# Patient Record
Sex: FEMALE | Race: ASIAN | NOT HISPANIC OR LATINO
[De-identification: names, ages, dates, MRNs, and addresses within clinical notes are randomized per-mention and may not be internally consistent; named-entity substitution may affect disease eponyms.]

---

## 2020-08-07 PROBLEM — Z00.00 ENCOUNTER FOR PREVENTIVE HEALTH EXAMINATION: Status: ACTIVE | Noted: 2020-08-07

## 2020-08-10 ENCOUNTER — APPOINTMENT (OUTPATIENT)
Dept: CARDIOLOGY | Facility: CLINIC | Age: 65
End: 2020-08-10

## 2021-06-18 ENCOUNTER — EMERGENCY (EMERGENCY)
Facility: HOSPITAL | Age: 66
LOS: 1 days | Discharge: HOME | End: 2021-06-18
Attending: EMERGENCY MEDICINE
Payer: MEDICARE

## 2021-06-18 VITALS
WEIGHT: 210.1 LBS | OXYGEN SATURATION: 97 % | HEART RATE: 58 BPM | SYSTOLIC BLOOD PRESSURE: 209 MMHG | TEMPERATURE: 99 F | HEIGHT: 60 IN | DIASTOLIC BLOOD PRESSURE: 79 MMHG | RESPIRATION RATE: 18 BRPM

## 2021-06-18 LAB
ALBUMIN SERPL ELPH-MCNC: 3.7 G/DL — SIGNIFICANT CHANGE UP (ref 3.3–5)
ALP SERPL-CCNC: 90 U/L — SIGNIFICANT CHANGE UP (ref 40–120)
ALT FLD-CCNC: 32 U/L — SIGNIFICANT CHANGE UP (ref 10–45)
ANION GAP SERPL CALC-SCNC: 13 MMOL/L — SIGNIFICANT CHANGE UP (ref 5–17)
APTT BLD: 32 SEC — SIGNIFICANT CHANGE UP (ref 27.5–35.5)
AST SERPL-CCNC: 37 U/L — SIGNIFICANT CHANGE UP (ref 10–40)
BASOPHILS # BLD AUTO: 0.02 K/UL — SIGNIFICANT CHANGE UP (ref 0–0.2)
BASOPHILS NFR BLD AUTO: 0.4 % — SIGNIFICANT CHANGE UP (ref 0–2)
BILIRUB SERPL-MCNC: 0.4 MG/DL — SIGNIFICANT CHANGE UP (ref 0.2–1.2)
BLD GP AB SCN SERPL QL: NEGATIVE — SIGNIFICANT CHANGE UP
BUN SERPL-MCNC: 18 MG/DL — SIGNIFICANT CHANGE UP (ref 7–23)
CALCIUM SERPL-MCNC: 9.8 MG/DL — SIGNIFICANT CHANGE UP (ref 8.4–10.5)
CHLORIDE SERPL-SCNC: 102 MMOL/L — SIGNIFICANT CHANGE UP (ref 96–108)
CO2 SERPL-SCNC: 22 MMOL/L — SIGNIFICANT CHANGE UP (ref 22–31)
CREAT SERPL-MCNC: 0.72 MG/DL — SIGNIFICANT CHANGE UP (ref 0.5–1.3)
EOSINOPHIL # BLD AUTO: 0.15 K/UL — SIGNIFICANT CHANGE UP (ref 0–0.5)
EOSINOPHIL NFR BLD AUTO: 2.8 % — SIGNIFICANT CHANGE UP (ref 0–6)
GLUCOSE SERPL-MCNC: 108 MG/DL — HIGH (ref 70–99)
HCT VFR BLD CALC: 45.3 % — HIGH (ref 34.5–45)
HGB BLD-MCNC: 14.1 G/DL — SIGNIFICANT CHANGE UP (ref 11.5–15.5)
IMM GRANULOCYTES NFR BLD AUTO: 0.2 % — SIGNIFICANT CHANGE UP (ref 0–1.5)
INR BLD: 1.04 RATIO — SIGNIFICANT CHANGE UP (ref 0.88–1.16)
LYMPHOCYTES # BLD AUTO: 1.82 K/UL — SIGNIFICANT CHANGE UP (ref 1–3.3)
LYMPHOCYTES # BLD AUTO: 34.5 % — SIGNIFICANT CHANGE UP (ref 13–44)
MCHC RBC-ENTMCNC: 25.5 PG — LOW (ref 27–34)
MCHC RBC-ENTMCNC: 31.1 GM/DL — LOW (ref 32–36)
MCV RBC AUTO: 81.8 FL — SIGNIFICANT CHANGE UP (ref 80–100)
MONOCYTES # BLD AUTO: 0.39 K/UL — SIGNIFICANT CHANGE UP (ref 0–0.9)
MONOCYTES NFR BLD AUTO: 7.4 % — SIGNIFICANT CHANGE UP (ref 2–14)
NEUTROPHILS # BLD AUTO: 2.89 K/UL — SIGNIFICANT CHANGE UP (ref 1.8–7.4)
NEUTROPHILS NFR BLD AUTO: 54.7 % — SIGNIFICANT CHANGE UP (ref 43–77)
NRBC # BLD: 0 /100 WBCS — SIGNIFICANT CHANGE UP (ref 0–0)
PLATELET # BLD AUTO: 177 K/UL — SIGNIFICANT CHANGE UP (ref 150–400)
POTASSIUM SERPL-MCNC: 5 MMOL/L — SIGNIFICANT CHANGE UP (ref 3.5–5.3)
POTASSIUM SERPL-SCNC: 5 MMOL/L — SIGNIFICANT CHANGE UP (ref 3.5–5.3)
PROT SERPL-MCNC: 8 G/DL — SIGNIFICANT CHANGE UP (ref 6–8.3)
PROTHROM AB SERPL-ACNC: 12.4 SEC — SIGNIFICANT CHANGE UP (ref 10.6–13.6)
RBC # BLD: 5.54 M/UL — HIGH (ref 3.8–5.2)
RBC # FLD: 15.7 % — HIGH (ref 10.3–14.5)
RH IG SCN BLD-IMP: POSITIVE — SIGNIFICANT CHANGE UP
SODIUM SERPL-SCNC: 137 MMOL/L — SIGNIFICANT CHANGE UP (ref 135–145)
WBC # BLD: 5.28 K/UL — SIGNIFICANT CHANGE UP (ref 3.8–10.5)
WBC # FLD AUTO: 5.28 K/UL — SIGNIFICANT CHANGE UP (ref 3.8–10.5)

## 2021-06-18 PROCEDURE — 93971 EXTREMITY STUDY: CPT

## 2021-06-18 PROCEDURE — 99283 EMERGENCY DEPT VISIT LOW MDM: CPT

## 2021-06-18 PROCEDURE — 86900 BLOOD TYPING SEROLOGIC ABO: CPT

## 2021-06-18 PROCEDURE — 73590 X-RAY EXAM OF LOWER LEG: CPT | Mod: 26,LT

## 2021-06-18 PROCEDURE — 93005 ELECTROCARDIOGRAM TRACING: CPT

## 2021-06-18 PROCEDURE — 85025 COMPLETE CBC W/AUTO DIFF WBC: CPT

## 2021-06-18 PROCEDURE — 85730 THROMBOPLASTIN TIME PARTIAL: CPT

## 2021-06-18 PROCEDURE — 99284 EMERGENCY DEPT VISIT MOD MDM: CPT | Mod: 25

## 2021-06-18 PROCEDURE — 86850 RBC ANTIBODY SCREEN: CPT

## 2021-06-18 PROCEDURE — U0003: CPT

## 2021-06-18 PROCEDURE — 99284 EMERGENCY DEPT VISIT MOD MDM: CPT

## 2021-06-18 PROCEDURE — 73610 X-RAY EXAM OF ANKLE: CPT | Mod: 26,LT

## 2021-06-18 PROCEDURE — 73610 X-RAY EXAM OF ANKLE: CPT

## 2021-06-18 PROCEDURE — 73590 X-RAY EXAM OF LOWER LEG: CPT

## 2021-06-18 PROCEDURE — 93010 ELECTROCARDIOGRAM REPORT: CPT

## 2021-06-18 PROCEDURE — 80053 COMPREHEN METABOLIC PANEL: CPT

## 2021-06-18 PROCEDURE — 73562 X-RAY EXAM OF KNEE 3: CPT | Mod: 26,LT

## 2021-06-18 PROCEDURE — 85610 PROTHROMBIN TIME: CPT

## 2021-06-18 PROCEDURE — 93971 EXTREMITY STUDY: CPT | Mod: 26,LT

## 2021-06-18 PROCEDURE — 86901 BLOOD TYPING SEROLOGIC RH(D): CPT

## 2021-06-18 PROCEDURE — 73562 X-RAY EXAM OF KNEE 3: CPT

## 2021-06-18 PROCEDURE — U0005: CPT

## 2021-06-18 RX ORDER — ACETAMINOPHEN 500 MG
975 TABLET ORAL ONCE
Refills: 0 | Status: COMPLETED | OUTPATIENT
Start: 2021-06-18 | End: 2021-06-18

## 2021-06-18 RX ADMIN — Medication 975 MILLIGRAM(S): at 22:04

## 2021-06-18 NOTE — ED ADULT NURSE NOTE - OBJECTIVE STATEMENT
66 year old female with a past medical history of hypertension and high cholesterol presents to the ED c/o left leg/ ankle swelling and pain. Pt is A&Ox3, speaking coherently. Pt reports fracturing her left ankle "a few weeks ago" s/p fall. Pt was in [place] at this time, and was seen and evaluated for the fracture there. [place] back to the [place] from [place] on 6/16. Today, pt presents with increased pain and swelling to the left leg/ ankle. On exam, left foot is swollen and painful to the touch. Pt denies fever, chills, shortness of breath, nausea, vomiting, diarrhea. Pt is not on any anticoagulation.

## 2021-06-18 NOTE — ED ADULT NURSE NOTE - TEMPLATE LIST FOR HEAD TO TOE ASSESSMENT
[address] [address] [address]  [address] [place] [address]  Phone: [phone]  Fax: [phone]    Perry Quach NP        March 7, 2018     Patient: Lyly Chakraborty   YOB: 2003   Date of Visit: 3/7/2018       To Whom It May Concern: It is my medical opinion that Lyly Chakraborty should remain out of school 3/7 and 3/8/2018 due to illness. If you have any questions or concerns, please don't hesitate to call.     Sincerely,        Perry Quach NP General

## 2021-06-18 NOTE — CONSULT NOTE ADULT - SUBJECTIVE AND OBJECTIVE BOX
Patient is a 66yFemale community ambulator without assistive devices who presents to [facility] ED w/ a c/o of a left tibial shaft frcture nonunnion. Ptaient is Albanian speaking, daughter at bedside. Patient fell 3 months ago while in [place]. Was treated nonoperatively with a cast for the left tibial shaft fracture. The cast was removed after 6 weeks, and the patient has not ambulated since the fall. Patient has a deformity to the left LE but denies pain. Denies radiation of pain elsewhere. States inability to ambulate since the injury. Denies any numbness or tingling. Denies having any other pain elsewhere. Denies any previous orthopaedic history. No other orthopaedic concerns at this time.    PAST MEDICAL & SURGICAL HISTORY:      No Known Allergies      PHYSICAL EXAM:  T(C): 36.6 (06-18-21 @ 22:19), Max: 37.1 (06-18-21 @ 18:39)  HR: 58 (06-18-21 @ 22:19) (58 - 58)  BP: 160/79 (06-18-21 @ 22:19) (160/79 - 209/79)  RR: 20 (06-18-21 @ 22:19) (18 - 20)  SpO2: 96% (06-18-21 @ 22:19) (96% - 97%)    Gen: NAD, Resting comfortably    LLE:  Skin intact, no erythema or ecchymosis. Notable deformity to the distal tibia which is stable and bony  No bony tenderness to palpation  Stable to SLR, and varus/valgus stress to the previous fracture site  +EHL/FHL/TA/GSC  +SILT L3-S1  + DP  Compartments soft and compressible  No calf tenderness    Secondary Survey:   No TTP over bony prominences, SILT, palpable pulses, full/painless A/PROM, compartments soft. No TTP over spinous processes or paraspinal muscles at C/T/L spine. No palpable step off. No other injuries or complaints.      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 Duenas outpatient on Monday. Call office to schedule appointment.  -All Patient's and Family Member's questions answered. Patient/family understand and agree w/ plan. Discussed with Daughter at bedside.  -Imaging and clinical presentation discussed w/ Dr. Duenas who is aware and agrees w/ above plan.

## 2021-06-18 NOTE — ED PROVIDER NOTE - PATIENT PORTAL LINK FT
You can access the FollowMyHealth Patient Portal offered by [facility] by registering at the following website: [email or web address]. By joining [employer]’s FollowMyHealth portal, you will also be able to view your health information using other applications (apps) compatible with our system.

## 2021-06-18 NOTE — ED PROVIDER NOTE - CARE PROVIDER_API CALL
Jimenez Duenas (MD)  Orthopaedic Surgery  [address], [address]  [place] [address]  Phone: [phone]  Fax: [phone]  Scheduled Appointment: 06/21/2021

## 2021-06-18 NOTE — ED PROVIDER NOTE - ATTENDING CONTRIBUTION TO CARE
Attending MD Sanchez:   I personally have seen and examined this patient.  Physician assistant note reviewed and agree on plan of care and except where noted.  See HPI, PE, and MDM for details.

## 2021-06-18 NOTE — ED PROVIDER NOTE - PROGRESS NOTE DETAILS
Attending MD Sanchez: orthopedics has seen patient. Non union will certainly need surgery, however ortho team feels surgery is best planned as outpatient. Patient should remain NWB on LLE until then. Plan for follow up Monday with Dr. Duenas. Will follow up US ro DVT

## 2021-06-18 NOTE — ED PROVIDER NOTE - CARE PLAN
Principal Discharge DX:	Closed displaced pilon fracture of left tibia with nonunion, subsequent encounter  Secondary Diagnosis:	Closed fracture of distal end of left fibula with nonunion, unspecified fracture morphology, subsequent encounter

## 2021-06-18 NOTE — ED PROVIDER NOTE - PHYSICAL EXAMINATION
A&Ox3, NAD. NCAT. PERRL, EOMI. Neck supple, no LAD. Lungs CTAB. +S1S2, RRR, No m/r/g. Abd soft, NT/ND, +BS, no rebound or guarding. Extremities: cap refill <2, pulses in distal L extremities 4+,  Skin without rash. CN II-XII intact. Strength 5/5 UE/LE. Sensations intact throughout. + LLE valgus deformity of the ankle, + 2+ edema, + pulses appreciated by doppler.

## 2021-06-18 NOTE — ED PROVIDER NOTE - NSFOLLOWUPINSTRUCTIONS_ED_ALL_ED_FT
1. You were seen in the ED for ankle pain. Your x-rays show non-union (failure of the bone to heal correctly) of a previously tibia and fibula fracture.      2. This fracture will need surgery to fix it. You were seen by our orthopedics doctors who would like you to follow up with Dr Jerry Duenas this Monday. Please call the office first thing in the morning to schedule the appointment. (number provided).      3. You may use Tylenol 650mg every 8 hours or Motrin 600mg every 8 hours as needed for pain. These are over the counter medications.      4. Please continue to not walk on the leg until you see Dr. Duenas.     5. Return for worsening pain or new numbness or tingling of the leg.

## 2021-06-18 NOTE — ED PROVIDER NOTE - OBJECTIVE STATEMENT
67 yo female with pmh htn here for evaluation of left ankle fracture sustained 3 months ago. Pt from [place], sustained mechanical fall 3 months ago in the bathroom resulting in ankle fracture which required surgical intervention which pt did not wish to undergo at the time. Pt was placed in cast x 6 weeks instead. Pt subsequently since fracture has been bedridden as anle too painful to ambulate on, 2 days ago came to [place] to live with her daughter and is here today to establish care. Pt endorses left ankle/foot swelling which has recently increased, denies chest pain o shortness of breath, no difficulty breathing, abdominal pain, no other complaints. PT has not taken htn medications today as she ran out of medication, pcp is sending a new script today.

## 2021-06-18 NOTE — ED PROVIDER NOTE - SECONDARY DIAGNOSIS.
Closed fracture of distal end of left fibula with nonunion, unspecified fracture morphology, subsequent encounter

## 2021-06-18 NOTE — ED PROVIDER NOTE - CLINICAL SUMMARY MEDICAL DECISION MAKING FREE TEXT BOX
Attending MD Sanchez:   66F with history of left ankle fracture 3 months ago, offered surgery in [place] but declined, was immobilized with cast, presenting with persistent left ankle pain and inability to ambulate due to pain for weeks. [place] arrived in [place]. Exam shows valgus deformity of left ankle with palpable prominent callus. No focal bony ttp on exam. Ddx includes chronic non-union of left ankle fx, DVT. Plan for XRs, ortho consult, US      *The above represents an initial assessment/impression. Please refer to progress notes for potential changes in patient clinical course*

## 2021-06-19 VITALS
HEART RATE: 61 BPM | RESPIRATION RATE: 16 BRPM | DIASTOLIC BLOOD PRESSURE: 66 MMHG | OXYGEN SATURATION: 97 % | SYSTOLIC BLOOD PRESSURE: 149 MMHG | TEMPERATURE: 97 F

## 2021-06-19 LAB — SARS-COV-2 RNA SPEC QL NAA+PROBE: SIGNIFICANT CHANGE UP

## 2021-06-23 ENCOUNTER — APPOINTMENT (OUTPATIENT)
Dept: ORTHOPEDIC SURGERY | Facility: CLINIC | Age: 66
End: 2021-06-23
Payer: MEDICARE

## 2021-06-23 VITALS
DIASTOLIC BLOOD PRESSURE: 92 MMHG | HEIGHT: 60 IN | WEIGHT: 215 LBS | SYSTOLIC BLOOD PRESSURE: 192 MMHG | BODY MASS INDEX: 42.21 KG/M2 | HEART RATE: 67 BPM

## 2021-06-23 DIAGNOSIS — Z78.9 OTHER SPECIFIED HEALTH STATUS: ICD-10-CM

## 2021-06-23 PROCEDURE — 99204 OFFICE O/P NEW MOD 45 MIN: CPT

## 2021-06-28 ENCOUNTER — OUTPATIENT (OUTPATIENT)
Dept: OUTPATIENT SERVICES | Facility: HOSPITAL | Age: 66
LOS: 1 days | End: 2021-06-28
Payer: MEDICARE

## 2021-06-28 VITALS
RESPIRATION RATE: 16 BRPM | OXYGEN SATURATION: 98 % | SYSTOLIC BLOOD PRESSURE: 176 MMHG | DIASTOLIC BLOOD PRESSURE: 82 MMHG | HEIGHT: 60 IN | TEMPERATURE: 98 F | HEART RATE: 77 BPM | WEIGHT: 214.95 LBS

## 2021-06-28 DIAGNOSIS — Z90.49 ACQUIRED ABSENCE OF OTHER SPECIFIED PARTS OF DIGESTIVE TRACT: Chronic | ICD-10-CM

## 2021-06-28 DIAGNOSIS — I10 ESSENTIAL (PRIMARY) HYPERTENSION: ICD-10-CM

## 2021-06-28 DIAGNOSIS — S82.202K UNSPECIFIED FRACTURE OF SHAFT OF LEFT TIBIA, SUBSEQUENT ENCOUNTER FOR CLOSED FRACTURE WITH NONUNION: ICD-10-CM

## 2021-06-28 DIAGNOSIS — S82.209A UNSPECIFIED FRACTURE OF SHAFT OF UNSPECIFIED TIBIA, INITIAL ENCOUNTER FOR CLOSED FRACTURE: ICD-10-CM

## 2021-06-28 LAB
ANION GAP SERPL CALC-SCNC: 13 MMOL/L — SIGNIFICANT CHANGE UP (ref 7–14)
BLD GP AB SCN SERPL QL: NEGATIVE — SIGNIFICANT CHANGE UP
BUN SERPL-MCNC: 12 MG/DL — SIGNIFICANT CHANGE UP (ref 7–23)
CALCIUM SERPL-MCNC: 10.1 MG/DL — SIGNIFICANT CHANGE UP (ref 8.4–10.5)
CHLORIDE SERPL-SCNC: 100 MMOL/L — SIGNIFICANT CHANGE UP (ref 98–107)
CO2 SERPL-SCNC: 24 MMOL/L — SIGNIFICANT CHANGE UP (ref 22–31)
CREAT SERPL-MCNC: 0.63 MG/DL — SIGNIFICANT CHANGE UP (ref 0.5–1.3)
GLUCOSE SERPL-MCNC: 95 MG/DL — SIGNIFICANT CHANGE UP (ref 70–99)
HCT VFR BLD CALC: 51 % — HIGH (ref 34.5–45)
HGB BLD-MCNC: 15.9 G/DL — HIGH (ref 11.5–15.5)
MCHC RBC-ENTMCNC: 26.2 PG — LOW (ref 27–34)
MCHC RBC-ENTMCNC: 31.2 GM/DL — LOW (ref 32–36)
MCV RBC AUTO: 83.9 FL — SIGNIFICANT CHANGE UP (ref 80–100)
NRBC # BLD: 0 /100 WBCS — SIGNIFICANT CHANGE UP
NRBC # FLD: 0 K/UL — SIGNIFICANT CHANGE UP
PLATELET # BLD AUTO: 160 K/UL — SIGNIFICANT CHANGE UP (ref 150–400)
POTASSIUM SERPL-MCNC: 4.4 MMOL/L — SIGNIFICANT CHANGE UP (ref 3.5–5.3)
POTASSIUM SERPL-SCNC: 4.4 MMOL/L — SIGNIFICANT CHANGE UP (ref 3.5–5.3)
RBC # BLD: 6.08 M/UL — HIGH (ref 3.8–5.2)
RBC # FLD: 16.6 % — HIGH (ref 10.3–14.5)
RH IG SCN BLD-IMP: POSITIVE — SIGNIFICANT CHANGE UP
SODIUM SERPL-SCNC: 137 MMOL/L — SIGNIFICANT CHANGE UP (ref 135–145)
WBC # BLD: 6.69 K/UL — SIGNIFICANT CHANGE UP (ref 3.8–10.5)
WBC # FLD AUTO: 6.69 K/UL — SIGNIFICANT CHANGE UP (ref 3.8–10.5)

## 2021-06-28 PROCEDURE — 93010 ELECTROCARDIOGRAM REPORT: CPT

## 2021-06-28 RX ORDER — SODIUM CHLORIDE 9 MG/ML
1000 INJECTION, SOLUTION INTRAVENOUS
Refills: 0 | Status: DISCONTINUED | OUTPATIENT
Start: 2021-07-02 | End: 2021-07-06

## 2021-06-28 NOTE — H&P PST ADULT - NSICDXPASTMEDICALHX_GEN_ALL_CORE_FT
PAST MEDICAL HISTORY:  Hyperlipidemia     Hypertension     Obesity      PAST MEDICAL HISTORY:  H/O fracture of tibia and fibula (L)    Hyperlipidemia     Hypertension     Obesity

## 2021-06-28 NOTE — H&P PST ADULT - MUSCULOSKELETAL COMMENTS
hx of Left tibia and fibula fracture 3 months ago with non union.  She injured her left leg when she fell in her [place] country of [place].  Now scheduled for Reconstruction Left tibia nonunion. LLE with ace wrap.

## 2021-06-28 NOTE — H&P PST ADULT - NSICDXPROBLEM_GEN_ALL_CORE_FT
PROBLEM DIAGNOSES  Problem: Tibia fracture  Assessment and Plan: Reconstruction Left tibia nonunion.  Preop instructions and antibacterial soap given and explained (verbal and written), with teach back.   ULISES prtecautions, OR booking informed    Problem: Hypertension  Assessment and Plan: Pt advised to see PMD for pre-op eval, requested on chart.  Pt to take her Losartan morning of surgery wi\h sip of water

## 2021-06-28 NOTE — H&P PST ADULT - HISTORY OF PRESENT ILLNESS
67 y/o female with hx of Left tibia and fibula fracture 3 months ago with non union.  She injured her left leg when she fell in her [place] country of [place].  Now scheduled for Reconstruction Left tibia nonunion.

## 2021-06-28 NOTE — H&P PST ADULT - NSANTHOSAYNRD_GEN_A_CORE
No. ULISES screening performed.  STOP BANG Legend: 0-2 = LOW Risk; 3-4 = INTERMEDIATE Risk; 5-8 = HIGH Risk

## 2021-06-28 NOTE — H&P PST ADULT - SOURCE OF INFORMATION, PROFILE
patient Pt speaks Khmer.  anahy presented to PST with pt and assisted with interpretation. at pt's request/patient/family

## 2021-06-29 ENCOUNTER — APPOINTMENT (OUTPATIENT)
Dept: DISASTER EMERGENCY | Facility: CLINIC | Age: 66
End: 2021-06-29

## 2021-06-30 LAB — SARS-COV-2 N GENE NPH QL NAA+PROBE: NOT DETECTED

## 2021-07-01 ENCOUNTER — TRANSCRIPTION ENCOUNTER (OUTPATIENT)
Age: 66
End: 2021-07-01

## 2021-07-02 ENCOUNTER — INPATIENT (INPATIENT)
Facility: HOSPITAL | Age: 66
LOS: 3 days | Discharge: HOME | End: 2021-07-06
Attending: ORTHOPAEDIC SURGERY | Admitting: ORTHOPAEDIC SURGERY
Payer: MEDICARE

## 2021-07-02 ENCOUNTER — TRANSCRIPTION ENCOUNTER (OUTPATIENT)
Age: 66
End: 2021-07-02

## 2021-07-02 ENCOUNTER — APPOINTMENT (OUTPATIENT)
Dept: ORTHOPEDIC SURGERY | Facility: HOSPITAL | Age: 66
End: 2021-07-02

## 2021-07-02 VITALS
RESPIRATION RATE: 16 BRPM | SYSTOLIC BLOOD PRESSURE: 176 MMHG | TEMPERATURE: 98 F | DIASTOLIC BLOOD PRESSURE: 71 MMHG | HEART RATE: 85 BPM | HEIGHT: 60 IN | OXYGEN SATURATION: 96 % | WEIGHT: 214.95 LBS

## 2021-07-02 DIAGNOSIS — S82.202K UNSPECIFIED FRACTURE OF SHAFT OF LEFT TIBIA, SUBSEQUENT ENCOUNTER FOR CLOSED FRACTURE WITH NONUNION: ICD-10-CM

## 2021-07-02 DIAGNOSIS — Z90.49 ACQUIRED ABSENCE OF OTHER SPECIFIED PARTS OF DIGESTIVE TRACT: Chronic | ICD-10-CM

## 2021-07-02 LAB
ANION GAP SERPL CALC-SCNC: 11 MMOL/L — SIGNIFICANT CHANGE UP (ref 7–14)
BUN SERPL-MCNC: 16 MG/DL — SIGNIFICANT CHANGE UP (ref 7–23)
CALCIUM SERPL-MCNC: 9.1 MG/DL — SIGNIFICANT CHANGE UP (ref 8.4–10.5)
CHLORIDE SERPL-SCNC: 102 MMOL/L — SIGNIFICANT CHANGE UP (ref 98–107)
CO2 SERPL-SCNC: 23 MMOL/L — SIGNIFICANT CHANGE UP (ref 22–31)
CREAT SERPL-MCNC: 0.73 MG/DL — SIGNIFICANT CHANGE UP (ref 0.5–1.3)
GLUCOSE SERPL-MCNC: 132 MG/DL — HIGH (ref 70–99)
HCT VFR BLD CALC: 45.7 % — HIGH (ref 34.5–45)
HGB BLD-MCNC: 14.4 G/DL — SIGNIFICANT CHANGE UP (ref 11.5–15.5)
MCHC RBC-ENTMCNC: 26 PG — LOW (ref 27–34)
MCHC RBC-ENTMCNC: 31.5 GM/DL — LOW (ref 32–36)
MCV RBC AUTO: 82.5 FL — SIGNIFICANT CHANGE UP (ref 80–100)
NRBC # BLD: 0 /100 WBCS — SIGNIFICANT CHANGE UP
NRBC # FLD: 0 K/UL — SIGNIFICANT CHANGE UP
PLATELET # BLD AUTO: 156 K/UL — SIGNIFICANT CHANGE UP (ref 150–400)
POTASSIUM SERPL-MCNC: 4.4 MMOL/L — SIGNIFICANT CHANGE UP (ref 3.5–5.3)
POTASSIUM SERPL-SCNC: 4.4 MMOL/L — SIGNIFICANT CHANGE UP (ref 3.5–5.3)
RBC # BLD: 5.54 M/UL — HIGH (ref 3.8–5.2)
RBC # FLD: 15.3 % — HIGH (ref 10.3–14.5)
SODIUM SERPL-SCNC: 136 MMOL/L — SIGNIFICANT CHANGE UP (ref 135–145)
WBC # BLD: 12.32 K/UL — HIGH (ref 3.8–10.5)
WBC # FLD AUTO: 12.32 K/UL — HIGH (ref 3.8–10.5)

## 2021-07-02 PROCEDURE — 27759 TREATMENT OF TIBIA FRACTURE: CPT | Mod: LT

## 2021-07-02 PROCEDURE — 27726 REPAIR FIBULA NONUNION: CPT | Mod: LT

## 2021-07-02 PROCEDURE — 27724 REPAIR/GRAFT OF TIBIA: CPT | Mod: LT

## 2021-07-02 RX ORDER — PANTOPRAZOLE SODIUM 20 MG/1
40 TABLET, DELAYED RELEASE ORAL
Refills: 0 | Status: DISCONTINUED | OUTPATIENT
Start: 2021-07-02 | End: 2021-07-06

## 2021-07-02 RX ORDER — AMLODIPINE BESYLATE 2.5 MG/1
10 TABLET ORAL DAILY
Refills: 0 | Status: DISCONTINUED | OUTPATIENT
Start: 2021-07-02 | End: 2021-07-06

## 2021-07-02 RX ORDER — HYDROMORPHONE HYDROCHLORIDE 2 MG/ML
0.5 INJECTION INTRAMUSCULAR; INTRAVENOUS; SUBCUTANEOUS
Refills: 0 | Status: DISCONTINUED | OUTPATIENT
Start: 2021-07-02 | End: 2021-07-02

## 2021-07-02 RX ORDER — CEFAZOLIN SODIUM 1 G
2000 VIAL (EA) INJECTION EVERY 8 HOURS
Refills: 0 | Status: COMPLETED | OUTPATIENT
Start: 2021-07-02 | End: 2021-07-03

## 2021-07-02 RX ORDER — SENNA PLUS 8.6 MG/1
2 TABLET ORAL AT BEDTIME
Refills: 0 | Status: DISCONTINUED | OUTPATIENT
Start: 2021-07-02 | End: 2021-07-06

## 2021-07-02 RX ORDER — ATORVASTATIN CALCIUM 80 MG/1
10 TABLET, FILM COATED ORAL DAILY
Refills: 0 | Status: DISCONTINUED | OUTPATIENT
Start: 2021-07-02 | End: 2021-07-06

## 2021-07-02 RX ORDER — ATORVASTATIN CALCIUM 80 MG/1
10 TABLET, FILM COATED ORAL DAILY
Refills: 0 | Status: DISCONTINUED | OUTPATIENT
Start: 2021-07-02 | End: 2021-07-02

## 2021-07-02 RX ORDER — ASPIRIN/CALCIUM CARB/MAGNESIUM 324 MG
325 TABLET ORAL
Refills: 0 | Status: DISCONTINUED | OUTPATIENT
Start: 2021-07-02 | End: 2021-07-06

## 2021-07-02 RX ORDER — OXYCODONE HYDROCHLORIDE 5 MG/1
5 TABLET ORAL EVERY 4 HOURS
Refills: 0 | Status: DISCONTINUED | OUTPATIENT
Start: 2021-07-02 | End: 2021-07-06

## 2021-07-02 RX ORDER — MAGNESIUM HYDROXIDE 400 MG/1
30 TABLET, CHEWABLE ORAL DAILY
Refills: 0 | Status: DISCONTINUED | OUTPATIENT
Start: 2021-07-02 | End: 2021-07-06

## 2021-07-02 RX ORDER — OXYCODONE HYDROCHLORIDE 5 MG/1
2.5 TABLET ORAL EVERY 4 HOURS
Refills: 0 | Status: DISCONTINUED | OUTPATIENT
Start: 2021-07-02 | End: 2021-07-06

## 2021-07-02 RX ORDER — ACETAMINOPHEN 500 MG
975 TABLET ORAL EVERY 8 HOURS
Refills: 0 | Status: DISCONTINUED | OUTPATIENT
Start: 2021-07-02 | End: 2021-07-06

## 2021-07-02 RX ORDER — SODIUM CHLORIDE 9 MG/ML
1000 INJECTION INTRAMUSCULAR; INTRAVENOUS; SUBCUTANEOUS
Refills: 0 | Status: DISCONTINUED | OUTPATIENT
Start: 2021-07-02 | End: 2021-07-06

## 2021-07-02 RX ORDER — ONDANSETRON 8 MG/1
4 TABLET, FILM COATED ORAL ONCE
Refills: 0 | Status: DISCONTINUED | OUTPATIENT
Start: 2021-07-02 | End: 2021-07-02

## 2021-07-02 RX ORDER — LOSARTAN POTASSIUM 100 MG/1
25 TABLET, FILM COATED ORAL DAILY
Refills: 0 | Status: DISCONTINUED | OUTPATIENT
Start: 2021-07-02 | End: 2021-07-06

## 2021-07-02 RX ADMIN — HYDROMORPHONE HYDROCHLORIDE 0.5 MILLIGRAM(S): 2 INJECTION INTRAMUSCULAR; INTRAVENOUS; SUBCUTANEOUS at 16:18

## 2021-07-02 RX ADMIN — HYDROMORPHONE HYDROCHLORIDE 0.5 MILLIGRAM(S): 2 INJECTION INTRAMUSCULAR; INTRAVENOUS; SUBCUTANEOUS at 17:45

## 2021-07-02 RX ADMIN — HYDROMORPHONE HYDROCHLORIDE 0.5 MILLIGRAM(S): 2 INJECTION INTRAMUSCULAR; INTRAVENOUS; SUBCUTANEOUS at 18:10

## 2021-07-02 RX ADMIN — Medication 100 MILLIGRAM(S): at 21:34

## 2021-07-02 RX ADMIN — Medication 975 MILLIGRAM(S): at 23:12

## 2021-07-02 RX ADMIN — HYDROMORPHONE HYDROCHLORIDE 0.5 MILLIGRAM(S): 2 INJECTION INTRAMUSCULAR; INTRAVENOUS; SUBCUTANEOUS at 17:53

## 2021-07-02 RX ADMIN — SODIUM CHLORIDE 125 MILLILITER(S): 9 INJECTION INTRAMUSCULAR; INTRAVENOUS; SUBCUTANEOUS at 19:02

## 2021-07-02 RX ADMIN — HYDROMORPHONE HYDROCHLORIDE 0.5 MILLIGRAM(S): 2 INJECTION INTRAMUSCULAR; INTRAVENOUS; SUBCUTANEOUS at 15:44

## 2021-07-02 RX ADMIN — SENNA PLUS 2 TABLET(S): 8.6 TABLET ORAL at 21:34

## 2021-07-02 RX ADMIN — OXYCODONE HYDROCHLORIDE 5 MILLIGRAM(S): 5 TABLET ORAL at 23:13

## 2021-07-02 RX ADMIN — HYDROMORPHONE HYDROCHLORIDE 0.5 MILLIGRAM(S): 2 INJECTION INTRAMUSCULAR; INTRAVENOUS; SUBCUTANEOUS at 16:15

## 2021-07-02 RX ADMIN — HYDROMORPHONE HYDROCHLORIDE 0.5 MILLIGRAM(S): 2 INJECTION INTRAMUSCULAR; INTRAVENOUS; SUBCUTANEOUS at 18:00

## 2021-07-02 NOTE — DISCHARGE NOTE PROVIDER - NSDCMRMEDTOKEN_GEN_ALL_CORE_FT
amLODIPine 10 mg oral tablet: 1 tab(s) orally once a day  atorvastatin 10 mg oral tablet: 1 tab(s) orally once a day  losartan 25 mg oral tablet: 1 tab(s) orally once a day   acetaminophen 325 mg oral tablet: 3 tab(s) orally every 8 hours  amLODIPine 10 mg oral tablet: 1 tab(s) orally once a day  aspirin 325 mg oral delayed release tablet: 1 tab(s) orally 2 times a day MDD:2  atorvastatin 10 mg oral tablet: 1 tab(s) orally once a day  losartan 25 mg oral tablet: 1 tab(s) orally once a day  oxyCODONE 5 mg oral tablet: 1-2 tab(s) orally every 6 hours, As Needed moderate to severe pain MDD:8  pantoprazole 40 mg oral delayed release tablet: 1 tab(s) orally once a day (before a meal)  senna oral tablet: 2 tab(s) orally once a day (at bedtime)

## 2021-07-02 NOTE — DISCHARGE NOTE PROVIDER - INSTRUCTIONS
continue diet as was prior to surgery, and as was discussed with PMD  resume same diet as prior to surgery

## 2021-07-02 NOTE — DISCHARGE NOTE PROVIDER - NSDCCPCAREPLAN_GEN_ALL_CORE_FT
PRINCIPAL DISCHARGE DIAGNOSIS  Diagnosis: Tibia fracture  Assessment and Plan of Treatment:        PRINCIPAL DISCHARGE DIAGNOSIS  Diagnosis: Closed fracture of shaft of left tibia with nonunion  Assessment and Plan of Treatment: 66year old female is admitted for elective Left tibia intramedullary nail for nonunion fracture 7/2/2021 without any intraoperative complications.  Patient is doing well and stable for discharge.  Patient is tolerating physical therapy: weight bearing to Left leg, out of bed for gait training, and exercises as shown by Physical Therapy.  Keep wound dressing on as is until day of office visit.  Staples/sutures if applicable, to be removed in the office 14 days from date of surgery.  Patient is on Aspirin (MUST TAKE WITH FOOD) for anticoagulation.  Orthopaedic Surgeon Dr. Duenas would like patient to call private MD/Internist for appointment postop to maintain continuity of care.  Follow up with Dr. Duenas's office in 1-2 weeks.   Istop reviewed       PRINCIPAL DISCHARGE DIAGNOSIS  Diagnosis: Closed fracture of shaft of left tibia with nonunion  Assessment and Plan of Treatment: 66 year old female history of left tibial shaft nonunion admitted for surgery. Patient is s/p elective Left tibia intramedullary nail for nonunion fracture with Dr. Duenas on 7/2/2021 without any intraoperative complications.  Patient is doing well and stable for discharge.  Patient is tolerating physical therapy: weight bearing as tolerated to Left leg, out of bed for gait training, and exercises as shown by Physical Therapy.  Keep wound dressing on as is until day of office visit.  Staples/sutures if applicable, to be removed in the office 14 days from date of surgery.  Patient is on Aspirin (MUST TAKE WITH FOOD) for anticoagulation.  Orthopaedic Surgeon Dr. Duenas would like patient to call private MD/Internist for appointment postop to maintain continuity of care.  Follow up with Dr. Duenas's office in 1-2 weeks.   Istop reviewed

## 2021-07-02 NOTE — ASU PREOP CHECKLIST - SELECT TESTS ORDERED
BMP/CBC/Type and Screen/EKG BMP/CBC/Type and Cross/Type and Screen/EKG BMP/CBC/Type and Cross/Type and Screen/EKG/COVID-19

## 2021-07-02 NOTE — DISCHARGE NOTE PROVIDER - HOSPITAL COURSE
65 y/o emapuma presents to [facility] for orthopedic surgery. Patient s/p Left tibia IMN with Dr. Duenas on 7/2/21. Patient tolerated the procedure well without any intraoperative complications. Patient tolerated physical therapy well, pain is controlled. Pt is weight bearing as tolerated left lower extremity with cane/walker as needed. Seen by medical attending for continuity of care and management and cleared for safe discharge. Keep dressing/incision clean, dry and intact. Any suture/staples to be removed on post-op day #14 at office visit. Pt is on  Aspirin 325mg  BID  for DVT prophylaxis, please take for 6 weeks unless otherwise instructed by your surgeon. Please follow up with Dr. Duenas in 1-2 weeks, call office to make appointment, [phone]. Please follow up with your PMD for continuity of care and management as medications may have changed. 66year old female is admitted for elective Left tibia intramedullary nail for nonunion fracture 7/2/2021 without any intraoperative complications.  Patient is doing well and stable for discharge.  Patient is tolerating physical therapy: weight bearing to Left leg, out of bed for gait training, and exercises as shown by Physical Therapy.  Keep wound dressing on as is until day of office visit.  Staples/sutures if applicable, to be removed in the office 14 days from date of surgery.  Patient is on Aspirin (MUST TAKE WITH FOOD) for anticoagulation.  Orthopaedic Surgeon Dr. Duenas would like patient to call private MD/Internist for appointment postop to maintain continuity of care.  Follow up with Dr. Duenas's office in 1-2 weeks.     Istop reviewed 66 year old female history of left tibial shaft nonunion admitted for surgery. Patient is s/p elective Left tibia intramedullary nail for nonunion fracture with Dr. Duenas on 7/2/2021 without any intraoperative complications.  Patient is doing well and stable for discharge.  Patient is tolerating physical therapy: weight bearing as tolerated to Left leg, out of bed for gait training, and exercises as shown by Physical Therapy.  Keep wound dressing on as is until day of office visit.  Staples/sutures if applicable, to be removed in the office 14 days from date of surgery.  Patient is on Aspirin (MUST TAKE WITH FOOD) for anticoagulation.  Orthopaedic Surgeon Dr. Duenas would like patient to call private MD/Internist for appointment postop to maintain continuity of care.  Follow up with Dr. Duenas's office in 1-2 weeks.     Istop reviewed

## 2021-07-02 NOTE — ASU PREOP CHECKLIST - TYPE OF SOLUTION
Detail Level: Simple Additional Notes: Patient is going to be having surgery next week will do bloodwork and any other additional testing Regarding a possible Lupus diagnosis. Patient reports she has been diagnosed with lupus in the past but never treated.\\nPatient prefers desonide cream lactated ringers

## 2021-07-02 NOTE — DISCHARGE NOTE PROVIDER - CARE PROVIDER_API CALL
Jimenez Duenas (MD)  Orthopaedic Surgery  [address]  [place] [address]  Phone: [phone]  Fax: [phone]  Follow Up Time: 2 weeks

## 2021-07-02 NOTE — DISCHARGE NOTE PROVIDER - NSDCACTIVITY_GEN_ALL_CORE
Do not drive or operate machinery/Showering allowed/Do not make important decisions/Stairs allowed/No heavy lifting/straining/Walking - Outdoors allowed

## 2021-07-02 NOTE — DISCHARGE NOTE PROVIDER - NSDCFUADDINST_GEN_ALL_CORE_FT
weight bear as tolerated left lower extremity dressing is water resistant but still keep direct stream of water away from the dressing  no swimming or bathing where wound is exposed to water for long periods of time

## 2021-07-03 DIAGNOSIS — I10 ESSENTIAL (PRIMARY) HYPERTENSION: ICD-10-CM

## 2021-07-03 DIAGNOSIS — Z29.9 ENCOUNTER FOR PROPHYLACTIC MEASURES, UNSPECIFIED: ICD-10-CM

## 2021-07-03 DIAGNOSIS — E78.5 HYPERLIPIDEMIA, UNSPECIFIED: ICD-10-CM

## 2021-07-03 LAB
ANION GAP SERPL CALC-SCNC: 12 MMOL/L — SIGNIFICANT CHANGE UP (ref 7–14)
BUN SERPL-MCNC: 18 MG/DL — SIGNIFICANT CHANGE UP (ref 7–23)
CALCIUM SERPL-MCNC: 9.3 MG/DL — SIGNIFICANT CHANGE UP (ref 8.4–10.5)
CHLORIDE SERPL-SCNC: 101 MMOL/L — SIGNIFICANT CHANGE UP (ref 98–107)
CO2 SERPL-SCNC: 24 MMOL/L — SIGNIFICANT CHANGE UP (ref 22–31)
COVID-19 SPIKE DOMAIN AB INTERP: NEGATIVE — SIGNIFICANT CHANGE UP
COVID-19 SPIKE DOMAIN ANTIBODY RESULT: 0.4 U/ML — SIGNIFICANT CHANGE UP
CREAT SERPL-MCNC: 0.68 MG/DL — SIGNIFICANT CHANGE UP (ref 0.5–1.3)
GLUCOSE SERPL-MCNC: 138 MG/DL — HIGH (ref 70–99)
HCT VFR BLD CALC: 42.7 % — SIGNIFICANT CHANGE UP (ref 34.5–45)
HGB BLD-MCNC: 13.2 G/DL — SIGNIFICANT CHANGE UP (ref 11.5–15.5)
MAGNESIUM SERPL-MCNC: 2 MG/DL — SIGNIFICANT CHANGE UP (ref 1.6–2.6)
MCHC RBC-ENTMCNC: 26 PG — LOW (ref 27–34)
MCHC RBC-ENTMCNC: 30.9 GM/DL — LOW (ref 32–36)
MCV RBC AUTO: 84.2 FL — SIGNIFICANT CHANGE UP (ref 80–100)
NRBC # BLD: 0 /100 WBCS — SIGNIFICANT CHANGE UP
NRBC # FLD: 0 K/UL — SIGNIFICANT CHANGE UP
PHOSPHATE SERPL-MCNC: 4.3 MG/DL — SIGNIFICANT CHANGE UP (ref 2.5–4.5)
PLATELET # BLD AUTO: 164 K/UL — SIGNIFICANT CHANGE UP (ref 150–400)
POTASSIUM SERPL-MCNC: 5 MMOL/L — SIGNIFICANT CHANGE UP (ref 3.5–5.3)
POTASSIUM SERPL-SCNC: 5 MMOL/L — SIGNIFICANT CHANGE UP (ref 3.5–5.3)
RBC # BLD: 5.07 M/UL — SIGNIFICANT CHANGE UP (ref 3.8–5.2)
RBC # FLD: 15.4 % — HIGH (ref 10.3–14.5)
SARS-COV-2 IGG+IGM SERPL QL IA: 0.4 U/ML — SIGNIFICANT CHANGE UP
SARS-COV-2 IGG+IGM SERPL QL IA: NEGATIVE — SIGNIFICANT CHANGE UP
SODIUM SERPL-SCNC: 137 MMOL/L — SIGNIFICANT CHANGE UP (ref 135–145)
WBC # BLD: 10.46 K/UL — SIGNIFICANT CHANGE UP (ref 3.8–10.5)
WBC # FLD AUTO: 10.46 K/UL — SIGNIFICANT CHANGE UP (ref 3.8–10.5)

## 2021-07-03 PROCEDURE — 99223 1ST HOSP IP/OBS HIGH 75: CPT

## 2021-07-03 RX ORDER — HYDROMORPHONE HYDROCHLORIDE 2 MG/ML
0.25 INJECTION INTRAMUSCULAR; INTRAVENOUS; SUBCUTANEOUS ONCE
Refills: 0 | Status: DISCONTINUED | OUTPATIENT
Start: 2021-07-03 | End: 2021-07-03

## 2021-07-03 RX ADMIN — Medication 325 MILLIGRAM(S): at 05:54

## 2021-07-03 RX ADMIN — Medication 100 MILLIGRAM(S): at 05:53

## 2021-07-03 RX ADMIN — Medication 975 MILLIGRAM(S): at 22:25

## 2021-07-03 RX ADMIN — Medication 325 MILLIGRAM(S): at 17:37

## 2021-07-03 RX ADMIN — SENNA PLUS 2 TABLET(S): 8.6 TABLET ORAL at 22:25

## 2021-07-03 RX ADMIN — LOSARTAN POTASSIUM 25 MILLIGRAM(S): 100 TABLET, FILM COATED ORAL at 05:54

## 2021-07-03 RX ADMIN — HYDROMORPHONE HYDROCHLORIDE 0.25 MILLIGRAM(S): 2 INJECTION INTRAMUSCULAR; INTRAVENOUS; SUBCUTANEOUS at 14:08

## 2021-07-03 RX ADMIN — OXYCODONE HYDROCHLORIDE 5 MILLIGRAM(S): 5 TABLET ORAL at 04:26

## 2021-07-03 RX ADMIN — OXYCODONE HYDROCHLORIDE 5 MILLIGRAM(S): 5 TABLET ORAL at 03:56

## 2021-07-03 RX ADMIN — OXYCODONE HYDROCHLORIDE 5 MILLIGRAM(S): 5 TABLET ORAL at 12:03

## 2021-07-03 RX ADMIN — ATORVASTATIN CALCIUM 10 MILLIGRAM(S): 80 TABLET, FILM COATED ORAL at 22:25

## 2021-07-03 RX ADMIN — PANTOPRAZOLE SODIUM 40 MILLIGRAM(S): 20 TABLET, DELAYED RELEASE ORAL at 05:54

## 2021-07-03 RX ADMIN — OXYCODONE HYDROCHLORIDE 5 MILLIGRAM(S): 5 TABLET ORAL at 11:03

## 2021-07-03 RX ADMIN — AMLODIPINE BESYLATE 10 MILLIGRAM(S): 2.5 TABLET ORAL at 05:54

## 2021-07-03 RX ADMIN — Medication 975 MILLIGRAM(S): at 05:54

## 2021-07-03 RX ADMIN — OXYCODONE HYDROCHLORIDE 5 MILLIGRAM(S): 5 TABLET ORAL at 00:00

## 2021-07-03 RX ADMIN — Medication 975 MILLIGRAM(S): at 14:09

## 2021-07-03 NOTE — CONSULT NOTE ADULT - PROBLEM SELECTOR RECOMMENDATION 9
Due to fall in March 2021. s/p L IMN on July 2, 2021.   -outpatient osteoporosis screening   -pain control, bowel regimen  -PT  -abx, ASA per ortho

## 2021-07-03 NOTE — PHYSICAL THERAPY INITIAL EVALUATION ADULT - ACTIVE RANGE OF MOTION EXAMINATION, REHAB EVAL
left LE unable to assess due to pain; able to move toes on left foot/bilateral upper extremity Active ROM was WFL (within functional limits)/Right LE Active ROM was WFL (within functional limits)

## 2021-07-03 NOTE — CONSULT NOTE ADULT - SUBJECTIVE AND OBJECTIVE BOX
Patient speaks a dialect which is close to Serbian. Daughter is at bedside and provides translation. Patient and daughter decline phone .    HPI:   66 W h/o essential HTN presents for L IMN. Patient sustained a fall in [place] in March 2021 which resulted in L tibia and fibula fracture. She is POD1. Just worked with PT and reports some L leg pain. Otherwise, patient is tolerating an oral diet. Reports no n/v/d.    PAST MEDICAL & SURGICAL HISTORY:  Obesity    Hypertension    Hyperlipidemia    H/O fracture of tibia  and fibula (L)    History of cholecystectomy  2011        Review of Systems:   CONSTITUTIONAL: No fever, weight loss, or fatigue  EYES: No eye pain, visual disturbances, or discharge  ENMT:  No difficulty hearing, tinnitus, vertigo; No sinus or throat pain  NECK: No pain or stiffness  RESPIRATORY: No cough, wheezing, chills or hemoptysis; No shortness of breath  CARDIOVASCULAR: No chest pain, palpitations, dizziness, or leg swelling  GASTROINTESTINAL: No abdominal or epigastric pain. No nausea, vomiting, or hematemesis; No diarrhea or constipation. No melena or hematochezia.  GENITOURINARY: No dysuria, frequency, hematuria, or incontinence  NEUROLOGICAL: No headaches, memory loss, loss of strength, numbness, or tremors  SKIN: No itching, burning, rashes, or lesions   LYMPH NODES: No enlarged glands  ENDOCRINE: No heat or cold intolerance; No hair loss  MUSCULOSKELETAL: No joint pain or swelling; No muscle, back pain; +extremity pain    HEME/LYMPH: No easy bruising, or bleeding gums  ALLERGY AND IMMUNOLOGIC: No hives or eczema    Allergies    No Known Allergies    Intolerances        Social History:   From [place]. Living with her daughters. She is fully dependent on most ADLs since the leg fracture; she was fully independent prior to the fall. Patient can feed herself, make her needs known.    FAMILY HISTORY:  No pertinent medical history in first degree relatives.    MEDICATIONS  (STANDING):  acetaminophen   Tablet .. 975 milliGRAM(s) Oral every 8 hours  amLODIPine   Tablet 10 milliGRAM(s) Oral daily  aspirin enteric coated 325 milliGRAM(s) Oral two times a day  atorvastatin 10 milliGRAM(s) Oral daily  lactated ringers. 1000 milliLiter(s) (30 mL/Hr) IV Continuous <Continuous>  losartan 25 milliGRAM(s) Oral daily  pantoprazole    Tablet 40 milliGRAM(s) Oral before breakfast  senna 2 Tablet(s) Oral at bedtime  sodium chloride 0.9%. 1000 milliLiter(s) (125 mL/Hr) IV Continuous <Continuous>    MEDICATIONS  (PRN):  magnesium hydroxide Suspension 30 milliLiter(s) Oral daily PRN Constipation  oxyCODONE    IR 2.5 milliGRAM(s) Oral every 4 hours PRN Moderate Pain (4 - 6)  oxyCODONE    IR 5 milliGRAM(s) Oral every 4 hours PRN Severe Pain (7 - 10)      Vital Signs Last 24 Hrs  T(C): 37.2 (03 Jul 2021 14:57), Max: 37.2 (03 Jul 2021 14:57)  T(F): 98.9 (03 Jul 2021 14:57), Max: 98.9 (03 Jul 2021 14:57)  HR: 61 (03 Jul 2021 14:57) (61 - 83)  BP: 127/54 (03 Jul 2021 14:57) (114/53 - 162/86)  BP(mean): 90 (02 Jul 2021 22:30) (88 - 136)  RR: 17 (03 Jul 2021 14:57) (10 - 18)  SpO2: 96% (03 Jul 2021 14:57) (93% - 100%)  CAPILLARY BLOOD GLUCOSE            PHYSICAL EXAM:  GENERAL: NAD, well-developed, lying in bed  HEAD:  Atraumatic, Normocephalic  EYES: EOMI, PERRLA, conjunctiva and sclera clear  NECK: Supple, No JVD  CHEST/LUNG: Clear to auscultation bilaterally; No wheeze  HEART: Regular rate and rhythm; No murmurs, rubs, or gallops  ABDOMEN: Soft, Nontender, Nondistended; Bowel sounds present  EXTREMITIES:  2+ Peripheral Pulses, No clubbing, cyanosis, or edema  PSYCH: AAOx3  NEUROLOGY: non-focal  SKIN: No rashes or lesions    LABS:                        13.2   10.46 )-----------( 164      ( 03 Jul 2021 07:14 )             42.7     07-03    137  |  101  |  18  ----------------------------<  138<H>  5.0   |  24  |  0.68    Ca    9.3      03 Jul 2021 07:14  Phos  4.3     07-03  Mg     2.00     07-03                EKG(personally reviewed):    RADIOLOGY & ADDITIONAL TESTS:    Imaging Personally Reviewed:    Consultant(s) Notes Reviewed:      Care Discussed with Consultants/Other Providers:

## 2021-07-03 NOTE — PHYSICAL THERAPY INITIAL EVALUATION ADULT - PLANNED THERAPY INTERVENTIONS, PT EVAL
Patient left positioned for safety in bed in NAD, call bell in reach, all lines intact. Brent RN aware. Daughter at bedside./balance training/bed mobility training/gait training/ROM/strengthening/transfer training

## 2021-07-03 NOTE — PHYSICAL THERAPY INITIAL EVALUATION ADULT - GENERAL OBSERVATIONS, REHAB EVAL
Pt found in bed with head of bed elevated; +02; +prima fit; +IV; patient agreeable to PT; Pierre speaking, daughter at bedside and able to translate;

## 2021-07-03 NOTE — PHYSICAL THERAPY INITIAL EVALUATION ADULT - DISCHARGE DISPOSITION, PT EVAL
Pt would benefit from restorative rehab to improve functional mobility and strength and to return to baseline functional status. However, daughter at bedside requesting home with PT.

## 2021-07-03 NOTE — PHYSICAL THERAPY INITIAL EVALUATION ADULT - ADDITIONAL COMMENTS
patient has a ramp at home for wheelchair and no stairs to negotiate; daughter reports patient was able to stand with assist at rolling walker and ambulate a few steps to wheelchair

## 2021-07-03 NOTE — PHYSICAL THERAPY INITIAL EVALUATION ADULT - PERTINENT HX OF CURRENT PROBLEM, REHAB EVAL
66 year old female with hx of Left tibia and fibula fracture 3 months ago with non union.  She injured her left leg when she fell in her home country of [place].

## 2021-07-04 PROCEDURE — 99232 SBSQ HOSP IP/OBS MODERATE 35: CPT

## 2021-07-04 RX ADMIN — Medication 975 MILLIGRAM(S): at 05:42

## 2021-07-04 RX ADMIN — OXYCODONE HYDROCHLORIDE 5 MILLIGRAM(S): 5 TABLET ORAL at 05:41

## 2021-07-04 RX ADMIN — LOSARTAN POTASSIUM 25 MILLIGRAM(S): 100 TABLET, FILM COATED ORAL at 05:42

## 2021-07-04 RX ADMIN — PANTOPRAZOLE SODIUM 40 MILLIGRAM(S): 20 TABLET, DELAYED RELEASE ORAL at 05:42

## 2021-07-04 RX ADMIN — OXYCODONE HYDROCHLORIDE 5 MILLIGRAM(S): 5 TABLET ORAL at 23:11

## 2021-07-04 RX ADMIN — OXYCODONE HYDROCHLORIDE 5 MILLIGRAM(S): 5 TABLET ORAL at 22:44

## 2021-07-04 RX ADMIN — ATORVASTATIN CALCIUM 10 MILLIGRAM(S): 80 TABLET, FILM COATED ORAL at 22:44

## 2021-07-04 RX ADMIN — Medication 975 MILLIGRAM(S): at 22:44

## 2021-07-04 RX ADMIN — AMLODIPINE BESYLATE 10 MILLIGRAM(S): 2.5 TABLET ORAL at 05:42

## 2021-07-04 RX ADMIN — OXYCODONE HYDROCHLORIDE 5 MILLIGRAM(S): 5 TABLET ORAL at 16:43

## 2021-07-04 RX ADMIN — OXYCODONE HYDROCHLORIDE 5 MILLIGRAM(S): 5 TABLET ORAL at 14:31

## 2021-07-04 RX ADMIN — Medication 975 MILLIGRAM(S): at 13:15

## 2021-07-04 RX ADMIN — SENNA PLUS 2 TABLET(S): 8.6 TABLET ORAL at 22:44

## 2021-07-04 RX ADMIN — Medication 325 MILLIGRAM(S): at 05:42

## 2021-07-04 RX ADMIN — Medication 325 MILLIGRAM(S): at 17:33

## 2021-07-04 RX ADMIN — OXYCODONE HYDROCHLORIDE 5 MILLIGRAM(S): 5 TABLET ORAL at 06:30

## 2021-07-05 DIAGNOSIS — E16.2 HYPOGLYCEMIA, UNSPECIFIED: ICD-10-CM

## 2021-07-05 LAB
ANION GAP SERPL CALC-SCNC: 10 MMOL/L — SIGNIFICANT CHANGE UP (ref 7–14)
BUN SERPL-MCNC: 18 MG/DL — SIGNIFICANT CHANGE UP (ref 7–23)
CALCIUM SERPL-MCNC: 8.6 MG/DL — SIGNIFICANT CHANGE UP (ref 8.4–10.5)
CHLORIDE SERPL-SCNC: 102 MMOL/L — SIGNIFICANT CHANGE UP (ref 98–107)
CO2 SERPL-SCNC: 27 MMOL/L — SIGNIFICANT CHANGE UP (ref 22–31)
CREAT SERPL-MCNC: 0.67 MG/DL — SIGNIFICANT CHANGE UP (ref 0.5–1.3)
GLUCOSE BLDC GLUCOMTR-MCNC: 101 MG/DL — HIGH (ref 70–99)
GLUCOSE SERPL-MCNC: 53 MG/DL — CRITICAL LOW (ref 70–99)
HCT VFR BLD CALC: 38.1 % — SIGNIFICANT CHANGE UP (ref 34.5–45)
HGB BLD-MCNC: 11.8 G/DL — SIGNIFICANT CHANGE UP (ref 11.5–15.5)
MCHC RBC-ENTMCNC: 26.4 PG — LOW (ref 27–34)
MCHC RBC-ENTMCNC: 31 GM/DL — LOW (ref 32–36)
MCV RBC AUTO: 85.2 FL — SIGNIFICANT CHANGE UP (ref 80–100)
NRBC # BLD: 0 /100 WBCS — SIGNIFICANT CHANGE UP
NRBC # FLD: 0 K/UL — SIGNIFICANT CHANGE UP
PLATELET # BLD AUTO: 173 K/UL — SIGNIFICANT CHANGE UP (ref 150–400)
POTASSIUM SERPL-MCNC: 4.8 MMOL/L — SIGNIFICANT CHANGE UP (ref 3.5–5.3)
POTASSIUM SERPL-SCNC: 4.8 MMOL/L — SIGNIFICANT CHANGE UP (ref 3.5–5.3)
RBC # BLD: 4.47 M/UL — SIGNIFICANT CHANGE UP (ref 3.8–5.2)
RBC # FLD: 16 % — HIGH (ref 10.3–14.5)
SODIUM SERPL-SCNC: 139 MMOL/L — SIGNIFICANT CHANGE UP (ref 135–145)
WBC # BLD: 7.45 K/UL — SIGNIFICANT CHANGE UP (ref 3.8–10.5)
WBC # FLD AUTO: 7.45 K/UL — SIGNIFICANT CHANGE UP (ref 3.8–10.5)

## 2021-07-05 PROCEDURE — 99232 SBSQ HOSP IP/OBS MODERATE 35: CPT

## 2021-07-05 RX ADMIN — ATORVASTATIN CALCIUM 10 MILLIGRAM(S): 80 TABLET, FILM COATED ORAL at 22:18

## 2021-07-05 RX ADMIN — Medication 325 MILLIGRAM(S): at 07:27

## 2021-07-05 RX ADMIN — MAGNESIUM HYDROXIDE 30 MILLILITER(S): 400 TABLET, CHEWABLE ORAL at 19:17

## 2021-07-05 RX ADMIN — Medication 975 MILLIGRAM(S): at 22:18

## 2021-07-05 RX ADMIN — PANTOPRAZOLE SODIUM 40 MILLIGRAM(S): 20 TABLET, DELAYED RELEASE ORAL at 07:27

## 2021-07-05 RX ADMIN — Medication 975 MILLIGRAM(S): at 14:42

## 2021-07-05 RX ADMIN — LOSARTAN POTASSIUM 25 MILLIGRAM(S): 100 TABLET, FILM COATED ORAL at 07:26

## 2021-07-05 RX ADMIN — AMLODIPINE BESYLATE 10 MILLIGRAM(S): 2.5 TABLET ORAL at 07:27

## 2021-07-05 RX ADMIN — Medication 325 MILLIGRAM(S): at 17:10

## 2021-07-05 RX ADMIN — Medication 975 MILLIGRAM(S): at 23:06

## 2021-07-05 RX ADMIN — Medication 975 MILLIGRAM(S): at 07:26

## 2021-07-05 RX ADMIN — SENNA PLUS 2 TABLET(S): 8.6 TABLET ORAL at 22:19

## 2021-07-06 ENCOUNTER — TRANSCRIPTION ENCOUNTER (OUTPATIENT)
Age: 66
End: 2021-07-06

## 2021-07-06 VITALS
OXYGEN SATURATION: 97 % | HEART RATE: 78 BPM | TEMPERATURE: 98 F | DIASTOLIC BLOOD PRESSURE: 60 MMHG | SYSTOLIC BLOOD PRESSURE: 150 MMHG | RESPIRATION RATE: 18 BRPM

## 2021-07-06 LAB — GLUCOSE BLDC GLUCOMTR-MCNC: 102 MG/DL — HIGH (ref 70–99)

## 2021-07-06 PROCEDURE — 99232 SBSQ HOSP IP/OBS MODERATE 35: CPT

## 2021-07-06 PROCEDURE — 99238 HOSP IP/OBS DSCHRG MGMT 30/<: CPT

## 2021-07-06 RX ORDER — ACETAMINOPHEN 500 MG
3 TABLET ORAL
Qty: 0 | Refills: 0 | DISCHARGE
Start: 2021-07-06

## 2021-07-06 RX ORDER — OXYCODONE HYDROCHLORIDE 5 MG/1
2 TABLET ORAL
Qty: 56 | Refills: 0
Start: 2021-07-06 | End: 2021-07-12

## 2021-07-06 RX ORDER — ASPIRIN/CALCIUM CARB/MAGNESIUM 324 MG
1 TABLET ORAL
Qty: 90 | Refills: 0
Start: 2021-07-06 | End: 2021-08-19

## 2021-07-06 RX ORDER — OXYCODONE HYDROCHLORIDE 5 MG/1
5 TABLET ORAL EVERY 4 HOURS
Refills: 0 | Status: DISCONTINUED | OUTPATIENT
Start: 2021-07-06 | End: 2021-07-06

## 2021-07-06 RX ORDER — OXYCODONE HYDROCHLORIDE 5 MG/1
2.5 TABLET ORAL EVERY 4 HOURS
Refills: 0 | Status: DISCONTINUED | OUTPATIENT
Start: 2021-07-06 | End: 2021-07-06

## 2021-07-06 RX ORDER — SENNA PLUS 8.6 MG/1
2 TABLET ORAL
Qty: 30 | Refills: 0
Start: 2021-07-06 | End: 2021-07-20

## 2021-07-06 RX ORDER — PANTOPRAZOLE SODIUM 20 MG/1
1 TABLET, DELAYED RELEASE ORAL
Qty: 45 | Refills: 0
Start: 2021-07-06 | End: 2021-08-19

## 2021-07-06 RX ADMIN — Medication 975 MILLIGRAM(S): at 13:19

## 2021-07-06 RX ADMIN — Medication 975 MILLIGRAM(S): at 05:52

## 2021-07-06 RX ADMIN — LOSARTAN POTASSIUM 25 MILLIGRAM(S): 100 TABLET, FILM COATED ORAL at 05:52

## 2021-07-06 RX ADMIN — AMLODIPINE BESYLATE 10 MILLIGRAM(S): 2.5 TABLET ORAL at 05:52

## 2021-07-06 RX ADMIN — Medication 325 MILLIGRAM(S): at 05:52

## 2021-07-06 RX ADMIN — Medication 975 MILLIGRAM(S): at 15:40

## 2021-07-06 RX ADMIN — PANTOPRAZOLE SODIUM 40 MILLIGRAM(S): 20 TABLET, DELAYED RELEASE ORAL at 06:01

## 2021-07-06 NOTE — DISCHARGE NOTE NURSING/CASE MANAGEMENT/SOCIAL WORK - CAREGIVER NAME
Rounding (Video Assessment):  Yes    Intervention Initiated From:  COR / EICU    Ivan Communicated with Bedside Nurse regarding:  Medication    Nurse Notified:  Yes  Comments:  Discussed IV drip rates w/ bedside RN.   Fhat Babs

## 2021-07-06 NOTE — DISCHARGE NOTE NURSING/CASE MANAGEMENT/SOCIAL WORK - NSDCPNINST_GEN_ALL_CORE
Maintain incisions and dressings clean dry and intact. Call MD with any signs of infection ie fever, redness, drainage, or with signs of bleeding, unrelieved/ increased pain, or persistent nausea. Continue to drink plenty of fluids. Avoid strenuous activity and constipation which may be a side effect from taking certain medications and narcotics. Continue safety and fall prevention measures as instructed to avoid injury.

## 2021-07-06 NOTE — PROGRESS NOTE ADULT - PROBLEM SELECTOR PLAN 2
FS glucose normal today. Not on insulin. No indication for sliding scale.
Hypoglycemic on BMP but asymptomatic. FS glucose normal. Not on insulin. No indication for sliding scale.
c/w home dose amlodipine, losartan.

## 2021-07-06 NOTE — PROGRESS NOTE ADULT - PROBLEM SELECTOR PLAN 1
Due to fall in March 2021. s/p L IMN on July 2, 2021.   -outpatient osteoporosis screening   -pain control, bowel regimen  -PT
Due to fall in March 2021. s/p L IMN on July 2, 2021.   -outpatient osteoporosis screening   -pain control, bowel regimen  -PT
-Due to fall in March 2021. s/p L IMN on July 2, 2021.   -outpatient osteoporosis screening   -pain control, bowel regimen  -PT

## 2021-07-06 NOTE — PROGRESS NOTE ADULT - ASSESSMENT
66 W h/o essential HTN presents for s/p L IMN July 2, 2021.
66yFemale s/p left tibia IMN, POD2    - Pain control  - mechanical/DVT ppx  - OOB/PT  - WBAT LLE  - IS  - dispo planning
66 W h/o essential HTN presents for s/p L IMN July 2, 2021.
66F h/o essential HTN presents for s/p L IMN July 2, 2021.

## 2021-07-06 NOTE — PROGRESS NOTE ADULT - PROVIDER SPECIALTY LIST ADULT
Orthopedics
Hospitalist

## 2021-07-06 NOTE — PROGRESS NOTE ADULT - SUBJECTIVE AND OBJECTIVE BOX
CHIEF COMPLAINT: f/u left tibial fracture    SUBJECTIVE / OVERNIGHT EVENTS: Patient seen and examined. No acute events overnight. Pain well controlled and patient without any complaints.    MEDICATIONS  (STANDING):  acetaminophen   Tablet .. 975 milliGRAM(s) Oral every 8 hours  amLODIPine   Tablet 10 milliGRAM(s) Oral daily  aspirin enteric coated 325 milliGRAM(s) Oral two times a day  atorvastatin 10 milliGRAM(s) Oral daily  losartan 25 milliGRAM(s) Oral daily  pantoprazole    Tablet 40 milliGRAM(s) Oral before breakfast  senna 2 Tablet(s) Oral at bedtime    MEDICATIONS  (PRN):  magnesium hydroxide Suspension 30 milliLiter(s) Oral daily PRN Constipation  oxyCODONE    IR 2.5 milliGRAM(s) Oral every 4 hours PRN Mild Pain (1 - 3)  oxyCODONE    IR 5 milliGRAM(s) Oral every 4 hours PRN Moderate or Severe pain      VITALS:  T(F): 97.8 (07-06-21 @ 13:28), Max: 98.1 (07-05-21 @ 20:53)  HR: 78 (07-06-21 @ 13:28) (69 - 78)  BP: 150/60 (07-06-21 @ 13:28) (148/64 - 162/67)  RR: 18 (07-06-21 @ 13:28) (16 - 18)  SpO2: 97% (07-06-21 @ 13:28)    PHYSICAL EXAM:  GENERAL: NAD, well-developed  HEAD:  Atraumatic, Normocephalic, MMM  CHEST/LUNG: No use of accessory muscles, CTAB, breathing non-labored  CV: RR, no mrcg  ABD: Soft, ND/NT, +BS  PSYCH: AAOx3  NEUROLOGY: CN II-XII grossly intact, moving all extremities  SKIN: No rashes or lesions  EXT: no C/C/E    LABS:              11.8                 139  | 27   | 18           7.45  >-----------< 173     ------------------------< 53                    38.1                 4.8  | 102  | 0.67                                         Ca 8.6   Mg x     Ph x        CAPILLARY BLOOD GLUCOSE  POCT Blood Glucose.: 102 mg/dL (06 Jul 2021 11:22)  POCT Blood Glucose.: 101 mg/dL (05 Jul 2021 14:29)    RADIOLOGY & ADDITIONAL TESTS:  Imaging Personally Reviewed: [x] Yes    [ ] Consultant(s) Notes Reviewed:  [x] Care Discussed with Consultants/Other Providers: Orthopedic PA - discussed disposition
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  
ORTHOPAEDICS DAILY PROGRESS NOTE:       SUBJECTIVE/ROS: POD 2. Seen and examined. Pain well controlled. Worked with PT yesterday. Denies CP/SOB/N/V.         MEDICATIONS  (STANDING):  acetaminophen   Tablet .. 975 milliGRAM(s) Oral every 8 hours  amLODIPine   Tablet 10 milliGRAM(s) Oral daily  aspirin enteric coated 325 milliGRAM(s) Oral two times a day  atorvastatin 10 milliGRAM(s) Oral daily  lactated ringers. 1000 milliLiter(s) (30 mL/Hr) IV Continuous <Continuous>  losartan 25 milliGRAM(s) Oral daily  pantoprazole    Tablet 40 milliGRAM(s) Oral before breakfast  senna 2 Tablet(s) Oral at bedtime  sodium chloride 0.9%. 1000 milliLiter(s) (125 mL/Hr) IV Continuous <Continuous>    MEDICATIONS  (PRN):  magnesium hydroxide Suspension 30 milliLiter(s) Oral daily PRN Constipation  oxyCODONE    IR 2.5 milliGRAM(s) Oral every 4 hours PRN Moderate Pain (4 - 6)  oxyCODONE    IR 5 milliGRAM(s) Oral every 4 hours PRN Severe Pain (7 - 10)      OBJECTIVE:    Vital Signs Last 24 Hrs  T(C): 36.4 (03 Jul 2021 22:30), Max: 37.2 (03 Jul 2021 14:57)  T(F): 97.6 (03 Jul 2021 22:30), Max: 98.9 (03 Jul 2021 14:57)  HR: 63 (03 Jul 2021 22:30) (61 - 66)  BP: 118/46 (03 Jul 2021 22:30) (114/53 - 130/71)  BP(mean): --  RR: 17 (03 Jul 2021 22:30) (16 - 18)  SpO2: 100% (03 Jul 2021 22:30) (96% - 100%)    I&O's Detail    02 Jul 2021 07:01  -  03 Jul 2021 07:00  --------------------------------------------------------  IN:    IV PiggyBack: 50 mL    Lactated Ringers: 120 mL    sodium chloride 0.9%: 375 mL  Total IN: 545 mL    OUT:    Voided (mL): 550 mL  Total OUT: 550 mL    Total NET: -5 mL      03 Jul 2021 07:01  -  04 Jul 2021 04:14  --------------------------------------------------------  IN:  Total IN: 0 mL    OUT:    Voided (mL): 475 mL  Total OUT: 475 mL    Total NET: -475 mL          Daily     Daily     LABS:                        13.2   10.46 )-----------( 164      ( 03 Jul 2021 07:14 )             42.7     07-03    137  |  101  |  18  ----------------------------<  138<H>  5.0   |  24  |  0.68    Ca    9.3      03 Jul 2021 07:14  Phos  4.3     07-03  Mg     2.00     07-03                    PHYSICAL EXAM:  NAD   nonlabored resp  LLE dressing c/d/i  +gastroc/ta/ehl/fhl  silt s/s/sp/dp/t  wwp  
Orthopaedic Surgery Progress Note    Subjective:   Patient seen and examined  No acute events overnight  Saturated dressing changed    Objective:  T(C): 36.6 (07-06-21 @ 05:58), Max: 36.8 (07-05-21 @ 10:04)  HR: 72 (07-06-21 @ 05:58) (69 - 76)  BP: 162/67 (07-06-21 @ 05:58) (133/58 - 162/67)  RR: 17 (07-06-21 @ 05:58) (16 - 18)  SpO2: 97% (07-06-21 @ 05:58) (95% - 97%)  Wt(kg): --    07-04 @ 07:01  -  07-05 @ 07:00  --------------------------------------------------------  IN: 500 mL / OUT: 500 mL / NET: 0 mL    07-05 @ 07:01  -  07-06 @ 06:23  --------------------------------------------------------  IN: 1250 mL / OUT: 2800 mL / NET: -1550 mL        PE    NAD  LLE:   LLE dressing c/d/i  +gastroc/ta/ehl/fhl  silt s/s/sp/dp/t  wwp                        11.8   7.45  )-----------( 173      ( 05 Jul 2021 13:35 )             38.1     07-05    139  |  102  |  18  ----------------------------<  53<LL>  4.8   |  27  |  0.67    Ca    8.6      05 Jul 2021 13:35      66y Female s/p L tibia shaft nonunion POD4  - Pain control  - WBAT LLE  - PT/OT/OOB  - DVT ppx:  BID  - Dispo planning
Pt seen/examined. Daughter at bedside. Doing well. Pain controlled. No acute overnight complaints or events.    T(C): 36.6 (07-03-21 @ 05:52), Max: 37 (07-02-21 @ 19:00)  HR: 66 (07-03-21 @ 05:52) (66 - 85)  BP: 130/71 (07-03-21 @ 05:52) (122/98 - 176/71)  RR: 16 (07-03-21 @ 05:52) (10 - 22)  SpO2: 97% (07-03-21 @ 05:52) (93% - 100%)  Wt(kg): --    Gen: awake, alert, NAD  Resp: no increased work of breathing  LLE:  Dressing c/d/i   +EHL/FHL/TA/GS  SILT S/S/SP/DP  +DP/PT Pulses  Compartments soft  No calf TTP                         13.2   10.46 )-----------( 164      ( 03 Jul 2021 07:14 )             42.7                         14.4   12.32 )-----------( 156      ( 02 Jul 2021 16:05 )             45.7       07-03    137  |  101  |  18  ----------------------------<  138<H>  5.0   |  24  |  0.68          66yFemale s/p left tibia IMN, POD1    - Pain control  - mechanical/DVT ppx  - OOB/PT  - WBAT LLE  - IS  - dispo planning
Orthopedics Post-Op Check  Patient was seen and examined at bedside. Denies CP/SOB/Dizziness/N/V/D/HA. Pain is well controlled at the moment. Daughter at bedside, able to translate. Patient speaks specific dialect, closest translation is Vietnamese.    Vital Signs Last 24 Hrs  T(C): 37 (02 Jul 2021 19:00), Max: 37 (02 Jul 2021 19:00)  T(F): 98.6 (02 Jul 2021 19:00), Max: 98.6 (02 Jul 2021 19:00)  HR: 72 (02 Jul 2021 20:00) (72 - 85)  BP: 157/131 (02 Jul 2021 20:00) (122/98 - 176/71)  BP(mean): 136 (02 Jul 2021 20:00) (90 - 136)  RR: 16 (02 Jul 2021 20:00) (12 - 22)  SpO2: 99% (02 Jul 2021 20:00) (94% - 100%)    PHYSICAL EXAM:  General: NAD  L LE: Dressing C/D/I with ACE wrap in place.   L LE: Patient able to wiggle toes 1-5, +EHL/FHL/TA/GC. Sensation is grossly intact distal. Extremities warm. Compartments are soft. DP 1+    Labs:                          14.4   12.32 )-----------( 156      ( 02 Jul 2021 16:05 )             45.7       07-02    136  |  102  |  16  ----------------------------<  132<H>  4.4   |  23  |  0.73    Ca    9.1      02 Jul 2021 16:05          A/P: 66y y/o Female s/p L tibia IMN, POD #0  -Pain control  -Antibiotic - Ancef postop  -Incentive Spirometry  -DVT prophylaxis: Venodynes/Aspirin 325mg BID  -F/U AM Labs  -PT/OT/WBAT LLE  -Notify Orthopedics with any questions    
Patient seen and examined this AM. Pain well controlled. Denies CP/SOB/N/V.     ICU Vital Signs Last 24 Hrs  T(C): 36.9 (05 Jul 2021 06:17), Max: 36.9 (05 Jul 2021 06:17)  T(F): 98.4 (05 Jul 2021 06:17), Max: 98.4 (05 Jul 2021 06:17)  HR: 77 (05 Jul 2021 06:17) (64 - 77)  BP: 149/62 (05 Jul 2021 06:17) (121/43 - 149/62)  BP(mean): --  ABP: --  ABP(mean): --  RR: 18 (05 Jul 2021 06:17) (18 - 18)  SpO2: 96% (05 Jul 2021 06:17) (96% - 97%)      PHYSICAL EXAM:  NAD   nonlabored resp  LLE dressing c/d/i  +gastroc/ta/ehl/fhl  silt s/s/sp/dp/t  wwp      66yFemale s/p left tibia IMN, progressing well.    - Pain control  - mechanical/DVT ppx  - OOB/PT  - WBAT LLE  - IS  - dispo planning  
Patient is a 66y old  Female who presents with a chief complaint of L IMN (03 Jul 2021 16:38)      SUBJECTIVE / OVERNIGHT EVENTS:  Daughter at bedside providing translation. Declines phone  as patient speaks specific dialect not available on [employer].    No events overnight. This AM, patient without n/v/d/cp/sob.  Worked w/PT today.    MEDICATIONS  (STANDING):  acetaminophen   Tablet .. 975 milliGRAM(s) Oral every 8 hours  amLODIPine   Tablet 10 milliGRAM(s) Oral daily  aspirin enteric coated 325 milliGRAM(s) Oral two times a day  atorvastatin 10 milliGRAM(s) Oral daily  lactated ringers. 1000 milliLiter(s) (30 mL/Hr) IV Continuous <Continuous>  losartan 25 milliGRAM(s) Oral daily  pantoprazole    Tablet 40 milliGRAM(s) Oral before breakfast  senna 2 Tablet(s) Oral at bedtime  sodium chloride 0.9%. 1000 milliLiter(s) (125 mL/Hr) IV Continuous <Continuous>    MEDICATIONS  (PRN):  magnesium hydroxide Suspension 30 milliLiter(s) Oral daily PRN Constipation  oxyCODONE    IR 2.5 milliGRAM(s) Oral every 4 hours PRN Moderate Pain (4 - 6)  oxyCODONE    IR 5 milliGRAM(s) Oral every 4 hours PRN Severe Pain (7 - 10)      PHYSICAL EXAM:  T(C): 36.4 (07-04-21 @ 09:56), Max: 37.2 (07-03-21 @ 14:57)  HR: 64 (07-04-21 @ 09:56) (61 - 64)  BP: 121/43 (07-04-21 @ 09:56) (115/59 - 127/54)  RR: 18 (07-04-21 @ 09:56) (17 - 18)  SpO2: 96% (07-04-21 @ 09:56) (96% - 100%)  I&O's Summary    03 Jul 2021 07:01  -  04 Jul 2021 07:00  --------------------------------------------------------  IN: 0 mL / OUT: 625 mL / NET: -625 mL      GENERAL: NAD, well-developed  HEAD:  Atraumatic, Normocephalic, MMM  CHEST/LUNG: No use of accessory muscles, CTAB, breathing non-labored  COR: RR, no mrcg  ABD: Soft, ND/NT, +BS  PSYCH: AAOx3  NEUROLOGY: CN II-XII grossly intact, moving all extremities  SKIN: No rashes or lesions  EXT: wwp, no cce    LABS:  CAPILLARY BLOOD GLUCOSE                              13.2   10.46 )-----------( 164      ( 03 Jul 2021 07:14 )             42.7     07-03    137  |  101  |  18  ----------------------------<  138<H>  5.0   |  24  |  0.68    Ca    9.3      03 Jul 2021 07:14  Phos  4.3     07-03  Mg     2.00     07-03                  RADIOLOGY & ADDITIONAL TESTS:    Telemetry Personally Reviewed -     Imaging Personally Reviewed -     Imaging Reviewed -     Consultant(s) Notes Reviewed -       Care Discussed with Consultants/Other Providers - 
Patient is a 66y old  Female who presents with a chief complaint of L IMN (03 Jul 2021 16:38)      SUBJECTIVE / OVERNIGHT EVENTS:    No events overnight. This AM, patient without n/v/d/cp/sob.  Eager to go home.    MEDICATIONS  (STANDING):  acetaminophen   Tablet .. 975 milliGRAM(s) Oral every 8 hours  amLODIPine   Tablet 10 milliGRAM(s) Oral daily  aspirin enteric coated 325 milliGRAM(s) Oral two times a day  atorvastatin 10 milliGRAM(s) Oral daily  lactated ringers. 1000 milliLiter(s) (30 mL/Hr) IV Continuous <Continuous>  losartan 25 milliGRAM(s) Oral daily  pantoprazole    Tablet 40 milliGRAM(s) Oral before breakfast  senna 2 Tablet(s) Oral at bedtime  sodium chloride 0.9%. 1000 milliLiter(s) (125 mL/Hr) IV Continuous <Continuous>    MEDICATIONS  (PRN):  magnesium hydroxide Suspension 30 milliLiter(s) Oral daily PRN Constipation  oxyCODONE    IR 2.5 milliGRAM(s) Oral every 4 hours PRN Moderate Pain (4 - 6)  oxyCODONE    IR 5 milliGRAM(s) Oral every 4 hours PRN Severe Pain (7 - 10)      PHYSICAL EXAM:  T(C): 36.4 (07-05-21 @ 13:45), Max: 36.9 (07-05-21 @ 06:17)  HR: 71 (07-05-21 @ 13:45) (71 - 77)  BP: 135/57 (07-05-21 @ 13:45) (132/64 - 149/62)  RR: 17 (07-05-21 @ 13:45) (17 - 18)  SpO2: 95% (07-05-21 @ 13:45) (95% - 97%)  I&O's Summary    04 Jul 2021 07:01  -  05 Jul 2021 07:00  --------------------------------------------------------  IN: 500 mL / OUT: 500 mL / NET: 0 mL    05 Jul 2021 07:01  -  05 Jul 2021 15:18  --------------------------------------------------------  IN: 1000 mL / OUT: 1000 mL / NET: 0 mL      GENERAL: NAD, well-developed  HEAD:  Atraumatic, Normocephalic, MMM  CHEST/LUNG: No use of accessory muscles, CTAB, breathing non-labored  COR: RR, no mrcg  ABD: Soft, ND/NT, +BS  PSYCH: AAOx3  NEUROLOGY: CN II-XII grossly intact, moving all extremities  SKIN: No rashes or lesions  EXT: wwp, no cce    LABS:  CAPILLARY BLOOD GLUCOSE      POCT Blood Glucose.: 101 mg/dL (05 Jul 2021 14:29)                          11.8   7.45  )-----------( 173      ( 05 Jul 2021 13:35 )             38.1     07-05    139  |  102  |  18  ----------------------------<  53<LL>  4.8   |  27  |  0.67    Ca    8.6      05 Jul 2021 13:35                  RADIOLOGY & ADDITIONAL TESTS:    Telemetry Personally Reviewed -     Imaging Personally Reviewed -     Imaging Reviewed -     Consultant(s) Notes Reviewed -       Care Discussed with Consultants/Other Providers -

## 2021-07-06 NOTE — DISCHARGE NOTE NURSING/CASE MANAGEMENT/SOCIAL WORK - PATIENT PORTAL LINK FT
You can access the FollowMyHealth Patient Portal offered by [facility] by registering at the following website: [email or web address]. By joining [employer]’s FollowMyHealth portal, you will also be able to view your health information using other applications (apps) compatible with our system.

## 2021-07-06 NOTE — PROGRESS NOTE ADULT - ATTENDING COMMENTS
PT, WBAT. f/u labs. DVT ppx. dc planning
PT. DVT PPX. DC planning
doing well s/p reconstruction non union

## 2021-07-19 ENCOUNTER — APPOINTMENT (OUTPATIENT)
Dept: ORTHOPEDIC SURGERY | Facility: CLINIC | Age: 66
End: 2021-07-19
Payer: MEDICARE

## 2021-07-19 PROBLEM — E66.9 OBESITY, UNSPECIFIED: Chronic | Status: ACTIVE | Noted: 2021-06-28

## 2021-07-19 PROBLEM — E78.5 HYPERLIPIDEMIA, UNSPECIFIED: Chronic | Status: ACTIVE | Noted: 2021-06-28

## 2021-07-19 PROBLEM — I10 ESSENTIAL (PRIMARY) HYPERTENSION: Chronic | Status: ACTIVE | Noted: 2021-06-28

## 2021-07-19 PROBLEM — Z87.81 PERSONAL HISTORY OF (HEALED) TRAUMATIC FRACTURE: Chronic | Status: ACTIVE | Noted: 2021-06-28

## 2021-07-19 PROCEDURE — 99024 POSTOP FOLLOW-UP VISIT: CPT

## 2021-07-26 ENCOUNTER — APPOINTMENT (OUTPATIENT)
Dept: ORTHOPEDIC SURGERY | Facility: CLINIC | Age: 66
End: 2021-07-26
Payer: MEDICARE

## 2021-07-26 PROCEDURE — 99024 POSTOP FOLLOW-UP VISIT: CPT

## 2021-07-26 PROCEDURE — 73610 X-RAY EXAM OF ANKLE: CPT | Mod: LT

## 2021-08-17 NOTE — ASU PATIENT PROFILE, ADULT - NS TRANSFER HEARING AID
0700 got report from night shift nurse RN Terence Arias regarding patient condition, plan of care, goals and information on the patient and how the patient did through out the night    0830 got the from prone to supine. After patient was supine. O2 stat drop in the 60s. Dr. Marilyn Shepard is at bedside and the respiratory  therapist in the room as well. RT and Dr. Marilyn Shepard tried different setting of ventilator and decided on Pressure control, Peep at 14, rate of 28 and Fio2 at 100%. Dr. Marilyn Shepard said not to change the peep lower than 14. He put the order in as well. Kd Dotson María 83 disciplinary  Round, Marilyn Vragas and Pharmacist discuss about her high triglyceride level and how we can stop the propofol. The goal today is to try to get rid of propofol drip and stop the Nimbex drip as well. Start Ketamine drip and increase versed to 10 so that we can come down on propofol drip and stop nimbex. 1155 change versed drip to 10 and start ketamine drip up to max 0.2 according to Dr. Marilyn Shepard order and can try to lower it later     1214 I lower down the propofol drip to 10     1328 she start becoming awake, open her eyes and move her hand up. I have to put the propofol up to 50 to keep her sedated and calm for me to go and talk to Dr. Marilyn Shepard. Called Dr. Marilyn Shepard and he does not want the propofol to stay at 50 because it will cause pancreatitis. S0 he order to put he versed drip to 15, and every 4 hours as need 4 mg of ativan when suction patient. And he order rocuronium 50 mg IV push one time to prone patient.        1800 Patient start to cough and wake up so Dr. Marilyn Shepard order another dose of rocuronium and increase the Fio2 to 100%    1910 Gave report to the nurse night shift nurse Terence Arias and update her what happen today, none

## 2021-09-08 ENCOUNTER — APPOINTMENT (OUTPATIENT)
Dept: ORTHOPEDIC SURGERY | Facility: CLINIC | Age: 66
End: 2021-09-08
Payer: MEDICARE

## 2021-09-08 VITALS
BODY MASS INDEX: 42.21 KG/M2 | HEIGHT: 60 IN | DIASTOLIC BLOOD PRESSURE: 69 MMHG | HEART RATE: 76 BPM | SYSTOLIC BLOOD PRESSURE: 145 MMHG | WEIGHT: 215 LBS

## 2021-09-08 PROCEDURE — 99024 POSTOP FOLLOW-UP VISIT: CPT

## 2021-09-08 PROCEDURE — 73590 X-RAY EXAM OF LOWER LEG: CPT | Mod: LT

## 2021-09-11 NOTE — HISTORY OF PRESENT ILLNESS
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 Presents with daughter who is acting as . She is doing well post operatively. She is attempting to walk short distances.  She has some pain with ambulation, but she is walking in regular shoes [de-identified] : Physical exam L LE \par Incisions CDI healed well. The proximal incision is well healed. The distal incision over the nonunion site is healed with the exception of a small dried eschar in the center of the incision.  There is no erythema or drainage.  There is swelling distally around ankle and foot which continues to resolve\par knee ROM 0-120 degrees \par ankle ROM 5 deg dorsiflexion to 10 degrees plantarflexion able to move toes \par NVi distally  [de-identified] : S/P reconstruction L distal 1/3 tibia shaft nonunion with auto/allograft 7/2/21\par - doing well  [de-identified] : Ap and lateral tibia taken today and reviewed by me  show healing distal tibia fracture with IMN in good positiion no signs of mechanical failure  [de-identified] : S/P reconstruction L distal 1/3 tibia shaft nonunion with auto/allograft 7/2/21\par - continue WBAT and ROMAT  \par - F/U in 8 weeks with XR at that time

## 2021-10-19 ENCOUNTER — APPOINTMENT (OUTPATIENT)
Dept: ORTHOPEDIC SURGERY | Facility: CLINIC | Age: 66
End: 2021-10-19
Payer: MEDICARE

## 2021-10-19 PROCEDURE — 99213 OFFICE O/P EST LOW 20 MIN: CPT

## 2021-10-19 PROCEDURE — 73590 X-RAY EXAM OF LOWER LEG: CPT | Mod: LT

## 2021-10-21 NOTE — HISTORY OF PRESENT ILLNESS
[de-identified] : S/P reconstruction L distal 1/3 tibia shaft nonunion with auto/allograft 7/2/21 [de-identified] : 67 yo FS/P reconstruction L distal 1/3 tibia shaft nonunion with auto/allograft 7/2/21. Presents with daughter who is acting as . She is doing well post operatively. She is able to walk short distances.  She has some pain minor with ambulation, but she is walking in regular shoes.  There is an eschar that fell off the anterior incision.  There was drainage for approximately 24 hours, but stopped on its own. [de-identified] : Physical exam L LE \par Incisions CDI healed well. The proximal incision is well healed. The distal incision over the nonunion site is healed.  There is no erythema or drainage.  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

## 2022-01-25 ENCOUNTER — APPOINTMENT (OUTPATIENT)
Dept: ORTHOPEDIC SURGERY | Facility: CLINIC | Age: 67
End: 2022-01-25
Payer: MEDICARE

## 2022-01-25 DIAGNOSIS — S82.202K UNSPECIFIED FRACTURE OF SHAFT OF LEFT TIBIA, SUBSEQUENT ENCOUNTER FOR CLOSED FRACTURE WITH NONUNION: ICD-10-CM

## 2022-01-25 DIAGNOSIS — S82.402K UNSPECIFIED FRACTURE OF SHAFT OF LEFT TIBIA, SUBSEQUENT ENCOUNTER FOR CLOSED FRACTURE WITH NONUNION: ICD-10-CM

## 2022-01-25 PROCEDURE — 73590 X-RAY EXAM OF LOWER LEG: CPT | Mod: LT

## 2022-01-25 PROCEDURE — 99213 OFFICE O/P EST LOW 20 MIN: CPT

## 2022-01-28 PROBLEM — S82.202K: Status: ACTIVE | Noted: 2021-06-23

## 2022-04-14 ENCOUNTER — APPOINTMENT (OUTPATIENT)
Dept: ORTHOPEDIC SURGERY | Facility: CLINIC | Age: 67
End: 2022-04-14
Payer: MEDICARE

## 2022-04-14 VITALS — BODY MASS INDEX: 43.19 KG/M2 | WEIGHT: 220 LBS | HEIGHT: 60 IN

## 2022-04-14 DIAGNOSIS — M25.562 PAIN IN RIGHT KNEE: ICD-10-CM

## 2022-04-14 DIAGNOSIS — M25.519 PAIN IN UNSPECIFIED SHOULDER: ICD-10-CM

## 2022-04-14 DIAGNOSIS — M25.561 PAIN IN RIGHT KNEE: ICD-10-CM

## 2022-04-14 PROCEDURE — 99215 OFFICE O/P EST HI 40 MIN: CPT | Mod: 25

## 2022-04-14 PROCEDURE — 20610 DRAIN/INJ JOINT/BURSA W/O US: CPT | Mod: 50

## 2022-04-14 PROCEDURE — 73562 X-RAY EXAM OF KNEE 3: CPT | Mod: 50

## 2022-04-15 PROBLEM — M25.561 ACUTE PAIN OF BOTH KNEES: Status: ACTIVE | Noted: 2022-04-14

## 2022-06-14 ENCOUNTER — TRANSCRIPTION ENCOUNTER (OUTPATIENT)
Age: 67
End: 2022-06-14

## 2022-06-14 ENCOUNTER — INPATIENT (INPATIENT)
Facility: HOSPITAL | Age: 67
LOS: 22 days | Discharge: HOME HEALTH | End: 2022-07-07
Attending: STUDENT IN AN ORGANIZED HEALTH CARE EDUCATION/TRAINING PROGRAM | Admitting: STUDENT IN AN ORGANIZED HEALTH CARE EDUCATION/TRAINING PROGRAM
Payer: MEDICARE

## 2022-06-14 VITALS
OXYGEN SATURATION: 100 % | RESPIRATION RATE: 18 BRPM | SYSTOLIC BLOOD PRESSURE: 148 MMHG | HEART RATE: 73 BPM | DIASTOLIC BLOOD PRESSURE: 74 MMHG | TEMPERATURE: 98 F | HEIGHT: 60 IN

## 2022-06-14 DIAGNOSIS — Z90.49 ACQUIRED ABSENCE OF OTHER SPECIFIED PARTS OF DIGESTIVE TRACT: Chronic | ICD-10-CM

## 2022-06-14 PROCEDURE — 43753 TX GASTRO INTUB W/ASP: CPT

## 2022-06-14 PROCEDURE — 99285 EMERGENCY DEPT VISIT HI MDM: CPT | Mod: 25

## 2022-06-14 PROCEDURE — 93010 ELECTROCARDIOGRAM REPORT: CPT | Mod: 59

## 2022-06-14 NOTE — ED ADULT TRIAGE NOTE - CHIEF COMPLAINT QUOTE
Pt c/o generalized abd pain and "gas sensation" x 1 day. Reports multiple episodes of vomiting and last BM yesterday. Denies fever, sick contacts. PMHX HTN.

## 2022-06-15 ENCOUNTER — TRANSCRIPTION ENCOUNTER (OUTPATIENT)
Age: 67
End: 2022-06-15

## 2022-06-15 DIAGNOSIS — K56.609 UNSPECIFIED INTESTINAL OBSTRUCTION, UNSPECIFIED AS TO PARTIAL VERSUS COMPLETE OBSTRUCTION: ICD-10-CM

## 2022-06-15 LAB
ALBUMIN SERPL ELPH-MCNC: 3.7 G/DL — SIGNIFICANT CHANGE UP (ref 3.3–5)
ALP SERPL-CCNC: 94 U/L — SIGNIFICANT CHANGE UP (ref 40–120)
ALT FLD-CCNC: 17 U/L — SIGNIFICANT CHANGE UP (ref 4–33)
ANION GAP SERPL CALC-SCNC: 13 MMOL/L — SIGNIFICANT CHANGE UP (ref 7–14)
APTT BLD: 32.3 SEC — SIGNIFICANT CHANGE UP (ref 27–36.3)
AST SERPL-CCNC: 36 U/L — HIGH (ref 4–32)
BASOPHILS # BLD AUTO: 0 K/UL — SIGNIFICANT CHANGE UP (ref 0–0.2)
BASOPHILS NFR BLD AUTO: 0 % — SIGNIFICANT CHANGE UP (ref 0–2)
BILIRUB SERPL-MCNC: 0.7 MG/DL — SIGNIFICANT CHANGE UP (ref 0.2–1.2)
BLD GP AB SCN SERPL QL: NEGATIVE — SIGNIFICANT CHANGE UP
BUN SERPL-MCNC: 12 MG/DL — SIGNIFICANT CHANGE UP (ref 7–23)
CALCIUM SERPL-MCNC: 9.5 MG/DL — SIGNIFICANT CHANGE UP (ref 8.4–10.5)
CHLORIDE SERPL-SCNC: 98 MMOL/L — SIGNIFICANT CHANGE UP (ref 98–107)
CO2 SERPL-SCNC: 21 MMOL/L — LOW (ref 22–31)
CREAT SERPL-MCNC: 0.72 MG/DL — SIGNIFICANT CHANGE UP (ref 0.5–1.3)
EGFR: 92 ML/MIN/1.73M2 — SIGNIFICANT CHANGE UP
EOSINOPHIL # BLD AUTO: 0.03 K/UL — SIGNIFICANT CHANGE UP (ref 0–0.5)
EOSINOPHIL NFR BLD AUTO: 0.3 % — SIGNIFICANT CHANGE UP (ref 0–6)
FLUAV AG NPH QL: SIGNIFICANT CHANGE UP
FLUBV AG NPH QL: SIGNIFICANT CHANGE UP
GLUCOSE SERPL-MCNC: 114 MG/DL — HIGH (ref 70–99)
HCT VFR BLD CALC: 45.7 % — HIGH (ref 34.5–45)
HGB BLD-MCNC: 14.5 G/DL — SIGNIFICANT CHANGE UP (ref 11.5–15.5)
IANC: 6.16 K/UL — SIGNIFICANT CHANGE UP (ref 1.8–7.4)
IMM GRANULOCYTES NFR BLD AUTO: 0.2 % — SIGNIFICANT CHANGE UP (ref 0–1.5)
INR BLD: 1.09 RATIO — SIGNIFICANT CHANGE UP (ref 0.88–1.16)
LIDOCAIN IGE QN: 24 U/L — SIGNIFICANT CHANGE UP (ref 7–60)
LYMPHOCYTES # BLD AUTO: 2.18 K/UL — SIGNIFICANT CHANGE UP (ref 1–3.3)
LYMPHOCYTES # BLD AUTO: 24.7 % — SIGNIFICANT CHANGE UP (ref 13–44)
MCHC RBC-ENTMCNC: 25.7 PG — LOW (ref 27–34)
MCHC RBC-ENTMCNC: 31.7 GM/DL — LOW (ref 32–36)
MCV RBC AUTO: 81 FL — SIGNIFICANT CHANGE UP (ref 80–100)
MONOCYTES # BLD AUTO: 0.45 K/UL — SIGNIFICANT CHANGE UP (ref 0–0.9)
MONOCYTES NFR BLD AUTO: 5.1 % — SIGNIFICANT CHANGE UP (ref 2–14)
NEUTROPHILS # BLD AUTO: 6.16 K/UL — SIGNIFICANT CHANGE UP (ref 1.8–7.4)
NEUTROPHILS NFR BLD AUTO: 69.7 % — SIGNIFICANT CHANGE UP (ref 43–77)
NRBC # BLD: 0 /100 WBCS — SIGNIFICANT CHANGE UP
NRBC # FLD: 0 K/UL — SIGNIFICANT CHANGE UP
PLATELET # BLD AUTO: 227 K/UL — SIGNIFICANT CHANGE UP (ref 150–400)
POTASSIUM SERPL-MCNC: 5.2 MMOL/L — SIGNIFICANT CHANGE UP (ref 3.5–5.3)
POTASSIUM SERPL-SCNC: 5.2 MMOL/L — SIGNIFICANT CHANGE UP (ref 3.5–5.3)
PROT SERPL-MCNC: 8.2 G/DL — SIGNIFICANT CHANGE UP (ref 6–8.3)
PROTHROM AB SERPL-ACNC: 12.7 SEC — SIGNIFICANT CHANGE UP (ref 10.5–13.4)
RBC # BLD: 5.64 M/UL — HIGH (ref 3.8–5.2)
RBC # FLD: 14.1 % — SIGNIFICANT CHANGE UP (ref 10.3–14.5)
RH IG SCN BLD-IMP: POSITIVE — SIGNIFICANT CHANGE UP
RH IG SCN BLD-IMP: POSITIVE — SIGNIFICANT CHANGE UP
RSV RNA NPH QL NAA+NON-PROBE: SIGNIFICANT CHANGE UP
SARS-COV-2 RNA SPEC QL NAA+PROBE: SIGNIFICANT CHANGE UP
SODIUM SERPL-SCNC: 132 MMOL/L — LOW (ref 135–145)
WBC # BLD: 8.84 K/UL — SIGNIFICANT CHANGE UP (ref 3.8–10.5)
WBC # FLD AUTO: 8.84 K/UL — SIGNIFICANT CHANGE UP (ref 3.8–10.5)

## 2022-06-15 PROCEDURE — G1004: CPT

## 2022-06-15 PROCEDURE — 49561: CPT

## 2022-06-15 PROCEDURE — 99222 1ST HOSP IP/OBS MODERATE 55: CPT | Mod: 57

## 2022-06-15 PROCEDURE — 74176 CT ABD & PELVIS W/O CONTRAST: CPT | Mod: 26,MG

## 2022-06-15 PROCEDURE — 71045 X-RAY EXAM CHEST 1 VIEW: CPT | Mod: 26

## 2022-06-15 DEVICE — DEVICE SORBAFIX ABSORBABLE FIXATION 30 DISP: Type: IMPLANTABLE DEVICE | Status: FUNCTIONAL

## 2022-06-15 DEVICE — LIGATING CLIPS WECK HORIZON MEDIUM (BLUE) 24: Type: IMPLANTABLE DEVICE | Status: FUNCTIONAL

## 2022-06-15 RX ORDER — SODIUM CHLORIDE 9 MG/ML
1000 INJECTION, SOLUTION INTRAVENOUS ONCE
Refills: 0 | Status: COMPLETED | OUTPATIENT
Start: 2022-06-15 | End: 2022-06-15

## 2022-06-15 RX ORDER — INFLUENZA VIRUS VACCINE 15; 15; 15; 15 UG/.5ML; UG/.5ML; UG/.5ML; UG/.5ML
0.7 SUSPENSION INTRAMUSCULAR ONCE
Refills: 0 | Status: DISCONTINUED | OUTPATIENT
Start: 2022-06-15 | End: 2022-06-27

## 2022-06-15 RX ORDER — HYDROMORPHONE HYDROCHLORIDE 2 MG/ML
0.25 INJECTION INTRAMUSCULAR; INTRAVENOUS; SUBCUTANEOUS EVERY 4 HOURS
Refills: 0 | Status: DISCONTINUED | OUTPATIENT
Start: 2022-06-15 | End: 2022-06-16

## 2022-06-15 RX ORDER — MORPHINE SULFATE 50 MG/1
4 CAPSULE, EXTENDED RELEASE ORAL ONCE
Refills: 0 | Status: DISCONTINUED | OUTPATIENT
Start: 2022-06-15 | End: 2022-06-15

## 2022-06-15 RX ORDER — HYDROMORPHONE HYDROCHLORIDE 2 MG/ML
0.5 INJECTION INTRAMUSCULAR; INTRAVENOUS; SUBCUTANEOUS
Refills: 0 | Status: DISCONTINUED | OUTPATIENT
Start: 2022-06-15 | End: 2022-06-15

## 2022-06-15 RX ORDER — ENOXAPARIN SODIUM 100 MG/ML
40 INJECTION SUBCUTANEOUS EVERY 24 HOURS
Refills: 0 | Status: DISCONTINUED | OUTPATIENT
Start: 2022-06-15 | End: 2022-07-07

## 2022-06-15 RX ORDER — ACETAMINOPHEN 500 MG
1000 TABLET ORAL EVERY 6 HOURS
Refills: 0 | Status: DISCONTINUED | OUTPATIENT
Start: 2022-06-15 | End: 2022-06-16

## 2022-06-15 RX ORDER — HYDROMORPHONE HYDROCHLORIDE 2 MG/ML
0.25 INJECTION INTRAMUSCULAR; INTRAVENOUS; SUBCUTANEOUS ONCE
Refills: 0 | Status: DISCONTINUED | OUTPATIENT
Start: 2022-06-15 | End: 2022-06-15

## 2022-06-15 RX ORDER — SODIUM CHLORIDE 9 MG/ML
1000 INJECTION, SOLUTION INTRAVENOUS
Refills: 0 | Status: DISCONTINUED | OUTPATIENT
Start: 2022-06-15 | End: 2022-06-16

## 2022-06-15 RX ORDER — HYDROMORPHONE HYDROCHLORIDE 2 MG/ML
0.5 INJECTION INTRAMUSCULAR; INTRAVENOUS; SUBCUTANEOUS EVERY 6 HOURS
Refills: 0 | Status: DISCONTINUED | OUTPATIENT
Start: 2022-06-15 | End: 2022-06-16

## 2022-06-15 RX ORDER — ONDANSETRON 8 MG/1
4 TABLET, FILM COATED ORAL ONCE
Refills: 0 | Status: COMPLETED | OUTPATIENT
Start: 2022-06-15 | End: 2022-06-15

## 2022-06-15 RX ORDER — LOSARTAN POTASSIUM 100 MG/1
1 TABLET, FILM COATED ORAL
Qty: 0 | Refills: 0 | DISCHARGE

## 2022-06-15 RX ORDER — HYDROMORPHONE HYDROCHLORIDE 2 MG/ML
0.25 INJECTION INTRAMUSCULAR; INTRAVENOUS; SUBCUTANEOUS EVERY 6 HOURS
Refills: 0 | Status: DISCONTINUED | OUTPATIENT
Start: 2022-06-15 | End: 2022-06-15

## 2022-06-15 RX ORDER — ONDANSETRON 8 MG/1
4 TABLET, FILM COATED ORAL ONCE
Refills: 0 | Status: DISCONTINUED | OUTPATIENT
Start: 2022-06-15 | End: 2022-06-15

## 2022-06-15 RX ADMIN — HYDROMORPHONE HYDROCHLORIDE 0.5 MILLIGRAM(S): 2 INJECTION INTRAMUSCULAR; INTRAVENOUS; SUBCUTANEOUS at 19:20

## 2022-06-15 RX ADMIN — SODIUM CHLORIDE 125 MILLILITER(S): 9 INJECTION, SOLUTION INTRAVENOUS at 23:07

## 2022-06-15 RX ADMIN — HYDROMORPHONE HYDROCHLORIDE 0.5 MILLIGRAM(S): 2 INJECTION INTRAMUSCULAR; INTRAVENOUS; SUBCUTANEOUS at 19:05

## 2022-06-15 RX ADMIN — Medication 400 MILLIGRAM(S): at 17:05

## 2022-06-15 RX ADMIN — HYDROMORPHONE HYDROCHLORIDE 0.25 MILLIGRAM(S): 2 INJECTION INTRAMUSCULAR; INTRAVENOUS; SUBCUTANEOUS at 22:24

## 2022-06-15 RX ADMIN — HYDROMORPHONE HYDROCHLORIDE 0.5 MILLIGRAM(S): 2 INJECTION INTRAMUSCULAR; INTRAVENOUS; SUBCUTANEOUS at 13:16

## 2022-06-15 RX ADMIN — MORPHINE SULFATE 4 MILLIGRAM(S): 50 CAPSULE, EXTENDED RELEASE ORAL at 03:12

## 2022-06-15 RX ADMIN — ONDANSETRON 4 MILLIGRAM(S): 8 TABLET, FILM COATED ORAL at 00:44

## 2022-06-15 RX ADMIN — ENOXAPARIN SODIUM 40 MILLIGRAM(S): 100 INJECTION SUBCUTANEOUS at 17:04

## 2022-06-15 RX ADMIN — MORPHINE SULFATE 4 MILLIGRAM(S): 50 CAPSULE, EXTENDED RELEASE ORAL at 00:45

## 2022-06-15 RX ADMIN — Medication 1000 MILLIGRAM(S): at 23:37

## 2022-06-15 RX ADMIN — SODIUM CHLORIDE 125 MILLILITER(S): 9 INJECTION, SOLUTION INTRAVENOUS at 13:11

## 2022-06-15 RX ADMIN — HYDROMORPHONE HYDROCHLORIDE 0.5 MILLIGRAM(S): 2 INJECTION INTRAMUSCULAR; INTRAVENOUS; SUBCUTANEOUS at 13:30

## 2022-06-15 RX ADMIN — MORPHINE SULFATE 4 MILLIGRAM(S): 50 CAPSULE, EXTENDED RELEASE ORAL at 01:15

## 2022-06-15 RX ADMIN — MORPHINE SULFATE 4 MILLIGRAM(S): 50 CAPSULE, EXTENDED RELEASE ORAL at 06:12

## 2022-06-15 RX ADMIN — MORPHINE SULFATE 4 MILLIGRAM(S): 50 CAPSULE, EXTENDED RELEASE ORAL at 05:42

## 2022-06-15 RX ADMIN — Medication 400 MILLIGRAM(S): at 23:07

## 2022-06-15 RX ADMIN — HYDROMORPHONE HYDROCHLORIDE 0.25 MILLIGRAM(S): 2 INJECTION INTRAMUSCULAR; INTRAVENOUS; SUBCUTANEOUS at 22:54

## 2022-06-15 RX ADMIN — Medication 1000 MILLIGRAM(S): at 17:37

## 2022-06-15 RX ADMIN — SODIUM CHLORIDE 1000 MILLILITER(S): 9 INJECTION, SOLUTION INTRAVENOUS at 00:45

## 2022-06-15 RX ADMIN — MORPHINE SULFATE 4 MILLIGRAM(S): 50 CAPSULE, EXTENDED RELEASE ORAL at 03:42

## 2022-06-15 NOTE — H&P ADULT - ASSESSMENT
68yo F (speaks Azeri dialect) with a history of HTN, HLD, L tibia surgery, lap kevan (2011, [facility]) presenting with 1.5 days of nausea, vomiting and abdominal pain found to have an incarcerated right ventral hernia. Currently hemodynamically stable.    PLAN:  - Admit to B team surgery under Dr. Fidel Ochoa, floor bed  - For OR emergently for laparoscopic hernia repair with mesh, possible open, possible bowel resection  - Daughter consented for procedure (decision maker)  - NPO/IVF  - NGT to LCWS  - Exam prior to pain meds  - Holding home anti-hypertensives    Seen and discussed with Dr. Don Pedroza, PGY2  B Team Surgery   [id number]

## 2022-06-15 NOTE — ED ADULT NURSE REASSESSMENT NOTE - NS ED NURSE REASSESS COMMENT FT1
Break coverage RN: Pt A&Ox4 resting on stretcher. Respirations even and unlabored, . pt c/o 6/10 abdominal pain. MD made aware Pt well appearing. NAD noted. Pending further orders. Bed in lowest position, safety maintained.

## 2022-06-15 NOTE — ASU PREOP CHECKLIST - 1.
pt speaks Yoruba   Hadi   ID # [id number] used to obtain permission for daughter Chu Brice to interpret for patient

## 2022-06-15 NOTE — BRIEF OPERATIVE NOTE - OPERATION/FINDINGS
Diagnostic laparoscopy with Veress entry and camera port at Deluna's point and two 5mm working ports in left hemiabdomen. Bowel reduced from hernia. Converted to open for repair of enterotomy - less than 50% wall circumference, closed in two layers. Serosal tear repaired with Lembert stitch. Hernia defect repaired primarily, fascia closed with PDS. Diagnostic laparoscopy with Veress entry and camera port at Deluna's point and two 5mm working ports in left hemiabdomen. Bowel reduced from hernia. Converted to open for repair of enterotomy - less than 50% wall circumference, closed transversely in two layers. Serosal tear repaired with Lembert stitch. Hernia defect repaired primarily, fascia closed with PDS.

## 2022-06-15 NOTE — CHART NOTE - NSCHARTNOTEFT_GEN_A_CORE
Post Operative Check    Patient is post op from a ventral hernia repair, lap converted to open with primary repair of enterotomy, pain controlled, recovering appropriately. Yet to pass gas or stool.     Vitals    T(C): 37.6 (06-15-22 @ 17:23), Max: 37.6 (06-15-22 @ 17:23)  HR: 92 (06-15-22 @ 17:23) (70 - 92)  BP: 118/69 (06-15-22 @ 17:23) (114/80 - 150/83)  RR: 18 (06-15-22 @ 17:23) (14 - 20)  SpO2: 92% (06-15-22 @ 17:23) (90% - 100%)      06-15 @ 07:01  -  06-15 @ 17:44  --------------------------------------------------------  IN:    Lactated Ringers: 375 mL  Total IN: 375 mL    OUT:    Oral Fluid: 0 mL  Total OUT: 0 mL    Total NET: 375 mL          Labs                        14.5   8.84  )-----------( 227      ( 15 Rambo 2022 01:45 )             45.7       CBC Full  -  ( 15 Rambo 2022 01:45 )  WBC Count : 8.84 K/uL  Hemoglobin : 14.5 g/dL  Hematocrit : 45.7 %  Platelet Count - Automated : 227 K/uL  Mean Cell Volume : 81.0 fL  Mean Cell Hemoglobin : 25.7 pg  Mean Cell Hemoglobin Concentration : 31.7 gm/dL  Auto Neutrophil # : 6.16 K/uL  Auto Lymphocyte # : 2.18 K/uL  Auto Monocyte # : 0.45 K/uL  Auto Eosinophil # : 0.03 K/uL  Auto Basophil # : 0.00 K/uL  Auto Neutrophil % : 69.7 %  Auto Lymphocyte % : 24.7 %  Auto Monocyte % : 5.1 %  Auto Eosinophil % : 0.3 %  Auto Basophil % : 0.0 %      Physical Exam  General: NAD, resting comfortably  Abdomen: Appropriately tender at incisions, midline wound c/d/i, 3 lap port sides in left abdomen closed with steris c/d/i       Patient is a 67y old Female s/p lap converted to open ventral hernia repair with primary repair of enterotomy, recovering on floor.     Plan:  Pain control  NPO/IVF  OOBAT  F/u AM labs     B team surgery   [id number]

## 2022-06-15 NOTE — ED PROVIDER NOTE - NSICDXPASTMEDICALHX_GEN_ALL_CORE_FT
PAST MEDICAL HISTORY:  H/O fracture of tibia and fibula (L)    Hyperlipidemia     Hypertension     Obesity

## 2022-06-15 NOTE — ED PROVIDER NOTE - NS ED ROS FT
In additional the that documented in the HPI, the additional ROS was obtained:    CONSTITUTIONAL: No fever, no chills  EYES: no change in vision, no blurred vision  HEENT: no trouble swallowing, no trouble speaking, no sore throat  CV: no chest pain, no palpitations  RESP: no cough, no shortness of breath  GI: +abdominal pain, +nausea, +vomiting, no diarrhea, no constipation  : No dysuria, no frequency  NEURO: no headache, no new numbness, no weakness, no dizziness  SKIN: no new rashes  HEME: No easy bruising or bleeding  MSK: no recent trauma  Hosea Quintanilla M.D. -Resident

## 2022-06-15 NOTE — H&P ADULT - HISTORY OF PRESENT ILLNESS
Patient is a 68yo F (speaks Nepali dialect) with a history of HTN, HLD, L tibia surgery, lap kevan (2011, [facility]) presenting with 1.5 days of nausea, vomiting and abdominal pain. Per daughter at bedside, patient began having symptoms Monday night after dinner. Last passed flatus and had a BM at that time. Denies fevers or chills. Pain improved in ED after getting morphine and NGT placement.    In the ED, stable and afebrile. Labs unremarkable. CT showed incarcerated SBO at right ventral hernia.

## 2022-06-15 NOTE — ED PROVIDER NOTE - CARE PLAN
1 Principal Discharge DX:	Small bowel obstruction  Secondary Diagnosis:	Abdominal pain  Secondary Diagnosis:	Abdominal distension

## 2022-06-15 NOTE — ED PROCEDURE NOTE - ATTENDING CONTRIBUTION TO CARE
I was physically present for the E/M service provided. I was physically present for the key portions of the service provided. KAM.

## 2022-06-15 NOTE — ED ADULT NURSE NOTE - OBJECTIVE STATEMENT
Pt received to room 14 A&Ox4 and ambulatory at baseline, steady gait observed. Pt arrives with c/o diffuse abd pain x1 day with multiple episodes of NBNB emesis. Pt denies constipation or diarrhea, states she is passing flatus with normal BM yesterday. Pt denies CP, SOB, fever, chills. PMHx HTN. 20G IV placed to RAC and labs drawn as ordered. Medicated per eMAR. MD hdz in progress.

## 2022-06-15 NOTE — ED PROVIDER NOTE - ATTENDING CONTRIBUTION TO CARE
Afebrile. Awake and Alert. Lungs CTA. Heart RRR. Abdomen soft NTND. CN II-XII grossly intact. Moves all extremities without lateralization.    r/o SBO Afebrile. Awake and Alert. Lungs CTA. Heart RRR. Abdomen soft, general TTP, ND. CN II-XII grossly intact. Moves all extremities without lateralization.    r/o SBO

## 2022-06-15 NOTE — PATIENT PROFILE ADULT - FALL HARM RISK - HARM RISK INTERVENTIONS

## 2022-06-15 NOTE — H&P ADULT - NSHPSOCIALHISTORY_GEN_ALL_CORE
Lives with daughter.  Uses wheelchair to get around and requires assistance with ADLs due to tibia surgery  Denies alcohol, drug, tobacco use.

## 2022-06-15 NOTE — PATIENT PROFILE ADULT - MONEY FOR FOOD
Aditi Muñiz PA-C     no

## 2022-06-15 NOTE — H&P ADULT - ATTENDING COMMENTS
Patient with incarcerated incisional ventral hernia. Patient with CT scan showing transition point at hernia site. On exam unable to be reduced  plan  npo, ivf ngt  or for lap ventral hernia repair possible open    I have personally interviewed and examined this patient, reviewed pertinent labs and imaging, and discussed the case with colleagues, residents, and physician assistants on B Team rounds.    The active care issues are:  1. ventral hernia with sbo    The Acute Care Surgery (B Team) Attending Group Practice:  Dr. Symone Galeano, Dr. Bernard Hogue, Dr. Diony Kaba, Dr. Fidel Ochoa,     urgent issues - spectra [id number]  nonurgent issues - [phone]  patient appointments or afterhours - [phone]

## 2022-06-15 NOTE — ED PROVIDER NOTE - PHYSICAL EXAMINATION
Vital signs reviewed.  CONSTITUTIONAL: Appears to be in pain.  awake  HEAD: Normocephalic; atraumatic  EYES: EOMI, no conjunctival injection, no scleral icterus  MOUTH/THROAT:  MMM  NECK: Trachea midline  CV: Normal S1, S2; no audible murmurs; extremities WWP  RESP: normal work of breathing; CTAB, no stridor  ABD: soft, +distended; +diffusely tender  : Deferred  MSK/EXT: no edema, no limited ROM  SKIN: No rashes on exposed skin surfaces  NEURO: Moves all extremities spontaneously with no focal deficits, speech is appropriate

## 2022-06-15 NOTE — H&P ADULT - NSHPLABSRESULTS_GEN_ALL_CORE
LABS:                          14.5   8.84  )-----------( 227      ( 15 Rambo 2022 01:45 )             45.7       06-15    132<L>  |  98  |  12  ----------------------------<  114<H>  5.2   |  21<L>  |  0.72    Ca    9.5      15 Rambo 2022 01:45    TPro  8.2  /  Alb  3.7  /  TBili  0.7  /  DBili  x   /  AST  36<H>  /  ALT  17  /  AlkPhos  94  06-15         PT/INR - ( 15 Rambo 2022 05:30 )   PT: 12.7 sec;   INR: 1.09 ratio       PTT - ( 15 Rambo 2022 05:30 )  PTT:32.3 sec    _______________________________________  RADIOLOGY & ADDITIONAL STUDIES:    < from: CT Abdomen and Pelvis w/ Oral Cont (06.15.22 @ 04:29) >    IMPRESSION:  Small bowel obstruction secondary to a right ventral hernia containing a   dilated, fecalized loop of small bowel.    < end of copied text >

## 2022-06-15 NOTE — ED PROVIDER NOTE - OBJECTIVE STATEMENT
67F pmh htn, hld, newly diagnosed DM who presents with 1 day abdominal pain, distension, nausea, and vomiting (5 episodes today).  Last BM yesterday afternoon (one hard piece of stool), has not passed flatus since prior to that.  Not eaten or drank x24 hours.  History of laparoscopic cholecystectomy.  No prior colonoscopy, declined colonoscopy when referred by her PCP 2 weeks ago.    specifically denies headache, sore throat, chest pain, shortness of breath, cough, diarrhea, back or neck pain, or lower extremity edema.

## 2022-06-15 NOTE — H&P ADULT - NSHPPHYSICALEXAM_GEN_ALL_CORE
VITALS:  Vital Signs Last 24 Hrs  T(C): 36.1 (15 Rambo 2022 07:24), Max: 36.8 (15 Rambo 2022 00:51)  T(F): 97 (15 Rambo 2022 07:24), Max: 98.3 (15 Rambo 2022 00:51)  HR: 83 (15 Rambo 2022 07:24) (70 - 83)  BP: 138/78 (15 Rambo 2022 07:24) (127/78 - 150/83)  BP(mean): --  RR: 18 (15 Rambo 2022 07:24) (18 - 18)  SpO2: 100% (15 Rambo 2022 07:24) (100% - 100%)    PHYSICAL EXAM:  Gen: No acute distress.  Abd: Soft, morbidly obese, right anterior ventral hernia mildly tender to palpation, no skin changes, nondistended, no rebound, no guarding. NGT with bilious output.  Ext: Warm and well-perfused

## 2022-06-16 LAB
ANION GAP SERPL CALC-SCNC: 11 MMOL/L — SIGNIFICANT CHANGE UP (ref 7–14)
BUN SERPL-MCNC: 19 MG/DL — SIGNIFICANT CHANGE UP (ref 7–23)
CALCIUM SERPL-MCNC: 8.2 MG/DL — LOW (ref 8.4–10.5)
CHLORIDE SERPL-SCNC: 99 MMOL/L — SIGNIFICANT CHANGE UP (ref 98–107)
CO2 SERPL-SCNC: 25 MMOL/L — SIGNIFICANT CHANGE UP (ref 22–31)
CREAT SERPL-MCNC: 1.2 MG/DL — SIGNIFICANT CHANGE UP (ref 0.5–1.3)
EGFR: 50 ML/MIN/1.73M2 — LOW
GLUCOSE SERPL-MCNC: 80 MG/DL — SIGNIFICANT CHANGE UP (ref 70–99)
HCT VFR BLD CALC: 41.1 % — SIGNIFICANT CHANGE UP (ref 34.5–45)
HCV AB S/CO SERPL IA: 0.09 S/CO — SIGNIFICANT CHANGE UP (ref 0–0.99)
HCV AB SERPL-IMP: SIGNIFICANT CHANGE UP
HGB BLD-MCNC: 12.9 G/DL — SIGNIFICANT CHANGE UP (ref 11.5–15.5)
MAGNESIUM SERPL-MCNC: 1.5 MG/DL — LOW (ref 1.6–2.6)
MCHC RBC-ENTMCNC: 26.1 PG — LOW (ref 27–34)
MCHC RBC-ENTMCNC: 31.4 GM/DL — LOW (ref 32–36)
MCV RBC AUTO: 83 FL — SIGNIFICANT CHANGE UP (ref 80–100)
NRBC # BLD: 0 /100 WBCS — SIGNIFICANT CHANGE UP
NRBC # FLD: 0 K/UL — SIGNIFICANT CHANGE UP
PHOSPHATE SERPL-MCNC: 5.4 MG/DL — HIGH (ref 2.5–4.5)
PLATELET # BLD AUTO: 183 K/UL — SIGNIFICANT CHANGE UP (ref 150–400)
POTASSIUM SERPL-MCNC: 4.3 MMOL/L — SIGNIFICANT CHANGE UP (ref 3.5–5.3)
POTASSIUM SERPL-SCNC: 4.3 MMOL/L — SIGNIFICANT CHANGE UP (ref 3.5–5.3)
RBC # BLD: 4.95 M/UL — SIGNIFICANT CHANGE UP (ref 3.8–5.2)
RBC # FLD: 14.5 % — SIGNIFICANT CHANGE UP (ref 10.3–14.5)
SODIUM SERPL-SCNC: 135 MMOL/L — SIGNIFICANT CHANGE UP (ref 135–145)
WBC # BLD: 8.2 K/UL — SIGNIFICANT CHANGE UP (ref 3.8–10.5)
WBC # FLD AUTO: 8.2 K/UL — SIGNIFICANT CHANGE UP (ref 3.8–10.5)

## 2022-06-16 RX ORDER — OXYCODONE HYDROCHLORIDE 5 MG/1
5 TABLET ORAL EVERY 6 HOURS
Refills: 0 | Status: DISCONTINUED | OUTPATIENT
Start: 2022-06-16 | End: 2022-06-16

## 2022-06-16 RX ORDER — MAGNESIUM SULFATE 500 MG/ML
2 VIAL (ML) INJECTION ONCE
Refills: 0 | Status: COMPLETED | OUTPATIENT
Start: 2022-06-16 | End: 2022-06-16

## 2022-06-16 RX ORDER — HYDROMORPHONE HYDROCHLORIDE 2 MG/ML
0.5 INJECTION INTRAMUSCULAR; INTRAVENOUS; SUBCUTANEOUS ONCE
Refills: 0 | Status: DISCONTINUED | OUTPATIENT
Start: 2022-06-16 | End: 2022-06-16

## 2022-06-16 RX ORDER — OXYCODONE HYDROCHLORIDE 5 MG/1
10 TABLET ORAL EVERY 6 HOURS
Refills: 0 | Status: DISCONTINUED | OUTPATIENT
Start: 2022-06-16 | End: 2022-06-19

## 2022-06-16 RX ORDER — OXYCODONE HYDROCHLORIDE 5 MG/1
2.5 TABLET ORAL EVERY 4 HOURS
Refills: 0 | Status: DISCONTINUED | OUTPATIENT
Start: 2022-06-16 | End: 2022-06-16

## 2022-06-16 RX ORDER — OXYCODONE HYDROCHLORIDE 5 MG/1
5 TABLET ORAL EVERY 4 HOURS
Refills: 0 | Status: DISCONTINUED | OUTPATIENT
Start: 2022-06-16 | End: 2022-06-19

## 2022-06-16 RX ORDER — ACETAMINOPHEN 500 MG
650 TABLET ORAL EVERY 6 HOURS
Refills: 0 | Status: DISCONTINUED | OUTPATIENT
Start: 2022-06-16 | End: 2022-06-18

## 2022-06-16 RX ADMIN — OXYCODONE HYDROCHLORIDE 10 MILLIGRAM(S): 5 TABLET ORAL at 21:02

## 2022-06-16 RX ADMIN — HYDROMORPHONE HYDROCHLORIDE 0.5 MILLIGRAM(S): 2 INJECTION INTRAMUSCULAR; INTRAVENOUS; SUBCUTANEOUS at 11:54

## 2022-06-16 RX ADMIN — HYDROMORPHONE HYDROCHLORIDE 0.5 MILLIGRAM(S): 2 INJECTION INTRAMUSCULAR; INTRAVENOUS; SUBCUTANEOUS at 15:52

## 2022-06-16 RX ADMIN — HYDROMORPHONE HYDROCHLORIDE 0.5 MILLIGRAM(S): 2 INJECTION INTRAMUSCULAR; INTRAVENOUS; SUBCUTANEOUS at 11:39

## 2022-06-16 RX ADMIN — OXYCODONE HYDROCHLORIDE 10 MILLIGRAM(S): 5 TABLET ORAL at 21:32

## 2022-06-16 RX ADMIN — HYDROMORPHONE HYDROCHLORIDE 0.5 MILLIGRAM(S): 2 INJECTION INTRAMUSCULAR; INTRAVENOUS; SUBCUTANEOUS at 15:37

## 2022-06-16 RX ADMIN — HYDROMORPHONE HYDROCHLORIDE 0.5 MILLIGRAM(S): 2 INJECTION INTRAMUSCULAR; INTRAVENOUS; SUBCUTANEOUS at 04:58

## 2022-06-16 RX ADMIN — OXYCODONE HYDROCHLORIDE 5 MILLIGRAM(S): 5 TABLET ORAL at 10:21

## 2022-06-16 RX ADMIN — ENOXAPARIN SODIUM 40 MILLIGRAM(S): 100 INJECTION SUBCUTANEOUS at 18:06

## 2022-06-16 RX ADMIN — Medication 650 MILLIGRAM(S): at 22:47

## 2022-06-16 RX ADMIN — Medication 1000 MILLIGRAM(S): at 05:33

## 2022-06-16 RX ADMIN — Medication 25 GRAM(S): at 10:21

## 2022-06-16 RX ADMIN — Medication 650 MILLIGRAM(S): at 22:17

## 2022-06-16 RX ADMIN — Medication 400 MILLIGRAM(S): at 05:03

## 2022-06-16 RX ADMIN — HYDROMORPHONE HYDROCHLORIDE 0.5 MILLIGRAM(S): 2 INJECTION INTRAMUSCULAR; INTRAVENOUS; SUBCUTANEOUS at 23:48

## 2022-06-16 RX ADMIN — HYDROMORPHONE HYDROCHLORIDE 0.5 MILLIGRAM(S): 2 INJECTION INTRAMUSCULAR; INTRAVENOUS; SUBCUTANEOUS at 23:18

## 2022-06-16 RX ADMIN — HYDROMORPHONE HYDROCHLORIDE 0.5 MILLIGRAM(S): 2 INJECTION INTRAMUSCULAR; INTRAVENOUS; SUBCUTANEOUS at 05:28

## 2022-06-16 NOTE — PROGRESS NOTE ADULT - ASSESSMENT
67y old Female s/p lap converted to open ventral hernia repair with primary repair of enterotomy, recovering on floor.     PLAN  -NPO/IVF  -OOBAT  -F/u AM labs   -Pain control PRN    B team surgery   [id number] 67y old Female s/p lap converted to open ventral hernia repair with primary repair of enterotomy, recovering on floor.     PLAN  -CLD, adv in afternoon if tolerates  -OOBAT  -F/u AM labs   -Pain control PRN  -dispo planning    B team surgery   [id number]

## 2022-06-16 NOTE — PHYSICAL THERAPY INITIAL EVALUATION ADULT - LEVEL OF INDEPENDENCE, REHAB EVAL
Bed mobility, transfers, and ambulation held at this time due to patient being ruled out for right Lower Extremity DVT. SAYRA Glover made aware, PT being held pending results./unable to perform

## 2022-06-16 NOTE — PHYSICAL THERAPY INITIAL EVALUATION ADULT - GENERAL OBSERVATIONS, REHAB EVAL
Pt found in bed; patient speaks Chinese, daughter at bedside and patient prefers daughter to translate; +02; morbidly obese; right Lower Extremity with +edema and erythema noted, patient reporting pain in calf, with SAYRA Glover and Surgical team aware. Pt found in bed; patient speaks Greek, daughter at bedside and patient prefers daughter to translate; +02; morbidly obese; right Lower Extremity with +mild edema and erythema noted proximal calf and distally to foot, patient reporting pain in distal posterior calf, with SAYRA Glover and Surgical team aware.

## 2022-06-16 NOTE — PROGRESS NOTE ADULT - ATTENDING COMMENTS
wound dressed, abd obese, soft, appropriate periincisional tenderness  no ankle edema, Beth's sign negative  labs reviewed    patient and her daughter's questions answered  plan today is to ADAT, ambulate, d/c purewick  d/c home possibly later today/tomorrow depending on progress

## 2022-06-16 NOTE — PROGRESS NOTE ADULT - SUBJECTIVE AND OBJECTIVE BOX
SURGERY PROGRESS NOTE    Hospital Day #1  Procedure/Dx:   ·	Diagnostic laparoscopy  ·	Ventral hernia repair        SUBJECTIVE  Pt seen and examined at bedside. No complaints.  No acute events overnight.       OBJECTIVE:    PHYSICAL EXAM   General Appearance: Appears well, NAD  Resp: Patent airway, non-labored breathing  CV: RRR  Abdomen: Soft, Nontender, Nondistended, dressing clean/dry/intact      Vital Signs Last 24 Hrs  T(C): 37.6 (15 Rambo 2022 17:23), Max: 37.6 (15 Rambo 2022 17:23)  T(F): 99.7 (15 Rambo 2022 17:23), Max: 99.7 (15 Rambo 2022 17:23)  HR: 81 (15 Rambo 2022 19:30) (70 - 92)  BP: 103/60 (15 Rambo 2022 22:20) (89/56 - 150/83)  BP(mean): 88 (15 Rambo 2022 15:00) (79 - 95)  RR: 18 (15 Rambo 2022 19:30) (14 - 20)  SpO2: 82% (15 Rambo 2022 19:30) (82% - 100%)      I's & O's    06-15-22 @ 07:01  -  06-16-22 @ 00:04  --------------------------------------------------------  IN:    IV PiggyBack: 100 mL    Lactated Ringers: 875 mL  Total IN: 975 mL    OUT:    Oral Fluid: 0 mL    Voided (mL): 100 mL  Total OUT: 100 mL    Total NET: 875 mL            LAB/STUDIES                        14.5   8.84  )-----------( 227      ( 15 Rambo 2022 01:45 )             45.7     06-15    132<L>  |  98  |  12  ----------------------------<  114<H>  5.2   |  21<L>  |  0.72    Ca    9.5      15 Rambo 2022 01:45    TPro  8.2  /  Alb  3.7  /  TBili  0.7  /  DBili  x   /  AST  36<H>  /  ALT  17  /  AlkPhos  94  06-15    PT/INR - ( 15 Rambo 2022 05:30 )   PT: 12.7 sec;   INR: 1.09 ratio    PTT - ( 15 Rambo 2022 05:30 )  PTT:32.3 sec    LIVER FUNCTIONS - ( 15 Rambo 2022 01:45 )  Alb: 3.7 g/dL / Pro: 8.2 g/dL / ALK PHOS: 94 U/L / ALT: 17 U/L / AST: 36 U/L / GGT: x                  SURGERY PROGRESS NOTE    Hospital Day #1  Procedure/Dx:   ·	Diagnostic laparoscopy  ·	Ventral hernia repair        SUBJECTIVE  Pt seen and examined at bedside. Pt's daughter at bedside, states pt c/o abd pain, notes improvement with pain medication. Denies N/V, +flatus, -BM          OBJECTIVE:    PHYSICAL EXAM   General Appearance: Appears well, NAD  Resp: Patent airway, non-labored breathing  CV: RRR  Abdomen: Soft, Nontender, Nondistended, dressing clean/dry/intact      Vital Signs Last 24 Hrs  T(C): 37.6 (15 Rambo 2022 17:23), Max: 37.6 (15 Rambo 2022 17:23)  T(F): 99.7 (15 Rambo 2022 17:23), Max: 99.7 (15 Rambo 2022 17:23)  HR: 81 (15 Rambo 2022 19:30) (70 - 92)  BP: 103/60 (15 Rambo 2022 22:20) (89/56 - 150/83)  BP(mean): 88 (15 Rambo 2022 15:00) (79 - 95)  RR: 18 (15 Rambo 2022 19:30) (14 - 20)  SpO2: 82% (15 Rambo 2022 19:30) (82% - 100%)      I's & O's    06-15-22 @ 07:01  -  06-16-22 @ 00:04  --------------------------------------------------------  IN:    IV PiggyBack: 100 mL    Lactated Ringers: 875 mL  Total IN: 975 mL    OUT:    Oral Fluid: 0 mL    Voided (mL): 100 mL  Total OUT: 100 mL    Total NET: 875 mL            LAB/STUDIES                        14.5   8.84  )-----------( 227      ( 15 Rambo 2022 01:45 )             45.7     06-15    132<L>  |  98  |  12  ----------------------------<  114<H>  5.2   |  21<L>  |  0.72    Ca    9.5      15 Rambo 2022 01:45    TPro  8.2  /  Alb  3.7  /  TBili  0.7  /  DBili  x   /  AST  36<H>  /  ALT  17  /  AlkPhos  94  06-15    PT/INR - ( 15 Rambo 2022 05:30 )   PT: 12.7 sec;   INR: 1.09 ratio    PTT - ( 15 Rambo 2022 05:30 )  PTT:32.3 sec    LIVER FUNCTIONS - ( 15 Rambo 2022 01:45 )  Alb: 3.7 g/dL / Pro: 8.2 g/dL / ALK PHOS: 94 U/L / ALT: 17 U/L / AST: 36 U/L / GGT: x

## 2022-06-17 ENCOUNTER — TRANSCRIPTION ENCOUNTER (OUTPATIENT)
Age: 67
End: 2022-06-17

## 2022-06-17 LAB
ANION GAP SERPL CALC-SCNC: 9 MMOL/L — SIGNIFICANT CHANGE UP (ref 7–14)
BUN SERPL-MCNC: 20 MG/DL — SIGNIFICANT CHANGE UP (ref 7–23)
CALCIUM SERPL-MCNC: 8.2 MG/DL — LOW (ref 8.4–10.5)
CHLORIDE SERPL-SCNC: 97 MMOL/L — LOW (ref 98–107)
CO2 SERPL-SCNC: 26 MMOL/L — SIGNIFICANT CHANGE UP (ref 22–31)
CREAT SERPL-MCNC: 0.81 MG/DL — SIGNIFICANT CHANGE UP (ref 0.5–1.3)
EGFR: 80 ML/MIN/1.73M2 — SIGNIFICANT CHANGE UP
GLUCOSE SERPL-MCNC: 90 MG/DL — SIGNIFICANT CHANGE UP (ref 70–99)
POTASSIUM SERPL-MCNC: 4.2 MMOL/L — SIGNIFICANT CHANGE UP (ref 3.5–5.3)
POTASSIUM SERPL-SCNC: 4.2 MMOL/L — SIGNIFICANT CHANGE UP (ref 3.5–5.3)
SODIUM SERPL-SCNC: 132 MMOL/L — LOW (ref 135–145)

## 2022-06-17 RX ORDER — PANTOPRAZOLE SODIUM 20 MG/1
40 TABLET, DELAYED RELEASE ORAL ONCE
Refills: 0 | Status: COMPLETED | OUTPATIENT
Start: 2022-06-17 | End: 2022-06-17

## 2022-06-17 RX ORDER — ONDANSETRON 8 MG/1
4 TABLET, FILM COATED ORAL ONCE
Refills: 0 | Status: COMPLETED | OUTPATIENT
Start: 2022-06-17 | End: 2022-06-17

## 2022-06-17 RX ORDER — LOSARTAN POTASSIUM 100 MG/1
1 TABLET, FILM COATED ORAL
Qty: 0 | Refills: 0 | DISCHARGE

## 2022-06-17 RX ORDER — OXYCODONE HYDROCHLORIDE 5 MG/1
1 TABLET ORAL
Qty: 8 | Refills: 0
Start: 2022-06-17 | End: 2022-06-18

## 2022-06-17 RX ORDER — AMLODIPINE BESYLATE 2.5 MG/1
1 TABLET ORAL
Qty: 0 | Refills: 0 | DISCHARGE

## 2022-06-17 RX ORDER — ACETAMINOPHEN 500 MG
2 TABLET ORAL
Qty: 0 | Refills: 0 | DISCHARGE
Start: 2022-06-17

## 2022-06-17 RX ORDER — PANTOPRAZOLE SODIUM 20 MG/1
40 TABLET, DELAYED RELEASE ORAL
Refills: 0 | Status: DISCONTINUED | OUTPATIENT
Start: 2022-06-18 | End: 2022-06-19

## 2022-06-17 RX ADMIN — Medication 650 MILLIGRAM(S): at 17:14

## 2022-06-17 RX ADMIN — Medication 650 MILLIGRAM(S): at 17:44

## 2022-06-17 RX ADMIN — ONDANSETRON 4 MILLIGRAM(S): 8 TABLET, FILM COATED ORAL at 23:32

## 2022-06-17 RX ADMIN — ONDANSETRON 4 MILLIGRAM(S): 8 TABLET, FILM COATED ORAL at 17:08

## 2022-06-17 RX ADMIN — Medication 650 MILLIGRAM(S): at 23:38

## 2022-06-17 RX ADMIN — Medication 650 MILLIGRAM(S): at 11:01

## 2022-06-17 RX ADMIN — Medication 650 MILLIGRAM(S): at 11:31

## 2022-06-17 RX ADMIN — Medication 650 MILLIGRAM(S): at 06:19

## 2022-06-17 RX ADMIN — ENOXAPARIN SODIUM 40 MILLIGRAM(S): 100 INJECTION SUBCUTANEOUS at 17:13

## 2022-06-17 RX ADMIN — Medication 650 MILLIGRAM(S): at 05:49

## 2022-06-17 RX ADMIN — PANTOPRAZOLE SODIUM 40 MILLIGRAM(S): 20 TABLET, DELAYED RELEASE ORAL at 23:12

## 2022-06-17 RX ADMIN — Medication 650 MILLIGRAM(S): at 23:08

## 2022-06-17 NOTE — DISCHARGE NOTE PROVIDER - CARE PROVIDERS DIRECT ADDRESSES
[email or web address]

## 2022-06-17 NOTE — DISCHARGE NOTE PROVIDER - NSDCCPCAREPLAN_GEN_ALL_CORE_FT
PRINCIPAL DISCHARGE DIAGNOSIS  Diagnosis: Incarcerated ventral hernia  Assessment and Plan of Treatment: WOUND CARE:  Please keep incisions clean and dry. Please do not Scrub or rub incisions. Do not use lotion or powder on incisions.   BATHING: You may shower and/or sponge bathe. You may use warm soapy water in the shower and rinse, pat dry.  ACTIVITY: No heavy lifting or straining. Otherwise, you may return to your usual level of physical activity. If you are taking narcotic pain medication DO NOT drive a car, operate machinery or make important decisions.  DIET: Return to your usual diet.  NOTIFY YOUR SURGEON IF YOU HAVE: any bleeding that does not stop, any pus draining from your wound(s), any fever (over 100.4 F) persistent nausea/vomiting, or if your pain is not controlled on your discharge pain medications, unable to urinate.  FOLLOW UP:  1. Please follow up with your primary care physician in one week regarding your hospitalization, bring copies of your discharge paperwork.  2. Please follow up with your surgeon, Dr. Fidel Ochoa. Please call [phone] to make an appointment.      SECONDARY DISCHARGE DIAGNOSES  Diagnosis: Abdominal pain  Assessment and Plan of Treatment:     Diagnosis: Abdominal distension  Assessment and Plan of Treatment:      PRINCIPAL DISCHARGE DIAGNOSIS  Diagnosis: Incarcerated ventral hernia  Assessment and Plan of Treatment: WOUND CARE:  wet to dry dressing with normal saline and sterile 4x4 gauze to midline incision daily. Please keep incisions clean and dry. Please do not Scrub or rub incisions. Do not use lotion or powder on incisions.   BATHING: You may shower and/or sponge bathe. You may use warm soapy water in the shower and rinse, pat dry.  ACTIVITY: No heavy lifting or straining. Otherwise, you may return to your usual level of physical activity. If you are taking narcotic pain medication DO NOT drive a car, operate machinery or make important decisions.  DIET: Return to your usual diet.  NOTIFY YOUR SURGEON IF YOU HAVE: any bleeding that does not stop, any pus draining from your wound(s), any fever (over 100.4 F) persistent nausea/vomiting, or if your pain is not controlled on your discharge pain medications, unable to urinate.  FOLLOW UP:  1. Please follow up with your primary care physician in one week regarding your hospitalization, bring copies of your discharge paperwork.  2. Please follow up with your surgeon, Dr. Fidel Ochoa. Please call [phone] to make an appointment.      SECONDARY DISCHARGE DIAGNOSES  Diagnosis: Abdominal pain  Assessment and Plan of Treatment:     Diagnosis: Abdominal distension  Assessment and Plan of Treatment:

## 2022-06-17 NOTE — DISCHARGE NOTE PROVIDER - CARE PROVIDER_API CALL
Fidel Ochoa)  Surgery  Critical Care  [address] [place] [address]  Phone: [phone]  Fax: [phone]  Follow Up Time: 2 weeks

## 2022-06-17 NOTE — DISCHARGE NOTE PROVIDER - HOSPITAL COURSE
This patient is a 68yo F (speaks Romanian dialect) with a history of HTN, HLD, L tibia surgery, lap kevan (2011, [facility]) who presented to [facility] 6/15/22 with 1.5 days of nausea, vomiting and abdominal pain. Pain improved in ED after getting morphine and NGT placement. In the ED, stable and afebrile. Labs unremarkable. CT showed incarcerated SBO at right ventral hernia. Patient admitted to Acute Care Surgery for incarcerated right ventral hernia. Patient taken to OR by Dr. Ochoa and underwent diagnostic laparoscopy converted to open for repair of enterotomy, reduction of hernia, and hernia defect repair. Patient tolerated operation well and there were no post-operative complications identified. Patient remained hemodynamically stable in the PACU and transferred to the surgical floor. Diet was restarted and advanced as tolerated. Pain control was transitioned from IV to PO pain meds. At this time, patient is currently ambulating, voiding, tolerating a regular diet. Pain well controlled on PO pain meds. Patient has been deemed stable for discharge home with follow up as an outpatient.    This patient is a 66yo F (speaks Persian dialect) with a history of HTN, HLD, L tibia surgery, lap kevan (2011, [facility]) who presented to [facility] 6/15/22 with 1.5 days of nausea, vomiting and abdominal pain. Pain improved in ED after getting morphine and NGT placement. In the ED, stable and afebrile. Labs unremarkable. CT showed incarcerated SBO at right ventral hernia. Patient admitted to Acute Care Surgery for incarcerated right ventral hernia. Patient taken to OR by Dr. Ochoa and underwent diagnostic laparoscopy converted to open for repair of enterotomy, reduction of hernia, and hernia defect repair. Patient tolerated operation well and there were no post-operative complications identified. Patient remained hemodynamically stable in the PACU and transferred to the surgical floor. Diet was restarted and advanced as tolerated. Pain control was transitioned from IV to PO pain meds. At this time, patient is currently ambulating, voiding, tolerating a regular diet. Pain well controlled on PO pain meds. Patient evaluated by physical therapy throughout post-operative course and recommended patient be discharged to rehab facility for continued PT services. Patient and family refused rehab placement. Patient has been deemed stable for discharge home with follow up as an outpatient.

## 2022-06-17 NOTE — DISCHARGE NOTE PROVIDER - NSDCFUSCHEDAPPT_GEN_ALL_CORE_FT
Humera Montero  [facility]  OrthoSurg [address] [facility]  Scheduled Appointment: 07/21/2022

## 2022-06-17 NOTE — PROGRESS NOTE ADULT - ATTENDING COMMENTS
d/c home when tolerating regular diet and no longer requiring supplemental oxygen via nasal cannula - likely 2/2 postop atelectasis  -optimize pain control  -ambulation d/c home when tolerating regular diet and no longer requiring supplemental oxygen via nasal cannula - likely 2/2 postop atelectasis  -optimize pain control  -ambulation  -check labs today to monitor creatinine trend

## 2022-06-17 NOTE — PROGRESS NOTE ADULT - ASSESSMENT
67y old Female s/p lap converted to open ventral hernia repair with primary repair of enterotomy, recovering on floor.     PLAN  -regular diet  -OOBAT  -F/u AM labs   -Pain control PRN  -dispo planning    B team surgery   [id number] 67F POD #2 from lap converted to open ventral hernia repair with primary repair of enterotomy, recovering on floor.     PLAN  - encourage regular diet  - OOBAT  - trend creatinine today  - Pain control PRN  - dispo planning: potentially home if: pain is well controlled, patient is tolerating diet, and Cr is not uptrending    B team surgery   [id number] 67F POD #2 from lap converted to open ventral hernia repair with primary repair of enterotomy, recovering on floor.     PLAN  - encourage regular diet  - OOBAT  - trend creatinine today  - Pain control PRN  - dispo: potentially home if - nasal cannula is weaned off, pain is well controlled, patient is tolerating diet, and Cr is not uptrending    B team surgery   [id number]

## 2022-06-17 NOTE — DISCHARGE NOTE PROVIDER - NSDCMRMEDTOKEN_GEN_ALL_CORE_FT
acetaminophen 325 mg oral tablet: 2 tab(s) orally every 6 hours  amLODIPine 10 mg oral tablet: 1 tab(s) orally once a day  atorvastatin 10 mg oral tablet: 1 tab(s) orally once a day  losartan 50 mg oral tablet: 1 tab(s) orally once a day   acetaminophen 325 mg oral tablet: 2 tab(s) orally every 6 hours  atorvastatin 10 mg oral tablet: 1 tab(s) orally once a day  oxyCODONE 5 mg oral tablet: 1 tab(s) orally every 6 hours, As Needed -for moderate pain - for severe pain MDD:20mg (4 tabs)    iSTOP Reference #: [id number]    acetaminophen 325 mg oral tablet: 2 tab(s) orally every 6 hours  amLODIPine 10 mg oral tablet: 1 tab(s) orally once a day  atorvastatin 10 mg oral tablet: 1 tab(s) orally once a day  losartan 50 mg oral tablet: 1 tab(s) orally once a day  oxyCODONE 5 mg oral tablet: 1 tab(s) orally every 6 hours, As Needed -for moderate pain - for severe pain MDD:20mg (4 tabs)    iSTOP Reference #: [id number]

## 2022-06-17 NOTE — DISCHARGE NOTE PROVIDER - NSDCCPTREATMENT_GEN_ALL_CORE_FT
PRINCIPAL PROCEDURE  Procedure: Ventral hernia repair  Findings and Treatment:       SECONDARY PROCEDURE  Procedure: Diagnostic laparoscopy  Findings and Treatment:     Procedure: Ventral hernia repair  Findings and Treatment:

## 2022-06-17 NOTE — PROGRESS NOTE ADULT - SUBJECTIVE AND OBJECTIVE BOX
SURGERY PROGRESS NOTE    Hospital Day #2  Procedure/Dx:   ·	Diagnostic laparoscopy  ·	Ventral hernia repair        SUBJECTIVE  Pt seen and examined at bedside. No acute events overnight.      OBJECTIVE:    PHYSICAL EXAM   General Appearance: Appears well, NAD  Resp: Patent airway, non-labored breathing  CV: RRR  Abdomen: Soft, Nontender, Nondistended, dressing clean/dry/intact      Vital Signs Last 24 Hrs  T(C): 37.6 (15 Rambo 2022 17:23), Max: 37.6 (15 Rambo 2022 17:23)  T(F): 99.7 (15 Rambo 2022 17:23), Max: 99.7 (15 Rambo 2022 17:23)  HR: 81 (15 Rambo 2022 19:30) (70 - 92)  BP: 103/60 (15 Rambo 2022 22:20) (89/56 - 150/83)  BP(mean): 88 (15 Rambo 2022 15:00) (79 - 95)  RR: 18 (15 Rambo 2022 19:30) (14 - 20)  SpO2: 82% (15 Rambo 2022 19:30) (82% - 100%)      I's & O's    06-15-22 @ 07:01  -  06-16-22 @ 00:04  --------------------------------------------------------  IN:    IV PiggyBack: 100 mL    Lactated Ringers: 875 mL  Total IN: 975 mL    OUT:    Oral Fluid: 0 mL    Voided (mL): 100 mL  Total OUT: 100 mL    Total NET: 875 mL            LAB/STUDIES                        14.5   8.84  )-----------( 227      ( 15 Rambo 2022 01:45 )             45.7     06-15    132<L>  |  98  |  12  ----------------------------<  114<H>  5.2   |  21<L>  |  0.72    Ca    9.5      15 Rambo 2022 01:45    TPro  8.2  /  Alb  3.7  /  TBili  0.7  /  DBili  x   /  AST  36<H>  /  ALT  17  /  AlkPhos  94  06-15    PT/INR - ( 15 Rambo 2022 05:30 )   PT: 12.7 sec;   INR: 1.09 ratio    PTT - ( 15 Rambo 2022 05:30 )  PTT:32.3 sec    LIVER FUNCTIONS - ( 15 Rambo 2022 01:45 )  Alb: 3.7 g/dL / Pro: 8.2 g/dL / ALK PHOS: 94 U/L / ALT: 17 U/L / AST: 36 U/L / GGT: x                  SURGERY PROGRESS NOTE    Hospital Day #2  Procedure/Dx:   ·	Diagnostic laparoscopy  ·	Ventral hernia repair        SUBJECTIVE  Pt seen and examined at bedside. No acute events overnight.  Family at bedside providing translation for subjective: patient is drinking liquids and tolerating, not nauseous/vomiting, and has not been passing gas or having bowel movements. Per family, pt is also saying her pain is not controlled on her PO pain regimen. The family would also not like her to go to rehab and they will be taking her home when she is ready for discharge.       OBJECTIVE:    PHYSICAL EXAM   General Appearance: Appears well, NAD  Resp: Patent airway, non-labored breathing  Abdomen: Soft, mildly tender, Nondistended, dressing clean/dry/intact      Vital Signs Last 24 Hrs  T(C): 37.2 (17 Jun 2022 06:03), Max: 37.5 (17 Jun 2022 02:05)  T(F): 98.9 (17 Jun 2022 06:03), Max: 99.5 (17 Jun 2022 02:05)  HR: 92 (17 Jun 2022 06:03) (82 - 102)  BP: 116/60 (17 Jun 2022 06:03) (105/56 - 122/60)  BP(mean): --  RR: 18 (17 Jun 2022 06:03) (17 - 19)  SpO2: 96% (17 Jun 2022 06:03) (92% - 98%)      16 Jun 2022 07:01  -  17 Jun 2022 07:00  --------------------------------------------------------  IN:    Oral Fluid: 600 mL  Total IN: 600 mL    OUT:    Voided (mL): 800 mL  Total OUT: 800 mL    Total NET: -200 mL                LAB/STUDIES    LABS:  cret                        12.9   8.20  )-----------( 183      ( 16 Jun 2022 07:31 )             41.1     06-16    135  |  99  |  19  ----------------------------<  80  4.3   |  25  |  1.20    Ca    8.2<L>      16 Jun 2022 07:31  Phos  5.4     06-16  Mg     1.50     06-16

## 2022-06-18 LAB
ANION GAP SERPL CALC-SCNC: 11 MMOL/L — SIGNIFICANT CHANGE UP (ref 7–14)
BUN SERPL-MCNC: 20 MG/DL — SIGNIFICANT CHANGE UP (ref 7–23)
CALCIUM SERPL-MCNC: 8.7 MG/DL — SIGNIFICANT CHANGE UP (ref 8.4–10.5)
CHLORIDE SERPL-SCNC: 98 MMOL/L — SIGNIFICANT CHANGE UP (ref 98–107)
CO2 SERPL-SCNC: 24 MMOL/L — SIGNIFICANT CHANGE UP (ref 22–31)
CREAT SERPL-MCNC: 0.66 MG/DL — SIGNIFICANT CHANGE UP (ref 0.5–1.3)
EGFR: 96 ML/MIN/1.73M2 — SIGNIFICANT CHANGE UP
GLUCOSE SERPL-MCNC: 71 MG/DL — SIGNIFICANT CHANGE UP (ref 70–99)
HCT VFR BLD CALC: 38.9 % — SIGNIFICANT CHANGE UP (ref 34.5–45)
HGB BLD-MCNC: 12.3 G/DL — SIGNIFICANT CHANGE UP (ref 11.5–15.5)
MAGNESIUM SERPL-MCNC: 2.4 MG/DL — SIGNIFICANT CHANGE UP (ref 1.6–2.6)
MCHC RBC-ENTMCNC: 25.9 PG — LOW (ref 27–34)
MCHC RBC-ENTMCNC: 31.6 GM/DL — LOW (ref 32–36)
MCV RBC AUTO: 81.9 FL — SIGNIFICANT CHANGE UP (ref 80–100)
NRBC # BLD: 0 /100 WBCS — SIGNIFICANT CHANGE UP
NRBC # FLD: 0 K/UL — SIGNIFICANT CHANGE UP
PHOSPHATE SERPL-MCNC: 2.7 MG/DL — SIGNIFICANT CHANGE UP (ref 2.5–4.5)
PLATELET # BLD AUTO: 161 K/UL — SIGNIFICANT CHANGE UP (ref 150–400)
POTASSIUM SERPL-MCNC: 4.5 MMOL/L — SIGNIFICANT CHANGE UP (ref 3.5–5.3)
POTASSIUM SERPL-SCNC: 4.5 MMOL/L — SIGNIFICANT CHANGE UP (ref 3.5–5.3)
RBC # BLD: 4.75 M/UL — SIGNIFICANT CHANGE UP (ref 3.8–5.2)
RBC # FLD: 14.7 % — HIGH (ref 10.3–14.5)
SODIUM SERPL-SCNC: 133 MMOL/L — LOW (ref 135–145)
WBC # BLD: 9.95 K/UL — SIGNIFICANT CHANGE UP (ref 3.8–10.5)
WBC # FLD AUTO: 9.95 K/UL — SIGNIFICANT CHANGE UP (ref 3.8–10.5)

## 2022-06-18 PROCEDURE — 74018 RADEX ABDOMEN 1 VIEW: CPT | Mod: 26

## 2022-06-18 RX ORDER — POLYETHYLENE GLYCOL 3350 17 G/17G
17 POWDER, FOR SOLUTION ORAL EVERY 12 HOURS
Refills: 0 | Status: DISCONTINUED | OUTPATIENT
Start: 2022-06-18 | End: 2022-06-19

## 2022-06-18 RX ORDER — SENNA PLUS 8.6 MG/1
2 TABLET ORAL AT BEDTIME
Refills: 0 | Status: DISCONTINUED | OUTPATIENT
Start: 2022-06-18 | End: 2022-06-19

## 2022-06-18 RX ORDER — ONDANSETRON 8 MG/1
4 TABLET, FILM COATED ORAL EVERY 6 HOURS
Refills: 0 | Status: DISCONTINUED | OUTPATIENT
Start: 2022-06-18 | End: 2022-06-19

## 2022-06-18 RX ORDER — SODIUM CHLORIDE 9 MG/ML
1000 INJECTION, SOLUTION INTRAVENOUS
Refills: 0 | Status: DISCONTINUED | OUTPATIENT
Start: 2022-06-18 | End: 2022-06-19

## 2022-06-18 RX ORDER — HYDROMORPHONE HYDROCHLORIDE 2 MG/ML
0.5 INJECTION INTRAMUSCULAR; INTRAVENOUS; SUBCUTANEOUS ONCE
Refills: 0 | Status: DISCONTINUED | OUTPATIENT
Start: 2022-06-18 | End: 2022-06-18

## 2022-06-18 RX ORDER — LANOLIN ALCOHOL/MO/W.PET/CERES
3 CREAM (GRAM) TOPICAL ONCE
Refills: 0 | Status: COMPLETED | OUTPATIENT
Start: 2022-06-18 | End: 2022-06-18

## 2022-06-18 RX ORDER — ONDANSETRON 8 MG/1
4 TABLET, FILM COATED ORAL ONCE
Refills: 0 | Status: COMPLETED | OUTPATIENT
Start: 2022-06-18 | End: 2022-06-18

## 2022-06-18 RX ORDER — ACETAMINOPHEN 500 MG
1000 TABLET ORAL EVERY 6 HOURS
Refills: 0 | Status: DISCONTINUED | OUTPATIENT
Start: 2022-06-18 | End: 2022-06-19

## 2022-06-18 RX ADMIN — Medication 1000 MILLIGRAM(S): at 17:42

## 2022-06-18 RX ADMIN — PANTOPRAZOLE SODIUM 40 MILLIGRAM(S): 20 TABLET, DELAYED RELEASE ORAL at 06:21

## 2022-06-18 RX ADMIN — Medication 1000 MILLIGRAM(S): at 18:15

## 2022-06-18 RX ADMIN — Medication 1000 MILLIGRAM(S): at 23:14

## 2022-06-18 RX ADMIN — Medication 1000 MILLIGRAM(S): at 23:44

## 2022-06-18 RX ADMIN — Medication 3 MILLIGRAM(S): at 01:22

## 2022-06-18 RX ADMIN — HYDROMORPHONE HYDROCHLORIDE 0.5 MILLIGRAM(S): 2 INJECTION INTRAMUSCULAR; INTRAVENOUS; SUBCUTANEOUS at 01:52

## 2022-06-18 RX ADMIN — POLYETHYLENE GLYCOL 3350 17 GRAM(S): 17 POWDER, FOR SOLUTION ORAL at 13:19

## 2022-06-18 RX ADMIN — OXYCODONE HYDROCHLORIDE 10 MILLIGRAM(S): 5 TABLET ORAL at 07:00

## 2022-06-18 RX ADMIN — OXYCODONE HYDROCHLORIDE 10 MILLIGRAM(S): 5 TABLET ORAL at 06:30

## 2022-06-18 RX ADMIN — ENOXAPARIN SODIUM 40 MILLIGRAM(S): 100 INJECTION SUBCUTANEOUS at 17:42

## 2022-06-18 RX ADMIN — Medication 1000 MILLIGRAM(S): at 13:50

## 2022-06-18 RX ADMIN — HYDROMORPHONE HYDROCHLORIDE 0.5 MILLIGRAM(S): 2 INJECTION INTRAMUSCULAR; INTRAVENOUS; SUBCUTANEOUS at 01:22

## 2022-06-18 RX ADMIN — Medication 1000 MILLIGRAM(S): at 13:19

## 2022-06-18 RX ADMIN — ONDANSETRON 4 MILLIGRAM(S): 8 TABLET, FILM COATED ORAL at 09:50

## 2022-06-18 RX ADMIN — ONDANSETRON 4 MILLIGRAM(S): 8 TABLET, FILM COATED ORAL at 18:58

## 2022-06-18 RX ADMIN — Medication 1000 MILLIGRAM(S): at 06:21

## 2022-06-18 RX ADMIN — SODIUM CHLORIDE 75 MILLILITER(S): 9 INJECTION, SOLUTION INTRAVENOUS at 13:21

## 2022-06-18 RX ADMIN — Medication 1000 MILLIGRAM(S): at 06:51

## 2022-06-18 NOTE — PROGRESS NOTE ADULT - ATTENDING COMMENTS
I have personally interviewed and examined this patient, reviewed pertinent labs and imaging, and discussed the case with colleagues, residents, and physician assistants on B Team rounds.  More than 50% of this 35 minute encounter including face to face with the patient was spent counseling and/or coordination of care on ileus.  Time included review of vitals, labs, imaging, discussion with consultants and coordination with the operating room/emergency department.    Pt seen and examined on 6/18 - distended and 1-2 episodes of vomiting overnight. Pt reported feeling a little better in AM, though still not passing gas or BM. Abd softly distended, nontender. Normal WBC. Discussed with pt's daughter, likely ileus.     - NPO/Clears as tolerated   - IV hydration   - May require NGT if not improved     The active care issues are:  1. ileus    The Acute Care Surgery (B Team) Attending Group Practice:  Dr. Elise Mccord    urgent issues - spectra [id number]  nonurgent issues - [phone]  patient appointments or afterhours - [phone]

## 2022-06-18 NOTE — PROGRESS NOTE ADULT - SUBJECTIVE AND OBJECTIVE BOX
SURGERY PROGRESS NOTE    Hospital Day #3  Procedure/Dx:   ·	Diagnostic laparoscopy  ·	Ventral hernia repair        SUBJECTIVE  Pt seen and examined at bedside. Patient complaining of some nausea overnight.   No acute events.      OBJECTIVE:    PHYSICAL EXAM   General Appearance: Appears well, NAD  Resp: Patent airway, non-labored breathing  Abdomen: Soft, mildly tender, Nondistended, dressing clean/dry/intact      Vital Signs Last 24 Hrs  T(C): 37.2 (17 Jun 2022 06:03), Max: 37.5 (17 Jun 2022 02:05)  T(F): 98.9 (17 Jun 2022 06:03), Max: 99.5 (17 Jun 2022 02:05)  HR: 92 (17 Jun 2022 06:03) (82 - 102)  BP: 116/60 (17 Jun 2022 06:03) (105/56 - 122/60)  BP(mean): --  RR: 18 (17 Jun 2022 06:03) (17 - 19)  SpO2: 96% (17 Jun 2022 06:03) (92% - 98%)      I&O's Detail    16 Jun 2022 07:01  -  17 Jun 2022 07:00  --------------------------------------------------------  IN:    Oral Fluid: 600 mL  Total IN: 600 mL    OUT:    Voided (mL): 800 mL  Total OUT: 800 mL    Total NET: -200 mL      17 Jun 2022 07:01  -  18 Jun 2022 00:18  --------------------------------------------------------  IN:    Oral Fluid: 720 mL  Total IN: 720 mL    OUT:    Voided (mL): 950 mL  Total OUT: 950 mL    Total NET: -230 mL            LAB/STUDIES    LABS:                        12.9   8.20  )-----------( 183      ( 16 Jun 2022 07:31 )             41.1     06-16    135  |  99  |  19  ----------------------------<  80  4.3   |  25  |  1.20    Ca    8.2<L>      16 Jun 2022 07:31  Phos  5.4     06-16  Mg     1.50     06-16       SURGERY PROGRESS NOTE  Procedure/Dx:   ·	Diagnostic laparoscopy  ·	Ventral hernia repair        SUBJECTIVE  Pt seen and examined at bedside. Patient complaining of some nausea overnight, on and off nasal cannula. No fevers or chills.       OBJECTIVE:    PHYSICAL EXAM   General Appearance: Appears well, NAD  Resp: Patent airway, non-labored breathing  Abdomen: Soft, mildly tender, Nondistended, dressing clean/dry/intact      Vital Signs Last 24 Hrs  T(C): 37.2 (17 Jun 2022 06:03), Max: 37.5 (17 Jun 2022 02:05)  T(F): 98.9 (17 Jun 2022 06:03), Max: 99.5 (17 Jun 2022 02:05)  HR: 92 (17 Jun 2022 06:03) (82 - 102)  BP: 116/60 (17 Jun 2022 06:03) (105/56 - 122/60)  BP(mean): --  RR: 18 (17 Jun 2022 06:03) (17 - 19)  SpO2: 96% (17 Jun 2022 06:03) (92% - 98%)      I&O's Detail    16 Jun 2022 07:01  -  17 Jun 2022 07:00  --------------------------------------------------------  IN:    Oral Fluid: 600 mL  Total IN: 600 mL    OUT:    Voided (mL): 800 mL  Total OUT: 800 mL    Total NET: -200 mL      17 Jun 2022 07:01  -  18 Jun 2022 00:18  --------------------------------------------------------  IN:    Oral Fluid: 720 mL  Total IN: 720 mL    OUT:    Voided (mL): 950 mL  Total OUT: 950 mL    Total NET: -230 mL            LAB/STUDIES    LABS:                        12.3   9.95  )-----------( 161      ( 18 Jun 2022 08:02 )             38.9   06-18    133<L>  |  98  |  20  ----------------------------<  71  4.5   |  24  |  0.66    Ca    8.7      18 Jun 2022 08:02  Phos  2.7     06-18  Mg     2.40     06-18

## 2022-06-18 NOTE — PROGRESS NOTE ADULT - ASSESSMENT
67F s/p lap converted to open ventral hernia repair with primary repair of enterotomy    PLAN  - encourage regular diet  - OOBAT  - trend creatinine   - Pain control PRN  - dispo: potentially home if - nasal cannula is weaned off, pain is well controlled, patient is tolerating diet, and Cr is not uptrending    B team surgery   [id number] 67F s/p lap converted to open ventral hernia repair with primary repair of enterotomy    PLAN  - CLD  - AXR to evaluate for ileus   - OOBAT  - trend creatinine   - Pain control PRN  - dispo: potentially home if - nasal cannula is weaned off, pain is well controlled, patient is tolerating diet, and Cr is not uptrending    B team surgery   [id number] 67F s/p lap converted to open ventral hernia repair with primary repair of enterotomy    PLAN  - CLD  - AXR to evaluate for ileus   - OOBAT  - trend creatinine   - Pain control PRN    B team surgery   [id number]

## 2022-06-19 LAB
ANION GAP SERPL CALC-SCNC: 13 MMOL/L — SIGNIFICANT CHANGE UP (ref 7–14)
BUN SERPL-MCNC: 18 MG/DL — SIGNIFICANT CHANGE UP (ref 7–23)
CALCIUM SERPL-MCNC: 8.7 MG/DL — SIGNIFICANT CHANGE UP (ref 8.4–10.5)
CHLORIDE SERPL-SCNC: 98 MMOL/L — SIGNIFICANT CHANGE UP (ref 98–107)
CO2 SERPL-SCNC: 26 MMOL/L — SIGNIFICANT CHANGE UP (ref 22–31)
CREAT SERPL-MCNC: 0.55 MG/DL — SIGNIFICANT CHANGE UP (ref 0.5–1.3)
EGFR: 100 ML/MIN/1.73M2 — SIGNIFICANT CHANGE UP
GLUCOSE SERPL-MCNC: 70 MG/DL — SIGNIFICANT CHANGE UP (ref 70–99)
HCT VFR BLD CALC: 39.3 % — SIGNIFICANT CHANGE UP (ref 34.5–45)
HGB BLD-MCNC: 12.3 G/DL — SIGNIFICANT CHANGE UP (ref 11.5–15.5)
MAGNESIUM SERPL-MCNC: 1.9 MG/DL — SIGNIFICANT CHANGE UP (ref 1.6–2.6)
MCHC RBC-ENTMCNC: 26.3 PG — LOW (ref 27–34)
MCHC RBC-ENTMCNC: 31.3 GM/DL — LOW (ref 32–36)
MCV RBC AUTO: 84 FL — SIGNIFICANT CHANGE UP (ref 80–100)
NRBC # BLD: 0 /100 WBCS — SIGNIFICANT CHANGE UP
NRBC # FLD: 0 K/UL — SIGNIFICANT CHANGE UP
PHOSPHATE SERPL-MCNC: 2.8 MG/DL — SIGNIFICANT CHANGE UP (ref 2.5–4.5)
PLATELET # BLD AUTO: 197 K/UL — SIGNIFICANT CHANGE UP (ref 150–400)
POTASSIUM SERPL-MCNC: 4.1 MMOL/L — SIGNIFICANT CHANGE UP (ref 3.5–5.3)
POTASSIUM SERPL-SCNC: 4.1 MMOL/L — SIGNIFICANT CHANGE UP (ref 3.5–5.3)
RBC # BLD: 4.68 M/UL — SIGNIFICANT CHANGE UP (ref 3.8–5.2)
RBC # FLD: 14.8 % — HIGH (ref 10.3–14.5)
SODIUM SERPL-SCNC: 137 MMOL/L — SIGNIFICANT CHANGE UP (ref 135–145)
WBC # BLD: 6.96 K/UL — SIGNIFICANT CHANGE UP (ref 3.8–10.5)
WBC # FLD AUTO: 6.96 K/UL — SIGNIFICANT CHANGE UP (ref 3.8–10.5)

## 2022-06-19 PROCEDURE — 71045 X-RAY EXAM CHEST 1 VIEW: CPT | Mod: 26

## 2022-06-19 RX ORDER — ONDANSETRON 8 MG/1
4 TABLET, FILM COATED ORAL ONCE
Refills: 0 | Status: COMPLETED | OUTPATIENT
Start: 2022-06-19 | End: 2022-06-19

## 2022-06-19 RX ORDER — TETRACAINE/BENZOCAINE/BUTAMBEN 2%-14%-2%
1 OINTMENT (GRAM) TOPICAL EVERY 6 HOURS
Refills: 0 | Status: DISCONTINUED | OUTPATIENT
Start: 2022-06-19 | End: 2022-06-20

## 2022-06-19 RX ORDER — HYDROMORPHONE HYDROCHLORIDE 2 MG/ML
0.5 INJECTION INTRAMUSCULAR; INTRAVENOUS; SUBCUTANEOUS EVERY 4 HOURS
Refills: 0 | Status: DISCONTINUED | OUTPATIENT
Start: 2022-06-19 | End: 2022-06-21

## 2022-06-19 RX ORDER — HYDROMORPHONE HYDROCHLORIDE 2 MG/ML
1 INJECTION INTRAMUSCULAR; INTRAVENOUS; SUBCUTANEOUS EVERY 4 HOURS
Refills: 0 | Status: DISCONTINUED | OUTPATIENT
Start: 2022-06-19 | End: 2022-06-21

## 2022-06-19 RX ORDER — POTASSIUM PHOSPHATE, MONOBASIC POTASSIUM PHOSPHATE, DIBASIC 236; 224 MG/ML; MG/ML
15 INJECTION, SOLUTION INTRAVENOUS ONCE
Refills: 0 | Status: COMPLETED | OUTPATIENT
Start: 2022-06-19 | End: 2022-06-19

## 2022-06-19 RX ORDER — DEXTROSE MONOHYDRATE, SODIUM CHLORIDE, AND POTASSIUM CHLORIDE 50; .745; 4.5 G/1000ML; G/1000ML; G/1000ML
1000 INJECTION, SOLUTION INTRAVENOUS
Refills: 0 | Status: DISCONTINUED | OUTPATIENT
Start: 2022-06-19 | End: 2022-06-21

## 2022-06-19 RX ORDER — MAGNESIUM SULFATE 500 MG/ML
2 VIAL (ML) INJECTION ONCE
Refills: 0 | Status: COMPLETED | OUTPATIENT
Start: 2022-06-19 | End: 2022-06-19

## 2022-06-19 RX ORDER — PANTOPRAZOLE SODIUM 20 MG/1
40 TABLET, DELAYED RELEASE ORAL EVERY 24 HOURS
Refills: 0 | Status: DISCONTINUED | OUTPATIENT
Start: 2022-06-19 | End: 2022-06-25

## 2022-06-19 RX ORDER — ACETAMINOPHEN 500 MG
1000 TABLET ORAL EVERY 6 HOURS
Refills: 0 | Status: COMPLETED | OUTPATIENT
Start: 2022-06-19 | End: 2022-06-20

## 2022-06-19 RX ADMIN — DEXTROSE MONOHYDRATE, SODIUM CHLORIDE, AND POTASSIUM CHLORIDE 125 MILLILITER(S): 50; .745; 4.5 INJECTION, SOLUTION INTRAVENOUS at 21:23

## 2022-06-19 RX ADMIN — Medication 1000 MILLIGRAM(S): at 18:40

## 2022-06-19 RX ADMIN — Medication 400 MILLIGRAM(S): at 18:10

## 2022-06-19 RX ADMIN — POTASSIUM PHOSPHATE, MONOBASIC POTASSIUM PHOSPHATE, DIBASIC 62.5 MILLIMOLE(S): 236; 224 INJECTION, SOLUTION INTRAVENOUS at 13:50

## 2022-06-19 RX ADMIN — ONDANSETRON 4 MILLIGRAM(S): 8 TABLET, FILM COATED ORAL at 07:55

## 2022-06-19 RX ADMIN — PANTOPRAZOLE SODIUM 40 MILLIGRAM(S): 20 TABLET, DELAYED RELEASE ORAL at 13:52

## 2022-06-19 RX ADMIN — Medication 25 GRAM(S): at 19:31

## 2022-06-19 RX ADMIN — DEXTROSE MONOHYDRATE, SODIUM CHLORIDE, AND POTASSIUM CHLORIDE 125 MILLILITER(S): 50; .745; 4.5 INJECTION, SOLUTION INTRAVENOUS at 18:07

## 2022-06-19 RX ADMIN — ONDANSETRON 4 MILLIGRAM(S): 8 TABLET, FILM COATED ORAL at 21:23

## 2022-06-19 RX ADMIN — ENOXAPARIN SODIUM 40 MILLIGRAM(S): 100 INJECTION SUBCUTANEOUS at 18:07

## 2022-06-19 RX ADMIN — ONDANSETRON 4 MILLIGRAM(S): 8 TABLET, FILM COATED ORAL at 01:26

## 2022-06-19 NOTE — PROGRESS NOTE ADULT - ASSESSMENT
67F s/p lap converted to open ventral hernia repair with primary repair of enterotomy on 6/15 c/b post-operative ileus     PLAN  - NPO/IVF/NGT  - ARBF  - DVT ppx/IS/OOB  - Pain control PRN  - Home meds held (Amlodipine, Losartan, Atorvastatin)    B team surgery   [id number]

## 2022-06-19 NOTE — PROGRESS NOTE ADULT - SUBJECTIVE AND OBJECTIVE BOX
HPI:  POD4    24h Events:   - Overnight, emesis x1    Subjective:   Patient examined at bedside this AM  Dry heaving at bedside  No flatus, no BMs    Objective:  Vital Signs  T(C): 36.5 (06-19 @ 14:00), Max: 36.7 (06-18 @ 21:44)  HR: 86 (06-19 @ 14:00) (75 - 86)  BP: 146/71 (06-19 @ 14:00) (119/60 - 146/71)  RR: 18 (06-19 @ 14:00) (17 - 18)  SpO2: 93% (06-19 @ 14:00) (93% - 98%)  06-18-22 @ 07:01  -  06-19-22 @ 07:00  --------------------------------------------------------  IN:  Total IN: 0 mL    OUT:    Voided (mL): 1450 mL  Total OUT: 1450 mL    Total NET: -1450 mL      06-19-22 @ 07:01  -  06-19-22 @ 15:36  --------------------------------------------------------  IN:  Total IN: 0 mL    OUT:    Voided (mL): 700 mL  Total OUT: 700 mL    Total NET: -700 mL    Physical Exam:  General: alert and oriented, NAD  Resp: airway patent, respirations unlabored  Abdomen: soft, nontender, nondistended, dressing clean/dry/intact    Labs:                        12.3   6.96  )-----------( 197      ( 19 Jun 2022 08:28 )             39.3   06-19    137  |  98  |  18  ----------------------------<  70  4.1   |  26  |  0.55    Ca    8.7      19 Jun 2022 08:28  Phos  2.8     06-19  Mg     1.90     06-19      CAPILLARY BLOOD GLUCOSE          Microbiology:      Medications:   MEDICATIONS  (STANDING):  acetaminophen   IVPB .. 1000 milliGRAM(s) IV Intermittent every 6 hours  dextrose 5% + sodium chloride 0.45% with potassium chloride 20 mEq/L 1000 milliLiter(s) (125 mL/Hr) IV Continuous <Continuous>  enoxaparin Injectable 40 milliGRAM(s) SubCutaneous every 24 hours  influenza  Vaccine (HIGH DOSE) 0.7 milliLiter(s) IntraMuscular once  magnesium sulfate  IVPB 2 Gram(s) IV Intermittent once  pantoprazole  Injectable 40 milliGRAM(s) IV Push every 24 hours    MEDICATIONS  (PRN):  HYDROmorphone  Injectable 0.5 milliGRAM(s) IV Push every 4 hours PRN Moderate Pain (4 - 6)  HYDROmorphone  Injectable 1 milliGRAM(s) IV Push every 4 hours PRN Severe Pain (7 - 10)  tetracaine/benzocaine/butamben Spray 1 Spray(s) Topical every 6 hours PRN Throat pain from NGT

## 2022-06-20 ENCOUNTER — TRANSCRIPTION ENCOUNTER (OUTPATIENT)
Age: 67
End: 2022-06-20

## 2022-06-20 LAB
ANION GAP SERPL CALC-SCNC: 10 MMOL/L — SIGNIFICANT CHANGE UP (ref 7–14)
BUN SERPL-MCNC: 13 MG/DL — SIGNIFICANT CHANGE UP (ref 7–23)
CALCIUM SERPL-MCNC: 8.6 MG/DL — SIGNIFICANT CHANGE UP (ref 8.4–10.5)
CHLORIDE SERPL-SCNC: 97 MMOL/L — LOW (ref 98–107)
CO2 SERPL-SCNC: 27 MMOL/L — SIGNIFICANT CHANGE UP (ref 22–31)
CREAT SERPL-MCNC: 0.49 MG/DL — LOW (ref 0.5–1.3)
EGFR: 103 ML/MIN/1.73M2 — SIGNIFICANT CHANGE UP
GLUCOSE SERPL-MCNC: 137 MG/DL — HIGH (ref 70–99)
HCT VFR BLD CALC: 40.9 % — SIGNIFICANT CHANGE UP (ref 34.5–45)
HGB BLD-MCNC: 13.3 G/DL — SIGNIFICANT CHANGE UP (ref 11.5–15.5)
MAGNESIUM SERPL-MCNC: 1.9 MG/DL — SIGNIFICANT CHANGE UP (ref 1.6–2.6)
MCHC RBC-ENTMCNC: 26.1 PG — LOW (ref 27–34)
MCHC RBC-ENTMCNC: 32.5 GM/DL — SIGNIFICANT CHANGE UP (ref 32–36)
MCV RBC AUTO: 80.2 FL — SIGNIFICANT CHANGE UP (ref 80–100)
NRBC # BLD: 0 /100 WBCS — SIGNIFICANT CHANGE UP
NRBC # FLD: 0 K/UL — SIGNIFICANT CHANGE UP
PHOSPHATE SERPL-MCNC: 2.5 MG/DL — SIGNIFICANT CHANGE UP (ref 2.5–4.5)
PLATELET # BLD AUTO: 233 K/UL — SIGNIFICANT CHANGE UP (ref 150–400)
POTASSIUM SERPL-MCNC: 4.1 MMOL/L — SIGNIFICANT CHANGE UP (ref 3.5–5.3)
POTASSIUM SERPL-SCNC: 4.1 MMOL/L — SIGNIFICANT CHANGE UP (ref 3.5–5.3)
RBC # BLD: 5.1 M/UL — SIGNIFICANT CHANGE UP (ref 3.8–5.2)
RBC # FLD: 14.9 % — HIGH (ref 10.3–14.5)
SODIUM SERPL-SCNC: 134 MMOL/L — LOW (ref 135–145)
WBC # BLD: 5.96 K/UL — SIGNIFICANT CHANGE UP (ref 3.8–10.5)
WBC # FLD AUTO: 5.96 K/UL — SIGNIFICANT CHANGE UP (ref 3.8–10.5)

## 2022-06-20 PROCEDURE — 71045 X-RAY EXAM CHEST 1 VIEW: CPT | Mod: 26

## 2022-06-20 RX ORDER — METOCLOPRAMIDE HCL 10 MG
10 TABLET ORAL EVERY 8 HOURS
Refills: 0 | Status: DISCONTINUED | OUTPATIENT
Start: 2022-06-20 | End: 2022-06-21

## 2022-06-20 RX ORDER — TETRACAINE/BENZOCAINE/BUTAMBEN 2%-14%-2%
1 OINTMENT (GRAM) TOPICAL EVERY 6 HOURS
Refills: 0 | Status: DISCONTINUED | OUTPATIENT
Start: 2022-06-20 | End: 2022-06-27

## 2022-06-20 RX ORDER — ONDANSETRON 8 MG/1
4 TABLET, FILM COATED ORAL ONCE
Refills: 0 | Status: COMPLETED | OUTPATIENT
Start: 2022-06-20 | End: 2022-06-20

## 2022-06-20 RX ADMIN — Medication 1000 MILLIGRAM(S): at 00:30

## 2022-06-20 RX ADMIN — Medication 10 MILLIGRAM(S): at 21:06

## 2022-06-20 RX ADMIN — Medication 400 MILLIGRAM(S): at 06:37

## 2022-06-20 RX ADMIN — PANTOPRAZOLE SODIUM 40 MILLIGRAM(S): 20 TABLET, DELAYED RELEASE ORAL at 13:31

## 2022-06-20 RX ADMIN — Medication 10 MILLIGRAM(S): at 13:31

## 2022-06-20 RX ADMIN — Medication 400 MILLIGRAM(S): at 11:34

## 2022-06-20 RX ADMIN — ENOXAPARIN SODIUM 40 MILLIGRAM(S): 100 INJECTION SUBCUTANEOUS at 17:28

## 2022-06-20 RX ADMIN — ONDANSETRON 4 MILLIGRAM(S): 8 TABLET, FILM COATED ORAL at 06:54

## 2022-06-20 RX ADMIN — Medication 1000 MILLIGRAM(S): at 11:58

## 2022-06-20 RX ADMIN — Medication 400 MILLIGRAM(S): at 00:00

## 2022-06-20 RX ADMIN — Medication 1 SPRAY(S): at 12:25

## 2022-06-20 RX ADMIN — HYDROMORPHONE HYDROCHLORIDE 0.5 MILLIGRAM(S): 2 INJECTION INTRAMUSCULAR; INTRAVENOUS; SUBCUTANEOUS at 23:33

## 2022-06-20 RX ADMIN — HYDROMORPHONE HYDROCHLORIDE 0.5 MILLIGRAM(S): 2 INJECTION INTRAMUSCULAR; INTRAVENOUS; SUBCUTANEOUS at 23:03

## 2022-06-20 RX ADMIN — DEXTROSE MONOHYDRATE, SODIUM CHLORIDE, AND POTASSIUM CHLORIDE 125 MILLILITER(S): 50; .745; 4.5 INJECTION, SOLUTION INTRAVENOUS at 02:18

## 2022-06-20 RX ADMIN — DEXTROSE MONOHYDRATE, SODIUM CHLORIDE, AND POTASSIUM CHLORIDE 125 MILLILITER(S): 50; .745; 4.5 INJECTION, SOLUTION INTRAVENOUS at 09:48

## 2022-06-20 RX ADMIN — Medication 1000 MILLIGRAM(S): at 07:07

## 2022-06-20 NOTE — PROGRESS NOTE ADULT - ASSESSMENT
67F s/p lap converted to open ventral hernia repair with primary repair of enterotomy on 6/15 c/b post-operative ileus     PLAN  - NPO/IVF/NGT  - ARBF  - DVT ppx/IS/OOB  - Pain control PRN  - Home meds held (Amlodipine, Losartan, Atorvastatin)    B team surgery   [id number] 67F s/p lap converted to open ventral hernia repair with primary repair of enterotomy on 6/15 c/b post-operative ileus     PLAN  - NPO/IVF/NGT  - Standing reglan started  - If continues to be symptomatic tomorrow may repeat CT A/P IV and PO contrast   - ARBF  - DVT ppx/IS/OOB  - Pain control PRN  - Home meds held (Amlodipine, Losartan, Atorvastatin)    B team surgery   [id number]

## 2022-06-20 NOTE — PROGRESS NOTE ADULT - ATTENDING COMMENTS
I have personally interviewed and examined this patient, reviewed pertinent labs and imaging, and discussed the case with colleagues, residents, and physician assistants on B Team rounds.  More than 50% of this 35 minute encounter including face to face with the patient was spent counseling and/or coordination of care on ileus.  Time included review of vitals, labs, imaging, discussion with consultants and coordination with the operating room/emergency department.    66yo F s/p lap to open ventral hernia repair with enterotomy repair, post op course complicated by ileus. NGT placed yesterday with bilious output. Pt's daughter facilitated translation/discussion - pt's abdomen feeling better, with less cramping pain or nausea. Occasional nausea when NGT not functioning well. Complains of sore throat/gagging from tube. Started passing flatus today. NGT output still bilious.     - Recommend keeping NGT until tomorrow morning to monitor output, as she is still vomiting around the tube at times.   - If minimal output tomorrow and continues to pass flatus, will remove NGT then.  - If not improved by tomorrow, will obtain CT abd pelvis to eval for abscess, etc, though less likely given normal WBC and afebrile.   - IV hydration     The active care issues are:  1. ileus    The Acute Care Surgery (B Team) Attending Group Practice:  Dr. Elise Mccord    urgent issues - spectra [id number]  nonurgent issues - [phone]  patient appointments or afterhours - [phone]

## 2022-06-20 NOTE — PROGRESS NOTE ADULT - SUBJECTIVE AND OBJECTIVE BOX
Surgery Progress Note     Subjective/24hour Events:   Patient seen and examined.       Vital Signs:  Vital Signs Last 24 Hrs  T(C): 36.6 (19 Jun 2022 20:50), Max: 36.7 (19 Jun 2022 02:31)  T(F): 97.8 (19 Jun 2022 20:50), Max: 98.1 (19 Jun 2022 02:31)  HR: 88 (19 Jun 2022 20:50) (79 - 88)  BP: 147/77 (19 Jun 2022 20:50) (137/77 - 147/77)  BP(mean): --  RR: 18 (19 Jun 2022 20:50) (17 - 18)  SpO2: 91% (19 Jun 2022 20:50) (91% - 98%)    CAPILLARY BLOOD GLUCOSE          I&O's Detail    18 Jun 2022 07:01  -  19 Jun 2022 07:00  --------------------------------------------------------  IN:    Lactated Ringers: 375 mL  Total IN: 375 mL    OUT:    Voided (mL): 1450 mL  Total OUT: 1450 mL    Total NET: -1075 mL      19 Jun 2022 07:01  -  20 Jun 2022 01:52  --------------------------------------------------------  IN:    dextrose 5% + sodium chloride 0.45% w/ Additives: 1000 mL    Lactated Ringers: 800 mL    Oral Fluid: 720 mL  Total IN: 2520 mL    OUT:    Nasogastric/Oral tube (mL): 450 mL    Voided (mL): 1100 mL  Total OUT: 1550 mL    Total NET: 970 mL            Physical Exam:  General: alert and oriented, NAD  Resp: airway patent, respirations unlabored  Abdomen: soft, nontender, nondistended, dressing clean/dry/intact      Labs:    06-19    137  |  98  |  18  ----------------------------<  70  4.1   |  26  |  0.55    Ca    8.7      19 Jun 2022 08:28  Phos  2.8     06-19  Mg     1.90     06-19                              12.3   6.96  )-----------( 197      ( 19 Jun 2022 08:28 )             39.3          Surgery Progress Note     Subjective/24hour Events:   Patient seen and examined. Overnight, vomiting around NGT, CXR confirmed position, still nauseated and gagging this morning, given zofran and reglan, denies fevers or chills.     OBJECTIVE:   Vital Signs Last 24 Hrs  T(C): 36.7 (20 Jun 2022 06:00), Max: 36.8 (20 Jun 2022 02:20)  T(F): 98 (20 Jun 2022 06:00), Max: 98.2 (20 Jun 2022 02:20)  HR: 80 (20 Jun 2022 06:00) (80 - 88)  BP: 139/85 (20 Jun 2022 06:00) (139/85 - 148/82)  BP(mean): --  RR: 17 (20 Jun 2022 06:00) (16 - 18)  SpO2: 95% (20 Jun 2022 06:00) (91% - 98%)    I&O's Detail    19 Jun 2022 07:01  -  20 Jun 2022 07:00  --------------------------------------------------------  IN:    dextrose 5% + sodium chloride 0.45% w/ Additives: 2000 mL    IV PiggyBack: 200 mL    Lactated Ringers: 800 mL    Oral Fluid: 720 mL  Total IN: 3720 mL    OUT:    Nasogastric/Oral tube (mL): 550 mL    Voided (mL): 1300 mL  Total OUT: 1850 mL    Total NET: 1870 mL      Physical Exam:  General: alert and oriented, NAD  Resp: airway patent, respirations unlabored  Abdomen: soft, nontender, nondistended, dressing clean/dry/intact      Labs:                          13.3   5.96  )-----------( 233      ( 20 Jun 2022 07:35 )             40.9   06-20    134<L>  |  97<L>  |  13  ----------------------------<  137<H>  4.1   |  27  |  0.49<L>    Ca    8.6      20 Jun 2022 07:35  Phos  2.5     06-20  Mg     1.90     06-20

## 2022-06-21 ENCOUNTER — RESULT REVIEW (OUTPATIENT)
Age: 67
End: 2022-06-21

## 2022-06-21 LAB
ALBUMIN SERPL ELPH-MCNC: 2.1 G/DL — LOW (ref 3.3–5)
ALBUMIN SERPL ELPH-MCNC: 2.4 G/DL — LOW (ref 3.3–5)
ALP SERPL-CCNC: 54 U/L — SIGNIFICANT CHANGE UP (ref 40–120)
ALP SERPL-CCNC: 75 U/L — SIGNIFICANT CHANGE UP (ref 40–120)
ALT FLD-CCNC: 15 U/L — SIGNIFICANT CHANGE UP (ref 4–33)
ALT FLD-CCNC: 8 U/L — SIGNIFICANT CHANGE UP (ref 4–33)
ANION GAP SERPL CALC-SCNC: 12 MMOL/L — SIGNIFICANT CHANGE UP (ref 7–14)
ANION GAP SERPL CALC-SCNC: 13 MMOL/L — SIGNIFICANT CHANGE UP (ref 7–14)
APTT BLD: 22.8 SEC — LOW (ref 27–36.3)
APTT BLD: 30.8 SEC — SIGNIFICANT CHANGE UP (ref 27–36.3)
AST SERPL-CCNC: 15 U/L — SIGNIFICANT CHANGE UP (ref 4–32)
AST SERPL-CCNC: 31 U/L — SIGNIFICANT CHANGE UP (ref 4–32)
BASE EXCESS BLDV CALC-SCNC: 0 MMOL/L — SIGNIFICANT CHANGE UP (ref -2–3)
BASOPHILS # BLD AUTO: 0 K/UL — SIGNIFICANT CHANGE UP (ref 0–0.2)
BASOPHILS NFR BLD AUTO: 0 % — SIGNIFICANT CHANGE UP (ref 0–2)
BILIRUB SERPL-MCNC: 0.7 MG/DL — SIGNIFICANT CHANGE UP (ref 0.2–1.2)
BILIRUB SERPL-MCNC: 1.1 MG/DL — SIGNIFICANT CHANGE UP (ref 0.2–1.2)
BLD GP AB SCN SERPL QL: NEGATIVE — SIGNIFICANT CHANGE UP
BLOOD GAS ARTERIAL - LYTES,HGB,ICA,LACT RESULT: SIGNIFICANT CHANGE UP
BLOOD GAS ARTERIAL - LYTES,HGB,ICA,LACT RESULT: SIGNIFICANT CHANGE UP
BLOOD GAS ARTERIAL COMPREHENSIVE RESULT: SIGNIFICANT CHANGE UP
BLOOD GAS ARTERIAL COMPREHENSIVE RESULT: SIGNIFICANT CHANGE UP
BUN SERPL-MCNC: 16 MG/DL — SIGNIFICANT CHANGE UP (ref 7–23)
BUN SERPL-MCNC: 17 MG/DL — SIGNIFICANT CHANGE UP (ref 7–23)
CA-I SERPL-SCNC: 1.13 MMOL/L — LOW (ref 1.15–1.33)
CALCIUM SERPL-MCNC: 7.8 MG/DL — LOW (ref 8.4–10.5)
CALCIUM SERPL-MCNC: 8.3 MG/DL — LOW (ref 8.4–10.5)
CHLORIDE BLDV-SCNC: 101 MMOL/L — SIGNIFICANT CHANGE UP (ref 96–108)
CHLORIDE SERPL-SCNC: 105 MMOL/L — SIGNIFICANT CHANGE UP (ref 98–107)
CHLORIDE SERPL-SCNC: 97 MMOL/L — LOW (ref 98–107)
CK MB BLD-MCNC: <8.3 % — HIGH (ref 0–2.5)
CK MB CFR SERPL CALC: <1 NG/ML — SIGNIFICANT CHANGE UP
CK SERPL-CCNC: 12 U/L — LOW (ref 25–170)
CO2 BLDV-SCNC: 22.3 MMOL/L — SIGNIFICANT CHANGE UP (ref 22–26)
CO2 SERPL-SCNC: 18 MMOL/L — LOW (ref 22–31)
CO2 SERPL-SCNC: 21 MMOL/L — LOW (ref 22–31)
CREAT ?TM UR-MCNC: 103 MG/DL — SIGNIFICANT CHANGE UP
CREAT SERPL-MCNC: 0.62 MG/DL — SIGNIFICANT CHANGE UP (ref 0.5–1.3)
CREAT SERPL-MCNC: 0.68 MG/DL — SIGNIFICANT CHANGE UP (ref 0.5–1.3)
EGFR: 95 ML/MIN/1.73M2 — SIGNIFICANT CHANGE UP
EGFR: 98 ML/MIN/1.73M2 — SIGNIFICANT CHANGE UP
EOSINOPHIL # BLD AUTO: 0 K/UL — SIGNIFICANT CHANGE UP (ref 0–0.5)
EOSINOPHIL NFR BLD AUTO: 0 % — SIGNIFICANT CHANGE UP (ref 0–6)
GAS PNL BLDA: SIGNIFICANT CHANGE UP
GAS PNL BLDA: SIGNIFICANT CHANGE UP
GAS PNL BLDV: 126 MMOL/L — LOW (ref 136–145)
GAS PNL BLDV: SIGNIFICANT CHANGE UP
GLUCOSE BLDC GLUCOMTR-MCNC: 178 MG/DL — HIGH (ref 70–99)
GLUCOSE BLDV-MCNC: 198 MG/DL — HIGH (ref 70–99)
GLUCOSE SERPL-MCNC: 108 MG/DL — HIGH (ref 70–99)
GLUCOSE SERPL-MCNC: 139 MG/DL — HIGH (ref 70–99)
HCO3 BLDV-SCNC: 22 MMOL/L — SIGNIFICANT CHANGE UP (ref 22–29)
HCT VFR BLD CALC: 37.6 % — SIGNIFICANT CHANGE UP (ref 34.5–45)
HCT VFR BLD CALC: 44.8 % — SIGNIFICANT CHANGE UP (ref 34.5–45)
HCT VFR BLD CALC: 47.7 % — HIGH (ref 34.5–45)
HCT VFR BLDA CALC: 46 % — SIGNIFICANT CHANGE UP (ref 34.5–46.5)
HGB BLD CALC-MCNC: 15.4 G/DL — SIGNIFICANT CHANGE UP (ref 11.5–15.5)
HGB BLD-MCNC: 12.5 G/DL — SIGNIFICANT CHANGE UP (ref 11.5–15.5)
HGB BLD-MCNC: 14.4 G/DL — SIGNIFICANT CHANGE UP (ref 11.5–15.5)
HGB BLD-MCNC: 15 G/DL — SIGNIFICANT CHANGE UP (ref 11.5–15.5)
IANC: 9.19 K/UL — HIGH (ref 1.8–7.4)
INR BLD: 1.2 RATIO — HIGH (ref 0.88–1.16)
INR BLD: 1.23 RATIO — HIGH (ref 0.88–1.16)
LACTATE BLDV-MCNC: 1.6 MMOL/L — SIGNIFICANT CHANGE UP (ref 0.5–2)
LYMPHOCYTES # BLD AUTO: 0.4 K/UL — LOW (ref 1–3.3)
LYMPHOCYTES # BLD AUTO: 3.5 % — LOW (ref 13–44)
MAGNESIUM SERPL-MCNC: 1.5 MG/DL — LOW (ref 1.6–2.6)
MAGNESIUM SERPL-MCNC: 1.9 MG/DL — SIGNIFICANT CHANGE UP (ref 1.6–2.6)
MCHC RBC-ENTMCNC: 25.7 PG — LOW (ref 27–34)
MCHC RBC-ENTMCNC: 26 PG — LOW (ref 27–34)
MCHC RBC-ENTMCNC: 26.5 PG — LOW (ref 27–34)
MCHC RBC-ENTMCNC: 31.4 GM/DL — LOW (ref 32–36)
MCHC RBC-ENTMCNC: 32.1 GM/DL — SIGNIFICANT CHANGE UP (ref 32–36)
MCHC RBC-ENTMCNC: 33.2 GM/DL — SIGNIFICANT CHANGE UP (ref 32–36)
MCV RBC AUTO: 79.7 FL — LOW (ref 80–100)
MCV RBC AUTO: 80.9 FL — SIGNIFICANT CHANGE UP (ref 80–100)
MCV RBC AUTO: 81.7 FL — SIGNIFICANT CHANGE UP (ref 80–100)
MONOCYTES # BLD AUTO: 0.5 K/UL — SIGNIFICANT CHANGE UP (ref 0–0.9)
MONOCYTES NFR BLD AUTO: 4.4 % — SIGNIFICANT CHANGE UP (ref 2–14)
NEUTROPHILS # BLD AUTO: 9.51 K/UL — HIGH (ref 1.8–7.4)
NEUTROPHILS NFR BLD AUTO: 22.8 % — LOW (ref 43–77)
NRBC # BLD: 0 /100 WBCS — SIGNIFICANT CHANGE UP
NRBC # BLD: 0 /100 WBCS — SIGNIFICANT CHANGE UP
NRBC # FLD: 0 K/UL — SIGNIFICANT CHANGE UP
NRBC # FLD: 0 K/UL — SIGNIFICANT CHANGE UP
NT-PROBNP SERPL-SCNC: 758 PG/ML — HIGH
PCO2 BLDV: 27 MMHG — LOW (ref 39–42)
PH BLDV: 7.51 — HIGH (ref 7.32–7.43)
PHOSPHATE SERPL-MCNC: 2.3 MG/DL — LOW (ref 2.5–4.5)
PHOSPHATE SERPL-MCNC: 3.6 MG/DL — SIGNIFICANT CHANGE UP (ref 2.5–4.5)
PLATELET # BLD AUTO: 217 K/UL — SIGNIFICANT CHANGE UP (ref 150–400)
PLATELET # BLD AUTO: 271 K/UL — SIGNIFICANT CHANGE UP (ref 150–400)
PLATELET # BLD AUTO: 328 K/UL — SIGNIFICANT CHANGE UP (ref 150–400)
PO2 BLDV: 196 MMHG — SIGNIFICANT CHANGE UP
POTASSIUM BLDV-SCNC: 4.7 MMOL/L — SIGNIFICANT CHANGE UP (ref 3.5–5.1)
POTASSIUM SERPL-MCNC: 4.3 MMOL/L — SIGNIFICANT CHANGE UP (ref 3.5–5.3)
POTASSIUM SERPL-MCNC: 5.3 MMOL/L — SIGNIFICANT CHANGE UP (ref 3.5–5.3)
POTASSIUM SERPL-SCNC: 4.3 MMOL/L — SIGNIFICANT CHANGE UP (ref 3.5–5.3)
POTASSIUM SERPL-SCNC: 5.3 MMOL/L — SIGNIFICANT CHANGE UP (ref 3.5–5.3)
PROT SERPL-MCNC: 5.2 G/DL — LOW (ref 6–8.3)
PROT SERPL-MCNC: 6.1 G/DL — SIGNIFICANT CHANGE UP (ref 6–8.3)
PROTHROM AB SERPL-ACNC: 14 SEC — HIGH (ref 10.5–13.4)
PROTHROM AB SERPL-ACNC: 14.3 SEC — HIGH (ref 10.5–13.4)
RBC # BLD: 4.72 M/UL — SIGNIFICANT CHANGE UP (ref 3.8–5.2)
RBC # BLD: 5.54 M/UL — HIGH (ref 3.8–5.2)
RBC # BLD: 5.84 M/UL — HIGH (ref 3.8–5.2)
RBC # FLD: 15.4 % — HIGH (ref 10.3–14.5)
RBC # FLD: 15.5 % — HIGH (ref 10.3–14.5)
RBC # FLD: 15.5 % — HIGH (ref 10.3–14.5)
RH IG SCN BLD-IMP: POSITIVE — SIGNIFICANT CHANGE UP
SAO2 % BLDV: 100 % — SIGNIFICANT CHANGE UP
SARS-COV-2 RNA SPEC QL NAA+PROBE: SIGNIFICANT CHANGE UP
SODIUM SERPL-SCNC: 127 MMOL/L — LOW (ref 135–145)
SODIUM SERPL-SCNC: 139 MMOL/L — SIGNIFICANT CHANGE UP (ref 135–145)
SODIUM UR-SCNC: <20 MMOL/L — SIGNIFICANT CHANGE UP
TROPONIN T, HIGH SENSITIVITY RESULT: 6 NG/L — SIGNIFICANT CHANGE UP
UUN UR-MCNC: 428 MG/DL — SIGNIFICANT CHANGE UP
WBC # BLD: 11.3 K/UL — HIGH (ref 3.8–10.5)
WBC # BLD: 9.19 K/UL — SIGNIFICANT CHANGE UP (ref 3.8–10.5)
WBC # BLD: 9.29 K/UL — SIGNIFICANT CHANGE UP (ref 3.8–10.5)
WBC # FLD AUTO: 11.3 K/UL — HIGH (ref 3.8–10.5)
WBC # FLD AUTO: 9.19 K/UL — SIGNIFICANT CHANGE UP (ref 3.8–10.5)
WBC # FLD AUTO: 9.29 K/UL — SIGNIFICANT CHANGE UP (ref 3.8–10.5)

## 2022-06-21 PROCEDURE — 71275 CT ANGIOGRAPHY CHEST: CPT | Mod: 26

## 2022-06-21 PROCEDURE — 71045 X-RAY EXAM CHEST 1 VIEW: CPT | Mod: 26,76

## 2022-06-21 PROCEDURE — 88307 TISSUE EXAM BY PATHOLOGIST: CPT | Mod: 26

## 2022-06-21 PROCEDURE — 36620 INSERTION CATHETER ARTERY: CPT | Mod: GC

## 2022-06-21 PROCEDURE — 36620 INSERTION CATHETER ARTERY: CPT

## 2022-06-21 PROCEDURE — 74177 CT ABD & PELVIS W/CONTRAST: CPT | Mod: 26

## 2022-06-21 PROCEDURE — 97606 NEG PRS WND THER DME>50 SQCM: CPT

## 2022-06-21 PROCEDURE — 76937 US GUIDE VASCULAR ACCESS: CPT | Mod: 26

## 2022-06-21 PROCEDURE — 36556 INSERT NON-TUNNEL CV CATH: CPT

## 2022-06-21 PROCEDURE — 99291 CRITICAL CARE FIRST HOUR: CPT | Mod: 57,GC

## 2022-06-21 PROCEDURE — 44125 REMOVAL OF SMALL INTESTINE: CPT

## 2022-06-21 PROCEDURE — 93010 ELECTROCARDIOGRAM REPORT: CPT

## 2022-06-21 DEVICE — STAPLER COVIDIEN ENDO GIA 80-3.8MM BLUE: Type: IMPLANTABLE DEVICE | Status: FUNCTIONAL

## 2022-06-21 DEVICE — STAPLER COVIDIEN ENDO GIA 80-3.8MM BLUE RELOAD: Type: IMPLANTABLE DEVICE | Status: FUNCTIONAL

## 2022-06-21 RX ORDER — MAGNESIUM SULFATE 500 MG/ML
2 VIAL (ML) INJECTION ONCE
Refills: 0 | Status: COMPLETED | OUTPATIENT
Start: 2022-06-21 | End: 2022-06-21

## 2022-06-21 RX ORDER — PROPOFOL 10 MG/ML
10 INJECTION, EMULSION INTRAVENOUS
Qty: 1000 | Refills: 0 | Status: DISCONTINUED | OUTPATIENT
Start: 2022-06-21 | End: 2022-06-22

## 2022-06-21 RX ORDER — LIDOCAINE 4 G/100G
1 CREAM TOPICAL DAILY
Refills: 0 | Status: DISCONTINUED | OUTPATIENT
Start: 2022-06-21 | End: 2022-07-07

## 2022-06-21 RX ORDER — PIPERACILLIN AND TAZOBACTAM 4; .5 G/20ML; G/20ML
3.38 INJECTION, POWDER, LYOPHILIZED, FOR SOLUTION INTRAVENOUS ONCE
Refills: 0 | Status: COMPLETED | OUTPATIENT
Start: 2022-06-21 | End: 2022-06-21

## 2022-06-21 RX ORDER — CHLORHEXIDINE GLUCONATE 213 G/1000ML
15 SOLUTION TOPICAL EVERY 12 HOURS
Refills: 0 | Status: DISCONTINUED | OUTPATIENT
Start: 2022-06-21 | End: 2022-06-23

## 2022-06-21 RX ORDER — ACETAMINOPHEN 500 MG
1000 TABLET ORAL ONCE
Refills: 0 | Status: COMPLETED | OUTPATIENT
Start: 2022-06-21 | End: 2022-06-21

## 2022-06-21 RX ORDER — MAGNESIUM SULFATE 500 MG/ML
1 VIAL (ML) INJECTION ONCE
Refills: 0 | Status: COMPLETED | OUTPATIENT
Start: 2022-06-21 | End: 2022-06-21

## 2022-06-21 RX ORDER — SODIUM CHLORIDE 9 MG/ML
500 INJECTION, SOLUTION INTRAVENOUS ONCE
Refills: 0 | Status: COMPLETED | OUTPATIENT
Start: 2022-06-21 | End: 2022-06-21

## 2022-06-21 RX ORDER — VANCOMYCIN HCL 1 G
1500 VIAL (EA) INTRAVENOUS EVERY 12 HOURS
Refills: 0 | Status: DISCONTINUED | OUTPATIENT
Start: 2022-06-21 | End: 2022-06-23

## 2022-06-21 RX ORDER — CHLORHEXIDINE GLUCONATE 213 G/1000ML
1 SOLUTION TOPICAL
Refills: 0 | Status: DISCONTINUED | OUTPATIENT
Start: 2022-06-21 | End: 2022-07-07

## 2022-06-21 RX ORDER — SODIUM CHLORIDE 9 MG/ML
1000 INJECTION, SOLUTION INTRAVENOUS ONCE
Refills: 0 | Status: COMPLETED | OUTPATIENT
Start: 2022-06-21 | End: 2022-06-21

## 2022-06-21 RX ORDER — VANCOMYCIN HCL 1 G
1000 VIAL (EA) INTRAVENOUS ONCE
Refills: 0 | Status: COMPLETED | OUTPATIENT
Start: 2022-06-21 | End: 2022-06-21

## 2022-06-21 RX ORDER — SODIUM CHLORIDE 9 MG/ML
1000 INJECTION, SOLUTION INTRAVENOUS
Refills: 0 | Status: DISCONTINUED | OUTPATIENT
Start: 2022-06-21 | End: 2022-06-22

## 2022-06-21 RX ORDER — NOREPINEPHRINE BITARTRATE/D5W 8 MG/250ML
0.05 PLASTIC BAG, INJECTION (ML) INTRAVENOUS
Qty: 8 | Refills: 0 | Status: DISCONTINUED | OUTPATIENT
Start: 2022-06-21 | End: 2022-06-22

## 2022-06-21 RX ORDER — FENTANYL CITRATE 50 UG/ML
50 INJECTION INTRAVENOUS ONCE
Refills: 0 | Status: DISCONTINUED | OUTPATIENT
Start: 2022-06-21 | End: 2022-06-21

## 2022-06-21 RX ORDER — SODIUM CHLORIDE 9 MG/ML
10 INJECTION INTRAMUSCULAR; INTRAVENOUS; SUBCUTANEOUS
Refills: 0 | Status: DISCONTINUED | OUTPATIENT
Start: 2022-06-21 | End: 2022-07-07

## 2022-06-21 RX ORDER — CHLORHEXIDINE GLUCONATE 213 G/1000ML
1 SOLUTION TOPICAL DAILY
Refills: 0 | Status: DISCONTINUED | OUTPATIENT
Start: 2022-06-21 | End: 2022-06-21

## 2022-06-21 RX ORDER — PIPERACILLIN AND TAZOBACTAM 4; .5 G/20ML; G/20ML
3.38 INJECTION, POWDER, LYOPHILIZED, FOR SOLUTION INTRAVENOUS EVERY 8 HOURS
Refills: 0 | Status: DISCONTINUED | OUTPATIENT
Start: 2022-06-21 | End: 2022-06-26

## 2022-06-21 RX ADMIN — CHLORHEXIDINE GLUCONATE 15 MILLILITER(S): 213 SOLUTION TOPICAL at 18:15

## 2022-06-21 RX ADMIN — SODIUM CHLORIDE 125 MILLILITER(S): 9 INJECTION, SOLUTION INTRAVENOUS at 06:20

## 2022-06-21 RX ADMIN — Medication 100 GRAM(S): at 22:32

## 2022-06-21 RX ADMIN — SODIUM CHLORIDE 100 MILLILITER(S): 9 INJECTION, SOLUTION INTRAVENOUS at 03:20

## 2022-06-21 RX ADMIN — SODIUM CHLORIDE 1000 MILLILITER(S): 9 INJECTION, SOLUTION INTRAVENOUS at 08:36

## 2022-06-21 RX ADMIN — SODIUM CHLORIDE 500 MILLILITER(S): 9 INJECTION, SOLUTION INTRAVENOUS at 10:40

## 2022-06-21 RX ADMIN — PROPOFOL 6.26 MICROGRAM(S)/KG/MIN: 10 INJECTION, EMULSION INTRAVENOUS at 19:43

## 2022-06-21 RX ADMIN — Medication 25 GRAM(S): at 06:20

## 2022-06-21 RX ADMIN — Medication 400 MILLIGRAM(S): at 02:15

## 2022-06-21 RX ADMIN — FENTANYL CITRATE 50 MICROGRAM(S): 50 INJECTION INTRAVENOUS at 18:15

## 2022-06-21 RX ADMIN — PIPERACILLIN AND TAZOBACTAM 25 GRAM(S): 4; .5 INJECTION, POWDER, LYOPHILIZED, FOR SOLUTION INTRAVENOUS at 22:31

## 2022-06-21 RX ADMIN — Medication 250 MILLIGRAM(S): at 03:20

## 2022-06-21 RX ADMIN — SODIUM CHLORIDE 125 MILLILITER(S): 9 INJECTION, SOLUTION INTRAVENOUS at 18:15

## 2022-06-21 RX ADMIN — Medication 10 MILLIGRAM(S): at 05:12

## 2022-06-21 RX ADMIN — CHLORHEXIDINE GLUCONATE 1 APPLICATION(S): 213 SOLUTION TOPICAL at 12:43

## 2022-06-21 RX ADMIN — Medication 300 MILLIGRAM(S): at 18:35

## 2022-06-21 RX ADMIN — SODIUM CHLORIDE 1000 MILLILITER(S): 9 INJECTION, SOLUTION INTRAVENOUS at 03:20

## 2022-06-21 RX ADMIN — SODIUM CHLORIDE 125 MILLILITER(S): 9 INJECTION, SOLUTION INTRAVENOUS at 17:44

## 2022-06-21 RX ADMIN — FENTANYL CITRATE 50 MICROGRAM(S): 50 INJECTION INTRAVENOUS at 17:43

## 2022-06-21 RX ADMIN — PANTOPRAZOLE SODIUM 40 MILLIGRAM(S): 20 TABLET, DELAYED RELEASE ORAL at 13:23

## 2022-06-21 RX ADMIN — PIPERACILLIN AND TAZOBACTAM 25 GRAM(S): 4; .5 INJECTION, POWDER, LYOPHILIZED, FOR SOLUTION INTRAVENOUS at 09:47

## 2022-06-21 RX ADMIN — PIPERACILLIN AND TAZOBACTAM 200 GRAM(S): 4; .5 INJECTION, POWDER, LYOPHILIZED, FOR SOLUTION INTRAVENOUS at 02:30

## 2022-06-21 RX ADMIN — PROPOFOL 6.26 MICROGRAM(S)/KG/MIN: 10 INJECTION, EMULSION INTRAVENOUS at 18:15

## 2022-06-21 NOTE — DIETITIAN INITIAL EVALUATION ADULT - NSFNSGIIOFT_GEN_A_CORE
06-20-22 @ 07:01  -  06-21-22 @ 07:00  --------------------------------------------------------  OUT:    Nasogastric/Oral tube (mL): 2500 mL  Total OUT: 2500 mL    Total NET: -2500 mL

## 2022-06-21 NOTE — DIETITIAN INITIAL EVALUATION ADULT - PERTINENT MEDS FT
MEDICATIONS  (STANDING):  chlorhexidine 2% Cloths 1 Application(s) Topical daily  enoxaparin Injectable 40 milliGRAM(s) SubCutaneous every 24 hours  influenza  Vaccine (HIGH DOSE) 0.7 milliLiter(s) IntraMuscular once  lactated ringers Bolus 500 milliLiter(s) IV Bolus once  lactated ringers. 1000 milliLiter(s) (125 mL/Hr) IV Continuous <Continuous>  norepinephrine Infusion 0.05 MICROgram(s)/kG/Min (9.78 mL/Hr) IV Continuous <Continuous>  pantoprazole  Injectable 40 milliGRAM(s) IV Push every 24 hours  piperacillin/tazobactam IVPB.. 3.375 Gram(s) IV Intermittent every 8 hours    MEDICATIONS  (PRN):  lidocaine   4% Patch 1 Patch Transdermal daily PRN pain  tetracaine/benzocaine/butamben Spray 1 Spray(s) Topical every 6 hours PRN Throat pain from NGT

## 2022-06-21 NOTE — RAPID RESPONSE TEAM SUMMARY - NSOTHERINTERVENTIONSRRT_GEN_ALL_CORE
CXR obtained, ngt in place on CXR  patietn brought to CT scanner first then transferred to SICU stat preop labs + abg and blood cultures x 2 sent, additional PIV placed  ice packs placed in armpits/groin area  CXR obtained, ngt in place on CXR  patietn brought to CT scanner first then transferred to SICU

## 2022-06-21 NOTE — CHART NOTE - NSCHARTNOTEFT_GEN_A_CORE
: Jessica Moe    INDICATION:    PROCEDURE:  [X] LIMITED ECHO  [X] LIMITED CHEST  [ ] LIMITED RETROPERITONEAL  [ ] LIMITED ABDOMINAL  [ ] LIMITED DVT  [ ] NEEDLE GUIDANCE VASCULAR  [ ] NEEDLE GUIDANCE THORACENTESIS  [ ] NEEDLE GUIDANCE PARACENTESIS  [ ] NEEDLE GUIDANCE PERICARDIOCENTESIS  [ ] OTHER    FINDINGS:    Lungs: A line predominance. Lung sliding b/l.   Heart: Technically difficult study due to body habitus. Hyperdynamic contractions on levophed    INTERPRETATION:  Normal aeration pattern in apices bilaterally  Cardiac exam limited by body habitus. Hyperdynamic contractions on levophed.    Images uploaded on [employer] Path

## 2022-06-21 NOTE — DIETITIAN INITIAL EVALUATION ADULT - REASON FOR ADMISSION
Intestinal obstruction  Patient is a 68yo F (speaks Maltese dialect) with a history of HTN, HLD, L tibia surgery, lap kevan (2011, [place]) presenting with 1.5 days of nausea, vomiting and abdominal pain.  Patient s/p ventral hernia repair, laparopscopic, c/b enterotomy intraoperatively, converted to open with primary repair on 6/15. Patient on floors with NGT, NPO. On 6/21, 2am rapid response called for fever to 104 rectal, tachycardia to 120s and tachypnea.

## 2022-06-21 NOTE — CHART NOTE - NSCHARTNOTEFT_GEN_A_CORE
Post Operative Check Note  Patient: VIPIN TERRAZAS 67y (1955) Female   MRN: [id number]  Location: [place] [address]  Visit: 06-15-22 Inpatient  Date: 06-22-22 @ 00:50    Procedure: S/P Exploratory laparotomy, small bowel resection, end ileostomy creation    Subjective: Patient seen and examined at bedside in SICU.      Objective:  Vitals: T(F): 100.1 (06-22-22 @ 00:00), Max: 102.8 (06-21-22 @ 01:38)  HR: 84 (06-22-22 @ 00:00)  BP: 126/71 (06-21-22 @ 21:00) (65/50 - 149/65)  RR: 20 (06-22-22 @ 00:00)  SpO2: 98% (06-22-22 @ 00:00)  Vent Settings: Mode: AC/ CMV (Assist Control/ Continuous Mandatory Ventilation), RR (machine): 16, TV (machine): 400, FiO2: 30, PEEP: 5, MAP: 11, PIP: 23    In:   06-20-22 @ 07:01  -  06-21-22 @ 07:00  --------------------------------------------------------  IN: 3800 mL    06-21-22 @ 07:01  -  06-22-22 @ 00:50  --------------------------------------------------------  IN: 3490 mL      IV Fluids: lactated ringers. 1000 milliLiter(s) (125 mL/Hr) IV Continuous <Continuous>      Out:   06-20-22 @ 07:01  -  06-21-22 @ 07:00  --------------------------------------------------------  OUT: 3650 mL    06-21-22 @ 07:01  -  06-22-22 @ 00:50  --------------------------------------------------------  OUT: 3810 mL      EBL:   06-21-22 @ 07:01  -  06-22-22 @ 00:50  --------------------------------------------------------  OUT: 0 mL      Voided Urine:   06-20-22 @ 07:01  -  06-21-22 @ 07:00  --------------------------------------------------------  OUT: 3650 mL    06-21-22 @ 07:01  -  06-22-22 @ 00:50  --------------------------------------------------------  OUT: 3810 mL      Rajput Catheter: yes   Drains:   RICH:        NG Tube:   06-20-22 @ 07:01  -  06-21-22 @ 07:00  --------------------------------------------------------  OUT: 2500 mL    06-21-22 @ 07:01  -  06-22-22 @ 00:50  --------------------------------------------------------  OUT: 2800 mL          Physical Examination:  Gen: Sedated  HEENT: NGT in place  CV: Regular rate  Resp: Intubated  Abd: Distended, midline wound vac in place holding appropriate suction, stoma is pink and viable, ostomy bag with serosanguinous drainage  : Rajput catheter in place    Imaging:  No post-op imaging studies    Assessment:  67yFemale patient S/P exploratory laparotomy, small bowel resection, end ileostomy creation for leaks from prior enterotomy and serosal tears.    Plan:  - IV Abx: Zosyn, Vancomycin  - Pain control  - NPO  - IVF: LR @ 125 cc/hr  - Maintain NGT, monitor output  - Maintain rajput, monitor output  - Maintain wound vac  - DVT ppx: LVX      B Team Surgery  [id number]

## 2022-06-21 NOTE — DIETITIAN INITIAL EVALUATION ADULT - NSFNSPHYEXAMSKINFT_GEN_A_CORE
no pressure injuries  83 y/o m s/p fall today with left side rib pain, hit head on xarelto; no neuro deficits, labs wnl, CT head neg, cxr showing no rib fracture, pain improved with pain meds, pt ambulatory with walker, stable for d/c, daughter with pt

## 2022-06-21 NOTE — PROGRESS NOTE ADULT - ASSESSMENT
67F s/p lap converted to open ventral hernia repair with primary repair of enterotomy on 6/15 c/b post-operative SBO. Sent to SICU for high fevers and hypotension.     PLAN  - To OR today for reexploration   - Appreciate excellent care per SICU  - NGT    B team surgery   [id number]

## 2022-06-21 NOTE — BRIEF OPERATIVE NOTE - OPERATION/FINDINGS
Midline laparotomy.  Two leaks from repairs of prior enterotomy and serosal tear identified.  Small bowel resection and end ileostomy.  Fascia closed and wound VAC applied.

## 2022-06-21 NOTE — DIETITIAN INITIAL EVALUATION ADULT - PERTINENT LABORATORY DATA
06-21    127<L>  |  97<L>  |  16  ----------------------------<  139<H>  5.3   |  18<L>  |  0.62    Ca    7.8<L>      21 Jun 2022 04:30  Phos  2.3     06-21  Mg     1.50     06-21    TPro  6.1  /  Alb  2.1<L>  /  TBili  0.7  /  DBili  x   /  AST  31  /  ALT  15  /  AlkPhos  75  06-21  POCT Blood Glucose.: 178 mg/dL (06-21-22 @ 01:44)

## 2022-06-21 NOTE — CONSULT NOTE ADULT - SUBJECTIVE AND OBJECTIVE BOX
SICU Consultation Note  =====================================================  HPI:  Patient is a 66yo F (speaks French dialect) with a history of HTN, HLD, L tibia surgery, lap kevan (2011, [place]) presenting with 1.5 days of nausea, vomiting and abdominal pain. Per daughter at bedside, patient began having symptoms Monday night after dinner. Last passed flatus and had a BM at that time. Denies fevers or chills. Pain improved in ED after getting morphine and NGT placement.    In the ED, stable and afebrile. Labs unremarkable. CT showed incarcerated SBO at right ventral hernia. (15 Rambo 2022 08:22)    Patient s/p ventral hernia repair, laparopscopic, c/b enterotomy intraoperatively, converted to open with primary repair on 6/15. Patient on floors with NGT, NPO. On 6/21, 2am rapid response called for fever to 104 rectal, tachycardia to 120s and tachypnea. Unable to answer questions per daughter, in native [facility]. Stat labs drawn. SICU consulted for sepsis management and altered mental status. CT scan obtained on way.       PAST MEDICAL & SURGICAL HISTORY:  Obesity      Hypertension      Hyperlipidemia      H/O fracture of tibia  and fibula (L)      History of cholecystectomy  2011        Home Meds: Home Medications:  acetaminophen 325 mg oral tablet: 2 tab(s) orally every 6 hours (17 Jun 2022 06:23)  atorvastatin 10 mg oral tablet: 1 tab(s) orally once a day (02 Jul 2021 10:58)    Allergies: Allergies    No Known Allergies    Intolerances      Soc:   Advanced Directives: Presumed Full Code     ROS:    REVIEW OF SYSTEMS    [ ] A ten-point review of systems was otherwise negative except as noted.  [ ] Due to altered mental status/intubation, subjective information were not able to be obtained from the patient. History was obtained, to the extent possible, from review of the chart and collateral sources of information.      CURRENT MEDICATIONS:   --------------------------------------------------------------------------------------  Neurologic Medications  acetaminophen   IVPB .. 1000 milliGRAM(s) IV Intermittent once  HYDROmorphone  Injectable 0.5 milliGRAM(s) IV Push every 4 hours PRN Moderate Pain (4 - 6)  HYDROmorphone  Injectable 1 milliGRAM(s) IV Push every 4 hours PRN Severe Pain (7 - 10)  metoclopramide Injectable 10 milliGRAM(s) IV Push every 8 hours    Respiratory Medications    Cardiovascular Medications    Gastrointestinal Medications  dextrose 5% + sodium chloride 0.45% with potassium chloride 20 mEq/L 1000 milliLiter(s) IV Continuous <Continuous>  lactated ringers Bolus 1000 milliLiter(s) IV Bolus once  pantoprazole  Injectable 40 milliGRAM(s) IV Push every 24 hours    Genitourinary Medications    Hematologic/Oncologic Medications  enoxaparin Injectable 40 milliGRAM(s) SubCutaneous every 24 hours  influenza  Vaccine (HIGH DOSE) 0.7 milliLiter(s) IntraMuscular once    Antimicrobial/Immunologic Medications  piperacillin/tazobactam IVPB. 3.375 Gram(s) IV Intermittent once  piperacillin/tazobactam IVPB.. 3.375 Gram(s) IV Intermittent every 8 hours  vancomycin  IVPB 1000 milliGRAM(s) IV Intermittent once    Endocrine/Metabolic Medications    Topical/Other Medications  tetracaine/benzocaine/butamben Spray 1 Spray(s) Topical every 6 hours PRN Throat pain from NGT    --------------------------------------------------------------------------------------    VITAL SIGNS, INS/OUTS (last 24 hours):  --------------------------------------------------------------------------------------  ICU Vital Signs Last 24 Hrs  T(C): 39.3 (21 Jun 2022 01:38), Max: 39.3 (21 Jun 2022 01:38)  T(F): 102.8 (21 Jun 2022 01:38), Max: 102.8 (21 Jun 2022 01:38)  HR: 121 (21 Jun 2022 01:38) (80 - 121)  BP: 131/84 (21 Jun 2022 01:38) (131/84 - 144/82)  BP(mean): --  ABP: --  ABP(mean): --  RR: 32 (21 Jun 2022 01:38) (17 - 32)  SpO2: 94% (21 Jun 2022 01:38) (94% - 100%)    I&O's Summary    19 Jun 2022 07:01  -  20 Jun 2022 07:00  --------------------------------------------------------  IN: 3720 mL / OUT: 1850 mL / NET: 1870 mL    20 Jun 2022 07:01  -  21 Jun 2022 02:45  --------------------------------------------------------  IN: 1850 mL / OUT: 1950 mL / NET: -100 mL      --------------------------------------------------------------------------------------    EXAM:  General/Neuro  RASS: 0  GCS: 15  Exam: Normal, NAD, unable to answer questions appropriately in [facility]    Respiratory  Exam: Lungs clear to auscultation, Normal expansion/effort.    Cardiovascular  Exam: S1, S2.  Sinus tachycardia    GI  Exam: Abdomen distended, not peritoneal to palpation, incision sites c/d/i, NGT to suction with bilious output.   Current Diet:  NPO      Tubes/Lines/Drains  ***  [x] Peripheral IV  [] Central Venous Line     	[] R	[] L	[] IJ	[] Fem	[] SC        Type:	    Date Placed:   [] Arterial Line		[] R	[] L	[] Fem	[] Rad	[] Ax	Date Placed:   [] PICC:         	[] Midline		[] Mediport           [] Urinary Catheter		Date Placed:     Extremities  Exam: Extremities warm, pink, well-perfused.        Derm:  Exam: Good skin turgor, no skin breakdown.      :   Exam: Pierce catheter in place.     LABS  --------------------------------------------------------------------------------------  Labs:  CAPILLARY BLOOD GLUCOSE      POCT Blood Glucose.: 178 mg/dL (21 Jun 2022 01:44)                          15.0   9.19  )-----------( 328      ( 21 Jun 2022 01:50 )             47.7         06-20    134<L>  |  97<L>  |  13  ----------------------------<  137<H>  4.1   |  27  |  0.49<L>      Calcium, Total Serum: 8.6 mg/dL (06-20-22 @ 07:35)      LFTs:     Blood Gas Venous - Lactate: 1.6 mmol/L (06-21-22 @ 01:50)      Coags:     14.3   ----< 1.23    ( 21 Jun 2022 01:50 )     30.8        CARDIAC MARKERS ( 21 Jun 2022 01:50 )  x     / x     / 12 U/L / x     / <1.0 ng/mL                --------------------------------------------------------------------------------------    OTHER LABS    IMAGING RESULTS      ASSESSMENT:  67y Female ***    PLAN:   Neurologic:   Respiratory:   Cardiovascular:   Gastrointestinal/Nutrition:   Renal/Genitourinary:   Hematologic:   Infectious Disease:   Lines/Tubes:  Endocrine:   Disposition:     --------------------------------------------------------------------------------------    Critical Care Diagnoses:   SICU Consultation Note  =====================================================  HPI:  Patient is a 66yo F (speaks Latvian dialect) with a history of HTN, HLD, L tibia surgery, lap kevan (2011, [place]) presenting with 1.5 days of nausea, vomiting and abdominal pain. Per daughter at bedside, patient began having symptoms Monday night after dinner. Last passed flatus and had a BM at that time. Denies fevers or chills. Pain improved in ED after getting morphine and NGT placement.    In the ED, stable and afebrile. Labs unremarkable. CT showed incarcerated SBO at right ventral hernia. (15 Rambo 2022 08:22)    Patient s/p ventral hernia repair, laparopscopic, c/b enterotomy intraoperatively, converted to open with primary repair on 6/15. Patient on floors with NGT, NPO. On 6/21, 2am rapid response called for fever to 104 rectal, tachycardia to 120s and tachypnea. Unable to answer questions per daughter, in native [facility]. Stat labs drawn. SICU consulted for sepsis management and altered mental status. CT scan obtained on way.       PAST MEDICAL & SURGICAL HISTORY:  Obesity      Hypertension      Hyperlipidemia      H/O fracture of tibia  and fibula (L)      History of cholecystectomy  2011        Home Meds: Home Medications:  acetaminophen 325 mg oral tablet: 2 tab(s) orally every 6 hours (17 Jun 2022 06:23)  atorvastatin 10 mg oral tablet: 1 tab(s) orally once a day (02 Jul 2021 10:58)    Allergies: Allergies    No Known Allergies    Intolerances      Soc:   Advanced Directives: Presumed Full Code     ROS:    REVIEW OF SYSTEMS    [ ] A ten-point review of systems was otherwise negative except as noted.  [ ] Due to altered mental status/intubation, subjective information were not able to be obtained from the patient. History was obtained, to the extent possible, from review of the chart and collateral sources of information.      CURRENT MEDICATIONS:   --------------------------------------------------------------------------------------  Neurologic Medications  acetaminophen   IVPB .. 1000 milliGRAM(s) IV Intermittent once  HYDROmorphone  Injectable 0.5 milliGRAM(s) IV Push every 4 hours PRN Moderate Pain (4 - 6)  HYDROmorphone  Injectable 1 milliGRAM(s) IV Push every 4 hours PRN Severe Pain (7 - 10)  metoclopramide Injectable 10 milliGRAM(s) IV Push every 8 hours    Respiratory Medications    Cardiovascular Medications    Gastrointestinal Medications  dextrose 5% + sodium chloride 0.45% with potassium chloride 20 mEq/L 1000 milliLiter(s) IV Continuous <Continuous>  lactated ringers Bolus 1000 milliLiter(s) IV Bolus once  pantoprazole  Injectable 40 milliGRAM(s) IV Push every 24 hours    Genitourinary Medications    Hematologic/Oncologic Medications  enoxaparin Injectable 40 milliGRAM(s) SubCutaneous every 24 hours  influenza  Vaccine (HIGH DOSE) 0.7 milliLiter(s) IntraMuscular once    Antimicrobial/Immunologic Medications  piperacillin/tazobactam IVPB. 3.375 Gram(s) IV Intermittent once  piperacillin/tazobactam IVPB.. 3.375 Gram(s) IV Intermittent every 8 hours  vancomycin  IVPB 1000 milliGRAM(s) IV Intermittent once    Endocrine/Metabolic Medications    Topical/Other Medications  tetracaine/benzocaine/butamben Spray 1 Spray(s) Topical every 6 hours PRN Throat pain from NGT    --------------------------------------------------------------------------------------    VITAL SIGNS, INS/OUTS (last 24 hours):  --------------------------------------------------------------------------------------  ICU Vital Signs Last 24 Hrs  T(C): 39.3 (21 Jun 2022 01:38), Max: 39.3 (21 Jun 2022 01:38)  T(F): 102.8 (21 Jun 2022 01:38), Max: 102.8 (21 Jun 2022 01:38)  HR: 121 (21 Jun 2022 01:38) (80 - 121)  BP: 131/84 (21 Jun 2022 01:38) (131/84 - 144/82)  BP(mean): --  ABP: --  ABP(mean): --  RR: 32 (21 Jun 2022 01:38) (17 - 32)  SpO2: 94% (21 Jun 2022 01:38) (94% - 100%)    I&O's Summary    19 Jun 2022 07:01  -  20 Jun 2022 07:00  --------------------------------------------------------  IN: 3720 mL / OUT: 1850 mL / NET: 1870 mL    20 Jun 2022 07:01  -  21 Jun 2022 02:45  --------------------------------------------------------  IN: 1850 mL / OUT: 1950 mL / NET: -100 mL      --------------------------------------------------------------------------------------    EXAM:  General/Neuro  RASS: 0  GCS: 15  Exam: Normal, NAD, unable to answer questions appropriately in [facility]    Respiratory  Exam: Lungs clear to auscultation, Normal expansion/effort.    Cardiovascular  Exam: S1, S2.  Sinus tachycardia    GI  Exam: Abdomen distended, not peritoneal to palpation, incision sites c/d/i, NGT to suction with bilious output.   Current Diet:  NPO      Tubes/Lines/Drains  ***  [x] Peripheral IV  [] Central Venous Line     	[] R	[] L	[] IJ	[] Fem	[] SC        Type:	    Date Placed:   [] Arterial Line		[] R	[] L	[] Fem	[] Rad	[] Ax	Date Placed:   [] PICC:         	[] Midline		[] Mediport           [] Urinary Catheter		Date Placed:     Extremities  Exam: Extremities warm, pink, well-perfused.        Derm:  Exam: Good skin turgor, no skin breakdown.      :   Exam: Pierce catheter in place.     LABS  --------------------------------------------------------------------------------------  Labs:  CAPILLARY BLOOD GLUCOSE      POCT Blood Glucose.: 178 mg/dL (21 Jun 2022 01:44)                          15.0   9.19  )-----------( 328      ( 21 Jun 2022 01:50 )             47.7         06-20    134<L>  |  97<L>  |  13  ----------------------------<  137<H>  4.1   |  27  |  0.49<L>      Calcium, Total Serum: 8.6 mg/dL (06-20-22 @ 07:35)      LFTs:     Blood Gas Venous - Lactate: 1.6 mmol/L (06-21-22 @ 01:50)      Coags:     14.3   ----< 1.23    ( 21 Jun 2022 01:50 )     30.8        CARDIAC MARKERS ( 21 Jun 2022 01:50 )  x     / x     / 12 U/L / x     / <1.0 ng/m        --------------------------------------------------------------------------------------    OTHER LABS    IMAGING RESULTS      ASSESSMENT:  67y Female s/p laparoscopic ventral hernia repair converted to open after enterotomy with primary repair on 6/15. Patient recovering on floors. RRT on 6.21 for tachycardic to 120s, tachypnea and febrile to 104.     PLAN:   Neurologic:   - ANO0-1 per daughter in [facility]  - pain control: IV tylenol, dilaudid PRN    Respiratory:   - saturating appropriately on RA  - monitor SpO2    Cardiovascular:   - sinus tachycardia  - lactate 1.6 wnl  - no pressor requirements    Gastrointestinal/Nutrition:   - NPO/NGT to LCS  - passing flatus, negative bowel movement  - reglan  - protonix    Renal/Genitourinary:   - primafit  - monitor UOP  - replete electrolytes PRN  - 1L LR bolus   - D5 1/2 NS + 20 K @125    Hematologic:   - monitor H/H  - lovenox 40 DVT ppx    Infectious Disease:   - vanc and zosyn IV abx  - monitor fever curve and WBC curve     Lines/Tubes:    Endocrine:  - monitor sugar on BMP     Disposition: SICU    --------------------------------------------------------------------------------------    Critical Care Diagnoses:   SICU Consultation Note  =====================================================  HPI:  Patient is a 66yo F (speaks Maltese dialect) with a history of HTN, HLD, L tibia surgery, lap kevan (2011, [place]) presenting with 1.5 days of nausea, vomiting and abdominal pain. Per daughter at bedside, patient began having symptoms Monday night after dinner. Last passed flatus and had a BM at that time. Denies fevers or chills. Pain improved in ED after getting morphine and NGT placement.    In the ED, stable and afebrile. Labs unremarkable. CT showed incarcerated SBO at right ventral hernia. (15 Rambo 2022 08:22)    Patient s/p ventral hernia repair, laparopscopic, c/b enterotomy intraoperatively, converted to open with primary repair on 6/15. Patient on floors with NGT, NPO. On 6/21, 2am rapid response called for fever to 104 rectal, tachycardia to 120s and tachypnea. Unable to answer questions per daughter, in native [facility]. Stat labs drawn. SICU consulted for sepsis management and altered mental status. CT scan obtained on way.       PAST MEDICAL & SURGICAL HISTORY:  Obesity      Hypertension      Hyperlipidemia      H/O fracture of tibia  and fibula (L)      History of cholecystectomy  2011        Home Meds: Home Medications:  acetaminophen 325 mg oral tablet: 2 tab(s) orally every 6 hours (17 Jun 2022 06:23)  atorvastatin 10 mg oral tablet: 1 tab(s) orally once a day (02 Jul 2021 10:58)    Allergies: Allergies    No Known Allergies    Intolerances      Soc:   Advanced Directives: Presumed Full Code     ROS:    REVIEW OF SYSTEMS    [ ] A ten-point review of systems was otherwise negative except as noted.  [ ] Due to altered mental status/intubation, subjective information were not able to be obtained from the patient. History was obtained, to the extent possible, from review of the chart and collateral sources of information.      CURRENT MEDICATIONS:   --------------------------------------------------------------------------------------  Neurologic Medications  acetaminophen   IVPB .. 1000 milliGRAM(s) IV Intermittent once  HYDROmorphone  Injectable 0.5 milliGRAM(s) IV Push every 4 hours PRN Moderate Pain (4 - 6)  HYDROmorphone  Injectable 1 milliGRAM(s) IV Push every 4 hours PRN Severe Pain (7 - 10)  metoclopramide Injectable 10 milliGRAM(s) IV Push every 8 hours    Respiratory Medications    Cardiovascular Medications    Gastrointestinal Medications  dextrose 5% + sodium chloride 0.45% with potassium chloride 20 mEq/L 1000 milliLiter(s) IV Continuous <Continuous>  lactated ringers Bolus 1000 milliLiter(s) IV Bolus once  pantoprazole  Injectable 40 milliGRAM(s) IV Push every 24 hours    Genitourinary Medications    Hematologic/Oncologic Medications  enoxaparin Injectable 40 milliGRAM(s) SubCutaneous every 24 hours  influenza  Vaccine (HIGH DOSE) 0.7 milliLiter(s) IntraMuscular once    Antimicrobial/Immunologic Medications  piperacillin/tazobactam IVPB. 3.375 Gram(s) IV Intermittent once  piperacillin/tazobactam IVPB.. 3.375 Gram(s) IV Intermittent every 8 hours  vancomycin  IVPB 1000 milliGRAM(s) IV Intermittent once    Endocrine/Metabolic Medications    Topical/Other Medications  tetracaine/benzocaine/butamben Spray 1 Spray(s) Topical every 6 hours PRN Throat pain from NGT    --------------------------------------------------------------------------------------    VITAL SIGNS, INS/OUTS (last 24 hours):  --------------------------------------------------------------------------------------  ICU Vital Signs Last 24 Hrs  T(C): 39.3 (21 Jun 2022 01:38), Max: 39.3 (21 Jun 2022 01:38)  T(F): 102.8 (21 Jun 2022 01:38), Max: 102.8 (21 Jun 2022 01:38)  HR: 121 (21 Jun 2022 01:38) (80 - 121)  BP: 131/84 (21 Jun 2022 01:38) (131/84 - 144/82)  BP(mean): --  ABP: --  ABP(mean): --  RR: 32 (21 Jun 2022 01:38) (17 - 32)  SpO2: 94% (21 Jun 2022 01:38) (94% - 100%)    I&O's Summary    19 Jun 2022 07:01  -  20 Jun 2022 07:00  --------------------------------------------------------  IN: 3720 mL / OUT: 1850 mL / NET: 1870 mL    20 Jun 2022 07:01  -  21 Jun 2022 02:45  --------------------------------------------------------  IN: 1850 mL / OUT: 1950 mL / NET: -100 mL      --------------------------------------------------------------------------------------    EXAM:  General/Neuro    Exam: unable to answer questions appropriately in [facility], following commands    Respiratory  Exam: Lungs clear to auscultation, Normal expansion/effort.    Cardiovascular  Exam: S1, S2.  Sinus tachycardia    GI  Exam: Abdomen distended, not peritoneal to palpation, incision sites c/d/i, NGT to suction with bilious output.   Current Diet:  NPO      Tubes/Lines/Drains  ***  [x] Peripheral IV  [X] Central Venous Line     	[] R	[] L	[] IJ	[] Fem	[] SC        Type:	    Date Placed:   [X] Arterial Line		[] R	[] L	[] Fem	[] Rad	[] Ax	Date Placed:   [] PICC:         	[] Midline		[] Mediport           [] Urinary Catheter		Date Placed:     Extremities  Exam: Extremities warm, pink, well-perfused.        Derm:  Exam: Good skin turgor, no skin breakdown.      :   Exam: Rajput catheter in place.     LABS  --------------------------------------------------------------------------------------  Labs:  CAPILLARY BLOOD GLUCOSE      POCT Blood Glucose.: 178 mg/dL (21 Jun 2022 01:44)                          15.0   9.19  )-----------( 328      ( 21 Jun 2022 01:50 )             47.7         06-20    134<L>  |  97<L>  |  13  ----------------------------<  137<H>  4.1   |  27  |  0.49<L>      Calcium, Total Serum: 8.6 mg/dL (06-20-22 @ 07:35)      LFTs:     Blood Gas Venous - Lactate: 1.6 mmol/L (06-21-22 @ 01:50)      Coags:     14.3   ----< 1.23    ( 21 Jun 2022 01:50 )     30.8        CARDIAC MARKERS ( 21 Jun 2022 01:50 )  x     / x     / 12 U/L / x     / <1.0 ng/m        --------------------------------------------------------------------------------------    OTHER LABS    IMAGING RESULTS      ASSESSMENT:  67y Female s/p laparoscopic ventral hernia repair converted to open after enterotomy with primary repair on 6/15. Patient recovering on floors. RRT on 6.21 for tachycardic to 120s, tachypnea and febrile to 104.     Interval Events:  - s/p 1L LR bolus O/N, additional 500cc bolus LR in AM given for hypotension  - added lidocaine patch for pain  - ordered echocardiogram, f/u results  - placed rajput  - reglan DCd    ASSESSMENT:  67y Female s/p laparoscopic ventral hernia repair converted to open after enterotomy with primary repair on 6/15. Patient recovering on floors. RRT on 6.21 for tachycardic to 120s, tachypnea and febrile to 104.     PLAN:   Neurologic:   - ANO0-1 per daughter in [facility]  - pain control: IV tylenol, dilaudid PRN  - added lidocaine patch    Respiratory:   - On NC  - Monitor SpO2  - Maintain O2 saturation >92%    Cardiovascular:   - sinus tachycardia  - lactate 1.6 --> 2.9 -> 1.7  - started on levo  - RTOR 6/21  - ordered echocardiogram, f/u results    Gastrointestinal/Nutrition:   - NPO/NGT to LCS  - passing flatus, (+) bowel movement  - protonix    Renal/Genitourinary:   - placed rajput,  - Monitor strict Is & Os, check FeNA  - Monitor and replete electrolytes PRN  - LR @ 125  - s/p 1L LR bolus O/N  - Given 500cc bolus LR for hypotension    Hematologic:   - monitor H/H  - lovenox 40 DVT ppx    Infectious Disease:   - vanc and zosyn IV abx  - MRSA swab f/u results  - monitor fever curve and WBC curve       Endocrine:  - monitor sugar on BMP     Lines/Tubes:  L Radial A line  R IJ CVC    Disposition: SICU    --------------------------------------------------------------------------------------    Critical Care Diagnoses:

## 2022-06-21 NOTE — PROCEDURE NOTE - ADDITIONAL PROCEDURE DETAILS
1/1 L radial ultrasound guided sterile arterial line no complications good circulation before and after, correlates with NIBP, dressed and sutured.

## 2022-06-21 NOTE — RAPID RESPONSE TEAM SUMMARY - NSSITUATIONBACKGROUNDRRT_GEN_ALL_CORE
67F presented 6/15 with incarcerated ventral hernia s/p diagnostic laparoscopy converted to open for enterotomy repair, primary closure of fascia now recovering on the floor with NGT, has been nauseous / gagging around the NGT. RRT called for rectal tempt 104 and tachycardia. Per daughter at bedside, patient has been increasingly altered this evening and had called the daughter a different name (another daughter's name).

## 2022-06-21 NOTE — CONSULT NOTE ADULT - ATTENDING COMMENTS
I agree with the history, physical, and plan, which I have reviewed and edited where appropriate.  I agree with notes/assessment of health care providers on my service.  I have personally examined the patient.  I was physically present for the key portions of the evaluation and management (E/M) service provided.  I reviewed data and laboratory tests/x-rays and all pertinent electronic images.  The patient is a critical care patient with life threatening hemodynamic and metabolic instability in SICU.  Risk benefit analyses discussed.    The patient is in SICU with diagnosis mentioned in the note.    The plan is specified below.        ASSESSMENT:  67y Female s/p laparoscopic ventral hernia repair converted to open after enterotomy with primary repair on 6/15, complicated by postop ileus. RRT on 6.21 for tachycardic to 120s, tachypnea and febrile to 104. Concern for leak vs aspiration PNA.     PLAN:   Neurologic: AMS, toxic metabolic encephalopathy   - pain control: IV tylenol, dilaudid PRN    Respiratory:   - saturating appropriately on RA    Cardiovascular: sinus tachycardia  - lactate 1.6 wnl  - IVF resuscitation     Gastrointestinal/Nutrition: s/p ex lap, JULIAN, enterotomy repair, post-op ileus  - NPO/NGT to LCS  - reglan  - protonix    Renal/Genitourinary:   - rajput  - 1L LR bolus   - D5 1/2 NS + 20 K @125    Hematologic:   - lovenox 40 DVT ppx    Infectious Disease: sepsis, unknown source, ? aspiration PNA vs leak  - vanc and zosyn IV abx  - blood cultures   - CT abd/pelvis     Endocrine:  - monitor sugar on BMP

## 2022-06-21 NOTE — BRIEF OPERATIVE NOTE - NSICDXBRIEFPREOP_GEN_ALL_CORE_FT
PRE-OP DIAGNOSIS:  Incarcerated ventral hernia 15-Rambo-2022 18:28:52  Jake العلي  Small bowel obstruction 21-Jun-2022 21:22:28  Jake العلي  
PRE-OP DIAGNOSIS:  Incarcerated ventral hernia 15-Rambo-2022 18:28:52  Jake العلي

## 2022-06-21 NOTE — DIETITIAN INITIAL EVALUATION ADULT - OTHER INFO
A&Ox2- Serbian speaking. Pt has been NPO/clears x4 days this admission. No BM yet. 2500 mL nasoenteral tube output noted.

## 2022-06-21 NOTE — BRIEF OPERATIVE NOTE - NSICDXBRIEFPOSTOP_GEN_ALL_CORE_FT
POST-OP DIAGNOSIS:  Incarcerated ventral hernia 15-Rambo-2022 18:29:03  Jake العلي  Small bowel anastomotic leak 21-Jun-2022 21:24:21  Jake العلي  
POST-OP DIAGNOSIS:  Incarcerated ventral hernia 15-Rambo-2022 18:29:03  Jake العلي

## 2022-06-21 NOTE — PROGRESS NOTE ADULT - SUBJECTIVE AND OBJECTIVE BOX
Surgery Progress Note     Subjective/24hour Events:   Overnight, patient had a rapid response for fever and desaturation, transferred to SICU, CT showing SBO.     OBJECTIVE:   Vital Signs Last 24 Hrs  T(C): 37.3 (21 Jun 2022 11:00), Max: 39.3 (21 Jun 2022 01:38)  T(F): 99.1 (21 Jun 2022 11:00), Max: 102.8 (21 Jun 2022 01:38)  HR: 95 (21 Jun 2022 13:15) (82 - 121)  BP: 65/50 (21 Jun 2022 12:00) (65/50 - 149/65)  BP(mean): 56 (21 Jun 2022 12:00) (56 - 110)  RR: 20 (21 Jun 2022 13:15) (15 - 33)  SpO2: 99% (21 Jun 2022 13:15) (85% - 100%)    I&O's Detail    20 Jun 2022 07:01  -  21 Jun 2022 07:00  --------------------------------------------------------  IN:    dextrose 5% + sodium chloride 0.45% w/ Additives: 1750 mL    IV PiggyBack: 450 mL    Lactated Ringers: 600 mL    Lactated Ringers Bolus: 1000 mL  Total IN: 3800 mL    OUT:    Nasogastric/Oral tube (mL): 2500 mL    Oral Fluid: 0 mL    Voided (mL): 1150 mL  Total OUT: 3650 mL    Total NET: 150 mL      21 Jun 2022 07:01  -  21 Jun 2022 15:00  --------------------------------------------------------  IN:    IV PiggyBack: 100 mL    Lactated Ringers: 750 mL    Lactated Ringers Bolus: 1000 mL    Norepinephrine: 11.7 mL  Total IN: 1861.7 mL    OUT:    Indwelling Catheter - Urethral (mL): 200 mL    Nasogastric/Oral tube (mL): 600 mL    Stool (mL): 150 mL    Voided (mL): 200 mL  Total OUT: 1150 mL    Total NET: 711.7 mL    Physical Exam:  General: Uncomfortable  HEENT: NGT  Resp: on nonrebreather   Abdomen: obese, distended abdomen, incisions c/d/i, lauri-incisional tenderness       Labs:                          14.4   9.29  )-----------( 271      ( 21 Jun 2022 04:30 )             44.8   06-21    127<L>  |  97<L>  |  16  ----------------------------<  139<H>  5.3   |  18<L>  |  0.62    Ca    7.8<L>      21 Jun 2022 04:30  Phos  2.3     06-21  Mg     1.50     06-21    TPro  6.1  /  Alb  2.1<L>  /  TBili  0.7  /  DBili  x   /  AST  31  /  ALT  15  /  AlkPhos  75  06-21

## 2022-06-22 LAB
ANION GAP SERPL CALC-SCNC: 10 MMOL/L — SIGNIFICANT CHANGE UP (ref 7–14)
BLOOD GAS ARTERIAL - LYTES,HGB,ICA,LACT RESULT: SIGNIFICANT CHANGE UP
BLOOD GAS ARTERIAL COMPREHENSIVE RESULT: SIGNIFICANT CHANGE UP
BUN SERPL-MCNC: 15 MG/DL — SIGNIFICANT CHANGE UP (ref 7–23)
CALCIUM SERPL-MCNC: 7.8 MG/DL — LOW (ref 8.4–10.5)
CHLORIDE SERPL-SCNC: 98 MMOL/L — SIGNIFICANT CHANGE UP (ref 98–107)
CO2 SERPL-SCNC: 22 MMOL/L — SIGNIFICANT CHANGE UP (ref 22–31)
CREAT SERPL-MCNC: 0.66 MG/DL — SIGNIFICANT CHANGE UP (ref 0.5–1.3)
CULTURE RESULTS: NO GROWTH — SIGNIFICANT CHANGE UP
EGFR: 96 ML/MIN/1.73M2 — SIGNIFICANT CHANGE UP
GLUCOSE SERPL-MCNC: 135 MG/DL — HIGH (ref 70–99)
HCT VFR BLD CALC: 34.4 % — LOW (ref 34.5–45)
HGB BLD-MCNC: 11.5 G/DL — SIGNIFICANT CHANGE UP (ref 11.5–15.5)
MAGNESIUM SERPL-MCNC: 2.2 MG/DL — SIGNIFICANT CHANGE UP (ref 1.6–2.6)
MCHC RBC-ENTMCNC: 25.8 PG — LOW (ref 27–34)
MCHC RBC-ENTMCNC: 33.4 GM/DL — SIGNIFICANT CHANGE UP (ref 32–36)
MCV RBC AUTO: 77.3 FL — LOW (ref 80–100)
NRBC # BLD: 0 /100 WBCS — SIGNIFICANT CHANGE UP
NRBC # FLD: 0 K/UL — SIGNIFICANT CHANGE UP
PHOSPHATE SERPL-MCNC: 2.9 MG/DL — SIGNIFICANT CHANGE UP (ref 2.5–4.5)
PLATELET # BLD AUTO: 250 K/UL — SIGNIFICANT CHANGE UP (ref 150–400)
POTASSIUM SERPL-MCNC: 4.3 MMOL/L — SIGNIFICANT CHANGE UP (ref 3.5–5.3)
POTASSIUM SERPL-SCNC: 4.3 MMOL/L — SIGNIFICANT CHANGE UP (ref 3.5–5.3)
RBC # BLD: 4.45 M/UL — SIGNIFICANT CHANGE UP (ref 3.8–5.2)
RBC # FLD: 15.3 % — HIGH (ref 10.3–14.5)
SODIUM SERPL-SCNC: 130 MMOL/L — LOW (ref 135–145)
SPECIMEN SOURCE: SIGNIFICANT CHANGE UP
WBC # BLD: 14.45 K/UL — HIGH (ref 3.8–10.5)
WBC # FLD AUTO: 14.45 K/UL — HIGH (ref 3.8–10.5)

## 2022-06-22 PROCEDURE — 71045 X-RAY EXAM CHEST 1 VIEW: CPT | Mod: 26

## 2022-06-22 PROCEDURE — 99291 CRITICAL CARE FIRST HOUR: CPT | Mod: 24

## 2022-06-22 RX ORDER — SODIUM CHLORIDE 9 MG/ML
1000 INJECTION, SOLUTION INTRAVENOUS ONCE
Refills: 0 | Status: COMPLETED | OUTPATIENT
Start: 2022-06-22 | End: 2022-06-22

## 2022-06-22 RX ORDER — ACETYLCYSTEINE 200 MG/ML
4 VIAL (ML) MISCELLANEOUS EVERY 8 HOURS
Refills: 0 | Status: DISCONTINUED | OUTPATIENT
Start: 2022-06-22 | End: 2022-06-23

## 2022-06-22 RX ORDER — SODIUM CHLORIDE 9 MG/ML
1000 INJECTION, SOLUTION INTRAVENOUS ONCE
Refills: 0 | Status: DISCONTINUED | OUTPATIENT
Start: 2022-06-22 | End: 2022-06-22

## 2022-06-22 RX ORDER — ACETAMINOPHEN 500 MG
1000 TABLET ORAL EVERY 6 HOURS
Refills: 0 | Status: COMPLETED | OUTPATIENT
Start: 2022-06-22 | End: 2022-06-23

## 2022-06-22 RX ORDER — SODIUM CHLORIDE 9 MG/ML
500 INJECTION INTRAMUSCULAR; INTRAVENOUS; SUBCUTANEOUS ONCE
Refills: 0 | Status: COMPLETED | OUTPATIENT
Start: 2022-06-22 | End: 2022-06-22

## 2022-06-22 RX ORDER — SODIUM CHLORIDE 9 MG/ML
1000 INJECTION INTRAMUSCULAR; INTRAVENOUS; SUBCUTANEOUS ONCE
Refills: 0 | Status: COMPLETED | OUTPATIENT
Start: 2022-06-22 | End: 2022-06-22

## 2022-06-22 RX ORDER — ALBUTEROL 90 UG/1
2.5 AEROSOL, METERED ORAL EVERY 8 HOURS
Refills: 0 | Status: DISCONTINUED | OUTPATIENT
Start: 2022-06-22 | End: 2022-06-28

## 2022-06-22 RX ORDER — HYDROMORPHONE HYDROCHLORIDE 2 MG/ML
0.5 INJECTION INTRAMUSCULAR; INTRAVENOUS; SUBCUTANEOUS EVERY 4 HOURS
Refills: 0 | Status: DISCONTINUED | OUTPATIENT
Start: 2022-06-22 | End: 2022-06-25

## 2022-06-22 RX ORDER — SODIUM CHLORIDE 9 MG/ML
1000 INJECTION, SOLUTION INTRAVENOUS
Refills: 0 | Status: DISCONTINUED | OUTPATIENT
Start: 2022-06-22 | End: 2022-06-23

## 2022-06-22 RX ORDER — HYDROMORPHONE HYDROCHLORIDE 2 MG/ML
1 INJECTION INTRAMUSCULAR; INTRAVENOUS; SUBCUTANEOUS
Refills: 0 | Status: DISCONTINUED | OUTPATIENT
Start: 2022-06-22 | End: 2022-06-25

## 2022-06-22 RX ADMIN — Medication 4 MILLILITER(S): at 14:54

## 2022-06-22 RX ADMIN — Medication 400 MILLIGRAM(S): at 11:08

## 2022-06-22 RX ADMIN — SODIUM CHLORIDE 500 MILLILITER(S): 9 INJECTION INTRAMUSCULAR; INTRAVENOUS; SUBCUTANEOUS at 05:27

## 2022-06-22 RX ADMIN — CHLORHEXIDINE GLUCONATE 15 MILLILITER(S): 213 SOLUTION TOPICAL at 06:27

## 2022-06-22 RX ADMIN — SODIUM CHLORIDE 100 MILLILITER(S): 9 INJECTION, SOLUTION INTRAVENOUS at 12:32

## 2022-06-22 RX ADMIN — HYDROMORPHONE HYDROCHLORIDE 1 MILLIGRAM(S): 2 INJECTION INTRAMUSCULAR; INTRAVENOUS; SUBCUTANEOUS at 10:20

## 2022-06-22 RX ADMIN — PIPERACILLIN AND TAZOBACTAM 25 GRAM(S): 4; .5 INJECTION, POWDER, LYOPHILIZED, FOR SOLUTION INTRAVENOUS at 05:17

## 2022-06-22 RX ADMIN — Medication 300 MILLIGRAM(S): at 05:19

## 2022-06-22 RX ADMIN — HYDROMORPHONE HYDROCHLORIDE 1 MILLIGRAM(S): 2 INJECTION INTRAMUSCULAR; INTRAVENOUS; SUBCUTANEOUS at 19:35

## 2022-06-22 RX ADMIN — PANTOPRAZOLE SODIUM 40 MILLIGRAM(S): 20 TABLET, DELAYED RELEASE ORAL at 13:37

## 2022-06-22 RX ADMIN — Medication 400 MILLIGRAM(S): at 23:48

## 2022-06-22 RX ADMIN — ALBUTEROL 2.5 MILLIGRAM(S): 90 AEROSOL, METERED ORAL at 22:27

## 2022-06-22 RX ADMIN — SODIUM CHLORIDE 1000 MILLILITER(S): 9 INJECTION, SOLUTION INTRAVENOUS at 13:55

## 2022-06-22 RX ADMIN — PIPERACILLIN AND TAZOBACTAM 25 GRAM(S): 4; .5 INJECTION, POWDER, LYOPHILIZED, FOR SOLUTION INTRAVENOUS at 13:24

## 2022-06-22 RX ADMIN — SODIUM CHLORIDE 125 MILLILITER(S): 9 INJECTION, SOLUTION INTRAVENOUS at 07:51

## 2022-06-22 RX ADMIN — CHLORHEXIDINE GLUCONATE 1 APPLICATION(S): 213 SOLUTION TOPICAL at 05:18

## 2022-06-22 RX ADMIN — Medication 1000 MILLIGRAM(S): at 11:38

## 2022-06-22 RX ADMIN — Medication 300 MILLIGRAM(S): at 17:00

## 2022-06-22 RX ADMIN — CHLORHEXIDINE GLUCONATE 15 MILLILITER(S): 213 SOLUTION TOPICAL at 17:00

## 2022-06-22 RX ADMIN — HYDROMORPHONE HYDROCHLORIDE 1 MILLIGRAM(S): 2 INJECTION INTRAMUSCULAR; INTRAVENOUS; SUBCUTANEOUS at 19:50

## 2022-06-22 RX ADMIN — ALBUTEROL 2.5 MILLIGRAM(S): 90 AEROSOL, METERED ORAL at 14:54

## 2022-06-22 RX ADMIN — SODIUM CHLORIDE 100 MILLILITER(S): 9 INJECTION, SOLUTION INTRAVENOUS at 19:33

## 2022-06-22 RX ADMIN — Medication 400 MILLIGRAM(S): at 17:00

## 2022-06-22 RX ADMIN — SODIUM CHLORIDE 125 MILLILITER(S): 9 INJECTION, SOLUTION INTRAVENOUS at 19:33

## 2022-06-22 RX ADMIN — ENOXAPARIN SODIUM 40 MILLIGRAM(S): 100 INJECTION SUBCUTANEOUS at 05:18

## 2022-06-22 RX ADMIN — Medication 4 MILLILITER(S): at 22:28

## 2022-06-22 RX ADMIN — Medication 10 MILLIGRAM(S): at 21:34

## 2022-06-22 RX ADMIN — SODIUM CHLORIDE 125 MILLILITER(S): 9 INJECTION, SOLUTION INTRAVENOUS at 17:03

## 2022-06-22 RX ADMIN — SODIUM CHLORIDE 100 MILLILITER(S): 9 INJECTION, SOLUTION INTRAVENOUS at 17:03

## 2022-06-22 RX ADMIN — HYDROMORPHONE HYDROCHLORIDE 1 MILLIGRAM(S): 2 INJECTION INTRAMUSCULAR; INTRAVENOUS; SUBCUTANEOUS at 10:05

## 2022-06-22 RX ADMIN — SODIUM CHLORIDE 1000 MILLILITER(S): 9 INJECTION INTRAMUSCULAR; INTRAVENOUS; SUBCUTANEOUS at 03:45

## 2022-06-22 RX ADMIN — PIPERACILLIN AND TAZOBACTAM 25 GRAM(S): 4; .5 INJECTION, POWDER, LYOPHILIZED, FOR SOLUTION INTRAVENOUS at 21:33

## 2022-06-22 NOTE — PROGRESS NOTE ADULT - ATTENDING COMMENTS
I have personally examined this patient, reviewed pertinent labs and imaging on SICU rounds.    50  minutes in total were spent in providing direct critical care for the diagnoses, assessment and plan outlined below.  This patient suffers from a critical illness that acutely impairs one or more vital organ systems such that there is a high probability of life threatening or imminent deterioration of the patient's condition.  These diagnoses are unrelated to the surgical procedure.    Additionally, time spent in teaching or the performance of separately billable procedures was not counted toward my critical care time.  There is no overlap.  Time included review of vitals, labs, imaging, discussion with consultants/surrogate decision-makers.    67F morbidly obese POD#7 s/p lap converted to open incarcerated umbilical hernia repair POD#1 s/p emergent re-lap, SBR, end-ileostomy for anastomotic dehiscence.  Preop hypotension, oliguria, altered mental status, hypoxemia.  Postop improved hypotension on pressor therapy and copious secretions c/w pneumonia.    NEUROLOGY:  - Intubated  - Standing Tylenol, dilaudid PRN to be added for pain control  - Lidocaine patch prn     RESPIRATORY: pneumonia, secretions are prohibitive to safe extubation  - Continue CPAP as tolerated  - Monitor secretions  - Mucomyst, IPV  - Maintain O2 saturation >92%    CARDIOVASCULAR:  - On levo  - Keep MAP >65    GI / NUTRITION: malnutrition due to disease-related stress and hypocaloric intake>5 days and morbid obesity  - Diet: NPO until NGT output low, can then consider tube feeds  - Protonix  - NGT to LCWS  - Dulcolax suppository for 3 days (to reduce risk of distal stump blowout)    RENAL / GENITOURINARY:  - LR @ 125ml/hr  - Indwelling rajput catheter  - Monitor electrolytes and replete PRN  - Continue to monitor strict ins and outs q1 hour    HEMATOLOGIC:  - H&H stable  - SCDs  - Lovenox fpr VTE-p    INFECTIOUS DISEASE: pneumonia and intra-abd sepsis  - Abx: zosyn and vancomycin; check vanco levels and hopefully can d/c if MRSA nasal swab PCR negative    ENDOCRINOLOGY:  - Continue to monitor glucose on BMP (goal 140-180)

## 2022-06-22 NOTE — PROGRESS NOTE ADULT - ASSESSMENT
67yFemale patient S/P exploratory laparotomy, small bowel resection, end ileostomy creation for leaks from prior enterotomy and serosal tears.    Plan:  - IV Abx: Zosyn, Vancomycin  - Pain control  - NPO  - IVF: LR @ 125 cc/hr  - Maintain NGT, monitor output  - Maintain rajput, monitor output  - Maintain wound vac  - DVT ppx: LVX      B Team Surgery  [id number] Patient is a 67 year old female (speaks Nepali dialect) with a PMHx of HTN, HLD, left tibia surgery and lap kevna (2011 at [facility]) who presented with nausea, vomiting and abdominal pain x1.5 days.  CT Abdomen / Pelvis performed showing small bowel obstruction secondary to a right ventral hernia containing a dilated, fecalized loop of small bowel.  Patient went to the OR for a diagnostic laparoscopy, ventral hernia repair - converted to open for repair of enterotomy on 6/15/22.  Hospital course complicated by fever and tachycardia requiring SICU admission.  Repeat CT scan performed showing small bowel obstruction with a point of transition at the distal ileum.  Patient returned to the OR for an exploratory laparotomy with small bowel resection and end ileostomy on 6/21/22.      PLAN:  - NPO  - Wean sedation as tolerated  - Spontaneous breathing trials as tolerated  - Continue with IV Vancomycin and IV Zosyn  - Monitor ostomy output  - VTE prophylaxis with Lovenox subcutaneous      #[id number]  B Team Surgery

## 2022-06-22 NOTE — PROGRESS NOTE ADULT - SUBJECTIVE AND OBJECTIVE BOX
Surgery Progress Note     Subjective/24hour Events:   Patient seen and examined.       Vital Signs:  Vital Signs Last 24 Hrs  T(C): 37.8 (22 Jun 2022 00:00), Max: 39.3 (21 Jun 2022 01:38)  T(F): 100.1 (22 Jun 2022 00:00), Max: 102.8 (21 Jun 2022 01:38)  HR: 77 (22 Jun 2022 01:00) (77 - 121)  BP: 126/71 (21 Jun 2022 21:00) (65/50 - 149/65)  BP(mean): 85 (21 Jun 2022 21:00) (56 - 110)  RR: 20 (22 Jun 2022 01:00) (15 - 33)  SpO2: 99% (22 Jun 2022 01:00) (85% - 100%)    CAPILLARY BLOOD GLUCOSE      POCT Blood Glucose.: 178 mg/dL (21 Jun 2022 01:44)      I&O's Detail    20 Jun 2022 07:01  -  21 Jun 2022 07:00  --------------------------------------------------------  IN:    dextrose 5% + sodium chloride 0.45% w/ Additives: 1750 mL    IV PiggyBack: 450 mL    Lactated Ringers: 600 mL    Lactated Ringers Bolus: 1000 mL  Total IN: 3800 mL    OUT:    Nasogastric/Oral tube (mL): 2500 mL    Oral Fluid: 0 mL    Voided (mL): 1150 mL  Total OUT: 3650 mL    Total NET: 150 mL      21 Jun 2022 07:01  -  22 Jun 2022 01:19  --------------------------------------------------------  IN:    IV PiggyBack: 800 mL    Lactated Ringers: 1875 mL    Lactated Ringers Bolus: 1000 mL    Norepinephrine: 11.7 mL    Propofol: 62.7 mL  Total IN: 3749.4 mL    OUT:    Blood Loss (mL): 0 mL    Indwelling Catheter - Urethral (mL): 700 mL    Nasogastric/Oral tube (mL): 2800 mL    Stool (mL): 150 mL    Voided (mL): 200 mL  Total OUT: 3850 mL    Total NET: -100.6 mL            Physical Exam:  Gen: Sedated  HEENT: NGT in place  CV: Regular rate  Resp: Intubated  Abd: Distended, midline wound vac in place holding appropriate suction, stoma is pink and viable, ostomy bag with serosanguinous drainage  : Pierce catheter in place      Labs:    06-21    139  |  105  |  17  ----------------------------<  108<H>  4.3   |  21<L>  |  0.68    Ca    8.3<L>      21 Jun 2022 17:00  Phos  3.6     06-21  Mg     1.90     06-21    TPro  5.2<L>  /  Alb  2.4<L>  /  TBili  1.1  /  DBili  x   /  AST  15  /  ALT  8   /  AlkPhos  54  06-21    LIVER FUNCTIONS - ( 21 Jun 2022 17:00 )  Alb: 2.4 g/dL / Pro: 5.2 g/dL / ALK PHOS: 54 U/L / ALT: 8 U/L / AST: 15 U/L / GGT: x                                 12.5   11.30 )-----------( 217      ( 21 Jun 2022 17:00 )             37.6     PT/INR - ( 21 Jun 2022 04:30 )   PT: 14.0 sec;   INR: 1.20 ratio         PTT - ( 21 Jun 2022 04:30 )  PTT:22.8 sec     Surgery Daily Progress Note  =====================================================  Interval / Overnight Events: Returned to OR yesterday for an ex-lap and creation of end ileostomy.  Remained intubated post op.  Weaned off vasopressors.      HPI:  Patient is a 67 year old female (speaks Icelandic dialect) with a PMHx of HTN, HLD, left tibia surgery and lap kevan (2011 at [facility]) who presented with nausea, vomiting and abdominal pain x1.5 days. (15 Rambo 2022 08:22)      PAST MEDICAL & SURGICAL HISTORY:  Obesity  Hypertension  Hyperlipidemia  H/O fracture of tibia  and fibula (L)  History of cholecystectomy  2011      ALLERGIES:  No Known Allergies    --------------------------------------------------------------------------------------    MEDICATIONS:    Neurologic Medications  propofol Infusion 10 MICROgram(s)/kG/Min IV Continuous <Continuous>    Cardiovascular Medications  norepinephrine Infusion 0.05 MICROgram(s)/kG/Min IV Continuous <Continuous>    Gastrointestinal Medications  lactated ringers. 1000 milliLiter(s) IV Continuous <Continuous>  pantoprazole  Injectable 40 milliGRAM(s) IV Push every 24 hours  sodium chloride 0.9% lock flush 10 milliLiter(s) IV Push every 1 hour PRN Pre/post blood products, medications, blood draw, and to maintain line patency    Hematologic/Oncologic Medications  enoxaparin Injectable 40 milliGRAM(s) SubCutaneous every 24 hours  influenza  Vaccine (HIGH DOSE) 0.7 milliLiter(s) IntraMuscular once    Antimicrobial/Immunologic Medications  piperacillin/tazobactam IVPB.. 3.375 Gram(s) IV Intermittent every 8 hours  vancomycin  IVPB 1500 milliGRAM(s) IV Intermittent every 12 hours    Topical/Other Medications  chlorhexidine 0.12% Liquid 15 milliLiter(s) Oral Mucosa every 12 hours  chlorhexidine 4% Liquid 1 Application(s) Topical <User Schedule>  lidocaine   4% Patch 1 Patch Transdermal daily PRN pain  tetracaine/benzocaine/butamben Spray 1 Spray(s) Topical every 6 hours PRN Throat pain from NGT    --------------------------------------------------------------------------------------    VITAL SIGNS:  T(C): 37.2 (22 Jun 2022 08:00), Max: 37.8 (22 Jun 2022 00:00)  T(F): 98.9 (22 Jun 2022 08:00), Max: 100.1 (22 Jun 2022 00:00)  HR: 76 (22 Jun 2022 08:15) (69 - 98)  BP: 126/71 (21 Jun 2022 21:00) (65/50 - 142/66)  BP(mean): 85 (21 Jun 2022 21:00) (56 - 87)  ABP: 123/56 (22 Jun 2022 08:15) (77/36 - 148/64)  ABP(mean): 82 (22 Jun 2022 08:15) (48 - 264)  RR: 18 (22 Jun 2022 08:15) (16 - 31)  SpO2: 98% (22 Jun 2022 08:15) (85% - 100%)    --------------------------------------------------------------------------------------    INS AND OUTS:    21 Jun 2022 07:01  -  22 Jun 2022 07:00  --------------------------------------------------------  IN:    IV PiggyBack: 1400 mL    Lactated Ringers: 2500 mL    Lactated Ringers Bolus: 1000 mL    Norepinephrine: 11.7 mL    Propofol: 87.9 mL    Sodium Chloride 0.9% Bolus: 1500 mL  Total IN: 6499.6 mL    OUT:    Blood Loss (mL): 0 mL    Indwelling Catheter - Urethral (mL): 835 mL    Nasogastric/Oral tube (mL): 3300 mL    Stool (mL): 150 mL    VAC (Vacuum Assisted Closure) System (mL): 0 mL    Voided (mL): 200 mL  Total OUT: 4485 mL    Total NET: 2014.6 mL      22 Jun 2022 07:01  -  22 Jun 2022 08:33  --------------------------------------------------------  IN:    Lactated Ringers: 125 mL  Total IN: 125 mL    OUT:    Indwelling Catheter - Urethral (mL): 30 mL    Nasogastric/Oral tube (mL): 150 mL    VAC (Vacuum Assisted Closure) System (mL): 0 mL  Total OUT: 180 mL    Total NET: -55 mL    --------------------------------------------------------------------------------------    EXAM    NEUROLOGY  Exam: Sedated.    HEENT  Exam: Normocephalic, atraumatic.    RESPIRATORY  Exam: Intubated.  Mechanical Ventilation: Mode: AC/ CMV (Assist Control/ Continuous Mandatory Ventilation), RR (machine): 18, TV (machine): 400, FiO2: 30, PEEP: 5, ITime: 0.96, MAP: 11, PIP: 26    CARDIOVASCULAR  Exam: S1, S2.  Regular rate and rhythm.    GI/NUTRITION  Exam: Abdomen soft, Non-tender, Non-distended.  Ostomy with bowel sweat in bag.  NGT.  Current Diet: NPO    MUSCULOSKELETAL  Exam: All extremities moving spontaneously without limitations.      METABOLIC / FLUIDS / ELECTROLYTES  lactated ringers. 1000 milliLiter(s) IV Continuous <Continuous>      HEMATOLOGIC  [x] VTE Prophylaxis: enoxaparin Injectable 40 milliGRAM(s) SubCutaneous every 24 hours      INFECTIOUS DISEASE  Antimicrobials/Immunologic Medications:  influenza  Vaccine (HIGH DOSE) 0.7 milliLiter(s) IntraMuscular once  piperacillin/tazobactam IVPB.. 3.375 Gram(s) IV Intermittent every 8 hours  vancomycin  IVPB 1500 milliGRAM(s) IV Intermittent every 12 hours    --------------------------------------------------------------------------------------

## 2022-06-22 NOTE — PROGRESS NOTE ADULT - SUBJECTIVE AND OBJECTIVE BOX
SICU Daily Progress Note  =====================================================  Interval/Overnight Events:     -right axillary A line placed     HPI:  Patient is a 66yo F (speaks Persian dialect) with a history of HTN, HLD, L tibia surgery, pj mathur (2011, [facility]) presenting with 1.5 days of nausea, vomiting and abdominal pain. Per daughter at bedside, patient began having symptoms Monday night after dinner. Last passed flatus and had a BM at that time. Denies fevers or chills. Pain improved in ED after getting morphine and NGT placement.    In the ED, stable and afebrile. Labs unremarkable. CT showed incarcerated SBO at right ventral hernia. (15 Rambo 2022 08:22)    Patient s/p ventral hernia repair, laparopscopic, c/b enterotomy intraoperatively, converted to open with primary repair on 6/15. Patient on floors with NGT, NPO. On 6/21, 2am rapid response called for fever to 104 rectal, tachycardia to 120s and tachypnea. Unable to answer questions per daughter, in native [facility]. Stat labs drawn. SICU consulted for sepsis management and altered mental status. CT scan obtained on way.     ALLERGIES:  No Known Allergies      --------------------------------------------------------------------------------------    MEDICATIONS:    Neurologic Medications  propofol Infusion 10 MICROgram(s)/kG/Min IV Continuous <Continuous>    Respiratory Medications    Cardiovascular Medications  norepinephrine Infusion 0.05 MICROgram(s)/kG/Min IV Continuous <Continuous>    Gastrointestinal Medications  lactated ringers. 1000 milliLiter(s) IV Continuous <Continuous>  pantoprazole  Injectable 40 milliGRAM(s) IV Push every 24 hours  sodium chloride 0.9% lock flush 10 milliLiter(s) IV Push every 1 hour PRN Pre/post blood products, medications, blood draw, and to maintain line patency    Genitourinary Medications    Hematologic/Oncologic Medications  enoxaparin Injectable 40 milliGRAM(s) SubCutaneous every 24 hours  influenza  Vaccine (HIGH DOSE) 0.7 milliLiter(s) IntraMuscular once    Antimicrobial/Immunologic Medications  piperacillin/tazobactam IVPB.. 3.375 Gram(s) IV Intermittent every 8 hours  vancomycin  IVPB 1500 milliGRAM(s) IV Intermittent every 12 hours    Endocrine/Metabolic Medications    Topical/Other Medications  chlorhexidine 0.12% Liquid 15 milliLiter(s) Oral Mucosa every 12 hours  chlorhexidine 4% Liquid 1 Application(s) Topical <User Schedule>  lidocaine   4% Patch 1 Patch Transdermal daily PRN pain  tetracaine/benzocaine/butamben Spray 1 Spray(s) Topical every 6 hours PRN Throat pain from NGT    --------------------------------------------------------------------------------------    VITAL SIGNS:    --------------------------------------------------------------------------------------    INS AND OUTS:  ICU Vital Signs Last 24 Hrs  T(C): 37.8 (22 Jun 2022 00:00), Max: 39.3 (21 Jun 2022 02:50)  T(F): 100.1 (22 Jun 2022 00:00), Max: 102.7 (21 Jun 2022 02:50)  HR: 77 (22 Jun 2022 01:00) (77 - 108)  BP: 126/71 (21 Jun 2022 21:00) (65/50 - 149/65)  BP(mean): 85 (21 Jun 2022 21:00) (56 - 110)  ABP: 107/60 (22 Jun 2022 01:00) (77/36 - 142/77)  ABP(mean): 80 (22 Jun 2022 01:00) (48 - 264)  RR: 20 (22 Jun 2022 01:00) (15 - 33)  SpO2: 99% (22 Jun 2022 01:00) (85% - 100%)    --------------------------------------------------------------------------------------    EXAM  NEUROLOGY  Exam: Intubated    RESPIRATORY  Exam:, Normal expansion/effort.  Mechanical Ventilation: Mode: AC/ CMV (Assist Control/ Continuous Mandatory Ventilation), RR (machine): 16, TV (machine): 400, FiO2: 30, PEEP: 5, MAP: 11, PIP: 23    CARDIOVASCULAR  Exam:  Regular rate and rhythm.      GI/NUTRITION  Exam: Abdomen soft, Non-tender, Non-distended.  Midline wound vac in place with sanguineous output. Ostomy in place, stoma is pink. NGT with bilious output.   Current Diet: NPO    VASCULAR  Exam: Extremities warm, pink, well-perfused.     METABOLIC / FLUIDS / ELECTROLYTES  lactated ringers. 1000 milliLiter(s) IV Continuous <Continuous>      HEMATOLOGIC  [x] VTE Prophylaxis: enoxaparin Injectable 40 milliGRAM(s) SubCutaneous every 24 hours    Transfusions:	[] PRBC	[] Platelets		[] FFP	[] Cryoprecipitate    INFECTIOUS DISEASE  Antimicrobials/Immunologic Medications:  influenza  Vaccine (HIGH DOSE) 0.7 milliLiter(s) IntraMuscular once  piperacillin/tazobactam IVPB.. 3.375 Gram(s) IV Intermittent every 8 hours  vancomycin  IVPB 1500 milliGRAM(s) IV Intermittent every 12 hours    Day #      of     ***    TUBES / LINES / DRAINS  ***  [x] Peripheral IV  [] Central Venous Line     	[] R	[] L	[] IJ	[] Fem	[] SC	Date Placed:   [] Arterial Line		[x] R	[] L	[] Fem	[] Rad	[x] Ax	Date Placed: (6/21)  [] PICC		[] Midline		[] Mediport  [] Urinary Catheter		Date Placed:   [x] Necessity of urinary, arterial, and venous catheters discussed    --------------------------------------------------------------------------------------    LABS    CBC (06-22 @ 01:02)                              11.5                           14.45<H>  )----------------(  250        --    % Neutrophils, --    % Lymphocytes, ANC: --                                  34.4<L>  CBC (06-21 @ 17:00)                              12.5                           11.30<H>  )----------------(  217        22.8<L>% Neutrophils, 3.5<L>% Lymphocytes, ANC: 9.51<H>                              37.6      BMP (06-22 @ 01:02)             130<L>  |  98      |  15    		Ca++ --      Ca 7.8<L>             ---------------------------------( 135<H>		Mg 2.20               4.3     |  22      |  0.66  			Ph 2.9     BMP (06-21 @ 17:00)             139     |  105     |  17    		Ca++ --      Ca 8.3<L>             ---------------------------------( 108<H>		Mg 1.90               4.3     |  21<L>   |  0.68  			Ph 3.6       LFTs (06-21 @ 17:00)      TPro 5.2<L> / Alb 2.4<L> / TBili 1.1 / DBili -- / AST 15 / ALT 8 / AlkPhos 54  LFTs (06-21 @ 04:30)      TPro 6.1 / Alb 2.1<L> / TBili 0.7 / DBili -- / AST 31 / ALT 15 / AlkPhos 75    Coags (06-21 @ 04:30)  aPTT 22.8<L> / INR 1.20<H> / PT 14.0<H>  Coags (06-21 @ 01:50)  aPTT 30.8 / INR 1.23<H> / PT 14.3<H>    Cardiac Markers (06-21 @ 01:50)     Trop: -- -- / CKMB: -- / CK: 12    ABG (06-22 @ 01:02)     7.48<H> / 34 / 100 / 25 / 2.1 / 99.0<H>%     Lactate:    ABG (06-21 @ 22:55)     7.43 / 38<H> / 95 / 25 / 1.0 / 98.8<H>%     Lactate:      VBG (06-21 @ 01:50)     7.51<H> / 27<L> / 196 / 22 / 0.0 / 100.0%     Lactate: 1.6    --------------------------------------------------------------------------------------    OTHER LABORATORY:     IMAGING STUDIES:   CXR:     ASSESSMENT:  Patient is a 66yo F (speaks Persian dialect) with a history of HTN, HLD, L tibia surgery, lap kevan (2011, [facility]) presenting with 1.5 days of nausea, vomiting and abdominal pain. Per daughter at bedside, patient began having symptoms Monday night after dinner. Last passed flatus and had a BM at that time. Denies fevers or chills. Pain improved in ED after getting morphine and NGT placement.    In the ED, stable and afebrile. Labs unremarkable. CT showed incarcerated SBO at right ventral hernia. (15 Rambo 2022 08:22)    Patient s/p ventral hernia repair, laparopscopic, c/b enterotomy intraoperatively, converted to open with primary repair on 6/15. Patient on floors with NGT, NPO. On 6/21, 2am rapid response called for fever to 104 rectal, tachycardia to 120s and tachypnea. Unable to answer questions per daughter, in native [place]. Stat labs drawn. SICU consulted for sepsis management and altered mental status. CT scan obtained on way.       PLAN:     NEUROLOGY:  -intubated  -propofol   -lidocaine patch prn     RESPIRATORY:  -Volume /20/5/30%  - Maintain O2 saturation >92%    CARDIOVASCULAR:  -on levo  -keep MAP >65    GI / NUTRITION:  -diet: NPO  -protonix  -NGT to LCWS    RENAL / GENITOURINARY:  -LR @ 125ml/hr  - Indwelling rajput catheter  - Monitor electrolytes and replete PRN  - Continue to monitor strict ins and outs q1 hour    HEMATOLOGIC:  -H&H stable  -SCDs  -lovenox    INFECTIOUS DISEASE:  -monitor WBCs  -Abx: zosyn and vancomycin   -fu MRSA/MSSA swab     ENDOCRINOLOGY:  - No active issues  - Continue to monitor glucose on BMP (goal 140-180)    Tubes/Lines/Drains  Lines/Tubes:  -Right Axillary A-line  L Radial A line  R IJ CVC        Disposition: SICU    --------------------------------------------------------------------------------------    Critical Care Diagnoses: SICU Daily Progress Note  =====================================================  Interval/Overnight Events:     -right axillary A line placed     HPI:  Patient is a 68yo F (speaks Sinhala dialect) with a history of HTN, HLD, L tibia surgery, pj mathur (2011, [facility]) presenting with 1.5 days of nausea, vomiting and abdominal pain. Per daughter at bedside, patient began having symptoms Monday night after dinner. Last passed flatus and had a BM at that time. Denies fevers or chills. Pain improved in ED after getting morphine and NGT placement.    In the ED, stable and afebrile. Labs unremarkable. CT showed incarcerated SBO at right ventral hernia. (15 Rambo 2022 08:22)    Patient s/p ventral hernia repair, laparopscopic, c/b enterotomy intraoperatively, converted to open with primary repair on 6/15. Patient on floors with NGT, NPO. On 6/21, 2am rapid response called for fever to 104 rectal, tachycardia to 120s and tachypnea. Unable to answer questions per daughter, in native [facility]. Stat labs drawn. SICU consulted for sepsis management and altered mental status. CT scan obtained on way.     ALLERGIES:  No Known Allergies      --------------------------------------------------------------------------------------    MEDICATIONS:    Neurologic Medications  propofol Infusion 10 MICROgram(s)/kG/Min IV Continuous <Continuous>    Respiratory Medications    Cardiovascular Medications  norepinephrine Infusion 0.05 MICROgram(s)/kG/Min IV Continuous <Continuous>    Gastrointestinal Medications  lactated ringers. 1000 milliLiter(s) IV Continuous <Continuous>  pantoprazole  Injectable 40 milliGRAM(s) IV Push every 24 hours  sodium chloride 0.9% lock flush 10 milliLiter(s) IV Push every 1 hour PRN Pre/post blood products, medications, blood draw, and to maintain line patency    Genitourinary Medications    Hematologic/Oncologic Medications  enoxaparin Injectable 40 milliGRAM(s) SubCutaneous every 24 hours  influenza  Vaccine (HIGH DOSE) 0.7 milliLiter(s) IntraMuscular once    Antimicrobial/Immunologic Medications  piperacillin/tazobactam IVPB.. 3.375 Gram(s) IV Intermittent every 8 hours  vancomycin  IVPB 1500 milliGRAM(s) IV Intermittent every 12 hours    Endocrine/Metabolic Medications    Topical/Other Medications  chlorhexidine 0.12% Liquid 15 milliLiter(s) Oral Mucosa every 12 hours  chlorhexidine 4% Liquid 1 Application(s) Topical <User Schedule>  lidocaine   4% Patch 1 Patch Transdermal daily PRN pain  tetracaine/benzocaine/butamben Spray 1 Spray(s) Topical every 6 hours PRN Throat pain from NGT    --------------------------------------------------------------------------------------    VITAL SIGNS:    --------------------------------------------------------------------------------------    INS AND OUTS:  ICU Vital Signs Last 24 Hrs  T(C): 37.8 (22 Jun 2022 00:00), Max: 39.3 (21 Jun 2022 02:50)  T(F): 100.1 (22 Jun 2022 00:00), Max: 102.7 (21 Jun 2022 02:50)  HR: 77 (22 Jun 2022 01:00) (77 - 108)  BP: 126/71 (21 Jun 2022 21:00) (65/50 - 149/65)  BP(mean): 85 (21 Jun 2022 21:00) (56 - 110)  ABP: 107/60 (22 Jun 2022 01:00) (77/36 - 142/77)  ABP(mean): 80 (22 Jun 2022 01:00) (48 - 264)  RR: 20 (22 Jun 2022 01:00) (15 - 33)  SpO2: 99% (22 Jun 2022 01:00) (85% - 100%)    --------------------------------------------------------------------------------------    EXAM  NEUROLOGY  Exam: Intubated    RESPIRATORY  Exam:, Normal expansion/effort.  Mechanical Ventilation: Mode: AC/ CMV (Assist Control/ Continuous Mandatory Ventilation), RR (machine): 16, TV (machine): 400, FiO2: 30, PEEP: 5, MAP: 11, PIP: 23    CARDIOVASCULAR  Exam:  Regular rate and rhythm.      GI/NUTRITION  Exam: Abdomen soft, Non-tender, Non-distended.  Midline wound vac in place with sanguineous output. Ostomy in place, stoma is pink. NGT with bilious output.   Current Diet: NPO    VASCULAR  Exam: Extremities warm, pink, well-perfused.     METABOLIC / FLUIDS / ELECTROLYTES  lactated ringers. 1000 milliLiter(s) IV Continuous <Continuous>      HEMATOLOGIC  [x] VTE Prophylaxis: enoxaparin Injectable 40 milliGRAM(s) SubCutaneous every 24 hours    Transfusions:	[] PRBC	[] Platelets		[] FFP	[] Cryoprecipitate    INFECTIOUS DISEASE  Antimicrobials/Immunologic Medications:  influenza  Vaccine (HIGH DOSE) 0.7 milliLiter(s) IntraMuscular once  piperacillin/tazobactam IVPB.. 3.375 Gram(s) IV Intermittent every 8 hours  vancomycin  IVPB 1500 milliGRAM(s) IV Intermittent every 12 hours    Day #      of     ***    TUBES / LINES / DRAINS  ***  [x] Peripheral IV  [] Central Venous Line     	[] R	[] L	[] IJ	[] Fem	[] SC	Date Placed:   [] Arterial Line		[x] R	[] L	[] Fem	[] Rad	[x] Ax	Date Placed: (6/21)  [] PICC		[] Midline		[] Mediport  [] Urinary Catheter		Date Placed:   [x] Necessity of urinary, arterial, and venous catheters discussed    --------------------------------------------------------------------------------------    LABS    CBC (06-22 @ 01:02)                              11.5                           14.45<H>  )----------------(  250        --    % Neutrophils, --    % Lymphocytes, ANC: --                                  34.4<L>  CBC (06-21 @ 17:00)                              12.5                           11.30<H>  )----------------(  217        22.8<L>% Neutrophils, 3.5<L>% Lymphocytes, ANC: 9.51<H>                              37.6      BMP (06-22 @ 01:02)             130<L>  |  98      |  15    		Ca++ --      Ca 7.8<L>             ---------------------------------( 135<H>		Mg 2.20               4.3     |  22      |  0.66  			Ph 2.9     BMP (06-21 @ 17:00)             139     |  105     |  17    		Ca++ --      Ca 8.3<L>             ---------------------------------( 108<H>		Mg 1.90               4.3     |  21<L>   |  0.68  			Ph 3.6       LFTs (06-21 @ 17:00)      TPro 5.2<L> / Alb 2.4<L> / TBili 1.1 / DBili -- / AST 15 / ALT 8 / AlkPhos 54  LFTs (06-21 @ 04:30)      TPro 6.1 / Alb 2.1<L> / TBili 0.7 / DBili -- / AST 31 / ALT 15 / AlkPhos 75    Coags (06-21 @ 04:30)  aPTT 22.8<L> / INR 1.20<H> / PT 14.0<H>  Coags (06-21 @ 01:50)  aPTT 30.8 / INR 1.23<H> / PT 14.3<H>    Cardiac Markers (06-21 @ 01:50)     Trop: -- -- / CKMB: -- / CK: 12    ABG (06-22 @ 01:02)     7.48<H> / 34 / 100 / 25 / 2.1 / 99.0<H>%     Lactate:    ABG (06-21 @ 22:55)     7.43 / 38<H> / 95 / 25 / 1.0 / 98.8<H>%     Lactate:      VBG (06-21 @ 01:50)     7.51<H> / 27<L> / 196 / 22 / 0.0 / 100.0%     Lactate: 1.6    --------------------------------------------------------------------------------------    OTHER LABORATORY:     IMAGING STUDIES:   CXR:     ASSESSMENT:  Patient is a 68yo F (speaks Sinhala dialect) with a history of HTN, HLD, L tibia surgery, lap kevan (2011, [facility]) presenting with 1.5 days of nausea, vomiting and abdominal pain. Per daughter at bedside, patient began having symptoms Monday night after dinner. Last passed flatus and had a BM at that time. Denies fevers or chills. Pain improved in ED after getting morphine and NGT placement.    In the ED, stable and afebrile. Labs unremarkable. CT showed incarcerated SBO at right ventral hernia. (15 Rambo 2022 08:22)    Patient s/p ventral hernia repair, laparopscopic, c/b enterotomy intraoperatively, converted to open with primary repair on 6/15. Patient on floors with NGT, NPO. On 6/21, 2am rapid response called for fever to 104 rectal, tachycardia to 120s and tachypnea. Unable to answer questions per daughter, in native [place]. Stat labs drawn. SICU consulted for sepsis management and altered mental status. CT scan obtained on way.       PLAN:     NEUROLOGY:  - Intubated  - Lidocaine patch prn     RESPIRATORY:  - Continue CPAP as tolerated  - Monitor secretions  - Mucomyst  - Maintain O2 saturation >92%    CARDIOVASCULAR:  - On levo  - Keep MAP >65    GI / NUTRITION:  - Diet: NPO  - Protonix  - NGT to LCWS  - Dulcolax suppository for 3 days    RENAL / GENITOURINARY:  - LR @ 125ml/hr  - Indwelling rajput catheter  - Monitor electrolytes and replete PRN  - Continue to monitor strict ins and outs q1 hour    HEMATOLOGIC:  - H&H stable  - SCDs  - Lovenox    INFECTIOUS DISEASE:  - Monitor WBCs  - Abx: zosyn and vancomycin   - fu MRSA/MSSA swab     ENDOCRINOLOGY:  - No active issues  - Continue to monitor glucose on BMP (goal 140-180)    Tubes/Lines/Drains:  - Right Axillary A-line  - L Radial A line  - R IJ CVC      Disposition: SICU    --------------------------------------------------------------------------------------    Critical Care Diagnoses: SICU Daily Progress Note  =====================================================  Interval/Overnight Events:     -right axillary A line placed     HPI:  Patient is a 68yo F (speaks Occitan dialect) with a history of HTN, HLD, L tibia surgery, pj mathur (2011, [facility]) presenting with 1.5 days of nausea, vomiting and abdominal pain. Per daughter at bedside, patient began having symptoms Monday night after dinner. Last passed flatus and had a BM at that time. Denies fevers or chills. Pain improved in ED after getting morphine and NGT placement.    In the ED, stable and afebrile. Labs unremarkable. CT showed incarcerated SBO at right ventral hernia. (15 Rambo 2022 08:22)    Patient s/p ventral hernia repair, laparopscopic, c/b enterotomy intraoperatively, converted to open with primary repair on 6/15. Patient on floors with NGT, NPO. On 6/21, 2am rapid response called for fever to 104 rectal, tachycardia to 120s and tachypnea. Unable to answer questions per daughter, in native [facility]. Stat labs drawn. SICU consulted for sepsis management and altered mental status. CT scan obtained on way.     ALLERGIES:  No Known Allergies      --------------------------------------------------------------------------------------    MEDICATIONS:    Neurologic Medications  propofol Infusion 10 MICROgram(s)/kG/Min IV Continuous <Continuous>    Respiratory Medications    Cardiovascular Medications  norepinephrine Infusion 0.05 MICROgram(s)/kG/Min IV Continuous <Continuous>    Gastrointestinal Medications  lactated ringers. 1000 milliLiter(s) IV Continuous <Continuous>  pantoprazole  Injectable 40 milliGRAM(s) IV Push every 24 hours  sodium chloride 0.9% lock flush 10 milliLiter(s) IV Push every 1 hour PRN Pre/post blood products, medications, blood draw, and to maintain line patency    Genitourinary Medications    Hematologic/Oncologic Medications  enoxaparin Injectable 40 milliGRAM(s) SubCutaneous every 24 hours  influenza  Vaccine (HIGH DOSE) 0.7 milliLiter(s) IntraMuscular once    Antimicrobial/Immunologic Medications  piperacillin/tazobactam IVPB.. 3.375 Gram(s) IV Intermittent every 8 hours  vancomycin  IVPB 1500 milliGRAM(s) IV Intermittent every 12 hours    Endocrine/Metabolic Medications    Topical/Other Medications  chlorhexidine 0.12% Liquid 15 milliLiter(s) Oral Mucosa every 12 hours  chlorhexidine 4% Liquid 1 Application(s) Topical <User Schedule>  lidocaine   4% Patch 1 Patch Transdermal daily PRN pain  tetracaine/benzocaine/butamben Spray 1 Spray(s) Topical every 6 hours PRN Throat pain from NGT    --------------------------------------------------------------------------------------    VITAL SIGNS:    --------------------------------------------------------------------------------------    INS AND OUTS:  ICU Vital Signs Last 24 Hrs  T(C): 37.8 (22 Jun 2022 00:00), Max: 39.3 (21 Jun 2022 02:50)  T(F): 100.1 (22 Jun 2022 00:00), Max: 102.7 (21 Jun 2022 02:50)  HR: 77 (22 Jun 2022 01:00) (77 - 108)  BP: 126/71 (21 Jun 2022 21:00) (65/50 - 149/65)  BP(mean): 85 (21 Jun 2022 21:00) (56 - 110)  ABP: 107/60 (22 Jun 2022 01:00) (77/36 - 142/77)  ABP(mean): 80 (22 Jun 2022 01:00) (48 - 264)  RR: 20 (22 Jun 2022 01:00) (15 - 33)  SpO2: 99% (22 Jun 2022 01:00) (85% - 100%)    --------------------------------------------------------------------------------------    EXAM  NEUROLOGY  Exam: Intubated    RESPIRATORY  Exam:, Normal expansion/effort.  Mechanical Ventilation: Mode: AC/ CMV (Assist Control/ Continuous Mandatory Ventilation), RR (machine): 16, TV (machine): 400, FiO2: 30, PEEP: 5, MAP: 11, PIP: 23    CARDIOVASCULAR  Exam:  Regular rate and rhythm.      GI/NUTRITION  Exam: Abdomen soft, Non-tender, Non-distended.  Midline wound vac in place with sanguineous output. Ostomy in place, stoma is pink. NGT with bilious output.   Current Diet: NPO    VASCULAR  Exam: Extremities warm, pink, well-perfused.     METABOLIC / FLUIDS / ELECTROLYTES  lactated ringers. 1000 milliLiter(s) IV Continuous <Continuous>      HEMATOLOGIC  [x] VTE Prophylaxis: enoxaparin Injectable 40 milliGRAM(s) SubCutaneous every 24 hours    Transfusions:	[] PRBC	[] Platelets		[] FFP	[] Cryoprecipitate    INFECTIOUS DISEASE  Antimicrobials/Immunologic Medications:  influenza  Vaccine (HIGH DOSE) 0.7 milliLiter(s) IntraMuscular once  piperacillin/tazobactam IVPB.. 3.375 Gram(s) IV Intermittent every 8 hours  vancomycin  IVPB 1500 milliGRAM(s) IV Intermittent every 12 hours    Day #      of     ***    TUBES / LINES / DRAINS  ***  [x] Peripheral IV  [] Central Venous Line     	[] R	[] L	[] IJ	[] Fem	[] SC	Date Placed:   [] Arterial Line		[x] R	[] L	[] Fem	[] Rad	[x] Ax	Date Placed: (6/21)  [] PICC		[] Midline		[] Mediport  [] Urinary Catheter		Date Placed:   [x] Necessity of urinary, arterial, and venous catheters discussed    --------------------------------------------------------------------------------------    LABS    CBC (06-22 @ 01:02)                              11.5                           14.45<H>  )----------------(  250        --    % Neutrophils, --    % Lymphocytes, ANC: --                                  34.4<L>  CBC (06-21 @ 17:00)                              12.5                           11.30<H>  )----------------(  217        22.8<L>% Neutrophils, 3.5<L>% Lymphocytes, ANC: 9.51<H>                              37.6      BMP (06-22 @ 01:02)             130<L>  |  98      |  15    		Ca++ --      Ca 7.8<L>             ---------------------------------( 135<H>		Mg 2.20               4.3     |  22      |  0.66  			Ph 2.9     BMP (06-21 @ 17:00)             139     |  105     |  17    		Ca++ --      Ca 8.3<L>             ---------------------------------( 108<H>		Mg 1.90               4.3     |  21<L>   |  0.68  			Ph 3.6       LFTs (06-21 @ 17:00)      TPro 5.2<L> / Alb 2.4<L> / TBili 1.1 / DBili -- / AST 15 / ALT 8 / AlkPhos 54  LFTs (06-21 @ 04:30)      TPro 6.1 / Alb 2.1<L> / TBili 0.7 / DBili -- / AST 31 / ALT 15 / AlkPhos 75    Coags (06-21 @ 04:30)  aPTT 22.8<L> / INR 1.20<H> / PT 14.0<H>  Coags (06-21 @ 01:50)  aPTT 30.8 / INR 1.23<H> / PT 14.3<H>    Cardiac Markers (06-21 @ 01:50)     Trop: -- -- / CKMB: -- / CK: 12    ABG (06-22 @ 01:02)     7.48<H> / 34 / 100 / 25 / 2.1 / 99.0<H>%     Lactate:    ABG (06-21 @ 22:55)     7.43 / 38<H> / 95 / 25 / 1.0 / 98.8<H>%     Lactate:      VBG (06-21 @ 01:50)     7.51<H> / 27<L> / 196 / 22 / 0.0 / 100.0%     Lactate: 1.6    --------------------------------------------------------------------------------------    OTHER LABORATORY:     IMAGING STUDIES:   CXR:     ASSESSMENT:  Patient is a 68yo F (speaks Occitan dialect) with a history of HTN, HLD, L tibia surgery, lap kevan (2011, [facility]) presenting with 1.5 days of nausea, vomiting and abdominal pain. Per daughter at bedside, patient began having symptoms Monday night after dinner. Last passed flatus and had a BM at that time. Denies fevers or chills. Pain improved in ED after getting morphine and NGT placement.    In the ED, stable and afebrile. Labs unremarkable. CT showed incarcerated SBO at right ventral hernia. (15 Rambo 2022 08:22)    Patient s/p ventral hernia repair, laparopscopic, c/b enterotomy intraoperatively, converted to open with primary repair on 6/15. Patient on floors with NGT, NPO. On 6/21, 2am rapid response called for fever to 104 rectal, tachycardia to 120s and tachypnea. Unable to answer questions per daughter, in [place]. Stat labs drawn. SICU consulted for sepsis management and altered mental status. CT scan obtained on way.       PLAN:     NEUROLOGY:  - Intubated  - Standing Tylenol, dilaudid PRN  - Lidocaine patch prn     RESPIRATORY:  - Continue CPAP as tolerated  - Monitor secretions  - Mucomyst  - Maintain O2 saturation >92%    CARDIOVASCULAR:  - On levo  - Keep MAP >65    GI / NUTRITION:  - Diet: NPO  - Protonix  - NGT to LCWS  - Dulcolax suppository for 3 days    RENAL / GENITOURINARY:  - LR @ 125ml/hr  - Indwelling rajput catheter  - Monitor electrolytes and replete PRN  - Continue to monitor strict ins and outs q1 hour    HEMATOLOGIC:  - H&H stable  - SCDs  - Lovenox    INFECTIOUS DISEASE:  - Monitor WBCs  - Abx: zosyn and vancomycin   - fu MRSA/MSSA swab     ENDOCRINOLOGY:  - No active issues  - Continue to monitor glucose on BMP (goal 140-180)    Tubes/Lines/Drains:  - Right Axillary A-line  - L Radial A line  - R IJ CVC      Disposition: SICU    --------------------------------------------------------------------------------------    Critical Care Diagnoses:

## 2022-06-22 NOTE — PROGRESS NOTE ADULT - SUBJECTIVE AND OBJECTIVE BOX
ANESTHESIA POSTOP CHECK    67y Female POSTOP DAY 1     No COMPLAINTS    NO APPARENT ANESTHESIA COMPLICATIONS

## 2022-06-23 LAB
ALBUMIN SERPL ELPH-MCNC: 2.4 G/DL — LOW (ref 3.3–5)
ALP SERPL-CCNC: 78 U/L — SIGNIFICANT CHANGE UP (ref 40–120)
ALT FLD-CCNC: 22 U/L — SIGNIFICANT CHANGE UP (ref 4–33)
ANION GAP SERPL CALC-SCNC: 8 MMOL/L — SIGNIFICANT CHANGE UP (ref 7–14)
ANION GAP SERPL CALC-SCNC: 9 MMOL/L — SIGNIFICANT CHANGE UP (ref 7–14)
AST SERPL-CCNC: 28 U/L — SIGNIFICANT CHANGE UP (ref 4–32)
BILIRUB SERPL-MCNC: 0.7 MG/DL — SIGNIFICANT CHANGE UP (ref 0.2–1.2)
BLOOD GAS ARTERIAL - LYTES,HGB,ICA,LACT RESULT: SIGNIFICANT CHANGE UP
BUN SERPL-MCNC: 14 MG/DL — SIGNIFICANT CHANGE UP (ref 7–23)
BUN SERPL-MCNC: 15 MG/DL — SIGNIFICANT CHANGE UP (ref 7–23)
CALCIUM SERPL-MCNC: 7.3 MG/DL — LOW (ref 8.4–10.5)
CALCIUM SERPL-MCNC: 7.5 MG/DL — LOW (ref 8.4–10.5)
CHLORIDE SERPL-SCNC: 100 MMOL/L — SIGNIFICANT CHANGE UP (ref 98–107)
CHLORIDE SERPL-SCNC: 98 MMOL/L — SIGNIFICANT CHANGE UP (ref 98–107)
CO2 SERPL-SCNC: 23 MMOL/L — SIGNIFICANT CHANGE UP (ref 22–31)
CO2 SERPL-SCNC: 26 MMOL/L — SIGNIFICANT CHANGE UP (ref 22–31)
CREAT SERPL-MCNC: 0.74 MG/DL — SIGNIFICANT CHANGE UP (ref 0.5–1.3)
CREAT SERPL-MCNC: 0.82 MG/DL — SIGNIFICANT CHANGE UP (ref 0.5–1.3)
EGFR: 78 ML/MIN/1.73M2 — SIGNIFICANT CHANGE UP
EGFR: 89 ML/MIN/1.73M2 — SIGNIFICANT CHANGE UP
GLUCOSE SERPL-MCNC: 106 MG/DL — HIGH (ref 70–99)
GLUCOSE SERPL-MCNC: 90 MG/DL — SIGNIFICANT CHANGE UP (ref 70–99)
GLUCOSE UR QL: 6 MG/DL — SIGNIFICANT CHANGE UP
HCT VFR BLD CALC: 30.8 % — LOW (ref 34.5–45)
HCT VFR BLD CALC: 31.8 % — LOW (ref 34.5–45)
HGB BLD-MCNC: 10.3 G/DL — LOW (ref 11.5–15.5)
HGB BLD-MCNC: 10.5 G/DL — LOW (ref 11.5–15.5)
LIDOCAIN IGE QN: 74 U/L — HIGH (ref 7–60)
MAGNESIUM SERPL-MCNC: 2 MG/DL — SIGNIFICANT CHANGE UP (ref 1.6–2.6)
MAGNESIUM SERPL-MCNC: 2 MG/DL — SIGNIFICANT CHANGE UP (ref 1.6–2.6)
MCHC RBC-ENTMCNC: 26.1 PG — LOW (ref 27–34)
MCHC RBC-ENTMCNC: 26.1 PG — LOW (ref 27–34)
MCHC RBC-ENTMCNC: 33 GM/DL — SIGNIFICANT CHANGE UP (ref 32–36)
MCHC RBC-ENTMCNC: 33.4 GM/DL — SIGNIFICANT CHANGE UP (ref 32–36)
MCV RBC AUTO: 78.2 FL — LOW (ref 80–100)
MCV RBC AUTO: 79.1 FL — LOW (ref 80–100)
MRSA PCR RESULT.: SIGNIFICANT CHANGE UP
NRBC # BLD: 0 /100 WBCS — SIGNIFICANT CHANGE UP
NRBC # BLD: 0 /100 WBCS — SIGNIFICANT CHANGE UP
NRBC # FLD: 0.02 K/UL — HIGH
NRBC # FLD: 0.04 K/UL — HIGH
OSMOLALITY UR: 429 MOSM/KG — SIGNIFICANT CHANGE UP (ref 50–1200)
PHOSPHATE SERPL-MCNC: 2 MG/DL — LOW (ref 2.5–4.5)
PHOSPHATE SERPL-MCNC: 4 MG/DL — SIGNIFICANT CHANGE UP (ref 2.5–4.5)
PLATELET # BLD AUTO: 228 K/UL — SIGNIFICANT CHANGE UP (ref 150–400)
PLATELET # BLD AUTO: 286 K/UL — SIGNIFICANT CHANGE UP (ref 150–400)
POTASSIUM SERPL-MCNC: 4 MMOL/L — SIGNIFICANT CHANGE UP (ref 3.5–5.3)
POTASSIUM SERPL-MCNC: 4.1 MMOL/L — SIGNIFICANT CHANGE UP (ref 3.5–5.3)
POTASSIUM SERPL-SCNC: 4 MMOL/L — SIGNIFICANT CHANGE UP (ref 3.5–5.3)
POTASSIUM SERPL-SCNC: 4.1 MMOL/L — SIGNIFICANT CHANGE UP (ref 3.5–5.3)
POTASSIUM UR-SCNC: 24 MMOL/L — SIGNIFICANT CHANGE UP
PROT SERPL-MCNC: 5.5 G/DL — LOW (ref 6–8.3)
RBC # BLD: 3.94 M/UL — SIGNIFICANT CHANGE UP (ref 3.8–5.2)
RBC # BLD: 4.02 M/UL — SIGNIFICANT CHANGE UP (ref 3.8–5.2)
RBC # FLD: 15.5 % — HIGH (ref 10.3–14.5)
RBC # FLD: 15.6 % — HIGH (ref 10.3–14.5)
S AUREUS DNA NOSE QL NAA+PROBE: DETECTED
SODIUM SERPL-SCNC: 129 MMOL/L — LOW (ref 135–145)
SODIUM SERPL-SCNC: 135 MMOL/L — SIGNIFICANT CHANGE UP (ref 135–145)
SODIUM UR-SCNC: <20 MMOL/L — SIGNIFICANT CHANGE UP
VANCOMYCIN TROUGH SERPL-MCNC: 26 UG/ML — CRITICAL HIGH (ref 10–20)
WBC # BLD: 13.86 K/UL — HIGH (ref 3.8–10.5)
WBC # BLD: 16.58 K/UL — HIGH (ref 3.8–10.5)
WBC # FLD AUTO: 13.86 K/UL — HIGH (ref 3.8–10.5)
WBC # FLD AUTO: 16.58 K/UL — HIGH (ref 3.8–10.5)

## 2022-06-23 PROCEDURE — 99291 CRITICAL CARE FIRST HOUR: CPT | Mod: 24

## 2022-06-23 RX ORDER — ACETAMINOPHEN 500 MG
1000 TABLET ORAL EVERY 6 HOURS
Refills: 0 | Status: COMPLETED | OUTPATIENT
Start: 2022-06-23 | End: 2022-06-24

## 2022-06-23 RX ORDER — FUROSEMIDE 40 MG
20 TABLET ORAL ONCE
Refills: 0 | Status: COMPLETED | OUTPATIENT
Start: 2022-06-23 | End: 2022-06-23

## 2022-06-23 RX ORDER — CALCIUM GLUCONATE 100 MG/ML
1 VIAL (ML) INTRAVENOUS ONCE
Refills: 0 | Status: DISCONTINUED | OUTPATIENT
Start: 2022-06-23 | End: 2022-06-23

## 2022-06-23 RX ORDER — FUROSEMIDE 40 MG
20 TABLET ORAL ONCE
Refills: 0 | Status: DISCONTINUED | OUTPATIENT
Start: 2022-06-23 | End: 2022-06-23

## 2022-06-23 RX ORDER — POTASSIUM PHOSPHATE, MONOBASIC POTASSIUM PHOSPHATE, DIBASIC 236; 224 MG/ML; MG/ML
30 INJECTION, SOLUTION INTRAVENOUS ONCE
Refills: 0 | Status: COMPLETED | OUTPATIENT
Start: 2022-06-23 | End: 2022-06-23

## 2022-06-23 RX ADMIN — ALBUTEROL 2.5 MILLIGRAM(S): 90 AEROSOL, METERED ORAL at 15:04

## 2022-06-23 RX ADMIN — HYDROMORPHONE HYDROCHLORIDE 0.5 MILLIGRAM(S): 2 INJECTION INTRAMUSCULAR; INTRAVENOUS; SUBCUTANEOUS at 09:20

## 2022-06-23 RX ADMIN — Medication 400 MILLIGRAM(S): at 23:30

## 2022-06-23 RX ADMIN — HYDROMORPHONE HYDROCHLORIDE 0.5 MILLIGRAM(S): 2 INJECTION INTRAMUSCULAR; INTRAVENOUS; SUBCUTANEOUS at 12:40

## 2022-06-23 RX ADMIN — Medication 1000 MILLIGRAM(S): at 12:00

## 2022-06-23 RX ADMIN — Medication 400 MILLIGRAM(S): at 17:16

## 2022-06-23 RX ADMIN — Medication 10 MILLIGRAM(S): at 22:13

## 2022-06-23 RX ADMIN — PANTOPRAZOLE SODIUM 40 MILLIGRAM(S): 20 TABLET, DELAYED RELEASE ORAL at 14:50

## 2022-06-23 RX ADMIN — PIPERACILLIN AND TAZOBACTAM 25 GRAM(S): 4; .5 INJECTION, POWDER, LYOPHILIZED, FOR SOLUTION INTRAVENOUS at 14:50

## 2022-06-23 RX ADMIN — PIPERACILLIN AND TAZOBACTAM 25 GRAM(S): 4; .5 INJECTION, POWDER, LYOPHILIZED, FOR SOLUTION INTRAVENOUS at 05:18

## 2022-06-23 RX ADMIN — HYDROMORPHONE HYDROCHLORIDE 1 MILLIGRAM(S): 2 INJECTION INTRAMUSCULAR; INTRAVENOUS; SUBCUTANEOUS at 20:27

## 2022-06-23 RX ADMIN — HYDROMORPHONE HYDROCHLORIDE 1 MILLIGRAM(S): 2 INJECTION INTRAMUSCULAR; INTRAVENOUS; SUBCUTANEOUS at 16:25

## 2022-06-23 RX ADMIN — LIDOCAINE 1 PATCH: 4 CREAM TOPICAL at 11:39

## 2022-06-23 RX ADMIN — HYDROMORPHONE HYDROCHLORIDE 1 MILLIGRAM(S): 2 INJECTION INTRAMUSCULAR; INTRAVENOUS; SUBCUTANEOUS at 16:40

## 2022-06-23 RX ADMIN — Medication 4 MILLILITER(S): at 07:24

## 2022-06-23 RX ADMIN — ALBUTEROL 2.5 MILLIGRAM(S): 90 AEROSOL, METERED ORAL at 22:54

## 2022-06-23 RX ADMIN — LIDOCAINE 1 PATCH: 4 CREAM TOPICAL at 23:07

## 2022-06-23 RX ADMIN — LIDOCAINE 1 PATCH: 4 CREAM TOPICAL at 18:33

## 2022-06-23 RX ADMIN — Medication 20 MILLIGRAM(S): at 10:47

## 2022-06-23 RX ADMIN — Medication 1000 MILLIGRAM(S): at 17:46

## 2022-06-23 RX ADMIN — Medication 4 MILLILITER(S): at 15:04

## 2022-06-23 RX ADMIN — Medication 20 MILLIGRAM(S): at 17:19

## 2022-06-23 RX ADMIN — HYDROMORPHONE HYDROCHLORIDE 0.5 MILLIGRAM(S): 2 INJECTION INTRAMUSCULAR; INTRAVENOUS; SUBCUTANEOUS at 12:55

## 2022-06-23 RX ADMIN — ALBUTEROL 2.5 MILLIGRAM(S): 90 AEROSOL, METERED ORAL at 07:23

## 2022-06-23 RX ADMIN — CHLORHEXIDINE GLUCONATE 15 MILLILITER(S): 213 SOLUTION TOPICAL at 05:19

## 2022-06-23 RX ADMIN — CHLORHEXIDINE GLUCONATE 1 APPLICATION(S): 213 SOLUTION TOPICAL at 05:19

## 2022-06-23 RX ADMIN — HYDROMORPHONE HYDROCHLORIDE 1 MILLIGRAM(S): 2 INJECTION INTRAMUSCULAR; INTRAVENOUS; SUBCUTANEOUS at 20:57

## 2022-06-23 RX ADMIN — Medication 400 MILLIGRAM(S): at 11:30

## 2022-06-23 RX ADMIN — POTASSIUM PHOSPHATE, MONOBASIC POTASSIUM PHOSPHATE, DIBASIC 83.33 MILLIMOLE(S): 236; 224 INJECTION, SOLUTION INTRAVENOUS at 06:15

## 2022-06-23 RX ADMIN — Medication 400 MILLIGRAM(S): at 05:18

## 2022-06-23 RX ADMIN — ENOXAPARIN SODIUM 40 MILLIGRAM(S): 100 INJECTION SUBCUTANEOUS at 05:18

## 2022-06-23 RX ADMIN — HYDROMORPHONE HYDROCHLORIDE 0.5 MILLIGRAM(S): 2 INJECTION INTRAMUSCULAR; INTRAVENOUS; SUBCUTANEOUS at 09:05

## 2022-06-23 RX ADMIN — PIPERACILLIN AND TAZOBACTAM 25 GRAM(S): 4; .5 INJECTION, POWDER, LYOPHILIZED, FOR SOLUTION INTRAVENOUS at 21:00

## 2022-06-23 NOTE — PROGRESS NOTE ADULT - SUBJECTIVE AND OBJECTIVE BOX
Surgery Progress Note     Subjective/24hour Events:   Patient seen and examined.       Vital Signs:  Vital Signs Last 24 Hrs  T(C): 36.8 (23 Jun 2022 00:00), Max: 37.4 (22 Jun 2022 04:00)  T(F): 98.2 (23 Jun 2022 00:00), Max: 99.4 (22 Jun 2022 04:00)  HR: 53 (23 Jun 2022 03:10) (53 - 84)  BP: --  BP(mean): --  RR: 18 (23 Jun 2022 03:00) (12 - 24)  SpO2: 99% (23 Jun 2022 03:10) (96% - 100%)    CAPILLARY BLOOD GLUCOSE          I&O's Detail    21 Jun 2022 07:01  -  22 Jun 2022 07:00  --------------------------------------------------------  IN:    IV PiggyBack: 1400 mL    Lactated Ringers: 2500 mL    Lactated Ringers Bolus: 1000 mL    Norepinephrine: 11.7 mL    Propofol: 87.9 mL    Sodium Chloride 0.9% Bolus: 1500 mL  Total IN: 6499.6 mL    OUT:    Blood Loss (mL): 0 mL    Indwelling Catheter - Urethral (mL): 835 mL    Nasogastric/Oral tube (mL): 3300 mL    Stool (mL): 150 mL    VAC (Vacuum Assisted Closure) System (mL): 0 mL    Voided (mL): 200 mL  Total OUT: 4485 mL    Total NET: 2014.6 mL      22 Jun 2022 07:01  -  23 Jun 2022 03:46  --------------------------------------------------------  IN:    IV PiggyBack: 900 mL    Lactated Ringers: 2000 mL    Lactated Ringers: 500 mL    Lactated Ringers w/ Additives: 1500 mL  Total IN: 4900 mL    OUT:    Indwelling Catheter - Urethral (mL): 755 mL    Nasogastric/Oral tube (mL): 1100 mL    Stool (mL): 50 mL    VAC (Vacuum Assisted Closure) System (mL): 0 mL  Total OUT: 1905 mL    Total NET: 2995 mL            Physical Exam:  Gen:  CV:  Resp:  Abd:  Ext:      Labs:    06-23    129<L>  |  98  |  15  ----------------------------<  106<H>  4.1   |  23  |  0.74    Ca    7.3<L>      23 Jun 2022 01:29  Phos  2.0     06-23  Mg     2.00     06-23    TPro  5.2<L>  /  Alb  2.4<L>  /  TBili  1.1  /  DBili  x   /  AST  15  /  ALT  8   /  AlkPhos  54  06-21    LIVER FUNCTIONS - ( 21 Jun 2022 17:00 )  Alb: 2.4 g/dL / Pro: 5.2 g/dL / ALK PHOS: 54 U/L / ALT: 8 U/L / AST: 15 U/L / GGT: x                                 10.3   13.86 )-----------( 228      ( 23 Jun 2022 01:29 )             30.8     PT/INR - ( 21 Jun 2022 04:30 )   PT: 14.0 sec;   INR: 1.20 ratio         PTT - ( 21 Jun 2022 04:30 )  PTT:22.8 sec     Surgery Progress Note     Subjective/24hour Events:   Patient seen and examined.   Limited secondary to patient clinical condition.      Vital Signs:  Vital Signs Last 24 Hrs  T(C): 36.8 (23 Jun 2022 00:00), Max: 37.4 (22 Jun 2022 04:00)  T(F): 98.2 (23 Jun 2022 00:00), Max: 99.4 (22 Jun 2022 04:00)  HR: 53 (23 Jun 2022 03:10) (53 - 84)  BP: --  BP(mean): --  RR: 18 (23 Jun 2022 03:00) (12 - 24)  SpO2: 99% (23 Jun 2022 03:10) (96% - 100%)    CAPILLARY BLOOD GLUCOSE          I&O's Detail    21 Jun 2022 07:01  -  22 Jun 2022 07:00  --------------------------------------------------------  IN:    IV PiggyBack: 1400 mL    Lactated Ringers: 2500 mL    Lactated Ringers Bolus: 1000 mL    Norepinephrine: 11.7 mL    Propofol: 87.9 mL    Sodium Chloride 0.9% Bolus: 1500 mL  Total IN: 6499.6 mL    OUT:    Blood Loss (mL): 0 mL    Indwelling Catheter - Urethral (mL): 835 mL    Nasogastric/Oral tube (mL): 3300 mL    Stool (mL): 150 mL    VAC (Vacuum Assisted Closure) System (mL): 0 mL    Voided (mL): 200 mL  Total OUT: 4485 mL    Total NET: 2014.6 mL      22 Jun 2022 07:01  -  23 Jun 2022 03:46  --------------------------------------------------------  IN:    IV PiggyBack: 900 mL    Lactated Ringers: 2000 mL    Lactated Ringers: 500 mL    Lactated Ringers w/ Additives: 1500 mL  Total IN: 4900 mL    OUT:    Indwelling Catheter - Urethral (mL): 755 mL    Nasogastric/Oral tube (mL): 1100 mL    Stool (mL): 50 mL    VAC (Vacuum Assisted Closure) System (mL): 0 mL  Total OUT: 1905 mL    Total NET: 2995 mL            Physical Exam:    General: NAD, resting in bed comfortably, intubated, awake nodding yes/no  Cardiac: regular rate, warm and well perfused  Respiratory: intubated, CPAP5/5, nonlabored.  Abdomen: soft, nondistended, midline incision with black wound vac to suction.  Extremities: normal strength, FROM, no deformities      Labs:    06-23    129<L>  |  98  |  15  ----------------------------<  106<H>  4.1   |  23  |  0.74    Ca    7.3<L>      23 Jun 2022 01:29  Phos  2.0     06-23  Mg     2.00     06-23    TPro  5.2<L>  /  Alb  2.4<L>  /  TBili  1.1  /  DBili  x   /  AST  15  /  ALT  8   /  AlkPhos  54  06-21    LIVER FUNCTIONS - ( 21 Jun 2022 17:00 )  Alb: 2.4 g/dL / Pro: 5.2 g/dL / ALK PHOS: 54 U/L / ALT: 8 U/L / AST: 15 U/L / GGT: x                                 10.3   13.86 )-----------( 228      ( 23 Jun 2022 01:29 )             30.8     PT/INR - ( 21 Jun 2022 04:30 )   PT: 14.0 sec;   INR: 1.20 ratio         PTT - ( 21 Jun 2022 04:30 )  PTT:22.8 sec

## 2022-06-23 NOTE — PROGRESS NOTE ADULT - ASSESSMENT
Patient is a 67 year old female (speaks [place]ic dialect) with a PMHx of HTN, HLD, left tibia surgery and lap kevan (2011 at [facility]) who presented with nausea, vomiting and abdominal pain x1.5 days.  CT Abdomen / Pelvis performed showing small bowel obstruction secondary to a right ventral hernia containing a dilated, fecalized loop of small bowel.  Patient went to the OR for a diagnostic laparoscopy, ventral hernia repair - converted to open for repair of enterotomy on 6/15/22.  Hospital course complicated by fever and tachycardia requiring SICU admission.  Repeat CT scan performed showing small bowel obstruction with a point of transition at the distal ileum.  Patient returned to the OR for an exploratory laparotomy with small bowel resection and end ileostomy on 6/21/22.      PLAN:  - NPO  - Wean sedation as tolerated  - Spontaneous breathing trials as tolerated  - Continue with IV Vancomycin and IV Zosyn  - Monitor ostomy output  - VTE prophylaxis with Lovenox subcutaneous      #[id number]  B Team Surgery Patient is a 67 year old female (speaks Hebrew dialect) with a PMHx of HTN, HLD, left tibia surgery and lap kevan (2011 at [facility]) who presented with nausea, vomiting and abdominal pain x1.5 days.  CT Abdomen / Pelvis performed showing small bowel obstruction secondary to a right ventral hernia containing a dilated, fecalized loop of small bowel.  Patient went to the OR for a diagnostic laparoscopy, ventral hernia repair - converted to open for repair of enterotomy on 6/15/22.  Hospital course complicated by fever and tachycardia requiring SICU admission.  Repeat CT scan performed showing small bowel obstruction with a point of transition at the distal ileum.  Patient returned to the OR for an exploratory laparotomy with small bowel resection and end ileostomy on 6/21/22.      PLAN:  - NPO  - CPAP to ?extubation  - wound vac change today  - Continue with IV Vancomycin (supratherapeutic) and IV Zosyn  - Monitor ostomy output  - VTE prophylaxis with Lovenox subcutaneous      #[id number]  B Team Surgery

## 2022-06-23 NOTE — PROGRESS NOTE ADULT - SUBJECTIVE AND OBJECTIVE BOX
SICU Daily Progress Note  =====================================================  Interval/Overnight Events:   -Urine osmolyte sent for hyponatremia (129)  -follow up vanc trough (due 6/23 AM)     POD #2          	SICU Day #    ***    HPI:  Patient is a 68yo F (speaks Mohawk dialect) with a history of HTN, HLD, L tibia surgery, lap kevan (2011, [facility]) presenting with 1.5 days of nausea, vomiting and abdominal pain. Per daughter at bedside, patient began having symptoms Monday night after dinner. Last passed flatus and had a BM at that time. Denies fevers or chills. Pain improved in ED after getting morphine and NGT placement.    In the ED, stable and afebrile. Labs unremarkable. CT showed incarcerated SBO at right ventral hernia. (15 Rambo 2022 08:22)    Patient s/p ventral hernia repair, laparopscopic, c/b enterotomy intraoperatively, converted to open with primary repair on 6/15. Patient on floors with NGT, NPO. On 6/21, 2am rapid response called for fever to 104 rectal, tachycardia to 120s and tachypnea. Unable to answer questions per daughter, in native [facility]. Stat labs drawn. SICU consulted for sepsis management and altered mental status. CT scan obtained on way.       ALLERGIES:  No Known Allergies      --------------------------------------------------------------------------------------    MEDICATIONS:    Neurologic Medications  acetaminophen   IVPB .. 1000 milliGRAM(s) IV Intermittent every 6 hours  HYDROmorphone  Injectable 0.5 milliGRAM(s) IV Push every 4 hours PRN Moderate Pain (4 - 6)  HYDROmorphone  Injectable 1 milliGRAM(s) IV Push every 3 hours PRN Severe Pain (7 - 10)    Respiratory Medications  acetylcysteine 20%  Inhalation 4 milliLiter(s) Inhalation every 8 hours  ALBUTerol   0.5% 2.5 milliGRAM(s) Nebulizer every 8 hours    Cardiovascular Medications    Gastrointestinal Medications  bisacodyl Suppository 10 milliGRAM(s) Rectal at bedtime  lactated ringers 1000 milliLiter(s) IV Continuous <Continuous>  lactated ringers. 1000 milliLiter(s) IV Continuous <Continuous>  pantoprazole  Injectable 40 milliGRAM(s) IV Push every 24 hours  potassium phosphate IVPB 30 milliMole(s) IV Intermittent once  sodium chloride 0.9% lock flush 10 milliLiter(s) IV Push every 1 hour PRN Pre/post blood products, medications, blood draw, and to maintain line patency    Genitourinary Medications    Hematologic/Oncologic Medications  enoxaparin Injectable 40 milliGRAM(s) SubCutaneous every 24 hours  influenza  Vaccine (HIGH DOSE) 0.7 milliLiter(s) IntraMuscular once    Antimicrobial/Immunologic Medications  piperacillin/tazobactam IVPB.. 3.375 Gram(s) IV Intermittent every 8 hours  vancomycin  IVPB 1500 milliGRAM(s) IV Intermittent every 12 hours    Endocrine/Metabolic Medications    Topical/Other Medications  chlorhexidine 0.12% Liquid 15 milliLiter(s) Oral Mucosa every 12 hours  chlorhexidine 4% Liquid 1 Application(s) Topical <User Schedule>  lidocaine   4% Patch 1 Patch Transdermal daily PRN pain  tetracaine/benzocaine/butamben Spray 1 Spray(s) Topical every 6 hours PRN Throat pain from NGT    --------------------------------------------------------------------------------------    VITAL SIGNS:    --------------------------------------------------------------------------------------    INS AND OUTS:  ICU Vital Signs Last 24 Hrs  T(C): 36.8 (23 Jun 2022 00:00), Max: 37.4 (22 Jun 2022 04:00)  T(F): 98.2 (23 Jun 2022 00:00), Max: 99.4 (22 Jun 2022 04:00)  HR: 53 (23 Jun 2022 03:10) (53 - 84)  BP: --  BP(mean): --  ABP: 110/53 (23 Jun 2022 03:00) (90/51 - 148/64)  ABP(mean): 75 (23 Jun 2022 03:00) (66 - 97)  RR: 18 (23 Jun 2022 03:00) (12 - 24)  SpO2: 99% (23 Jun 2022 03:10) (96% - 100%)    --------------------------------------------------------------------------------------    EXAM  NEUROLOGY  -Intubated   Exam: Normal, in no acute distress.  No focal neurologic deficits.    RESPIRATORY  Exam:  Normal expansion/effort.  Mechanical Ventilation: Mode: AC/ CMV (Assist Control/ Continuous Mandatory Ventilation), RR (machine): 18, TV (machine): 400, FiO2: 30, PEEP: 5, ITime: 0.97, MAP: 12, PIP: 29    CARDIOVASCULAR  Exam:   Regular rate and rhythm.      GI/NUTRITION  Exam: Abdomen soft, Non-tender, Non-distended. Stoma dusky, moist, bowel sweat noted. NGT in place with dark bilious output.   Current Diet: NPO    VASCULAR  Exam: Extremities warm, pink, well-perfused.     METABOLIC / FLUIDS / ELECTROLYTES  lactated ringers 1000 milliLiter(s) IV Continuous <Continuous>  lactated ringers. 1000 milliLiter(s) IV Continuous <Continuous>  potassium phosphate IVPB 30 milliMole(s) IV Intermittent once      HEMATOLOGIC  [x] VTE Prophylaxis: enoxaparin Injectable 40 milliGRAM(s) SubCutaneous every 24 hours    Transfusions:	[] PRBC	[] Platelets		[] FFP	[] Cryoprecipitate    INFECTIOUS DISEASE  Antimicrobials/Immunologic Medications:  influenza  Vaccine (HIGH DOSE) 0.7 milliLiter(s) IntraMuscular once  piperacillin/tazobactam IVPB.. 3.375 Gram(s) IV Intermittent every 8 hours  vancomycin  IVPB 1500 milliGRAM(s) IV Intermittent every 12 hours    Day #      of     ***    TUBES / LINES / DRAINS  ***  [x] Peripheral IV  [] Central Venous Line     	[] R	[] L	[] IJ	[] Fem	[] SC	Date Placed:   [x] Arterial Line		[x] R	[] L	[] Fem	[] Rad	[x] Ax	Date Placed: 06/21/22  [] PICC		[] Midline		[] Mediport  [] Urinary Catheter		Date Placed:   [x] Necessity of urinary, arterial, and venous catheters discussed    --------------------------------------------------------------------------------------    LABS        T(C): 36.8 (06-23-22 @ 00:00), Max: 37.4 (06-22-22 @ 04:00)  HR: 53 (06-23-22 @ 03:10) (53 - 84)  BP: --  RR: 18 (06-23-22 @ 03:00) (12 - 24)  SpO2: 99% (06-23-22 @ 03:10) (96% - 100%)    06-21-22 @ 07:01  -  06-22-22 @ 07:00  --------------------------------------------------------  IN: 6499.6 mL / OUT: 4485 mL / NET: 2014.6 mL    06-22-22 @ 07:01  -  06-23-22 @ 03:52  --------------------------------------------------------  IN: 4900 mL / OUT: 1905 mL / NET: 2995 mL        LABS                        10.3   13.86 )-----------( 228      ( 23 Jun 2022 01:29 )             30.8     06-23    129<L>  |  98  |  15  ----------------------------<  106<H>  4.1   |  23  |  0.74    Ca    7.3<L>      23 Jun 2022 01:29  Phos  2.0     06-23  Mg     2.00     06-23    TPro  5.2<L>  /  Alb  2.4<L>  /  TBili  1.1  /  DBili  x   /  AST  15  /  ALT  8   /  AlkPhos  54  06-21    PT/INR - ( 21 Jun 2022 04:30 )   PT: 14.0 sec;   INR: 1.20 ratio         PTT - ( 21 Jun 2022 04:30 )  PTT:22.8 sec      --------------------------------------------------------------------------------------    OTHER LABORATORY:     IMAGING STUDIES:   CXR:     ASSESSMENT:  67y Female  Patient is a 68yo F (speaks Mohawk dialect) with a history of HTN, HLD, L tibia surgery, lap kevan (2011, [facility]) presenting with 1.5 days of nausea, vomiting and abdominal pain. Per daughter at bedside, patient began having symptoms Monday night after dinner. Last passed flatus and had a BM at that time. Denies fevers or chills. Pain improved in ED after getting morphine and NGT placement.    In the ED, stable and afebrile. Labs unremarkable. CT showed incarcerated SBO at right ventral hernia. (15 Rambo 2022 08:22)    Patient s/p ventral hernia repair, laparopscopic, c/b enterotomy intraoperatively, converted to open with primary repair on 6/15. Patient on floors with NGT, NPO. On 6/21, 2am rapid response called for fever to 104 rectal, tachycardia to 120s and tachypnea. Unable to answer questions per daughter, in native [facility]. Stat labs drawn. SICU consulted for sepsis management and altered mental status. CT scan obtained on way.           PLAN:    NEUROLOGY:  -off sedation   -pain control: Dilaudid prn, standing tylenol  -lidocaine patch     RESPIRATORY:  -intubated   -Volume /20/5/30%  -CPAP as tolerated   - Maintain O2 saturation >92%  -IPV, nebs, Mucomyst for secretions    CARDIOVASCULAR:  -off pressors  -tc trac in place  -Keep MAP >65    GI / NUTRITION:  -diet: NPO  -NGT to LCWS  -end ileostomy, monitor stoma and colostomy bag output   -protonix  -dulcolax x 3 days    RENAL / GENITOURINARY:  -fu urine osmolality   -LR @ 125ml/hr  -banana bag   - Indwelling rajput catheter  - Monitor electrolytes and replete PRN  - Continue to monitor strict ins and outs q1 hour    HEMATOLOGIC:  -H&H stable  -SCDS  -DVT px: lovenox    INFECTIOUS DISEASE:  -abx: vanc and zosyn  -draw vanc trough before 4th dose (morning of 06/23)  -fu MRSA?MSSA swab  -currently afebrile  -Monitor WBCs    ENDOCRINOLOGY:  - No active issues  - Continue to monitor glucose on BMP (goal 140-180)    Tubes/Lines/Drains  -Right Axillary A line   -R IJ CVC  -PIVS    Disposition: SICU    --------------------------------------------------------------------------------------    Critical Care Diagnoses: SICU Daily Progress Note  =====================================================  Interval/Overnight Events:   -Urine osmolyte sent for hyponatremia (129)  -follow up vanc trough (due 6/23 AM)     POD #2          	SICU Day #    ***    HPI:  Patient is a 66yo F (speaks Faroese dialect) with a history of HTN, HLD, L tibia surgery, lap kevan (2011, [facility]) presenting with 1.5 days of nausea, vomiting and abdominal pain. Per daughter at bedside, patient began having symptoms Monday night after dinner. Last passed flatus and had a BM at that time. Denies fevers or chills. Pain improved in ED after getting morphine and NGT placement.    In the ED, stable and afebrile. Labs unremarkable. CT showed incarcerated SBO at right ventral hernia. (15 Rambo 2022 08:22)    Patient s/p ventral hernia repair, laparopscopic, c/b enterotomy intraoperatively, converted to open with primary repair on 6/15. Patient on floors with NGT, NPO. On 6/21, 2am rapid response called for fever to 104 rectal, tachycardia to 120s and tachypnea. Unable to answer questions per daughter, in native [facility]. Stat labs drawn. SICU consulted for sepsis management and altered mental status. CT scan obtained on way.       ALLERGIES:  No Known Allergies      --------------------------------------------------------------------------------------    MEDICATIONS:    Neurologic Medications  acetaminophen   IVPB .. 1000 milliGRAM(s) IV Intermittent every 6 hours  HYDROmorphone  Injectable 0.5 milliGRAM(s) IV Push every 4 hours PRN Moderate Pain (4 - 6)  HYDROmorphone  Injectable 1 milliGRAM(s) IV Push every 3 hours PRN Severe Pain (7 - 10)    Respiratory Medications  acetylcysteine 20%  Inhalation 4 milliLiter(s) Inhalation every 8 hours  ALBUTerol   0.5% 2.5 milliGRAM(s) Nebulizer every 8 hours    Cardiovascular Medications    Gastrointestinal Medications  bisacodyl Suppository 10 milliGRAM(s) Rectal at bedtime  lactated ringers 1000 milliLiter(s) IV Continuous <Continuous>  lactated ringers. 1000 milliLiter(s) IV Continuous <Continuous>  pantoprazole  Injectable 40 milliGRAM(s) IV Push every 24 hours  potassium phosphate IVPB 30 milliMole(s) IV Intermittent once  sodium chloride 0.9% lock flush 10 milliLiter(s) IV Push every 1 hour PRN Pre/post blood products, medications, blood draw, and to maintain line patency    Genitourinary Medications    Hematologic/Oncologic Medications  enoxaparin Injectable 40 milliGRAM(s) SubCutaneous every 24 hours  influenza  Vaccine (HIGH DOSE) 0.7 milliLiter(s) IntraMuscular once    Antimicrobial/Immunologic Medications  piperacillin/tazobactam IVPB.. 3.375 Gram(s) IV Intermittent every 8 hours  vancomycin  IVPB 1500 milliGRAM(s) IV Intermittent every 12 hours    Endocrine/Metabolic Medications    Topical/Other Medications  chlorhexidine 0.12% Liquid 15 milliLiter(s) Oral Mucosa every 12 hours  chlorhexidine 4% Liquid 1 Application(s) Topical <User Schedule>  lidocaine   4% Patch 1 Patch Transdermal daily PRN pain  tetracaine/benzocaine/butamben Spray 1 Spray(s) Topical every 6 hours PRN Throat pain from NGT    --------------------------------------------------------------------------------------    VITAL SIGNS:    --------------------------------------------------------------------------------------    INS AND OUTS:  ICU Vital Signs Last 24 Hrs  T(C): 36.8 (23 Jun 2022 00:00), Max: 37.4 (22 Jun 2022 04:00)  T(F): 98.2 (23 Jun 2022 00:00), Max: 99.4 (22 Jun 2022 04:00)  HR: 53 (23 Jun 2022 03:10) (53 - 84)  BP: --  BP(mean): --  ABP: 110/53 (23 Jun 2022 03:00) (90/51 - 148/64)  ABP(mean): 75 (23 Jun 2022 03:00) (66 - 97)  RR: 18 (23 Jun 2022 03:00) (12 - 24)  SpO2: 99% (23 Jun 2022 03:10) (96% - 100%)    --------------------------------------------------------------------------------------    EXAM  NEUROLOGY  -Intubated   Exam: Normal, in no acute distress.  No focal neurologic deficits.    RESPIRATORY  Exam:  Normal expansion/effort.  Mechanical Ventilation: Mode: AC/ CMV (Assist Control/ Continuous Mandatory Ventilation), RR (machine): 18, TV (machine): 400, FiO2: 30, PEEP: 5, ITime: 0.97, MAP: 12, PIP: 29    CARDIOVASCULAR  Exam:   Regular rate and rhythm.      GI/NUTRITION  Exam: Abdomen soft, Non-tender, Non-distended. Stoma dusky, moist, bowel sweat noted. NGT in place with dark bilious output.   Current Diet: NPO    VASCULAR  Exam: Extremities warm, pink, well-perfused.     METABOLIC / FLUIDS / ELECTROLYTES  lactated ringers 1000 milliLiter(s) IV Continuous <Continuous>  lactated ringers. 1000 milliLiter(s) IV Continuous <Continuous>  potassium phosphate IVPB 30 milliMole(s) IV Intermittent once      HEMATOLOGIC  [x] VTE Prophylaxis: enoxaparin Injectable 40 milliGRAM(s) SubCutaneous every 24 hours    Transfusions:	[] PRBC	[] Platelets		[] FFP	[] Cryoprecipitate    INFECTIOUS DISEASE  Antimicrobials/Immunologic Medications:  influenza  Vaccine (HIGH DOSE) 0.7 milliLiter(s) IntraMuscular once  piperacillin/tazobactam IVPB.. 3.375 Gram(s) IV Intermittent every 8 hours  vancomycin  IVPB 1500 milliGRAM(s) IV Intermittent every 12 hours    Day #      of     ***    TUBES / LINES / DRAINS  ***  [x] Peripheral IV  [] Central Venous Line     	[] R	[] L	[] IJ	[] Fem	[] SC	Date Placed:   [x] Arterial Line		[x] R	[] L	[] Fem	[] Rad	[x] Ax	Date Placed: 06/21/22  [] PICC		[] Midline		[] Mediport  [] Urinary Catheter		Date Placed:   [x] Necessity of urinary, arterial, and venous catheters discussed    --------------------------------------------------------------------------------------    LABS        T(C): 36.8 (06-23-22 @ 00:00), Max: 37.4 (06-22-22 @ 04:00)  HR: 53 (06-23-22 @ 03:10) (53 - 84)  BP: --  RR: 18 (06-23-22 @ 03:00) (12 - 24)  SpO2: 99% (06-23-22 @ 03:10) (96% - 100%)    06-21-22 @ 07:01  -  06-22-22 @ 07:00  --------------------------------------------------------  IN: 6499.6 mL / OUT: 4485 mL / NET: 2014.6 mL    06-22-22 @ 07:01  -  06-23-22 @ 03:52  --------------------------------------------------------  IN: 4900 mL / OUT: 1905 mL / NET: 2995 mL        LABS                        10.3   13.86 )-----------( 228      ( 23 Jun 2022 01:29 )             30.8     06-23    129<L>  |  98  |  15  ----------------------------<  106<H>  4.1   |  23  |  0.74    Ca    7.3<L>      23 Jun 2022 01:29  Phos  2.0     06-23  Mg     2.00     06-23    TPro  5.2<L>  /  Alb  2.4<L>  /  TBili  1.1  /  DBili  x   /  AST  15  /  ALT  8   /  AlkPhos  54  06-21    PT/INR - ( 21 Jun 2022 04:30 )   PT: 14.0 sec;   INR: 1.20 ratio         PTT - ( 21 Jun 2022 04:30 )  PTT:22.8 sec      --------------------------------------------------------------------------------------    OTHER LABORATORY:     IMAGING STUDIES:   CXR:     ASSESSMENT:  67y Female  Patient is a 66yo F (speaks Faroese dialect) with a history of HTN, HLD, L tibia surgery, lap kevan (2011, [facility]) presenting with 1.5 days of nausea, vomiting and abdominal pain. Per daughter at bedside, patient began having symptoms Monday night after dinner. Last passed flatus and had a BM at that time. Denies fevers or chills. Pain improved in ED after getting morphine and NGT placement.    In the ED, stable and afebrile. Labs unremarkable. CT showed incarcerated SBO at right ventral hernia. (15 Rambo 2022 08:22)    Patient s/p ventral hernia repair, laparopscopic, c/b enterotomy intraoperatively, converted to open with primary repair on 6/15. Patient on floors with NGT, NPO. On 6/21, 2am rapid response called for fever to 104 rectal, tachycardia to 120s and tachypnea. Unable to answer questions per daughter, in native [facility]. Stat labs drawn. SICU consulted for sepsis management and altered mental status. CT scan obtained on way.           PLAN:    NEUROLOGY:  -off sedation   -pain control: Dilaudid prn, standing tylenol  -lidocaine patch     RESPIRATORY:  -intubated   -Volume /20/5/30%  -CPAP as tolerated   - Maintain O2 saturation >92%  -IPV, nebs, Mucomyst for secretions    CARDIOVASCULAR:  -off pressors  -tc trac in place  -Keep MAP >65    GI / NUTRITION:  -diet: NPO  -NGT to LCWS  -end ileostomy, monitor stoma and colostomy bag output   -protonix  -dulcolax x 3 days    RENAL / GENITOURINARY:  -fu urine osmolality   -LR @ 125ml/hr  -banana bag   - Indwelling rajput catheter  - Monitor electrolytes and replete PRN  - Continue to monitor strict ins and outs q1 hour    HEMATOLOGIC:  -H&H stable  -SCDS  -DVT px: lovenox    INFECTIOUS DISEASE:  -abx: vanc and zosyn  -draw vanc trough before 4th dose (morning of 06/23)  -fu MRSA/MSSA swab  -currently afebrile  -Monitor WBCs    ENDOCRINOLOGY:  - No active issues  - Continue to monitor glucose on BMP (goal 140-180)    Tubes/Lines/Drains  -Right Axillary A line   -R IJ CVC  -PIVS    Disposition: SICU    --------------------------------------------------------------------------------------    Critical Care Diagnoses: SICU Daily Progress Note  =====================================================  Interval/Overnight Events:   -vanc trough level high (26.0), to resume 6/23 at qD dosing  - diurese, net+  - Start TF  - extubate tomorrow am      HPI:  Patient is a 68yo F (speaks Ma[place] dialect) with a history of HTN, HLD, L tibia surgery, lap kevan (2011, [facility]) presenting with 1.5 days of nausea, vomiting and abdominal pain. Per daughter at bedside, patient began having symptoms Monday night after dinner. Last passed flatus and had a BM at that time. Denies fevers or chills. Pain improved in ED after getting morphine and NGT placement.    In the ED, stable and afebrile. Labs unremarkable. CT showed incarcerated SBO at right ventral hernia. (15 Rambo 2022 08:22)    Patient s/p ventral hernia repair, laparopscopic, c/b enterotomy intraoperatively, converted to open with primary repair on 6/15. Patient on floors with NGT, NPO. On 6/21, 2am rapid response called for fever to 104 rectal, tachycardia to 120s and tachypnea. Unable to answer questions per daughter, in native [facility]. Stat labs drawn. SICU consulted for sepsis management and altered mental status. CT scan obtained on way.       ALLERGIES:  No Known Allergies      --------------------------------------------------------------------------------------    MEDICATIONS:    Neurologic Medications  acetaminophen   IVPB .. 1000 milliGRAM(s) IV Intermittent every 6 hours  HYDROmorphone  Injectable 0.5 milliGRAM(s) IV Push every 4 hours PRN Moderate Pain (4 - 6)  HYDROmorphone  Injectable 1 milliGRAM(s) IV Push every 3 hours PRN Severe Pain (7 - 10)    Respiratory Medications  acetylcysteine 20%  Inhalation 4 milliLiter(s) Inhalation every 8 hours  ALBUTerol   0.5% 2.5 milliGRAM(s) Nebulizer every 8 hours    Cardiovascular Medications    Gastrointestinal Medications  bisacodyl Suppository 10 milliGRAM(s) Rectal at bedtime  lactated ringers 1000 milliLiter(s) IV Continuous <Continuous>  lactated ringers. 1000 milliLiter(s) IV Continuous <Continuous>  pantoprazole  Injectable 40 milliGRAM(s) IV Push every 24 hours  potassium phosphate IVPB 30 milliMole(s) IV Intermittent once  sodium chloride 0.9% lock flush 10 milliLiter(s) IV Push every 1 hour PRN Pre/post blood products, medications, blood draw, and to maintain line patency    Genitourinary Medications    Hematologic/Oncologic Medications  enoxaparin Injectable 40 milliGRAM(s) SubCutaneous every 24 hours  influenza  Vaccine (HIGH DOSE) 0.7 milliLiter(s) IntraMuscular once    Antimicrobial/Immunologic Medications  piperacillin/tazobactam IVPB.. 3.375 Gram(s) IV Intermittent every 8 hours  vancomycin  IVPB 1500 milliGRAM(s) IV Intermittent every 12 hours    Endocrine/Metabolic Medications    Topical/Other Medications  chlorhexidine 0.12% Liquid 15 milliLiter(s) Oral Mucosa every 12 hours  chlorhexidine 4% Liquid 1 Application(s) Topical <User Schedule>  lidocaine   4% Patch 1 Patch Transdermal daily PRN pain  tetracaine/benzocaine/butamben Spray 1 Spray(s) Topical every 6 hours PRN Throat pain from NGT    --------------------------------------------------------------------------------------    VITAL SIGNS:    --------------------------------------------------------------------------------------    INS AND OUTS:  ICU Vital Signs Last 24 Hrs  T(C): 36.8 (23 Jun 2022 00:00), Max: 37.4 (22 Jun 2022 04:00)  T(F): 98.2 (23 Jun 2022 00:00), Max: 99.4 (22 Jun 2022 04:00)  HR: 53 (23 Jun 2022 03:10) (53 - 84)  BP: --  BP(mean): --  ABP: 110/53 (23 Jun 2022 03:00) (90/51 - 148/64)  ABP(mean): 75 (23 Jun 2022 03:00) (66 - 97)  RR: 18 (23 Jun 2022 03:00) (12 - 24)  SpO2: 99% (23 Jun 2022 03:10) (96% - 100%)    --------------------------------------------------------------------------------------    EXAM  NEUROLOGY  -Intubated   Exam: Normal, in no acute distress.  No focal neurologic deficits.    RESPIRATORY  Exam:  Normal expansion/effort.  Mechanical Ventilation: Mode: AC/ CMV (Assist Control/ Continuous Mandatory Ventilation), RR (machine): 18, TV (machine): 400, FiO2: 30, PEEP: 5, ITime: 0.97, MAP: 12, PIP: 29    CARDIOVASCULAR  Exam:   Regular rate and rhythm.      GI/NUTRITION  Exam: Abdomen soft, Non-tender, Non-distended. Stoma dusky, moist, bowel sweat noted. NGT in place with dark bilious output.   Current Diet: NPO    VASCULAR  Exam: Extremities warm, pink, well-perfused.     METABOLIC / FLUIDS / ELECTROLYTES  lactated ringers 1000 milliLiter(s) IV Continuous <Continuous>  lactated ringers. 1000 milliLiter(s) IV Continuous <Continuous>  potassium phosphate IVPB 30 milliMole(s) IV Intermittent once      HEMATOLOGIC  [x] VTE Prophylaxis: enoxaparin Injectable 40 milliGRAM(s) SubCutaneous every 24 hours    Transfusions:	[] PRBC	[] Platelets		[] FFP	[] Cryoprecipitate    INFECTIOUS DISEASE  Antimicrobials/Immunologic Medications:  influenza  Vaccine (HIGH DOSE) 0.7 milliLiter(s) IntraMuscular once  piperacillin/tazobactam IVPB.. 3.375 Gram(s) IV Intermittent every 8 hours  vancomycin  IVPB 1500 milliGRAM(s) IV Intermittent every 12 hours    Day #      of     ***    TUBES / LINES / DRAINS  ***  [x] Peripheral IV  [] Central Venous Line     	[] R	[] L	[] IJ	[] Fem	[] SC	Date Placed:   [x] Arterial Line		[x] R	[] L	[] Fem	[] Rad	[x] Ax	Date Placed: 06/21/22  [] PICC		[] Midline		[] Mediport  [] Urinary Catheter		Date Placed:   [x] Necessity of urinary, arterial, and venous catheters discussed    --------------------------------------------------------------------------------------    LABS        T(C): 36.8 (06-23-22 @ 00:00), Max: 37.4 (06-22-22 @ 04:00)  HR: 53 (06-23-22 @ 03:10) (53 - 84)  BP: --  RR: 18 (06-23-22 @ 03:00) (12 - 24)  SpO2: 99% (06-23-22 @ 03:10) (96% - 100%)    06-21-22 @ 07:01  -  06-22-22 @ 07:00  --------------------------------------------------------  IN: 6499.6 mL / OUT: 4485 mL / NET: 2014.6 mL    06-22-22 @ 07:01  -  06-23-22 @ 03:52  --------------------------------------------------------  IN: 4900 mL / OUT: 1905 mL / NET: 2995 mL        LABS                        10.3   13.86 )-----------( 228      ( 23 Jun 2022 01:29 )             30.8     06-23    129<L>  |  98  |  15  ----------------------------<  106<H>  4.1   |  23  |  0.74    Ca    7.3<L>      23 Jun 2022 01:29  Phos  2.0     06-23  Mg     2.00     06-23    TPro  5.2<L>  /  Alb  2.4<L>  /  TBili  1.1  /  DBili  x   /  AST  15  /  ALT  8   /  AlkPhos  54  06-21    PT/INR - ( 21 Jun 2022 04:30 )   PT: 14.0 sec;   INR: 1.20 ratio         PTT - ( 21 Jun 2022 04:30 )  PTT:22.8 sec      --------------------------------------------------------------------------------------    OTHER LABORATORY:     IMAGING STUDIES:   CXR:     ASSESSMENT:  67y Female  Patient is a 68yo F (speaks Maori dialect) with a history of HTN, HLD, L tibia surgery, lap kevan (2011, [facility]) presenting with 1.5 days of nausea, vomiting and abdominal pain. Per daughter at bedside, patient began having symptoms Monday night after dinner. Last passed flatus and had a BM at that time. Denies fevers or chills. Pain improved in ED after getting morphine and NGT placement.    In the ED, stable and afebrile. Labs unremarkable. CT showed incarcerated SBO at right ventral hernia. (15 Rambo 2022 08:22)    Patient s/p ventral hernia repair, laparopscopic, c/b enterotomy intraoperatively, converted to open with primary repair on 6/15. Patient on floors with NGT, NPO. On 6/21, 2am rapid response called for fever to 104 rectal, tachycardia to 120s and tachypnea. Unable to answer questions per daughter, in native [place]. Stat labs drawn. SICU consulted for sepsis management and altered mental status. CT scan obtained on way.           PLAN:    NEUROLOGY:  -off sedation   -pain control: Dilaudid prn, standing tylenol  -lidocaine patch     RESPIRATORY:  -intubated   -Volume /18/5/30%  -CPAP as tolerated   - Maintain O2 saturation >92%  -IPV, nebs, Mucomyst for heavy secretions    CARDIOVASCULAR:  -off pressors  -tc trac in place  -Keep MAP >65    GI / NUTRITION:  -diet: TF  -end ileostomy, ostomy has begun to put out  -protonix    RENAL / GENITOURINARY:  -fu urine osmolality  - Lasix 20 6/23PM   - DC mIVF   - Indwelling rajput catheter  - Monitor electrolytes and replete PRN  - Continue to monitor strict ins and outs q1 hour    HEMATOLOGIC:  -H&H stable  -SCDS  -DVT px: lovenox    INFECTIOUS DISEASE:  -abx:  zosyn  - vanc to resume 6/24  - MRSA PCR+  -currently afebrile  -Monitor WBCs    ENDOCRINOLOGY:  - No active issues  - Continue to monitor glucose on BMP (goal 140-180)    Tubes/Lines/Drains  -Right Axillary A line   -R IJ CVC  -PIVS    Disposition: SICU  --------------------------------------------------------------------------------------    Critical Care Diagnoses:    --------------------------------------------------------------------------------------    Critical Care Diagnoses: SICU Daily Progress Note  =====================================================  Interval/Overnight Events:   -vanc trough level high (26.0), to resume 6/23 at qD dosing  - diurese, net+  - Start TF  - extubate tomorrow am      HPI:  Patient is a 66yo F (speaks Serbian dialect) with a history of HTN, HLD, L tibia surgery, lap kevan (2011, [facility]) presenting with 1.5 days of nausea, vomiting and abdominal pain. Per daughter at bedside, patient began having symptoms Monday night after dinner. Last passed flatus and had a BM at that time. Denies fevers or chills. Pain improved in ED after getting morphine and NGT placement.    In the ED, stable and afebrile. Labs unremarkable. CT showed incarcerated SBO at right ventral hernia. (15 Rambo 2022 08:22)    Patient s/p ventral hernia repair, laparopscopic, c/b enterotomy intraoperatively, converted to open with primary repair on 6/15. Patient on floors with NGT, NPO. On 6/21, 2am rapid response called for fever to 104 rectal, tachycardia to 120s and tachypnea. Unable to answer questions per daughter, in native [facility]. Stat labs drawn. SICU consulted for sepsis management and altered mental status. CT scan obtained on way.       ALLERGIES:  No Known Allergies      --------------------------------------------------------------------------------------    MEDICATIONS:    Neurologic Medications  acetaminophen   IVPB .. 1000 milliGRAM(s) IV Intermittent every 6 hours  HYDROmorphone  Injectable 0.5 milliGRAM(s) IV Push every 4 hours PRN Moderate Pain (4 - 6)  HYDROmorphone  Injectable 1 milliGRAM(s) IV Push every 3 hours PRN Severe Pain (7 - 10)    Respiratory Medications  acetylcysteine 20%  Inhalation 4 milliLiter(s) Inhalation every 8 hours  ALBUTerol   0.5% 2.5 milliGRAM(s) Nebulizer every 8 hours    Cardiovascular Medications    Gastrointestinal Medications  bisacodyl Suppository 10 milliGRAM(s) Rectal at bedtime  lactated ringers 1000 milliLiter(s) IV Continuous <Continuous>  lactated ringers. 1000 milliLiter(s) IV Continuous <Continuous>  pantoprazole  Injectable 40 milliGRAM(s) IV Push every 24 hours  potassium phosphate IVPB 30 milliMole(s) IV Intermittent once  sodium chloride 0.9% lock flush 10 milliLiter(s) IV Push every 1 hour PRN Pre/post blood products, medications, blood draw, and to maintain line patency    Genitourinary Medications    Hematologic/Oncologic Medications  enoxaparin Injectable 40 milliGRAM(s) SubCutaneous every 24 hours  influenza  Vaccine (HIGH DOSE) 0.7 milliLiter(s) IntraMuscular once    Antimicrobial/Immunologic Medications  piperacillin/tazobactam IVPB.. 3.375 Gram(s) IV Intermittent every 8 hours  vancomycin  IVPB 1500 milliGRAM(s) IV Intermittent every 12 hours    Endocrine/Metabolic Medications    Topical/Other Medications  chlorhexidine 0.12% Liquid 15 milliLiter(s) Oral Mucosa every 12 hours  chlorhexidine 4% Liquid 1 Application(s) Topical <User Schedule>  lidocaine   4% Patch 1 Patch Transdermal daily PRN pain  tetracaine/benzocaine/butamben Spray 1 Spray(s) Topical every 6 hours PRN Throat pain from NGT    --------------------------------------------------------------------------------------    VITAL SIGNS:    --------------------------------------------------------------------------------------    INS AND OUTS:  ICU Vital Signs Last 24 Hrs  T(C): 36.8 (23 Jun 2022 00:00), Max: 37.4 (22 Jun 2022 04:00)  T(F): 98.2 (23 Jun 2022 00:00), Max: 99.4 (22 Jun 2022 04:00)  HR: 53 (23 Jun 2022 03:10) (53 - 84)  BP: --  BP(mean): --  ABP: 110/53 (23 Jun 2022 03:00) (90/51 - 148/64)  ABP(mean): 75 (23 Jun 2022 03:00) (66 - 97)  RR: 18 (23 Jun 2022 03:00) (12 - 24)  SpO2: 99% (23 Jun 2022 03:10) (96% - 100%)    --------------------------------------------------------------------------------------    EXAM  NEUROLOGY  -Intubated   Exam: Normal, in no acute distress.  No focal neurologic deficits.    RESPIRATORY  Exam:  Normal expansion/effort.  Mechanical Ventilation: Mode: AC/ CMV (Assist Control/ Continuous Mandatory Ventilation), RR (machine): 18, TV (machine): 400, FiO2: 30, PEEP: 5, ITime: 0.97, MAP: 12, PIP: 29    CARDIOVASCULAR  Exam:   Regular rate and rhythm.      GI/NUTRITION  Exam: Abdomen soft, Non-tender, Non-distended. Stoma dusky, moist, bowel sweat noted. NGT in place with dark bilious output.   Current Diet: NPO    VASCULAR  Exam: Extremities warm, pink, well-perfused.     METABOLIC / FLUIDS / ELECTROLYTES  lactated ringers 1000 milliLiter(s) IV Continuous <Continuous>  lactated ringers. 1000 milliLiter(s) IV Continuous <Continuous>  potassium phosphate IVPB 30 milliMole(s) IV Intermittent once      HEMATOLOGIC  [x] VTE Prophylaxis: enoxaparin Injectable 40 milliGRAM(s) SubCutaneous every 24 hours    Transfusions:	[] PRBC	[] Platelets		[] FFP	[] Cryoprecipitate    INFECTIOUS DISEASE  Antimicrobials/Immunologic Medications:  influenza  Vaccine (HIGH DOSE) 0.7 milliLiter(s) IntraMuscular once  piperacillin/tazobactam IVPB.. 3.375 Gram(s) IV Intermittent every 8 hours  vancomycin  IVPB 1500 milliGRAM(s) IV Intermittent every 12 hours    Day #      of     ***    TUBES / LINES / DRAINS  ***  [x] Peripheral IV  [] Central Venous Line     	[] R	[] L	[] IJ	[] Fem	[] SC	Date Placed:   [x] Arterial Line		[x] R	[] L	[] Fem	[] Rad	[x] Ax	Date Placed: 06/21/22  [] PICC		[] Midline		[] Mediport  [] Urinary Catheter		Date Placed:   [x] Necessity of urinary, arterial, and venous catheters discussed    --------------------------------------------------------------------------------------    LABS        T(C): 36.8 (06-23-22 @ 00:00), Max: 37.4 (06-22-22 @ 04:00)  HR: 53 (06-23-22 @ 03:10) (53 - 84)  BP: --  RR: 18 (06-23-22 @ 03:00) (12 - 24)  SpO2: 99% (06-23-22 @ 03:10) (96% - 100%)    06-21-22 @ 07:01  -  06-22-22 @ 07:00  --------------------------------------------------------  IN: 6499.6 mL / OUT: 4485 mL / NET: 2014.6 mL    06-22-22 @ 07:01  -  06-23-22 @ 03:52  --------------------------------------------------------  IN: 4900 mL / OUT: 1905 mL / NET: 2995 mL        LABS                        10.3   13.86 )-----------( 228      ( 23 Jun 2022 01:29 )             30.8     06-23    129<L>  |  98  |  15  ----------------------------<  106<H>  4.1   |  23  |  0.74    Ca    7.3<L>      23 Jun 2022 01:29  Phos  2.0     06-23  Mg     2.00     06-23    TPro  5.2<L>  /  Alb  2.4<L>  /  TBili  1.1  /  DBili  x   /  AST  15  /  ALT  8   /  AlkPhos  54  06-21    PT/INR - ( 21 Jun 2022 04:30 )   PT: 14.0 sec;   INR: 1.20 ratio         PTT - ( 21 Jun 2022 04:30 )  PTT:22.8 sec      --------------------------------------------------------------------------------------    OTHER LABORATORY:     IMAGING STUDIES:   CXR:     ASSESSMENT:  67y Female  Patient is a 66yo F (speaks Serbian dialect) with a history of HTN, HLD, L tibia surgery, lap kevan (2011, [facility]) presenting with 1.5 days of nausea, vomiting and abdominal pain. Per daughter at bedside, patient began having symptoms Monday night after dinner. Last passed flatus and had a BM at that time. Denies fevers or chills. Pain improved in ED after getting morphine and NGT placement.    In the ED, stable and afebrile. Labs unremarkable. CT showed incarcerated SBO at right ventral hernia. (15 Rambo 2022 08:22)    Patient s/p ventral hernia repair, laparopscopic, c/b enterotomy intraoperatively, converted to open with primary repair on 6/15. Patient on floors with NGT, NPO. On 6/21, 2am rapid response called for fever to 104 rectal, tachycardia to 120s and tachypnea. Unable to answer questions per daughter, in native [place]. Stat labs drawn. SICU consulted for sepsis management and altered mental status. CT scan obtained on way.           PLAN:    NEUROLOGY:  -off sedation   -pain control: Dilaudid prn, standing tylenol  -lidocaine patch     RESPIRATORY:  -intubated   -Volume /18/5/30%  -CPAP as tolerated   - Maintain O2 saturation >92%  -IPV, nebs, Mucomyst for heavy secretions    CARDIOVASCULAR:  -off pressors  -tc trac in place  -Keep MAP >65    GI / NUTRITION:  -diet: TF  -end ileostomy, ostomy has begun to put out  -protonix  - dulcolax    RENAL / GENITOURINARY:  -fu urine osmolality  - Lasix 20 6/23PM   - DC mIVF   - Indwelling rajput catheter  - Monitor electrolytes and replete PRN  - Continue to monitor strict ins and outs q1 hour    HEMATOLOGIC:  -H&H stable  -SCDS  -DVT px: lovenox    INFECTIOUS DISEASE:  -abx:  zosyn  - vanc to resume 6/24  - MRSA PCR+  -currently afebrile  -Monitor WBCs    ENDOCRINOLOGY:  - No active issues  - Continue to monitor glucose on BMP (goal 140-180)    Tubes/Lines/Drains  -Right Axillary A line   -R IJ CVC  -PIVS    Disposition: SICU  --------------------------------------------------------------------------------------    Critical Care Diagnoses:    --------------------------------------------------------------------------------------    Critical Care Diagnoses:

## 2022-06-23 NOTE — PROGRESS NOTE ADULT - ATTENDING COMMENTS
I have personally interviewed and examined this patient, reviewed pertinent labs and imaging on SICU rounds.    50   minutes in total were spent in providing direct critical care for the diagnoses, assessment and plan outlined below.  This patient suffers from a critical illness that acutely impairs one or more vital organ systems such that there is a high probability of life threatening or imminent deterioration of the patient's condition.  These diagnoses are unrelated to the surgical procedure.    Additionally, time spent in teaching or the performance of separately billable procedures was not counted toward my critical care time.  There is no overlap.  Time included review of vitals, labs, imaging, discussion with consultants/surrogate decision-makers.    67F morbidly obese POD7 s/p lap converted to open incarcerated umbilical hernia repair POD2 s/p emergent relap, SBR, end-ileostomy for enteric leak now critically ill from intra-abdominal sepsis and MRSA PNA.  JAMISON. Malnutrition.      NEUROLOGY:  -off sedation   -pain control: Dilaudid prn, standing tylenol  -lidocaine patch     RESPIRATORY: right pleural effusion  -intubated   -Volume /20/5/30%  -CPAP as tolerated   - Maintain O2 saturation >92%  -IPV, nebs, Mucomyst for secretions  -extubation if secretions manageable    CARDIOVASCULAR:  -off pressors  -tc trac in place  -Keep MAP >65    GI / NUTRITION: malnutrition due to disease stress, hypocaloric intake>5 days, obesity  -diet: NPO  -NGT to LCWS  -end ileostomy, monitor stoma and colostomy bag output   -protonix  -dulcolax x 3 days  -may start tube feeds with close surveillance for gastric tolerance now that ostomy functional of flatus    RENAL / GENITOURINARY:  -fu urine osmolality   -LR @ 125ml/hr +banana bag -->d/c add'l IVF today and try gentle diuresis later this afternoon  - Indwelling rajput catheter  - Monitor electrolytes and replete PRN  - Continue to monitor strict ins and outs q1 hour    HEMATOLOGIC:  -H&H stable  -SCDS  -DVT px: lovenox    INFECTIOUS DISEASE: intra-abd sepsis and MRSA PNA (total antibiotic course anticipated to be 4-8 days)  -abx: vanc and zosyn  -draw vanc trough before 4th dose (morning of 06/23)  -fu MRSA/MSSA swab  -currently afebrile  -Monitor WBCs    ENDOCRINOLOGY:  - No active issues  - Continue to monitor glucose on BMP (goal 140-180)    Disposition: SICU

## 2022-06-24 LAB
ANION GAP SERPL CALC-SCNC: 11 MMOL/L — SIGNIFICANT CHANGE UP (ref 7–14)
ANION GAP SERPL CALC-SCNC: 9 MMOL/L — SIGNIFICANT CHANGE UP (ref 7–14)
APTT BLD: 24.1 SEC — LOW (ref 27–36.3)
BLD GP AB SCN SERPL QL: NEGATIVE — SIGNIFICANT CHANGE UP
BLOOD GAS ARTERIAL - LYTES,HGB,ICA,LACT RESULT: SIGNIFICANT CHANGE UP
BLOOD GAS ARTERIAL - LYTES,HGB,ICA,LACT RESULT: SIGNIFICANT CHANGE UP
BUN SERPL-MCNC: 11 MG/DL — SIGNIFICANT CHANGE UP (ref 7–23)
BUN SERPL-MCNC: 15 MG/DL — SIGNIFICANT CHANGE UP (ref 7–23)
CALCIUM SERPL-MCNC: 7.6 MG/DL — LOW (ref 8.4–10.5)
CALCIUM SERPL-MCNC: 8.1 MG/DL — LOW (ref 8.4–10.5)
CHLORIDE SERPL-SCNC: 95 MMOL/L — LOW (ref 98–107)
CHLORIDE SERPL-SCNC: 97 MMOL/L — LOW (ref 98–107)
CO2 SERPL-SCNC: 26 MMOL/L — SIGNIFICANT CHANGE UP (ref 22–31)
CO2 SERPL-SCNC: 34 MMOL/L — HIGH (ref 22–31)
CREAT SERPL-MCNC: 0.79 MG/DL — SIGNIFICANT CHANGE UP (ref 0.5–1.3)
CREAT SERPL-MCNC: 0.86 MG/DL — SIGNIFICANT CHANGE UP (ref 0.5–1.3)
EGFR: 74 ML/MIN/1.73M2 — SIGNIFICANT CHANGE UP
EGFR: 82 ML/MIN/1.73M2 — SIGNIFICANT CHANGE UP
GLUCOSE SERPL-MCNC: 69 MG/DL — LOW (ref 70–99)
GLUCOSE SERPL-MCNC: 83 MG/DL — SIGNIFICANT CHANGE UP (ref 70–99)
HCT VFR BLD CALC: 32.4 % — LOW (ref 34.5–45)
HGB BLD-MCNC: 10.6 G/DL — LOW (ref 11.5–15.5)
INR BLD: 1.01 RATIO — SIGNIFICANT CHANGE UP (ref 0.88–1.16)
MAGNESIUM SERPL-MCNC: 1.8 MG/DL — SIGNIFICANT CHANGE UP (ref 1.6–2.6)
MAGNESIUM SERPL-MCNC: 1.9 MG/DL — SIGNIFICANT CHANGE UP (ref 1.6–2.6)
MCHC RBC-ENTMCNC: 26.4 PG — LOW (ref 27–34)
MCHC RBC-ENTMCNC: 32.7 GM/DL — SIGNIFICANT CHANGE UP (ref 32–36)
MCV RBC AUTO: 80.6 FL — SIGNIFICANT CHANGE UP (ref 80–100)
NRBC # BLD: 0 /100 WBCS — SIGNIFICANT CHANGE UP
NRBC # FLD: 0.03 K/UL — HIGH
PHOSPHATE SERPL-MCNC: 4.2 MG/DL — SIGNIFICANT CHANGE UP (ref 2.5–4.5)
PHOSPHATE SERPL-MCNC: 4.3 MG/DL — SIGNIFICANT CHANGE UP (ref 2.5–4.5)
PLATELET # BLD AUTO: 272 K/UL — SIGNIFICANT CHANGE UP (ref 150–400)
POTASSIUM SERPL-MCNC: 3.6 MMOL/L — SIGNIFICANT CHANGE UP (ref 3.5–5.3)
POTASSIUM SERPL-MCNC: 3.7 MMOL/L — SIGNIFICANT CHANGE UP (ref 3.5–5.3)
POTASSIUM SERPL-SCNC: 3.6 MMOL/L — SIGNIFICANT CHANGE UP (ref 3.5–5.3)
POTASSIUM SERPL-SCNC: 3.7 MMOL/L — SIGNIFICANT CHANGE UP (ref 3.5–5.3)
PROTHROM AB SERPL-ACNC: 11.7 SEC — SIGNIFICANT CHANGE UP (ref 10.5–13.4)
RBC # BLD: 4.02 M/UL — SIGNIFICANT CHANGE UP (ref 3.8–5.2)
RBC # FLD: 15.7 % — HIGH (ref 10.3–14.5)
RH IG SCN BLD-IMP: POSITIVE — SIGNIFICANT CHANGE UP
SODIUM SERPL-SCNC: 134 MMOL/L — LOW (ref 135–145)
SODIUM SERPL-SCNC: 138 MMOL/L — SIGNIFICANT CHANGE UP (ref 135–145)
WBC # BLD: 13.85 K/UL — HIGH (ref 3.8–10.5)
WBC # FLD AUTO: 13.85 K/UL — HIGH (ref 3.8–10.5)

## 2022-06-24 PROCEDURE — 71045 X-RAY EXAM CHEST 1 VIEW: CPT | Mod: 26

## 2022-06-24 PROCEDURE — 99291 CRITICAL CARE FIRST HOUR: CPT

## 2022-06-24 RX ORDER — MUPIROCIN 20 MG/G
1 OINTMENT TOPICAL
Refills: 0 | Status: COMPLETED | OUTPATIENT
Start: 2022-06-24 | End: 2022-06-29

## 2022-06-24 RX ORDER — MAGNESIUM SULFATE 500 MG/ML
2 VIAL (ML) INJECTION ONCE
Refills: 0 | Status: DISCONTINUED | OUTPATIENT
Start: 2022-06-24 | End: 2022-06-24

## 2022-06-24 RX ORDER — ACETAMINOPHEN 500 MG
1000 TABLET ORAL EVERY 6 HOURS
Refills: 0 | Status: DISCONTINUED | OUTPATIENT
Start: 2022-06-24 | End: 2022-06-25

## 2022-06-24 RX ORDER — MAGNESIUM SULFATE 500 MG/ML
1 VIAL (ML) INJECTION ONCE
Refills: 0 | Status: COMPLETED | OUTPATIENT
Start: 2022-06-24 | End: 2022-06-24

## 2022-06-24 RX ORDER — SIMETHICONE 80 MG/1
80 TABLET, CHEWABLE ORAL ONCE
Refills: 0 | Status: COMPLETED | OUTPATIENT
Start: 2022-06-24 | End: 2022-06-24

## 2022-06-24 RX ORDER — FUROSEMIDE 40 MG
60 TABLET ORAL ONCE
Refills: 0 | Status: COMPLETED | OUTPATIENT
Start: 2022-06-24 | End: 2022-06-24

## 2022-06-24 RX ORDER — POTASSIUM CHLORIDE 20 MEQ
10 PACKET (EA) ORAL
Refills: 0 | Status: DISCONTINUED | OUTPATIENT
Start: 2022-06-24 | End: 2022-06-24

## 2022-06-24 RX ORDER — POTASSIUM CHLORIDE 20 MEQ
20 PACKET (EA) ORAL
Refills: 0 | Status: COMPLETED | OUTPATIENT
Start: 2022-06-24 | End: 2022-06-24

## 2022-06-24 RX ORDER — POTASSIUM CHLORIDE 20 MEQ
10 PACKET (EA) ORAL
Refills: 0 | Status: COMPLETED | OUTPATIENT
Start: 2022-06-24 | End: 2022-06-24

## 2022-06-24 RX ORDER — MAGNESIUM SULFATE 500 MG/ML
2 VIAL (ML) INJECTION ONCE
Refills: 0 | Status: COMPLETED | OUTPATIENT
Start: 2022-06-24 | End: 2022-06-24

## 2022-06-24 RX ADMIN — Medication 400 MILLIGRAM(S): at 22:16

## 2022-06-24 RX ADMIN — HYDROMORPHONE HYDROCHLORIDE 1 MILLIGRAM(S): 2 INJECTION INTRAMUSCULAR; INTRAVENOUS; SUBCUTANEOUS at 19:56

## 2022-06-24 RX ADMIN — Medication 10 MILLIGRAM(S): at 22:44

## 2022-06-24 RX ADMIN — HYDROMORPHONE HYDROCHLORIDE 1 MILLIGRAM(S): 2 INJECTION INTRAMUSCULAR; INTRAVENOUS; SUBCUTANEOUS at 16:21

## 2022-06-24 RX ADMIN — HYDROMORPHONE HYDROCHLORIDE 1 MILLIGRAM(S): 2 INJECTION INTRAMUSCULAR; INTRAVENOUS; SUBCUTANEOUS at 06:40

## 2022-06-24 RX ADMIN — Medication 100 MILLIEQUIVALENT(S): at 01:51

## 2022-06-24 RX ADMIN — HYDROMORPHONE HYDROCHLORIDE 1 MILLIGRAM(S): 2 INJECTION INTRAMUSCULAR; INTRAVENOUS; SUBCUTANEOUS at 01:09

## 2022-06-24 RX ADMIN — Medication 60 MILLIGRAM(S): at 11:17

## 2022-06-24 RX ADMIN — Medication 50 MILLIEQUIVALENT(S): at 20:57

## 2022-06-24 RX ADMIN — ALBUTEROL 2.5 MILLIGRAM(S): 90 AEROSOL, METERED ORAL at 07:51

## 2022-06-24 RX ADMIN — HYDROMORPHONE HYDROCHLORIDE 1 MILLIGRAM(S): 2 INJECTION INTRAMUSCULAR; INTRAVENOUS; SUBCUTANEOUS at 20:26

## 2022-06-24 RX ADMIN — CHLORHEXIDINE GLUCONATE 1 APPLICATION(S): 213 SOLUTION TOPICAL at 06:39

## 2022-06-24 RX ADMIN — SIMETHICONE 80 MILLIGRAM(S): 80 TABLET, CHEWABLE ORAL at 15:51

## 2022-06-24 RX ADMIN — Medication 25 GRAM(S): at 22:16

## 2022-06-24 RX ADMIN — Medication 1000 MILLIGRAM(S): at 00:00

## 2022-06-24 RX ADMIN — Medication 100 MILLIEQUIVALENT(S): at 03:13

## 2022-06-24 RX ADMIN — ALBUTEROL 2.5 MILLIGRAM(S): 90 AEROSOL, METERED ORAL at 23:25

## 2022-06-24 RX ADMIN — MUPIROCIN 1 APPLICATION(S): 20 OINTMENT TOPICAL at 18:13

## 2022-06-24 RX ADMIN — HYDROMORPHONE HYDROCHLORIDE 1 MILLIGRAM(S): 2 INJECTION INTRAMUSCULAR; INTRAVENOUS; SUBCUTANEOUS at 01:39

## 2022-06-24 RX ADMIN — Medication 100 MILLIEQUIVALENT(S): at 04:59

## 2022-06-24 RX ADMIN — HYDROMORPHONE HYDROCHLORIDE 1 MILLIGRAM(S): 2 INJECTION INTRAMUSCULAR; INTRAVENOUS; SUBCUTANEOUS at 07:10

## 2022-06-24 RX ADMIN — HYDROMORPHONE HYDROCHLORIDE 1 MILLIGRAM(S): 2 INJECTION INTRAMUSCULAR; INTRAVENOUS; SUBCUTANEOUS at 16:35

## 2022-06-24 RX ADMIN — HYDROMORPHONE HYDROCHLORIDE 0.5 MILLIGRAM(S): 2 INJECTION INTRAMUSCULAR; INTRAVENOUS; SUBCUTANEOUS at 12:47

## 2022-06-24 RX ADMIN — ENOXAPARIN SODIUM 40 MILLIGRAM(S): 100 INJECTION SUBCUTANEOUS at 06:38

## 2022-06-24 RX ADMIN — ALBUTEROL 2.5 MILLIGRAM(S): 90 AEROSOL, METERED ORAL at 14:27

## 2022-06-24 RX ADMIN — Medication 1000 MILLIGRAM(S): at 22:46

## 2022-06-24 RX ADMIN — PIPERACILLIN AND TAZOBACTAM 25 GRAM(S): 4; .5 INJECTION, POWDER, LYOPHILIZED, FOR SOLUTION INTRAVENOUS at 06:37

## 2022-06-24 RX ADMIN — Medication 1000 MILLIGRAM(S): at 06:30

## 2022-06-24 RX ADMIN — Medication 400 MILLIGRAM(S): at 06:39

## 2022-06-24 RX ADMIN — HYDROMORPHONE HYDROCHLORIDE 0.5 MILLIGRAM(S): 2 INJECTION INTRAMUSCULAR; INTRAVENOUS; SUBCUTANEOUS at 12:32

## 2022-06-24 RX ADMIN — PANTOPRAZOLE SODIUM 40 MILLIGRAM(S): 20 TABLET, DELAYED RELEASE ORAL at 12:19

## 2022-06-24 RX ADMIN — Medication 100 GRAM(S): at 01:51

## 2022-06-24 RX ADMIN — PIPERACILLIN AND TAZOBACTAM 25 GRAM(S): 4; .5 INJECTION, POWDER, LYOPHILIZED, FOR SOLUTION INTRAVENOUS at 22:43

## 2022-06-24 RX ADMIN — PIPERACILLIN AND TAZOBACTAM 25 GRAM(S): 4; .5 INJECTION, POWDER, LYOPHILIZED, FOR SOLUTION INTRAVENOUS at 14:34

## 2022-06-24 NOTE — PROGRESS NOTE ADULT - ASSESSMENT
67F (speaks Maltese dialect) with a history of HTN, HLD, L tibia surgery, lap kevan (2011, [facility]) presenting w incarcerated SBO at right ventral hernia s/p (6/15) right ventral hernia s/p laproscopic ventral hernia repair, c/b enterotomy intaoperatively, converted to open with primary repair. RRT on GMF, 6/21 for fever tmax 104, tacycardia to 120s, tachypnea. SICU accepted for severe sepsis. Patient now s/p (6/21) RTOR ExLap, SBR, end ileostomy, fascia closed, midline wound VAC. Extubated 6/23.    PLAN:    NEUROLOGY:  -Pain control: Tylenol 1g IV q6hr and Dilaudid 0.5/1mg IV q4hr PRN  - Lidocaine patch     RESPIRATORY:  - Maintain O2 saturation >92%  - Albuterol 2.5mg nebs q8hr for heavy secretions    CARDIOVASCULAR:  - Monitor hemodynamics    GI / NUTRITION:  - NPO holding TF  - GI ppx: Protonix 40mg IV daily  - Bowel regimen: Dulcolax HS x3 days    RENAL / GENITOURINARY:  - Monitor I&Os  - Monitor electrolytes and replete PRN    HEMATOLOGIC:  - VTE ppx: Lovenox 40mg SQ daily  - Trend H/H on CBC daily    INFECTIOUS DISEASE:  - Abx: Zosyn 3.375g IV q8hr  - MRSA swab (6/22): Negative  - BCx (6/21): NGTD  - Trend fever curve and WBC on CBC daily    ENDOCRINOLOGY:  - No active issues  - Continue to monitor glucose on BMP (goal 140-180)    Tubes/Lines/Drains  -Right Axillary A line   -R IJ CVC  -PIVS    Disposition: SICU   67F (speaks Lao dialect) with a history of HTN, HLD, L tibia surgery, lap kevan (2011, [facility]) presenting w incarcerated SBO at right ventral hernia s/p (6/15) right ventral hernia s/p laproscopic ventral hernia repair, c/b enterotomy intaoperatively, converted to open with primary repair. RRT on GMF, 6/21 for fever tmax 104, tacycardia to 120s, tachypnea. SICU accepted for severe sepsis. Patient now s/p (6/21) RTOR ExLap, SBR, end ileostomy, fascia closed, midline wound VAC. Extubated 6/23.    PLAN:    NEUROLOGY:  - Pain control: Tylenol 1g IV q6hr and Dilaudid 0.5/1mg IV q4hr PRN  - Lidocaine patch    RESPIRATORY:  - Maintain O2 saturation >92%  - Albuterol 2.5mg nebs q8hr for heavy secretions    CARDIOVASCULAR:  - Monitor hemodynamics  - Discontinue arterial line    GI / NUTRITION:  - NPO holding TF  - GI ppx: Protonix 40mg IV daily  - Bowel regimen: Dulcolax HS x3 days    RENAL / GENITOURINARY:  - Lasix 60 mg  - Monitor I&Os  - Monitor electrolytes and replete PRN    HEMATOLOGIC:  - VTE ppx: Lovenox 40mg SQ daily  - Trend H/H on CBC daily    INFECTIOUS DISEASE:  - Abx: Zosyn 3.375g IV q8hr  - MRSA swab (6/22): Negative  - BCx (6/21): NGTD  - Trend fever curve and WBC on CBC daily    ENDOCRINOLOGY:  - No active issues  - Continue to monitor glucose on BMP (goal 140-180)    Tubes/Lines/Drains  -Right Axillary A line   -R IJ CVC  -PIVS    Disposition: SICU   67F (speaks English dialect) with a history of HTN, HLD, L tibia surgery, lap kevan (2011, [facility]) presenting w incarcerated SBO at right ventral hernia s/p (6/15) right ventral hernia s/p laproscopic ventral hernia repair, c/b enterotomy intaoperatively, converted to open with primary repair. RRT on GMF, 6/21 for fever tmax 104, tacycardia to 120s, tachypnea. SICU accepted for severe sepsis. Patient now s/p (6/21) RTOR ExLap, SBR, end ileostomy, fascia closed, midline wound VAC. Extubated 6/23.    PLAN:    NEUROLOGY:  - Pain control: Tylenol 1g IV q6hr and Dilaudid 0.5/1mg IV q4hr PRN  - Lidocaine patch    RESPIRATORY:  - Maintain O2 saturation >92%  - Albuterol 2.5mg nebs q8hr for heavy secretions    CARDIOVASCULAR:  - Monitor hemodynamics  - Discontinue arterial line    GI / NUTRITION:  - NPO holding TF  - GI ppx: Protonix 40mg IV daily  - Bowel regimen: Dulcolax HS x3 days    RENAL / GENITOURINARY:  - Lasix 60 mg  - Monitor I&Os  - Monitor electrolytes and replete PRN    HEMATOLOGIC:  - VTE ppx: Lovenox 40mg SQ daily  - Trend H/H on CBC daily    INFECTIOUS DISEASE:  - Abx: Zosyn 3.375g IV q8hr  - MRSA swab (6/22): Negative  - BCx (6/21): NGTD  - Trend fever curve and WBC on CBC daily    ENDOCRINOLOGY:  - No active issues  - Continue to monitor glucose on BMP (goal 140-180)    Tubes/Lines/Drains  -Right Axillary A line   -R IJ CVC  -PIVS    Disposition: SICU

## 2022-06-24 NOTE — PROGRESS NOTE ADULT - ATTENDING COMMENTS
I agree with the detailed interval history, physical, and plan, which I have reviewed and edited where appropriate'; also agree with notes/assessment with my team on service.  I have personally examined the patient.  I was physically present for the key portions of the evaluation and management (E/M) service provided.  I reviewed all the pertinent data.  The patient is a critical care patient with life threatening hemodynamic and metabolic instability in SICU.  The SICU team has a constant risk benefit analyzes discussion and coordinating care with the primary team and all consultants.   The patient is in SICU with the chief complaint and diagnosis mentioned in the note.   The plan will be specified in the note.  67F  s/p right ventral hernia s/p laparoscopic ventral hernia repair in SICU for severe sepsis. Extubated.  EXAM  NEUROLOGY  Exam;, no focal deficits.  RESPIRATORY  Exam: clear  CARDIOVASCULAR  Exam: Regular rate   GI/NUTRITION  Exam: Abdomen soft, Non-tender  VASCULAR  Exam: Extremities warm,  PLAN:    NEUROLOGY: Pain control:  -  Tylenol 1g IV q6hr and Dilaudid 0.5/1mg IV q4hr PRN  RESPIRATORY: respiratory abnormality  - Albuterol  CARDIOVASCULAR:  - Discontinue arterial line  GI / NUTRITION:  - GI ppx: Protonix 40mg IV daily  RENAL / GENITOURINARY:  - Lasix 60 mg  HEMATOLOGIC:  - VTE ppx: Lovenox 40mg SQ daily  INFECTIOUS DISEASE: severe sepsis  - Abx: Zosyn 3.375g IV q8hr  ENDOCRINOLOGY:  -glucose on BMP   Disposition: SICU

## 2022-06-24 NOTE — PROGRESS NOTE ADULT - SUBJECTIVE AND OBJECTIVE BOX
Surgery Progress Note     Subjective/24hour Events:   Patient seen and examined.       Vital Signs:  Vital Signs Last 24 Hrs  T(C): 36.1 (23 Jun 2022 20:00), Max: 37.2 (23 Jun 2022 12:00)  T(F): 96.9 (23 Jun 2022 20:00), Max: 99 (23 Jun 2022 12:00)  HR: 69 (24 Jun 2022 00:00) (52 - 79)  BP: --  BP(mean): --  RR: 12 (24 Jun 2022 00:00) (10 - 22)  SpO2: 98% (24 Jun 2022 00:00) (98% - 100%)    CAPILLARY BLOOD GLUCOSE          I&O's Detail    22 Jun 2022 07:01  -  23 Jun 2022 07:00  --------------------------------------------------------  IN:    IV PiggyBack: 1600 mL    Lactated Ringers: 2000 mL    Lactated Ringers: 750 mL    Lactated Ringers w/ Additives: 1900 mL  Total IN: 6250 mL    OUT:    Indwelling Catheter - Urethral (mL): 930 mL    Nasogastric/Oral tube (mL): 1450 mL    Stool (mL): 80 mL    VAC (Vacuum Assisted Closure) System (mL): 0 mL  Total OUT: 2460 mL    Total NET: 3790 mL      23 Jun 2022 07:01  -  24 Jun 2022 00:51  --------------------------------------------------------  IN:    IV PiggyBack: 300 mL    Jevity 1.2: 100 mL    Lactated Ringers w/ Additives: 200 mL  Total IN: 600 mL    OUT:    Indwelling Catheter - Urethral (mL): 3025 mL    Nasogastric/Oral tube (mL): 250 mL    Stool (mL): 250 mL    VAC (Vacuum Assisted Closure) System (mL): 0 mL  Total OUT: 3525 mL    Total NET: -2925 mL            Physical Exam:  General: NAD, resting in bed comfortably, intubated, awake nodding yes/no  Cardiac: regular rate, warm and well perfused  Respiratory: intubated, CPAP5/5, nonlabored.  Abdomen: soft, nondistended, midline incision with black wound vac to suction.  Extremities: normal strength, FROM, no deformities        Labs:    06-23    135  |  100  |  14  ----------------------------<  90  4.0   |  26  |  0.82    Ca    7.5<L>      23 Jun 2022 15:22  Phos  4.0     06-23  Mg     2.00     06-23    TPro  5.5<L>  /  Alb  2.4<L>  /  TBili  0.7  /  DBili  x   /  AST  28  /  ALT  22  /  AlkPhos  78  06-23    LIVER FUNCTIONS - ( 23 Jun 2022 15:22 )  Alb: 2.4 g/dL / Pro: 5.5 g/dL / ALK PHOS: 78 U/L / ALT: 22 U/L / AST: 28 U/L / GGT: x                                 10.5   16.58 )-----------( 286      ( 23 Jun 2022 15:22 )             31.8          Surgery Progress Note     Subjective/24hour Events:   Patient seen and examined. Extubated, having ileostomy function, remains in SICU.    ICU Vital Signs Last 24 Hrs  T(C): 35.6 (24 Jun 2022 08:15), Max: 37.2 (23 Jun 2022 12:00)  T(F): 96 (24 Jun 2022 08:15), Max: 99 (23 Jun 2022 12:00)  HR: 77 (24 Jun 2022 09:00) (61 - 79)  BP: --  BP(mean): --  ABP: 126/59 (24 Jun 2022 09:00) (97/49 - 147/69)  ABP(mean): 85 (24 Jun 2022 09:00) (68 - 101)  RR: 17 (24 Jun 2022 09:00) (10 - 21)  SpO2: 99% (24 Jun 2022 09:00) (96% - 100%)    I&O's Detail    23 Jun 2022 07:01  -  24 Jun 2022 07:00  --------------------------------------------------------  IN:    IV PiggyBack: 500 mL    Jevity 1.2: 100 mL    Lactated Ringers w/ Additives: 200 mL  Total IN: 800 mL    OUT:    Indwelling Catheter - Urethral (mL): 3785 mL    Nasogastric/Oral tube (mL): 750 mL    Stool (mL): 250 mL    VAC (Vacuum Assisted Closure) System (mL): 0 mL  Total OUT: 4785 mL    Total NET: -3985 mL      24 Jun 2022 07:01  -  24 Jun 2022 09:54  --------------------------------------------------------  IN:  Total IN: 0 mL    OUT:    Indwelling Catheter - Urethral (mL): 250 mL  Total OUT: 250 mL    Total NET: -250 mL    Physical Exam:  General: NAD, resting in bed comfortably, intubated, awake nodding yes/no  Cardiac: regular rate, warm and well perfused  Respiratory: intubated, CPAP5/5, nonlabored.  Abdomen: soft, nondistended, midline incision with black wound vac to suction.  Extremities: normal strength, FROM, no deformities    Labs:                          10.6   13.85 )-----------( 272      ( 24 Jun 2022 00:40 )             32.4   06-24    134<L>  |  97<L>  |  15  ----------------------------<  83  3.6   |  26  |  0.86    Ca    7.6<L>      24 Jun 2022 00:40  Phos  4.2     06-24  Mg     1.90     06-24    TPro  5.5<L>  /  Alb  2.4<L>  /  TBili  0.7  /  DBili  x   /  AST  28  /  ALT  22  /  AlkPhos  78  06-23

## 2022-06-24 NOTE — PROGRESS NOTE ADULT - SUBJECTIVE AND OBJECTIVE BOX
SICU Daily Progress Note  =====================================================  Interval/Overnight Events:       HPI:    Allergies: No Known Allergies    MEDICATIONS:   --------------------------------------------------------------------------------------  Neurologic Medications  acetaminophen   IVPB .. 1000 milliGRAM(s) IV Intermittent every 6 hours  HYDROmorphone  Injectable 1 milliGRAM(s) IV Push every 3 hours PRN Severe Pain (7 - 10)  HYDROmorphone  Injectable 0.5 milliGRAM(s) IV Push every 4 hours PRN Moderate Pain (4 - 6)    Respiratory Medications  ALBUTerol   0.5% 2.5 milliGRAM(s) Nebulizer every 8 hours    Cardiovascular Medications    Gastrointestinal Medications  bisacodyl Suppository 10 milliGRAM(s) Rectal at bedtime  magnesium sulfate  IVPB 1 Gram(s) IV Intermittent once  pantoprazole  Injectable 40 milliGRAM(s) IV Push every 24 hours  potassium chloride  10 mEq/100 mL IVPB 10 milliEquivalent(s) IV Intermittent every 1 hour  sodium chloride 0.9% lock flush 10 milliLiter(s) IV Push every 1 hour PRN Pre/post blood products, medications, blood draw, and to maintain line patency    Genitourinary Medications    Hematologic/Oncologic Medications  enoxaparin Injectable 40 milliGRAM(s) SubCutaneous every 24 hours  influenza  Vaccine (HIGH DOSE) 0.7 milliLiter(s) IntraMuscular once    Antimicrobial/Immunologic Medications  piperacillin/tazobactam IVPB.. 3.375 Gram(s) IV Intermittent every 8 hours    Endocrine/Metabolic Medications    Topical/Other Medications  chlorhexidine 4% Liquid 1 Application(s) Topical <User Schedule>  lidocaine   4% Patch 1 Patch Transdermal daily PRN pain  tetracaine/benzocaine/butamben Spray 1 Spray(s) Topical every 6 hours PRN Throat pain from NGT    --------------------------------------------------------------------------------------  VITAL SIGNS, INS/OUTS (last 24 hours):  --------------------------------------------------------------------------------------  T(C): 36 (06-24-22 @ 00:00), Max: 37.2 (06-23-22 @ 12:00)  HR: 71 (06-24-22 @ 01:00) (52 - 79)  BP: --  RR: 18 (06-24-22 @ 01:00) (10 - 22)  SpO2: 98% (06-24-22 @ 01:00) (98% - 100%)    06-22-22 @ 07:01  -  06-23-22 @ 07:00  --------------------------------------------------------  IN: 6250 mL / OUT: 2460 mL / NET: 3790 mL    06-23-22 @ 07:01  -  06-24-22 @ 01:28  --------------------------------------------------------  IN: 600 mL / OUT: 3825 mL / NET: -3225 mL      --------------------------------------------------------------------------------------    EXAM  NEUROLOGY  Exam: Normal, NAD, alert, oriented x3, no focal deficits.    HEENT  Exam: Normocephalic, atraumatic, EOMI.     RESPIRATORY  Exam: Normal expansion/effort.  Mechanical Ventilation:     CARDIOVASCULAR  Exam: Regular rate and rhythm.       GI/NUTRITION  Exam: Abdomen soft, Non-tender, Non-distended.     VASCULAR  Exam: Extremities warm, pink, well-perfused.     MUSCULOSKELETAL  Exam: All extremities moving spontaneously without limitations.     SKIN  Exam: Good skin turgor, no skin breakdown.       LABS  --------------------------------------------------------------------------------------                        10.6   13.85 )-----------( 272      ( 24 Jun 2022 00:40 )             32.4   06-24    134<L>  |  97<L>  |  15  ----------------------------<  83  3.6   |  26  |  0.86    Ca    7.6<L>      24 Jun 2022 00:40  Phos  4.2     06-24  Mg     1.90     06-24    TPro  5.5<L>  /  Alb  2.4<L>  /  TBili  0.7  /  DBili  x   /  AST  28  /  ALT  22  /  AlkPhos  78  06-23  ABG - ( 24 Jun 2022 00:45 )  pH, Arterial: 7.39  pH, Blood: x     /  pCO2: 51    /  pO2: 115   / HCO3: 31    / Base Excess: 5.0   /  SaO2: 98.3            PT/INR - ( 24 Jun 2022 00:40 )   PT: 11.7 sec;   INR: 1.01 ratio         PTT - ( 24 Jun 2022 00:40 )  PTT:24.1 sec  --------------------------------------------------------------------------------------     SICU Daily Progress Note  =====================================================  Interval/Overnight Events:     - Lasix 20mg IV x2 with appropriate response  - NGT to suction  - Extubated  - Vancomycin removed, since MRSA swab negative  - No acute events overnight    HPI: 68yo F (speaks Luxembourgish dialect) with a history of HTN, HLD, L tibia surgery, lap kevan (2011, [facility]) presenting with 1.5 days of nausea, vomiting and abdominal pain. Per daughter at bedside, patient began having symptoms Monday night after dinner. Last passed flatus and had a BM at that time. Denies fevers or chills. Pain improved in ED after getting morphine and NGT placement.    In the ED, stable and afebrile. Labs unremarkable. CT showed incarcerated SBO at right ventral hernia. (15 Rambo 2022 08:22)    Patient s/p ventral hernia repair, laparopscopic, c/b enterotomy intraoperatively, converted to open with primary repair on 6/15. Patient on floors with NGT, NPO. On 6/21, 2am rapid response called for fever to 104 rectal, tachycardia to 120s and tachypnea. Unable to answer questions per daughter, in native [employer]. Stat labs drawn. SICU consulted for sepsis management and altered mental status. CT scan obtained on way.     Allergies: No Known Allergies    MEDICATIONS:   --------------------------------------------------------------------------------------  Neurologic Medications  acetaminophen   IVPB .. 1000 milliGRAM(s) IV Intermittent every 6 hours  HYDROmorphone  Injectable 1 milliGRAM(s) IV Push every 3 hours PRN Severe Pain (7 - 10)  HYDROmorphone  Injectable 0.5 milliGRAM(s) IV Push every 4 hours PRN Moderate Pain (4 - 6)    Respiratory Medications  ALBUTerol   0.5% 2.5 milliGRAM(s) Nebulizer every 8 hours    Cardiovascular Medications    Gastrointestinal Medications  bisacodyl Suppository 10 milliGRAM(s) Rectal at bedtime  magnesium sulfate  IVPB 1 Gram(s) IV Intermittent once  pantoprazole  Injectable 40 milliGRAM(s) IV Push every 24 hours  potassium chloride  10 mEq/100 mL IVPB 10 milliEquivalent(s) IV Intermittent every 1 hour  sodium chloride 0.9% lock flush 10 milliLiter(s) IV Push every 1 hour PRN Pre/post blood products, medications, blood draw, and to maintain line patency    Genitourinary Medications    Hematologic/Oncologic Medications  enoxaparin Injectable 40 milliGRAM(s) SubCutaneous every 24 hours  influenza  Vaccine (HIGH DOSE) 0.7 milliLiter(s) IntraMuscular once    Antimicrobial/Immunologic Medications  piperacillin/tazobactam IVPB.. 3.375 Gram(s) IV Intermittent every 8 hours    Endocrine/Metabolic Medications    Topical/Other Medications  chlorhexidine 4% Liquid 1 Application(s) Topical <User Schedule>  lidocaine   4% Patch 1 Patch Transdermal daily PRN pain  tetracaine/benzocaine/butamben Spray 1 Spray(s) Topical every 6 hours PRN Throat pain from NGT    --------------------------------------------------------------------------------------  VITAL SIGNS, INS/OUTS (last 24 hours):  --------------------------------------------------------------------------------------  T(C): 36 (06-24-22 @ 00:00), Max: 37.2 (06-23-22 @ 12:00)  HR: 71 (06-24-22 @ 01:00) (52 - 79)  BP: --  RR: 18 (06-24-22 @ 01:00) (10 - 22)  SpO2: 98% (06-24-22 @ 01:00) (98% - 100%)    06-22-22 @ 07:01  -  06-23-22 @ 07:00  --------------------------------------------------------  IN: 6250 mL / OUT: 2460 mL / NET: 3790 mL    06-23-22 @ 07:01  -  06-24-22 @ 01:28  --------------------------------------------------------  IN: 600 mL / OUT: 3825 mL / NET: -3225 mL      --------------------------------------------------------------------------------------    EXAM  NEUROLOGY  Exam: Normal, NAD, alert, oriented x3, no focal deficits.    HEENT  Exam: Normocephalic, atraumatic, EOMI.     RESPIRATORY  Exam: Normal expansion/effort.  Mechanical Ventilation:     CARDIOVASCULAR  Exam: Regular rate and rhythm.       GI/NUTRITION  Exam: Abdomen soft, Non-tender, Non-distended. Ileostomy with bilious output, midline wound VAC holding suction.    VASCULAR  Exam: Extremities warm, pink, well-perfused.     MUSCULOSKELETAL  Exam: All extremities moving spontaneously without limitations.     SKIN  Exam: Good skin turgor, no skin breakdown.       LABS  --------------------------------------------------------------------------------------                        10.6   13.85 )-----------( 272      ( 24 Jun 2022 00:40 )             32.4   06-24    134<L>  |  97<L>  |  15  ----------------------------<  83  3.6   |  26  |  0.86    Ca    7.6<L>      24 Jun 2022 00:40  Phos  4.2     06-24  Mg     1.90     06-24    TPro  5.5<L>  /  Alb  2.4<L>  /  TBili  0.7  /  DBili  x   /  AST  28  /  ALT  22  /  AlkPhos  78  06-23  ABG - ( 24 Jun 2022 00:45 )  pH, Arterial: 7.39  pH, Blood: x     /  pCO2: 51    /  pO2: 115   / HCO3: 31    / Base Excess: 5.0   /  SaO2: 98.3            PT/INR - ( 24 Jun 2022 00:40 )   PT: 11.7 sec;   INR: 1.01 ratio         PTT - ( 24 Jun 2022 00:40 )  PTT:24.1 sec  --------------------------------------------------------------------------------------     SICU Daily Progress Note  =====================================================  Interval/Overnight Events:     - O/n hypercapnea, on ETCO2 high 30s-40s, monitor  - 60 Lasix  - Remove a line, CVC      HPI: 68yo F (speaks Welsh dialect) with a history of HTN, HLD, L tibia surgery, pj mathur (2011, [facility]) presenting with 1.5 days of nausea, vomiting and abdominal pain. Per daughter at bedside, patient began having symptoms Monday night after dinner. Last passed flatus and had a BM at that time. Denies fevers or chills. Pain improved in ED after getting morphine and NGT placement.    In the ED, stable and afebrile. Labs unremarkable. CT showed incarcerated SBO at right ventral hernia. (15 Rambo 2022 08:22)    Patient s/p ventral hernia repair, laparopscopic, c/b enterotomy intraoperatively, converted to open with primary repair on 6/15. Patient on floors with NGT, NPO. On 6/21, 2am rapid response called for fever to 104 rectal, tachycardia to 120s and tachypnea. Unable to answer questions per daughter, in native [facility]. Stat labs drawn. SICU consulted for sepsis management and altered mental status. CT scan obtained on way.     Allergies: No Known Allergies    MEDICATIONS:   --------------------------------------------------------------------------------------  Neurologic Medications  acetaminophen   IVPB .. 1000 milliGRAM(s) IV Intermittent every 6 hours  HYDROmorphone  Injectable 1 milliGRAM(s) IV Push every 3 hours PRN Severe Pain (7 - 10)  HYDROmorphone  Injectable 0.5 milliGRAM(s) IV Push every 4 hours PRN Moderate Pain (4 - 6)    Respiratory Medications  ALBUTerol   0.5% 2.5 milliGRAM(s) Nebulizer every 8 hours    Cardiovascular Medications    Gastrointestinal Medications  bisacodyl Suppository 10 milliGRAM(s) Rectal at bedtime  magnesium sulfate  IVPB 1 Gram(s) IV Intermittent once  pantoprazole  Injectable 40 milliGRAM(s) IV Push every 24 hours  potassium chloride  10 mEq/100 mL IVPB 10 milliEquivalent(s) IV Intermittent every 1 hour  sodium chloride 0.9% lock flush 10 milliLiter(s) IV Push every 1 hour PRN Pre/post blood products, medications, blood draw, and to maintain line patency    Genitourinary Medications    Hematologic/Oncologic Medications  enoxaparin Injectable 40 milliGRAM(s) SubCutaneous every 24 hours  influenza  Vaccine (HIGH DOSE) 0.7 milliLiter(s) IntraMuscular once    Antimicrobial/Immunologic Medications  piperacillin/tazobactam IVPB.. 3.375 Gram(s) IV Intermittent every 8 hours    Endocrine/Metabolic Medications    Topical/Other Medications  chlorhexidine 4% Liquid 1 Application(s) Topical <User Schedule>  lidocaine   4% Patch 1 Patch Transdermal daily PRN pain  tetracaine/benzocaine/butamben Spray 1 Spray(s) Topical every 6 hours PRN Throat pain from NGT    --------------------------------------------------------------------------------------  VITAL SIGNS, INS/OUTS (last 24 hours):  --------------------------------------------------------------------------------------  T(C): 36 (06-24-22 @ 00:00), Max: 37.2 (06-23-22 @ 12:00)  HR: 71 (06-24-22 @ 01:00) (52 - 79)  BP: --  RR: 18 (06-24-22 @ 01:00) (10 - 22)  SpO2: 98% (06-24-22 @ 01:00) (98% - 100%)    06-22-22 @ 07:01  -  06-23-22 @ 07:00  --------------------------------------------------------  IN: 6250 mL / OUT: 2460 mL / NET: 3790 mL    06-23-22 @ 07:01  -  06-24-22 @ 01:28  --------------------------------------------------------  IN: 600 mL / OUT: 3825 mL / NET: -3225 mL      --------------------------------------------------------------------------------------    EXAM  NEUROLOGY  Exam: Normal, NAD, alert, oriented x3, no focal deficits.    HEENT  Exam: Normocephalic, atraumatic, EOMI.     RESPIRATORY  Exam: Normal expansion/effort.  Mechanical Ventilation:     CARDIOVASCULAR  Exam: Regular rate and rhythm.       GI/NUTRITION  Exam: Abdomen soft, Non-tender, Non-distended. Ileostomy with bilious output, midline wound VAC holding suction.    VASCULAR  Exam: Extremities warm, pink, well-perfused.     MUSCULOSKELETAL  Exam: All extremities moving spontaneously without limitations.     SKIN  Exam: Good skin turgor, no skin breakdown.       LABS  --------------------------------------------------------------------------------------                        10.6   13.85 )-----------( 272      ( 24 Jun 2022 00:40 )             32.4   06-24    134<L>  |  97<L>  |  15  ----------------------------<  83  3.6   |  26  |  0.86    Ca    7.6<L>      24 Jun 2022 00:40  Phos  4.2     06-24  Mg     1.90     06-24    TPro  5.5<L>  /  Alb  2.4<L>  /  TBili  0.7  /  DBili  x   /  AST  28  /  ALT  22  /  AlkPhos  78  06-23  ABG - ( 24 Jun 2022 00:45 )  pH, Arterial: 7.39  pH, Blood: x     /  pCO2: 51    /  pO2: 115   / HCO3: 31    / Base Excess: 5.0   /  SaO2: 98.3            PT/INR - ( 24 Jun 2022 00:40 )   PT: 11.7 sec;   INR: 1.01 ratio         PTT - ( 24 Jun 2022 00:40 )  PTT:24.1 sec  --------------------------------------------------------------------------------------

## 2022-06-24 NOTE — PROGRESS NOTE ADULT - ASSESSMENT
Patient is a 67 year old female (speaks Hungarian dialect) with a PMHx of HTN, HLD, left tibia surgery and lap kevan (2011 at [facility]) who presented with nausea, vomiting and abdominal pain x1.5 days.  CT Abdomen / Pelvis performed showing small bowel obstruction secondary to a right ventral hernia containing a dilated, fecalized loop of small bowel.  Patient went to the OR for a diagnostic laparoscopy, ventral hernia repair - converted to open for repair of enterotomy on 6/15/22.  Hospital course complicated by fever and tachycardia requiring SICU admission.  Repeat CT scan performed showing small bowel obstruction with a point of transition at the distal ileum.  Patient returned to the OR for an exploratory laparotomy with small bowel resection and end ileostomy on 6/21/22.      PLAN:  - NPO  - CPAP to ?extubation  - wound vac change today  - Continue with IV Vancomycin (supratherapeutic) and IV Zosyn  - Monitor ostomy output  - VTE prophylaxis with Lovenox subcutaneous      B Team Surgery  [id number] Patient is a 67 year old female (speaks Maori dialect) with a PMHx of HTN, HLD, left tibia surgery and lap kevan (2011 at [facility]) who presented with nausea, vomiting and abdominal pain x1.5 days.  CT Abdomen / Pelvis performed showing small bowel obstruction secondary to a right ventral hernia containing a dilated, fecalized loop of small bowel.  Patient went to the OR for a diagnostic laparoscopy, ventral hernia repair - converted to open for repair of enterotomy on 6/15/22.  Hospital course complicated by fever and tachycardia requiring SICU admission.  Repeat CT scan performed showing small bowel obstruction with a point of transition at the distal ileum.  Patient returned to the OR for an exploratory laparotomy with small bowel resection and end ileostomy on 6/21/22.      PLAN:  - NGT with tube feeds  - Nasal cannula   - wound vac change today  - Continue with IV Vancomycin (supratherapeutic) and IV Zosyn  - Monitor ostomy output  - VTE prophylaxis with Lovenox subcutaneous    B Team Surgery  [id number]

## 2022-06-25 LAB
ANION GAP SERPL CALC-SCNC: 9 MMOL/L — SIGNIFICANT CHANGE UP (ref 7–14)
BUN SERPL-MCNC: 12 MG/DL — SIGNIFICANT CHANGE UP (ref 7–23)
CALCIUM SERPL-MCNC: 8.1 MG/DL — LOW (ref 8.4–10.5)
CHLORIDE SERPL-SCNC: 94 MMOL/L — LOW (ref 98–107)
CO2 SERPL-SCNC: 32 MMOL/L — HIGH (ref 22–31)
CREAT SERPL-MCNC: 0.82 MG/DL — SIGNIFICANT CHANGE UP (ref 0.5–1.3)
EGFR: 78 ML/MIN/1.73M2 — SIGNIFICANT CHANGE UP
GLUCOSE SERPL-MCNC: 72 MG/DL — SIGNIFICANT CHANGE UP (ref 70–99)
HCT VFR BLD CALC: 34.4 % — LOW (ref 34.5–45)
HGB BLD-MCNC: 10.9 G/DL — LOW (ref 11.5–15.5)
MAGNESIUM SERPL-MCNC: 2.3 MG/DL — SIGNIFICANT CHANGE UP (ref 1.6–2.6)
MCHC RBC-ENTMCNC: 25.7 PG — LOW (ref 27–34)
MCHC RBC-ENTMCNC: 31.7 GM/DL — LOW (ref 32–36)
MCV RBC AUTO: 81.1 FL — SIGNIFICANT CHANGE UP (ref 80–100)
NRBC # BLD: 0 /100 WBCS — SIGNIFICANT CHANGE UP
NRBC # FLD: 0.02 K/UL — HIGH
PHOSPHATE SERPL-MCNC: 4.1 MG/DL — SIGNIFICANT CHANGE UP (ref 2.5–4.5)
PLATELET # BLD AUTO: 341 K/UL — SIGNIFICANT CHANGE UP (ref 150–400)
POTASSIUM SERPL-MCNC: 4 MMOL/L — SIGNIFICANT CHANGE UP (ref 3.5–5.3)
POTASSIUM SERPL-SCNC: 4 MMOL/L — SIGNIFICANT CHANGE UP (ref 3.5–5.3)
RBC # BLD: 4.24 M/UL — SIGNIFICANT CHANGE UP (ref 3.8–5.2)
RBC # FLD: 15.9 % — HIGH (ref 10.3–14.5)
SODIUM SERPL-SCNC: 135 MMOL/L — SIGNIFICANT CHANGE UP (ref 135–145)
WBC # BLD: 12.36 K/UL — HIGH (ref 3.8–10.5)
WBC # FLD AUTO: 12.36 K/UL — HIGH (ref 3.8–10.5)

## 2022-06-25 PROCEDURE — 71045 X-RAY EXAM CHEST 1 VIEW: CPT | Mod: 26

## 2022-06-25 PROCEDURE — 36573 INSJ PICC RS&I 5 YR+: CPT

## 2022-06-25 PROCEDURE — 99233 SBSQ HOSP IP/OBS HIGH 50: CPT | Mod: 25

## 2022-06-25 RX ORDER — PANTOPRAZOLE SODIUM 20 MG/1
40 TABLET, DELAYED RELEASE ORAL DAILY
Refills: 0 | Status: DISCONTINUED | OUTPATIENT
Start: 2022-06-25 | End: 2022-06-27

## 2022-06-25 RX ORDER — ACETAMINOPHEN 500 MG
1000 TABLET ORAL EVERY 6 HOURS
Refills: 0 | Status: DISCONTINUED | OUTPATIENT
Start: 2022-06-25 | End: 2022-06-27

## 2022-06-25 RX ORDER — OXYCODONE HYDROCHLORIDE 5 MG/1
5 TABLET ORAL EVERY 4 HOURS
Refills: 0 | Status: DISCONTINUED | OUTPATIENT
Start: 2022-06-25 | End: 2022-06-27

## 2022-06-25 RX ORDER — OXYCODONE HYDROCHLORIDE 5 MG/1
10 TABLET ORAL EVERY 4 HOURS
Refills: 0 | Status: DISCONTINUED | OUTPATIENT
Start: 2022-06-25 | End: 2022-06-27

## 2022-06-25 RX ADMIN — HYDROMORPHONE HYDROCHLORIDE 1 MILLIGRAM(S): 2 INJECTION INTRAMUSCULAR; INTRAVENOUS; SUBCUTANEOUS at 08:11

## 2022-06-25 RX ADMIN — HYDROMORPHONE HYDROCHLORIDE 1 MILLIGRAM(S): 2 INJECTION INTRAMUSCULAR; INTRAVENOUS; SUBCUTANEOUS at 18:28

## 2022-06-25 RX ADMIN — HYDROMORPHONE HYDROCHLORIDE 1 MILLIGRAM(S): 2 INJECTION INTRAMUSCULAR; INTRAVENOUS; SUBCUTANEOUS at 08:26

## 2022-06-25 RX ADMIN — ALBUTEROL 2.5 MILLIGRAM(S): 90 AEROSOL, METERED ORAL at 16:06

## 2022-06-25 RX ADMIN — MUPIROCIN 1 APPLICATION(S): 20 OINTMENT TOPICAL at 06:24

## 2022-06-25 RX ADMIN — ALBUTEROL 2.5 MILLIGRAM(S): 90 AEROSOL, METERED ORAL at 07:08

## 2022-06-25 RX ADMIN — HYDROMORPHONE HYDROCHLORIDE 1 MILLIGRAM(S): 2 INJECTION INTRAMUSCULAR; INTRAVENOUS; SUBCUTANEOUS at 19:00

## 2022-06-25 RX ADMIN — LIDOCAINE 1 PATCH: 4 CREAM TOPICAL at 21:30

## 2022-06-25 RX ADMIN — MUPIROCIN 1 APPLICATION(S): 20 OINTMENT TOPICAL at 17:31

## 2022-06-25 RX ADMIN — HYDROMORPHONE HYDROCHLORIDE 1 MILLIGRAM(S): 2 INJECTION INTRAMUSCULAR; INTRAVENOUS; SUBCUTANEOUS at 12:19

## 2022-06-25 RX ADMIN — HYDROMORPHONE HYDROCHLORIDE 1 MILLIGRAM(S): 2 INJECTION INTRAMUSCULAR; INTRAVENOUS; SUBCUTANEOUS at 04:07

## 2022-06-25 RX ADMIN — PIPERACILLIN AND TAZOBACTAM 25 GRAM(S): 4; .5 INJECTION, POWDER, LYOPHILIZED, FOR SOLUTION INTRAVENOUS at 06:24

## 2022-06-25 RX ADMIN — OXYCODONE HYDROCHLORIDE 5 MILLIGRAM(S): 5 TABLET ORAL at 21:20

## 2022-06-25 RX ADMIN — PIPERACILLIN AND TAZOBACTAM 25 GRAM(S): 4; .5 INJECTION, POWDER, LYOPHILIZED, FOR SOLUTION INTRAVENOUS at 16:05

## 2022-06-25 RX ADMIN — OXYCODONE HYDROCHLORIDE 5 MILLIGRAM(S): 5 TABLET ORAL at 22:20

## 2022-06-25 RX ADMIN — ALBUTEROL 2.5 MILLIGRAM(S): 90 AEROSOL, METERED ORAL at 23:30

## 2022-06-25 RX ADMIN — PANTOPRAZOLE SODIUM 40 MILLIGRAM(S): 20 TABLET, DELAYED RELEASE ORAL at 14:30

## 2022-06-25 RX ADMIN — HYDROMORPHONE HYDROCHLORIDE 1 MILLIGRAM(S): 2 INJECTION INTRAMUSCULAR; INTRAVENOUS; SUBCUTANEOUS at 04:37

## 2022-06-25 RX ADMIN — PIPERACILLIN AND TAZOBACTAM 25 GRAM(S): 4; .5 INJECTION, POWDER, LYOPHILIZED, FOR SOLUTION INTRAVENOUS at 21:20

## 2022-06-25 RX ADMIN — Medication 1000 MILLIGRAM(S): at 23:19

## 2022-06-25 RX ADMIN — ENOXAPARIN SODIUM 40 MILLIGRAM(S): 100 INJECTION SUBCUTANEOUS at 06:25

## 2022-06-25 RX ADMIN — CHLORHEXIDINE GLUCONATE 1 APPLICATION(S): 213 SOLUTION TOPICAL at 06:24

## 2022-06-25 RX ADMIN — HYDROMORPHONE HYDROCHLORIDE 1 MILLIGRAM(S): 2 INJECTION INTRAMUSCULAR; INTRAVENOUS; SUBCUTANEOUS at 12:34

## 2022-06-25 NOTE — PHYSICAL THERAPY INITIAL EVALUATION ADULT - PLANNED THERAPY INTERVENTIONS, PT EVAL
Patient left positioned for safety in bed in NAD, call bell in reach, all lines intact. +bed alarm. Daughter at bedside.
balance training/bed mobility training/gait training/strengthening/transfer training

## 2022-06-25 NOTE — PHYSICAL THERAPY INITIAL EVALUATION ADULT - ORIENTATION, REHAB EVAL
as per daughter patient is A and O x 4/oriented to person, place, time and situation
oriented to person, place, time and situation

## 2022-06-25 NOTE — PHYSICAL THERAPY INITIAL EVALUATION ADULT - PERTINENT HX OF CURRENT PROBLEM, REHAB EVAL
67 year old female (speaks Malay dialect) with a history of HTN, HLD, L tibia surgery, lap kevan (2011, [facility]) presenting with 1.5 days of nausea, vomiting and abdominal pain. CT showed incarcerated SBO at right ventral hernia.
Patient is a 67 year old Female (speaks Italian dialect) with a history of HTN, HLD, Left tibia surgery, lap kevan (2011, [facility]) presenting w incarcerated SBO at right ventral hernia s/p laproscopic ventral hernia repair, c/b enterotomy intaoperatively, converted to open with primary repair on 6/15.

## 2022-06-25 NOTE — PROCEDURE NOTE - NSINDICATIONS_GEN_A_CORE
critical patient/monitoring purposes
critical patient
critical illness/hemodynamic monitoring/venous access
venous access

## 2022-06-25 NOTE — PROGRESS NOTE ADULT - ASSESSMENT
Patient is a 67 year old female (speaks Tajik dialect) with a PMHx of HTN, HLD, left tibia surgery and lap kevan (2011 at [facility]) who presented with nausea, vomiting and abdominal pain x1.5 days.  CT Abdomen / Pelvis performed showing small bowel obstruction secondary to a right ventral hernia containing a dilated, fecalized loop of small bowel.  Patient went to the OR for a diagnostic laparoscopy, ventral hernia repair - converted to open for repair of enterotomy on 6/15/22.  Hospital course complicated by fever and tachycardia requiring SICU admission.  Repeat CT scan performed showing small bowel obstruction with a point of transition at the distal ileum.  Patient returned to the OR for an exploratory laparotomy with small bowel resection and end ileostomy on 6/21/22.      PLAN:  - NGT with tube feeds  - Nasal cannula   - wound vac change today  - Continue with IV Vancomycin (supratherapeutic) and IV Zosyn  - Monitor ostomy output  - VTE prophylaxis with Lovenox subcutaneous    B Team Surgery  [id number]

## 2022-06-25 NOTE — PHYSICAL THERAPY INITIAL EVALUATION ADULT - PRECAUTIONS/LIMITATIONS, REHAB EVAL
abdominal precautions; 2 L/min via NC/fall precautions/obesity precautions/oxygen therapy device and L/min/surgical precautions
cardiac precautions/fall precautions/surgical precautions

## 2022-06-25 NOTE — PROCEDURE NOTE - NSPROCNAME_GEN_A_CORE
Arterial Puncture/Cannulation
Midline Insertion
Arterial Puncture/Cannulation
Central Line Insertion

## 2022-06-25 NOTE — PHYSICAL THERAPY INITIAL EVALUATION ADULT - MD/RN NOTIFIED
4/13/2017    Subject: Action Required to Complete [employer] Activation    Dear Sulema Castorena,    Thank you for enrolling in [employer], a free online tool where you can schedule appointments, request prescription refills, view test results and more. To complete your [employer] activation, please follow the instructions below.     1. Visit [employer]     2. Click “Sign Up Now”    3. Enter activation code: [email or web address]  Expires: 5/13/2017  4:20 PM    4. Follow the instructions on screen to complete the quick, 3-step activation    Once you’ve completed your activation, you’re ready to view your medical record. Please remember, [employer] is not to be used for urgent needs. For medical emergencies, dial 911.    [employer]’s secure online tool allows you to manage your health information day or night. [employer] allows you to:    • Schedule and view appointments  • View test results  • Request prescription refills  • Message your healthcare provider  • Keep track of your family’s health  • Review immunization records  • View or download your Summary of Care document    Download the [employer] Emma   After you’ve created a login by following the steps above, you can download the [employer] emma for your smartphone for even quicker access. Simply visit the emma store and search “[employer].”    For questions or assistance with [employer], please call [employer] customer insighter Service at [phone].    Sincerely,  Customer Service Team  
yes
yes

## 2022-06-25 NOTE — PROGRESS NOTE ADULT - ASSESSMENT
67F (speaks Korean dialect) with a history of HTN, HLD, L tibia surgery, lap kevan (2011, [facility]) presenting w incarcerated SBO at right ventral hernia s/p (6/15) right ventral hernia s/p laparoscopic ventral hernia repair, c/b enterotomy intaoperatively, converted to open with primary repair. RRT on GMF, 6/21 for fever tmax 104, tacycardia to 120s, tachypnea. SICU accepted for severe sepsis. Patient now s/p (6/21) RTOR ExLap, SBR, end ileostomy, fascia closed, midline wound VAC. Extubated 6/23.    PLAN:    NEUROLOGY:  - Pain control: Tylenol 1g IV q6hr and Dilaudid 0.5/1mg IV q4hr PRN  - Lidocaine patch    RESPIRATORY:  - Maintain O2 saturation >92%  - Albuterol 2.5mg nebs q8hr for heavy secretions    CARDIOVASCULAR:  - Monitor hemodynamics    GI / NUTRITION:  - NGT w/ TF Jevity 1.2 @ 40cc/hr  - GI ppx: Protonix 40mg IV daily    RENAL / GENITOURINARY:  - Monitor I&Os  - Monitor electrolytes and replete PRN    HEMATOLOGIC:  - VTE ppx: Lovenox 40mg SQ daily  - Trend H/H on CBC daily    INFECTIOUS DISEASE:  - Abx: Zosyn 3.375g IV q8hr  - MRSA swab (6/22): Negative  - BCx (6/21): NGTD  - Trend fever curve and WBC on CBC daily    ENDOCRINOLOGY:  - No active issues  - Continue to monitor glucose on BMP (goal 140-180)    Tubes/Lines/Drains  -PIVS    Disposition: SICU   67F (speaks Belarusian dialect) with a history of HTN, HLD, L tibia surgery, lap kevan (2011, [facility]) presenting w incarcerated SBO at right ventral hernia s/p (6/15) right ventral hernia s/p laparoscopic ventral hernia repair, c/b enterotomy intaoperatively, converted to open with primary repair. RRT on GMF, 6/21 for fever tmax 104, tacycardia to 120s, tachypnea. SICU accepted for severe sepsis. Patient now s/p (6/21) RTOR ExLap, SBR, end ileostomy, fascia closed, midline wound VAC. Extubated 6/23.    PLAN:    PLAN:    NEUROLOGY:  - Pain control: Tylenol 1g IV q6hr and Dilaudid 0.5/1mg IV q4hr PRN  - Lidocaine patch    RESPIRATORY:  - Maintain O2 saturation >92%  - Albuterol 2.5mg nebs q8hr for heavy secretions    CARDIOVASCULAR:  - Monitor hemodynamics    GI / NUTRITION:  - NGT w/ TF Jevity 1.2 @ 40cc/hr  - GI ppx: Protonix 40mg IV daily    RENAL / GENITOURINARY:  - Monitor I&Os  - Monitor electrolytes and replete PRN    HEMATOLOGIC:  - VTE ppx: Lovenox 40mg SQ daily  - Trend H/H on CBC daily    INFECTIOUS DISEASE:  - Abx: Zosyn 3.375g IV q8hr - last date is 6/25  - MRSA swab (6/22): Negative  - BCx (6/21): NGTD  - Trend fever curve and WBC on CBC daily    ENDOCRINOLOGY:  - No active issues  - Continue to monitor glucose on BMP (goal 140-180)    Tubes/Lines/Drains  -PIVS    Disposition: SICU

## 2022-06-25 NOTE — PROGRESS NOTE ADULT - SUBJECTIVE AND OBJECTIVE BOX
Surgery Progress Note     Subjective/24hour Events:   Patient seen and examined.       Vital Signs:  Vital Signs Last 24 Hrs  T(C): 37.3 (25 Jun 2022 00:00), Max: 37.3 (24 Jun 2022 16:00)  T(F): 99.1 (25 Jun 2022 00:00), Max: 99.1 (24 Jun 2022 16:00)  HR: 75 (25 Jun 2022 01:00) (66 - 89)  BP: 97/67 (25 Jun 2022 01:00) (92/60 - 120/61)  BP(mean): 77 (25 Jun 2022 01:00) (69 - 78)  RR: 16 (25 Jun 2022 01:00) (12 - 23)  SpO2: 98% (25 Jun 2022 01:00) (95% - 100%)    CAPILLARY BLOOD GLUCOSE          I&O's Detail    23 Jun 2022 07:01  -  24 Jun 2022 07:00  --------------------------------------------------------  IN:    IV PiggyBack: 500 mL    Jevity 1.2: 100 mL    Lactated Ringers w/ Additives: 200 mL  Total IN: 800 mL    OUT:    Indwelling Catheter - Urethral (mL): 3785 mL    Nasogastric/Oral tube (mL): 750 mL    Stool (mL): 250 mL    VAC (Vacuum Assisted Closure) System (mL): 0 mL  Total OUT: 4785 mL    Total NET: -3985 mL      24 Jun 2022 07:01  -  25 Jun 2022 01:28  --------------------------------------------------------  IN:    IV PiggyBack: 350 mL  Total IN: 350 mL    OUT:    Indwelling Catheter - Urethral (mL): 4750 mL    Nasogastric/Oral tube (mL): 300 mL    Stool (mL): 300 mL    VAC (Vacuum Assisted Closure) System (mL): 300 mL    Voided (mL): 600 mL  Total OUT: 6250 mL    Total NET: -5900 mL            Physical Exam:  General: NAD, resting in bed comfortably, intubated, awake nodding yes/no  Cardiac: regular rate, warm and well perfused  Respiratory: intubated, CPAP5/5, nonlabored.  Abdomen: soft, nondistended, midline incision with black wound vac to suction.  Extremities: normal strength, FROM, no deformities      Labs:    06-24    138  |  95<L>  |  11  ----------------------------<  69<L>  3.7   |  34<H>  |  0.79    Ca    8.1<L>      24 Jun 2022 19:50  Phos  4.3     06-24  Mg     1.80     06-24    TPro  5.5<L>  /  Alb  2.4<L>  /  TBili  0.7  /  DBili  x   /  AST  28  /  ALT  22  /  AlkPhos  78  06-23    LIVER FUNCTIONS - ( 23 Jun 2022 15:22 )  Alb: 2.4 g/dL / Pro: 5.5 g/dL / ALK PHOS: 78 U/L / ALT: 22 U/L / AST: 28 U/L / GGT: x                                 10.6   13.85 )-----------( 272      ( 24 Jun 2022 00:40 )             32.4     PT/INR - ( 24 Jun 2022 00:40 )   PT: 11.7 sec;   INR: 1.01 ratio         PTT - ( 24 Jun 2022 00:40 )  PTT:24.1 sec

## 2022-06-25 NOTE — PHYSICAL THERAPY INITIAL EVALUATION ADULT - ADDITIONAL COMMENTS
Pt owns a rolling walker, rollator, commode. Pt has 4 steps to enter with bilateral railings and then can remain on first floor. Pt does have 16 steps with unilateral railing to upstairs but doesn't have to use them.
Pt lives in a private house with her daughter, uses a rolling walker outside the house but does not use a device to ambulate within the home. Pt also uses a wheelchair when attending physician appointments. Requires assistance with ADLs.    Pt left semi-supine in bed, all lines intact, call bell in reach, in NAD. SAYRA Paige present and aware throughout evaluation.

## 2022-06-25 NOTE — PHYSICAL THERAPY INITIAL EVALUATION ADULT - MANUAL MUSCLE TESTING RESULTS, REHAB EVAL
bilateral Upper Extremity and left Lower Extremity 3-/5; right Lower Extremity grossly 2+/5
bilateral UE grossly 3/5 throughout, bilateral LE grossly 3-/5 throughout

## 2022-06-25 NOTE — PROCEDURE NOTE - NSPROCDETAILS_GEN_ALL_CORE
ultrasound guidance
location identified, draped/prepped, sterile technique used, needle inserted/introduced/positive blood return obtained via catheter/connected to a pressurized flush line/sutured in place/hemostasis with direct pressure, dressing applied/Seldinger technique/all materials/supplies accounted for at end of procedure
location identified, draped/prepped, sterile technique used, needle inserted/introduced/positive blood return obtained via catheter/connected to a pressurized flush line/sutured in place/Seldinger technique/all materials/supplies accounted for at end of procedure
guidewire recovered/lumen(s) aspirated and flushed/sterile dressing applied/sterile technique, catheter placed/ultrasound guidance with use of sterile gel and probe cove

## 2022-06-25 NOTE — PROGRESS NOTE ADULT - ATTENDING COMMENTS
I agree with the detailed interval history, physical, and plan, which I have reviewed and edited where appropriate'; also agree with notes/assessment with my team on service.  I have personally examined the patient.  I was physically present for the key portions of the evaluation and management (E/M) service provided.  I reviewed all the pertinent data.  The patient is a critical care patient with life threatening hemodynamic and metabolic instability in SICU.  The SICU team has a constant risk benefit analyzes discussion and coordinating care with the primary team and all consultants.   The patient is in SICU with the chief complaint and diagnosis mentioned in the note.   The plan will be specified in the note.  67F s/p right ventral hernia s/p laparoscopic ventral hernia repair, and RTOR ExLap, SBR, end ileostomy, fascia closed, midline wound VAC. Extubated now in SICU  EXAM  NEUROLOGY  Exam: no focal deficits.  RESPIRATORY  Exam: clear BS decreased in bases  CARDIOVASCULAR  Exam: Regular rate   GI/NUTRITION  Exam: Abdomen mildly distended,  VASCULAR  Exam: Extremities warm,      PLAN:    NEUROLOGY: -Pain control   Tylenol 1g IV q6hr and Dilaudid 0.5/1mg IV q4hr PRN  -RESPIRATORY: respiratory abnormality  - saturation >92%  - Albuterol   CARDIOVASCULAR:  - Monitor hemodynamics  GI / NUTRITION:  - TF Jevity 1.2 @ 40cc/hr  - Protonix 40mg IV daily  RENAL / GENITOURINARY:  - Monitor electrolytes   HEMATOLOGIC:  - Lovenox 40mg SQ daily  INFECTIOUS DISEASE:  - Abx: Zosyn   ENDOCRINOLOGY:  -Continue to monitor glucose   Disposition: SICU

## 2022-06-25 NOTE — PROCEDURE NOTE - NSINFORMCONSENT_GEN_A_CORE
This was an emergent procedure.
Benefits, risks, and possible complications of procedure explained to patient/caregiver who verbalized understanding and gave written consent.
daughter/Benefits, risks, and possible complications of procedure explained to patient/caregiver who verbalized understanding and gave written consent.

## 2022-06-25 NOTE — PHYSICAL THERAPY INITIAL EVALUATION ADULT - DID THE PATIENT HAVE SURGERY?
Diagnostic laparoscopy; Ventral hernia repair; Exploratory laparotomy with small bowel resection/yes
s/p ventral hernia repair/yes

## 2022-06-25 NOTE — PROGRESS NOTE ADULT - SUBJECTIVE AND OBJECTIVE BOX
SICU Daily Progress Note  =====================================================  Interval/Overnight Events:     - hypercapnea, on ETCO2 high 30s-40s, monitor  - 60 Lasix, 3L UOP, rpt bmp tonight  - Removed a line, CVC  - started IN mupirocin  - No acute events overnight     HPI: 68yo F (speaks English dialect) with a history of HTN, HLD, L tibia surgery, lap kevan (2011, [facility]) presenting with 1.5 days of nausea, vomiting and abdominal pain. Per daughter at bedside, patient began having symptoms Monday night after dinner. Last passed flatus and had a BM at that time. Denies fevers or chills. Pain improved in ED after getting morphine and NGT placement.    In the ED, stable and afebrile. Labs unremarkable. CT showed incarcerated SBO at right ventral hernia. (15 Rambo 2022 08:22)    Patient s/p ventral hernia repair, laparoscopic c/b enterotomy intraoperatively, converted to open with primary repair on 6/15. Patient on floors with NGT, NPO. On 6/21, 2am rapid response called for fever to 104 rectal, tachycardia to 120s and tachypnea. Unable to answer questions per daughter, in native [employer]. Stat labs drawn. SICU consulted for sepsis management and altered mental status. CT scan obtained on way.     Allergies: No Known Allergies    MEDICATIONS:   --------------------------------------------------------------------------------------  Neurologic Medications  acetaminophen   IVPB .. 1000 milliGRAM(s) IV Intermittent every 6 hours PRN Temp greater or equal to 38C (100.4F), Mild Pain (1 - 3)  HYDROmorphone  Injectable 1 milliGRAM(s) IV Push every 3 hours PRN Severe Pain (7 - 10)  HYDROmorphone  Injectable 0.5 milliGRAM(s) IV Push every 4 hours PRN Moderate Pain (4 - 6)    Respiratory Medications  ALBUTerol   0.5% 2.5 milliGRAM(s) Nebulizer every 8 hours    Cardiovascular Medications    Gastrointestinal Medications  pantoprazole  Injectable 40 milliGRAM(s) IV Push every 24 hours  sodium chloride 0.9% lock flush 10 milliLiter(s) IV Push every 1 hour PRN Pre/post blood products, medications, blood draw, and to maintain line patency    Genitourinary Medications    Hematologic/Oncologic Medications  enoxaparin Injectable 40 milliGRAM(s) SubCutaneous every 24 hours  influenza  Vaccine (HIGH DOSE) 0.7 milliLiter(s) IntraMuscular once    Antimicrobial/Immunologic Medications  piperacillin/tazobactam IVPB.. 3.375 Gram(s) IV Intermittent every 8 hours    Endocrine/Metabolic Medications    Topical/Other Medications  chlorhexidine 4% Liquid 1 Application(s) Topical <User Schedule>  lidocaine   4% Patch 1 Patch Transdermal daily PRN pain  mupirocin 2% Ointment 1 Application(s) Topical two times a day  tetracaine/benzocaine/butamben Spray 1 Spray(s) Topical every 6 hours PRN Throat pain from NGT    --------------------------------------------------------------------------------------  VITAL SIGNS, INS/OUTS (last 24 hours):  --------------------------------------------------------------------------------------  T(C): 37.3 (06-25-22 @ 00:00), Max: 37.3 (06-24-22 @ 16:00)  HR: 73 (06-25-22 @ 03:00) (67 - 89)  BP: 106/71 (06-25-22 @ 03:00) (92/60 - 120/61)  RR: 18 (06-25-22 @ 03:00) (12 - 23)  SpO2: 97% (06-25-22 @ 03:00) (95% - 100%)    06-23-22 @ 07:01  -  06-24-22 @ 07:00  --------------------------------------------------------  IN: 800 mL / OUT: 4785 mL / NET: -3985 mL    06-24-22 @ 07:01  -  06-25-22 @ 03:43  --------------------------------------------------------  IN: 350 mL / OUT: 6525 mL / NET: -6175 mL    --------------------------------------------------------------------------------------  EXAM  NEUROLOGY  Exam: Normal, NAD, alert, oriented x3, no focal deficits.    HEENT  Exam: Normocephalic, atraumatic, EOMI.     RESPIRATORY  Exam: Normal expansion/effort.  Mechanical Ventilation:     CARDIOVASCULAR  Exam: Regular rate and rhythm.       GI/NUTRITION  Exam: Abdomen soft, Non-tender, Non-distended.     VASCULAR  Exam: Extremities warm, pink, well-perfused.     MUSCULOSKELETAL  Exam: All extremities moving spontaneously without limitations.     SKIN  Exam: Good skin turgor, no skin breakdown.     LABS  --------------------------------------------------------------------------------------                        10.9   12.36 )-----------( 341      ( 25 Jun 2022 02:00 )             34.4   06-25    135  |  94<L>  |  12  ----------------------------<  72  4.0   |  32<H>  |  0.82    Ca    8.1<L>      25 Jun 2022 02:00  Phos  4.1     06-25  Mg     2.30     06-25    TPro  5.5<L>  /  Alb  2.4<L>  /  TBili  0.7  /  DBili  x   /  AST  28  /  ALT  22  /  AlkPhos  78  06-23  ABG - ( 24 Jun 2022 05:45 )  pH, Arterial: 7.40  pH, Blood: x     /  pCO2: 51    /  pO2: 117   / HCO3: 32    / Base Excess: 5.8   /  SaO2: 99.7        PT/INR - ( 24 Jun 2022 00:40 )   PT: 11.7 sec;   INR: 1.01 ratio    PTT - ( 24 Jun 2022 00:40 )  PTT:24.1 sec  --------------------------------------------------------------------------------------     SICU Daily Progress Note  =====================================================  Interval/Overnight Events:     - hypercapnea, on ETCO2 high 30s-40s, monitor  - 60 Lasix, 3L UOP  - Removed a line, CVC  - tube feeds to goal  - No acute events overnight   - POCUS today; possible diuresis with diamox     HPI: 68yo F (speaks [place] dialect) with a history of HTN, HLD, L tibia surgery, lap kevan (2011, [facility]) presenting with 1.5 days of nausea, vomiting and abdominal pain. Per daughter at bedside, patient began having symptoms Monday night after dinner. Last passed flatus and had a BM at that time. Denies fevers or chills. Pain improved in ED after getting morphine and NGT placement.    In the ED, stable and afebrile. Labs unremarkable. CT showed incarcerated SBO at right ventral hernia. (15 Rambo 2022 08:22)    Patient s/p ventral hernia repair, laparoscopic c/b enterotomy intraoperatively, converted to open with primary repair on 6/15. Patient on floors with NGT, NPO. On 6/21, 2am rapid response called for fever to 104 rectal, tachycardia to 120s and tachypnea. Unable to answer questions per daughter, in native [facility]. Stat labs drawn. SICU consulted for sepsis management and altered mental status. CT scan obtained on way.     Allergies: No Known Allergies    MEDICATIONS:   --------------------------------------------------------------------------------------  Neurologic Medications  acetaminophen   IVPB .. 1000 milliGRAM(s) IV Intermittent every 6 hours PRN Temp greater or equal to 38C (100.4F), Mild Pain (1 - 3)  HYDROmorphone  Injectable 1 milliGRAM(s) IV Push every 3 hours PRN Severe Pain (7 - 10)  HYDROmorphone  Injectable 0.5 milliGRAM(s) IV Push every 4 hours PRN Moderate Pain (4 - 6)    Respiratory Medications  ALBUTerol   0.5% 2.5 milliGRAM(s) Nebulizer every 8 hours    Cardiovascular Medications    Gastrointestinal Medications  pantoprazole  Injectable 40 milliGRAM(s) IV Push every 24 hours  sodium chloride 0.9% lock flush 10 milliLiter(s) IV Push every 1 hour PRN Pre/post blood products, medications, blood draw, and to maintain line patency    Genitourinary Medications    Hematologic/Oncologic Medications  enoxaparin Injectable 40 milliGRAM(s) SubCutaneous every 24 hours  influenza  Vaccine (HIGH DOSE) 0.7 milliLiter(s) IntraMuscular once    Antimicrobial/Immunologic Medications  piperacillin/tazobactam IVPB.. 3.375 Gram(s) IV Intermittent every 8 hours    Endocrine/Metabolic Medications    Topical/Other Medications  chlorhexidine 4% Liquid 1 Application(s) Topical <User Schedule>  lidocaine   4% Patch 1 Patch Transdermal daily PRN pain  mupirocin 2% Ointment 1 Application(s) Topical two times a day  tetracaine/benzocaine/butamben Spray 1 Spray(s) Topical every 6 hours PRN Throat pain from NGT    --------------------------------------------------------------------------------------  VITAL SIGNS, INS/OUTS (last 24 hours):  --------------------------------------------------------------------------------------  T(C): 37.3 (06-25-22 @ 00:00), Max: 37.3 (06-24-22 @ 16:00)  HR: 73 (06-25-22 @ 03:00) (67 - 89)  BP: 106/71 (06-25-22 @ 03:00) (92/60 - 120/61)  RR: 18 (06-25-22 @ 03:00) (12 - 23)  SpO2: 97% (06-25-22 @ 03:00) (95% - 100%)    06-23-22 @ 07:01  -  06-24-22 @ 07:00  --------------------------------------------------------  IN: 800 mL / OUT: 4785 mL / NET: -3985 mL    06-24-22 @ 07:01  -  06-25-22 @ 03:43  --------------------------------------------------------  IN: 350 mL / OUT: 6525 mL / NET: -6175 mL    --------------------------------------------------------------------------------------  EXAM  NEUROLOGY  Exam: Normal, NAD, alert, oriented x3, no focal deficits.    HEENT  Exam: Normocephalic, atraumatic, EOMI.     RESPIRATORY  Exam: Normal expansion/effort.    CARDIOVASCULAR  Exam: Regular rate and rhythm.       GI/NUTRITION  Exam: Abdomen mildly distended, midline incision c/d/i with minimal strikethrough, ostomy with fluid output and gas     VASCULAR  Exam: Extremities warm, pink, well-perfused.     MUSCULOSKELETAL  Exam: All extremities moving spontaneously without limitations.     SKIN  Exam: Good skin turgor, no skin breakdown.     LABS  --------------------------------------------------------------------------------------                        10.9   12.36 )-----------( 341      ( 25 Jun 2022 02:00 )             34.4   06-25    135  |  94<L>  |  12  ----------------------------<  72  4.0   |  32<H>  |  0.82    Ca    8.1<L>      25 Jun 2022 02:00  Phos  4.1     06-25  Mg     2.30     06-25    TPro  5.5<L>  /  Alb  2.4<L>  /  TBili  0.7  /  DBili  x   /  AST  28  /  ALT  22  /  AlkPhos  78  06-23  ABG - ( 24 Jun 2022 05:45 )  pH, Arterial: 7.40  pH, Blood: x     /  pCO2: 51    /  pO2: 117   / HCO3: 32    / Base Excess: 5.8   /  SaO2: 99.7        PT/INR - ( 24 Jun 2022 00:40 )   PT: 11.7 sec;   INR: 1.01 ratio    PTT - ( 24 Jun 2022 00:40 )  PTT:24.1 sec  --------------------------------------------------------------------------------------

## 2022-06-25 NOTE — PHYSICAL THERAPY INITIAL EVALUATION ADULT - GENERAL OBSERVATIONS, REHAB EVAL
Pt received semi-supine in bed, daughter present at bedside, comfortable and in NAD. Pt agreeable to participate in PT evaluation.

## 2022-06-26 LAB
ANION GAP SERPL CALC-SCNC: 9 MMOL/L — SIGNIFICANT CHANGE UP (ref 7–14)
BUN SERPL-MCNC: 12 MG/DL — SIGNIFICANT CHANGE UP (ref 7–23)
CALCIUM SERPL-MCNC: 7.9 MG/DL — LOW (ref 8.4–10.5)
CHLORIDE SERPL-SCNC: 94 MMOL/L — LOW (ref 98–107)
CO2 SERPL-SCNC: 33 MMOL/L — HIGH (ref 22–31)
CREAT SERPL-MCNC: 0.74 MG/DL — SIGNIFICANT CHANGE UP (ref 0.5–1.3)
CULTURE RESULTS: SIGNIFICANT CHANGE UP
CULTURE RESULTS: SIGNIFICANT CHANGE UP
EGFR: 89 ML/MIN/1.73M2 — SIGNIFICANT CHANGE UP
GLUCOSE SERPL-MCNC: 170 MG/DL — HIGH (ref 70–99)
HCT VFR BLD CALC: 33.2 % — LOW (ref 34.5–45)
HGB BLD-MCNC: 10.5 G/DL — LOW (ref 11.5–15.5)
MAGNESIUM SERPL-MCNC: 2.1 MG/DL — SIGNIFICANT CHANGE UP (ref 1.6–2.6)
MCHC RBC-ENTMCNC: 26.4 PG — LOW (ref 27–34)
MCHC RBC-ENTMCNC: 31.6 GM/DL — LOW (ref 32–36)
MCV RBC AUTO: 83.4 FL — SIGNIFICANT CHANGE UP (ref 80–100)
NRBC # BLD: 0 /100 WBCS — SIGNIFICANT CHANGE UP
NRBC # FLD: 0 K/UL — SIGNIFICANT CHANGE UP
PHOSPHATE SERPL-MCNC: 2.7 MG/DL — SIGNIFICANT CHANGE UP (ref 2.5–4.5)
PLATELET # BLD AUTO: 361 K/UL — SIGNIFICANT CHANGE UP (ref 150–400)
POTASSIUM SERPL-MCNC: 3.7 MMOL/L — SIGNIFICANT CHANGE UP (ref 3.5–5.3)
POTASSIUM SERPL-SCNC: 3.7 MMOL/L — SIGNIFICANT CHANGE UP (ref 3.5–5.3)
RBC # BLD: 3.98 M/UL — SIGNIFICANT CHANGE UP (ref 3.8–5.2)
RBC # FLD: 15.4 % — HIGH (ref 10.3–14.5)
SODIUM SERPL-SCNC: 136 MMOL/L — SIGNIFICANT CHANGE UP (ref 135–145)
SPECIMEN SOURCE: SIGNIFICANT CHANGE UP
SPECIMEN SOURCE: SIGNIFICANT CHANGE UP
WBC # BLD: 10.35 K/UL — SIGNIFICANT CHANGE UP (ref 3.8–10.5)
WBC # FLD AUTO: 10.35 K/UL — SIGNIFICANT CHANGE UP (ref 3.8–10.5)

## 2022-06-26 PROCEDURE — 99233 SBSQ HOSP IP/OBS HIGH 50: CPT

## 2022-06-26 RX ORDER — POTASSIUM PHOSPHATE, MONOBASIC POTASSIUM PHOSPHATE, DIBASIC 236; 224 MG/ML; MG/ML
15 INJECTION, SOLUTION INTRAVENOUS ONCE
Refills: 0 | Status: COMPLETED | OUTPATIENT
Start: 2022-06-26 | End: 2022-06-26

## 2022-06-26 RX ORDER — ACETAZOLAMIDE 250 MG/1
250 TABLET ORAL EVERY 6 HOURS
Refills: 0 | Status: COMPLETED | OUTPATIENT
Start: 2022-06-26 | End: 2022-06-26

## 2022-06-26 RX ADMIN — ALBUTEROL 2.5 MILLIGRAM(S): 90 AEROSOL, METERED ORAL at 16:24

## 2022-06-26 RX ADMIN — ALBUTEROL 2.5 MILLIGRAM(S): 90 AEROSOL, METERED ORAL at 07:37

## 2022-06-26 RX ADMIN — OXYCODONE HYDROCHLORIDE 10 MILLIGRAM(S): 5 TABLET ORAL at 11:00

## 2022-06-26 RX ADMIN — ACETAZOLAMIDE 250 MILLIGRAM(S): 250 TABLET ORAL at 17:59

## 2022-06-26 RX ADMIN — ACETAZOLAMIDE 250 MILLIGRAM(S): 250 TABLET ORAL at 23:28

## 2022-06-26 RX ADMIN — OXYCODONE HYDROCHLORIDE 10 MILLIGRAM(S): 5 TABLET ORAL at 10:30

## 2022-06-26 RX ADMIN — MUPIROCIN 1 APPLICATION(S): 20 OINTMENT TOPICAL at 06:31

## 2022-06-26 RX ADMIN — Medication 1000 MILLIGRAM(S): at 00:19

## 2022-06-26 RX ADMIN — ENOXAPARIN SODIUM 40 MILLIGRAM(S): 100 INJECTION SUBCUTANEOUS at 06:31

## 2022-06-26 RX ADMIN — OXYCODONE HYDROCHLORIDE 10 MILLIGRAM(S): 5 TABLET ORAL at 03:50

## 2022-06-26 RX ADMIN — LIDOCAINE 1 PATCH: 4 CREAM TOPICAL at 09:36

## 2022-06-26 RX ADMIN — POTASSIUM PHOSPHATE, MONOBASIC POTASSIUM PHOSPHATE, DIBASIC 62.5 MILLIMOLE(S): 236; 224 INJECTION, SOLUTION INTRAVENOUS at 02:09

## 2022-06-26 RX ADMIN — ACETAZOLAMIDE 250 MILLIGRAM(S): 250 TABLET ORAL at 11:13

## 2022-06-26 RX ADMIN — OXYCODONE HYDROCHLORIDE 10 MILLIGRAM(S): 5 TABLET ORAL at 23:00

## 2022-06-26 RX ADMIN — ALBUTEROL 2.5 MILLIGRAM(S): 90 AEROSOL, METERED ORAL at 23:08

## 2022-06-26 RX ADMIN — OXYCODONE HYDROCHLORIDE 10 MILLIGRAM(S): 5 TABLET ORAL at 22:29

## 2022-06-26 RX ADMIN — OXYCODONE HYDROCHLORIDE 10 MILLIGRAM(S): 5 TABLET ORAL at 03:24

## 2022-06-26 RX ADMIN — OXYCODONE HYDROCHLORIDE 10 MILLIGRAM(S): 5 TABLET ORAL at 16:00

## 2022-06-26 RX ADMIN — LIDOCAINE 1 PATCH: 4 CREAM TOPICAL at 06:43

## 2022-06-26 RX ADMIN — OXYCODONE HYDROCHLORIDE 10 MILLIGRAM(S): 5 TABLET ORAL at 15:30

## 2022-06-26 RX ADMIN — PANTOPRAZOLE SODIUM 40 MILLIGRAM(S): 20 TABLET, DELAYED RELEASE ORAL at 11:13

## 2022-06-26 RX ADMIN — CHLORHEXIDINE GLUCONATE 1 APPLICATION(S): 213 SOLUTION TOPICAL at 06:31

## 2022-06-26 RX ADMIN — MUPIROCIN 1 APPLICATION(S): 20 OINTMENT TOPICAL at 17:59

## 2022-06-26 NOTE — PROGRESS NOTE ADULT - ATTENDING COMMENTS
I agree with the detailed interval history, physical, and plan, which I have reviewed and edited where appropriate'; also agree with notes/assessment with my team on service.  I have personally examined the patient.  I was physically present for the key portions of the evaluation and management (E/M) service provided.  I reviewed all the pertinent data.  The patient is a critical care patient with life threatening hemodynamic and metabolic instability in SICU.  The SICU team has a constant risk benefit analyzes discussion and coordinating care with the primary team and all consultants.   The patient is in SICU with the chief complaint and diagnosis mentioned in the note.   The plan will be specified in the note.  67F with right ventral hernia s/p laparoscopic ventral hernia repair and RTOR ExLap, SBR, end ileostomy in SICU.  EXAM  NEUROLOGY  Exam: no focal deficits.  RESPIRATORY  Exam: coarse Bs  CARDIOVASCULAR  Exam: Regular rate   GI/NUTRITION  Exam: Abdomen soft, Non-tender,   VASCULAR  Exam: Extremities warm,  PLAN:    NEUROLOGY:  Pain control  -: Tylenol 1g PO q6hr and Oxycodone 5/10 PO q4hr PRN  RESPIRATORY: respiratory abnormality. Hypercarbia  - Diamox  - Albuterol 2.5mg nebs q8hr for heavy secretions  -CARDIOVASCULAR:  - Monitor hemodynamics  GI / NUTRITION:  -Jevity 1.2 @ 45cc/hr  - GI ppx: Protonix 40mg PO daily  RENAL / GENITOURINARY:  - DIamox for diuresis  HEMATOLOGIC:  - VTE ppx: Lovenox 40mg SQ daily  -INFECTIOUS DISEASE:   Trend fever curve   ENDOCRINOLOGY:  - Continue to monitor glucose   Disposition:   - Full code  - SICU

## 2022-06-26 NOTE — PROGRESS NOTE ADULT - SUBJECTIVE AND OBJECTIVE BOX
SICU Daily Progress Note  =====================================================  Interval/Overnight Events:     - Removed CVC, Midline placed  - No acute events overnight    HPI: 68yo F (speaks Nepali dialect) with a history of HTN, HLD, L tibia surgery, lap kevan (2011, [facility]) presenting with 1.5 days of nausea, vomiting and abdominal pain. Per daughter at bedside, patient began having symptoms Monday night after dinner. Last passed flatus and had a BM at that time. Denies fevers or chills. Pain improved in ED after getting morphine and NGT placement.    In the ED, stable and afebrile. Labs unremarkable. CT showed incarcerated SBO at right ventral hernia. (15 Rambo 2022 08:22)    Patient s/p ventral hernia repair, laparoscopic c/b enterotomy intraoperatively, converted to open with primary repair on 6/15. Patient on floors with NGT, NPO. On 6/21, 2am rapid response called for fever to 104 rectal, tachycardia to 120s and tachypnea. Unable to answer questions per daughter, in native [facility]. Stat labs drawn. SICU consulted for sepsis management and altered mental status. CT scan obtained on way.     Allergies: No Known Allergies    MEDICATIONS:   --------------------------------------------------------------------------------------  Neurologic Medications  acetaminophen    Suspension .. 1000 milliGRAM(s) Oral every 6 hours PRN Temp greater or equal to 38C (100.4F), Mild Pain (1 - 3)  oxyCODONE    Solution 5 milliGRAM(s) Oral every 4 hours PRN Moderate Pain (4 - 6)  oxyCODONE    Solution 10 milliGRAM(s) Oral every 4 hours PRN Severe Pain (7 - 10)    Respiratory Medications  ALBUTerol   0.5% 2.5 milliGRAM(s) Nebulizer every 8 hours    Cardiovascular Medications    Gastrointestinal Medications  pantoprazole   Suspension 40 milliGRAM(s) Oral daily  sodium chloride 0.9% lock flush 10 milliLiter(s) IV Push every 1 hour PRN Pre/post blood products, medications, blood draw, and to maintain line patency    Genitourinary Medications    Hematologic/Oncologic Medications  enoxaparin Injectable 40 milliGRAM(s) SubCutaneous every 24 hours  influenza  Vaccine (HIGH DOSE) 0.7 milliLiter(s) IntraMuscular once    Antimicrobial/Immunologic Medications  piperacillin/tazobactam IVPB.. 3.375 Gram(s) IV Intermittent every 8 hours    Endocrine/Metabolic Medications    Topical/Other Medications  chlorhexidine 4% Liquid 1 Application(s) Topical <User Schedule>  lidocaine   4% Patch 1 Patch Transdermal daily PRN pain  mupirocin 2% Ointment 1 Application(s) Topical two times a day  tetracaine/benzocaine/butamben Spray 1 Spray(s) Topical every 6 hours PRN Throat pain from NGT    --------------------------------------------------------------------------------------    VITAL SIGNS, INS/OUTS (last 24 hours):  --------------------------------------------------------------------------------------  T(C): 37.1 (06-26-22 @ 00:00), Max: 37.4 (06-25-22 @ 04:00)  HR: 85 (06-26-22 @ 00:00) (65 - 89)  BP: 94/51 (06-26-22 @ 00:00) (94/51 - 123/100)  RR: 16 (06-26-22 @ 00:00) (10 - 21)  SpO2: 96% (06-26-22 @ 00:00) (93% - 100%)    06-24-22 @ 07:01  -  06-25-22 @ 07:00  --------------------------------------------------------  IN: 460 mL / OUT: 6950 mL / NET: -6490 mL    06-25-22 @ 07:01  -  06-26-22 @ 00:17  --------------------------------------------------------  IN: 615 mL / OUT: 1325 mL / NET: -710 mL      --------------------------------------------------------------------------------------    EXAM  NEUROLOGY  Exam: Normal, NAD, alert, oriented x3, no focal deficits.    HEENT  Exam: Normocephalic, atraumatic, EOMI.     RESPIRATORY  Exam: Normal expansion/effort.  Mechanical Ventilation:     CARDIOVASCULAR  Exam: Regular rate and rhythm.       GI/NUTRITION  Exam: Abdomen soft, Non-tender, Non-distended.     VASCULAR  Exam: Extremities warm, pink, well-perfused.     MUSCULOSKELETAL  Exam: All extremities moving spontaneously without limitations.     SKIN  Exam: Good skin turgor, no skin breakdown.       LABS  --------------------------------------------------------------------------------------                        10.9   12.36 )-----------( 341      ( 25 Jun 2022 02:00 )             34.4   06-25    135  |  94<L>  |  12  ----------------------------<  72  4.0   |  32<H>  |  0.82    Ca    8.1<L>      25 Jun 2022 02:00  Phos  4.1     06-25  Mg     2.30     06-25    ABG - ( 24 Jun 2022 05:45 )  pH, Arterial: 7.40  pH, Blood: x     /  pCO2: 51    /  pO2: 117   / HCO3: 32    / Base Excess: 5.8   /  SaO2: 99.7            PT/INR - ( 24 Jun 2022 00:40 )   PT: 11.7 sec;   INR: 1.01 ratio         PTT - ( 24 Jun 2022 00:40 )  PTT:24.1 sec  --------------------------------------------------------------------------------------     SICU Daily Progress Note  =====================================================  Interval/Overnight Events:     - Removed CVC, Midline placed  - No acute events overnight    HPI: 68yo F (speaks Sinhala dialect) with a history of HTN, HLD, L tibia surgery, lap kevan (2011, [facility]) presenting with 1.5 days of nausea, vomiting and abdominal pain. Per daughter at bedside, patient began having symptoms Monday night after dinner. Last passed flatus and had a BM at that time. Denies fevers or chills. Pain improved in ED after getting morphine and NGT placement.    In the ED, stable and afebrile. Labs unremarkable. CT showed incarcerated SBO at right ventral hernia. (15 Rambo 2022 08:22)    Patient s/p ventral hernia repair, laparoscopic c/b enterotomy intraoperatively, converted to open with primary repair on 6/15. Patient on floors with NGT, NPO. On 6/21, 2am rapid response called for fever to 104 rectal, tachycardia to 120s and tachypnea. Unable to answer questions per daughter, in native [facility]. Stat labs drawn. SICU consulted for sepsis management and altered mental status. CT scan obtained on way.     Allergies: No Known Allergies    MEDICATIONS:   --------------------------------------------------------------------------------------  Neurologic Medications  acetaminophen    Suspension .. 1000 milliGRAM(s) Oral every 6 hours PRN Temp greater or equal to 38C (100.4F), Mild Pain (1 - 3)  oxyCODONE    Solution 5 milliGRAM(s) Oral every 4 hours PRN Moderate Pain (4 - 6)  oxyCODONE    Solution 10 milliGRAM(s) Oral every 4 hours PRN Severe Pain (7 - 10)    Respiratory Medications  ALBUTerol   0.5% 2.5 milliGRAM(s) Nebulizer every 8 hours    Cardiovascular Medications    Gastrointestinal Medications  pantoprazole   Suspension 40 milliGRAM(s) Oral daily  sodium chloride 0.9% lock flush 10 milliLiter(s) IV Push every 1 hour PRN Pre/post blood products, medications, blood draw, and to maintain line patency    Genitourinary Medications    Hematologic/Oncologic Medications  enoxaparin Injectable 40 milliGRAM(s) SubCutaneous every 24 hours  influenza  Vaccine (HIGH DOSE) 0.7 milliLiter(s) IntraMuscular once    Antimicrobial/Immunologic Medications  piperacillin/tazobactam IVPB.. 3.375 Gram(s) IV Intermittent every 8 hours    Endocrine/Metabolic Medications    Topical/Other Medications  chlorhexidine 4% Liquid 1 Application(s) Topical <User Schedule>  lidocaine   4% Patch 1 Patch Transdermal daily PRN pain  mupirocin 2% Ointment 1 Application(s) Topical two times a day  tetracaine/benzocaine/butamben Spray 1 Spray(s) Topical every 6 hours PRN Throat pain from NGT    --------------------------------------------------------------------------------------    VITAL SIGNS, INS/OUTS (last 24 hours):  --------------------------------------------------------------------------------------  T(C): 37.1 (06-26-22 @ 00:00), Max: 37.4 (06-25-22 @ 04:00)  HR: 85 (06-26-22 @ 00:00) (65 - 89)  BP: 94/51 (06-26-22 @ 00:00) (94/51 - 123/100)  RR: 16 (06-26-22 @ 00:00) (10 - 21)  SpO2: 96% (06-26-22 @ 00:00) (93% - 100%)    06-24-22 @ 07:01  -  06-25-22 @ 07:00  --------------------------------------------------------  IN: 460 mL / OUT: 6950 mL / NET: -6490 mL    06-25-22 @ 07:01  -  06-26-22 @ 00:17  --------------------------------------------------------  IN: 615 mL / OUT: 1325 mL / NET: -710 mL      --------------------------------------------------------------------------------------    EXAM  NEUROLOGY  Exam: Normal, NAD, alert, oriented x3, no focal deficits.    HEENT  Exam: Normocephalic, atraumatic, EOMI.     RESPIRATORY  Exam: Normal expansion/effort.  Mechanical Ventilation:     CARDIOVASCULAR  Exam: Regular rate and rhythm.       GI/NUTRITION  Exam: Abdomen soft, Non-tender, Non-distended.     VASCULAR  Exam: Extremities warm, pink, well-perfused.     MUSCULOSKELETAL  Exam: All extremities moving spontaneously without limitations.     SKIN  Exam: Good skin turgor, no skin breakdown.       LABS  --------------------------------------------------------------------------------------             CBC (06-26 @ 00:50)                              10.5<L>                         10.35   )----------------(  361        --    % Neutrophils, --    % Lymphocytes, ANC: --                                  33.2<L>  CBC (06-25 @ 02:00)                              10.9<L>                         12.36<H>  )----------------(  341        --    % Neutrophils, --    % Lymphocytes, ANC: --                                  34.4<L>    BMP (06-26 @ 00:50)             136     |  94<L>   |  12    		Ca++ --      Ca 7.9<L>             ---------------------------------( 170<H>		Mg 2.10               3.7     |  33<H>   |  0.74  			Ph 2.7     BMP (06-25 @ 02:00)             135     |  94<L>   |  12    		Ca++ --      Ca 8.1<L>             ---------------------------------( 72    		Mg 2.30               4.0     |  32<H>   |  0.82  			Ph 4.1           -> Clean Catch Clean Catch (Midstream) Culture (06-21 @ 13:25)     NG    NG    No growth    -> .Blood Blood Culture (06-21 @ 01:50)     NG    NG    No Growth Final      --------------------------------------------------------------------------------------

## 2022-06-26 NOTE — PROGRESS NOTE ADULT - SUBJECTIVE AND OBJECTIVE BOX
24h Events per SICU:  - Removed CVC, Midline placed  - No acute events overnight    Subjective:   Patient seen at bedside this AM.     Objective:  Vital Signs  T(C): 37 (06-26 @ 04:00), Max: 37.2 (06-25 @ 12:00)  HR: 74 (06-26 @ 04:00) (65 - 89)  BP: 94/60 (06-26 @ 04:00) (94/51 - 123/100)  RR: 12 (06-26 @ 04:00) (10 - 21)  SpO2: 98% (06-26 @ 04:00) (93% - 100%)  06-24-22 @ 07:01  -  06-25-22 @ 07:00  --------------------------------------------------------  IN:  Total IN: 0 mL    OUT:    Indwelling Catheter - Urethral (mL): 4750 mL    Nasogastric/Oral tube (mL): 500 mL    Stool (mL): 700 mL    VAC (Vacuum Assisted Closure) System (mL): 300 mL    Voided (mL): 700 mL  Total OUT: 6950 mL    Total NET: -6950 mL      06-25-22 @ 07:01  -  06-26-22 @ 04:53  --------------------------------------------------------  IN:  Total IN: 0 mL    OUT:    Stool (mL): 875 mL    VAC (Vacuum Assisted Closure) System (mL): 0 mL    Voided (mL): 750 mL  Total OUT: 1625 mL    Total NET: -1625 mL          Physical Exam:  General: NAD, resting in bed comfortably, intubated, awake nodding yes/no  Cardiac: regular rate, warm and well perfused  Respiratory: intubated, CPAP5/5, nonlabored.  Abdomen: soft, nondistended, midline incision with black wound vac to suction.  Extremities: normal strength, FROM, no deformities    Labs:                        10.5   10.35 )-----------( 361      ( 26 Jun 2022 00:50 )             33.2   06-26    136  |  94<L>  |  12  ----------------------------<  170<H>  3.7   |  33<H>  |  0.74    Ca    7.9<L>      26 Jun 2022 00:50  Phos  2.7     06-26  Mg     2.10     06-26      CAPILLARY BLOOD GLUCOSE          Imaging:     24h Events per SICU:  - Removed CVC, Midline placed  - No acute events overnight    Subjective:   Patient seen at bedside this AM.  No vomiting, tolerating tube feeds.     Objective:  Vital Signs Last 24 Hrs  T(C): 37 (26 Jun 2022 08:00), Max: 37.1 (26 Jun 2022 00:00)  T(F): 98.6 (26 Jun 2022 08:00), Max: 98.8 (26 Jun 2022 00:00)  HR: 81 (26 Jun 2022 10:00) (73 - 89)  BP: 115/55 (26 Jun 2022 10:00) (94/51 - 121/68)  BP(mean): 72 (26 Jun 2022 10:00) (63 - 98)  RR: 22 (26 Jun 2022 10:00) (10 - 22)  SpO2: 98% (26 Jun 2022 10:00) (94% - 100%)    I&O's Detail    25 Jun 2022 07:01  -  26 Jun 2022 07:00  --------------------------------------------------------  IN:    Jevity 1.2: 930 mL  Total IN: 930 mL    OUT:    Stool (mL): 1125 mL    VAC (Vacuum Assisted Closure) System (mL): 0 mL    Voided (mL): 750 mL  Total OUT: 1875 mL    Total NET: -945 mL      26 Jun 2022 07:01  -  26 Jun 2022 12:42  --------------------------------------------------------  IN:    Jevity 1.2: 130 mL  Total IN: 130 mL    OUT:    Voided (mL): 100 mL  Total OUT: 100 mL    Total NET: 30 mL    Physical Exam:  General: NAD, resting in bed comfortably, intubated, awake nodding yes/no  Cardiac: regular rate, warm and well perfused  Respiratory: intubated, CPAP5/5, nonlabored.  Abdomen: soft, nondistended, midline incision with black wound vac to suction.  Extremities: normal strength, FROM, no deformities    Labs:                                 10.5   10.35 )-----------( 361      ( 26 Jun 2022 00:50 )             33.2   06-26    136  |  94<L>  |  12  ----------------------------<  170<H>  3.7   |  33<H>  |  0.74    Ca    7.9<L>      26 Jun 2022 00:50  Phos  2.7     06-26  Mg     2.10     06-26            Imaging:

## 2022-06-26 NOTE — PROGRESS NOTE ADULT - ASSESSMENT
67F (speaks Upper sorbian dialect) with a history of HTN, HLD, L tibia surgery, lap kevan (2011, [facility]) presenting w incarcerated SBO at right ventral hernia s/p (6/15) right ventral hernia s/p laparoscopic ventral hernia repair, c/b enterotomy intaoperatively, converted to open with primary repair. RRT on GMF, 6/21 for fever tmax 104, tacycardia to 120s, tachypnea. SICU accepted for severe sepsis. Patient now s/p (6/21) RTOR ExLap, SBR, end ileostomy, fascia closed, midline wound VAC. Extubated 6/23.    PLAN:    NEUROLOGY:  - Pain control: Tylenol 1g PO q6hr and Oxycodone 5/10 PO q4hr PRN  - Lidocaine patch    RESPIRATORY:  - Maintain O2 saturation >92%  - Albuterol 2.5mg nebs q8hr for heavy secretions    CARDIOVASCULAR:  - Monitor hemodynamics    GI / NUTRITION:  - NGT w/ TF Jevity 1.2 @ 45cc/hr  - GI ppx: Protonix 40mg PO daily    RENAL / GENITOURINARY:  - Monitor I&Os  - Monitor electrolytes and replete PRN    HEMATOLOGIC:  - VTE ppx: Lovenox 40mg SQ daily  - Trend H/H on CBC daily    INFECTIOUS DISEASE:  - MRSA swab (6/22): Negative  - BCx (6/21): NGTD  - Trend fever curve and WBC on CBC daily    ENDOCRINOLOGY:  - No active issues  - Continue to monitor glucose on BMP (goal 140-180)    Tubes/Lines/Drains  - PIVs    Disposition:   - Full code  - SICU 67F (speaks Upper sorbian dialect) with a history of HTN, HLD, L tibia surgery, lap kevan (2011, [facility]) presenting w incarcerated SBO at right ventral hernia s/p (6/15) right ventral hernia s/p laparoscopic ventral hernia repair, c/b enterotomy intaoperatively, converted to open with primary repair. RRT on GMF, 6/21 for fever tmax 104, tacycardia to 120s, tachypnea. SICU accepted for severe sepsis. Patient now s/p (6/21) RTOR ExLap, SBR, end ileostomy, fascia closed, midline wound VAC. Extubated 6/23.    PLAN:    NEUROLOGY:  - Pain control: Tylenol 1g PO q6hr and Oxycodone 5/10 PO q4hr PRN  - Lidocaine patch    RESPIRATORY:  - Maintain O2 saturation >92%  - Albuterol 2.5mg nebs q8hr for heavy secretions  - will check afternoon ABG after diuresis with diamox    CARDIOVASCULAR:  - Monitor hemodynamics    GI / NUTRITION:  - NGT w/ TF Jevity 1.2 @ 45cc/hr  - GI ppx: Protonix 40mg PO daily    RENAL / GENITOURINARY:  - Monitor I&Os  - Monitor electrolytes and replete PRN  - DIamox for diuresis    HEMATOLOGIC:  - VTE ppx: Lovenox 40mg SQ daily  - Trend H/H on CBC daily    INFECTIOUS DISEASE:  - MRSA swab (6/22): Negative  - BCx (6/21): NGTD  - Trend fever curve and WBC on CBC daily    ENDOCRINOLOGY:  - No active issues  - Continue to monitor glucose on BMP (goal 140-180)    Tubes/Lines/Drains  - PIVs    Disposition:   - Full code  - SICU

## 2022-06-26 NOTE — PROGRESS NOTE ADULT - ASSESSMENT
Patient is a 67 year old female (speaks Frisian dialect) with a PMHx of HTN, HLD, left tibia surgery and lap kevan (2011 at [facility]) who presented with nausea, vomiting and abdominal pain x1.5 days.  CT Abdomen / Pelvis performed showing small bowel obstruction secondary to a right ventral hernia containing a dilated, fecalized loop of small bowel.  Patient went to the OR for a diagnostic laparoscopy, ventral hernia repair - converted to open for repair of enterotomy on 6/15/22.  Hospital course complicated by fever and tachycardia requiring SICU admission.  Repeat CT scan performed showing small bowel obstruction with a point of transition at the distal ileum.  Patient returned to the OR for an exploratory laparotomy with small bowel resection and end ileostomy on 6/21/22.      PLAN:  - NGT with tube feeds  - Nasal cannula   - Monitor ostomy output  - VTE prophylaxis with Lovenox subcutaneous  - Appreciate SICU care     B Team Surgery  [id number] Patient is a 67 year old female (speaks Khmer dialect) with a PMHx of HTN, HLD, left tibia surgery and lap kevan (2011 at [facility]) who presented with nausea, vomiting and abdominal pain x1.5 days.  CT Abdomen / Pelvis performed showing small bowel obstruction secondary to a right ventral hernia containing a dilated, fecalized loop of small bowel.  Patient went to the OR for a diagnostic laparoscopy, ventral hernia repair - converted to open for repair of enterotomy on 6/15/22.  Hospital course complicated by fever and tachycardia requiring SICU admission.  Repeat CT scan performed showing small bowel obstruction with a point of transition at the distal ileum.  Patient returned to the OR for an exploratory laparotomy with small bowel resection and end ileostomy on 6/21/22.      PLAN:  - Plan to feed PO today   - Nasal cannula   - Monitor ostomy output  - VTE prophylaxis with Lovenox subcutaneous  - Appreciate SICU care     B Team Surgery  [id number]

## 2022-06-27 LAB
ANION GAP SERPL CALC-SCNC: 10 MMOL/L — SIGNIFICANT CHANGE UP (ref 7–14)
ANION GAP SERPL CALC-SCNC: 12 MMOL/L — SIGNIFICANT CHANGE UP (ref 7–14)
BUN SERPL-MCNC: 12 MG/DL — SIGNIFICANT CHANGE UP (ref 7–23)
BUN SERPL-MCNC: 13 MG/DL — SIGNIFICANT CHANGE UP (ref 7–23)
CA-I BLD-SCNC: 1.04 MMOL/L — LOW (ref 1.15–1.29)
CALCIUM SERPL-MCNC: 8.3 MG/DL — LOW (ref 8.4–10.5)
CALCIUM SERPL-MCNC: 8.9 MG/DL — SIGNIFICANT CHANGE UP (ref 8.4–10.5)
CHLORIDE SERPL-SCNC: 100 MMOL/L — SIGNIFICANT CHANGE UP (ref 98–107)
CHLORIDE SERPL-SCNC: 97 MMOL/L — LOW (ref 98–107)
CO2 SERPL-SCNC: 24 MMOL/L — SIGNIFICANT CHANGE UP (ref 22–31)
CO2 SERPL-SCNC: 25 MMOL/L — SIGNIFICANT CHANGE UP (ref 22–31)
CREAT SERPL-MCNC: 0.67 MG/DL — SIGNIFICANT CHANGE UP (ref 0.5–1.3)
CREAT SERPL-MCNC: 0.67 MG/DL — SIGNIFICANT CHANGE UP (ref 0.5–1.3)
EGFR: 96 ML/MIN/1.73M2 — SIGNIFICANT CHANGE UP
EGFR: 96 ML/MIN/1.73M2 — SIGNIFICANT CHANGE UP
GLUCOSE SERPL-MCNC: 103 MG/DL — HIGH (ref 70–99)
GLUCOSE SERPL-MCNC: 136 MG/DL — HIGH (ref 70–99)
HCT VFR BLD CALC: 33.7 % — LOW (ref 34.5–45)
HCT VFR BLD CALC: 34.8 % — SIGNIFICANT CHANGE UP (ref 34.5–45)
HGB BLD-MCNC: 10.4 G/DL — LOW (ref 11.5–15.5)
HGB BLD-MCNC: 10.9 G/DL — LOW (ref 11.5–15.5)
IANC: 5.04 K/UL — SIGNIFICANT CHANGE UP (ref 1.8–7.4)
MAGNESIUM SERPL-MCNC: 1.6 MG/DL — SIGNIFICANT CHANGE UP (ref 1.6–2.6)
MAGNESIUM SERPL-MCNC: 1.9 MG/DL — SIGNIFICANT CHANGE UP (ref 1.6–2.6)
MCHC RBC-ENTMCNC: 25.9 PG — LOW (ref 27–34)
MCHC RBC-ENTMCNC: 25.9 PG — LOW (ref 27–34)
MCHC RBC-ENTMCNC: 30.9 GM/DL — LOW (ref 32–36)
MCHC RBC-ENTMCNC: 31.3 GM/DL — LOW (ref 32–36)
MCV RBC AUTO: 82.7 FL — SIGNIFICANT CHANGE UP (ref 80–100)
MCV RBC AUTO: 84 FL — SIGNIFICANT CHANGE UP (ref 80–100)
NRBC # BLD: 0 /100 WBCS — SIGNIFICANT CHANGE UP
NRBC # FLD: 0 K/UL — SIGNIFICANT CHANGE UP
PHOSPHATE SERPL-MCNC: 3.4 MG/DL — SIGNIFICANT CHANGE UP (ref 2.5–4.5)
PHOSPHATE SERPL-MCNC: 3.6 MG/DL — SIGNIFICANT CHANGE UP (ref 2.5–4.5)
PLATELET # BLD AUTO: 242 K/UL — SIGNIFICANT CHANGE UP (ref 150–400)
PLATELET # BLD AUTO: 461 K/UL — HIGH (ref 150–400)
POTASSIUM SERPL-MCNC: 3.6 MMOL/L — SIGNIFICANT CHANGE UP (ref 3.5–5.3)
POTASSIUM SERPL-MCNC: 3.8 MMOL/L — SIGNIFICANT CHANGE UP (ref 3.5–5.3)
POTASSIUM SERPL-SCNC: 3.6 MMOL/L — SIGNIFICANT CHANGE UP (ref 3.5–5.3)
POTASSIUM SERPL-SCNC: 3.8 MMOL/L — SIGNIFICANT CHANGE UP (ref 3.5–5.3)
RBC # BLD: 4.01 M/UL — SIGNIFICANT CHANGE UP (ref 3.8–5.2)
RBC # BLD: 4.21 M/UL — SIGNIFICANT CHANGE UP (ref 3.8–5.2)
RBC # FLD: 15.3 % — HIGH (ref 10.3–14.5)
RBC # FLD: 15.7 % — HIGH (ref 10.3–14.5)
SARS-COV-2 RNA SPEC QL NAA+PROBE: SIGNIFICANT CHANGE UP
SODIUM SERPL-SCNC: 133 MMOL/L — LOW (ref 135–145)
SODIUM SERPL-SCNC: 135 MMOL/L — SIGNIFICANT CHANGE UP (ref 135–145)
WBC # BLD: 8.03 K/UL — SIGNIFICANT CHANGE UP (ref 3.8–10.5)
WBC # BLD: 8.17 K/UL — SIGNIFICANT CHANGE UP (ref 3.8–10.5)
WBC # FLD AUTO: 8.03 K/UL — SIGNIFICANT CHANGE UP (ref 3.8–10.5)
WBC # FLD AUTO: 8.17 K/UL — SIGNIFICANT CHANGE UP (ref 3.8–10.5)

## 2022-06-27 PROCEDURE — 71045 X-RAY EXAM CHEST 1 VIEW: CPT | Mod: 26

## 2022-06-27 PROCEDURE — 99233 SBSQ HOSP IP/OBS HIGH 50: CPT | Mod: GC

## 2022-06-27 RX ORDER — CALCIUM GLUCONATE 100 MG/ML
2 VIAL (ML) INTRAVENOUS
Refills: 0 | Status: DISCONTINUED | OUTPATIENT
Start: 2022-06-27 | End: 2022-06-27

## 2022-06-27 RX ORDER — OXYCODONE HYDROCHLORIDE 5 MG/1
10 TABLET ORAL EVERY 6 HOURS
Refills: 0 | Status: DISCONTINUED | OUTPATIENT
Start: 2022-06-27 | End: 2022-07-03

## 2022-06-27 RX ORDER — FUROSEMIDE 40 MG
40 TABLET ORAL ONCE
Refills: 0 | Status: COMPLETED | OUTPATIENT
Start: 2022-06-27 | End: 2022-06-27

## 2022-06-27 RX ORDER — HYDROMORPHONE HYDROCHLORIDE 2 MG/ML
0.5 INJECTION INTRAMUSCULAR; INTRAVENOUS; SUBCUTANEOUS ONCE
Refills: 0 | Status: DISCONTINUED | OUTPATIENT
Start: 2022-06-27 | End: 2022-06-27

## 2022-06-27 RX ORDER — OXYCODONE HYDROCHLORIDE 5 MG/1
5 TABLET ORAL EVERY 4 HOURS
Refills: 0 | Status: DISCONTINUED | OUTPATIENT
Start: 2022-06-27 | End: 2022-07-03

## 2022-06-27 RX ORDER — ATORVASTATIN CALCIUM 80 MG/1
10 TABLET, FILM COATED ORAL AT BEDTIME
Refills: 0 | Status: DISCONTINUED | OUTPATIENT
Start: 2022-06-27 | End: 2022-07-07

## 2022-06-27 RX ORDER — OXYCODONE HYDROCHLORIDE 5 MG/1
5 TABLET ORAL EVERY 4 HOURS
Refills: 0 | Status: DISCONTINUED | OUTPATIENT
Start: 2022-06-27 | End: 2022-06-27

## 2022-06-27 RX ORDER — ACETAMINOPHEN 500 MG
975 TABLET ORAL EVERY 6 HOURS
Refills: 0 | Status: DISCONTINUED | OUTPATIENT
Start: 2022-06-27 | End: 2022-06-29

## 2022-06-27 RX ORDER — OXYCODONE HYDROCHLORIDE 5 MG/1
10 TABLET ORAL EVERY 4 HOURS
Refills: 0 | Status: DISCONTINUED | OUTPATIENT
Start: 2022-06-27 | End: 2022-06-27

## 2022-06-27 RX ADMIN — Medication 975 MILLIGRAM(S): at 22:09

## 2022-06-27 RX ADMIN — ALBUTEROL 2.5 MILLIGRAM(S): 90 AEROSOL, METERED ORAL at 07:58

## 2022-06-27 RX ADMIN — MUPIROCIN 1 APPLICATION(S): 20 OINTMENT TOPICAL at 18:38

## 2022-06-27 RX ADMIN — OXYCODONE HYDROCHLORIDE 5 MILLIGRAM(S): 5 TABLET ORAL at 15:30

## 2022-06-27 RX ADMIN — MUPIROCIN 1 APPLICATION(S): 20 OINTMENT TOPICAL at 06:13

## 2022-06-27 RX ADMIN — HYDROMORPHONE HYDROCHLORIDE 0.5 MILLIGRAM(S): 2 INJECTION INTRAMUSCULAR; INTRAVENOUS; SUBCUTANEOUS at 09:38

## 2022-06-27 RX ADMIN — ATORVASTATIN CALCIUM 10 MILLIGRAM(S): 80 TABLET, FILM COATED ORAL at 22:09

## 2022-06-27 RX ADMIN — ENOXAPARIN SODIUM 40 MILLIGRAM(S): 100 INJECTION SUBCUTANEOUS at 06:12

## 2022-06-27 RX ADMIN — ALBUTEROL 2.5 MILLIGRAM(S): 90 AEROSOL, METERED ORAL at 15:55

## 2022-06-27 RX ADMIN — OXYCODONE HYDROCHLORIDE 5 MILLIGRAM(S): 5 TABLET ORAL at 16:00

## 2022-06-27 RX ADMIN — Medication 40 MILLIGRAM(S): at 09:06

## 2022-06-27 RX ADMIN — CHLORHEXIDINE GLUCONATE 1 APPLICATION(S): 213 SOLUTION TOPICAL at 15:29

## 2022-06-27 RX ADMIN — HYDROMORPHONE HYDROCHLORIDE 0.5 MILLIGRAM(S): 2 INJECTION INTRAMUSCULAR; INTRAVENOUS; SUBCUTANEOUS at 10:53

## 2022-06-27 RX ADMIN — Medication 200 GRAM(S): at 06:12

## 2022-06-27 RX ADMIN — ALBUTEROL 2.5 MILLIGRAM(S): 90 AEROSOL, METERED ORAL at 22:52

## 2022-06-27 NOTE — PROGRESS NOTE ADULT - ASSESSMENT
67F (speaks Romanian dialect) with a history of HTN, HLD, L tibia surgery, lap kevan (2011, [facility]) presenting w incarcerated SBO at right ventral hernia s/p (6/15) right ventral hernia s/p laparoscopic ventral hernia repair, c/b enterotomy intaoperatively, converted to open with primary repair. RRT on GMF, 6/21 for fever tmax 104, tacycardia to 120s, tachypnea. SICU accepted for severe sepsis. Patient now s/p (6/21) RTOR ExLap, SBR, end ileostomy, fascia closed, midline wound VAC. Extubated 6/23.    PLAN:    NEUROLOGY:  - Pain control: Tylenol 1g PO q6hr and Oxycodone 5/10 PO q4hr PRN  - Lidocaine patch    RESPIRATORY:  - Maintain O2 saturation >92%  - Albuterol 2.5mg nebs q8hr for heavy secretions    CARDIOVASCULAR:  - Monitor hemodynamics    GI / NUTRITION:  - CLD with TF  - GI ppx: Protonix 40mg PO daily    RENAL / GENITOURINARY:  - Monitor I&Os  - Monitor electrolytes and replete PRN  - diamox for alkalosis     HEMATOLOGIC:  - VTE ppx: Lovenox 40mg SQ daily  - Trend H/H on CBC daily    INFECTIOUS DISEASE:  - MRSA swab (6/22): Negative  - BCx (6/21): NGTD  - Trend fever curve and WBC on CBC daily  - Mupirocin for MSSA in nares    ENDOCRINOLOGY:  - No active issues  - Continue to monitor glucose on BMP (goal 140-180)    Tubes/Lines/Drains  - PIVs    Disposition:   - Full code  - SICU

## 2022-06-27 NOTE — PROGRESS NOTE ADULT - SUBJECTIVE AND OBJECTIVE BOX
SICU Daily Progress Note  =====================================================  Interval/Overnight Events:     - Started on Diamox TID for alkalosis  - No acute events overnight    HPI: 68yo F (speaks Sinhala dialect) with a history of HTN, HLD, L tibia surgery, lap kevan (2011, [facility]) presenting with 1.5 days of nausea, vomiting and abdominal pain. Per daughter at bedside, patient began having symptoms Monday night after dinner. Last passed flatus and had a BM at that time. Denies fevers or chills. Pain improved in ED after getting morphine and NGT placement.    In the ED, stable and afebrile. Labs unremarkable. CT showed incarcerated SBO at right ventral hernia. (15 Rambo 2022 08:22)    Patient s/p ventral hernia repair, laparoscopic c/b enterotomy intraoperatively, converted to open with primary repair on 6/15. Patient on floors with NGT, NPO. On 6/21, 2am rapid response called for fever to 104 rectal, tachycardia to 120s and tachypnea. Unable to answer questions per daughter, in native [facility]. Stat labs drawn. SICU consulted for sepsis management and altered mental status. CT scan obtained on way.     Allergies: No Known Allergies    MEDICATIONS:   --------------------------------------------------------------------------------------  Neurologic Medications  acetaminophen    Suspension .. 1000 milliGRAM(s) Oral every 6 hours PRN Temp greater or equal to 38C (100.4F), Mild Pain (1 - 3)  oxyCODONE    Solution 5 milliGRAM(s) Oral every 4 hours PRN Moderate Pain (4 - 6)  oxyCODONE    Solution 10 milliGRAM(s) Oral every 4 hours PRN Severe Pain (7 - 10)    Respiratory Medications  ALBUTerol   0.5% 2.5 milliGRAM(s) Nebulizer every 8 hours    Cardiovascular Medications    Gastrointestinal Medications  pantoprazole   Suspension 40 milliGRAM(s) Oral daily  sodium chloride 0.9% lock flush 10 milliLiter(s) IV Push every 1 hour PRN Pre/post blood products, medications, blood draw, and to maintain line patency    Genitourinary Medications    Hematologic/Oncologic Medications  enoxaparin Injectable 40 milliGRAM(s) SubCutaneous every 24 hours  influenza  Vaccine (HIGH DOSE) 0.7 milliLiter(s) IntraMuscular once    Antimicrobial/Immunologic Medications  piperacillin/tazobactam IVPB.. 3.375 Gram(s) IV Intermittent every 8 hours    Endocrine/Metabolic Medications    Topical/Other Medications  chlorhexidine 4% Liquid 1 Application(s) Topical <User Schedule>  lidocaine   4% Patch 1 Patch Transdermal daily PRN pain  mupirocin 2% Ointment 1 Application(s) Topical two times a day  tetracaine/benzocaine/butamben Spray 1 Spray(s) Topical every 6 hours PRN Throat pain from NGT    --------------------------------------------------------------------------------------    VITAL SIGNS, INS/OUTS (last 24 hours):  --------------------------------------------------------------------------------------  ICU Vital Signs Last 24 Hrs  T(C): 36.6 (26 Jun 2022 20:00), Max: 37 (26 Jun 2022 04:00)  T(F): 97.8 (26 Jun 2022 20:00), Max: 98.6 (26 Jun 2022 04:00)  HR: 84 (26 Jun 2022 23:42) (73 - 90)  BP: 113/66 (26 Jun 2022 23:00) (94/60 - 145/74)  BP(mean): 80 (26 Jun 2022 23:00) (63 - 106)  ABP: --  ABP(mean): --  RR: 15 (26 Jun 2022 23:00) (12 - 22)  SpO2: 96% (26 Jun 2022 23:42) (94% - 100%)        --------------------------------------------------------------------------------------    EXAM  NEUROLOGY    06-25 @ 07:01  -  06-26 @ 07:00  --------------------------------------------------------  IN: 930 mL / OUT: 1875 mL / NET: -945 mL      Exam: Normal, NAD, alert, oriented x3, no focal deficits.    HEENT  Exam: Normocephalic, atraumatic, EOMI.     RESPIRATORY  Exam: Normal expansion/effort.  Mechanical Ventilation:     CARDIOVASCULAR  Exam: Regular rate and rhythm.       GI/NUTRITION  Exam: midline wound c/d/i    VASCULAR  Exam: Extremities warm, pink, well-perfused.     MUSCULOSKELETAL  Exam: All extremities moving spontaneously without limitations.     SKIN  Exam: Good skin turgor, no skin breakdown.       LABS  --------------------------------------------------------------------------------------             .  LABS:                         10.5   10.35 )-----------( 361      ( 26 Jun 2022 00:50 )             33.2     06-26    136  |  94<L>  |  12  ----------------------------<  170<H>  3.7   |  33<H>  |  0.74    Ca    7.9<L>      26 Jun 2022 00:50  Phos  2.7     06-26  Mg     2.10     06-26                RADIOLOGY, EKG & ADDITIONAL TESTS: Reviewed.     --------------------------------------------------------------------------------------

## 2022-06-27 NOTE — PROGRESS NOTE ADULT - ATTENDING COMMENTS
I agree with the history, physical, and plan, which I have reviewed and edited where appropriate.  I agree with notes/assessment of health care providers on my service.  I have personally examined the patient.  I was physically present for the key portions of the evaluation and management (E/M) service provided.  I reviewed data and laboratory tests/x-rays and all pertinent electronic images.  The patient is a critical care patient with life threatening hemodynamic and metabolic instability in SICU.  Risk benefit analyses discussed.    The patient is in SICU with diagnosis mentioned in the note.    The plan is specified below.      67F (speaks Bulgarian dialect) with a history of HTN, HLD, presenting w incarcerated SBO at ventral hernia s/p (6/15) s/p laparoscopic ventral hernia repair, c/b enterotomy converted to open with primary repair. RRT on 6/21 for fever tmax 104, tacycardia to 120s, tachypnea. SICU accepted for severe sepsis. Patient now s/p (6/21) RTOR ExLap, SBR, end ileostomy. Extubated 6/23.    PLAN:    NEUROLOGY: Postoperative pain   - Pain control: Tylenol 1g PO q6hr and Oxycodone 5/10 PO q4hr PRN  - Lidocaine patch    RESPIRATORY:  - Nasal cannula   - Albuterol 2.5mg nebs q8hr for heavy secretions    CARDIOVASCULAR: h/o HTN  - Holding home amlodipine and losartan.   - Resume atorvastatin     GI / NUTRITION: end ileostomy  - Regular diet   - D/C NGT   - d/c Protonix 40mg ppx     RENAL / GENITOURINARY: hypervolemic   - voiding   - lasix 40 x 1, goal - 1000ml     HEMATOLOGIC:  - VTE ppx: Lovenox 40mg SQ daily    INFECTIOUS DISEASE:  - observe off abx   - Mupirocin for MSSA in [facility]    ENDOCRINOLOGY:  - Continue to monitor glucose on BMP (goal 140-180)

## 2022-06-27 NOTE — PROGRESS NOTE ADULT - ATTENDING COMMENTS
s/p ventral hernia repair with anastomotic leak of SBR, take back with ileostomy, MF repair.   -doing well clinically, SICU care excellent  -con't current regimen, ADAT, PT  -GI, DVT prophylaxis   -transfer to floor when stable     Diony Kaba MD  Acute and Critical Care Surgery    The Acute Care Surgery (B Team) Attending Group Practice:  Dr. Symone Galeano, Dr. Bernard Hogue, Dr. Diony Kaba,  Dr. Fidel Ochoa and Dr. Elise Mccord     Urgent issues - spectra [id number] or [id number]  Nonurgent issues - [phone]  Patient appointments or after hours - [phone]

## 2022-06-27 NOTE — PROGRESS NOTE ADULT - SUBJECTIVE AND OBJECTIVE BOX
GENERAL SURGERY PROGRESS NOTE    SUBJECTIVE  Patient seen and examined. No acute events overnight.  started on diamox tid for alkalosis      OBJECTIVE    PHYSICAL EXAM  General: Appears well, NAD  CHEST: breathing comfortably  CV: appears well perfused  Abdomen: soft, nondistended  midline incision with black wound vac to suction.  Extremities: Grossly symmetric    T(C): 36.6 (06-26-22 @ 20:00), Max: 37 (06-26-22 @ 04:00)  HR: 84 (06-26-22 @ 23:42) (73 - 90)  BP: 113/66 (06-26-22 @ 23:00) (94/60 - 145/74)  RR: 15 (06-26-22 @ 23:00) (12 - 22)  SpO2: 96% (06-26-22 @ 23:42) (94% - 100%)    06-25-22 @ 07:01  -  06-26-22 @ 07:00  --------------------------------------------------------  IN: 930 mL / OUT: 1875 mL / NET: -945 mL    06-26-22 @ 07:01  -  06-27-22 @ 00:49  --------------------------------------------------------  IN: 760 mL / OUT: 1400 mL / NET: -640 mL        MEDICATIONS  acetaminophen    Suspension .. 1000 milliGRAM(s) Oral every 6 hours PRN  ALBUTerol   0.5% 2.5 milliGRAM(s) Nebulizer every 8 hours  chlorhexidine 4% Liquid 1 Application(s) Topical <User Schedule>  enoxaparin Injectable 40 milliGRAM(s) SubCutaneous every 24 hours  influenza  Vaccine (HIGH DOSE) 0.7 milliLiter(s) IntraMuscular once  lidocaine   4% Patch 1 Patch Transdermal daily PRN  mupirocin 2% Ointment 1 Application(s) Topical two times a day  oxyCODONE    Solution 5 milliGRAM(s) Oral every 4 hours PRN  oxyCODONE    Solution 10 milliGRAM(s) Oral every 4 hours PRN  pantoprazole   Suspension 40 milliGRAM(s) Oral daily  sodium chloride 0.9% lock flush 10 milliLiter(s) IV Push every 1 hour PRN  tetracaine/benzocaine/butamben Spray 1 Spray(s) Topical every 6 hours PRN      LABS                        10.5   10.35 )-----------( 361      ( 26 Jun 2022 00:50 )             33.2     06-26    136  |  94<L>  |  12  ----------------------------<  170<H>  3.7   |  33<H>  |  0.74    Ca    7.9<L>      26 Jun 2022 00:50  Phos  2.7     06-26  Mg     2.10     06-26            RADIOLOGY & ADDITIONAL STUDIES GENERAL SURGERY PROGRESS NOTE    SUBJECTIVE  Patient seen and examined. No acute events overnight.  started on diamox tid for alkalosis, no nausea or vomiting, tolerating CLD around NGT since yesterday.     OBJECTIVE    PHYSICAL EXAM  General: Appears well, NAD  CHEST: breathing comfortably  CV: appears well perfused  Abdomen: soft, nondistended  midline incision with black wound vac to suction.  Extremities: Grossly symmetric    Vital Signs Last 24 Hrs  T(C): 37.3 (27 Jun 2022 12:00), Max: 37.3 (27 Jun 2022 12:00)  T(F): 99.2 (27 Jun 2022 12:00), Max: 99.2 (27 Jun 2022 12:00)  HR: 86 (27 Jun 2022 12:00) (74 - 90)  BP: 108/56 (27 Jun 2022 12:00) (106/66 - 145/74)  BP(mean): 71 (27 Jun 2022 12:00) (71 - 106)  RR: 20 (27 Jun 2022 12:00) (13 - 24)  SpO2: 95% (27 Jun 2022 12:00) (92% - 100%)    I&O's Detail    26 Jun 2022 07:01  -  27 Jun 2022 07:00  --------------------------------------------------------  IN:    Enteral Tube Flush: 60 mL    IV PiggyBack: 50 mL    Jevity 1.2: 1080 mL    Oral Fluid: 160 mL  Total IN: 1350 mL    OUT:    Stool (mL): 1050 mL    VAC (Vacuum Assisted Closure) System (mL): 0 mL    Voided (mL): 1500 mL  Total OUT: 2550 mL    Total NET: -1200 mL      27 Jun 2022 07:01  -  27 Jun 2022 12:18  --------------------------------------------------------  IN:    Jevity 1.2: 45 mL    Oral Fluid: 100 mL  Total IN: 145 mL    OUT:    Stool (mL): 200 mL  Total OUT: 200 mL    Total NET: -55 mL      LABS                                   10.4   8.17  )-----------( 242      ( 27 Jun 2022 02:44 )             33.7   06-27    135  |  100  |  12  ----------------------------<  136<H>  3.8   |  25  |  0.67    Ca    8.3<L>      27 Jun 2022 02:44  Phos  3.4     06-27  Mg     1.90     06-27

## 2022-06-27 NOTE — PROGRESS NOTE ADULT - ASSESSMENT
Patient is a 67 year old female (speaks Mongolian dialect) with a PMHx of HTN, HLD, left tibia surgery and lap kevan (2011 at [facility]) who presented with nausea, vomiting and abdominal pain x1.5 days.  CT Abdomen / Pelvis performed showing small bowel obstruction secondary to a right ventral hernia containing a dilated, fecalized loop of small bowel.  Patient went to the OR for a diagnostic laparoscopy, ventral hernia repair - converted to open for repair of enterotomy on 6/15/22.  Hospital course complicated by fever and tachycardia requiring SICU admission.  Repeat CT scan performed showing small bowel obstruction with a point of transition at the distal ileum.  Patient returned to the OR for an exploratory laparotomy with small bowel resection and end ileostomy on 6/21/22.      PLAN:  - PO diet with TF  - Monitor ostomy output  - VTE prophylaxis   - Appreciate SICU care     B Team Surgery  [id number] Patient is a 67 year old female (speaks Occitan dialect) with a PMHx of HTN, HLD, left tibia surgery and lap kevan (2011 at [facility]) who presented with nausea, vomiting and abdominal pain x1.5 days.  CT Abdomen / Pelvis performed showing small bowel obstruction secondary to a right ventral hernia containing a dilated, fecalized loop of small bowel.  Patient went to the OR for a diagnostic laparoscopy, ventral hernia repair - converted to open for repair of enterotomy on 6/15/22.  Hospital course complicated by fever and tachycardia requiring SICU admission.  Repeat CT scan performed showing small bowel obstruction with a point of transition at the distal ileum.  Patient returned to the OR for an exploratory laparotomy with small bowel resection and end ileostomy on 6/21/22. Recovering in SICU, NGT out, on regular diet with ostomy function.       PLAN:  - Regular diet as tolerated   - Monitor ostomy output  - VTE prophylaxis   - Appreciate excellent SICU care     B Team Surgery  [id number]

## 2022-06-28 LAB
ANION GAP SERPL CALC-SCNC: 13 MMOL/L — SIGNIFICANT CHANGE UP (ref 7–14)
ANISOCYTOSIS BLD QL: SLIGHT — SIGNIFICANT CHANGE UP
APPEARANCE UR: CLEAR — SIGNIFICANT CHANGE UP
BACTERIA # UR AUTO: NEGATIVE — SIGNIFICANT CHANGE UP
BASOPHILS # BLD AUTO: 0 K/UL — SIGNIFICANT CHANGE UP (ref 0–0.2)
BASOPHILS NFR BLD AUTO: 0 % — SIGNIFICANT CHANGE UP (ref 0–2)
BILIRUB UR-MCNC: NEGATIVE — SIGNIFICANT CHANGE UP
BUN SERPL-MCNC: 16 MG/DL — SIGNIFICANT CHANGE UP (ref 7–23)
CALCIUM SERPL-MCNC: 9.4 MG/DL — SIGNIFICANT CHANGE UP (ref 8.4–10.5)
CHLORIDE SERPL-SCNC: 94 MMOL/L — LOW (ref 98–107)
CO2 SERPL-SCNC: 28 MMOL/L — SIGNIFICANT CHANGE UP (ref 22–31)
COLOR SPEC: YELLOW — SIGNIFICANT CHANGE UP
CREAT SERPL-MCNC: 0.69 MG/DL — SIGNIFICANT CHANGE UP (ref 0.5–1.3)
DACRYOCYTES BLD QL SMEAR: SLIGHT — SIGNIFICANT CHANGE UP
DIFF PNL FLD: ABNORMAL
EGFR: 95 ML/MIN/1.73M2 — SIGNIFICANT CHANGE UP
EOSINOPHIL # BLD AUTO: 0 K/UL — SIGNIFICANT CHANGE UP (ref 0–0.5)
EOSINOPHIL NFR BLD AUTO: 0 % — SIGNIFICANT CHANGE UP (ref 0–6)
EPI CELLS # UR: 2 /HPF — SIGNIFICANT CHANGE UP (ref 0–5)
GIANT PLATELETS BLD QL SMEAR: PRESENT — SIGNIFICANT CHANGE UP
GLUCOSE SERPL-MCNC: 102 MG/DL — HIGH (ref 70–99)
GLUCOSE UR QL: NEGATIVE — SIGNIFICANT CHANGE UP
HYALINE CASTS # UR AUTO: 1 /LPF — SIGNIFICANT CHANGE UP (ref 0–7)
KETONES UR-MCNC: NEGATIVE — SIGNIFICANT CHANGE UP
LEUKOCYTE ESTERASE UR-ACNC: NEGATIVE — SIGNIFICANT CHANGE UP
LYMPHOCYTES # BLD AUTO: 0.43 K/UL — LOW (ref 1–3.3)
LYMPHOCYTES # BLD AUTO: 5.3 % — LOW (ref 13–44)
MACROCYTES BLD QL: SLIGHT — SIGNIFICANT CHANGE UP
MAGNESIUM SERPL-MCNC: 2 MG/DL — SIGNIFICANT CHANGE UP (ref 1.6–2.6)
MANUAL SMEAR VERIFICATION: SIGNIFICANT CHANGE UP
MONOCYTES # BLD AUTO: 0.5 K/UL — SIGNIFICANT CHANGE UP (ref 0–0.9)
MONOCYTES NFR BLD AUTO: 6.2 % — SIGNIFICANT CHANGE UP (ref 2–14)
MYELOCYTES NFR BLD: 0.9 % — HIGH (ref 0–0)
NEUTROPHILS # BLD AUTO: 6.61 K/UL — SIGNIFICANT CHANGE UP (ref 1.8–7.4)
NEUTROPHILS NFR BLD AUTO: 82.3 % — HIGH (ref 43–77)
NITRITE UR-MCNC: NEGATIVE — SIGNIFICANT CHANGE UP
OVALOCYTES BLD QL SMEAR: SLIGHT — SIGNIFICANT CHANGE UP
PH UR: 8 — SIGNIFICANT CHANGE UP (ref 5–8)
PHOSPHATE SERPL-MCNC: 4.1 MG/DL — SIGNIFICANT CHANGE UP (ref 2.5–4.5)
PLAT MORPH BLD: NORMAL — SIGNIFICANT CHANGE UP
PLATELET COUNT - ESTIMATE: NORMAL — SIGNIFICANT CHANGE UP
POIKILOCYTOSIS BLD QL AUTO: SLIGHT — SIGNIFICANT CHANGE UP
POLYCHROMASIA BLD QL SMEAR: SLIGHT — SIGNIFICANT CHANGE UP
POTASSIUM SERPL-MCNC: 3.7 MMOL/L — SIGNIFICANT CHANGE UP (ref 3.5–5.3)
POTASSIUM SERPL-SCNC: 3.7 MMOL/L — SIGNIFICANT CHANGE UP (ref 3.5–5.3)
PROT UR-MCNC: ABNORMAL
RBC BLD AUTO: ABNORMAL
RBC CASTS # UR COMP ASSIST: 136 /HPF — HIGH (ref 0–4)
SMUDGE CELLS # BLD: PRESENT — SIGNIFICANT CHANGE UP
SODIUM SERPL-SCNC: 135 MMOL/L — SIGNIFICANT CHANGE UP (ref 135–145)
SP GR SPEC: 1.03 — SIGNIFICANT CHANGE UP (ref 1–1.05)
UROBILINOGEN FLD QL: ABNORMAL
VARIANT LYMPHS # BLD: 5.3 % — SIGNIFICANT CHANGE UP (ref 0–6)
WBC UR QL: 4 /HPF — SIGNIFICANT CHANGE UP (ref 0–5)

## 2022-06-28 PROCEDURE — 71045 X-RAY EXAM CHEST 1 VIEW: CPT | Mod: 26

## 2022-06-28 PROCEDURE — 99232 SBSQ HOSP IP/OBS MODERATE 35: CPT | Mod: GC

## 2022-06-28 RX ORDER — FUROSEMIDE 40 MG
40 TABLET ORAL ONCE
Refills: 0 | Status: COMPLETED | OUTPATIENT
Start: 2022-06-28 | End: 2022-06-28

## 2022-06-28 RX ORDER — ONDANSETRON 8 MG/1
4 TABLET, FILM COATED ORAL EVERY 6 HOURS
Refills: 0 | Status: DISCONTINUED | OUTPATIENT
Start: 2022-06-28 | End: 2022-07-07

## 2022-06-28 RX ORDER — POTASSIUM CHLORIDE 20 MEQ
10 PACKET (EA) ORAL
Refills: 0 | Status: COMPLETED | OUTPATIENT
Start: 2022-06-28 | End: 2022-06-28

## 2022-06-28 RX ORDER — MAGNESIUM SULFATE 500 MG/ML
2 VIAL (ML) INJECTION ONCE
Refills: 0 | Status: COMPLETED | OUTPATIENT
Start: 2022-06-28 | End: 2022-06-28

## 2022-06-28 RX ORDER — ALBUTEROL 90 UG/1
2.5 AEROSOL, METERED ORAL EVERY 8 HOURS
Refills: 0 | Status: DISCONTINUED | OUTPATIENT
Start: 2022-06-28 | End: 2022-06-30

## 2022-06-28 RX ORDER — ONDANSETRON 8 MG/1
4 TABLET, FILM COATED ORAL ONCE
Refills: 0 | Status: COMPLETED | OUTPATIENT
Start: 2022-06-28 | End: 2022-06-28

## 2022-06-28 RX ADMIN — ALBUTEROL 2.5 MILLIGRAM(S): 90 AEROSOL, METERED ORAL at 15:30

## 2022-06-28 RX ADMIN — ALBUTEROL 2.5 MILLIGRAM(S): 90 AEROSOL, METERED ORAL at 08:01

## 2022-06-28 RX ADMIN — ONDANSETRON 4 MILLIGRAM(S): 8 TABLET, FILM COATED ORAL at 23:28

## 2022-06-28 RX ADMIN — Medication 975 MILLIGRAM(S): at 17:39

## 2022-06-28 RX ADMIN — MUPIROCIN 1 APPLICATION(S): 20 OINTMENT TOPICAL at 17:40

## 2022-06-28 RX ADMIN — Medication 975 MILLIGRAM(S): at 23:16

## 2022-06-28 RX ADMIN — Medication 40 MILLIGRAM(S): at 10:12

## 2022-06-28 RX ADMIN — Medication 100 MILLIEQUIVALENT(S): at 02:32

## 2022-06-28 RX ADMIN — ATORVASTATIN CALCIUM 10 MILLIGRAM(S): 80 TABLET, FILM COATED ORAL at 23:17

## 2022-06-28 RX ADMIN — Medication 975 MILLIGRAM(S): at 18:09

## 2022-06-28 RX ADMIN — CHLORHEXIDINE GLUCONATE 1 APPLICATION(S): 213 SOLUTION TOPICAL at 05:11

## 2022-06-28 RX ADMIN — ONDANSETRON 4 MILLIGRAM(S): 8 TABLET, FILM COATED ORAL at 17:52

## 2022-06-28 RX ADMIN — Medication 975 MILLIGRAM(S): at 11:53

## 2022-06-28 RX ADMIN — Medication 975 MILLIGRAM(S): at 12:23

## 2022-06-28 RX ADMIN — MUPIROCIN 1 APPLICATION(S): 20 OINTMENT TOPICAL at 05:21

## 2022-06-28 RX ADMIN — ALBUTEROL 2.5 MILLIGRAM(S): 90 AEROSOL, METERED ORAL at 22:17

## 2022-06-28 RX ADMIN — Medication 975 MILLIGRAM(S): at 05:10

## 2022-06-28 RX ADMIN — ENOXAPARIN SODIUM 40 MILLIGRAM(S): 100 INJECTION SUBCUTANEOUS at 05:11

## 2022-06-28 RX ADMIN — Medication 25 GRAM(S): at 03:18

## 2022-06-28 RX ADMIN — Medication 100 MILLIEQUIVALENT(S): at 01:33

## 2022-06-28 NOTE — PROGRESS NOTE ADULT - ATTENDING COMMENTS
I agree with the history, physical, and plan, which I have reviewed and edited where appropriate.  I agree with notes/assessment of health care providers on my service.  I have personally examined the patient.  I was physically present for the key portions of the evaluation and management (E/M) service provided.  I reviewed data and laboratory tests/x-rays and all pertinent electronic images.  The patient is a critical care patient with life threatening hemodynamic and metabolic instability in SICU.  Risk benefit analyses discussed.    The patient is in SICU with diagnosis mentioned in the note.    The plan is specified below.      67F (speaks Italian dialect) with a history of HTN, HLD, presenting w incarcerated SBO at ventral hernia s/p (6/15) s/p laparoscopic ventral hernia repair, c/b enterotomy converted to open with primary repair. RRT on 6/21 for fever tmax 104, tacycardia to 120s, tachypnea. SICU accepted for severe sepsis. Patient now s/p (6/21) RTOR ExLap, SBR, end ileostomy. Extubated 6/23. Started regular diet on 6/27, tolerating well, ready for transfer to floor.     PLAN:    NEUROLOGY: Postoperative pain   - Pain control: Tylenol 1g PO q6hr and Oxycodone 5/10 PO q4hr PRN  - Lidocaine patch    RESPIRATORY:  - on RA   - Albuterol 2.5mg nebs q8hr for heavy secretions    CARDIOVASCULAR: h/o HTN  - Holding home amlodipine and losartan.   - Continue atorvastatin     GI / NUTRITION: end ileostomy  - Regular diet     RENAL / GENITOURINARY: hypervolemic   - voiding   - lasix 40 x 1, goal - 1000ml     HEMATOLOGIC:  - VTE ppx: Lovenox 40mg SQ daily    INFECTIOUS DISEASE:  - observe off abx   - Mupirocin for MSSA in [facility]    ENDOCRINOLOGY:  - Continue to monitor glucose on BMP (goal 140-180) .

## 2022-06-28 NOTE — PROGRESS NOTE ADULT - SUBJECTIVE AND OBJECTIVE BOX
GENERAL SURGERY PROGRESS NOTE    SUBJECTIVE  Patient seen and examined.   reg diet  febrile 100.8, bcx/ua sent    OBJECTIVE    PHYSICAL EXAM  General: Appears well, NAD  CHEST: breathing comfortably  CV: appears well perfused  Abdomen: soft, nondistended  wound vac c/d/i  Extremities: Grossly symmetric    T(C): 38.2 (06-27-22 @ 22:00), Max: 38.2 (06-27-22 @ 22:00)  HR: 88 (06-28-22 @ 00:00) (77 - 91)  BP: 114/64 (06-28-22 @ 00:00) (106/53 - 143/67)  RR: 24 (06-28-22 @ 00:00) (13 - 24)  SpO2: 93% (06-28-22 @ 00:00) (92% - 100%)    06-26-22 @ 07:01  -  06-27-22 @ 07:00  --------------------------------------------------------  IN: 1350 mL / OUT: 2550 mL / NET: -1200 mL    06-27-22 @ 07:01  -  06-28-22 @ 01:17  --------------------------------------------------------  IN: 395 mL / OUT: 2050 mL / NET: -1655 mL        MEDICATIONS  acetaminophen     Tablet .. 975 milliGRAM(s) Oral every 6 hours  ALBUTerol   0.5% 2.5 milliGRAM(s) Nebulizer every 8 hours  atorvastatin 10 milliGRAM(s) Oral at bedtime  chlorhexidine 4% Liquid 1 Application(s) Topical <User Schedule>  enoxaparin Injectable 40 milliGRAM(s) SubCutaneous every 24 hours  lidocaine   4% Patch 1 Patch Transdermal daily PRN  magnesium sulfate  IVPB 2 Gram(s) IV Intermittent once  mupirocin 2% Ointment 1 Application(s) Topical two times a day  oxyCODONE    IR 5 milliGRAM(s) Oral every 4 hours PRN  oxyCODONE    IR 10 milliGRAM(s) Oral every 6 hours PRN  sodium chloride 0.9% lock flush 10 milliLiter(s) IV Push every 1 hour PRN      LABS                        10.9   8.03  )-----------( 461      ( 27 Jun 2022 23:00 )             34.8     06-27    133<L>  |  97<L>  |  13  ----------------------------<  103<H>  3.6   |  24  |  0.67    Ca    8.9      27 Jun 2022 23:00  Phos  3.6     06-27  Mg     1.60     06-27            RADIOLOGY & ADDITIONAL STUDIES

## 2022-06-28 NOTE — PROGRESS NOTE ADULT - ASSESSMENT
67F (speaks German dialect) with a history of HTN, HLD, L tibia surgery, lap kevan (2011, [facility]) presenting w incarcerated SBO at right ventral hernia s/p (6/15) right ventral hernia s/p laparoscopic ventral hernia repair, c/b enterotomy intaoperatively, converted to open with primary repair. RRT on GMF, 6/21 for fever tmax 104, tacycardia to 120s, tachypnea. SICU accepted for severe sepsis. Patient now s/p (6/21) RTOR ExLap, SBR, end ileostomy, fascia closed, midline wound VAC. Extubated 6/23.    PLAN:    NEUROLOGY:  - Pain control: Tylenol 1g PO q6hr and Oxycodone 5/10 PO q4hr PRN  - Lidocaine patch    RESPIRATORY:  - Maintain O2 saturation >92%  - Albuterol 2.5mg nebs q8hr for heavy secretions    CARDIOVASCULAR:  - Monitor hemodynamics    GI / NUTRITION:  - regular diet  - d/c NGT, d/c tube feeds 6/26  - GI ppx: Protonix 40mg PO daily    RENAL / GENITOURINARY:  - Monitor I&Os  - Monitor electrolytes and replete PRN  - s/p diamox  - lasix 40    HEMATOLOGIC:  - VTE ppx: Lovenox 40mg SQ daily  - Trend H/H on CBC daily    INFECTIOUS DISEASE:  - MRSA swab (6/22): Negative  - BCx (6/21): NGTD  - Temp 100.8 6/27, BCx and UA sent  - Mupirocin for MSSA in nares    ENDOCRINOLOGY:  - No active issues  - Continue to monitor glucose on BMP (goal 140-180)    Tubes/Lines/Drains  - PIVs    Disposition:   - Full code  - SICU

## 2022-06-28 NOTE — PROGRESS NOTE ADULT - ASSESSMENT
Patient is a 67 year old female (speaks Sinhala dialect) with a PMHx of HTN, HLD, left tibia surgery and lap kevan (2011 at [facility]) who presented with SBO 2/2 R ventral hernia containing a dilated, fecalized loop of small bowel.  Patient went to the OR for a diagnostic laparoscopy, ventral hernia repair - converted to open for repair of enterotomy on 6/15 course c/b fever and tachycardia requiring SICU transfer.  Repeat CT performed showing SBO with tp at distal ileum s/p RTOR exploratory laparotomy, small bowel resection and end ileostomy on 6/21. Recovering in SICU, NGT out, on regular diet with ostomy function.     PLAN:  - Regular diet as tolerated   - Monitor ostomy output  - VTE prophylaxis   - Appreciate excellent SICU care     B Team Surgery  [id number] Patient is a 67 year old female (speaks Kazakh dialect) with a PMHx of HTN, HLD, left tibia surgery and lap kevan (2011 at [facility]) who presented with SBO 2/2 R ventral hernia containing a dilated, fecalized loop of small bowel.  Patient went to the OR for a diagnostic laparoscopy, ventral hernia repair - converted to open for repair of enterotomy on 6/15 course c/b fever and tachycardia requiring SICU transfer.  Repeat CT performed showing SBO with tp at distal ileum s/p RTOR exploratory laparotomy, small bowel resection and end ileostomy on 6/21. Recovering in SICU, NGT out, on regular diet with ostomy function.     PLAN:  - Regular diet as tolerated   - Monitor ostomy output +/+  - midline wound vac TIW changing  - VTE prophylaxis   - Appreciate excellent SICU care     B Team Surgery  [id number]

## 2022-06-28 NOTE — PROGRESS NOTE ADULT - SUBJECTIVE AND OBJECTIVE BOX
SICU Daily Progress Note  =====================================================  Interval/Overnight Events:     - s/p diamox  - lasix 40x1 6/26  - Regular diet  - febrile 100.8, BCx and UA sent      HPI: 66yo F (speaks Welsh dialect) with a history of HTN, HLD, L tibia surgery, pj hernadeze (2011, [facility]) presenting with 1.5 days of nausea, vomiting and abdominal pain. Per daughter at bedside, patient began having symptoms Monday night after dinner. Last passed flatus and had a BM at that time. Denies fevers or chills. Pain improved in ED after getting morphine and NGT placement.    In the ED, stable and afebrile. Labs unremarkable. CT showed incarcerated SBO at right ventral hernia. (15 Rambo 2022 08:22)    Patient s/p ventral hernia repair, laparoscopic c/b enterotomy intraoperatively, converted to open with primary repair on 6/15. Patient on floors with NGT, NPO. On 6/21, 2am rapid response called for fever to 104 rectal, tachycardia to 120s and tachypnea. Unable to answer questions per daughter, in native [facility]. Stat labs drawn. SICU consulted for sepsis management and altered mental status. CT scan obtained on way.     Allergies: No Known Allergies    MEDICATIONS:   --------------------------------------------------------------------------------------  Neurologic Medications  acetaminophen    Suspension .. 1000 milliGRAM(s) Oral every 6 hours PRN Temp greater or equal to 38C (100.4F), Mild Pain (1 - 3)  oxyCODONE    Solution 5 milliGRAM(s) Oral every 4 hours PRN Moderate Pain (4 - 6)  oxyCODONE    Solution 10 milliGRAM(s) Oral every 4 hours PRN Severe Pain (7 - 10)    Respiratory Medications  ALBUTerol   0.5% 2.5 milliGRAM(s) Nebulizer every 8 hours    Cardiovascular Medications    Gastrointestinal Medications  pantoprazole   Suspension 40 milliGRAM(s) Oral daily  sodium chloride 0.9% lock flush 10 milliLiter(s) IV Push every 1 hour PRN Pre/post blood products, medications, blood draw, and to maintain line patency    Genitourinary Medications    Hematologic/Oncologic Medications  enoxaparin Injectable 40 milliGRAM(s) SubCutaneous every 24 hours  influenza  Vaccine (HIGH DOSE) 0.7 milliLiter(s) IntraMuscular once    Antimicrobial/Immunologic Medications  piperacillin/tazobactam IVPB.. 3.375 Gram(s) IV Intermittent every 8 hours    Endocrine/Metabolic Medications    Topical/Other Medications  chlorhexidine 4% Liquid 1 Application(s) Topical <User Schedule>  lidocaine   4% Patch 1 Patch Transdermal daily PRN pain  mupirocin 2% Ointment 1 Application(s) Topical two times a day  tetracaine/benzocaine/butamben Spray 1 Spray(s) Topical every 6 hours PRN Throat pain from NGT    --------------------------------------------------------------------------------------    VITAL SIGNS, INS/OUTS (last 24 hours):  --------------------------------------------------------------------------------------  ICU Vital Signs Last 24 Hrs  T(C): 38.2 (27 Jun 2022 22:00), Max: 38.2 (27 Jun 2022 22:00)  T(F): 100.8 (27 Jun 2022 22:00), Max: 100.8 (27 Jun 2022 22:00)  HR: 88 (28 Jun 2022 00:00) (77 - 91)  BP: 114/64 (28 Jun 2022 00:00) (106/53 - 143/67)  BP(mean): 80 (28 Jun 2022 00:00) (68 - 89)  ABP: --  ABP(mean): --  RR: 24 (28 Jun 2022 00:00) (13 - 24)  SpO2: 93% (28 Jun 2022 00:00) (92% - 100%)        06-26 @ 07:01  -  06-27 @ 07:00  --------------------------------------------------------  IN: 1350 mL / OUT: 2550 mL / NET: -1200 mL        --------------------------------------------------------------------------------------    EXAM  NEUROLOGY        Exam: no focal deficits. moving all extremities    HEENT  Exam: Normocephalic, atraumatic, EOMI.     RESPIRATORY  Exam: Normal expansion/effort.  Mechanical Ventilation:     CARDIOVASCULAR  Exam: Regular rate and rhythm.       GI/NUTRITION  Exam: midline wound c/d/i, ostomy well appearing and functional    VASCULAR  Exam: Extremities warm, pink, well-perfused.     MUSCULOSKELETAL  Exam: All extremities moving spontaneously without limitations.     SKIN  Exam: Good skin turgor, no skin breakdown.       LABS  --------------------------------------------------------------------------------------             .  .  LABS:                         10.9   8.03  )-----------( 461      ( 27 Jun 2022 23:00 )             34.8     06-27    133<L>  |  97<L>  |  13  ----------------------------<  103<H>  3.6   |  24  |  0.67    Ca    8.9      27 Jun 2022 23:00  Phos  3.6     06-27  Mg     1.60     06-27                          RADIOLOGY, EKG & ADDITIONAL TESTS: Reviewed.     --------------------------------------------------------------------------------------

## 2022-06-29 LAB
ANION GAP SERPL CALC-SCNC: 13 MMOL/L — SIGNIFICANT CHANGE UP (ref 7–14)
BUN SERPL-MCNC: 18 MG/DL — SIGNIFICANT CHANGE UP (ref 7–23)
CALCIUM SERPL-MCNC: 8.9 MG/DL — SIGNIFICANT CHANGE UP (ref 8.4–10.5)
CHLORIDE SERPL-SCNC: 95 MMOL/L — LOW (ref 98–107)
CO2 SERPL-SCNC: 24 MMOL/L — SIGNIFICANT CHANGE UP (ref 22–31)
CREAT SERPL-MCNC: 0.71 MG/DL — SIGNIFICANT CHANGE UP (ref 0.5–1.3)
EGFR: 93 ML/MIN/1.73M2 — SIGNIFICANT CHANGE UP
GLUCOSE SERPL-MCNC: 99 MG/DL — SIGNIFICANT CHANGE UP (ref 70–99)
HCT VFR BLD CALC: 35.5 % — SIGNIFICANT CHANGE UP (ref 34.5–45)
HGB BLD-MCNC: 11 G/DL — LOW (ref 11.5–15.5)
MAGNESIUM SERPL-MCNC: 1.9 MG/DL — SIGNIFICANT CHANGE UP (ref 1.6–2.6)
MCHC RBC-ENTMCNC: 25.6 PG — LOW (ref 27–34)
MCHC RBC-ENTMCNC: 31 GM/DL — LOW (ref 32–36)
MCV RBC AUTO: 82.6 FL — SIGNIFICANT CHANGE UP (ref 80–100)
NRBC # BLD: 0 /100 WBCS — SIGNIFICANT CHANGE UP
NRBC # FLD: 0 K/UL — SIGNIFICANT CHANGE UP
PHOSPHATE SERPL-MCNC: 4.3 MG/DL — SIGNIFICANT CHANGE UP (ref 2.5–4.5)
PLATELET # BLD AUTO: 522 K/UL — HIGH (ref 150–400)
POTASSIUM SERPL-MCNC: 3.6 MMOL/L — SIGNIFICANT CHANGE UP (ref 3.5–5.3)
POTASSIUM SERPL-SCNC: 3.6 MMOL/L — SIGNIFICANT CHANGE UP (ref 3.5–5.3)
RBC # BLD: 4.3 M/UL — SIGNIFICANT CHANGE UP (ref 3.8–5.2)
RBC # FLD: 15.5 % — HIGH (ref 10.3–14.5)
SODIUM SERPL-SCNC: 132 MMOL/L — LOW (ref 135–145)
WBC # BLD: 8.97 K/UL — SIGNIFICANT CHANGE UP (ref 3.8–10.5)
WBC # FLD AUTO: 8.97 K/UL — SIGNIFICANT CHANGE UP (ref 3.8–10.5)

## 2022-06-29 PROCEDURE — 71045 X-RAY EXAM CHEST 1 VIEW: CPT | Mod: 26

## 2022-06-29 PROCEDURE — 99232 SBSQ HOSP IP/OBS MODERATE 35: CPT | Mod: GC

## 2022-06-29 RX ORDER — FUROSEMIDE 40 MG
40 TABLET ORAL ONCE
Refills: 0 | Status: COMPLETED | OUTPATIENT
Start: 2022-06-29 | End: 2022-06-29

## 2022-06-29 RX ORDER — POTASSIUM CHLORIDE 20 MEQ
10 PACKET (EA) ORAL
Refills: 0 | Status: DISCONTINUED | OUTPATIENT
Start: 2022-06-29 | End: 2022-06-29

## 2022-06-29 RX ORDER — MEGESTROL ACETATE 40 MG/ML
400 SUSPENSION ORAL DAILY
Refills: 0 | Status: DISCONTINUED | OUTPATIENT
Start: 2022-06-29 | End: 2022-06-29

## 2022-06-29 RX ORDER — POTASSIUM CHLORIDE 20 MEQ
10 PACKET (EA) ORAL
Refills: 0 | Status: COMPLETED | OUTPATIENT
Start: 2022-06-29 | End: 2022-06-29

## 2022-06-29 RX ORDER — HYDROMORPHONE HYDROCHLORIDE 2 MG/ML
0.5 INJECTION INTRAMUSCULAR; INTRAVENOUS; SUBCUTANEOUS ONCE
Refills: 0 | Status: DISCONTINUED | OUTPATIENT
Start: 2022-06-29 | End: 2022-06-29

## 2022-06-29 RX ORDER — POTASSIUM CHLORIDE 20 MEQ
40 PACKET (EA) ORAL ONCE
Refills: 0 | Status: DISCONTINUED | OUTPATIENT
Start: 2022-06-29 | End: 2022-06-29

## 2022-06-29 RX ORDER — PANTOPRAZOLE SODIUM 20 MG/1
40 TABLET, DELAYED RELEASE ORAL
Refills: 0 | Status: DISCONTINUED | OUTPATIENT
Start: 2022-06-29 | End: 2022-06-30

## 2022-06-29 RX ORDER — FUROSEMIDE 40 MG
20 TABLET ORAL ONCE
Refills: 0 | Status: COMPLETED | OUTPATIENT
Start: 2022-06-29 | End: 2022-06-29

## 2022-06-29 RX ORDER — MEGESTROL ACETATE 40 MG/ML
400 SUSPENSION ORAL DAILY
Refills: 0 | Status: DISCONTINUED | OUTPATIENT
Start: 2022-06-29 | End: 2022-07-04

## 2022-06-29 RX ORDER — ACETAMINOPHEN 500 MG
1000 TABLET ORAL EVERY 6 HOURS
Refills: 0 | Status: COMPLETED | OUTPATIENT
Start: 2022-06-29 | End: 2022-06-30

## 2022-06-29 RX ORDER — POTASSIUM CHLORIDE 20 MEQ
40 PACKET (EA) ORAL ONCE
Refills: 0 | Status: COMPLETED | OUTPATIENT
Start: 2022-06-29 | End: 2022-06-29

## 2022-06-29 RX ADMIN — Medication 40 MILLIGRAM(S): at 03:09

## 2022-06-29 RX ADMIN — ATORVASTATIN CALCIUM 10 MILLIGRAM(S): 80 TABLET, FILM COATED ORAL at 21:11

## 2022-06-29 RX ADMIN — ENOXAPARIN SODIUM 40 MILLIGRAM(S): 100 INJECTION SUBCUTANEOUS at 05:56

## 2022-06-29 RX ADMIN — ALBUTEROL 2.5 MILLIGRAM(S): 90 AEROSOL, METERED ORAL at 16:21

## 2022-06-29 RX ADMIN — PANTOPRAZOLE SODIUM 40 MILLIGRAM(S): 20 TABLET, DELAYED RELEASE ORAL at 10:01

## 2022-06-29 RX ADMIN — LIDOCAINE 1 PATCH: 4 CREAM TOPICAL at 17:02

## 2022-06-29 RX ADMIN — MEGESTROL ACETATE 400 MILLIGRAM(S): 40 SUSPENSION ORAL at 11:59

## 2022-06-29 RX ADMIN — Medication 400 MILLIGRAM(S): at 23:10

## 2022-06-29 RX ADMIN — ALBUTEROL 2.5 MILLIGRAM(S): 90 AEROSOL, METERED ORAL at 23:21

## 2022-06-29 RX ADMIN — ONDANSETRON 4 MILLIGRAM(S): 8 TABLET, FILM COATED ORAL at 17:02

## 2022-06-29 RX ADMIN — Medication 100 MILLIEQUIVALENT(S): at 11:59

## 2022-06-29 RX ADMIN — ONDANSETRON 4 MILLIGRAM(S): 8 TABLET, FILM COATED ORAL at 06:02

## 2022-06-29 RX ADMIN — MUPIROCIN 1 APPLICATION(S): 20 OINTMENT TOPICAL at 06:02

## 2022-06-29 RX ADMIN — Medication 100 MILLIEQUIVALENT(S): at 10:00

## 2022-06-29 RX ADMIN — HYDROMORPHONE HYDROCHLORIDE 0.5 MILLIGRAM(S): 2 INJECTION INTRAMUSCULAR; INTRAVENOUS; SUBCUTANEOUS at 10:15

## 2022-06-29 RX ADMIN — Medication 100 MILLIEQUIVALENT(S): at 08:00

## 2022-06-29 RX ADMIN — CHLORHEXIDINE GLUCONATE 1 APPLICATION(S): 213 SOLUTION TOPICAL at 05:57

## 2022-06-29 RX ADMIN — HYDROMORPHONE HYDROCHLORIDE 0.5 MILLIGRAM(S): 2 INJECTION INTRAMUSCULAR; INTRAVENOUS; SUBCUTANEOUS at 10:00

## 2022-06-29 RX ADMIN — Medication 1000 MILLIGRAM(S): at 23:30

## 2022-06-29 RX ADMIN — ALBUTEROL 2.5 MILLIGRAM(S): 90 AEROSOL, METERED ORAL at 07:45

## 2022-06-29 RX ADMIN — Medication 20 MILLIGRAM(S): at 17:01

## 2022-06-29 RX ADMIN — LIDOCAINE 1 PATCH: 4 CREAM TOPICAL at 19:07

## 2022-06-29 NOTE — PROGRESS NOTE ADULT - ATTENDING COMMENTS
I agree with the history, physical, and plan, which I have reviewed and edited where appropriate.  I agree with notes/assessment of health care providers on my service.  I have personally examined the patient.  I was physically present for the key portions of the evaluation and management (E/M) service provided.  I reviewed data and laboratory tests/x-rays and all pertinent electronic images.  The patient is a critical care patient with life threatening hemodynamic and metabolic instability in SICU.  Risk benefit analyses discussed.    The patient is in SICU with diagnosis mentioned in the note.    The plan is specified below.      67F (speaks Albanian dialect) with a history of HTN, HLD, presenting w incarcerated SBO at ventral hernia s/p (6/15) s/p laparoscopic ventral hernia repair, c/b enterotomy converted to open with primary repair. RRT on 6/21 for fever tmax 104, tacycardia to 120s, tachypnea. SICU accepted for severe sepsis. Patient now s/p (6/21) RTOR ExLap, SBR, end ileostomy. Extubated 6/23. Started regular diet on 6/27, tolerating well, ready for transfer to floor.     PLAN:    NEUROLOGY: Postoperative pain   - Pain control: Tylenol 1g PO q6hr and Oxycodone 5/10 PO q4hr PRN  - Lidocaine patch    RESPIRATORY:  - on RA   - Albuterol 2.5mg nebs q8hr for heavy secretions    CARDIOVASCULAR: h/o HTN  - Holding home amlodipine and losartan.   - Continue atorvastatin     GI / NUTRITION: end ileostomy  - Regular diet   - resume protonix for possible heartburn   - trial of megace for lack of appetite     RENAL / GENITOURINARY: hypervolemic   - voiding   - Possible lasix 20 in PM    HEMATOLOGIC:  - VTE ppx: Lovenox 40mg SQ daily    INFECTIOUS DISEASE:  - observe off abx   - Mupirocin for MSSA in nares    ENDOCRINOLOGY:  - Continue to monitor glucose on BMP (goal 140-180) .

## 2022-06-29 NOTE — PROGRESS NOTE ADULT - SUBJECTIVE AND OBJECTIVE BOX
SICU Daily Progress Note  =====================================================  Interval/Overnight Events:     - lasix 40x1   - Regular diet  - febrile 100.8,  fever w/u negative        HPI: 66yo F (speaks Romanian dialect) with a history of HTN, HLD, L tibia surgery, pj mathur (, [facility]) presenting with 1.5 days of nausea, vomiting and abdominal pain. Per daughter at bedside, patient began having symptoms Monday night after dinner. Last passed flatus and had a BM at that time. Denies fevers or chills. Pain improved in ED after getting morphine and NGT placement.    In the ED, stable and afebrile. Labs unremarkable. CT showed incarcerated SBO at right ventral hernia. (15 Rambo 2022 08:22)    Patient s/p ventral hernia repair, laparoscopic c/b enterotomy intraoperatively, converted to open with primary repair on 6/15. Patient on floors with NGT, NPO. On , 2am rapid response called for fever to 104 rectal, tachycardia to 120s and tachypnea. Unable to answer questions per daughter, in native [facility]. Stat labs drawn. SICU consulted for sepsis management and altered mental status. CT scan obtained on way.     Allergies: No Known Allergies    MEDICATIONS:   --------------------------------------------------------------------------------------  Neurologic Medications  acetaminophen    Suspension .. 1000 milliGRAM(s) Oral every 6 hours PRN Temp greater or equal to 38C (100.4F), Mild Pain (1 - 3)  oxyCODONE    Solution 5 milliGRAM(s) Oral every 4 hours PRN Moderate Pain (4 - 6)  oxyCODONE    Solution 10 milliGRAM(s) Oral every 4 hours PRN Severe Pain (7 - 10)    Respiratory Medications  ALBUTerol   0.5% 2.5 milliGRAM(s) Nebulizer every 8 hours    Cardiovascular Medications    Gastrointestinal Medications  pantoprazole   Suspension 40 milliGRAM(s) Oral daily  sodium chloride 0.9% lock flush 10 milliLiter(s) IV Push every 1 hour PRN Pre/post blood products, medications, blood draw, and to maintain line patency    Genitourinary Medications    Hematologic/Oncologic Medications  enoxaparin Injectable 40 milliGRAM(s) SubCutaneous every 24 hours  influenza  Vaccine (HIGH DOSE) 0.7 milliLiter(s) IntraMuscular once    Antimicrobial/Immunologic Medications  piperacillin/tazobactam IVPB.. 3.375 Gram(s) IV Intermittent every 8 hours    Endocrine/Metabolic Medications    Topical/Other Medications  chlorhexidine 4% Liquid 1 Application(s) Topical <User Schedule>  lidocaine   4% Patch 1 Patch Transdermal daily PRN pain  mupirocin 2% Ointment 1 Application(s) Topical two times a day  tetracaine/benzocaine/butamben Spray 1 Spray(s) Topical every 6 hours PRN Throat pain from NGT    --------------------------------------------------------------------------------------    VITAL SIGNS, INS/OUTS (last 24 hours):  --------------------------------------------------------------------------------------    ICU Vital Signs Last 24 Hrs  T(C): 37.2 (2022 00:00), Max: 37.3 (2022 05:00)  T(F): 99 (2022 00:00), Max: 99.2 (2022 05:00)  HR: 80 (2022 00:02) (71 - 88)  BP: 128/72 (2022 00:00) (100/89 - 138/72)  BP(mean): 89 (2022 00:00) (73 - 100)  ABP: --  ABP(mean): --  RR: 26 (2022 00:00) (14 - 26)  SpO2: 92% (2022 00:02) (92% - 97%)      06-27 @ 07:01  -  -28 @ 07:00  --------------------------------------------------------  IN: 395 mL / OUT: 2275 mL / NET: -1880 mL            --------------------------------------------------------------------------------------    EXAM  NEUROLOGY        Exam: no focal deficits. moving all extremities    HEENT  Exam: Normocephalic, atraumatic, EOMI.     RESPIRATORY  Exam: Normal expansion/effort.  Mechanical Ventilation:     CARDIOVASCULAR  Exam: Regular rate and rhythm.       GI/NUTRITION  Exam: midline wound c/d/i, ostomy well appearing and functional    VASCULAR  Exam: Extremities warm, pink, well-perfused.     MUSCULOSKELETAL  Exam: All extremities moving spontaneously without limitations.     SKIN  Exam: Good skin turgor, no skin breakdown.       LABS  --------------------------------------------------------------------------------------   .  LABS:                         10.9   8.03  )-----------( 461      ( 2022 23:00 )             34.8     06-28    135  |  94<L>  |  16  ----------------------------<  102<H>  3.7   |  28  |  0.69    Ca    9.4      2022 17:40  Phos  4.1       Mg     2.00             Urinalysis Basic - ( 2022 04:00 )    Color: Yellow / Appearance: Clear / S.027 / pH: x  Gluc: x / Ketone: Negative  / Bili: Negative / Urobili: 3 mg/dL   Blood: x / Protein: 30 mg/dL / Nitrite: Negative   Leuk Esterase: Negative / RBC: 136 /HPF / WBC 4 /HPF   Sq Epi: x / Non Sq Epi: 2 /HPF / Bacteria: Negative            RADIOLOGY, EKG & ADDITIONAL TESTS: Reviewed.        SICU Daily Progress Note  =====================================================  Interval/Overnight Events:     - lasix 40x1 , lasix 40  am  - Regular diet  - febrile 100.8,  fever w/u negative    HPI: 68yo F (speaks Czech dialect) with a history of HTN, HLD, L tibia surgery, lap kevan (, [facility]) presenting with 1.5 days of nausea, vomiting and abdominal pain. Per daughter at bedside, patient began having symptoms Monday night after dinner. Last passed flatus and had a BM at that time. Denies fevers or chills. Pain improved in ED after getting morphine and NGT placement.    In the ED, stable and afebrile. Labs unremarkable. CT showed incarcerated SBO at right ventral hernia. (15 Rambo 2022 08:22)    Patient s/p ventral hernia repair, laparoscopic c/b enterotomy intraoperatively, converted to open with primary repair on 6/15. Patient on floors with NGT, NPO. On , 2am rapid response called for fever to 104 rectal, tachycardia to 120s and tachypnea. Unable to answer questions per daughter, in native [facility]. Stat labs drawn. SICU consulted for sepsis management and altered mental status. CT scan obtained on way.     Allergies: No Known Allergies    MEDICATIONS:   --------------------------------------------------------------------------------------  Neurologic Medications  acetaminophen    Suspension .. 1000 milliGRAM(s) Oral every 6 hours PRN Temp greater or equal to 38C (100.4F), Mild Pain (1 - 3)  oxyCODONE    Solution 5 milliGRAM(s) Oral every 4 hours PRN Moderate Pain (4 - 6)  oxyCODONE    Solution 10 milliGRAM(s) Oral every 4 hours PRN Severe Pain (7 - 10)    Respiratory Medications  ALBUTerol   0.5% 2.5 milliGRAM(s) Nebulizer every 8 hours    Cardiovascular Medications    Gastrointestinal Medications  pantoprazole   Suspension 40 milliGRAM(s) Oral daily  sodium chloride 0.9% lock flush 10 milliLiter(s) IV Push every 1 hour PRN Pre/post blood products, medications, blood draw, and to maintain line patency    Genitourinary Medications    Hematologic/Oncologic Medications  enoxaparin Injectable 40 milliGRAM(s) SubCutaneous every 24 hours  influenza  Vaccine (HIGH DOSE) 0.7 milliLiter(s) IntraMuscular once    Antimicrobial/Immunologic Medications  piperacillin/tazobactam IVPB.. 3.375 Gram(s) IV Intermittent every 8 hours    Endocrine/Metabolic Medications    Topical/Other Medications  chlorhexidine 4% Liquid 1 Application(s) Topical <User Schedule>  lidocaine   4% Patch 1 Patch Transdermal daily PRN pain  mupirocin 2% Ointment 1 Application(s) Topical two times a day  tetracaine/benzocaine/butamben Spray 1 Spray(s) Topical every 6 hours PRN Throat pain from NGT    --------------------------------------------------------------------------------------    VITAL SIGNS, INS/OUTS (last 24 hours):  --------------------------------------------------------------------------------------    ICU Vital Signs Last 24 Hrs  T(C): 37.2 (2022 00:00), Max: 37.3 (2022 05:00)  T(F): 99 (2022 00:00), Max: 99.2 (2022 05:00)  HR: 80 (2022 00:02) (71 - 88)  BP: 128/72 (2022 00:00) (100/89 - 138/72)  BP(mean): 89 (2022 00:00) (73 - 100)  ABP: --  ABP(mean): --  RR: 26 (2022 00:00) (14 - 26)  SpO2: 92% (2022 00:02) (92% - 97%)       @ 07:01  -   @ 07:00  --------------------------------------------------------  IN: 395 mL / OUT: 2275 mL / NET: -1880 mL            --------------------------------------------------------------------------------------    EXAM  NEUROLOGY        Exam: no focal deficits. moving all extremities    HEENT  Exam: Normocephalic, atraumatic, EOMI.     RESPIRATORY  Exam: Normal expansion/effort.  Mechanical Ventilation:     CARDIOVASCULAR  Exam: Regular rate and rhythm.       GI/NUTRITION  Exam: midline wound c/d/i, ostomy well appearing and functional    VASCULAR  Exam: Extremities warm, pink, well-perfused.     MUSCULOSKELETAL  Exam: All extremities moving spontaneously without limitations.     SKIN  Exam: Good skin turgor, no skin breakdown.       LABS  --------------------------------------------------------------------------------------   .  LABS:                         10.9   8.03  )-----------( 461      ( 2022 23:00 )             34.8     0628    135  |  94<L>  |  16  ----------------------------<  102<H>  3.7   |  28  |  0.69    Ca    9.4      2022 17:40  Phos  4.1       Mg     2.00             Urinalysis Basic - ( 2022 04:00 )    Color: Yellow / Appearance: Clear / S.027 / pH: x  Gluc: x / Ketone: Negative  / Bili: Negative / Urobili: 3 mg/dL   Blood: x / Protein: 30 mg/dL / Nitrite: Negative   Leuk Esterase: Negative / RBC: 136 /HPF / WBC 4 /HPF   Sq Epi: x / Non Sq Epi: 2 /HPF / Bacteria: Negative            RADIOLOGY, EKG & ADDITIONAL TESTS: Reviewed.

## 2022-06-29 NOTE — PROGRESS NOTE ADULT - ATTENDING COMMENTS
Diony Kaba MD  Acute and Critical Care Surgery    The Acute Care Surgery (B Team) Attending Group Practice:  Dr. Symone Galeano, Dr. Bernard Hogue, Dr. Diony Kaba,  Dr. Fidel Ochoa and Dr. Elise Mccord     Urgent issues - spectra [phone] or [phone]  Nonurgent issues - [phone]  Patient appointments or after hours - [phone]

## 2022-06-29 NOTE — PROGRESS NOTE ADULT - ASSESSMENT
Patient is a 67 year old female (speaks German dialect) with a PMHx of HTN, HLD, left tibia surgery and lap kevan (2011 at [facility]) who presented with SBO 2/2 R ventral hernia containing a dilated, fecalized loop of small bowel.  Patient went to the OR for a diagnostic laparoscopy, ventral hernia repair - converted to open for repair of enterotomy on 6/15 course c/b fever and tachycardia requiring SICU transfer.  Repeat CT performed showing SBO with tp at distal ileum s/p RTOR exploratory laparotomy, small bowel resection and end ileostomy on 6/21. Recovering in SICU, NGT out, on regular diet with ostomy function.     PLAN:  - Regular diet as tolerated   - Monitor ostomy output +/+  - midline wound vac TIW changing  - VTE prophylaxis   - Appreciate excellent SICU care     B Team Surgery  [id number] Patient is a 67 year old female (speaks Croatian dialect) with a PMHx of HTN, HLD, left tibia surgery and lap kevan (2011 at [facility]) who presented with SBO 2/2 R ventral hernia containing a dilated, fecalized loop of small bowel.  Patient went to the OR for a diagnostic laparoscopy, ventral hernia repair - converted to open for repair of enterotomy on 6/15 course c/b fever and tachycardia requiring SICU transfer.  Repeat CT performed showing SBO with tp at distal ileum s/p RTOR exploratory laparotomy, small bowel resection and end ileostomy on 6/21. Recovering in SICU, NGT out, on regular diet with ostomy function.     PLAN:  - Regular diet as tolerated   - Monitor ostomy output +/+  - midline wound vac TIW changing  - VTE prophylaxis   - Encourage ambulation and OOB  - DVT PPx   - Appreciate excellent SICU care     B Team Surgery  [id number]

## 2022-06-29 NOTE — PROGRESS NOTE ADULT - ASSESSMENT
67F (speaks Arabic dialect) with a history of HTN, HLD, L tibia surgery, lap kevan (2011, [facility]) presenting w incarcerated SBO at right ventral hernia s/p (6/15) right ventral hernia s/p laparoscopic ventral hernia repair, c/b enterotomy intaoperatively, converted to open with primary repair. RRT on GMF, 6/21 for fever tmax 104, tacycardia to 120s, tachypnea. SICU accepted for severe sepsis. Patient now s/p (6/21) RTOR ExLap, SBR, end ileostomy, fascia closed, midline wound VAC. Extubated 6/23.    PLAN:    NEUROLOGY:  - Pain control: Tylenol 1g PO q6hr and Oxycodone 5/10 PO q4hr PRN  - Lidocaine patch  - zofran prn    RESPIRATORY:  - Maintain O2 saturation >92%  - Albuterol 2.5mg nebs q8hr for heavy secretions  - rpt CXR    CARDIOVASCULAR:  - Monitor hemodynamics    GI / NUTRITION:  - regular diet  - d/c NGT, d/c tube feeds 6/26    RENAL / GENITOURINARY:  - Monitor I&Os  - Monitor electrolytes and replete PRN  - s/p diamox  - s/p lasix     HEMATOLOGIC:  - VTE ppx: Lovenox 40mg SQ daily  - Trend H/H on CBC daily    INFECTIOUS DISEASE:  - MRSA swab (6/22): Negative  - BCx (6/21): NGTD  - Temp 100.8 6/27, BCx and UA sent  - Mupirocin for MSSA in nares    ENDOCRINOLOGY:  - No active issues  - Continue to monitor glucose on BMP (goal 140-180)    Tubes/Lines/Drains  - PIVs    Disposition:   - Full code  - SICU   67F (speaks Mohawk dialect) with a history of HTN, HLD, L tibia surgery, lap kevan (2011, [facility]) presenting w incarcerated SBO at right ventral hernia s/p (6/15) right ventral hernia s/p laparoscopic ventral hernia repair, c/b enterotomy intaoperatively, converted to open with primary repair. RRT on GMF, 6/21 for fever tmax 104, tacycardia to 120s, tachypnea. SICU accepted for severe sepsis. Patient now s/p (6/21) RTOR ExLap, SBR, end ileostomy, fascia closed, midline wound VAC. Extubated 6/23.    PLAN:    NEUROLOGY:  - Pain control: Tylenol 1g PO q6hr and Oxycodone 5/10 PO q4hr PRN  - Lidocaine patch    RESPIRATORY:  - Maintain O2 saturation >92%  - Albuterol 2.5mg nebs q8hr for heavy secretions    CARDIOVASCULAR:  - Monitor hemodynamics    GI / NUTRITION:  - Diet: Regular diet  - GI ppx: Protonix 40mg PO daily  - Megace  - Zofran PRN    RENAL / GENITOURINARY:  - Monitor I&Os  - Monitor electrolytes and replete PRN    HEMATOLOGIC:  - VTE ppx: Lovenox 40mg SQ daily  - Trend H/H on CBC daily    INFECTIOUS DISEASE:  - MRSA swab (6/22): Negative  - BCx (6/21): NGTD  - Temp 100.8 6/27, BCx and UA sent  - Mupirocin for MSSA in nares    ENDOCRINOLOGY:  - Continue to monitor glucose on BMP (goal 140-180)    Tubes/Lines/Drains  - PIVs    Disposition:   - Full code  - SICU

## 2022-06-29 NOTE — PROGRESS NOTE ADULT - SUBJECTIVE AND OBJECTIVE BOX
Surgery Progress Note     Subjective/24hour Events:   Patient seen and examined.       Vital Signs:  Vital Signs Last 24 Hrs  T(C): 37.2 (29 Jun 2022 00:00), Max: 37.3 (28 Jun 2022 05:00)  T(F): 99 (29 Jun 2022 00:00), Max: 99.2 (28 Jun 2022 05:00)  HR: 80 (29 Jun 2022 00:02) (71 - 88)  BP: 128/72 (29 Jun 2022 00:00) (100/89 - 138/72)  BP(mean): 89 (29 Jun 2022 00:00) (73 - 100)  RR: 26 (29 Jun 2022 00:00) (14 - 26)  SpO2: 92% (29 Jun 2022 00:02) (92% - 97%)    CAPILLARY BLOOD GLUCOSE          I&O's Detail    27 Jun 2022 07:01  -  28 Jun 2022 07:00  --------------------------------------------------------  IN:    Jevity 1.2: 45 mL    Oral Fluid: 350 mL  Total IN: 395 mL    OUT:    Stool (mL): 700 mL    VAC (Vacuum Assisted Closure) System (mL): 25 mL    Voided (mL): 1550 mL  Total OUT: 2275 mL    Total NET: -1880 mL      28 Jun 2022 07:01  -  29 Jun 2022 01:13  --------------------------------------------------------  IN:    Oral Fluid: 100 mL  Total IN: 100 mL    OUT:    Stool (mL): 275 mL    VAC (Vacuum Assisted Closure) System (mL): 10 mL    Voided (mL): 1000 mL  Total OUT: 1285 mL    Total NET: -1185 mL            Physical Exam:  General: Appears well, NAD  CHEST: breathing comfortably  CV: appears well perfused  Abdomen: soft, nondistended  wound vac c/d/i  Extremities: Grossly symmetric      Labs:    06-28    135  |  94<L>  |  16  ----------------------------<  102<H>  3.7   |  28  |  0.69    Ca    9.4      28 Jun 2022 17:40  Phos  4.1     06-28  Mg     2.00     06-28                              10.9   8.03  )-----------( 461      ( 27 Jun 2022 23:00 )             34.8          Surgery Progress Note     Subjective/24hour Events:   Patient seen and examined. Reports inability to tolerate liquids with "spitting up" per family member at bedside. Was OOB to chair yesterday.       Vital Signs:    T(C): 37 (29 Jun 2022 08:00), Max: 37.2 (28 Jun 2022 20:00)  T(F): 98.6 (29 Jun 2022 08:00), Max: 99 (29 Jun 2022 00:00)  HR: 92 (29 Jun 2022 08:00) (76 - 95)  BP: 141/80 (29 Jun 2022 08:00) (109/88 - 148/102)  BP(mean): 99 (29 Jun 2022 08:00) (75 - 118)  RR: 21 (29 Jun 2022 08:00) (14 - 26)  SpO2: 94% (29 Jun 2022 08:00) (92% - 97%)      I&O's Detail      28 Jun 2022 07:01  -  29 Jun 2022 07:00  --------------------------------------------------------  IN:    Oral Fluid: 150 mL  Total IN: 150 mL    OUT:    Stool (mL): 575 mL    VAC (Vacuum Assisted Closure) System (mL): 25 mL    Voided (mL): 1600 mL  Total OUT: 2200 mL    Total NET: -2050 mL      29 Jun 2022 07:01  -  29 Jun 2022 08:31  --------------------------------------------------------  IN:    IV PiggyBack: 100 mL  Total IN: 100 mL    OUT:    Oral Fluid: 0 mL    Stool (mL): 0 mL    VAC (Vacuum Assisted Closure) System (mL): 0 mL    Voided (mL): 150 mL  Total OUT: 150 mL    Total NET: -50 mL          Physical Exam:  General: Appears well, NAD  CHEST: breathing comfortably  CV: appears well perfused  Abdomen: soft, nondistended, non tender, ostomy with stool, wound vac c/d/i  Extremities: Grossly symmetric      Labs:                          11.0   8.97  )-----------( 522      ( 29 Jun 2022 00:48 )             35.5   06-29    132<L>  |  95<L>  |  18  ----------------------------<  99  3.6   |  24  |  0.71    Ca    8.9      29 Jun 2022 00:48  Phos  4.3     06-29  Mg     1.90     06-29

## 2022-06-30 LAB
ANION GAP SERPL CALC-SCNC: 13 MMOL/L — SIGNIFICANT CHANGE UP (ref 7–14)
BASOPHILS # BLD AUTO: 0.03 K/UL — SIGNIFICANT CHANGE UP (ref 0–0.2)
BASOPHILS NFR BLD AUTO: 0.3 % — SIGNIFICANT CHANGE UP (ref 0–2)
BUN SERPL-MCNC: 19 MG/DL — SIGNIFICANT CHANGE UP (ref 7–23)
CALCIUM SERPL-MCNC: 8.7 MG/DL — SIGNIFICANT CHANGE UP (ref 8.4–10.5)
CHLORIDE SERPL-SCNC: 94 MMOL/L — LOW (ref 98–107)
CO2 SERPL-SCNC: 26 MMOL/L — SIGNIFICANT CHANGE UP (ref 22–31)
CREAT SERPL-MCNC: 0.92 MG/DL — SIGNIFICANT CHANGE UP (ref 0.5–1.3)
EGFR: 68 ML/MIN/1.73M2 — SIGNIFICANT CHANGE UP
EOSINOPHIL # BLD AUTO: 0.04 K/UL — SIGNIFICANT CHANGE UP (ref 0–0.5)
EOSINOPHIL NFR BLD AUTO: 0.4 % — SIGNIFICANT CHANGE UP (ref 0–6)
GLUCOSE SERPL-MCNC: 101 MG/DL — HIGH (ref 70–99)
HCT VFR BLD CALC: 34.3 % — LOW (ref 34.5–45)
HGB BLD-MCNC: 11.1 G/DL — LOW (ref 11.5–15.5)
IANC: 6.95 K/UL — SIGNIFICANT CHANGE UP (ref 1.8–7.4)
IMM GRANULOCYTES NFR BLD AUTO: 1.6 % — HIGH (ref 0–1.5)
LYMPHOCYTES # BLD AUTO: 1.76 K/UL — SIGNIFICANT CHANGE UP (ref 1–3.3)
LYMPHOCYTES # BLD AUTO: 18.1 % — SIGNIFICANT CHANGE UP (ref 13–44)
MAGNESIUM SERPL-MCNC: 1.7 MG/DL — SIGNIFICANT CHANGE UP (ref 1.6–2.6)
MCHC RBC-ENTMCNC: 25.9 PG — LOW (ref 27–34)
MCHC RBC-ENTMCNC: 32.4 GM/DL — SIGNIFICANT CHANGE UP (ref 32–36)
MCV RBC AUTO: 80 FL — SIGNIFICANT CHANGE UP (ref 80–100)
MONOCYTES # BLD AUTO: 0.81 K/UL — SIGNIFICANT CHANGE UP (ref 0–0.9)
MONOCYTES NFR BLD AUTO: 8.3 % — SIGNIFICANT CHANGE UP (ref 2–14)
NEUTROPHILS # BLD AUTO: 6.95 K/UL — SIGNIFICANT CHANGE UP (ref 1.8–7.4)
NEUTROPHILS NFR BLD AUTO: 71.3 % — SIGNIFICANT CHANGE UP (ref 43–77)
NRBC # BLD: 0 /100 WBCS — SIGNIFICANT CHANGE UP
NRBC # FLD: 0 K/UL — SIGNIFICANT CHANGE UP
PHOSPHATE SERPL-MCNC: 3.8 MG/DL — SIGNIFICANT CHANGE UP (ref 2.5–4.5)
PLATELET # BLD AUTO: 574 K/UL — HIGH (ref 150–400)
POTASSIUM SERPL-MCNC: 3.6 MMOL/L — SIGNIFICANT CHANGE UP (ref 3.5–5.3)
POTASSIUM SERPL-SCNC: 3.6 MMOL/L — SIGNIFICANT CHANGE UP (ref 3.5–5.3)
RBC # BLD: 4.29 M/UL — SIGNIFICANT CHANGE UP (ref 3.8–5.2)
RBC # FLD: 15.6 % — HIGH (ref 10.3–14.5)
SODIUM SERPL-SCNC: 133 MMOL/L — LOW (ref 135–145)
WBC # BLD: 9.75 K/UL — SIGNIFICANT CHANGE UP (ref 3.8–10.5)
WBC # FLD AUTO: 9.75 K/UL — SIGNIFICANT CHANGE UP (ref 3.8–10.5)

## 2022-06-30 PROCEDURE — 99233 SBSQ HOSP IP/OBS HIGH 50: CPT

## 2022-06-30 RX ORDER — CALCIUM CARBONATE 500(1250)
1 TABLET ORAL EVERY 8 HOURS
Refills: 0 | Status: DISCONTINUED | OUTPATIENT
Start: 2022-06-30 | End: 2022-07-07

## 2022-06-30 RX ORDER — POTASSIUM CHLORIDE 20 MEQ
20 PACKET (EA) ORAL ONCE
Refills: 0 | Status: COMPLETED | OUTPATIENT
Start: 2022-06-30 | End: 2022-06-30

## 2022-06-30 RX ORDER — METOCLOPRAMIDE HCL 10 MG
10 TABLET ORAL EVERY 8 HOURS
Refills: 0 | Status: DISCONTINUED | OUTPATIENT
Start: 2022-06-30 | End: 2022-07-03

## 2022-06-30 RX ORDER — METOCLOPRAMIDE HCL 10 MG
10 TABLET ORAL ONCE
Refills: 0 | Status: DISCONTINUED | OUTPATIENT
Start: 2022-06-30 | End: 2022-06-30

## 2022-06-30 RX ORDER — MAGNESIUM SULFATE 500 MG/ML
2 VIAL (ML) INJECTION ONCE
Refills: 0 | Status: COMPLETED | OUTPATIENT
Start: 2022-06-30 | End: 2022-06-30

## 2022-06-30 RX ORDER — PANTOPRAZOLE SODIUM 20 MG/1
40 TABLET, DELAYED RELEASE ORAL DAILY
Refills: 0 | Status: DISCONTINUED | OUTPATIENT
Start: 2022-06-30 | End: 2022-07-05

## 2022-06-30 RX ADMIN — PANTOPRAZOLE SODIUM 40 MILLIGRAM(S): 20 TABLET, DELAYED RELEASE ORAL at 12:30

## 2022-06-30 RX ADMIN — Medication 400 MILLIGRAM(S): at 05:14

## 2022-06-30 RX ADMIN — Medication 1000 MILLIGRAM(S): at 18:30

## 2022-06-30 RX ADMIN — Medication 10 MILLIGRAM(S): at 12:31

## 2022-06-30 RX ADMIN — ATORVASTATIN CALCIUM 10 MILLIGRAM(S): 80 TABLET, FILM COATED ORAL at 21:40

## 2022-06-30 RX ADMIN — LIDOCAINE 1 PATCH: 4 CREAM TOPICAL at 05:00

## 2022-06-30 RX ADMIN — Medication 25 GRAM(S): at 03:18

## 2022-06-30 RX ADMIN — Medication 400 MILLIGRAM(S): at 18:00

## 2022-06-30 RX ADMIN — LIDOCAINE 1 PATCH: 4 CREAM TOPICAL at 22:21

## 2022-06-30 RX ADMIN — Medication 1000 MILLIGRAM(S): at 12:50

## 2022-06-30 RX ADMIN — ALBUTEROL 2.5 MILLIGRAM(S): 90 AEROSOL, METERED ORAL at 07:49

## 2022-06-30 RX ADMIN — Medication 400 MILLIGRAM(S): at 12:31

## 2022-06-30 RX ADMIN — ENOXAPARIN SODIUM 40 MILLIGRAM(S): 100 INJECTION SUBCUTANEOUS at 05:15

## 2022-06-30 RX ADMIN — Medication 20 MILLIEQUIVALENT(S): at 03:17

## 2022-06-30 RX ADMIN — MEGESTROL ACETATE 400 MILLIGRAM(S): 40 SUSPENSION ORAL at 12:30

## 2022-06-30 RX ADMIN — Medication 10 MILLIGRAM(S): at 21:40

## 2022-06-30 RX ADMIN — CHLORHEXIDINE GLUCONATE 1 APPLICATION(S): 213 SOLUTION TOPICAL at 05:17

## 2022-06-30 RX ADMIN — Medication 1000 MILLIGRAM(S): at 05:30

## 2022-06-30 NOTE — PROGRESS NOTE ADULT - SUBJECTIVE AND OBJECTIVE BOX
Surgery Progress Note     Subjective/24hour Events:   Patient seen and examined.       Vital Signs:  Vital Signs Last 24 Hrs  T(C): 37.7 (30 Jun 2022 00:00), Max: 37.7 (29 Jun 2022 12:00)  T(F): 99.8 (30 Jun 2022 00:00), Max: 99.9 (29 Jun 2022 12:00)  HR: 93 (30 Jun 2022 00:00) (77 - 95)  BP: 135/62 (30 Jun 2022 00:00) (121/68 - 148/102)  BP(mean): 82 (30 Jun 2022 00:00) (79 - 118)  RR: 27 (30 Jun 2022 00:00) (19 - 27)  SpO2: 95% (30 Jun 2022 00:00) (92% - 100%)    CAPILLARY BLOOD GLUCOSE          I&O's Detail    28 Jun 2022 07:01  -  29 Jun 2022 07:00  --------------------------------------------------------  IN:    Oral Fluid: 150 mL  Total IN: 150 mL    OUT:    Stool (mL): 575 mL    VAC (Vacuum Assisted Closure) System (mL): 25 mL    Voided (mL): 1600 mL  Total OUT: 2200 mL    Total NET: -2050 mL      29 Jun 2022 07:01  -  30 Jun 2022 01:36  --------------------------------------------------------  IN:    IV PiggyBack: 300 mL    Oral Fluid: 50 mL  Total IN: 350 mL    OUT:    Stool (mL): 700 mL    VAC (Vacuum Assisted Closure) System (mL): 0 mL    Voided (mL): 650 mL  Total OUT: 1350 mL    Total NET: -1000 mL            Physical Exam:  General: Appears well, NAD  CHEST: breathing comfortably  CV: appears well perfused  Abdomen: soft, nondistended, non tender, ostomy with stool, wound vac c/d/i  Extremities: Grossly symmetric      Labs:    06-29    132<L>  |  95<L>  |  18  ----------------------------<  99  3.6   |  24  |  0.71    Ca    8.9      29 Jun 2022 00:48  Phos  4.3     06-29  Mg     1.90     06-29                              11.0   8.97  )-----------( 522      ( 29 Jun 2022 00:48 )             35.5          Surgery Progress Note     Subjective/24hour Events:   Patient seen and examined.  Per daughter at bedside, patient having mild nausea and pain with eating. Denies emesis.       Vital Signs:  Vital Signs Last 24 Hrs  T(C): 37.7 (30 Jun 2022 00:00), Max: 37.7 (29 Jun 2022 12:00)  T(F): 99.8 (30 Jun 2022 00:00), Max: 99.9 (29 Jun 2022 12:00)  HR: 93 (30 Jun 2022 00:00) (77 - 95)  BP: 135/62 (30 Jun 2022 00:00) (121/68 - 148/102)  BP(mean): 82 (30 Jun 2022 00:00) (79 - 118)  RR: 27 (30 Jun 2022 00:00) (19 - 27)  SpO2: 95% (30 Jun 2022 00:00) (92% - 100%)    CAPILLARY BLOOD GLUCOSE          I&O's Detail    28 Jun 2022 07:01  -  29 Jun 2022 07:00  --------------------------------------------------------  IN:    Oral Fluid: 150 mL  Total IN: 150 mL    OUT:    Stool (mL): 575 mL    VAC (Vacuum Assisted Closure) System (mL): 25 mL    Voided (mL): 1600 mL  Total OUT: 2200 mL    Total NET: -2050 mL      29 Jun 2022 07:01  -  30 Jun 2022 01:36  --------------------------------------------------------  IN:    IV PiggyBack: 300 mL    Oral Fluid: 50 mL  Total IN: 350 mL    OUT:    Stool (mL): 700 mL    VAC (Vacuum Assisted Closure) System (mL): 0 mL    Voided (mL): 650 mL  Total OUT: 1350 mL    Total NET: -1000 mL            Physical Exam:  General: Appears well, NAD  CHEST: breathing comfortably  CV: appears well perfused  Abdomen: soft, nondistended, non tender, ostomy with stool, wound vac c/d/i  Extremities: Grossly symmetric      Labs:    06-29    132<L>  |  95<L>  |  18  ----------------------------<  99  3.6   |  24  |  0.71    Ca    8.9      29 Jun 2022 00:48  Phos  4.3     06-29  Mg     1.90     06-29                              11.0   8.97  )-----------( 522      ( 29 Jun 2022 00:48 )             35.5

## 2022-06-30 NOTE — PROGRESS NOTE ADULT - ATTENDING COMMENTS
I agree with the detailed interval history, physical, and plan, which I have reviewed and edited where appropriate'; also agree with notes/assessment with my team on service.  I have personally examined the patient.  I was physically present for the key portions of the evaluation and management (E/M) service provided.  I reviewed all the pertinent data.  The patient is a critical care patient with life threatening hemodynamic and metabolic instability in SICU.  The SICU team has a constant risk benefit analyzes discussion and coordinating care with the primary team and all consultants.   The patient is in SICU with the chief complaint and diagnosis mentioned in the note.   The plan will be specified in the note.  67F s/p laparoscopic ventral hernia repair, c/b enterotomy sp RTOR ExLap, SBR, end ileostomy, fascia closed, midline wound VAC in SICU..  EXAM  NEUROLOGY  Exam: no focal deficits  RESPIRATORY  Exam: coarse BS  CARDIOVASCULAR  Exam: Regular rate   GI/NUTRITION  Exam: midline wound clean; ostomy well appearing   VASCULAR  Exam: Extremities warm  PLAN:    NEUROLOGY: Pain control  - Tylenol 1g IV q6hr and Oxycodone 5/10 PO q4hr PRN  -RESPIRATORY: respiratory abnormality  - Maintain O2 saturation >92%  CARDIOVASCULAR:  - Atorvastatin 10mg PO HS  GI / NUTRITION:  - Diet: Regular   - Protonix 40mg IV daily  -Megace 400mg PO daily for appetite stimulation  RENAL / GENITOURINARY:  - Monitor electrolytes   HEMATOLOGIC:  - VTE ppx: Lovenox 40mg SQ daily  INFECTIOUS DISEASE:  - Trend fever curve   ENDOCRINOLOGY:  - Monitor glucose  Disposition:   - Full code  - d/c floor

## 2022-06-30 NOTE — OCCUPATIONAL THERAPY INITIAL EVALUATION ADULT - PERTINENT HX OF CURRENT PROBLEM, REHAB EVAL
67 year old female with history of HTN, HLD, L tibia surgery, lap kevan presenting w incarcerated SBO s/p laparoscopic ventral hernia repair, c/b enterotomy intaoperatively, converted to open with primary repair. RRT on 6/21 for fever, tacycardia to 120s, tachypnea. SICU accepted for severe sepsis. Patient now s/p (6/21) RTOR ExLap, SBR, end ileostomy, fascia closed, midline wound VAC. Extubated 6/23.

## 2022-06-30 NOTE — CHART NOTE - NSCHARTNOTEFT_GEN_A_CORE
NUTRITION FOLLOW-UP:    Met w/patient's daughter to obtain nutrition related information.  Pt's appetite still remains poor, but daughter states that it seems to be improving every day.  Pt consuming approximately 1 serving of Ensure Enlive/d as she doesn't like it.  Pt is w/o food allergies, difficulty chewing/swallowing at this time.  Pt requires some assistance with meals, as per flowsheet.  Loose BM noted.  Pt is currently on megestrol suspension.  NGT was d/c'd on 6/27.      Patient is a 67 year old female (speaks Uzbek dialect) with a PMHx of HTN, HLD, left tibia surgery and lap kevan (2011 at [facility]) who presented with SBO 2/2 R ventral hernia containing a dilated, fecalized loop of small bowel.  Patient went to the OR for a diagnostic laparoscopy, ventral hernia repair - converted to open for repair of enterotomy on 6/15 course c/b fever and tachycardia requiring SICU transfer.  Repeat CT performed showing SBO with tp at distal ileum s/p RTOR exploratory laparotomy, small bowel resection and end ileostomy on 6/21. Recovering in SICU, NGT out, on regular diet with ostomy function.     Current wt is 5.6kg above admission wt.  Pt w/ 1+ generalized edema.     Weight:  6/29 - 109.9kg     6/24 - 123.2kg     Adm - 104.3kg     IBW - 44.4kg  Labs: H/H 11.1/34.3  Na 133  Glu 101    Current Diet:  Regular Hala w/Ensure Enlive 3x/d  Nutrition Recommendations:  Continue to encourage and assist pt w/meals.  Suggest small, frequent feedings to enhance po intake with Ensure Enlive between meals and not at time of meals.  Ensure provides 350kcal w/20gm protein/240mL serving.      RD to Remain Available: yes    Additional Recommendations:   1) Monitor weights, labs, BM's, skin integrity, p.o. intake.   2) Continue current diet regimen as tolerated.

## 2022-06-30 NOTE — PROGRESS NOTE ADULT - SUBJECTIVE AND OBJECTIVE BOX
SICU Daily Progress Note  =====================================================  Interval/Overnight Events:     - lasix 20   - Regular diet  - restarted home Protonix  - started Megestrol      HPI: 68yo F (speaks Amharic dialect) with a history of HTN, HLD, L tibia surgery, lap kevan (, [facility]) presenting with 1.5 days of nausea, vomiting and abdominal pain. Per daughter at bedside, patient began having symptoms Monday night after dinner. Last passed flatus and had a BM at that time. Denies fevers or chills. Pain improved in ED after getting morphine and NGT placement.    In the ED, stable and afebrile. Labs unremarkable. CT showed incarcerated SBO at right ventral hernia. (15 Rambo 2022 08:22)    Patient s/p ventral hernia repair, laparoscopic c/b enterotomy intraoperatively, converted to open with primary repair on 6/15. Patient on floors with NGT, NPO. On , 2am rapid response called for fever to 104 rectal, tachycardia to 120s and tachypnea. Unable to answer questions per daughter, in native [facility]. Stat labs drawn. SICU consulted for sepsis management and altered mental status. CT scan obtained on way.     Allergies: No Known Allergies    MEDICATIONS:   --------------------------------------------------------------------------------------  Neurologic Medications  acetaminophen    Suspension .. 1000 milliGRAM(s) Oral every 6 hours PRN Temp greater or equal to 38C (100.4F), Mild Pain (1 - 3)  oxyCODONE    Solution 5 milliGRAM(s) Oral every 4 hours PRN Moderate Pain (4 - 6)  oxyCODONE    Solution 10 milliGRAM(s) Oral every 4 hours PRN Severe Pain (7 - 10)    Respiratory Medications  ALBUTerol   0.5% 2.5 milliGRAM(s) Nebulizer every 8 hours    Cardiovascular Medications    Gastrointestinal Medications  pantoprazole   Suspension 40 milliGRAM(s) Oral daily  sodium chloride 0.9% lock flush 10 milliLiter(s) IV Push every 1 hour PRN Pre/post blood products, medications, blood draw, and to maintain line patency    Genitourinary Medications    Hematologic/Oncologic Medications  enoxaparin Injectable 40 milliGRAM(s) SubCutaneous every 24 hours  influenza  Vaccine (HIGH DOSE) 0.7 milliLiter(s) IntraMuscular once    Antimicrobial/Immunologic Medications  piperacillin/tazobactam IVPB.. 3.375 Gram(s) IV Intermittent every 8 hours    Endocrine/Metabolic Medications    Topical/Other Medications  chlorhexidine 4% Liquid 1 Application(s) Topical <User Schedule>  lidocaine   4% Patch 1 Patch Transdermal daily PRN pain  mupirocin 2% Ointment 1 Application(s) Topical two times a day  tetracaine/benzocaine/butamben Spray 1 Spray(s) Topical every 6 hours PRN Throat pain from NGT    --------------------------------------------------------------------------------------    VITAL SIGNS, INS/OUTS (last 24 hours):  --------------------------------------------------------------------------------------    ICU Vital Signs Last 24 Hrs  T(C): 37.3 (2022 20:00), Max: 37.7 (2022 12:00)  T(F): 99.2 (2022 20:00), Max: 99.9 (2022 12:00)  HR: 89 (2022 20:00) (77 - 95)  BP: 143/69 (2022 20:00) (121/68 - 148/102)  BP(mean): 90 (2022 20:00) (79 - 118)  ABP: --  ABP(mean): --  RR: 26 (2022 20:00) (19 - 26)  SpO2: 95% (2022 20:00) (92% - 100%)       @ 07:01  -   @ 07:00  --------------------------------------------------------  IN: 150 mL / OUT: 2200 mL / NET: -2050 mL            --------------------------------------------------------------------------------------    EXAM  NEUROLOGY        Exam: no focal deficits. moving all extremities    HEENT  Exam: Normocephalic, atraumatic, EOMI.     RESPIRATORY  Exam: Normal expansion/effort.  Mechanical Ventilation:     CARDIOVASCULAR  Exam: Regular rate and rhythm.       GI/NUTRITION  Exam: midline wound c/d/i, ostomy well appearing and functional    VASCULAR  Exam: Extremities warm, pink, well-perfused.     MUSCULOSKELETAL  Exam: All extremities moving spontaneously without limitations.     SKIN  Exam: Good skin turgor, no skin breakdown.       LABS  --------------------------------------------------------------------------------------  .  LABS:                         11.0   8.97  )-----------( 522      ( 2022 00:48 )             35.5     06-    132<L>  |  95<L>  |  18  ----------------------------<  99  3.6   |  24  |  0.71    Ca    8.9      2022 00:48  Phos  4.3       Mg     1.90             Urinalysis Basic - ( 2022 04:00 )    Color: Yellow / Appearance: Clear / S.027 / pH: x  Gluc: x / Ketone: Negative  / Bili: Negative / Urobili: 3 mg/dL   Blood: x / Protein: 30 mg/dL / Nitrite: Negative   Leuk Esterase: Negative / RBC: 136 /HPF / WBC 4 /HPF   Sq Epi: x / Non Sq Epi: 2 /HPF / Bacteria: Negative            RADIOLOGY, EKG & ADDITIONAL TESTS: Reviewed.      SICU Daily Progress Note  =====================================================  Interval/Overnight Events:     - lasix 20   - Regular diet  - restarted home Protonix  - started Megestrol    HPI: 66yo F (speaks Pashto dialect) with a history of HTN, HLD, L tibia surgery, lap kevan (, [facility]) presenting with 1.5 days of nausea, vomiting and abdominal pain. Per daughter at bedside, patient began having symptoms Monday night after dinner. Last passed flatus and had a BM at that time. Denies fevers or chills. Pain improved in ED after getting morphine and NGT placement.    In the ED, stable and afebrile. Labs unremarkable. CT showed incarcerated SBO at right ventral hernia. (15 Rambo 2022 08:22)    Patient s/p ventral hernia repair, laparoscopic c/b enterotomy intraoperatively, converted to open with primary repair on 6/15. Patient on floors with NGT, NPO. On , 2am rapid response called for fever to 104 rectal, tachycardia to 120s and tachypnea. Unable to answer questions per daughter, in native [facility]. Stat labs drawn. SICU consulted for sepsis management and altered mental status. CT scan obtained on way.     Allergies: No Known Allergies    MEDICATIONS:   --------------------------------------------------------------------------------------  Neurologic Medications  acetaminophen    Suspension .. 1000 milliGRAM(s) Oral every 6 hours PRN Temp greater or equal to 38C (100.4F), Mild Pain (1 - 3)  oxyCODONE    Solution 5 milliGRAM(s) Oral every 4 hours PRN Moderate Pain (4 - 6)  oxyCODONE    Solution 10 milliGRAM(s) Oral every 4 hours PRN Severe Pain (7 - 10)    Respiratory Medications  ALBUTerol   0.5% 2.5 milliGRAM(s) Nebulizer every 8 hours    Cardiovascular Medications    Gastrointestinal Medications  pantoprazole   Suspension 40 milliGRAM(s) Oral daily  sodium chloride 0.9% lock flush 10 milliLiter(s) IV Push every 1 hour PRN Pre/post blood products, medications, blood draw, and to maintain line patency    Genitourinary Medications    Hematologic/Oncologic Medications  enoxaparin Injectable 40 milliGRAM(s) SubCutaneous every 24 hours  influenza  Vaccine (HIGH DOSE) 0.7 milliLiter(s) IntraMuscular once    Antimicrobial/Immunologic Medications  piperacillin/tazobactam IVPB.. 3.375 Gram(s) IV Intermittent every 8 hours    Endocrine/Metabolic Medications    Topical/Other Medications  chlorhexidine 4% Liquid 1 Application(s) Topical <User Schedule>  lidocaine   4% Patch 1 Patch Transdermal daily PRN pain  mupirocin 2% Ointment 1 Application(s) Topical two times a day  tetracaine/benzocaine/butamben Spray 1 Spray(s) Topical every 6 hours PRN Throat pain from NGT    --------------------------------------------------------------------------------------    VITAL SIGNS, INS/OUTS (last 24 hours):  --------------------------------------------------------------------------------------    ICU Vital Signs Last 24 Hrs  T(C): 37.3 (2022 20:00), Max: 37.7 (2022 12:00)  T(F): 99.2 (2022 20:00), Max: 99.9 (2022 12:00)  HR: 89 (2022 20:00) (77 - 95)  BP: 143/69 (2022 20:00) (121/68 - 148/102)  BP(mean): 90 (2022 20:00) (79 - 118)  ABP: --  ABP(mean): --  RR: 26 (2022 20:00) (19 - 26)  SpO2: 95% (2022 20:00) (92% - 100%)       @ 07:01  -   @ 07:00  --------------------------------------------------------  IN: 150 mL / OUT: 2200 mL / NET: -2050 mL            --------------------------------------------------------------------------------------    EXAM  NEUROLOGY        Exam: no focal deficits. moving all extremities    HEENT  Exam: Normocephalic, atraumatic, EOMI.     RESPIRATORY  Exam: Normal expansion/effort.  Mechanical Ventilation:     CARDIOVASCULAR  Exam: Regular rate and rhythm.       GI/NUTRITION  Exam: midline wound c/d/i, ostomy well appearing and functional    VASCULAR  Exam: Extremities warm, pink, well-perfused.     MUSCULOSKELETAL  Exam: All extremities moving spontaneously without limitations.     SKIN  Exam: Good skin turgor, no skin breakdown.       LABS  --------------------------------------------------------------------------------------  .  LABS:                         11.0   8.97  )-----------( 522      ( 2022 00:48 )             35.5     06-    132<L>  |  95<L>  |  18  ----------------------------<  99  3.6   |  24  |  0.71    Ca    8.9      2022 00:48  Phos  4.3       Mg     1.90             Urinalysis Basic - ( 2022 04:00 )    Color: Yellow / Appearance: Clear / S.027 / pH: x  Gluc: x / Ketone: Negative  / Bili: Negative / Urobili: 3 mg/dL   Blood: x / Protein: 30 mg/dL / Nitrite: Negative   Leuk Esterase: Negative / RBC: 136 /HPF / WBC 4 /HPF   Sq Epi: x / Non Sq Epi: 2 /HPF / Bacteria: Negative            RADIOLOGY, EKG & ADDITIONAL TESTS: Reviewed.

## 2022-06-30 NOTE — PROGRESS NOTE ADULT - ASSESSMENT
67F (speaks Greenlandic dialect) with a history of HTN, HLD, L tibia surgery, lap kevan (2011, [facility]) presenting w incarcerated SBO at right ventral hernia s/p (6/15) right ventral hernia s/p laparoscopic ventral hernia repair, c/b enterotomy intaoperatively, converted to open with primary repair. RRT on GMF, 6/21 for fever tmax 104, tacycardia to 120s, tachypnea. SICU accepted for severe sepsis. Patient now s/p (6/21) RTOR ExLap, SBR, end ileostomy, fascia closed, midline wound VAC. Extubated 6/23.    PLAN:    NEUROLOGY:  - Pain control: Tylenol 1g PO q6hr and Oxycodone 5/10 PO q4hr PRN  - Lidocaine patch    RESPIRATORY:  - Maintain O2 saturation >92%  - Albuterol 2.5mg nebs q8hr for heavy secretions  - rpt CXR    CARDIOVASCULAR:  - Monitor hemodynamics    GI / NUTRITION:  - regular diet  - d/c NGT, d/c tube feeds 6/26  - zofran prn  - home Protonix    RENAL / GENITOURINARY:  - primafit  - Monitor I&Os  - Monitor electrolytes and replete PRN  - s/p diamox  - s/p lasix     HEMATOLOGIC:  - VTE ppx: Lovenox 40mg SQ daily  - Trend H/H on CBC daily    INFECTIOUS DISEASE:  - MRSA swab (6/22): Negative  - BCx (6/21): NGTD  - Temp 100.8 6/27, BCx and UA sent  - Mupirocin for MSSA in nares    ENDOCRINOLOGY:  - No active issues  - Continue to monitor glucose on BMP (goal 140-180)    Tubes/Lines/Drains  - PIVs    Disposition:   - Full code  - SICU   67F (speaks Hungarian dialect) with a history of HTN, HLD, L tibia surgery, lap kevan (2011, [facility]) presenting w incarcerated SBO at right ventral hernia s/p (6/15) right ventral hernia s/p laparoscopic ventral hernia repair, c/b enterotomy intaoperatively, converted to open with primary repair. RRT on GMF, 6/21 for fever tmax 104, tacycardia to 120s, tachypnea. SICU accepted for severe sepsis. Patient now s/p (6/21) RTOR ExLap, SBR, end ileostomy, fascia closed, midline wound VAC. Extubated 6/23.    PLAN:    NEUROLOGY:  - Pain control: Tylenol 1g IV q6hr and Oxycodone 5/10 PO q4hr PRN  - Lidocaine patch    RESPIRATORY:  - Maintain O2 saturation >92%    CARDIOVASCULAR:  - Monitor hemodynamics  - Atorvastatin 10mg PO HS    GI / NUTRITION:  - Diet: Regular Halal + Ensure  - C/w home Protonix 40mg IV daily  - Reglan 10mg IV q8hr for promotility and nausea  - Zofran 4mg IV q6hr PRN for nausea  - Megace 400mg PO daily for appetite stimulation    RENAL / GENITOURINARY:  - Monitor I&Os  - Monitor electrolytes and replete PRN    HEMATOLOGIC:  - VTE ppx: Lovenox 40mg SQ daily  - Trend H/H on CBC daily    INFECTIOUS DISEASE:  - Trend fever curve and WBC on CBC daily    ENDOCRINOLOGY:  - Monitor glucose on BMP daily    Tubes/Lines/Drains  - PIVs    Disposition:   - Full code  - Listed for floor

## 2022-06-30 NOTE — OCCUPATIONAL THERAPY INITIAL EVALUATION ADULT - PREDICTED DURATION OF THERAPY (DAYS/WKS), OT EVAL
[facility] CONSULT [facility]     Janine Alvarado is a 30 year old female being seen at the request of Mark Anthony Bal DO for consultation for BRBPR.       ASSESSMENT/PLAN:  Janine Alvarado is a 30 year old female with a history as listed below presents today for consultation for BRBPR.    1. Rectal bleeding:  -Consistent since at least 2015. Colonoscopy not done at that time as patient was not having diarrhea, rectal pain, or blood mixed with stools.   -Reports blood is bright red with every BM when she wipes. It been occurring more consistently for at the past  2 years; occurs for 2 weeks, then stops for 2 weeks, and starts again.   -Hgb is stable at 13.1, Hct 41.0 on 1/29/21   Plan:  -Will proceed with colonoscopy to rule out IBD  -Start taking Benefiber 1 tablespoon daily (fiber supplement)   -Start using a squatty potty to elevate the legs during bowel movement.  -Avoid straining and pushing during bowel movements.  -Limit time sitting on the toilet bowl to 3-5 minutes.  -Discussed in office hemorrhoid banding procedure at length if cause of rectal bleeding is determined to be caused by internal hemorrhoids      2. Change of bowel movements:   -Stools are type 5-6 on the BSS  -Start Start taking Benefiber 1 tablespoon daily (fiber supplement)   -Proceed with colonscopy to rule out microscopic colitis vs IBD      HPI:  Janine Alvarado is a 30 year old female with a past medical history as listed below presents for consultation for BRBPR.    Patient was seen by GI, Rohini Gilmore PA-C for rectal bleeding in 2015. Noted to most likely be caused by hemorrhoids as her Hgb remained stable, bleeding occurred after passing stool without pain, and she did not have diarrhea or blood mixed with stool. Recommended to have colonoscopy if bleeding continued.   She follows up today after being seen by Dr. Bal on 1/29 where she complained of ongoing BRBPR without any pain.    Today, patient reports her rectal bleeding stopped  for some time but has been occurring intermittently. Reports blood is bright red with every BM when she wipes. It been occurring more consistently for at the past  2 years; occurs for 2 weeks, then stops for 2 weeks, and starts again. She is unsure if she blood is in the stool.   Stools are type 5-6 on the BSS. She is not using Benefiber or wipes. She denies abdominal pain. No fever, chills, or weight change.    No family history of Crohn's disease, ulcerative colitis, Celiac disease. Paternal side: aunt have colon polyps. Grandmother's brother  of stomach cancer. Grandmother  of pancreatic cancer and had polyps.      LAST ENDOSCOPY  Colonoscopy: None   EGD: None      Past Medical History:  Past Medical History:   Diagnosis Date   • Attention deficit     Psych Dr Don Núñez follows q2 months back on Rx; Pediatrics Dr Howell had on Vyvanse, then off it awhile, now back on   • Ex-smoker     quit 2012   • Finger laceration     R index laceration 14 ED visit ok since   • Follow up    • Migraine     occasional, helped by Excedrin   • Need for HPV vaccination        Past Surgical History:  Past Surgical History:   Procedure Laterality Date   • Iud insertion  2020    Kyleena IUD   • No past surgeries         Social History:  Social History     Tobacco Use   • Smoking status: Former Smoker     Packs/day: 0.00     Types: Cigarettes     Quit date: 2012     Years since quittin.2   • Smokeless tobacco: Never Used   • Tobacco comment: In process of quiting   Substance Use Topics   • Alcohol use: No     Alcohol/week: 0.0 standard drinks   • Drug use: No       Medications:  Current Outpatient Medications   Medication Sig   • adapalene-benzoyl peroxide (Epiduo Forte) 0.3-2.5 % gel Apply topically nightly.   • propranolol (INDERAL) 10 MG tablet TAKE 1 TABLET BY MOUTH THREE TIMES DAILY   • adapalene (DIFFERIN) 0.1 % gel Apply topically nightly.   • levonorgestrel (KYLEENA) 19.5 MG intrauterine device  19.5 mg by Intrauterine route.   • amphetamine-dextroamphetamine (ADDERALL XR) 30 MG 24 hr capsule Take 1 capsule by mouth every morning.     No current facility-administered medications for this visit.        Allergies:  ALLERGIES:  No Known Allergies    Family History:  Family History   Problem Relation Age of Onset   • NEGATIVE FAMILY HX OF Mother         ok   • NEGATIVE FAMILY HX OF Father         ok   • Stroke Maternal Grandmother         2019   • Multiple Sclerosis Maternal Grandfather    • Cancer Paternal Grandmother         Lung   • Cancer, Breast Paternal Grandmother          Review of Systems:  As per HPI above      PHYSICAL EXAM:  Visit Vitals  Resp 16   Ht 5' 4\" (1.626 m)   Wt 59.8 kg   BMI 22.61 kg/m²     Constitutional:  Alert and non-ill appearing.  No apparent acute distress.  Head:  Normocephalic, atraumatic.  Eyes:  Normal conjunctivae, no drainage.  Pulmonary:  Non-labored respirations, normal aeration, CTA bilaterally, no wheezes, no rhonchi, no rales.  Cardiovascular:  Regular rate and rhythm, normal S1 and S2, no murmurs.  Abdomen:  Positive bowel sounds, soft, no distention, non-tender to palpation, no hepatosplenomegaly  Integumentary:  Warm and dry with no obvious rash, no jaundice.  Musculoskeletal:  Normal range of motion of all four extremities, no lower extremity edema.  Neurologic:  Alert and oriented.  Psychiatric:  Mood and affect were appropriate.  Judgment and insight appear appropriate.     LABS  WBC (K/mcL)   Date Value   01/29/2021 6.5   08/08/2019 5.8   11/17/2015 5.7     RBC (mil/mcL)   Date Value   01/29/2021 4.00   08/08/2019 4.13   11/17/2015 3.99 (L)     HCT (%)   Date Value   01/29/2021 41.0   08/08/2019 41.5   11/17/2015 39.9     HGB (g/dL)   Date Value   01/29/2021 13.1   08/08/2019 13.6   11/17/2015 13.5     PLT (K/mcL)   Date Value   01/29/2021 242   08/08/2019 230   11/17/2015 252     MCV (fl)   Date Value   01/29/2021 102.5 (H)   08/08/2019 100.5 (H)   11/17/2015  100.0     Sodium (mmol/L)   Date Value   01/29/2021 139   08/08/2019 139   11/17/2015 140   01/18/2012 140     Chloride (mmol/L)   Date Value   01/29/2021 108 (H)   08/08/2019 104   11/17/2015 105   01/18/2012 105     Carbon Dioxide (mmol/L)   Date Value   01/29/2021 27   08/08/2019 26   11/17/2015 26   01/18/2012 26     BUN (mg/dL)   Date Value   01/29/2021 22 (H)   08/08/2019 19   11/17/2015 11   01/18/2012 16     Creatinine (mg/dL)   Date Value   01/29/2021 0.81   08/08/2019 0.75   11/17/2015 0.68   01/18/2012 0.71     TOTAL BILIRUBIN (mg/dL)   Date Value   08/08/2019 0.4   11/17/2015 0.7     No results found for: DBIL  ALK PHOSPHATASE (Units/L)   Date Value   08/08/2019 56   11/17/2015 69     AST/SGOT (Units/L)   Date Value   08/08/2019 10   11/17/2015 37     ALT/SGPT (Units/L)   Date Value   08/08/2019 19   11/17/2015 58     Albumin (g/dL)   Date Value   08/08/2019 4.4   11/17/2015 4.0     No results found for: LIPASE  No results found for: AMYLASE  INR (no units)   Date Value   11/17/2015 1.0         All questions have been answered and both verbal and written instructions have been provided.  Patient expresses understanding of the homecare instructions and reasons to seek medical care.    The patient was treated under the supervision of Dr. Romeo Pacheco.    Celia Jacobsen PA-C         ~2-4 weeks

## 2022-06-30 NOTE — OCCUPATIONAL THERAPY INITIAL EVALUATION ADULT - DIAGNOSIS, OT EVAL
s/p ventral hernia repair, s/p end ileostomy; decreased functional mobility, decreased ADL performance

## 2022-06-30 NOTE — PROGRESS NOTE ADULT - ASSESSMENT
Patient is a 67 year old female (speaks Kyrgyz dialect) with a PMHx of HTN, HLD, left tibia surgery and lap kevan (2011 at [facility]) who presented with SBO 2/2 R ventral hernia containing a dilated, fecalized loop of small bowel.  Patient went to the OR for a diagnostic laparoscopy, ventral hernia repair - converted to open for repair of enterotomy on 6/15 course c/b fever and tachycardia requiring SICU transfer.  Repeat CT performed showing SBO with tp at distal ileum s/p RTOR exploratory laparotomy, small bowel resection and end ileostomy on 6/21. Recovering in SICU, NGT out, on regular diet with ostomy function.     PLAN:  - Regular diet as tolerated   - Monitor ostomy output +/+  - midline wound vac TIW changing  - VTE prophylaxis   - Encourage ambulation and OOB  - DVT PPx   - Appreciate excellent SICU care     B Team Surgery  [id number]

## 2022-06-30 NOTE — OCCUPATIONAL THERAPY INITIAL EVALUATION ADULT - GENERAL OBSERVATIONS, REHAB EVAL
Patient received semisupine in bed in NAD and agreeable to participate in OT evaluation. +tele. +wound vac. Daughter at bedside.

## 2022-07-01 LAB
ANION GAP SERPL CALC-SCNC: 13 MMOL/L — SIGNIFICANT CHANGE UP (ref 7–14)
BUN SERPL-MCNC: 22 MG/DL — SIGNIFICANT CHANGE UP (ref 7–23)
CALCIUM SERPL-MCNC: 8.9 MG/DL — SIGNIFICANT CHANGE UP (ref 8.4–10.5)
CHLORIDE SERPL-SCNC: 93 MMOL/L — LOW (ref 98–107)
CO2 SERPL-SCNC: 27 MMOL/L — SIGNIFICANT CHANGE UP (ref 22–31)
CREAT SERPL-MCNC: 0.8 MG/DL — SIGNIFICANT CHANGE UP (ref 0.5–1.3)
EGFR: 81 ML/MIN/1.73M2 — SIGNIFICANT CHANGE UP
GLUCOSE SERPL-MCNC: 107 MG/DL — HIGH (ref 70–99)
HCT VFR BLD CALC: 35.3 % — SIGNIFICANT CHANGE UP (ref 34.5–45)
HGB BLD-MCNC: 11.7 G/DL — SIGNIFICANT CHANGE UP (ref 11.5–15.5)
MAGNESIUM SERPL-MCNC: 2.3 MG/DL — SIGNIFICANT CHANGE UP (ref 1.6–2.6)
MCHC RBC-ENTMCNC: 26.4 PG — LOW (ref 27–34)
MCHC RBC-ENTMCNC: 33.1 GM/DL — SIGNIFICANT CHANGE UP (ref 32–36)
MCV RBC AUTO: 79.5 FL — LOW (ref 80–100)
NRBC # BLD: 0 /100 WBCS — SIGNIFICANT CHANGE UP
NRBC # FLD: 0 K/UL — SIGNIFICANT CHANGE UP
PHOSPHATE SERPL-MCNC: 4 MG/DL — SIGNIFICANT CHANGE UP (ref 2.5–4.5)
PLATELET # BLD AUTO: 521 K/UL — HIGH (ref 150–400)
POTASSIUM SERPL-MCNC: 3.6 MMOL/L — SIGNIFICANT CHANGE UP (ref 3.5–5.3)
POTASSIUM SERPL-SCNC: 3.6 MMOL/L — SIGNIFICANT CHANGE UP (ref 3.5–5.3)
RBC # BLD: 4.44 M/UL — SIGNIFICANT CHANGE UP (ref 3.8–5.2)
RBC # FLD: 15.6 % — HIGH (ref 10.3–14.5)
SODIUM SERPL-SCNC: 133 MMOL/L — LOW (ref 135–145)
SURGICAL PATHOLOGY STUDY: SIGNIFICANT CHANGE UP
WBC # BLD: 11.57 K/UL — HIGH (ref 3.8–10.5)
WBC # FLD AUTO: 11.57 K/UL — HIGH (ref 3.8–10.5)

## 2022-07-01 PROCEDURE — 99233 SBSQ HOSP IP/OBS HIGH 50: CPT

## 2022-07-01 RX ORDER — POTASSIUM CHLORIDE 20 MEQ
40 PACKET (EA) ORAL ONCE
Refills: 0 | Status: DISCONTINUED | OUTPATIENT
Start: 2022-07-01 | End: 2022-07-01

## 2022-07-01 RX ORDER — ACETAMINOPHEN 500 MG
1000 TABLET ORAL ONCE
Refills: 0 | Status: COMPLETED | OUTPATIENT
Start: 2022-07-01 | End: 2022-07-01

## 2022-07-01 RX ORDER — OXANDROLONE 10 MG/1
2.5 TABLET ORAL
Refills: 0 | Status: DISCONTINUED | OUTPATIENT
Start: 2022-07-01 | End: 2022-07-05

## 2022-07-01 RX ORDER — POTASSIUM CHLORIDE 20 MEQ
40 PACKET (EA) ORAL ONCE
Refills: 0 | Status: COMPLETED | OUTPATIENT
Start: 2022-07-01 | End: 2022-07-01

## 2022-07-01 RX ADMIN — ENOXAPARIN SODIUM 40 MILLIGRAM(S): 100 INJECTION SUBCUTANEOUS at 05:33

## 2022-07-01 RX ADMIN — Medication 10 MILLIGRAM(S): at 05:32

## 2022-07-01 RX ADMIN — ATORVASTATIN CALCIUM 10 MILLIGRAM(S): 80 TABLET, FILM COATED ORAL at 22:55

## 2022-07-01 RX ADMIN — OXANDROLONE 2.5 MILLIGRAM(S): 10 TABLET ORAL at 17:11

## 2022-07-01 RX ADMIN — Medication 1000 MILLIGRAM(S): at 11:33

## 2022-07-01 RX ADMIN — Medication 400 MILLIGRAM(S): at 10:37

## 2022-07-01 RX ADMIN — PANTOPRAZOLE SODIUM 40 MILLIGRAM(S): 20 TABLET, DELAYED RELEASE ORAL at 11:48

## 2022-07-01 RX ADMIN — MEGESTROL ACETATE 400 MILLIGRAM(S): 40 SUSPENSION ORAL at 11:49

## 2022-07-01 RX ADMIN — LIDOCAINE 1 PATCH: 4 CREAM TOPICAL at 10:00

## 2022-07-01 RX ADMIN — LIDOCAINE 1 PATCH: 4 CREAM TOPICAL at 06:44

## 2022-07-01 RX ADMIN — CHLORHEXIDINE GLUCONATE 1 APPLICATION(S): 213 SOLUTION TOPICAL at 05:53

## 2022-07-01 RX ADMIN — Medication 10 MILLIGRAM(S): at 11:48

## 2022-07-01 RX ADMIN — Medication 10 MILLIGRAM(S): at 20:05

## 2022-07-01 RX ADMIN — Medication 40 MILLIEQUIVALENT(S): at 05:33

## 2022-07-01 NOTE — PROGRESS NOTE ADULT - ASSESSMENT
Patient is a 67 year old female (speaks Tamazight dialect) with a PMHx of HTN, HLD, left tibia surgery and lap kevan (2011 at [facility]) who presented with SBO 2/2 R ventral hernia containing a dilated, fecalized loop of small bowel.  Patient went to the OR for a diagnostic laparoscopy, ventral hernia repair - converted to open for repair of enterotomy on 6/15 course c/b fever and tachycardia requiring SICU transfer.  Repeat CT performed showing SBO with tp at distal ileum s/p RTOR exploratory laparotomy, small bowel resection and end ileostomy on 6/21. Recovering in SICU, NGT out, on regular diet with ostomy function.     PLAN:        B Team Surgery  [id number] Patient is a 67 year old female (speaks Luxembourgish dialect) with a PMHx of HTN, HLD, left tibia surgery and lap kevan (2011 at [facility]) who presented with SBO 2/2 R ventral hernia containing a dilated, fecalized loop of small bowel.  Patient went to the OR for a diagnostic laparoscopy, ventral hernia repair - converted to open for repair of enterotomy on 6/15 course c/b fever and tachycardia requiring SICU transfer.  Repeat CT performed showing SBO with tp at distal ileum s/p RTOR exploratory laparotomy, small bowel resection and end ileostomy on 6/21. Recovering in SICU, NGT out, on regular diet with ostomy function.     PLAN:  - Regular diet as tolerated   - Monitor ostomy output +/+  - midline wound vac TIW changing  - VTE prophylaxis   - Encourage ambulation and OOB  - DVT PPx   - Appreciate excellent SICU care       B Team Surgery  [id number] Patient is a 67 year old female (speaks Czech dialect) with a PMHx of HTN, HLD, left tibia surgery and lap kevan (2011 at [facility]) who presented with SBO 2/2 R ventral hernia containing a dilated, fecalized loop of small bowel.  Patient went to the OR for a diagnostic laparoscopy, ventral hernia repair - converted to open for repair of enterotomy on 6/15 course c/b fever and tachycardia requiring SICU transfer.  Repeat CT performed showing SBO with tp at distal ileum s/p RTOR exploratory laparotomy, small bowel resection and end ileostomy on 6/21. Recovering in SICU, NGT out, on regular diet with ostomy function.     PLAN:  - Regular diet as tolerated   - Monitor ostomy output +/+  - midline wound vac TIW changing  - VTE prophylaxis   - Encourage ambulation and OOB  - DVT PPx   - Discharge planning to rehab  - Appreciate excellent SICU care       B Team Surgery  [id number]

## 2022-07-01 NOTE — PROGRESS NOTE ADULT - SUBJECTIVE AND OBJECTIVE BOX
SICU Daily Progress Note  =====================================================  Interval/Overnight Events:     - started Reglan standing for PO intolerance  - POCUS to determine IV diuresis - euvolemic   - d/c albuterol  - listed for floor    HPI:  Patient is a 68yo F (speaks Portuguese dialect) with a history of HTN, HLD, L tibia surgery, lap kevan (2011, [facility]) presenting with 1.5 days of nausea, vomiting and abdominal pain. Per daughter at bedside, patient began having symptoms Monday night after dinner. Last passed flatus and had a BM at that time. Denies fevers or chills. Pain improved in ED after getting morphine and NGT placement.    In the ED, stable and afebrile. Labs unremarkable. CT showed incarcerated SBO at right ventral hernia. (15 Rambo 2022 08:22)      Allergies: No Known Allergies      MEDICATIONS:   --------------------------------------------------------------------------------------  Neurologic Medications  metoclopramide Injectable 10 milliGRAM(s) IV Push every 8 hours  ondansetron Injectable 4 milliGRAM(s) IV Push every 6 hours PRN Nausea and/or Vomiting  oxyCODONE    IR 5 milliGRAM(s) Oral every 4 hours PRN Moderate Pain (4 - 6)  oxyCODONE    IR 10 milliGRAM(s) Oral every 6 hours PRN Severe Pain (7 - 10)    Respiratory Medications    Cardiovascular Medications    Gastrointestinal Medications  calcium carbonate    500 mG (Tums) Chewable 1 Tablet(s) Chew every 8 hours PRN Gas  pantoprazole  Injectable 40 milliGRAM(s) IV Push daily  sodium chloride 0.9% lock flush 10 milliLiter(s) IV Push every 1 hour PRN Pre/post blood products, medications, blood draw, and to maintain line patency    Genitourinary Medications    Hematologic/Oncologic Medications  enoxaparin Injectable 40 milliGRAM(s) SubCutaneous every 24 hours    Antimicrobial/Immunologic Medications    Endocrine/Metabolic Medications  atorvastatin 10 milliGRAM(s) Oral at bedtime  megestrol Suspension 400 milliGRAM(s) Oral daily    Topical/Other Medications  chlorhexidine 4% Liquid 1 Application(s) Topical <User Schedule>  lidocaine   4% Patch 1 Patch Transdermal daily PRN pain    --------------------------------------------------------------------------------------    VITAL SIGNS, INS/OUTS (last 24 hours):  --------------------------------------------------------------------------------------  ICU Vital Signs Last 24 Hrs  T(C): 36.9 (01 Jul 2022 00:00), Max: 37.4 (30 Jun 2022 04:00)  T(F): 98.5 (01 Jul 2022 00:00), Max: 99.4 (30 Jun 2022 04:00)  HR: 89 (01 Jul 2022 00:00) (76 - 91)  BP: 127/72 (01 Jul 2022 00:00) (120/67 - 137/82)  BP(mean): 86 (01 Jul 2022 00:00) (83 - 97)  ABP: --  ABP(mean): --  RR: 27 (01 Jul 2022 00:00) (22 - 27)  SpO2: 93% (01 Jul 2022 00:00) (93% - 98%)   --------------------------------------------------------------------------------------  EXAM  NEUROLOGY  Exam: no focal deficits. moving all extremities    HEENT  Exam: Normocephalic, atraumatic, EOMI.     RESPIRATORY  Exam: Normal expansion/effort.  Mechanical Ventilation:     CARDIOVASCULAR  Exam: Regular rate and rhythm.       GI/NUTRITION  Exam: midline wound c/d/i, ostomy well appearing and functional    VASCULAR  Exam: Extremities warm, pink, well-perfused.     MUSCULOSKELETAL  Exam: All extremities moving spontaneously without limitations.     SKIN  Exam: Good skin turgor, no skin breakdown.     METABOLIC/FLUIDS/ELECTROLYTES      HEMATOLOGIC  [x] VTE Prophylaxis: enoxaparin Injectable 40 milliGRAM(s) SubCutaneous every 24 hours    Transfusions:	[] PRBC	[] Platelets		[] FFP	[] Cryoprecipitate    INFECTIOUS DISEASE  Antimicrobials/Immunologic Medications:    Day #      of     ***    Tubes/Lines/Drains  ***  [x] Peripheral IV  [] Central Venous Line     	[] R	[] L	[] IJ	[] Fem	[] SC	Date Placed:   [] Arterial Line		[] R	[] L	[] Fem	[] Rad	[] Ax	Date Placed:   [] PICC		[] Midline		[] Mediport  [] Urinary Catheter		Date Placed:   [x] Necessity of urinary, arterial, and venous catheters discussed    LABS  --------------------------------------------------------------------------------------  ((Insert SICU Labs here))***  --------------------------------------------------------------------------------------    OTHER LABORATORY:     IMAGING STUDIES:   CXR:     ASSESSMENT:  Patient is a 68yo F (speaks Portuguese dialect) with a history of HTN, HLD, L tibia surgery, lap kevan (2011, [facility]) presenting w incarcerated SBO at right ventral hernia:    6/15 - Right ventral hernia s/p laproscopic ventral hernia repair, c/b enterotomy intaoperatively, converted to open with primary repair  6/21 - RTOR ExLap, SBR, end ileostomy, fascia closed, midline wound VAC.  /15.      PLAN:    NEUROLOGY:  - Pain control: Tylenol 1g IV q6hr and Oxycodone 5/10 PO q4hr PRN  - Lidocaine patch    RESPIRATORY:  - Maintain O2 saturation >92%    CARDIOVASCULAR:  - Monitor hemodynamics  - Atorvastatin 10mg PO HS    GI / NUTRITION:  - Diet: Regular Halal + Ensure  - C/w home Protonix 40mg IV daily  - Reglan 10mg IV q8hr for promotility and nausea  - Zofran 4mg IV q6hr PRN for nausea  - Megace 400mg PO daily for appetite stimulation    RENAL / GENITOURINARY:  - Monitor I&Os  - Monitor electrolytes and replete PRN    HEMATOLOGIC:  - VTE ppx: Lovenox 40mg SQ daily  - Trend H/H on CBC daily    INFECTIOUS DISEASE:  - Trend fever curve and WBC on CBC daily    ENDOCRINOLOGY:  - Monitor glucose on BMP daily    Tubes/Lines/Drains  - PIVs    Disposition:   - Full code  - Listed for floor   SICU Daily Progress Note  =====================================================  Interval/Overnight Events:     - started Reglan standing for PO intolerance  - POCUS to determine IV diuresis - euvolemic   - d/c albuterol  - listed for floor    HPI:  Patient is a 66yo F (speaks Mongolian dialect) with a history of HTN, HLD, L tibia surgery, lap kevan (2011, [facility]) presenting with 1.5 days of nausea, vomiting and abdominal pain. Per daughter at bedside, patient began having symptoms Monday night after dinner. Last passed flatus and had a BM at that time. Denies fevers or chills. Pain improved in ED after getting morphine and NGT placement.    In the ED, stable and afebrile. Labs unremarkable. CT showed incarcerated SBO at right ventral hernia. (15 Rambo 2022 08:22)      Allergies: No Known Allergies      MEDICATIONS:   --------------------------------------------------------------------------------------  Neurologic Medications  metoclopramide Injectable 10 milliGRAM(s) IV Push every 8 hours  ondansetron Injectable 4 milliGRAM(s) IV Push every 6 hours PRN Nausea and/or Vomiting  oxyCODONE    IR 5 milliGRAM(s) Oral every 4 hours PRN Moderate Pain (4 - 6)  oxyCODONE    IR 10 milliGRAM(s) Oral every 6 hours PRN Severe Pain (7 - 10)    Respiratory Medications    Cardiovascular Medications    Gastrointestinal Medications  calcium carbonate    500 mG (Tums) Chewable 1 Tablet(s) Chew every 8 hours PRN Gas  pantoprazole  Injectable 40 milliGRAM(s) IV Push daily  sodium chloride 0.9% lock flush 10 milliLiter(s) IV Push every 1 hour PRN Pre/post blood products, medications, blood draw, and to maintain line patency    Genitourinary Medications    Hematologic/Oncologic Medications  enoxaparin Injectable 40 milliGRAM(s) SubCutaneous every 24 hours    Antimicrobial/Immunologic Medications    Endocrine/Metabolic Medications  atorvastatin 10 milliGRAM(s) Oral at bedtime  megestrol Suspension 400 milliGRAM(s) Oral daily    Topical/Other Medications  chlorhexidine 4% Liquid 1 Application(s) Topical <User Schedule>  lidocaine   4% Patch 1 Patch Transdermal daily PRN pain    --------------------------------------------------------------------------------------    VITAL SIGNS, INS/OUTS (last 24 hours):  --------------------------------------------------------------------------------------  ICU Vital Signs Last 24 Hrs  T(C): 36.9 (01 Jul 2022 00:00), Max: 37.4 (30 Jun 2022 04:00)  T(F): 98.5 (01 Jul 2022 00:00), Max: 99.4 (30 Jun 2022 04:00)  HR: 89 (01 Jul 2022 00:00) (76 - 91)  BP: 127/72 (01 Jul 2022 00:00) (120/67 - 137/82)  BP(mean): 86 (01 Jul 2022 00:00) (83 - 97)  ABP: --  ABP(mean): --  RR: 27 (01 Jul 2022 00:00) (22 - 27)  SpO2: 93% (01 Jul 2022 00:00) (93% - 98%)   --------------------------------------------------------------------------------------  EXAM  NEUROLOGY  Exam: no focal deficits. moving all extremities    HEENT  Exam: Normocephalic, atraumatic, EOMI.     RESPIRATORY  Exam: Normal expansion/effort.  Mechanical Ventilation:     CARDIOVASCULAR  Exam: Regular rate and rhythm.       GI/NUTRITION  Exam: midline wound c/d/i, ostomy well appearing and functional    VASCULAR  Exam: Extremities warm, pink, well-perfused.     MUSCULOSKELETAL  Exam: All extremities moving spontaneously without limitations.     SKIN  Exam: Good skin turgor, no skin breakdown.     METABOLIC/FLUIDS/ELECTROLYTES      HEMATOLOGIC  [x] VTE Prophylaxis: enoxaparin Injectable 40 milliGRAM(s) SubCutaneous every 24 hours    Transfusions:	[] PRBC	[] Platelets		[] FFP	[] Cryoprecipitate    INFECTIOUS DISEASE  Antimicrobials/Immunologic Medications:    Day #      of     ***    Tubes/Lines/Drains  ***  [x] Peripheral IV  [] Central Venous Line     	[] R	[] L	[] IJ	[] Fem	[] SC	Date Placed:   [] Arterial Line		[] R	[] L	[] Fem	[] Rad	[] Ax	Date Placed:   [] PICC		[] Midline		[] Mediport  [] Urinary Catheter		Date Placed:   [x] Necessity of urinary, arterial, and venous catheters discussed    LABS  --------------------------------------------------------------------------------------  ((Insert SICU Labs here))***  --------------------------------------------------------------------------------------    OTHER LABORATORY:     IMAGING STUDIES:   CXR:     ASSESSMENT:  Patient is a 66yo F (speaks Mongolian dialect) with a history of HTN, HLD, L tibia surgery, lap kevan (2011, [facility]) presenting w incarcerated SBO at right ventral hernia:    6/15 - Right ventral hernia s/p laproscopic ventral hernia repair, c/b enterotomy intaoperatively, converted to open with primary repair  6/21 - RTOR ExLap, SBR, end ileostomy, fascia closed, midline wound VAC.  /15.      PLAN:    NEUROLOGY:  - Pain control: Tylenol 1g IV q6hr and Oxycodone 5/10 PO q4hr PRN  - Lidocaine patch    RESPIRATORY:  - Maintain O2 saturation >92%    CARDIOVASCULAR:  - Monitor hemodynamics  - Atorvastatin 10mg PO HS    GI / NUTRITION:  - Diet: Regular Halal + Ensure  - C/w home Protonix 40mg IV daily  - Reglan 10mg IV q8hr for promotility and nausea  - Zofran 4mg IV q6hr PRN for nausea  - Megace 400mg PO daily for appetite stimulation  - will add oxandrolone     RENAL / GENITOURINARY:  - Monitor I&Os  - Monitor electrolytes and replete PRN    HEMATOLOGIC:  - VTE ppx: Lovenox 40mg SQ daily  - Trend H/H on CBC daily    INFECTIOUS DISEASE:  - Trend fever curve and WBC on CBC daily    ENDOCRINOLOGY:  - Monitor glucose on BMP daily    Tubes/Lines/Drains  - PIVs    Disposition:   - Full code  - Listed for floor    SICU  [id number]

## 2022-07-01 NOTE — PROGRESS NOTE ADULT - ATTENDING COMMENTS
I agree with the detailed interval history, physical, and plan, which I have reviewed and edited where appropriate'; also agree with notes/assessment with my team on service.  I have personally examined the patient.  I was physically present for the key portions of the evaluation and management (E/M) service provided.  I reviewed all the pertinent data.  The patient is a critical care patient with life threatening hemodynamic and metabolic instability in SICU.  The SICU team has a constant risk benefit analyzes discussion and coordinating care with the primary team and all consultants.   The patient is in SICU with the chief complaint and diagnosis mentioned in the note.   The plan will be specified in the note.  68yo F with incarcerated SBO at right ventral hernia in SICU post-op.  EXAM  NEUROLOGY  Exam: no focal deficits  RESPIRATORY  Exam: clear  CARDIOVASCULAR  Exam: Regular rate  ABD soft. VAC+    PLAN:    NEUROLOGY: Pain control  - Tylenol 1g IV q6hr and Oxycodone 5/10 PO q4hr PRN  RESPIRATORY:  - Maintain O2 saturation >92%  CARDIOVASCULAR:  - Atorvastatin 10mg PO HS  GI / NUTRITION:  - Diet: Regular   - Protonix 40mg IV daily  -will add oxandrolone   RENAL / GENITOURINARY:  - Monitor electrolytes  HEMATOLOGIC:  -  Lovenox 40mg SQ daily  INFECTIOUS DISEASE:  - Trend fever curve   ENDOCRINOLOGY:  - Monitor glucose   Disposition:   - Full code  - d/c floor

## 2022-07-01 NOTE — CHART NOTE - NSCHARTNOTEFT_GEN_A_CORE
SICU Transfer Note  ---------------------------  Transfer from: SICU   Transfer to:  8S Floor    Pt was sleeping in bed. Pt's daughter stated that pt had been nauseous, but denies any moderate or severe pain.   CHEST: breathing comfortably  CV: appears well perfused  Abdomen: soft, nondistended, non tender, ostomy with stool, wound vac c/d/i  Extremities: Grossly symmetric      OBJECTIVE --  Vital Signs Last 24 Hrs  T(C): 36.3 (01 Jul 2022 20:00), Max: 37.7 (01 Jul 2022 08:29)  T(F): 97.4 (01 Jul 2022 20:00), Max: 99.9 (01 Jul 2022 08:29)  HR: 90 (01 Jul 2022 20:00) (82 - 90)  BP: 142/79 (01 Jul 2022 16:00) (121/70 - 156/78)  BP(mean): 97 (01 Jul 2022 16:00) (82 - 103)  RR: 29 (01 Jul 2022 20:00) (22 - 29)  SpO2: 95% (01 Jul 2022 20:00) (93% - 97%)    I&O's Summary    30 Jun 2022 07:01  -  01 Jul 2022 07:00  --------------------------------------------------------  IN: 200 mL / OUT: 1600 mL / NET: -1400 mL    01 Jul 2022 07:01  -  02 Jul 2022 00:00  --------------------------------------------------------  IN: 500 mL / OUT: 900 mL / NET: -400 mL        MEDICATIONS  (STANDING):  atorvastatin 10 milliGRAM(s) Oral at bedtime  chlorhexidine 4% Liquid 1 Application(s) Topical <User Schedule>  enoxaparin Injectable 40 milliGRAM(s) SubCutaneous every 24 hours  megestrol Suspension 400 milliGRAM(s) Oral daily  metoclopramide Injectable 10 milliGRAM(s) IV Push every 8 hours  oxandrolone 2.5 milliGRAM(s) Oral two times a day  pantoprazole  Injectable 40 milliGRAM(s) IV Push daily    MEDICATIONS  (PRN):  calcium carbonate    500 mG (Tums) Chewable 1 Tablet(s) Chew every 8 hours PRN Gas  lidocaine   4% Patch 1 Patch Transdermal daily PRN pain  ondansetron Injectable 4 milliGRAM(s) IV Push every 6 hours PRN Nausea and/or Vomiting  oxyCODONE    IR 5 milliGRAM(s) Oral every 4 hours PRN Moderate Pain (4 - 6)  oxyCODONE    IR 10 milliGRAM(s) Oral every 6 hours PRN Severe Pain (7 - 10)  sodium chloride 0.9% lock flush 10 milliLiter(s) IV Push every 1 hour PRN Pre/post blood products, medications, blood draw, and to maintain line patency      LABS                                            11.7                  Neurophils% (auto):   x      (07-01 @ 01:00):    11.57)-----------(521          Lymphocytes% (auto):  x                                             35.3                   Eosinphils% (auto):   x        Manual%: Neutrophils x    ; Lymphocytes x    ; Eosinophils x    ; Bands%: x    ; Blasts x                                    133    |  93     |  22                  Calcium: 8.9   / iCa: x      (07-01 @ 01:00)    ----------------------------<  107       Magnesium: 2.30                             3.6     |  27     |  0.80             Phosphorous: 4.0        Assessment and Plan:   · Assessment    Patient is a 67 year old female (speaks Yakut dialect) with a PMHx of HTN, HLD, left tibia surgery and lap kevan (2011 at [facility]) who presented with SBO 2/2 R ventral hernia containing a dilated, fecalized loop of small bowel.  Patient went to the OR for a diagnostic laparoscopy, ventral hernia repair - converted to open for repair of enterotomy on 6/15 course c/b fever and tachycardia requiring SICU transfer.  Repeat CT performed showing SBO with tp at distal ileum s/p RTOR exploratory laparotomy, small bowel resection and end ileostomy on 6/21.  NGT out, on regular diet with ostomy function.     PLAN:  - Regular diet as tolerated   - Monitor ostomy output +/+  - midline wound vac TIW changing  - VTE prophylaxis   - Encourage ambulation and OOB  - DVT PPx   - Discharge planning to rehab

## 2022-07-01 NOTE — PROGRESS NOTE ADULT - SUBJECTIVE AND OBJECTIVE BOX
Surgery Progress Note     Subjective/24hour Events:   Patient seen and examined.       Vital Signs:  Vital Signs Last 24 Hrs  T(C): 36.9 (01 Jul 2022 00:00), Max: 37.4 (30 Jun 2022 04:00)  T(F): 98.5 (01 Jul 2022 00:00), Max: 99.4 (30 Jun 2022 04:00)  HR: 89 (01 Jul 2022 00:00) (76 - 91)  BP: 127/72 (01 Jul 2022 00:00) (120/67 - 137/82)  BP(mean): 86 (01 Jul 2022 00:00) (83 - 97)  RR: 27 (01 Jul 2022 00:00) (22 - 27)  SpO2: 93% (01 Jul 2022 00:00) (93% - 98%)    CAPILLARY BLOOD GLUCOSE          I&O's Detail    29 Jun 2022 07:01  -  30 Jun 2022 07:00  --------------------------------------------------------  IN:    IV PiggyBack: 300 mL    Oral Fluid: 50 mL  Total IN: 350 mL    OUT:    Stool (mL): 750 mL    VAC (Vacuum Assisted Closure) System (mL): 0 mL    Voided (mL): 700 mL  Total OUT: 1450 mL    Total NET: -1100 mL      30 Jun 2022 07:01  -  01 Jul 2022 02:11  --------------------------------------------------------  IN:    Oral Fluid: 200 mL  Total IN: 200 mL    OUT:    Stool (mL): 400 mL    VAC (Vacuum Assisted Closure) System (mL): 30 mL    Voided (mL): 650 mL  Total OUT: 1080 mL    Total NET: -880 mL            Physical Exam:  Gen:  CV:  Resp:  Abd:  Ext:      Labs:    07-01    133<L>  |  93<L>  |  22  ----------------------------<  107<H>  3.6   |  27  |  0.80    Ca    8.9      01 Jul 2022 01:00  Phos  4.0     07-01  Mg     2.30     07-01                              11.7   11.57 )-----------( 521      ( 01 Jul 2022 01:00 )             35.3          Surgery Progress Note     Subjective/24hour Events:   Patient seen and examined. MICHAELOE. Per floyd at the bedside- pt has had no emesis but some nausea and pain w/eating      Vital Signs:  Vital Signs Last 24 Hrs  T(C): 36.6 (01 Jul 2022 12:00), Max: 37.7 (01 Jul 2022 08:29)  T(F): 97.9 (01 Jul 2022 12:00), Max: 99.9 (01 Jul 2022 08:29)  HR: 89 (01 Jul 2022 12:00) (82 - 91)  BP: 135/65 (01 Jul 2022 12:00) (121/70 - 156/78)  BP(mean): 103 (01 Jul 2022 10:28) (82 - 103)  RR: 24 (01 Jul 2022 12:00) (22 - 27)  SpO2: 96% (01 Jul 2022 12:00) (93% - 96%)    CAPILLARY BLOOD GLUCOSE    I&O's Detail    30 Jun 2022 07:01  -  01 Jul 2022 07:00  --------------------------------------------------------  IN:    Oral Fluid: 200 mL  Total IN: 200 mL    OUT:    Stool (mL): 700 mL    VAC (Vacuum Assisted Closure) System (mL): 50 mL    Voided (mL): 850 mL  Total OUT: 1600 mL    Total NET: -1400 mL      01 Jul 2022 07:01  -  01 Jul 2022 12:05  --------------------------------------------------------  IN:    Oral Fluid: 200 mL  Total IN: 200 mL    OUT:    Voided (mL): 100 mL  Total OUT: 100 mL    Total NET: 100 mL      Physical Exam:  General: Appears well, NAD  CHEST: breathing comfortably  CV: appears well perfused  Abdomen: soft, nondistended, non tender, ostomy with stool, wound vac c/d/i  Extremities: Grossly symmetric    Labs:    07-01 07-01    133<L>  |  93<L>  |  22  ----------------------------<  107<H>  3.6   |  27  |  0.80    Ca    8.9      01 Jul 2022 01:00  Phos  4.0     07-01  Mg     2.30     07-01                                   11.7   11.57 )-----------( 521      ( 01 Jul 2022 01:00 )             35.3

## 2022-07-02 LAB
ANION GAP SERPL CALC-SCNC: 16 MMOL/L — HIGH (ref 7–14)
BUN SERPL-MCNC: 24 MG/DL — HIGH (ref 7–23)
CALCIUM SERPL-MCNC: 9.1 MG/DL — SIGNIFICANT CHANGE UP (ref 8.4–10.5)
CHLORIDE SERPL-SCNC: 93 MMOL/L — LOW (ref 98–107)
CO2 SERPL-SCNC: 24 MMOL/L — SIGNIFICANT CHANGE UP (ref 22–31)
CREAT SERPL-MCNC: 0.78 MG/DL — SIGNIFICANT CHANGE UP (ref 0.5–1.3)
EGFR: 83 ML/MIN/1.73M2 — SIGNIFICANT CHANGE UP
GLUCOSE SERPL-MCNC: 101 MG/DL — HIGH (ref 70–99)
HCT VFR BLD CALC: 37.9 % — SIGNIFICANT CHANGE UP (ref 34.5–45)
HGB BLD-MCNC: 12.2 G/DL — SIGNIFICANT CHANGE UP (ref 11.5–15.5)
MAGNESIUM SERPL-MCNC: 2 MG/DL — SIGNIFICANT CHANGE UP (ref 1.6–2.6)
MCHC RBC-ENTMCNC: 26.1 PG — LOW (ref 27–34)
MCHC RBC-ENTMCNC: 32.2 GM/DL — SIGNIFICANT CHANGE UP (ref 32–36)
MCV RBC AUTO: 81 FL — SIGNIFICANT CHANGE UP (ref 80–100)
NRBC # BLD: 0 /100 WBCS — SIGNIFICANT CHANGE UP
NRBC # FLD: 0 K/UL — SIGNIFICANT CHANGE UP
PHOSPHATE SERPL-MCNC: 3.6 MG/DL — SIGNIFICANT CHANGE UP (ref 2.5–4.5)
PLATELET # BLD AUTO: 479 K/UL — HIGH (ref 150–400)
POTASSIUM SERPL-MCNC: 3.7 MMOL/L — SIGNIFICANT CHANGE UP (ref 3.5–5.3)
POTASSIUM SERPL-SCNC: 3.7 MMOL/L — SIGNIFICANT CHANGE UP (ref 3.5–5.3)
RBC # BLD: 4.68 M/UL — SIGNIFICANT CHANGE UP (ref 3.8–5.2)
RBC # FLD: 15.4 % — HIGH (ref 10.3–14.5)
SODIUM SERPL-SCNC: 133 MMOL/L — LOW (ref 135–145)
WBC # BLD: 12.87 K/UL — HIGH (ref 3.8–10.5)
WBC # FLD AUTO: 12.87 K/UL — HIGH (ref 3.8–10.5)

## 2022-07-02 RX ADMIN — OXANDROLONE 2.5 MILLIGRAM(S): 10 TABLET ORAL at 17:41

## 2022-07-02 RX ADMIN — PANTOPRAZOLE SODIUM 40 MILLIGRAM(S): 20 TABLET, DELAYED RELEASE ORAL at 12:16

## 2022-07-02 RX ADMIN — OXANDROLONE 2.5 MILLIGRAM(S): 10 TABLET ORAL at 06:06

## 2022-07-02 RX ADMIN — CHLORHEXIDINE GLUCONATE 1 APPLICATION(S): 213 SOLUTION TOPICAL at 08:18

## 2022-07-02 RX ADMIN — Medication 10 MILLIGRAM(S): at 06:05

## 2022-07-02 RX ADMIN — Medication 10 MILLIGRAM(S): at 12:16

## 2022-07-02 RX ADMIN — ENOXAPARIN SODIUM 40 MILLIGRAM(S): 100 INJECTION SUBCUTANEOUS at 06:05

## 2022-07-02 RX ADMIN — ONDANSETRON 4 MILLIGRAM(S): 8 TABLET, FILM COATED ORAL at 19:02

## 2022-07-02 RX ADMIN — ATORVASTATIN CALCIUM 10 MILLIGRAM(S): 80 TABLET, FILM COATED ORAL at 21:02

## 2022-07-02 RX ADMIN — MEGESTROL ACETATE 400 MILLIGRAM(S): 40 SUSPENSION ORAL at 13:25

## 2022-07-02 NOTE — PROGRESS NOTE ADULT - ASSESSMENT
Patient is a 67 year old female (speaks Danish dialect) with a PMHx of HTN, HLD, left tibia surgery and lap kevan (2011 at [facility]) who presented with SBO 2/2 R ventral hernia containing a dilated, fecalized loop of small bowel.  Patient went to the OR for a diagnostic laparoscopy, ventral hernia repair - converted to open for repair of enterotomy on 6/15 course c/b fever and tachycardia requiring SICU transfer.  Repeat CT performed showing SBO with tp at distal ileum s/p RTOR exploratory laparotomy, small bowel resection and end ileostomy on 6/21. Recovering in SICU, NGT out, on regular diet with ostomy function.     PLAN:  - Regular diet as tolerated   - Monitor ostomy output +/+  - midline wound vac TIW changing  - VTE prophylaxis   - Encourage ambulation and OOB  - DVT PPx   - Discharge planning to rehab  - Appreciate excellent SICU care       B Team Surgery  [id number]   Patient is a 67 year old female (speaks Portuguese dialect) with a PMHx of HTN, HLD, left tibia surgery and lap kevan (2011 at [facility]) who presented with SBO 2/2 R ventral hernia containing a dilated, fecalized loop of small bowel.  Patient went to the OR for a diagnostic laparoscopy, ventral hernia repair - converted to open for repair of enterotomy on 6/15 course c/b fever and tachycardia requiring SICU transfer.  Repeat CT performed showing SBO with tp at distal ileum s/p RTOR exploratory laparotomy, small bowel resection and end ileostomy on 6/21. Recovering in SICU, NGT out, on regular diet with ostomy function.     PLAN:  - Regular diet as tolerated   - Monitor ostomy output +/+  - midline wound vac TIW changing  - VTE prophylaxis   - Encourage ambulation and OOB  - DVT PPx   - Discharge planning to home PT with wound vac care at home  - Appreciate excellent SICU care       B Team Surgery  [id number]

## 2022-07-02 NOTE — PROGRESS NOTE ADULT - SUBJECTIVE AND OBJECTIVE BOX
Overnight events: Transferred to floor    SUBJECTIVE: Pt seen and examined at bedside.       OBJECTIVE:  Vital Signs Last 24 Hrs  T(C): 36.6 (02 Jul 2022 02:46), Max: 37.7 (01 Jul 2022 08:29)  T(F): 97.9 (02 Jul 2022 02:46), Max: 99.9 (01 Jul 2022 08:29)  HR: 94 (02 Jul 2022 02:46) (82 - 96)  BP: 138/81 (02 Jul 2022 02:46) (121/70 - 156/78)  BP(mean): 97 (01 Jul 2022 16:00) (82 - 103)  RR: 18 (02 Jul 2022 02:46) (18 - 29)  SpO2: 98% (02 Jul 2022 02:46) (93% - 98%)      06-30-22 @ 07:01  -  07-01-22 @ 07:00  --------------------------------------------------------  IN: 200 mL / OUT: 1600 mL / NET: -1400 mL    07-01-22 @ 07:01  -  07-02-22 @ 02:56  --------------------------------------------------------  IN: 600 mL / OUT: 1200 mL / NET: -600 mL        Physical Examination:  General: Appears well, NAD  CHEST: breathing comfortably  CV: appears well perfused  Abdomen: soft, nondistended, non tender, ostomy with stool, wound vac c/d/i  Extremities: Grossly symmetric        LABS:                        11.7   11.57 )-----------( 521      ( 01 Jul 2022 01:00 )             35.3       07-01    133<L>  |  93<L>  |  22  ----------------------------<  107<H>  3.6   |  27  |  0.80    Ca    8.9      01 Jul 2022 01:00  Phos  4.0     07-01  Mg     2.30     07-01           Overnight events: N/A    SUBJECTIVE: Pt seen and examined at bedside.       OBJECTIVE:  Vital Signs Last 24 Hrs  T(C): 37.2 (02 Jul 2022 21:04), Max: 37.2 (02 Jul 2022 21:04)  T(F): 98.9 (02 Jul 2022 21:04), Max: 98.9 (02 Jul 2022 21:04)  HR: 97 (02 Jul 2022 21:04) (86 - 97)  BP: 125/68 (02 Jul 2022 21:04) (125/68 - 139/89)  BP(mean): --  RR: 17 (02 Jul 2022 21:04) (17 - 18)  SpO2: 98% (02 Jul 2022 21:04) (95% - 99%)    I&O's Detail    01 Jul 2022 07:01  -  02 Jul 2022 07:00  --------------------------------------------------------  IN:    Oral Fluid: 600 mL  Total IN: 600 mL    OUT:    Stool (mL): 750 mL    VAC (Vacuum Assisted Closure) System (mL): 0 mL    Voided (mL): 450 mL  Total OUT: 1200 mL    Total NET: -600 mL      02 Jul 2022 07:01  -  02 Jul 2022 23:24  --------------------------------------------------------  IN:  Total IN: 0 mL    OUT:    Stool (mL): 400 mL    VAC (Vacuum Assisted Closure) System (mL): 0 mL    Voided (mL): 350 mL  Total OUT: 750 mL    Total NET: -750 mL          General: Appears well, NAD  CHEST: breathing comfortably  CV: appears well perfused  Abdomen: soft, nondistended, non tender, ostomy with stool, wound vac c/d/i  Extremities: Grossly symmetric        LABS:    07-02    133<L>  |  93<L>  |  24<H>  ----------------------------<  101<H>  3.7   |  24  |  0.78    Ca    9.1      02 Jul 2022 06:48  Phos  3.6     07-02  Mg     2.00     07-02                          12.2   12.87 )-----------( 479      ( 02 Jul 2022 06:48 )             37.9

## 2022-07-02 NOTE — PROGRESS NOTE ADULT - ATTENDING COMMENTS
s/p VHR with primary repair of enterotomy complicated by small bowel leak requiting resection and end ileostomy    a. Note of slowly rising WBC count  b. With viable and functioning ostomy  c.  Wound VAC to midline wound  d.  For repeat CT to rule put SSI    Hypokalemia  a.  Replete  b.  Trend CMP

## 2022-07-03 LAB
ANION GAP SERPL CALC-SCNC: 17 MMOL/L — HIGH (ref 7–14)
BUN SERPL-MCNC: 30 MG/DL — HIGH (ref 7–23)
CALCIUM SERPL-MCNC: 9.2 MG/DL — SIGNIFICANT CHANGE UP (ref 8.4–10.5)
CHLORIDE SERPL-SCNC: 91 MMOL/L — LOW (ref 98–107)
CO2 SERPL-SCNC: 24 MMOL/L — SIGNIFICANT CHANGE UP (ref 22–31)
CREAT SERPL-MCNC: 1.02 MG/DL — SIGNIFICANT CHANGE UP (ref 0.5–1.3)
CULTURE RESULTS: SIGNIFICANT CHANGE UP
CULTURE RESULTS: SIGNIFICANT CHANGE UP
EGFR: 60 ML/MIN/1.73M2 — SIGNIFICANT CHANGE UP
GLUCOSE SERPL-MCNC: 102 MG/DL — HIGH (ref 70–99)
HCT VFR BLD CALC: 37.3 % — SIGNIFICANT CHANGE UP (ref 34.5–45)
HGB BLD-MCNC: 12.4 G/DL — SIGNIFICANT CHANGE UP (ref 11.5–15.5)
MAGNESIUM SERPL-MCNC: 2 MG/DL — SIGNIFICANT CHANGE UP (ref 1.6–2.6)
MCHC RBC-ENTMCNC: 26.3 PG — LOW (ref 27–34)
MCHC RBC-ENTMCNC: 33.2 GM/DL — SIGNIFICANT CHANGE UP (ref 32–36)
MCV RBC AUTO: 79.2 FL — LOW (ref 80–100)
NRBC # BLD: 0 /100 WBCS — SIGNIFICANT CHANGE UP
NRBC # FLD: 0 K/UL — SIGNIFICANT CHANGE UP
PHOSPHATE SERPL-MCNC: 4.1 MG/DL — SIGNIFICANT CHANGE UP (ref 2.5–4.5)
PLATELET # BLD AUTO: 429 K/UL — HIGH (ref 150–400)
POTASSIUM SERPL-MCNC: 3.9 MMOL/L — SIGNIFICANT CHANGE UP (ref 3.5–5.3)
POTASSIUM SERPL-SCNC: 3.9 MMOL/L — SIGNIFICANT CHANGE UP (ref 3.5–5.3)
RBC # BLD: 4.71 M/UL — SIGNIFICANT CHANGE UP (ref 3.8–5.2)
RBC # FLD: 15.5 % — HIGH (ref 10.3–14.5)
SARS-COV-2 RNA SPEC QL NAA+PROBE: SIGNIFICANT CHANGE UP
SODIUM SERPL-SCNC: 132 MMOL/L — LOW (ref 135–145)
SPECIMEN SOURCE: SIGNIFICANT CHANGE UP
SPECIMEN SOURCE: SIGNIFICANT CHANGE UP
WBC # BLD: 10.96 K/UL — HIGH (ref 3.8–10.5)
WBC # FLD AUTO: 10.96 K/UL — HIGH (ref 3.8–10.5)

## 2022-07-03 RX ORDER — ACETAMINOPHEN 500 MG
975 TABLET ORAL EVERY 6 HOURS
Refills: 0 | Status: DISCONTINUED | OUTPATIENT
Start: 2022-07-03 | End: 2022-07-04

## 2022-07-03 RX ORDER — METOCLOPRAMIDE HCL 10 MG
10 TABLET ORAL EVERY 8 HOURS
Refills: 0 | Status: DISCONTINUED | OUTPATIENT
Start: 2022-07-03 | End: 2022-07-07

## 2022-07-03 RX ORDER — SODIUM CHLORIDE 9 MG/ML
1000 INJECTION, SOLUTION INTRAVENOUS
Refills: 0 | Status: DISCONTINUED | OUTPATIENT
Start: 2022-07-03 | End: 2022-07-04

## 2022-07-03 RX ADMIN — MEGESTROL ACETATE 400 MILLIGRAM(S): 40 SUSPENSION ORAL at 12:39

## 2022-07-03 RX ADMIN — Medication 1 TABLET(S): at 20:59

## 2022-07-03 RX ADMIN — OXANDROLONE 2.5 MILLIGRAM(S): 10 TABLET ORAL at 17:54

## 2022-07-03 RX ADMIN — Medication 10 MILLIGRAM(S): at 12:10

## 2022-07-03 RX ADMIN — PANTOPRAZOLE SODIUM 40 MILLIGRAM(S): 20 TABLET, DELAYED RELEASE ORAL at 12:10

## 2022-07-03 RX ADMIN — Medication 10 MILLIGRAM(S): at 20:31

## 2022-07-03 RX ADMIN — Medication 10 MILLIGRAM(S): at 03:22

## 2022-07-03 RX ADMIN — CHLORHEXIDINE GLUCONATE 1 APPLICATION(S): 213 SOLUTION TOPICAL at 05:06

## 2022-07-03 RX ADMIN — ENOXAPARIN SODIUM 40 MILLIGRAM(S): 100 INJECTION SUBCUTANEOUS at 05:06

## 2022-07-03 RX ADMIN — ONDANSETRON 4 MILLIGRAM(S): 8 TABLET, FILM COATED ORAL at 17:53

## 2022-07-03 RX ADMIN — OXANDROLONE 2.5 MILLIGRAM(S): 10 TABLET ORAL at 05:01

## 2022-07-03 NOTE — PROGRESS NOTE ADULT - ASSESSMENT
Patient is a 67 year old female (speaks Frisian dialect) with a PMHx of HTN, HLD, left tibia surgery and lap kevan (2011 at [facility]) who presented with SBO 2/2 R ventral hernia containing a dilated, fecalized loop of small bowel.  Patient went to the OR for a diagnostic laparoscopy, ventral hernia repair - converted to open for repair of enterotomy on 6/15 course c/b fever and tachycardia requiring SICU transfer.  Repeat CT performed showing SBO with tp at distal ileum s/p RTOR exploratory laparotomy, small bowel resection and end ileostomy on 6/21. Recovering in SICU, NGT out, on regular diet with ostomy function. leukocytosis warranting imaging    PLAN:  - CT abd/pelvis w/contrast  - Regular diet as tolerated   - Monitor ostomy output +/+  - midline wound vac TIW changing  - VTE prophylaxis   - Encourage ambulation and OOB  - DVT PPx         B Team Surgery  [id number]

## 2022-07-03 NOTE — PROGRESS NOTE ADULT - SUBJECTIVE AND OBJECTIVE BOX
Overnight events:   - 10 cc emesis with red content resembling the food patient ate    SUBJECTIVE:  - no complaints this morning. passed gas.     OBJECTIVE:  Vital Signs Last 24 Hrs  T(C): 36.6 (03 Jul 2022 20:47), Max: 37 (03 Jul 2022 13:51)  T(F): 97.8 (03 Jul 2022 20:47), Max: 98.6 (03 Jul 2022 13:51)  HR: 94 (03 Jul 2022 20:47) (81 - 96)  BP: 123/66 (03 Jul 2022 20:47) (118/76 - 128/80)  RR: 18 (03 Jul 2022 20:47) (17 - 18)  SpO2: 97% (03 Jul 2022 20:47) (96% - 99%)      I&O's Detail    02 Jul 2022 07:01  -  03 Jul 2022 07:00  --------------------------------------------------------  IN:  Total IN: 0 mL    OUT:    Stool (mL): 500 mL    VAC (Vacuum Assisted Closure) System (mL): 0 mL    Voided (mL): 650 mL  Total OUT: 1150 mL    Total NET: -1150 mL      03 Jul 2022 07:01  -  03 Jul 2022 21:53  --------------------------------------------------------  IN:  Total IN: 0 mL    OUT:    Stool (mL): 500 mL  Total OUT: 500 mL    Total NET: -500 mL      Physical Examination:  General: Appears well, NAD  CHEST: breathing comfortably  CV: appears well perfused  Abdomen: soft, nondistended, non tender, ostomy with stool, wound vac c/d/i  Extremities: Grossly symmetric      LABS:                        12.4   10.96 )-----------( 429      ( 03 Jul 2022 06:27 )             37.3       07-03    132<L>  |  91<L>  |  30<H>  ----------------------------<  102<H>  3.9   |  24  |  1.02    Ca    9.2      03 Jul 2022 06:27  Phos  4.1     07-03  Mg     2.00     07-03

## 2022-07-04 LAB
ANION GAP SERPL CALC-SCNC: 14 MMOL/L — SIGNIFICANT CHANGE UP (ref 7–14)
BUN SERPL-MCNC: 30 MG/DL — HIGH (ref 7–23)
CALCIUM SERPL-MCNC: 9.1 MG/DL — SIGNIFICANT CHANGE UP (ref 8.4–10.5)
CHLORIDE SERPL-SCNC: 92 MMOL/L — LOW (ref 98–107)
CO2 SERPL-SCNC: 25 MMOL/L — SIGNIFICANT CHANGE UP (ref 22–31)
CREAT SERPL-MCNC: 1.1 MG/DL — SIGNIFICANT CHANGE UP (ref 0.5–1.3)
EGFR: 55 ML/MIN/1.73M2 — LOW
GLUCOSE SERPL-MCNC: 98 MG/DL — SIGNIFICANT CHANGE UP (ref 70–99)
HCT VFR BLD CALC: 34.6 % — SIGNIFICANT CHANGE UP (ref 34.5–45)
HGB BLD-MCNC: 11.6 G/DL — SIGNIFICANT CHANGE UP (ref 11.5–15.5)
MAGNESIUM SERPL-MCNC: 1.9 MG/DL — SIGNIFICANT CHANGE UP (ref 1.6–2.6)
MCHC RBC-ENTMCNC: 26.3 PG — LOW (ref 27–34)
MCHC RBC-ENTMCNC: 33.5 GM/DL — SIGNIFICANT CHANGE UP (ref 32–36)
MCV RBC AUTO: 78.5 FL — LOW (ref 80–100)
NRBC # BLD: 0 /100 WBCS — SIGNIFICANT CHANGE UP
NRBC # FLD: 0 K/UL — SIGNIFICANT CHANGE UP
PHOSPHATE SERPL-MCNC: 4.2 MG/DL — SIGNIFICANT CHANGE UP (ref 2.5–4.5)
PLATELET # BLD AUTO: 380 K/UL — SIGNIFICANT CHANGE UP (ref 150–400)
POTASSIUM SERPL-MCNC: 3.8 MMOL/L — SIGNIFICANT CHANGE UP (ref 3.5–5.3)
POTASSIUM SERPL-SCNC: 3.8 MMOL/L — SIGNIFICANT CHANGE UP (ref 3.5–5.3)
RBC # BLD: 4.41 M/UL — SIGNIFICANT CHANGE UP (ref 3.8–5.2)
RBC # FLD: 15.4 % — HIGH (ref 10.3–14.5)
SODIUM SERPL-SCNC: 131 MMOL/L — LOW (ref 135–145)
WBC # BLD: 9.17 K/UL — SIGNIFICANT CHANGE UP (ref 3.8–10.5)
WBC # FLD AUTO: 9.17 K/UL — SIGNIFICANT CHANGE UP (ref 3.8–10.5)

## 2022-07-04 PROCEDURE — 74177 CT ABD & PELVIS W/CONTRAST: CPT | Mod: 26

## 2022-07-04 PROCEDURE — 74018 RADEX ABDOMEN 1 VIEW: CPT | Mod: 26

## 2022-07-04 RX ORDER — OXYCODONE HYDROCHLORIDE 5 MG/1
10 TABLET ORAL EVERY 6 HOURS
Refills: 0 | Status: DISCONTINUED | OUTPATIENT
Start: 2022-07-04 | End: 2022-07-07

## 2022-07-04 RX ORDER — ACETAMINOPHEN 500 MG
1000 TABLET ORAL ONCE
Refills: 0 | Status: COMPLETED | OUTPATIENT
Start: 2022-07-04 | End: 2022-07-04

## 2022-07-04 RX ORDER — OXYCODONE HYDROCHLORIDE 5 MG/1
5 TABLET ORAL EVERY 4 HOURS
Refills: 0 | Status: DISCONTINUED | OUTPATIENT
Start: 2022-07-04 | End: 2022-07-07

## 2022-07-04 RX ORDER — SODIUM CHLORIDE 9 MG/ML
1000 INJECTION, SOLUTION INTRAVENOUS
Refills: 0 | Status: DISCONTINUED | OUTPATIENT
Start: 2022-07-04 | End: 2022-07-05

## 2022-07-04 RX ORDER — SODIUM CHLORIDE 9 MG/ML
1000 INJECTION, SOLUTION INTRAVENOUS
Refills: 0 | Status: DISCONTINUED | OUTPATIENT
Start: 2022-07-04 | End: 2022-07-04

## 2022-07-04 RX ORDER — ACETAMINOPHEN 500 MG
975 TABLET ORAL EVERY 6 HOURS
Refills: 0 | Status: DISCONTINUED | OUTPATIENT
Start: 2022-07-04 | End: 2022-07-07

## 2022-07-04 RX ADMIN — OXYCODONE HYDROCHLORIDE 5 MILLIGRAM(S): 5 TABLET ORAL at 21:55

## 2022-07-04 RX ADMIN — Medication 1000 MILLIGRAM(S): at 02:58

## 2022-07-04 RX ADMIN — Medication 1 TABLET(S): at 21:40

## 2022-07-04 RX ADMIN — ATORVASTATIN CALCIUM 10 MILLIGRAM(S): 80 TABLET, FILM COATED ORAL at 21:08

## 2022-07-04 RX ADMIN — Medication 400 MILLIGRAM(S): at 02:28

## 2022-07-04 RX ADMIN — OXANDROLONE 2.5 MILLIGRAM(S): 10 TABLET ORAL at 19:38

## 2022-07-04 RX ADMIN — ENOXAPARIN SODIUM 40 MILLIGRAM(S): 100 INJECTION SUBCUTANEOUS at 05:55

## 2022-07-04 RX ADMIN — CHLORHEXIDINE GLUCONATE 1 APPLICATION(S): 213 SOLUTION TOPICAL at 05:55

## 2022-07-04 RX ADMIN — Medication 1000 MILLIGRAM(S): at 16:25

## 2022-07-04 RX ADMIN — SODIUM CHLORIDE 125 MILLILITER(S): 9 INJECTION, SOLUTION INTRAVENOUS at 15:55

## 2022-07-04 RX ADMIN — PANTOPRAZOLE SODIUM 40 MILLIGRAM(S): 20 TABLET, DELAYED RELEASE ORAL at 13:20

## 2022-07-04 RX ADMIN — Medication 400 MILLIGRAM(S): at 15:55

## 2022-07-04 NOTE — PROGRESS NOTE ADULT - ASSESSMENT
Patient is a 67 year old female (speaks Romansh dialect) with a PMHx of HTN, HLD, left tibia surgery and lap kevan (2011 at [facility]) who presented with SBO 2/2 R ventral hernia containing a dilated, fecalized loop of small bowel.  Patient went to the OR for a diagnostic laparoscopy, ventral hernia repair - converted to open for repair of enterotomy on 6/15 course c/b fever and tachycardia requiring SICU transfer.  Repeat CT performed showing SBO with tp at distal ileum s/p RTOR exploratory laparotomy, small bowel resection and end ileostomy on 6/21. Recovering in SICU, NGT out, on regular diet with ostomy function. leukocytosis warranting imaging    PLAN:  - CT abd/pelvis w/contrast  - Regular diet as tolerated   - Monitor ostomy output +/+  - midline wound vac TIW changing  - VTE prophylaxis   - Encourage ambulation and OOB  - DVT PPx     B Team Surgery  [phone]   Patient is a 67 year old female (speaks Portuguese dialect) with a PMHx of HTN, HLD, left tibia surgery and lap kevan (2011 at [facility]) who presented with SBO 2/2 R ventral hernia containing a dilated, fecalized loop of small bowel.  Patient went to the OR for a diagnostic laparoscopy, ventral hernia repair - converted to open for repair of enterotomy on 6/15 course c/b fever and tachycardia requiring SICU transfer.  Repeat CT performed showing SBO with tp at distal ileum s/p RTOR exploratory laparotomy, small bowel resection and end ileostomy on 6/21, NGT out, on regular diet with ostomy function. leukocytosis  and chest pain warranting CT imaging.     PLAN:  - CT abd/pelvis w/contrast  - Regular diet as tolerated   - Monitor ostomy output +/+  - midline wound vac TIW changing  - VTE prophylaxis   - Encourage ambulation and OOB  - DVT PPx   - Coordinating vac paperwork and home PT with case management    B Team Surgery  [phone]   Patient is a 67 year old female (speaks Welsh dialect) with a PMHx of HTN, HLD, left tibia surgery and lap kevan (2011 at [facility]) who presented with SBO 2/2 R ventral hernia containing a dilated, fecalized loop of small bowel.  Patient went to the OR for a diagnostic laparoscopy, ventral hernia repair - converted to open for repair of enterotomy on 6/15 course c/b fever and tachycardia requiring SICU transfer.  Repeat CT performed showing SBO with tp at distal ileum s/p RTOR exploratory laparotomy, small bowel resection and end ileostomy on 6/21, NGT out, on regular diet with ostomy function. leukocytosis  and chest pain warranting CT imaging.     PLAN:  - CT abd/pelvis w/contrast  - Backed down to CLD w/ensures  - Monitor ostomy output +/+  - midline wound vac TIW changing  - VTE prophylaxis   - Encourage ambulation and OOB  - DVT PPx   - Coordinating vac paperwork and home PT with case management    B Team Surgery  [id number]

## 2022-07-04 NOTE — CHART NOTE - NSCHARTNOTEFT_GEN_A_CORE
Was called to the patient for 8/10 epigastric pain, dull, achy, non radiating. Pt denies nausea, or difficulty breathing at this time. On exam: Abd soft, tender in the epigastric region, mildly-distended. Ileostomy and incision were intact.

## 2022-07-05 LAB
ANION GAP SERPL CALC-SCNC: 12 MMOL/L — SIGNIFICANT CHANGE UP (ref 7–14)
BUN SERPL-MCNC: 21 MG/DL — SIGNIFICANT CHANGE UP (ref 7–23)
CALCIUM SERPL-MCNC: 8.7 MG/DL — SIGNIFICANT CHANGE UP (ref 8.4–10.5)
CHLORIDE SERPL-SCNC: 94 MMOL/L — LOW (ref 98–107)
CO2 SERPL-SCNC: 27 MMOL/L — SIGNIFICANT CHANGE UP (ref 22–31)
CREAT SERPL-MCNC: 0.84 MG/DL — SIGNIFICANT CHANGE UP (ref 0.5–1.3)
EGFR: 76 ML/MIN/1.73M2 — SIGNIFICANT CHANGE UP
GLUCOSE SERPL-MCNC: 115 MG/DL — HIGH (ref 70–99)
HCT VFR BLD CALC: 32.9 % — LOW (ref 34.5–45)
HGB BLD-MCNC: 10.4 G/DL — LOW (ref 11.5–15.5)
MAGNESIUM SERPL-MCNC: 1.7 MG/DL — SIGNIFICANT CHANGE UP (ref 1.6–2.6)
MCHC RBC-ENTMCNC: 25.7 PG — LOW (ref 27–34)
MCHC RBC-ENTMCNC: 31.6 GM/DL — LOW (ref 32–36)
MCV RBC AUTO: 81.2 FL — SIGNIFICANT CHANGE UP (ref 80–100)
NRBC # BLD: 0 /100 WBCS — SIGNIFICANT CHANGE UP
NRBC # FLD: 0 K/UL — SIGNIFICANT CHANGE UP
PHOSPHATE SERPL-MCNC: 3.6 MG/DL — SIGNIFICANT CHANGE UP (ref 2.5–4.5)
PLATELET # BLD AUTO: 283 K/UL — SIGNIFICANT CHANGE UP (ref 150–400)
POTASSIUM SERPL-MCNC: 3.5 MMOL/L — SIGNIFICANT CHANGE UP (ref 3.5–5.3)
POTASSIUM SERPL-SCNC: 3.5 MMOL/L — SIGNIFICANT CHANGE UP (ref 3.5–5.3)
RBC # BLD: 4.05 M/UL — SIGNIFICANT CHANGE UP (ref 3.8–5.2)
RBC # FLD: 15.2 % — HIGH (ref 10.3–14.5)
SODIUM SERPL-SCNC: 133 MMOL/L — LOW (ref 135–145)
WBC # BLD: 7.27 K/UL — SIGNIFICANT CHANGE UP (ref 3.8–10.5)
WBC # FLD AUTO: 7.27 K/UL — SIGNIFICANT CHANGE UP (ref 3.8–10.5)

## 2022-07-05 RX ORDER — MAGNESIUM SULFATE 500 MG/ML
1 VIAL (ML) INJECTION ONCE
Refills: 0 | Status: COMPLETED | OUTPATIENT
Start: 2022-07-05 | End: 2022-07-05

## 2022-07-05 RX ORDER — POTASSIUM CHLORIDE 20 MEQ
40 PACKET (EA) ORAL ONCE
Refills: 0 | Status: COMPLETED | OUTPATIENT
Start: 2022-07-05 | End: 2022-07-05

## 2022-07-05 RX ADMIN — Medication 100 GRAM(S): at 12:23

## 2022-07-05 RX ADMIN — ENOXAPARIN SODIUM 40 MILLIGRAM(S): 100 INJECTION SUBCUTANEOUS at 05:34

## 2022-07-05 RX ADMIN — ATORVASTATIN CALCIUM 10 MILLIGRAM(S): 80 TABLET, FILM COATED ORAL at 21:43

## 2022-07-05 RX ADMIN — PANTOPRAZOLE SODIUM 40 MILLIGRAM(S): 20 TABLET, DELAYED RELEASE ORAL at 12:23

## 2022-07-05 RX ADMIN — CHLORHEXIDINE GLUCONATE 1 APPLICATION(S): 213 SOLUTION TOPICAL at 06:54

## 2022-07-05 RX ADMIN — ONDANSETRON 4 MILLIGRAM(S): 8 TABLET, FILM COATED ORAL at 12:31

## 2022-07-05 RX ADMIN — Medication 40 MILLIEQUIVALENT(S): at 12:23

## 2022-07-05 RX ADMIN — OXANDROLONE 2.5 MILLIGRAM(S): 10 TABLET ORAL at 05:33

## 2022-07-05 NOTE — PROGRESS NOTE ADULT - ATTENDING COMMENTS
patient resting comfortably    answered son's questions at bedside    anticipate d/c home soon with VNS    ostomy reversal in several months time once midline wound healed, patient able to ambulate independently and other evidence of patient entering anabolic phase of recovery

## 2022-07-05 NOTE — PROGRESS NOTE ADULT - SUBJECTIVE AND OBJECTIVE BOX
Overnight events: One episode of breakthrough pain, 5mg oxycodone PO given    SUBJECTIVE: Pt seen and examined at bedside.       OBJECTIVE:  Vital Signs Last 24 Hrs  T(C): 36.7 (04 Jul 2022 20:56), Max: 37.2 (04 Jul 2022 01:50)  T(F): 98.1 (04 Jul 2022 20:56), Max: 99 (04 Jul 2022 01:50)  HR: 81 (04 Jul 2022 20:56) (81 - 90)  BP: 115/68 (04 Jul 2022 20:56) (111/67 - 135/81)  BP(mean): --  RR: 16 (04 Jul 2022 20:56) (16 - 18)  SpO2: 98% (04 Jul 2022 20:56) (96% - 99%)      07-03-22 @ 07:01  -  07-04-22 @ 07:00  --------------------------------------------------------  IN: 0 mL / OUT: 1375 mL / NET: -1375 mL    07-04-22 @ 07:01  -  07-05-22 @ 00:46  --------------------------------------------------------  IN: 2095 mL / OUT: 570 mL / NET: 1525 mL        Physical Examination:  General: Appears well, NAD  CHEST: breathing comfortably  CV: appears well perfused  Abdomen: soft, nondistended, non tender, ostomy with stool, wound vac c/d/i  Extremities: Grossly symmetric    LABS:                        11.6   9.17  )-----------( 380      ( 04 Jul 2022 07:25 )             34.6       07-04    131<L>  |  92<L>  |  30<H>  ----------------------------<  98  3.8   |  25  |  1.10    Ca    9.1      04 Jul 2022 07:25  Phos  4.2     07-04  Mg     1.90     07-04

## 2022-07-05 NOTE — PROGRESS NOTE ADULT - ASSESSMENT
Patient is a 67 year old female (speaks Syriac dialect) with a PMHx of HTN, HLD, left tibia surgery and lap kevan (2011 at [facility]) who presented with SBO 2/2 R ventral hernia containing a dilated, fecalized loop of small bowel.  Patient went to the OR for a diagnostic laparoscopy, ventral hernia repair - converted to open for repair of enterotomy on 6/15 course c/b fever and tachycardia requiring SICU transfer.  Repeat CT performed showing SBO with tp at distal ileum s/p RTOR exploratory laparotomy, small bowel resection and end ileostomy on 6/21, NGT out, on regular diet with ostomy function. leukocytosis  and chest pain warranting CT imaging.     PLAN:  - CT abd/pelvis w/contrast  - Backed down to CLD w/ensures  - Monitor ostomy output +/+  - midline wound vac TIW changing  - VTE prophylaxis   - Encourage ambulation and OOB  - DVT PPx   - Coordinating vac paperwork and home PT with case management    B Team Surgery  [phone]   Patient is a 67 year old female (speaks Romanian dialect) with a PMHx of HTN, HLD, left tibia surgery and lap kevan (2011 at [facility]) who presented with SBO 2/2 R ventral hernia containing a dilated, fecalized loop of small bowel.  Patient went to the OR for a diagnostic laparoscopy, ventral hernia repair - converted to open for repair of enterotomy on 6/15 course c/b fever and tachycardia requiring SICU transfer.  Repeat CT performed showing SBO with tp at distal ileum s/p RTOR exploratory laparotomy, small bowel resection and end ileostomy on 6/21, NGT out, on regular diet with ostomy function. leukocytosis  and chest pain warranting CT imaging.     PLAN:  -Regular Diet   -CT A/P without abscess   - Monitor ostomy output +/+  - midline wound vac ; can change to wet to dry dressing   - VTE prophylaxis   - Encourage ambulation and OOB  - DVT PPx   - DISPO: home with VNS/Home PT     B Team Surgery  [phone]

## 2022-07-06 ENCOUNTER — TRANSCRIPTION ENCOUNTER (OUTPATIENT)
Age: 67
End: 2022-07-06

## 2022-07-06 VITALS
RESPIRATION RATE: 16 BRPM | TEMPERATURE: 98 F | OXYGEN SATURATION: 96 % | HEART RATE: 86 BPM | SYSTOLIC BLOOD PRESSURE: 125 MMHG | DIASTOLIC BLOOD PRESSURE: 74 MMHG

## 2022-07-06 RX ORDER — LOSARTAN POTASSIUM 100 MG/1
1 TABLET, FILM COATED ORAL
Qty: 0 | Refills: 0 | DISCHARGE
Start: 2022-07-06

## 2022-07-06 RX ORDER — POTASSIUM CHLORIDE 20 MEQ
40 PACKET (EA) ORAL ONCE
Refills: 0 | Status: DISCONTINUED | OUTPATIENT
Start: 2022-07-06 | End: 2022-07-06

## 2022-07-06 RX ORDER — AMLODIPINE BESYLATE 2.5 MG/1
1 TABLET ORAL
Qty: 0 | Refills: 0 | DISCHARGE
Start: 2022-07-06

## 2022-07-06 RX ORDER — MAGNESIUM SULFATE 500 MG/ML
1 VIAL (ML) INJECTION ONCE
Refills: 0 | Status: DISCONTINUED | OUTPATIENT
Start: 2022-07-06 | End: 2022-07-06

## 2022-07-06 RX ADMIN — OXYCODONE HYDROCHLORIDE 5 MILLIGRAM(S): 5 TABLET ORAL at 17:42

## 2022-07-06 RX ADMIN — CHLORHEXIDINE GLUCONATE 1 APPLICATION(S): 213 SOLUTION TOPICAL at 11:58

## 2022-07-06 RX ADMIN — ATORVASTATIN CALCIUM 10 MILLIGRAM(S): 80 TABLET, FILM COATED ORAL at 20:48

## 2022-07-06 RX ADMIN — OXYCODONE HYDROCHLORIDE 5 MILLIGRAM(S): 5 TABLET ORAL at 10:51

## 2022-07-06 RX ADMIN — OXYCODONE HYDROCHLORIDE 5 MILLIGRAM(S): 5 TABLET ORAL at 10:21

## 2022-07-06 RX ADMIN — OXYCODONE HYDROCHLORIDE 5 MILLIGRAM(S): 5 TABLET ORAL at 18:12

## 2022-07-06 RX ADMIN — ENOXAPARIN SODIUM 40 MILLIGRAM(S): 100 INJECTION SUBCUTANEOUS at 05:43

## 2022-07-06 NOTE — DISCHARGE NOTE NURSING/CASE MANAGEMENT/SOCIAL WORK - NSSCNAMETXT_GEN_ALL_CORE
You were referred to [facility] for home care services for ileostomy and wound management. The visiting RN will call your daughter to schedule a visit.

## 2022-07-06 NOTE — DISCHARGE NOTE NURSING/CASE MANAGEMENT/SOCIAL WORK - PATIENT PORTAL LINK FT
You can access the FollowMyHealth Patient Portal offered by [facility] by registering at the following website: [email or web address]. By joining [employer]’s FollowMyHealth portal, you will also be able to view your health information using other applications (apps) compatible with our system.

## 2022-07-06 NOTE — ADVANCED PRACTICE NURSE CONSULT - ASSESSMENT
Notified by primary RN that daughter Chu will practice ostomy bag change with her and that she will be provided supplies to take home. Ifhat educated on how to utilize crusting technique (apply stoma powder to impaired skin, brush off excess, seal with liquid barrier) and provided supplies to take home. Ifhat verbalized understanding of indications for crusting technique. Ifhat provided [facility] Ostomy clinic number for any follow up questions.   
Patient AxOx4, her daughter Ifhat at bedside, ready and willing to learn. Daughter actively participating in pouch change. Overview of surgical procedure and need of ostomy reinforced. Terms defined utilized: Ostomy, Ileostomy, wafer, pouch, stoma. Frequency of pouch change and emptying reinforced, teach back method utilized. Stool consistency with ileostomy reinforced. Importance of hydration reinforced. IFhat actively participated in removal of Ileostomy pouch using pull and push technique, cleansed skin with water, patted dry. Reinforced with daughter how to assess stoma viability and peristomal skin. Treating of irritated peristomal skin with stoma powder and liquid barrier film demonstrated, Ifhat verbalized understanding. Patient's daughter actively participated in stoma measurement, creating template, cutting wafer, and applying pouch. Reviewed s/s to report to MD. Patient is currently on a high output bag connected to drainage, Daughter informed that once the patient resumes and tolerates a regular diet, the stool consistency will change and the bag will be changed to a drainable bag. Ifhat verbalized her understanding.     Stoma size: 1 1/2" x 1 1/8". See A&I Flowsheet for full assessment, MD Mekhi Yang ([id number]) made aware of assessment. 
Patient alert, on mechanical ventilation via ETT tube. Daughter ready and willing to learn. Actively participating in pouch change. Overview of surgical procedure and need of ostomy reinforced. Terms defined utilized: Ostomy, Ileostomy, wafer, pouch, stoma. Frequency of pouch change and emptying discussed, teach back method utilized. Stool consistency with ileostomy reviewed. Importance of hydration reinforced. Daughter able to show back how to open, empty and close pouch. Removed Ileostomy pouch using pull and push technique, cleansed skin with water, patted dry. Educated on how to properly assess stoma viability and peristomal skin. Actively participated in stoma measurement, creating template, cutting wafer, applying barrier ring (to protect peristomal skin from enzymatic irritation), applying pouch.  Showering with a pouch discussed.  Informed that visiting nurse would follow up after discharge to set up account for supplies. Questions answered.     RUQ ileostomy- stoma size: 1 3/8" See A&I flowsheet for full stoma assessment details.     Midline abdomen- See A&I flowsheet for full wound assessment details.

## 2022-07-06 NOTE — PROVIDER CONTACT NOTE (OTHER) - ASSESSMENT
. Vitals otherwise stable. Patient exhibiting restlessness.
V/S stable, complaints of nausea
pt denies burning/pain on urination, denies light headedness or signs of bleeding

## 2022-07-06 NOTE — ADVANCED PRACTICE NURSE CONSULT - REASON FOR CONSULT
Patient seen for ileostomy followup. Notified by primary RN Belen Hernandez that patient is to be discharged home today, and daughter Chu will practice ostomy bag change with her and that she will be provided ostomy bags to take home.   
Patient seen for NPWT application and initial ileostomy teaching with daughter, Luis Manuel at bedside. 
Patient seen for ostomy follow up and wound vac change.

## 2022-07-06 NOTE — PROGRESS NOTE ADULT - ATTENDING SUPERVISION STATEMENT
Resident
Resident/Fellow/Student
Resident

## 2022-07-06 NOTE — ADVANCED PRACTICE NURSE CONSULT - RECOMMEDATIONS
Plan for discharge home today.     Please contact Wound/Ostomy Care Service Line if we can be of further assistance (ext [phone]). 
Patient to be discharged home once medically stable. Daughter Ifhat made aware of plan of care. Primary RN Wilber at bedside.     Ostomy team will continue to follow.     Please contact Wound/Ostomy Care Service Line if we can be of further assistance (ext [phone]). 
Pouches recommended:   Skin barrier ring- item # [id number]  One piece drainable pouch- item #[id number]    Midline abdomen- WOCN team will continue to monitor M,W,F for NPWT application.     Please contact Wound/Ostomy Care Service Line if we can be of further assistance (ext [phone]).

## 2022-07-06 NOTE — PROGRESS NOTE ADULT - ASSESSMENT
Patient is a 67 year old female (speaks Occitan dialect) with a PMHx of HTN, HLD, left tibia surgery and lap kevan (2011 at [facility]) who presented with SBO 2/2 R ventral hernia containing a dilated, fecalized loop of small bowel.  Patient went to the OR for a diagnostic laparoscopy, ventral hernia repair - converted to open for repair of enterotomy on 6/15 course c/b fever and tachycardia requiring SICU transfer.  Repeat CT performed showing SBO with tp at distal ileum s/p RTOR exploratory laparotomy, small bowel resection and end ileostomy on 6/21, NGT out, on regular diet with ostomy function. leukocytosis  and chest pain warranting CT imaging.     PLAN:  -Regular Diet   -CT A/P without abscess   - Monitor ostomy output +/+  - midline wound vac ; can change to wet to dry dressing   - VTE prophylaxis   - Encourage ambulation and OOB  - DVT PPx   - DISPO: home with VNS/Home PT     B Team Surgery  [phone]     Patient is a 67 year old female (speaks Korean dialect) with a PMHx of HTN, HLD, left tibia surgery and lap kevan (2011 at [facility]) who presented with SBO 2/2 R ventral hernia containing a dilated, fecalized loop of small bowel.  Patient went to the OR for a diagnostic laparoscopy, ventral hernia repair - converted to open for repair of enterotomy on 6/15 course c/b fever and tachycardia requiring SICU transfer.  Repeat CT performed showing SBO with tp at distal ileum s/p RTOR exploratory laparotomy, small bowel resection and end ileostomy on 6/21, NGT out, on regular diet with ostomy function. leukocytosis  and chest pain warranting CT imaging. Pt stayed overnight 7/5 awaiting her daughter to arrive and receive teaching for wet/dry dressing change for pts wound.    PLAN:  -Regular Diet   -CT A/P without abscess   - Monitor ostomy output +/+  - midline wound vac ; can change to wet to dry dressing   - VTE prophylaxis   - Encourage ambulation and OOB  - DVT PPx   - DISPO: home with VNS/Home PT     B Team Surgery  [phone]

## 2022-07-06 NOTE — PROGRESS NOTE ADULT - PROVIDER SPECIALTY LIST ADULT
Anesthesia
SICU
Surgery
SICU
Surgery
SICU
Surgery

## 2022-07-06 NOTE — DISCHARGE NOTE NURSING/CASE MANAGEMENT/SOCIAL WORK - NSDCPEFALRISK_GEN_ALL_CORE
For information on Fall & Injury Prevention, visit: [email or web address] OR  [email or web address] [email or web address] OR  https://www.cdc.gov/steadi/patient.html

## 2022-07-06 NOTE — DISCHARGE NOTE NURSING/CASE MANAGEMENT/SOCIAL WORK - NSDCCRNAME_GEN_ALL_CORE_FT
[facility] transportation was arranged to transport you home upon discharge.  [facility] transportation was arranged to transport you home upon discharge.

## 2022-07-06 NOTE — PROGRESS NOTE ADULT - ATTENDING COMMENTS
Patient overnight no issues. Patient tolerating diet, wound care with wet to dry dressings, ileostomy functioning.  Plan  discharge planning with rehab and vns for wet to dry dressing  f/u as outpatient    I have personally interviewed and examined this patient, reviewed pertinent labs and imaging, and discussed the case with colleagues, residents, and physician assistants on B Team rounds.    The active care issues are:  1. s/p ileostomy, midline wound    The Acute Care Surgery (B Team) Attending Group Practice:  Dr. Symone Galeano, Dr. Bernard Hogue, Dr. Diony Kaba, Dr. Fidel Ochoa,     urgent issues - spectra [phone]  nonurgent issues - [phone]  patient appointments or afterhours - [phone]

## 2022-07-06 NOTE — PROGRESS NOTE ADULT - REASON FOR ADMISSION
Incarcerated ventral hernia

## 2022-07-06 NOTE — PROVIDER CONTACT NOTE (OTHER) - ACTION/TREATMENT ORDERED:
MD made aware.  MD will come and visit patient
Wean off O2, give Zofran
MD made aware. Will administer pain medication per eMAR. No further interventions at this time.

## 2022-07-06 NOTE — PROGRESS NOTE ADULT - SUBJECTIVE AND OBJECTIVE BOX
Overnight events: None    SUBJECTIVE: Pt seen and examined at bedside.       OBJECTIVE:  Vital Signs Last 24 Hrs  T(C): 36.7 (05 Jul 2022 21:04), Max: 37.4 (05 Jul 2022 14:15)  T(F): 98 (05 Jul 2022 21:04), Max: 99.3 (05 Jul 2022 14:15)  HR: 85 (05 Jul 2022 21:04) (77 - 86)  BP: 109/71 (05 Jul 2022 21:04) (109/71 - 123/64)  BP(mean): --  RR: 18 (05 Jul 2022 21:04) (16 - 18)  SpO2: 98% (05 Jul 2022 21:04) (95% - 98%)      07-04-22 @ 07:01  -  07-05-22 @ 07:00  --------------------------------------------------------  IN: 2935 mL / OUT: 925 mL / NET: 2010 mL    07-05-22 @ 07:01  -  07-06-22 @ 00:42  --------------------------------------------------------  IN: 520 mL / OUT: 1150 mL / NET: -630 mL        Physical Examination:  General: Appears well, NAD  CHEST: breathing comfortably  CV: appears well perfused  Abdomen: soft, nondistended, non tender, ostomy with stool, wound vac c/d/i  Extremities: Grossly symmetric        LABS:                        10.4   7.27  )-----------( 283      ( 05 Jul 2022 06:08 )             32.9       07-05    133<L>  |  94<L>  |  21  ----------------------------<  115<H>  3.5   |  27  |  0.84    Ca    8.7      05 Jul 2022 06:08  Phos  3.6     07-05  Mg     1.70     07-05           Overnight events: None    SUBJECTIVE: Pt seen and examined at bedside.       OBJECTIVE:  Physical Examination:  General: Appears well, NAD  CHEST: breathing comfortably  CV: appears well perfused  Abdomen: soft, nondistended, non tender, ostomy with stool, wound vac c/d/i  Extremities: Grossly symmetric        T(C): 36.7 (07-06-22 @ 09:25), Max: 37.4 (07-05-22 @ 14:15)  HR: 77 (07-06-22 @ 09:25) (77 - 88)  BP: 136/66 (07-06-22 @ 09:25) (109/71 - 136/66)  RR: 18 (07-06-22 @ 09:25) (16 - 18)  SpO2: 98% (07-06-22 @ 09:25) (97% - 99%)    I&O's Summary    05 Jul 2022 07:01  -  06 Jul 2022 07:00  --------------------------------------------------------  IN: 760 mL / OUT: 1425 mL / NET: -665 mL      I&O's Detail    05 Jul 2022 07:01  -  06 Jul 2022 07:00  --------------------------------------------------------  IN:    dextrose 5% + lactated ringers: 300 mL    Oral Fluid: 460 mL  Total IN: 760 mL    OUT:    Stool (mL): 725 mL    VAC (Vacuum Assisted Closure) System (mL): 0 mL    Voided (mL): 700 mL  Total OUT: 1425 mL    Total NET: -665 mL            LABS:                        10.4   7.27  )-----------( 283      ( 05 Jul 2022 06:08 )             32.9     07-05    133<L>  |  94<L>  |  21  ----------------------------<  115<H>  3.5   |  27  |  0.84    Ca    8.7      05 Jul 2022 06:08  Phos  3.6     07-05  Mg     1.70     07-05            MEDICATIONS  (STANDING):  atorvastatin 10 milliGRAM(s) Oral at bedtime  chlorhexidine 4% Liquid 1 Application(s) Topical <User Schedule>  enoxaparin Injectable 40 milliGRAM(s) SubCutaneous every 24 hours    MEDICATIONS  (PRN):  acetaminophen     Tablet .. 975 milliGRAM(s) Oral every 6 hours PRN Mild Pain (1 - 3)  calcium carbonate    500 mG (Tums) Chewable 1 Tablet(s) Chew every 8 hours PRN Gas  lidocaine   4% Patch 1 Patch Transdermal daily PRN pain  metoclopramide Injectable 10 milliGRAM(s) IV Push every 8 hours PRN Nausea (second line to zofran)  ondansetron Injectable 4 milliGRAM(s) IV Push every 6 hours PRN Nausea and/or Vomiting  oxyCODONE    IR 5 milliGRAM(s) Oral every 4 hours PRN Moderate Pain (4 - 6)  oxyCODONE    IR 10 milliGRAM(s) Oral every 6 hours PRN Severe Pain (7 - 10)  sodium chloride 0.9% lock flush 10 milliLiter(s) IV Push every 1 hour PRN Pre/post blood products, medications, blood draw, and to maintain line patency

## 2022-07-20 DIAGNOSIS — K56.600 PARTIAL INTESTINAL OBSTRUCTION, UNSPECIFIED AS TO CAUSE: ICD-10-CM

## 2022-07-20 RX ORDER — ONDANSETRON 4 MG/1
4 TABLET ORAL EVERY 6 HOURS
Qty: 20 | Refills: 0 | Status: COMPLETED | COMMUNITY
Start: 2022-07-20

## 2022-07-21 ENCOUNTER — APPOINTMENT (OUTPATIENT)
Dept: ORTHOPEDIC SURGERY | Facility: CLINIC | Age: 67
End: 2022-07-21

## 2022-07-25 ENCOUNTER — INPATIENT (INPATIENT)
Facility: HOSPITAL | Age: 67
LOS: 3 days | Discharge: HOME HEALTH | End: 2022-07-29
Attending: INTERNAL MEDICINE | Admitting: INTERNAL MEDICINE

## 2022-07-25 VITALS
HEART RATE: 95 BPM | OXYGEN SATURATION: 98 % | RESPIRATION RATE: 18 BRPM | TEMPERATURE: 98 F | SYSTOLIC BLOOD PRESSURE: 124 MMHG | HEIGHT: 59 IN | DIASTOLIC BLOOD PRESSURE: 108 MMHG

## 2022-07-25 DIAGNOSIS — E87.2 ACIDOSIS: ICD-10-CM

## 2022-07-25 DIAGNOSIS — E87.5 HYPERKALEMIA: ICD-10-CM

## 2022-07-25 DIAGNOSIS — N17.9 ACUTE KIDNEY FAILURE, UNSPECIFIED: ICD-10-CM

## 2022-07-25 DIAGNOSIS — Z90.49 ACQUIRED ABSENCE OF OTHER SPECIFIED PARTS OF DIGESTIVE TRACT: Chronic | ICD-10-CM

## 2022-07-25 DIAGNOSIS — Z98.890 OTHER SPECIFIED POSTPROCEDURAL STATES: Chronic | ICD-10-CM

## 2022-07-25 LAB
ALBUMIN SERPL ELPH-MCNC: 3.2 G/DL — LOW (ref 3.3–5)
ALBUMIN SERPL ELPH-MCNC: 3.8 G/DL — SIGNIFICANT CHANGE UP (ref 3.3–5)
ALBUMIN SERPL ELPH-MCNC: 4.1 G/DL — SIGNIFICANT CHANGE UP (ref 3.3–5)
ALP SERPL-CCNC: 148 U/L — HIGH (ref 40–120)
ALP SERPL-CCNC: 176 U/L — HIGH (ref 40–120)
ALP SERPL-CCNC: 182 U/L — HIGH (ref 40–120)
ALT FLD-CCNC: 25 U/L — SIGNIFICANT CHANGE UP (ref 4–33)
ALT FLD-CCNC: 26 U/L — SIGNIFICANT CHANGE UP (ref 4–33)
ALT FLD-CCNC: 27 U/L — SIGNIFICANT CHANGE UP (ref 4–33)
ANION GAP SERPL CALC-SCNC: 33 MMOL/L — HIGH (ref 7–14)
ANION GAP SERPL CALC-SCNC: 35 MMOL/L — HIGH (ref 7–14)
ANION GAP SERPL CALC-SCNC: 35 MMOL/L — HIGH (ref 7–14)
APPEARANCE UR: ABNORMAL
APTT BLD: 31.1 SEC — SIGNIFICANT CHANGE UP (ref 27–36.3)
AST SERPL-CCNC: 27 U/L — SIGNIFICANT CHANGE UP (ref 4–32)
AST SERPL-CCNC: 28 U/L — SIGNIFICANT CHANGE UP (ref 4–32)
AST SERPL-CCNC: 35 U/L — HIGH (ref 4–32)
BACTERIA # UR AUTO: ABNORMAL
BASE EXCESS BLDV CALC-SCNC: -3.2 MMOL/L — LOW (ref -2–3)
BASOPHILS # BLD AUTO: 0.01 K/UL — SIGNIFICANT CHANGE UP (ref 0–0.2)
BASOPHILS # BLD AUTO: 0.01 K/UL — SIGNIFICANT CHANGE UP (ref 0–0.2)
BASOPHILS NFR BLD AUTO: 0.1 % — SIGNIFICANT CHANGE UP (ref 0–2)
BASOPHILS NFR BLD AUTO: 0.1 % — SIGNIFICANT CHANGE UP (ref 0–2)
BILIRUB DIRECT SERPL-MCNC: <0.2 MG/DL — SIGNIFICANT CHANGE UP (ref 0–0.3)
BILIRUB INDIRECT FLD-MCNC: SIGNIFICANT CHANGE UP MG/DL (ref 0–1)
BILIRUB SERPL-MCNC: 0.7 MG/DL — SIGNIFICANT CHANGE UP (ref 0.2–1.2)
BILIRUB UR-MCNC: NEGATIVE — SIGNIFICANT CHANGE UP
BLD GP AB SCN SERPL QL: NEGATIVE — SIGNIFICANT CHANGE UP
BLOOD GAS ARTERIAL COMPREHENSIVE RESULT: SIGNIFICANT CHANGE UP
BLOOD GAS VENOUS COMPREHENSIVE RESULT: SIGNIFICANT CHANGE UP
BUN SERPL-MCNC: 164 MG/DL — HIGH (ref 7–23)
BUN SERPL-MCNC: 174 MG/DL — HIGH (ref 7–23)
BUN SERPL-MCNC: 184 MG/DL — HIGH (ref 7–23)
CALCIUM SERPL-MCNC: 8.3 MG/DL — LOW (ref 8.4–10.5)
CALCIUM SERPL-MCNC: 8.5 MG/DL — SIGNIFICANT CHANGE UP (ref 8.4–10.5)
CALCIUM SERPL-MCNC: 8.6 MG/DL — SIGNIFICANT CHANGE UP (ref 8.4–10.5)
CHLORIDE BLDV-SCNC: 73 MMOL/L — LOW (ref 96–108)
CHLORIDE SERPL-SCNC: 67 MMOL/L — LOW (ref 98–107)
CHLORIDE SERPL-SCNC: 68 MMOL/L — LOW (ref 98–107)
CHLORIDE SERPL-SCNC: 74 MMOL/L — LOW (ref 98–107)
CO2 BLDV-SCNC: 22.4 MMOL/L — SIGNIFICANT CHANGE UP (ref 22–26)
CO2 SERPL-SCNC: 15 MMOL/L — LOW (ref 22–31)
CO2 SERPL-SCNC: 16 MMOL/L — LOW (ref 22–31)
CO2 SERPL-SCNC: 19 MMOL/L — LOW (ref 22–31)
COLOR SPEC: ABNORMAL
CREAT SERPL-MCNC: 10.24 MG/DL — HIGH (ref 0.5–1.3)
CREAT SERPL-MCNC: 11.58 MG/DL — HIGH (ref 0.5–1.3)
CREAT SERPL-MCNC: 11.76 MG/DL — HIGH (ref 0.5–1.3)
DIFF PNL FLD: ABNORMAL
EGFR: 3 ML/MIN/1.73M2 — LOW
EGFR: 3 ML/MIN/1.73M2 — LOW
EGFR: 4 ML/MIN/1.73M2 — LOW
EOSINOPHIL # BLD AUTO: 0 K/UL — SIGNIFICANT CHANGE UP (ref 0–0.5)
EOSINOPHIL # BLD AUTO: 0 K/UL — SIGNIFICANT CHANGE UP (ref 0–0.5)
EOSINOPHIL NFR BLD AUTO: 0 % — SIGNIFICANT CHANGE UP (ref 0–6)
EOSINOPHIL NFR BLD AUTO: 0 % — SIGNIFICANT CHANGE UP (ref 0–6)
EPI CELLS # UR: >27 /HPF — HIGH (ref 0–5)
GAS PNL BLDV: 114 MMOL/L — CRITICAL LOW (ref 136–145)
GAS PNL BLDV: SIGNIFICANT CHANGE UP
GLUCOSE BLDC GLUCOMTR-MCNC: 121 MG/DL — HIGH (ref 70–99)
GLUCOSE BLDC GLUCOMTR-MCNC: 76 MG/DL — SIGNIFICANT CHANGE UP (ref 70–99)
GLUCOSE BLDV-MCNC: 98 MG/DL — SIGNIFICANT CHANGE UP (ref 70–99)
GLUCOSE SERPL-MCNC: 134 MG/DL — HIGH (ref 70–99)
GLUCOSE SERPL-MCNC: 71 MG/DL — SIGNIFICANT CHANGE UP (ref 70–99)
GLUCOSE SERPL-MCNC: 99 MG/DL — SIGNIFICANT CHANGE UP (ref 70–99)
GLUCOSE UR QL: NEGATIVE — SIGNIFICANT CHANGE UP
HCO3 BLDV-SCNC: 21 MMOL/L — LOW (ref 22–29)
HCT VFR BLD CALC: 34.6 % — SIGNIFICANT CHANGE UP (ref 34.5–45)
HCT VFR BLD CALC: 38.9 % — SIGNIFICANT CHANGE UP (ref 34.5–45)
HCT VFR BLD CALC: 40.7 % — SIGNIFICANT CHANGE UP (ref 34.5–45)
HCT VFR BLDA CALC: 49 % — HIGH (ref 34.5–46.5)
HGB BLD CALC-MCNC: 16.4 G/DL — HIGH (ref 11.5–15.5)
HGB BLD-MCNC: 11.9 G/DL — SIGNIFICANT CHANGE UP (ref 11.5–15.5)
HGB BLD-MCNC: 13.4 G/DL — SIGNIFICANT CHANGE UP (ref 11.5–15.5)
HGB BLD-MCNC: 13.8 G/DL — SIGNIFICANT CHANGE UP (ref 11.5–15.5)
HYALINE CASTS # UR AUTO: 0 /LPF — SIGNIFICANT CHANGE UP (ref 0–7)
IANC: 8.62 K/UL — HIGH (ref 1.8–7.4)
IANC: 8.87 K/UL — HIGH (ref 1.8–7.4)
IMM GRANULOCYTES NFR BLD AUTO: 0.6 % — SIGNIFICANT CHANGE UP (ref 0–1.5)
IMM GRANULOCYTES NFR BLD AUTO: 0.8 % — SIGNIFICANT CHANGE UP (ref 0–1.5)
INR BLD: 1.17 RATIO — HIGH (ref 0.88–1.16)
KETONES UR-MCNC: NEGATIVE — SIGNIFICANT CHANGE UP
LACTATE BLDV-MCNC: 3.3 MMOL/L — HIGH (ref 0.5–2)
LEUKOCYTE ESTERASE UR-ACNC: ABNORMAL
LIDOCAIN IGE QN: 423 U/L — HIGH (ref 7–60)
LYMPHOCYTES # BLD AUTO: 0.76 K/UL — LOW (ref 1–3.3)
LYMPHOCYTES # BLD AUTO: 1.12 K/UL — SIGNIFICANT CHANGE UP (ref 1–3.3)
LYMPHOCYTES # BLD AUTO: 10.5 % — LOW (ref 13–44)
LYMPHOCYTES # BLD AUTO: 7.5 % — LOW (ref 13–44)
MAGNESIUM SERPL-MCNC: 1.5 MG/DL — LOW (ref 1.6–2.6)
MAGNESIUM SERPL-MCNC: 1.7 MG/DL — SIGNIFICANT CHANGE UP (ref 1.6–2.6)
MCHC RBC-ENTMCNC: 25.6 PG — LOW (ref 27–34)
MCHC RBC-ENTMCNC: 26.3 PG — LOW (ref 27–34)
MCHC RBC-ENTMCNC: 26.6 PG — LOW (ref 27–34)
MCHC RBC-ENTMCNC: 32.9 GM/DL — SIGNIFICANT CHANGE UP (ref 32–36)
MCHC RBC-ENTMCNC: 34.4 GM/DL — SIGNIFICANT CHANGE UP (ref 32–36)
MCHC RBC-ENTMCNC: 35.5 GM/DL — SIGNIFICANT CHANGE UP (ref 32–36)
MCV RBC AUTO: 75 FL — LOW (ref 80–100)
MCV RBC AUTO: 76.4 FL — LOW (ref 80–100)
MCV RBC AUTO: 77.7 FL — LOW (ref 80–100)
MONOCYTES # BLD AUTO: 0.56 K/UL — SIGNIFICANT CHANGE UP (ref 0–0.9)
MONOCYTES # BLD AUTO: 0.73 K/UL — SIGNIFICANT CHANGE UP (ref 0–0.9)
MONOCYTES NFR BLD AUTO: 5.3 % — SIGNIFICANT CHANGE UP (ref 2–14)
MONOCYTES NFR BLD AUTO: 7.2 % — SIGNIFICANT CHANGE UP (ref 2–14)
NEUTROPHILS # BLD AUTO: 8.62 K/UL — HIGH (ref 1.8–7.4)
NEUTROPHILS # BLD AUTO: 8.87 K/UL — HIGH (ref 1.8–7.4)
NEUTROPHILS NFR BLD AUTO: 83.3 % — HIGH (ref 43–77)
NEUTROPHILS NFR BLD AUTO: 84.6 % — HIGH (ref 43–77)
NITRITE UR-MCNC: NEGATIVE — SIGNIFICANT CHANGE UP
NRBC # BLD: 0 /100 WBCS — SIGNIFICANT CHANGE UP
NRBC # FLD: 0 K/UL — SIGNIFICANT CHANGE UP
OSMOLALITY SERPL: 328 MOSM/KG — HIGH (ref 275–295)
PCO2 BLDV: 36 MMHG — LOW (ref 39–42)
PH BLDV: 7.38 — SIGNIFICANT CHANGE UP (ref 7.32–7.43)
PH UR: 7 — SIGNIFICANT CHANGE UP (ref 5–8)
PHOSPHATE SERPL-MCNC: 15.9 MG/DL — HIGH (ref 2.5–4.5)
PHOSPHATE SERPL-MCNC: 18.7 MG/DL — HIGH (ref 2.5–4.5)
PLATELET # BLD AUTO: 398 K/UL — SIGNIFICANT CHANGE UP (ref 150–400)
PLATELET # BLD AUTO: 447 K/UL — HIGH (ref 150–400)
PLATELET # BLD AUTO: 497 K/UL — HIGH (ref 150–400)
PO2 BLDV: 41 MMHG — SIGNIFICANT CHANGE UP
POTASSIUM BLDV-SCNC: 7.6 MMOL/L — CRITICAL HIGH (ref 3.5–5.1)
POTASSIUM SERPL-MCNC: 6.6 MMOL/L — CRITICAL HIGH (ref 3.5–5.3)
POTASSIUM SERPL-MCNC: 7.8 MMOL/L — CRITICAL HIGH (ref 3.5–5.3)
POTASSIUM SERPL-MCNC: 8 MMOL/L — CRITICAL HIGH (ref 3.5–5.3)
POTASSIUM SERPL-SCNC: 6.6 MMOL/L — CRITICAL HIGH (ref 3.5–5.3)
POTASSIUM SERPL-SCNC: 7.8 MMOL/L — CRITICAL HIGH (ref 3.5–5.3)
POTASSIUM SERPL-SCNC: 8 MMOL/L — CRITICAL HIGH (ref 3.5–5.3)
PROT SERPL-MCNC: 7.4 G/DL — SIGNIFICANT CHANGE UP (ref 6–8.3)
PROT SERPL-MCNC: 9.2 G/DL — HIGH (ref 6–8.3)
PROT SERPL-MCNC: 9.3 G/DL — HIGH (ref 6–8.3)
PROT UR-MCNC: ABNORMAL
PROTHROM AB SERPL-ACNC: 13.6 SEC — HIGH (ref 10.5–13.4)
RBC # BLD: 4.53 M/UL — SIGNIFICANT CHANGE UP (ref 3.8–5.2)
RBC # BLD: 5.19 M/UL — SIGNIFICANT CHANGE UP (ref 3.8–5.2)
RBC # BLD: 5.24 M/UL — HIGH (ref 3.8–5.2)
RBC # FLD: 16.3 % — HIGH (ref 10.3–14.5)
RBC # FLD: 16.3 % — HIGH (ref 10.3–14.5)
RBC # FLD: 16.7 % — HIGH (ref 10.3–14.5)
RBC CASTS # UR COMP ASSIST: 17 /HPF — HIGH (ref 0–4)
RH IG SCN BLD-IMP: POSITIVE — SIGNIFICANT CHANGE UP
SAO2 % BLDV: 49.1 % — SIGNIFICANT CHANGE UP
SARS-COV-2 RNA SPEC QL NAA+PROBE: SIGNIFICANT CHANGE UP
SODIUM SERPL-SCNC: 117 MMOL/L — CRITICAL LOW (ref 135–145)
SODIUM SERPL-SCNC: 122 MMOL/L — LOW (ref 135–145)
SODIUM SERPL-SCNC: 123 MMOL/L — LOW (ref 135–145)
SP GR SPEC: 1.01 — SIGNIFICANT CHANGE UP (ref 1–1.05)
TRIGL SERPL-MCNC: 198 MG/DL — HIGH
TSH SERPL-MCNC: 3.55 UIU/ML — SIGNIFICANT CHANGE UP (ref 0.27–4.2)
UROBILINOGEN FLD QL: SIGNIFICANT CHANGE UP
WBC # BLD: 10.18 K/UL — SIGNIFICANT CHANGE UP (ref 3.8–10.5)
WBC # BLD: 10.62 K/UL — HIGH (ref 3.8–10.5)
WBC # BLD: 10.65 K/UL — HIGH (ref 3.8–10.5)
WBC # FLD AUTO: 10.18 K/UL — SIGNIFICANT CHANGE UP (ref 3.8–10.5)
WBC # FLD AUTO: 10.62 K/UL — HIGH (ref 3.8–10.5)
WBC # FLD AUTO: 10.65 K/UL — HIGH (ref 3.8–10.5)
WBC UR QL: >720 /HPF — HIGH (ref 0–5)

## 2022-07-25 PROCEDURE — 74018 RADEX ABDOMEN 1 VIEW: CPT | Mod: 26

## 2022-07-25 PROCEDURE — 99291 CRITICAL CARE FIRST HOUR: CPT | Mod: GC,25

## 2022-07-25 PROCEDURE — 99285 EMERGENCY DEPT VISIT HI MDM: CPT | Mod: 25

## 2022-07-25 PROCEDURE — 71045 X-RAY EXAM CHEST 1 VIEW: CPT | Mod: 26

## 2022-07-25 PROCEDURE — 36556 INSERT NON-TUNNEL CV CATH: CPT | Mod: GC

## 2022-07-25 PROCEDURE — 99222 1ST HOSP IP/OBS MODERATE 55: CPT | Mod: GC,24,25

## 2022-07-25 PROCEDURE — 36000 PLACE NEEDLE IN VEIN: CPT

## 2022-07-25 PROCEDURE — 36620 INSERTION CATHETER ARTERY: CPT | Mod: GC

## 2022-07-25 RX ORDER — DEXTROSE 50 % IN WATER 50 %
50 SYRINGE (ML) INTRAVENOUS ONCE
Refills: 0 | Status: COMPLETED | OUTPATIENT
Start: 2022-07-25 | End: 2022-07-25

## 2022-07-25 RX ORDER — INSULIN HUMAN 100 [IU]/ML
5 INJECTION, SOLUTION SUBCUTANEOUS ONCE
Refills: 0 | Status: COMPLETED | OUTPATIENT
Start: 2022-07-25 | End: 2022-07-25

## 2022-07-25 RX ORDER — ALTEPLASE 100 MG
2 KIT INTRAVENOUS ONCE
Refills: 0 | Status: COMPLETED | OUTPATIENT
Start: 2022-07-25 | End: 2022-07-25

## 2022-07-25 RX ORDER — CALCIUM GLUCONATE 100 MG/ML
1 VIAL (ML) INTRAVENOUS ONCE
Refills: 0 | Status: COMPLETED | OUTPATIENT
Start: 2022-07-25 | End: 2022-07-25

## 2022-07-25 RX ORDER — SODIUM CHLORIDE 9 MG/ML
10 INJECTION INTRAMUSCULAR; INTRAVENOUS; SUBCUTANEOUS
Refills: 0 | Status: DISCONTINUED | OUTPATIENT
Start: 2022-07-25 | End: 2022-07-26

## 2022-07-25 RX ORDER — ONDANSETRON 8 MG/1
4 TABLET, FILM COATED ORAL ONCE
Refills: 0 | Status: COMPLETED | OUTPATIENT
Start: 2022-07-25 | End: 2022-07-25

## 2022-07-25 RX ORDER — INSULIN HUMAN 100 [IU]/ML
10 INJECTION, SOLUTION SUBCUTANEOUS ONCE
Refills: 0 | Status: COMPLETED | OUTPATIENT
Start: 2022-07-25 | End: 2022-07-25

## 2022-07-25 RX ORDER — DEXTROSE 50 % IN WATER 50 %
25 SYRINGE (ML) INTRAVENOUS ONCE
Refills: 0 | Status: COMPLETED | OUTPATIENT
Start: 2022-07-25 | End: 2022-07-25

## 2022-07-25 RX ORDER — SODIUM CHLORIDE 9 MG/ML
2000 INJECTION, SOLUTION INTRAVENOUS ONCE
Refills: 0 | Status: COMPLETED | OUTPATIENT
Start: 2022-07-25 | End: 2022-07-25

## 2022-07-25 RX ORDER — FUROSEMIDE 40 MG
40 TABLET ORAL ONCE
Refills: 0 | Status: COMPLETED | OUTPATIENT
Start: 2022-07-25 | End: 2022-07-25

## 2022-07-25 RX ORDER — SODIUM ZIRCONIUM CYCLOSILICATE 10 G/10G
5 POWDER, FOR SUSPENSION ORAL ONCE
Refills: 0 | Status: DISCONTINUED | OUTPATIENT
Start: 2022-07-25 | End: 2022-07-25

## 2022-07-25 RX ORDER — CHLORHEXIDINE GLUCONATE 213 G/1000ML
1 SOLUTION TOPICAL
Refills: 0 | Status: DISCONTINUED | OUTPATIENT
Start: 2022-07-25 | End: 2022-07-28

## 2022-07-25 RX ORDER — HEPARIN SODIUM 5000 [USP'U]/ML
5000 INJECTION INTRAVENOUS; SUBCUTANEOUS EVERY 8 HOURS
Refills: 0 | Status: DISCONTINUED | OUTPATIENT
Start: 2022-07-25 | End: 2022-07-29

## 2022-07-25 RX ORDER — SODIUM CHLORIDE 9 MG/ML
1000 INJECTION INTRAMUSCULAR; INTRAVENOUS; SUBCUTANEOUS
Refills: 0 | Status: DISCONTINUED | OUTPATIENT
Start: 2022-07-25 | End: 2022-07-26

## 2022-07-25 RX ORDER — SODIUM BICARBONATE 1 MEQ/ML
0.12 SYRINGE (ML) INTRAVENOUS
Qty: 150 | Refills: 0 | Status: DISCONTINUED | OUTPATIENT
Start: 2022-07-25 | End: 2022-07-26

## 2022-07-25 RX ORDER — MAGNESIUM SULFATE 500 MG/ML
2 VIAL (ML) INJECTION ONCE
Refills: 0 | Status: COMPLETED | OUTPATIENT
Start: 2022-07-25 | End: 2022-07-25

## 2022-07-25 RX ADMIN — Medication 40 MILLIGRAM(S): at 19:33

## 2022-07-25 RX ADMIN — Medication 25 GRAM(S): at 19:21

## 2022-07-25 RX ADMIN — Medication 25 MILLILITER(S): at 22:44

## 2022-07-25 RX ADMIN — ONDANSETRON 4 MILLIGRAM(S): 8 TABLET, FILM COATED ORAL at 15:54

## 2022-07-25 RX ADMIN — INSULIN HUMAN 5 UNIT(S): 100 INJECTION, SOLUTION SUBCUTANEOUS at 19:32

## 2022-07-25 RX ADMIN — Medication 75 MEQ/KG/HR: at 23:22

## 2022-07-25 RX ADMIN — Medication 1 GRAM(S): at 19:17

## 2022-07-25 RX ADMIN — SODIUM CHLORIDE 2000 MILLILITER(S): 9 INJECTION, SOLUTION INTRAVENOUS at 15:54

## 2022-07-25 RX ADMIN — ALTEPLASE 2 MILLIGRAM(S): KIT at 23:12

## 2022-07-25 RX ADMIN — SODIUM CHLORIDE 2000 MILLILITER(S): 9 INJECTION, SOLUTION INTRAVENOUS at 16:54

## 2022-07-25 RX ADMIN — Medication 100 GRAM(S): at 18:47

## 2022-07-25 RX ADMIN — Medication 25 GRAM(S): at 23:22

## 2022-07-25 RX ADMIN — ALTEPLASE 2 MILLIGRAM(S): KIT at 23:13

## 2022-07-25 NOTE — ED PROVIDER NOTE - PHYSICAL EXAMINATION
GENERAL: Awake, alert, NAD  LUNGS: CTAB, no wheezes or crackles   CARDIAC: RRR, no m/r/g  ABDOMEN: Soft, , mild tenderness, distended  bowel sounds   NEURO: A&Ox3. Moving all extremities.  SKIN: Warm and dry. No rash.  PSYCH: Normal affect.

## 2022-07-25 NOTE — ED ADULT TRIAGE NOTE - CHIEF COMPLAINT QUOTE
Patient brought to ER by EMS from home for vomiting X 2, decreased urine output, lethargy, and she had abdominal surgery (hernia repair and re-entry for infection)2 weeks ago. Pt has a colostomy bag.

## 2022-07-25 NOTE — CONSULT NOTE ADULT - ATTENDING COMMENTS
Critical Care Dx     K56.609 SBO (small bowel obstruction)   N17.1 Acute renal failure with acute cortical necrosis   E87.2 Lactic acidosis   E87.5 Hyperkalemia   E87.1 Hyponatremia   E87.8 Hypochloremia      The patient is a critical care patient with life threatening hemodynamic and respiratory instability in SICU.  I have personally interviewed and examined the patient, reviewed data and laboratory tests/x-rays and all pertinent electronic images.  The SICU team has a constant risk benefit analyzes discussion with the primary team, all consultants, House Staff and PA's on all decisions.   Time involved in performance of separately billable procedures was not counted toward my critical care time. There is no overlap.    I have personally provided 60 minutes of critical care time concurrently with the resident/fellow. This time excludes time spent on separate procedures and time spent teaching. I have reviewed the resident's/fellow's documentation and agree with the assessment and plan of care.  I was physically present for the key portions of the evaluation and management (E/M) service provided.      Diony Kaba MD  Acute and Critical Care Surgery Critical Care Dx     K56.609 SBO (small bowel obstruction)   -obstructive symptoms - CT pending while metabolic stability assured prior to further imaging  -NG tube to CLWS  -serial exams  N17.1 Acute renal failure with acute cortical necrosis   E87.2 Lactic acidosis   E87.5 Hyperkalemia   E87.1 Hyponatremia   E87.8 Hypochloremia   -Nephrology consult for dialysis parameters, shiley placed   -arterial line place for serial blood draws and hemodynamic monitoring   -bicarb gtt, monitor acid/base status - should correct with volume replacement      The patient is a critical care patient with life threatening metabolic instability in SICU.  I have personally interviewed and examined the patient, reviewed data and laboratory tests/x-rays and all pertinent electronic images.  The SICU team has a constant risk benefit analyzes discussion with the primary team, all consultants, House Staff and PA's on all decisions.   Time involved in performance of separately billable procedures was not counted toward my critical care time. There is no overlap.    I have personally provided 60 minutes of critical care time concurrently with the resident/fellow. This time excludes time spent on separate procedures and time spent teaching. I have reviewed the resident's/fellow's documentation and agree with the assessment and plan of care.  I was physically present for the key portions of the evaluation and management (E/M) service provided.      Diony Kaba MD  Acute and Critical Care Surgery

## 2022-07-25 NOTE — CONSULT NOTE ADULT - SUBJECTIVE AND OBJECTIVE BOX
HISTORY OF PRESENT ILLNESS:  68yo F (Latvian dialect speaking) h/o HTN, HLD, L tibial surgery, lap kevan 2011, s/p lap to open repair of incarcerated ventral hernia c/b enterotomy (repaired), hernia defect repair 6/15. Post op pt found to have leak from prior enterotomy so pt returned to OR 6/21 for enterotomy repair, SBR, and end ileostomy. Pt was discharged home 7/6.    Pt now presenting with nausea 1wk ago and       PAST MEDICAL HISTORY: Obesity    Hypertension    Hyperlipidemia    H/O fracture of tibia        PAST SURGICAL HISTORY: History of cholecystectomy        FAMILY HISTORY:     SOCIAL HISTORY:    CODE STATUS:     HOME MEDICATIONS:    ALLERGIES: No Known Allergies      VITAL SIGNS:  ICU Vital Signs Last 24 Hrs  T(C): 36.1 (25 Jul 2022 16:08), Max: 36.6 (25 Jul 2022 12:42)  T(F): 97 (25 Jul 2022 16:08), Max: 97.8 (25 Jul 2022 12:42)  HR: 87 (25 Jul 2022 16:08) (87 - 95)  BP: 130/99 (25 Jul 2022 16:08) (124/108 - 130/99)  BP(mean): --  ABP: --  ABP(mean): --  RR: 14 (25 Jul 2022 16:08) (14 - 18)  SpO2: 100% (25 Jul 2022 16:08) (98% - 100%)    O2 Parameters below as of 25 Jul 2022 16:08  Patient On (Oxygen Delivery Method): room air            NEURO  Exam:  Meds:    RESPIRATORY  Mechanical Ventilation:     Exam:  Meds:    CARDIOVASCULAR  VBG - ( 25 Jul 2022 18:11 )  pH: 7.38  /  pCO2: 36    /  pO2: 41    / HCO3: 21    / Base Excess: -3.2  /  SaO2: 49.1   Lactate: 3.3              Exam:  Cardiac Rhythm:  Meds:    GI/NUTRITION  Exam:  Diet:  Meds:    GENITOURINARY/RENAL  Meds:      07-25    122<L>  |  68<L>  |  184<H>  ----------------------------<  99  7.8<HH>   |  19<L>  |  11.76<H>    Ca    8.6      25 Jul 2022 18:11  Phos  18.7     07-25  Mg     1.70     07-25    TPro  9.2<H>  /  Alb  3.8  /  TBili  0.7  /  DBili  x   /  AST  28  /  ALT  27  /  AlkPhos  176<H>  07-25    [ ] Pierce catheter, indication: urine output monitoring in critically ill patient    HEMATOLOGIC  [ ] VTE Prophylaxis:                          13.4   10.18 )-----------( 447      ( 25 Jul 2022 18:11 )             40.7     PT/INR - ( 25 Jul 2022 16:05 )   PT: 13.6 sec;   INR: 1.17 ratio         PTT - ( 25 Jul 2022 16:05 )  PTT:31.1 sec  Transfusion: [ ] PRBC	[ ] Platelets	[ ] FFP	[ ] Cryoprecipitate      INFECTIOUS DISEASES  Meds:  RECENT CULTURES:      ENDOCRINE  Meds:  CAPILLARY BLOOD GLUCOSE      POCT Blood Glucose.: 190 mg/dL (25 Jul 2022 19:31)      PATIENT CARE ACCESS DEVICES:  [ ] Peripheral IV  [ ] Central Venous Line	[ ] R	[ ] L	[ ] IJ	[ ] Fem	[ ] SC	Placed:   [ ] Arterial Line		[ ] R	[ ] L	[ ] Fem	[ ] Rad	[ ] Ax	Placed:   [ ] PICC:					[ ] Mediport  [ ] Urinary Catheter, Date Placed:   [x] Necessity of urinary, arterial, and venous catheters discussed    OTHER MEDICATIONS:     IMAGING STUDIES: HISTORY OF PRESENT ILLNESS:  68yo F (Kyrgyz dialect speaking) h/o HTN, HLD, L tibial surgery, lap kevan 2011, s/p lap to open repair of incarcerated ventral hernia c/b enterotomy (repaired), hernia defect repair 6/15. Post op pt found to have leak from prior enterotomy so pt returned to OR 6/21 for enterotomy repair, SBR, and end ileostomy. Pt was discharged home 7/6.    Pt now presenting with nausea 1wk ago and emesis starting today, along with decreased PO intake. Pt has had stool in ileostomy bag but daughter is unsure of gas. Pt has open midline wound, has visiting RN coming 3x/wk for management.     In ED pt found to be in acute renal failure with Cr >11 (from 0.84 last admission), potassium 8, sodium 117. Pt is s/p calcium gluconate, shifted with insulin, and lasix.       PAST MEDICAL HISTORY:   Obesity  Hypertension  Hyperlipidemia  H/O fracture of tibia      PAST SURGICAL HISTORY:   History of cholecystectomy  Ventral hernia repair, enterotomy  Ex-lap, SBR, end ileostomy      FAMILY HISTORY:     SOCIAL HISTORY:    CODE STATUS: Full code    HOME MEDICATIONS:  amLODIPine 10 mg oral tablet: 1 tab(s) orally once a day  losartan 50 mg oral tablet: 1 tab(s) orally once a day  oxyCODONE 5 mg oral tablet: 1 tab(s) orally every 6 hours, As Needed -for moderate pain - for severe pain MDD:20mg (4 tabs) iSTOP Reference #: [id number]   acetaminophen 325 mg oral tablet: 2 tab(s) orally every 6 hours  atorvastatin 10 mg oral tablet: 1 tab(s) orally once a day    ALLERGIES: No Known Allergies      VITAL SIGNS:  ICU Vital Signs Last 24 Hrs  T(C): 36.1 (25 Jul 2022 16:08), Max: 36.6 (25 Jul 2022 12:42)  T(F): 97 (25 Jul 2022 16:08), Max: 97.8 (25 Jul 2022 12:42)  HR: 87 (25 Jul 2022 16:08) (87 - 95)  BP: 130/99 (25 Jul 2022 16:08) (124/108 - 130/99)  BP(mean): --  ABP: --  ABP(mean): --  RR: 14 (25 Jul 2022 16:08) (14 - 18)  SpO2: 100% (25 Jul 2022 16:08) (98% - 100%)    O2 Parameters below as of 25 Jul 2022 16:08  Patient On (Oxygen Delivery Method): room air      PHYSICAL EXAM  GEN: appears uncomfortable, shivering in bed    NEURO  Exam: awake, interactive answering questions however intermittently distracted by pain  Meds:    RESPIRATORY  Exam: some increased work of breathing  Meds:    CARDIOVASCULAR  VBG - ( 25 Jul 2022 18:11 )  pH: 7.38  /  pCO2: 36    /  pO2: 41    / HCO3: 21    / Base Excess: -3.2  /  SaO2: 49.1   Lactate: 3.3    Exam: appears well-perfused  Cardiac Rhythm: sinus rhythm, peaked T waves seen on tele strip  Meds:    GI/NUTRITION  Exam: soft, nondistended. Ileostomy bag with minimal stool. Midline open wound (approx 10cm in height) clean, pink, no drainage or dead tissue seen  Diet: NPO  Meds:    GENITOURINARY/RENAL  Meds:      07-25    122<L>  |  68<L>  |  184<H>  ----------------------------<  99  7.8<HH>   |  19<L>  |  11.76<H>    Ca    8.6      25 Jul 2022 18:11  Phos  18.7     07-25  Mg     1.70     07-25    TPro  9.2<H>  /  Alb  3.8  /  TBili  0.7  /  DBili  x   /  AST  28  /  ALT  27  /  AlkPhos  176<H>  07-25    [X] Pierce catheter, indication: urine output monitoring in critically ill patient    HEMATOLOGIC  [ ] VTE Prophylaxis:                          13.4   10.18 )-----------( 447      ( 25 Jul 2022 18:11 )             40.7     PT/INR - ( 25 Jul 2022 16:05 )   PT: 13.6 sec;   INR: 1.17 ratio         PTT - ( 25 Jul 2022 16:05 )  PTT:31.1 sec  Transfusion: [ ] PRBC	[ ] Platelets	[ ] FFP	[ ] Cryoprecipitate      INFECTIOUS DISEASES  Meds:  RECENT CULTURES:      ENDOCRINE  Meds:  CAPILLARY BLOOD GLUCOSE      POCT Blood Glucose.: 190 mg/dL (25 Jul 2022 19:31)      PATIENT CARE ACCESS DEVICES:  [X] Peripheral IV  [X] Central Venous Line	[ ] R	[X] L	[ ] IJ	[X] Fem	[ ] SC	Placed: 7/25/22 CANDIS  [X] Arterial Line		[ ] R	[X] L	[X] Fem	[ ] Rad	[ ] Ax	Placed: 7/25/22  [ ] PICC:					[ ] Mediport  [X] Urinary Catheter, Date Placed: 7/25/22  [x] Necessity of urinary, arterial, and venous catheters discussed    OTHER MEDICATIONS:     IMAGING STUDIES:

## 2022-07-25 NOTE — H&P ADULT - HISTORY OF PRESENT ILLNESS
68yo F (speaks Arabic dialect) with a history of HTN, HLD, L tibia surgery, lap kevan (2011, [facility]) s/p repair of incarcerated  ventral hernia repair with diagnostic laparoscopy converted to open for repair of enterotomy, reduction of hernia, and hernia defect repair on 6/15/22 and hospital course complicated by fever and tachycardia requiring SICU admission with RTOR, exploratory laparotomy with small bowel resection and end ileostomy on 6/21/22 presents with nausea starting 1 week ago and 2 episodes of vomiting today, associated with decreased PO intake. Over the weekend patient has had worsening PO intake, with noted decrease in urine output. Patients mental status has also declined over the past few days, with patient drowsy but arousable and responds appropriately to questions. Patient has been passing stool in the ileostomy bag but daughter is not sure if she has seen any gas since discharge. Patient has an open midline wound managed by visiting RN at home. Per daughter she was advised to administer Zofran daily for nausea after discussion with Surgeon.

## 2022-07-25 NOTE — ED ADULT NURSE NOTE - CAS DISCH ACCOMP BY
----- Message from Cecy Silver MD sent at 9/2/2020  3:06 PM CDT -----  Please notify patient that lab results were negative for CML (Chronic myeloid leukemia) and polycythemia vera.  Overall the results do not suggest an underlying bone marrow disorder.  We will go over them in detail at her appointment next week.      Lab Services on 08/27/2020   Component Date Value Ref Range Status   • LD, Total 08/27/2020 259* 82 - 240 Units/L Final    Result confirmed by repeat testing.   • JAK2 Gene, V617F Mutation, [employer]* 08/27/2020 Not Detected   Final   • JAK2 Exon 12 Mutation Analysis by * 08/27/2020 Not Detected   Final   • Scanned Report 08/27/2020 See attached report   Final   • Pathologist comments 08/27/2020 See Comment.   Final   • Erythropoietin 08/27/2020 8.1  4.0 - 27.0 mUnits/mL Final   • Uric Acid 08/27/2020 5.7  2.6 - 5.9 mg/dL Final   • Vitamin B12 08/27/2020 1,270* 211 - 911 pg/mL Final   • Folate 08/27/2020 3.6* >=5.5 ng/mL Final   • Copper, Blood 08/27/2020 109  80 - 155 mcg/dL Final   • Ferritin 08/27/2020 662* 8 - 252 ng/mL Final   • FISH BCR/ABL 08/27/2020 Specimen Received. Testing in Progress. Report to Follow.   Final   • WBC 08/27/2020 14.3* 4.2 - 11.0 K/mcL Final   • RBC 08/27/2020 4.22  4.00 - 5.20 mil/mcL Final   • HGB 08/27/2020 15.7* 12.0 - 15.5 g/dL Final   • HCT 08/27/2020 45.7  36.0 - 46.5 % Final   • MCV 08/27/2020 108.3* 78.0 - 100.0 fl Final   • MCH 08/27/2020 37.2* 26.0 - 34.0 pg Final   • MCHC 08/27/2020 34.4  32.0 - 36.5 g/dL Final   • RDW-CV 08/27/2020 14.3  11.0 - 15.0 % Final   • PLT 08/27/2020 265  140 - 450 K/mcL Final   • NRBC 08/27/2020 0  <=0 /100 WBC Final   • Neutrophil, Percent 08/27/2020 68  % Final   • Lymphocytes, Percent 08/27/2020 23  % Final   • Mono, Percent 08/27/2020 5  % Final   • Eosinophils, Percent 08/27/2020 2  % Final   • Basophils, Percent 08/27/2020 1  % Final   • Immature Granulocytes 08/27/2020 1  % Final   • Absolute Neutrophils 08/27/2020  9.6* 1.8 - 7.7 K/mcL Final   • Absolute Lymphocytes 2020 3.3  1.0 - 4.8 K/mcL Final   • Absolute Monocytes 2020 0.7  0.3 - 0.9 K/mcL Final   • Absolute Eosinophils  2020 0.3  0.1 - 0.5 K/mcL Final   • Absolute Basophils 2020 0.2  0.0 - 0.3 K/mcL Final   • Absolute Immmature Granulocytes 2020 0.1  0.0 - 0.2 K/mcL Final   • RDW-SD 2020 57.8* 39.0 - 50.0 fL Final   • Sodium 2020 135  135 - 145 mmol/L Final   • Potassium 2020 4.1  3.4 - 5.1 mmol/L Final   • Chloride 2020 102  98 - 107 mmol/L Final   • Carbon Dioxide 2020 27  21 - 32 mmol/L Final   • Anion Gap 2020 10  10 - 20 mmol/L Final   • Glucose 2020 109* 65 - 99 mg/dL Final   • BUN 2020 12  6 - 20 mg/dL Final   • Creatinine 2020 0.65  0.51 - 0.95 mg/dL Final   • Glomerular Filtration Rate 2020 >90  >90 mL/min/1.73m2 Final    eGFR results = or >90 mL/min/1.73m2 = Normal kidney function.   • BUN/ Creatinine Ratio 2020 18  7 - 25 Final   • Bilirubin, Total 2020 0.8  0.2 - 1.0 mg/dL Final   • GOT/AST 2020 127* <=37 Units/L Final   • Alkaline Phosphatase 2020 147* 45 - 117 Units/L Final   • Albumin 2020 3.7  3.6 - 5.1 g/dL Final   • Protein, Total 2020 7.6  6.4 - 8.2 g/dL Final   • Globulin 2020 3.9  2.0 - 4.0 g/dL Final   • A/G Ratio 2020 0.9* 1.0 - 2.4 Final   • GPT/ALT 2020 124* <64 Units/L Final   • Calcium 2020 9.4  8.4 - 10.2 mg/dL Final   • Genetics Report 2020    Final                    Value:Name: PALOMA WASHINGTON            MRN:  [id number]    /Age: 1973 (Age: 47)            Visit#: [id number]    Sex: F                                 Genetics Report        Client: [facility]                      Submitting MD: Cecy Flores MD        Date Specimen Collected: 20             Accession #:  [id number]    Date Specimen Received:  20             Requisition  #:    Date Reported:           9/1/2020 16:14              Specimen(s) Submitted:     A:  FISH (Leuk Bld ) for t(9;22),  BCR/ABL    B:  Culture for Leukemic Study          Reason for Referral:    Leukocytosis; Polycythemia        ______________________________________________________________________________    Results:                    FISH negative for BCR/ABL1 rearrangement.        Interpretation:                    Interphase FISH studies using the LSI BCR/ABL1 dual color dual fusion    FISH probe showed no evidence of a t(9;22) on a peripheral blood specimen    (di                          rect preparation) in 200 cells scored.        This FISH assay was developed and its performance characteristics determined    by the [facility].  It has not been cleared or approved by the U.S.    FDA.  The FDA has determined that such clearance or approval is not necessary.     This test is used for clinical purposes.        Karyotype:             nuc baylee(ABL1,BCR)x2[200]            Cells counted:     200        Banding technique:     FISH                                        favian Anderson MD, PhD        ** Electronic Signature (FAVIAN) 9/1/2020 16:14 **        ______________________________________________________________________________                 Fee Codes:     A: [id number], [id number], [id number], [id number]     B: [id number], [id number]            Performing Lab(Unless otherwise specified):    [facility], [address] [place] [id number]            Family/RN/Transport

## 2022-07-25 NOTE — H&P ADULT - NSICDXPASTSURGICALHX_GEN_ALL_CORE_FT
PAST SURGICAL HISTORY:  H/O ileostomy     H/O ventral hernia repair     History of cholecystectomy 2011    S/P small bowel resection

## 2022-07-25 NOTE — H&P ADULT - NSHPPHYSICALEXAM_GEN_ALL_CORE
Physical Exam:    General: WN/WD appears uncomfortable  Neurology: drowsy but arousable  Eyes: PERRLA/ EOMI  CV: spiked t waves on EKG and monitor  Abdominal: Soft, NT, ND open midlne incision, ostomy inverted with stool in bag  Extremities: No edema, + peripheral pulses  Skin: No Rashes, Hematoma, Ecchymosis

## 2022-07-25 NOTE — ED ADULT NURSE NOTE - OBJECTIVE STATEMENT
Received patient in room 12 c/o vomiting x2, decreased urine output today. Patient is A&OX0, confused, airway patent, breathing unlabored and even, radial pulses palpable, abdomen soft, colostomy bag present, skin dry. Side rails up and safety maintained. Fall precaution in place. Family at bedside.

## 2022-07-25 NOTE — CONSULT NOTE ADULT - ATTENDING COMMENTS
Severe Hyperkalemia with EKG changes  JAMISON  Metabolic acidosis  Hyponatremia    IVF recommended overnight with improvement in numbers with NS and bicarb gtt.    Can continue fluid resuscitation for now. Attempt made at dialysis overnight however line was not working. Will continue to monitor for RRT needs however currently not necessary.     Monitor labs, Is/Os and daily weights.

## 2022-07-25 NOTE — ED ADULT NURSE NOTE - NSIMPLEMENTINTERV_GEN_ALL_ED
Implemented All Fall with Harm Risk Interventions:  [place] to call system. Call bell, personal items and telephone within reach. Instruct patient to call for assistance. Room bathroom lighting operational. Non-slip footwear when patient is off stretcher. Physically safe environment: no spills, clutter or unnecessary equipment. Stretcher in lowest position, wheels locked, appropriate side rails in place. Provide visual cue, wrist band, yellow gown, etc. Monitor gait and stability. Monitor for mental status changes and reorient to person, place, and time. Review medications for side effects contributing to fall risk. Reinforce activity limits and safety measures with patient and family. Provide visual clues: red socks.

## 2022-07-25 NOTE — CONSULT NOTE ADULT - ASSESSMENT
66yo F (Irish dialect speaking) h/o HTN, HLD, s/p lap to open repair of incarcerated ventral hernia c/b enterotomy (repaired), hernia defect repair 6/15 and enterotomy repair, SBR, and 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 Pt now presenting with n/v found to have acute renal failure requiring urgent dialysis. Pt transferred to SICU for the above.       68yo F (Sinhala dialect speaking) h/o HTN, HLD, s/p lap to open repair of incarcerated ventral hernia c/b enterotomy (repaired), hernia defect repair 6/15 and enterotomy repair, SBR, and 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 Pt now presenting with n/v found to have acute renal failure requiring urgent dialysis. Pt transferred to SICU for the above.    PLAN:    NEUROLOGIC:  - Pain control prn    RESPIRATORY:  - Satting well on RA    CARDIOVASCULAR:  - Monitor EKG  - Lactate elevated to 3.3 on VBG, continue to trend    GI/NUTRITION:  - NPO, NGT to LCWS  - Will need CT A/P to r/o SBO after HD  - Monitor ileostomy function  - Midline wound dressing changes per primary team    /RENAL: acute renal failure, hyperkalemia, hyponatremia  - S/p Ca gluconate, insulin, and lasix in ER  - Urgent HD  - NS@100cc/hr  - Start sodium bicarb gtt  - Monitor Pierce output    HEMATOLOGIC:  - Monitor H&H    INFECTIOUS DISEASE:  - Monitor WBC    ENDOCRINE:  - ISS    LINES:  - L femoral Shiley  - L femoral A line  - Arrow  - Pierce  - NGT  - PIVs    DISPO:  - SICU  - Full Code

## 2022-07-25 NOTE — CONSULT NOTE ADULT - ASSESSMENT
68yo F (speaks Mohawk dialect) with a history of HTN, HLD, L tibia surgery, lap kevan (2011, [facility]) s/p repair of incarcerated  ventral hernia repair with diagnostic laparoscopy converted to open for repair of enterotomy, reduction of hernia, and hernia defect repair and hospital course complicated by fever and tachycardia requiring SICU admission with RTOR, exploratory laparotomy with small bowel resection and end ileostomy on 6/21/22 presents with nausea, vomiting today, associated with decreased PO intake, decreased ostomy output and concern for SBO.    Plan  - NPO  - NGT  - IVF  - Please maintain CBC/BMP/PTT,PT,INR/T&S/COVID  - Monitor urine output and ostomy output  - Serial abdominal exams  - Abdominal exam prior to pain medications  - CTAP pending to investigate for SBO  - Trend lactate  - Plan to be discussed with Surgery Attending pending the above    [id number]  Carlos Ariza PGY-2  Surgery

## 2022-07-25 NOTE — CONSULT NOTE ADULT - SUBJECTIVE AND OBJECTIVE BOX
68yo F (speaks Amharic dialect) with a history of HTN, HLD, L tibia surgery, lap kevan (2011, [facility]) s/p repair of incarcerated  ventral hernia repair with diagnostic laparoscopy converted to open for repair of enterotomy, reduction of hernia, and hernia defect repair and hospital course complicated by fever and tachycardia requiring SICU admission with RTOR, exploratory laparotomy with small bowel resection and end ileostomy on 6/21/22 presents with nausea starting 1 week ago and 2 episodes of vomiting today, associated with decreased PO intake. Patient has been passing stool in the ileostomy bag but daughter is not sure if she has seen any gas since discharge. Patient has an open midline wound managed by visiting RN at home. Per daughter she was advised to administer Zofran daily for nausea after discussion with Surgeon.    Surgery consulted for concern for SBO.    Vital Signs Last 24 Hrs  T(C): 36.1 (25 Jul 2022 16:08), Max: 36.6 (25 Jul 2022 12:42)  T(F): 97 (25 Jul 2022 16:08), Max: 97.8 (25 Jul 2022 12:42)  HR: 87 (25 Jul 2022 16:08) (87 - 95)  BP: 130/99 (25 Jul 2022 16:08) (124/108 - 130/99)  BP(mean): --  RR: 14 (25 Jul 2022 16:08) (14 - 18)  SpO2: 100% (25 Jul 2022 16:08) (98% - 100%)    Parameters below as of 25 Jul 2022 16:08  Patient On (Oxygen Delivery Method): room air    Physical exam  General: NAD  Cardiac: Regular rate  Respiratory: Breath sounds clear and equal bilaterally.  Abdominal Exam: softly distended, nontender, ostomy bag with stool but no flatus, midline wound dressing with serosanguinous drainage  Muskuloskeletal: Moves all 4 extremities spontaneously  Neurological: Alert and oriented, no gross focal deficits, no motor or sensory deficits.  Skin: No rash.  Psych: AOX3       LABS:  cret                        13.8   10.65 )-----------( 497      ( 25 Jul 2022 16:05 )             38.9     07-25    117<LL>  |  67<L>  |  174<H>  ----------------------------<  134<H>  8.0<HH>   |  15<L>  |  11.58<H>    Ca    8.5      25 Jul 2022 16:05    TPro  9.3<H>  /  Alb  4.1  /  TBili  0.7  /  DBili  x   /  AST  27  /  ALT  26  /  AlkPhos  182<H>  07-25    PT/INR - ( 25 Jul 2022 16:05 )   PT: 13.6 sec;   INR: 1.17 ratio         PTT - ( 25 Jul 2022 16:05 )  PTT:31.1 sec    ACC: [id number] EXAM: XR ABDOMEN SUPINE 1V    PROCEDURE DATE: 07/25/2022        INTERPRETATION: CLINICAL INFORMATION: Abdominal pain, evaluate for small bowel obstruction    EXAM: AP views of the abdomen    COMPARISON: Abdominal radiograph and CT scan 7/4/2022    FINDINGS:    Nonobstructive bowel gas pattern. Overall paucity of bowel gas.  There is no evidence of intraperitoneal free air.  The visualized osseous structures demonstrate no acute pathology.  Right upper quadrant surgical clips.    IMPRESSION:    Nonobstructive bowel gas pattern without evidence of free air.    --- End of Report ---        CTAP PENDING*****

## 2022-07-25 NOTE — CONSULT NOTE ADULT - PROBLEM SELECTOR RECOMMENDATION 9
Pt with acute renal failure in the setting of  PO intake. Upon review of [facility], pt had Scr of 0.84 on 22. Scr initially during this admission was 11.58. No prior h/o kidney disease. Urinary catheter placed with ~115 cc drained. Pt appears dry on exam. SNa is 122, and SCl is 68. JAMISON likely of pre-renal etiology with a component of ATN. HD consent obtained and placed in chart. Plan for HD today for hyperkalemia in the setting of renal failure. Start IV normal saline at 100 cc/hr. Recommend to obtain UA, urine sodium, urine creatinine, urine chloride, and urine protein. Obtain kidney US. Monitor labs and urine output. Avoid nephrotoxins. Dose medications as per eGFR.

## 2022-07-25 NOTE — ED PROVIDER NOTE - ATTENDING CONTRIBUTION TO CARE
MD Koenig:  patient seen and evaluated with the resident.  I was present for key portions of the History & Physical, and I agree with the Impression & Plan.  MD Koenig:  68 yo F c/o abdominal pain, distension, vomiting, decreased energy  Duration: 2d  Location: diffuse abd pain  Associated Sx:  +nausea, *** emesis; no flatus or BM; no f/c, no CP/SOB, no back pain.   Context:  recently dc'd 7/8/22 s/p incarcerated ventral hernia repair complicated by enterotomy/SBO, requiring bowel resection and ileostomy.   VS: tachycardic, afebrile, normotensive.    Physical Exam: adult F, mild distress, fatigued-appearing, NCAT, PERRL, EOMI, neck supple, CTA B, RRR,   Abdomen: soft, +distended, +diffusely TTP, ostomy bag with no gas, Ext: no edema.  Neuro:  AAOx3, moving all 4 extremities.  Gait not assessed.  Impression:  Abdominal pain, nausea/vomiting, & dehydration concerning for SBO, and therefore warrants pre-op labs, CT abdomen/pelvis, and likely general surgery consult.    Plan:  hydrate, zofran, labs, lactate, CT, reassess.  Given recent dc from surgical service, will consult gen surg early.

## 2022-07-25 NOTE — H&P ADULT - NSHPLABSRESULTS_GEN_ALL_CORE
13.4   10.18 )-----------( 447      ( 25 Jul 2022 18:11 )             40.7     07-25    122<L>  |  68<L>  |  184<H>  ----------------------------<  99  7.8<HH>   |  19<L>  |  11.76<H>    Ca    8.6      25 Jul 2022 18:11  Phos  18.7     07-25  Mg     1.70     07-25    TPro  9.2<H>  /  Alb  3.8  /  TBili  0.7  /  DBili  x   /  AST  28  /  ALT  27  /  AlkPhos  176<H>  07-25    LIVER FUNCTIONS - ( 25 Jul 2022 18:11 )  Alb: 3.8 g/dL / Pro: 9.2 g/dL / ALK PHOS: 176 U/L / ALT: 27 U/L / AST: 28 U/L / GGT: x           PT/INR - ( 25 Jul 2022 16:05 )   PT: 13.6 sec;   INR: 1.17 ratio         PTT - ( 25 Jul 2022 16:05 )  PTT:31.1 sec

## 2022-07-25 NOTE — CONSULT NOTE ADULT - SUBJECTIVE AND OBJECTIVE BOX
[facility] DIVISION OF KIDNEY DISEASES AND HYPERTENSION -- [phone]  -- INITIAL CONSULT NOTE  --------------------------------------------------------------------------------  HPI: 66 yo F with h/o HTN, HLD, and recent incarcerated hernia s/p ileostomy presenting with N/V and decreased PO intake. Nephrology consulted for acute renal failure with hyperkalemia    Pt seen and evaluated in the SICU with daughter bedside. History provided by patient and daughter. Daughter reports that pt's appetite has been low for the past 2 weeks. Over the past 2 days, she has not eaten much and has been nauseas with episodes of vomiting. Daughter has noted decreased output from the ileostomy and decreased urine output. Pt does report some chills and abdominal pain. Denies any headaches, chest pain, palpitations, SOB, and leg swelling.     PAST HISTORY  --------------------------------------------------------------------------------  PAST MEDICAL & SURGICAL HISTORY:  Obesity  Hypertension  Hyperlipidemia    H/O fracture of tibia  and fibula (L)  History of cholecystectomy  2011  H/O ventral hernia repair  S/P small bowel resection  H/O ileostomy      FAMILY HISTORY: HTN and diabetes in multiple family members.    PAST SOCIAL HISTORY: denies any alcohol or tobacco use.    ALLERGIES & MEDICATIONS  --------------------------------------------------------------------------------  Allergies    No Known Allergies    Intolerances      Standing Inpatient Medications  sodium chloride 0.9%. 1000 milliLiter(s) IV Continuous <Continuous>    PRN Inpatient Medications      REVIEW OF SYSTEMS  --------------------------------------------------------------------------------  Gen: Positive for chills  Skin: No rashes  Head/Eyes/Ears: Normal hearing,   Respiratory: No dyspnea, cough  CV: No chest pain  GI: Positive for abdominal pain and decreased ileostomy output  : No dysuria, hematuria  MSK: No  edema  Heme: No easy bruising or bleeding  Psych: No significant depression    All other systems were reviewed and are negative, except as noted.    VITALS/PHYSICAL EXAM  --------------------------------------------------------------------------------  T(C): 36.9 (07-25-22 @ 20:45), Max: 36.9 (07-25-22 @ 20:45)  HR: 88 (07-25-22 @ 21:00) (85 - 95)  BP: 120/94 (07-25-22 @ 21:00) (112/72 - 130/99)  RR: 15 (07-25-22 @ 21:00) (14 - 18)  SpO2: 100% (07-25-22 @ 21:00) (97% - 100%)  Wt(kg): --  Height (cm): 149.9 (07-25-22 @ 12:42)      Physical Exam:  Gen: lethargic and shivering  HEENT: MMM  Pulm: CTA B/L  CV: S1S2  Abd: abdomen is distended and tender. Ileostomy noted with small amount of liquid stool.  Ext: No LE edema B/L  Neuro: Awake  Skin: Warm and dry  Vascular access: peripehral IV    LABS/STUDIES  --------------------------------------------------------------------------------              13.4   10.18 >-----------<  447      [07-25-22 @ 18:11]              40.7     122  |  68  |  184  ----------------------------<  99      [07-25-22 @ 18:11]  7.8   |  19  |  11.76        Ca     8.6     [07-25-22 @ 18:11]      Mg     1.70     [07-25-22 @ 19:18]      Phos  18.7     [07-25-22 @ 19:18]    TPro  9.2  /  Alb  3.8  /  TBili  0.7  /  DBili  x   /  AST  28  /  ALT  27  /  AlkPhos  176  [07-25-22 @ 18:11]    PT/INR: PT 13.6 , INR 1.17       [07-25-22 @ 16:05]  PTT: 31.1       [07-25-22 @ 16:05]    Serum Osmolality 328      [07-25-22 @ 19:18]    Creatinine Trend:  SCr 11.76 [07-25 @ 18:11]  SCr 11.58 [07-25 @ 16:05]  SCr 0.84 [07-05 @ 06:08]  SCr 1.10 [07-04 @ 07:25]  SCr 1.02 [07-03 @ 06:27]    Urinalysis - [06-28-22 @ 04:00]      Color Yellow / Appearance Clear / SG 1.027 / pH 8.0      Gluc Negative / Ketone Negative  / Bili Negative / Urobili 3 mg/dL       Blood Large / Protein 30 mg/dL / Leuk Est Negative / Nitrite Negative       / WBC 4 / Hyaline 1 / Gran  / Sq Epi  / Non Sq Epi 2 / Bacteria Negative      TSH 3.55      [07-25-22 @ 19:18]  Lipid: chol --, , HDL --, LDL --      [07-25-22 @ 19:18]    HCV 0.09, Nonreact      [06-16-22 @ 07:31]

## 2022-07-25 NOTE — CONSULT NOTE ADULT - PROBLEM SELECTOR RECOMMENDATION 3
Pt with metabolic acidosis in the setting of acute renal failure. Serum CO2 is 19. Recommend bicarbonate drip, as above. Monitor serum CO2.    Case discussed with attending on call.    If you have any questions, please feel free to contact me  Milagro Armijo  Nephrology Fellow  [phone] / Microsoft Teams(Preferred)  (After 5pm or on weekends please page the on-call fellow)

## 2022-07-25 NOTE — H&P ADULT - ATTENDING COMMENTS
Mrs Aragon was admitted with the above findings which are most significant for hypovolemia and acute renal failure causing hyperkalemia. Given her recent surgical history, we admitted her to the SICU for evaluation for dialysis and eventual CT scan of the abdomen to evaluate for an SBO when she stabilizes.   Acute renal failure with ATN and hyperkalemia  -urgent dialysis after shiley placement  -trend labs - SICU care  -shifted in ER.  Bicarb gtt  Hyponatremia and Hypochloremia  -likely due to gastric losses. Trend during resuscitation and dialysis     Diony Kaba MD  Acute and Critical Care Surgery    The Acute Care Surgery (B Team) Attending Group Practice:  Dr. Symone Galeano, Dr. Bernard Hogue, Dr. Diony Kaba,  Dr. Fidel Ochoa and Dr. Elise Mccord     Urgent issues - spectra [phone] or [phone]  Nonurgent issues - [phone]  Patient appointments or after hours - [phone]

## 2022-07-25 NOTE — H&P ADULT - ASSESSMENT
66yo F s/p repair of incarcerated  ventral hernia repair with diagnostic laparoscopy converted to open for repair of enterotomy, reduction of hernia, and hernia defect repair on 6/15/22 RTOR, exploratory laparotomy with small bowel resection and end ileostomy on 6/21/22 pw 2 days of decreased PO intake, N/V, decreased UOP. In acute renal failure    -Admit to SICU for cardiac monitoring  -renal consult for ?urgent dialysis, will need shiley  -s/p calcium gluconate, lasix, and insulin with d50, recheck potassium and shift prn  -continuous cardiac monitoring  -ct once stable to evaluate for SBO  -rajput, strict I/Os    discussed with Dr. Sendy Eason PGY3  B team surgery  [id number]

## 2022-07-25 NOTE — CONSULT NOTE ADULT - PROBLEM SELECTOR RECOMMENDATION 2
Pt with hyperkalemia in the setting of acute renal failure. Serum potassium was initially 8.0, and 7.8 on repeat. Pt received IV insulin/dextrose, lasix, and calcium gluconate. Plan for HD, as above. Start bicarbonate drip (D5 with 3 amps of bicarbonate) at 75 cc/hr. Recommend to recheck BMP now. Monitor serum potassium.

## 2022-07-25 NOTE — ED PROVIDER NOTE - PROGRESS NOTE DETAILS
Hemanth PGY 2 surg aware Hemanth PGY 2   blood drawn from arterial line will resend labs and assess ekg Hemanth PGY 2 pt had peaked t waves on ekg giving calcium gluconate  pending final cmp result Pierce to be placed. nephro consulted for acute JAMISON and emergent dialysis given Cr 11. Attempted to place NG tube but unable to due to pt not cooperating  Pt tba SICU

## 2022-07-25 NOTE — ED ADULT NURSE REASSESSMENT NOTE - NS ED NURSE REASSESS COMMENT FT1
Attempted to insert IV access, unsuccessful. Patient very difficult stick, MD made aware and waiting for ultrasound guide IV line.
Fernando from lab called, patient's potassium level is 8.0, sodium 117. Dr. Saxena notified immediately.
JEANINE 190, MD made aware, medications for hyperkalemia given as ordered, Surgery consult at bedside.
Ultrasound guide IV line is not working now, Dr. Saxena made aware and will wait for another new line.
Report given to SAYRA Daniels from SICU for continuity of care, v/s taken and documented. Patient transported to SICU on a zoll w/RN, PCA. Safety maintained.

## 2022-07-25 NOTE — ED PROVIDER NOTE - OBJECTIVE STATEMENT
67F CC vomiting PMH HTN HLD recent ventral hernia incarcerated 2 weeks ago. PT present with daughter who notes that there has been decreased PO intake, decrease ostomy output no blood, decrease urine out put and n/v.   Otherwise no recorded fever.

## 2022-07-25 NOTE — ED PROVIDER NOTE - CLINICAL SUMMARY MEDICAL DECISION MAKING FREE TEXT BOX
67F CC vomiting s/p surgery for incarcerated bowel given hx and physical concern for sbo vs abscess will get labs and imaging w/ pain control and surg consult tba

## 2022-07-26 LAB
ANION GAP SERPL CALC-SCNC: 21 MMOL/L — HIGH (ref 7–14)
ANION GAP SERPL CALC-SCNC: 23 MMOL/L — HIGH (ref 7–14)
ANION GAP SERPL CALC-SCNC: 27 MMOL/L — HIGH (ref 7–14)
ANION GAP SERPL CALC-SCNC: 28 MMOL/L — HIGH (ref 7–14)
APTT BLD: 28.1 SEC — SIGNIFICANT CHANGE UP (ref 27–36.3)
BASOPHILS # BLD AUTO: 0 K/UL — SIGNIFICANT CHANGE UP (ref 0–0.2)
BASOPHILS NFR BLD AUTO: 0 % — SIGNIFICANT CHANGE UP (ref 0–2)
BLOOD GAS ARTERIAL - LYTES,HGB,ICA,LACT RESULT: SIGNIFICANT CHANGE UP
BLOOD GAS ARTERIAL COMPREHENSIVE RESULT: SIGNIFICANT CHANGE UP
BUN SERPL-MCNC: 123 MG/DL — HIGH (ref 7–23)
BUN SERPL-MCNC: 134 MG/DL — HIGH (ref 7–23)
BUN SERPL-MCNC: 149 MG/DL — HIGH (ref 7–23)
BUN SERPL-MCNC: 159 MG/DL — HIGH (ref 7–23)
CALCIUM SERPL-MCNC: 7.8 MG/DL — LOW (ref 8.4–10.5)
CALCIUM SERPL-MCNC: 8 MG/DL — LOW (ref 8.4–10.5)
CHLORIDE SERPL-SCNC: 78 MMOL/L — LOW (ref 98–107)
CHLORIDE SERPL-SCNC: 79 MMOL/L — LOW (ref 98–107)
CHLORIDE SERPL-SCNC: 80 MMOL/L — LOW (ref 98–107)
CHLORIDE SERPL-SCNC: 83 MMOL/L — LOW (ref 98–107)
CO2 SERPL-SCNC: 18 MMOL/L — LOW (ref 22–31)
CO2 SERPL-SCNC: 23 MMOL/L — SIGNIFICANT CHANGE UP (ref 22–31)
CO2 SERPL-SCNC: 23 MMOL/L — SIGNIFICANT CHANGE UP (ref 22–31)
CO2 SERPL-SCNC: 24 MMOL/L — SIGNIFICANT CHANGE UP (ref 22–31)
CREAT SERPL-MCNC: 6.71 MG/DL — HIGH (ref 0.5–1.3)
CREAT SERPL-MCNC: 7.57 MG/DL — HIGH (ref 0.5–1.3)
CREAT SERPL-MCNC: 8.36 MG/DL — HIGH (ref 0.5–1.3)
CREAT SERPL-MCNC: 9.6 MG/DL — HIGH (ref 0.5–1.3)
EGFR: 4 ML/MIN/1.73M2 — LOW
EGFR: 5 ML/MIN/1.73M2 — LOW
EGFR: 5 ML/MIN/1.73M2 — LOW
EGFR: 6 ML/MIN/1.73M2 — LOW
EOSINOPHIL # BLD AUTO: 0.01 K/UL — SIGNIFICANT CHANGE UP (ref 0–0.5)
EOSINOPHIL NFR BLD AUTO: 0.1 % — SIGNIFICANT CHANGE UP (ref 0–6)
GLUCOSE BLDC GLUCOMTR-MCNC: 291 MG/DL — HIGH (ref 70–99)
GLUCOSE SERPL-MCNC: 104 MG/DL — HIGH (ref 70–99)
GLUCOSE SERPL-MCNC: 87 MG/DL — SIGNIFICANT CHANGE UP (ref 70–99)
GLUCOSE SERPL-MCNC: 93 MG/DL — SIGNIFICANT CHANGE UP (ref 70–99)
GLUCOSE SERPL-MCNC: 97 MG/DL — SIGNIFICANT CHANGE UP (ref 70–99)
HBV CORE AB SER-ACNC: SIGNIFICANT CHANGE UP
HBV SURFACE AB SER-ACNC: <3 MIU/ML — LOW
HBV SURFACE AG SER-ACNC: SIGNIFICANT CHANGE UP
HCT VFR BLD CALC: 29.4 % — LOW (ref 34.5–45)
HCT VFR BLD CALC: 30 % — LOW (ref 34.5–45)
HCT VFR BLD CALC: 31.4 % — LOW (ref 34.5–45)
HCT VFR BLD CALC: 32 % — LOW (ref 34.5–45)
HCV AB S/CO SERPL IA: 0.13 S/CO — SIGNIFICANT CHANGE UP (ref 0–0.99)
HCV AB SERPL-IMP: SIGNIFICANT CHANGE UP
HGB BLD-MCNC: 10.4 G/DL — LOW (ref 11.5–15.5)
HGB BLD-MCNC: 10.5 G/DL — LOW (ref 11.5–15.5)
HGB BLD-MCNC: 10.8 G/DL — LOW (ref 11.5–15.5)
HGB BLD-MCNC: 9.7 G/DL — LOW (ref 11.5–15.5)
IANC: 8.92 K/UL — HIGH (ref 1.8–7.4)
IMM GRANULOCYTES NFR BLD AUTO: 0.5 % — SIGNIFICANT CHANGE UP (ref 0–1.5)
INR BLD: 1.2 RATIO — HIGH (ref 0.88–1.16)
LYMPHOCYTES # BLD AUTO: 1.19 K/UL — SIGNIFICANT CHANGE UP (ref 1–3.3)
LYMPHOCYTES # BLD AUTO: 10.8 % — LOW (ref 13–44)
MAGNESIUM SERPL-MCNC: 2.1 MG/DL — SIGNIFICANT CHANGE UP (ref 1.6–2.6)
MAGNESIUM SERPL-MCNC: 2.1 MG/DL — SIGNIFICANT CHANGE UP (ref 1.6–2.6)
MAGNESIUM SERPL-MCNC: 2.4 MG/DL — SIGNIFICANT CHANGE UP (ref 1.6–2.6)
MAGNESIUM SERPL-MCNC: 2.5 MG/DL — SIGNIFICANT CHANGE UP (ref 1.6–2.6)
MCHC RBC-ENTMCNC: 25.8 PG — LOW (ref 27–34)
MCHC RBC-ENTMCNC: 25.9 PG — LOW (ref 27–34)
MCHC RBC-ENTMCNC: 26.1 PG — LOW (ref 27–34)
MCHC RBC-ENTMCNC: 26.7 PG — LOW (ref 27–34)
MCHC RBC-ENTMCNC: 33 GM/DL — SIGNIFICANT CHANGE UP (ref 32–36)
MCHC RBC-ENTMCNC: 33.1 GM/DL — SIGNIFICANT CHANGE UP (ref 32–36)
MCHC RBC-ENTMCNC: 33.8 GM/DL — SIGNIFICANT CHANGE UP (ref 32–36)
MCHC RBC-ENTMCNC: 35 GM/DL — SIGNIFICANT CHANGE UP (ref 32–36)
MCV RBC AUTO: 76.3 FL — LOW (ref 80–100)
MCV RBC AUTO: 77.3 FL — LOW (ref 80–100)
MCV RBC AUTO: 78.1 FL — LOW (ref 80–100)
MCV RBC AUTO: 78.2 FL — LOW (ref 80–100)
MONOCYTES # BLD AUTO: 0.85 K/UL — SIGNIFICANT CHANGE UP (ref 0–0.9)
MONOCYTES NFR BLD AUTO: 7.7 % — SIGNIFICANT CHANGE UP (ref 2–14)
NEUTROPHILS # BLD AUTO: 8.92 K/UL — HIGH (ref 1.8–7.4)
NEUTROPHILS NFR BLD AUTO: 80.9 % — HIGH (ref 43–77)
NRBC # BLD: 0 /100 WBCS — SIGNIFICANT CHANGE UP
NRBC # FLD: 0 K/UL — SIGNIFICANT CHANGE UP
PHOSPHATE SERPL-MCNC: 10.2 MG/DL — HIGH (ref 2.5–4.5)
PHOSPHATE SERPL-MCNC: 11.7 MG/DL — HIGH (ref 2.5–4.5)
PHOSPHATE SERPL-MCNC: 12.7 MG/DL — HIGH (ref 2.5–4.5)
PHOSPHATE SERPL-MCNC: 13.6 MG/DL — HIGH (ref 2.5–4.5)
PLATELET # BLD AUTO: 271 K/UL — SIGNIFICANT CHANGE UP (ref 150–400)
PLATELET # BLD AUTO: 283 K/UL — SIGNIFICANT CHANGE UP (ref 150–400)
PLATELET # BLD AUTO: 297 K/UL — SIGNIFICANT CHANGE UP (ref 150–400)
PLATELET # BLD AUTO: 314 K/UL — SIGNIFICANT CHANGE UP (ref 150–400)
POTASSIUM SERPL-MCNC: 2.7 MMOL/L — CRITICAL LOW (ref 3.5–5.3)
POTASSIUM SERPL-MCNC: 3.1 MMOL/L — LOW (ref 3.5–5.3)
POTASSIUM SERPL-MCNC: 3.2 MMOL/L — LOW (ref 3.5–5.3)
POTASSIUM SERPL-MCNC: 4.3 MMOL/L — SIGNIFICANT CHANGE UP (ref 3.5–5.3)
POTASSIUM SERPL-SCNC: 2.7 MMOL/L — CRITICAL LOW (ref 3.5–5.3)
POTASSIUM SERPL-SCNC: 3.1 MMOL/L — LOW (ref 3.5–5.3)
POTASSIUM SERPL-SCNC: 3.2 MMOL/L — LOW (ref 3.5–5.3)
POTASSIUM SERPL-SCNC: 4.3 MMOL/L — SIGNIFICANT CHANGE UP (ref 3.5–5.3)
PROTHROM AB SERPL-ACNC: 13.9 SEC — HIGH (ref 10.5–13.4)
RBC # BLD: 3.76 M/UL — LOW (ref 3.8–5.2)
RBC # BLD: 3.93 M/UL — SIGNIFICANT CHANGE UP (ref 3.8–5.2)
RBC # BLD: 4.02 M/UL — SIGNIFICANT CHANGE UP (ref 3.8–5.2)
RBC # BLD: 4.14 M/UL — SIGNIFICANT CHANGE UP (ref 3.8–5.2)
RBC # FLD: 16.2 % — HIGH (ref 10.3–14.5)
RBC # FLD: 16.3 % — HIGH (ref 10.3–14.5)
RBC # FLD: 16.3 % — HIGH (ref 10.3–14.5)
RBC # FLD: 16.5 % — HIGH (ref 10.3–14.5)
SODIUM SERPL-SCNC: 125 MMOL/L — LOW (ref 135–145)
SODIUM SERPL-SCNC: 126 MMOL/L — LOW (ref 135–145)
SODIUM SERPL-SCNC: 128 MMOL/L — LOW (ref 135–145)
SODIUM SERPL-SCNC: 128 MMOL/L — LOW (ref 135–145)
WBC # BLD: 11.03 K/UL — HIGH (ref 3.8–10.5)
WBC # BLD: 6.19 K/UL — SIGNIFICANT CHANGE UP (ref 3.8–10.5)
WBC # BLD: 7.36 K/UL — SIGNIFICANT CHANGE UP (ref 3.8–10.5)
WBC # BLD: 7.53 K/UL — SIGNIFICANT CHANGE UP (ref 3.8–10.5)
WBC # FLD AUTO: 11.03 K/UL — HIGH (ref 3.8–10.5)
WBC # FLD AUTO: 6.19 K/UL — SIGNIFICANT CHANGE UP (ref 3.8–10.5)
WBC # FLD AUTO: 7.36 K/UL — SIGNIFICANT CHANGE UP (ref 3.8–10.5)
WBC # FLD AUTO: 7.53 K/UL — SIGNIFICANT CHANGE UP (ref 3.8–10.5)

## 2022-07-26 PROCEDURE — 99223 1ST HOSP IP/OBS HIGH 75: CPT | Mod: GC

## 2022-07-26 PROCEDURE — 74176 CT ABD & PELVIS W/O CONTRAST: CPT | Mod: 26

## 2022-07-26 PROCEDURE — 99291 CRITICAL CARE FIRST HOUR: CPT | Mod: 24,GC

## 2022-07-26 PROCEDURE — 99232 SBSQ HOSP IP/OBS MODERATE 35: CPT | Mod: 24

## 2022-07-26 RX ORDER — ACETAMINOPHEN 500 MG
1000 TABLET ORAL EVERY 6 HOURS
Refills: 0 | Status: COMPLETED | OUTPATIENT
Start: 2022-07-26 | End: 2022-07-27

## 2022-07-26 RX ORDER — CALCIUM GLUCONATE 100 MG/ML
1 VIAL (ML) INTRAVENOUS ONCE
Refills: 0 | Status: COMPLETED | OUTPATIENT
Start: 2022-07-26 | End: 2022-07-26

## 2022-07-26 RX ORDER — METRONIDAZOLE 500 MG
500 TABLET ORAL EVERY 8 HOURS
Refills: 0 | Status: DISCONTINUED | OUTPATIENT
Start: 2022-07-26 | End: 2022-07-26

## 2022-07-26 RX ORDER — LOPERAMIDE HCL 2 MG
4 TABLET ORAL EVERY 6 HOURS
Refills: 0 | Status: DISCONTINUED | OUTPATIENT
Start: 2022-07-26 | End: 2022-07-29

## 2022-07-26 RX ORDER — PHENYLEPHRINE HYDROCHLORIDE 10 MG/ML
0.1 INJECTION INTRAVENOUS
Qty: 40 | Refills: 0 | Status: DISCONTINUED | OUTPATIENT
Start: 2022-07-26 | End: 2022-07-26

## 2022-07-26 RX ORDER — PHENYLEPHRINE HYDROCHLORIDE 10 MG/ML
0.01 INJECTION INTRAVENOUS
Qty: 160 | Refills: 0 | Status: DISCONTINUED | OUTPATIENT
Start: 2022-07-26 | End: 2022-07-26

## 2022-07-26 RX ORDER — PHENYLEPHRINE HYDROCHLORIDE 10 MG/ML
0.1 INJECTION INTRAVENOUS
Qty: 160 | Refills: 0 | Status: DISCONTINUED | OUTPATIENT
Start: 2022-07-26 | End: 2022-07-26

## 2022-07-26 RX ORDER — CEFTRIAXONE 500 MG/1
1000 INJECTION, POWDER, FOR SOLUTION INTRAMUSCULAR; INTRAVENOUS EVERY 24 HOURS
Refills: 0 | Status: COMPLETED | OUTPATIENT
Start: 2022-07-26 | End: 2022-07-28

## 2022-07-26 RX ORDER — ALTEPLASE 100 MG
2 KIT INTRAVENOUS ONCE
Refills: 0 | Status: COMPLETED | OUTPATIENT
Start: 2022-07-26 | End: 2022-07-26

## 2022-07-26 RX ORDER — SODIUM CHLORIDE 9 MG/ML
1000 INJECTION, SOLUTION INTRAVENOUS
Refills: 0 | Status: DISCONTINUED | OUTPATIENT
Start: 2022-07-26 | End: 2022-07-27

## 2022-07-26 RX ORDER — POTASSIUM CHLORIDE 20 MEQ
10 PACKET (EA) ORAL
Refills: 0 | Status: COMPLETED | OUTPATIENT
Start: 2022-07-26 | End: 2022-07-26

## 2022-07-26 RX ADMIN — Medication 100 MILLIEQUIVALENT(S): at 20:36

## 2022-07-26 RX ADMIN — CEFTRIAXONE 100 MILLIGRAM(S): 500 INJECTION, POWDER, FOR SOLUTION INTRAMUSCULAR; INTRAVENOUS at 06:37

## 2022-07-26 RX ADMIN — PHENYLEPHRINE HYDROCHLORIDE 1.75 MICROGRAM(S)/KG/MIN: 10 INJECTION INTRAVENOUS at 08:38

## 2022-07-26 RX ADMIN — Medication 100 MILLIEQUIVALENT(S): at 22:26

## 2022-07-26 RX ADMIN — Medication 4 MILLIGRAM(S): at 17:37

## 2022-07-26 RX ADMIN — INSULIN HUMAN 10 UNIT(S): 100 INJECTION, SOLUTION SUBCUTANEOUS at 00:23

## 2022-07-26 RX ADMIN — CHLORHEXIDINE GLUCONATE 1 APPLICATION(S): 213 SOLUTION TOPICAL at 05:15

## 2022-07-26 RX ADMIN — Medication 100 GRAM(S): at 23:06

## 2022-07-26 RX ADMIN — PHENYLEPHRINE HYDROCHLORIDE 1.75 MICROGRAM(S)/KG/MIN: 10 INJECTION INTRAVENOUS at 06:36

## 2022-07-26 RX ADMIN — SODIUM CHLORIDE 100 MILLILITER(S): 9 INJECTION INTRAMUSCULAR; INTRAVENOUS; SUBCUTANEOUS at 08:38

## 2022-07-26 RX ADMIN — Medication 400 MILLIGRAM(S): at 17:37

## 2022-07-26 RX ADMIN — HEPARIN SODIUM 5000 UNIT(S): 5000 INJECTION INTRAVENOUS; SUBCUTANEOUS at 23:05

## 2022-07-26 RX ADMIN — ALTEPLASE 2 MILLIGRAM(S): KIT at 08:31

## 2022-07-26 RX ADMIN — Medication 4 MILLIGRAM(S): at 23:08

## 2022-07-26 RX ADMIN — Medication 400 MILLIGRAM(S): at 12:13

## 2022-07-26 RX ADMIN — Medication 100 GRAM(S): at 05:14

## 2022-07-26 RX ADMIN — Medication 100 MILLIEQUIVALENT(S): at 21:25

## 2022-07-26 RX ADMIN — Medication 100 MILLIGRAM(S): at 14:01

## 2022-07-26 RX ADMIN — HEPARIN SODIUM 5000 UNIT(S): 5000 INJECTION INTRAVENOUS; SUBCUTANEOUS at 07:08

## 2022-07-26 RX ADMIN — Medication 75 MEQ/KG/HR: at 08:38

## 2022-07-26 RX ADMIN — HEPARIN SODIUM 5000 UNIT(S): 5000 INJECTION INTRAVENOUS; SUBCUTANEOUS at 14:01

## 2022-07-26 RX ADMIN — Medication 50 MILLILITER(S): at 00:16

## 2022-07-26 NOTE — PROGRESS NOTE ADULT - ASSESSMENT
68yo F (German dialect speaking) h/o HTN, HLD, s/p lap to open repair of incarcerated ventral hernia c/b enterotomy (repaired), hernia defect repair 6/15 and enterotomy repair, SBR, and 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 Pt now presenting with n/v found to have acute renal failure requiring urgent dialysis. Pt transferred to SICU for the above.    PLAN:    NEUROLOGIC:  - Pain control prn    RESPIRATORY:  - Satting well on RA    CARDIOVASCULAR:  - Monitor EKG  - Lactate elevated to 5.3, continue to trend    GI/NUTRITION:  - NPO, NGT to LCWS  - Will need CT A/P to r/o SBO after HD  - Monitor ileostomy function  - Midline wound dressing changes per primary team    /RENAL: acute renal failure, hyperkalemia, hyponatremia  - S/p Ca gluconate, insulin, and lasix for hyperkalemia  - Urgent CRRT  - NS@100cc/hr  - Sodium bicarb gtt  - Monitor Pierce output    HEMATOLOGIC:  - Monitor H&H    INFECTIOUS DISEASE:  - Monitor WBC    ENDOCRINE:  - ISS    LINES:  - L femoral Shiley  - L femoral A line  - Arrow  - Pierce  - NGT  - PIVs    DISPO:  - SICU  - Full Code      66yo F (Polish dialect speaking) h/o HTN, HLD, s/p lap to open repair of incarcerated ventral hernia c/b enterotomy (repaired), hernia defect repair 6/15 and enterotomy repair, SBR, and 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 Pt now presenting with n/v found to have acute renal failure requiring urgent dialysis. Pt transferred to SICU for the above.    PLAN:  NEUROLOGIC:  - Pain control prn, tylenol ATC    RESPIRATORY:  - Satting well on RA    CARDIOVASCULAR:  - off kiran  - Monitor EKG  - Lactate elevated to 5.3, now 1.5; trend    GI/NUTRITION:  - NPO, NGT to LCWS  - CT A/P to r/o SBO  - Monitor ileostomy function/output  - Midline wound dressing changes per primary team    /RENAL: acute renal failure, hyperkalemia, hyponatremia  - s/p calcium gluconate, insulin, and lasix for hyperkalemia  - d/c sodium bicarb gtt  - hold CVVH, get repeat BMP in 6 hours  - LR@100cc/hr  - strict I&Os via rajput    HEMATOLOGIC:  - Monitor H&H  - Heparin 5000u SQ q8h    INFECTIOUS DISEASE:  - UA with many bacteria, large LE  - Monitor WBC  - ceftriaxone + flagyl  - f/u UCx    ENDOCRINE:  - ISS    LINES:  - L femoral Shiley  - L femoral A line  - Arrow  - Rajput  - NGT  - PIVs    DISPO:  - SICU  - Full Code      66yo F (Lithuanian dialect speaking) h/o HTN, HLD, s/p lap to open repair of incarcerated ventral hernia c/b enterotomy (repaired), hernia defect repair 6/15 and enterotomy repair, SBR, and 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 Pt now presenting with n/v found to have acute renal failure requiring urgent dialysis. Pt transferred to SICU for the above.    PLAN:  NEUROLOGIC:  - Pain control prn, tylenol ATC    RESPIRATORY:  - Satting well on RA  - obtain ABG    CARDIOVASCULAR:  - off kiran  - Monitor EKG  - Lactate elevated to 5.3, now 1.5; trend    GI/NUTRITION:  - NPO, NGT to LCWS  - CT A/P to r/o SBO  - Monitor ileostomy function/output  - Midline wound dressing changes per primary team    /RENAL: acute renal failure, hyperkalemia, hyponatremia  - s/p calcium gluconate, insulin, and lasix for hyperkalemia  - d/c sodium bicarb gtt  - hold CVVH, get repeat BMP in 6 hours  - LR@100cc/hr  - strict I&Os via rajput    HEMATOLOGIC:  - Monitor H&H  - Heparin 5000u SQ q8h    INFECTIOUS DISEASE:  - UA with many bacteria, large LE  - Monitor WBC  - ceftriaxone + flagyl  - f/u UCx    ENDOCRINE:  - ISS    LINES:  - L femoral Shiley  - L femoral A line  - Arrow  - Rajput  - NGT  - PIVs    DISPO:  - SICU  - Full Code      66yo F (Portuguese dialect speaking) h/o HTN, HLD, s/p lap to open repair of incarcerated ventral hernia c/b enterotomy (repaired), hernia defect repair 6/15 and enterotomy repair, SBR, and 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 Pt now presenting with n/v found to have acute renal failure requiring urgent dialysis. Pt transferred to SICU for the above.    PLAN:  NEUROLOGIC:  - Pain control prn, tylenol ATC    RESPIRATORY:  - Satting well on RA  - pending PM ABG    CARDIOVASCULAR:  - off kiran  - Monitor EKG  - Lactate elevated to 5.3, now 1.5; trend    GI/NUTRITION:  - NPO, NGT to LCWS  - CT A/P to r/o SBO  - Monitor ileostomy function/output  - Midline wound dressing changes per primary team, wound care following     /RENAL: acute renal failure, hyperkalemia, hyponatremia  - s/p calcium gluconate, insulin, and lasix for hyperkalemia  - d/c sodium bicarb gtt  - hold CVVH, get repeat BMP in 6 hours  - LR@100cc/hr  - strict I&Os via rajput    HEMATOLOGIC:  - Monitor H&H  - Heparin 5000u SQ q8h    INFECTIOUS DISEASE:  - UA with many bacteria, large LE  - Monitor WBC  - ceftriaxone + flagyl  - f/u UCx    ENDOCRINE:  - ISS    LINES:  - L femoral Shiley  - L femoral A line  - Arrow  - Rajput  - NGT  - PIVs    DISPO:  - SICU  - Full Code

## 2022-07-26 NOTE — PROGRESS NOTE ADULT - SUBJECTIVE AND OBJECTIVE BOX
Overnight events:   - Hyperkalemic- Shifted with insulin + D50 x2. Given Ca gluconate and lasix  - CRRT started for acute renal failure      SUBJECTIVE: Pt seen and examined at bedside.       OBJECTIVE:  Vital Signs Last 24 Hrs  T(C): 36.8 (26 Jul 2022 08:00), Max: 36.9 (25 Jul 2022 20:45)  T(F): 98.3 (26 Jul 2022 08:00), Max: 98.5 (25 Jul 2022 20:45)  HR: 84 (26 Jul 2022 10:00) (72 - 95)  BP: 88/70 (26 Jul 2022 00:30) (75/47 - 130/99)  BP(mean): 78 (26 Jul 2022 00:30) (57 - 103)  RR: 17 (26 Jul 2022 10:00) (14 - 20)  SpO2: 97% (26 Jul 2022 10:00) (95% - 100%)    Parameters below as of 26 Jul 2022 08:00  Patient On (Oxygen Delivery Method): room air          07-25-22 @ 07:01 - 07-26-22 @ 07:00  --------------------------------------------------------  IN: 1692.5 mL / OUT: 985 mL / NET: 707.5 mL    07-26-22 @ 07:01  -  07-26-22 @ 10:50  --------------------------------------------------------  IN: 375 mL / OUT: 300 mL / NET: 75 mL      PHYSICAL EXAM:  General: WN/WD appears uncomfortable  Neurology: drowsy but arousable  Eyes: PERRLA/ EOMI  CV: spiked t waves on EKG and monitor  Abdominal: Soft, nontender, nondistended with open midline incision, ostomy inverted with stool in bag  Extremities: No edema, + peripheral pulses  Skin: No Rashes, Hematoma, Ecchymosis        LABS:                        10.5   7.36  )-----------( 297      ( 26 Jul 2022 07:21 )             30.0       07-26    128<L>  |  78<L>  |  149<H>  ----------------------------<  104<H>  3.1<L>   |  23  |  8.36<H>    Ca    8.0<L>      26 Jul 2022 07:21  Phos  12.7     07-26  Mg     2.40     07-26    TPro  7.4  /  Alb  3.2<L>  /  TBili  0.7  /  DBili  <0.2  /  AST  35<H>  /  ALT  25  /  AlkPhos  148<H>  07-25

## 2022-07-26 NOTE — PROCEDURE NOTE - NSPOSTPRCRAD_GEN_A_CORE
post-procedure radiography performed
left femoral/central line located in the
left femoral vein/central line located in the

## 2022-07-26 NOTE — DIETITIAN INITIAL EVALUATION ADULT - NSFNSGIIOFT_GEN_A_CORE
07-25-22 @ 07:01  -  07-26-22 @ 07:00  --------------------------------------------------------  OUT:    Nasogastric/Oral tube (mL): 300 mL  Total OUT: 300 mL    Total NET: -300 mL

## 2022-07-26 NOTE — PROCEDURE NOTE - NSPROCDETAILS_GEN_ALL_CORE
location identified, draped/prepped, sterile technique used, needle inserted/introduced/positive blood return obtained via catheter/sutured in place/hemostasis with direct pressure, dressing applied/Seldinger technique/all materials/supplies accounted for at end of procedure
nasogastric/placement confirmed by auscultation/gastric secretions aspirated, placement confirmed/connected to suction
guidewire recovered/lumen(s) aspirated and flushed/sterile dressing applied/sterile technique, catheter placed/ultrasound guidance with use of sterile gel and probe cove
guidewire recovered/lumen(s) aspirated and flushed/sterile technique, catheter placed

## 2022-07-26 NOTE — PATIENT PROFILE ADULT - FALL HARM RISK - HARM RISK INTERVENTIONS

## 2022-07-26 NOTE — PROGRESS NOTE ADULT - ATTENDING COMMENTS
The patient was seen and examined, chart and notes reviewed.  The current diagnosis, plan of care and alternatives have been discussed with the patient.  All questions have been answered and updates have been discussed.  The case was discussed with B team residents/PA's and medical students at morning B surgical rounds and throughout the course of the day.    S/P ileostomy  a.  Patient with multiple electrolyte abnormalities  b.  Suspect patient with JAMISON from dehydration as I suspect high ileostomy outputs per provider  c.  CT to be reviewed  d.  Continue daily WTD to midline wound    JAMISON  a.  Continue IVF resuscitation    At risk for malnutrition  a.  NGT in place  b.  May advance diet as tolerated

## 2022-07-26 NOTE — DIETITIAN INITIAL EVALUATION ADULT - PERTINENT LABORATORY DATA
07-26    128<L>  |  78<L>  |  149<H>  ----------------------------<  104<H>  3.1<L>   |  23  |  8.36<H>    Ca    8.0<L>      26 Jul 2022 07:21  Phos  12.7     07-26  Mg     2.40     07-26    TPro  7.4  /  Alb  3.2<L>  /  TBili  0.7  /  DBili  <0.2  /  AST  35<H>  /  ALT  25  /  AlkPhos  148<H>  07-25  POCT Blood Glucose.: 291 mg/dL (07-26-22 @ 00:31)

## 2022-07-26 NOTE — PROCEDURE NOTE - NSPROCNAME_GEN_A_CORE
Arterial Puncture/Cannulation
Gastric Intubation/Gastric Lavage
Central Line Insertion
Central Line Insertion

## 2022-07-26 NOTE — DIETITIAN INITIAL EVALUATION ADULT - OTHER INFO
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 Pt now presenting with n/v found to have acute renal failure requiring CRRT. Pt too lethargic to interview at present. She is NPO with IVF of LR and IVPB fluids. NGT to LWCS with 300 mL output 7/26. Per H&P, pt with [facility] admission last month. She was discharged earlier this month and developed N/V with diminished oral intake over 1 week PTA. Dosing wt last admission 104.3 kg (230 lbs) with admit wt this admission 93.1 kg (205 lbs) with 2+ generalized edema on admission. Pt presents at severe risk for malnutrition based on <75% of estimated nutrition needs met over 1 week PTA with 11% weight loss over 3 weeks PTA and 2+ generalized edema. No GI distress at present - pt with hypoactive bowel sounds. If pt unable to have PO diet initiation, consider enteral feeding. RDN services to remain available as needed.

## 2022-07-26 NOTE — PROCEDURE NOTE - NSPOSTCAREGUIDE_GEN_A_CORE
Care for catheter as per unit/ICU protocols
Care for catheter as per unit/ICU protocols
Instructed patient/caregiver regarding signs and symptoms of infection

## 2022-07-26 NOTE — DIETITIAN INITIAL EVALUATION ADULT - ADD RECOMMEND
1. If unable to initiate PO diet, suggest consideration for enteral feeding. 2. Monitor weights, labs, BM's, skin integrity and edema. 3. Follow pt as per protocol.

## 2022-07-26 NOTE — PROGRESS NOTE ADULT - ATTENDING COMMENTS
I agree with the history, physical, and plan, which I have reviewed and edited where appropriate.  I agree with notes/assessment of health care providers on my service.  I have personally examined the patient.  I was physically present for the key portions of the evaluation and management (E/M) service provided.  I reviewed data and laboratory tests/x-rays and all pertinent electronic images.  The patient is a critical care patient with life threatening hemodynamic and metabolic instability in SICU.  Risk benefit analyses discussed.    The patient is in SICU with diagnosis mentioned in the note.    The plan is specified below.    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 Pt now presenting with n/v found to have acute renal failure requiring urgent dialysis. Pt transferred to SICU for the above. Underlying Etiology dehydration/ileostomy losses vs sepsis. + UTI. Responding well to hydration and CVVHD.    PLAN:  NEUROLOGIC:  - Pain control prn, tylenol ATC    RESPIRATORY:  - Satting well on RA    CARDIOVASCULAR:  - off kiran    GI/NUTRITION: h/o ileostomy   - NPO, NGT to LCWS  - CT A/P to r/o SBO  - Monitor ileostomy function/output    /RENAL: acute renal failure, hyperkalemia, hyponatremia  - d/c sodium bicarb gtt  - hold CVVH, get repeat BMP in 6 hours  - LR@100cc/hr  - Pierce     HEMATOLOGIC:   - Heparin 5000u SQ q8h    INFECTIOUS DISEASE: UTI, Concern for sepsis   - ceftriaxone + flagyl (for possible abdominal source of sepsis)  - f/u UCx    ENDOCRINE:  - ISS

## 2022-07-26 NOTE — PROGRESS NOTE ADULT - ASSESSMENT
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 Pt now presenting with n/v found to have acute renal failure requiring urgent dialysis. Pt transferred to SICU for the above.    PLAN:  - Pain control prn, tylenol ATC  - Monitor EKG  - Heparin 5000u SQ q8h  - Trend lactate elevated to 5.3, now 1.5  - NPO, NGT to LCWS  - CT A/P to r/o SBO  - Monitor ileostomy function/output  - Daily midline wound dressing changes wet-to-dry  - strict I&Os via rajput  - ceftriaxone + flagyl  - f/u UCx    B Team Surgery  [phone]

## 2022-07-26 NOTE — PROGRESS NOTE ADULT - SUBJECTIVE AND OBJECTIVE BOX
HISTORY  67y Female    24 HOUR EVENTS:  - Pt shifted with insulin + D50 x2. Given Ca gluconate and lasix  - CRRT started for acute renal failure    SUBJECTIVE/ROS:  [ ] A ten-point review of systems was otherwise negative except as noted.  [ ] Due to altered mental status/intubation, subjective information were not able to be obtained from the patient. History was obtained, to the extent possible, from review of the chart and collateral sources of information.      NEURO  Exam:   Meds:   [x] Adequacy of sedation and pain control has been assessed and adjusted      RESPIRATORY  RR: 16 (07-26-22 @ 00:30) (14 - 18)  SpO2: 100% (07-26-22 @ 00:30) (95% - 100%)  Wt(kg): --  Exam:   Mechanical Ventilation:   ABG - ( 25 Jul 2022 21:10 )  pH: 7.46  /  pCO2: 26    /  pO2: 109   / HCO3: 18    / Base Excess: -3.9  /  SaO2: 98.4    Lactate: x        Meds:       CARDIOVASCULAR  HR: 79 (07-26-22 @ 00:30) (77 - 95)  BP: 88/70 (07-26-22 @ 00:30) (88/70 - 130/99)  BP(mean): 78 (07-26-22 @ 00:30) (69 - 103)  ABP: 131/60 (07-26-22 @ 00:30) (100/63 - 133/65)  ABP(mean): 83 (07-26-22 @ 00:30) (74 - 88)  Wt(kg): --  CVP(cm H2O): --  VBG - ( 25 Jul 2022 18:11 )  pH: 7.38  /  pCO2: 36    /  pO2: 41    / HCO3: 21    / Base Excess: -3.2  /  SaO2: 49.1   Lactate: 3.3      Meds: phenylephrine    Infusion 0.006 MICROgram(s)/kG/Min IV Continuous <Continuous>        GI/NUTRITION  Exam:  Diet:  Meds:     GENITOURINARY  I&O's Detail    07-25 @ 07:01  -  07-26 @ 00:53  --------------------------------------------------------  IN:    Sodium Bicarbonate: 150 mL    sodium chloride 0.9%: 300 mL  Total IN: 450 mL    OUT:    Indwelling Catheter - Urethral (mL): 305 mL  Total OUT: 305 mL    Total NET: 145 mL        Weight (kg): 93.1 (07-25 @ 22:35)  07-25    123<L>  |  74<L>  |  164<H>  ----------------------------<  71  6.6<HH>   |  16<L>  |  10.24<H>    Ca    8.3<L>      25 Jul 2022 21:10  Phos  15.9     07-25  Mg     1.50     07-25    TPro  7.4  /  Alb  3.2<L>  /  TBili  0.7  /  DBili  <0.2  /  AST  35<H>  /  ALT  25  /  AlkPhos  148<H>  07-25    [X] Pierce catheter  Meds: sodium bicarbonate  Infusion 0.121 mEq/kG/Hr IV Continuous <Continuous>  sodium chloride 0.9% lock flush 10 milliLiter(s) IV Push every 1 hour PRN Pre/post blood products, medications, blood draw, and to maintain line patency  sodium chloride 0.9%. 1000 milliLiter(s) IV Continuous <Continuous>        HEMATOLOGIC  Meds: heparin   Injectable 5000 Unit(s) SubCutaneous every 8 hours    [x] VTE Prophylaxis                        11.9   10.62 )-----------( 398      ( 25 Jul 2022 21:10 )             34.6     PT/INR - ( 25 Jul 2022 16:05 )   PT: 13.6 sec;   INR: 1.17 ratio         PTT - ( 25 Jul 2022 16:05 )  PTT:31.1 sec  Transfusion     [ ] PRBC   [ ] Platelets   [ ] FFP   [ ] Cryoprecipitate      INFECTIOUS DISEASES  T(C): 36.9 (07-25-22 @ 20:45), Max: 36.9 (07-25-22 @ 20:45)  Wt(kg): --  WBC Count: 10.62 K/uL (07-25 @ 21:10)  WBC Count: 10.18 K/uL (07-25 @ 18:11)  WBC Count: 10.65 K/uL (07-25 @ 16:05)    Recent Cultures:    Meds:       ENDOCRINE  Capillary Blood Glucose    Meds:       PATIENT CARE ACCESS DEVICES:  [X] Peripheral IV  [X] Central Venous Line	[ ] R	[X] L	[ ] IJ	[X] Fem	[ ] SC	Placed: 7/25/22 SHI[facility]  [X] Arterial Line		[ ] R	[X] L	[X] Fem	[ ] Rad	[ ] Ax	Placed: 7/25/22  [ ] PICC:					[ ] Mediport  [X] Urinary Catheter, Date Placed: 7/25/22  [x] Necessity of urinary, arterial, and venous catheters discussed    OTHER MEDICATIONS:  chlorhexidine 4% Liquid 1 Application(s) Topical <User Schedule>  CRRT Treatment    <Continuous>  PrismaSOL Filtration BGK 0 / 2.5 5000 milliLiter(s) CRRT <Continuous>  PrismaSOL Filtration BGK 0 / 2.5 5000 milliLiter(s) CRRT <Continuous>  PrismaSOL Filtration BGK 0 / 2.5 5000 milliLiter(s) CRRT <Continuous>      CODE STATUS:     IMAGING:   HISTORY  67y Female    24 HOUR EVENTS:  - Shifted with insulin + D50 x2. Given Ca gluconate and lasix  - CRRT started for acute renal failure    SUBJECTIVE/ROS:  [ ] A ten-point review of systems was otherwise negative except as noted.  [ ] Due to altered mental status/intubation, subjective information were not able to be obtained from the patient. History was obtained, to the extent possible, from review of the chart and collateral sources of information.      NEURO  Exam:   Meds:   [x] Adequacy of sedation and pain control has been assessed and adjusted      RESPIRATORY  RR: 16 (07-26-22 @ 00:30) (14 - 18)  SpO2: 100% (07-26-22 @ 00:30) (95% - 100%)  Wt(kg): --  Exam:   Mechanical Ventilation:   ABG - ( 25 Jul 2022 21:10 )  pH: 7.46  /  pCO2: 26    /  pO2: 109   / HCO3: 18    / Base Excess: -3.9  /  SaO2: 98.4    Lactate: x        Meds:       CARDIOVASCULAR  HR: 79 (07-26-22 @ 00:30) (77 - 95)  BP: 88/70 (07-26-22 @ 00:30) (88/70 - 130/99)  BP(mean): 78 (07-26-22 @ 00:30) (69 - 103)  ABP: 131/60 (07-26-22 @ 00:30) (100/63 - 133/65)  ABP(mean): 83 (07-26-22 @ 00:30) (74 - 88)  Wt(kg): --  CVP(cm H2O): --  VBG - ( 25 Jul 2022 18:11 )  pH: 7.38  /  pCO2: 36    /  pO2: 41    / HCO3: 21    / Base Excess: -3.2  /  SaO2: 49.1   Lactate: 3.3      Meds: phenylephrine    Infusion 0.006 MICROgram(s)/kG/Min IV Continuous <Continuous>        GI/NUTRITION  Exam:  Diet:  Meds:     GENITOURINARY  I&O's Detail    07-25 @ 07:01  -  07-26 @ 00:53  --------------------------------------------------------  IN:    Sodium Bicarbonate: 150 mL    sodium chloride 0.9%: 300 mL  Total IN: 450 mL    OUT:    Indwelling Catheter - Urethral (mL): 305 mL  Total OUT: 305 mL    Total NET: 145 mL        Weight (kg): 93.1 (07-25 @ 22:35)  07-25    123<L>  |  74<L>  |  164<H>  ----------------------------<  71  6.6<HH>   |  16<L>  |  10.24<H>    Ca    8.3<L>      25 Jul 2022 21:10  Phos  15.9     07-25  Mg     1.50     07-25    TPro  7.4  /  Alb  3.2<L>  /  TBili  0.7  /  DBili  <0.2  /  AST  35<H>  /  ALT  25  /  AlkPhos  148<H>  07-25    [X] Pierce catheter  Meds: sodium bicarbonate  Infusion 0.121 mEq/kG/Hr IV Continuous <Continuous>  sodium chloride 0.9% lock flush 10 milliLiter(s) IV Push every 1 hour PRN Pre/post blood products, medications, blood draw, and to maintain line patency  sodium chloride 0.9%. 1000 milliLiter(s) IV Continuous <Continuous>        HEMATOLOGIC  Meds: heparin   Injectable 5000 Unit(s) SubCutaneous every 8 hours    [x] VTE Prophylaxis                        11.9   10.62 )-----------( 398      ( 25 Jul 2022 21:10 )             34.6     PT/INR - ( 25 Jul 2022 16:05 )   PT: 13.6 sec;   INR: 1.17 ratio         PTT - ( 25 Jul 2022 16:05 )  PTT:31.1 sec  Transfusion     [ ] PRBC   [ ] Platelets   [ ] FFP   [ ] Cryoprecipitate      INFECTIOUS DISEASES  T(C): 36.9 (07-25-22 @ 20:45), Max: 36.9 (07-25-22 @ 20:45)  Wt(kg): --  WBC Count: 10.62 K/uL (07-25 @ 21:10)  WBC Count: 10.18 K/uL (07-25 @ 18:11)  WBC Count: 10.65 K/uL (07-25 @ 16:05)    Recent Cultures:    Meds:       ENDOCRINE  Capillary Blood Glucose    Meds:       PATIENT CARE ACCESS DEVICES:  [X] Peripheral IV  [X] Central Venous Line	[ ] R	[X] L	[ ] IJ	[X] Fem	[ ] SC	Placed: 7/25/22 [facility]  [X] Arterial Line		[ ] R	[X] L	[X] Fem	[ ] Rad	[ ] Ax	Placed: 7/25/22  [ ] PICC:					[ ] Mediport  [X] Urinary Catheter, Date Placed: 7/25/22  [x] Necessity of urinary, arterial, and venous catheters discussed    OTHER MEDICATIONS:  chlorhexidine 4% Liquid 1 Application(s) Topical <User Schedule>  CRRT Treatment    <Continuous>  PrismaSOL Filtration BGK 0 / 2.5 5000 milliLiter(s) CRRT <Continuous>  PrismaSOL Filtration BGK 0 / 2.5 5000 milliLiter(s) CRRT <Continuous>  PrismaSOL Filtration BGK 0 / 2.5 5000 milliLiter(s) CRRT <Continuous>      CODE STATUS:     IMAGING:   HISTORY  67y Female    24 HOUR EVENTS:  - CTAP w/ PO contrast today   - no CVVHD   - repeat BMP and ABG at 15:00   - dc'd bicarb drip, NS changed to 100 cc/hr of LR   - added flagyl     SUBJECTIVE/ROS:  [ ] A ten-point review of systems was otherwise negative except as noted.  [ ] Due to altered mental status/intubation, subjective information were not able to be obtained from the patient. History was obtained, to the extent possible, from review of the chart and collateral sources of information.      NEURO  Exam:   Meds:   [x] Adequacy of sedation and pain control has been assessed and adjusted      RESPIRATORY  RR: 16 (07-26-22 @ 00:30) (14 - 18)  SpO2: 100% (07-26-22 @ 00:30) (95% - 100%)  Wt(kg): --  Exam:   Mechanical Ventilation:   ABG - ( 25 Jul 2022 21:10 )  pH: 7.46  /  pCO2: 26    /  pO2: 109   / HCO3: 18    / Base Excess: -3.9  /  SaO2: 98.4    Lactate: x        Meds:       CARDIOVASCULAR  HR: 79 (07-26-22 @ 00:30) (77 - 95)  BP: 88/70 (07-26-22 @ 00:30) (88/70 - 130/99)  BP(mean): 78 (07-26-22 @ 00:30) (69 - 103)  ABP: 131/60 (07-26-22 @ 00:30) (100/63 - 133/65)  ABP(mean): 83 (07-26-22 @ 00:30) (74 - 88)  Wt(kg): --  CVP(cm H2O): --  VBG - ( 25 Jul 2022 18:11 )  pH: 7.38  /  pCO2: 36    /  pO2: 41    / HCO3: 21    / Base Excess: -3.2  /  SaO2: 49.1   Lactate: 3.3      Meds: phenylephrine    Infusion 0.006 MICROgram(s)/kG/Min IV Continuous <Continuous>        GI/NUTRITION  Exam:  Diet:  Meds:     GENITOURINARY  I&O's Detail    07-25 @ 07:01  -  07-26 @ 00:53  --------------------------------------------------------  IN:    Sodium Bicarbonate: 150 mL    sodium chloride 0.9%: 300 mL  Total IN: 450 mL    OUT:    Indwelling Catheter - Urethral (mL): 305 mL  Total OUT: 305 mL    Total NET: 145 mL        Weight (kg): 93.1 (07-25 @ 22:35)  07-25    123<L>  |  74<L>  |  164<H>  ----------------------------<  71  6.6<HH>   |  16<L>  |  10.24<H>    Ca    8.3<L>      25 Jul 2022 21:10  Phos  15.9     07-25  Mg     1.50     07-25    TPro  7.4  /  Alb  3.2<L>  /  TBili  0.7  /  DBili  <0.2  /  AST  35<H>  /  ALT  25  /  AlkPhos  148<H>  07-25    [X] Pierce catheter  Meds: sodium bicarbonate  Infusion 0.121 mEq/kG/Hr IV Continuous <Continuous>  sodium chloride 0.9% lock flush 10 milliLiter(s) IV Push every 1 hour PRN Pre/post blood products, medications, blood draw, and to maintain line patency  sodium chloride 0.9%. 1000 milliLiter(s) IV Continuous <Continuous>        HEMATOLOGIC  Meds: heparin   Injectable 5000 Unit(s) SubCutaneous every 8 hours    [x] VTE Prophylaxis                        11.9   10.62 )-----------( 398      ( 25 Jul 2022 21:10 )             34.6     PT/INR - ( 25 Jul 2022 16:05 )   PT: 13.6 sec;   INR: 1.17 ratio         PTT - ( 25 Jul 2022 16:05 )  PTT:31.1 sec  Transfusion     [ ] PRBC   [ ] Platelets   [ ] FFP   [ ] Cryoprecipitate      INFECTIOUS DISEASES  T(C): 36.9 (07-25-22 @ 20:45), Max: 36.9 (07-25-22 @ 20:45)  Wt(kg): --  WBC Count: 10.62 K/uL (07-25 @ 21:10)  WBC Count: 10.18 K/uL (07-25 @ 18:11)  WBC Count: 10.65 K/uL (07-25 @ 16:05)    Recent Cultures:    Meds:       ENDOCRINE  Capillary Blood Glucose    Meds:       PATIENT CARE ACCESS DEVICES:  [X] Peripheral IV  [X] Central Venous Line	[ ] R	[X] L	[ ] IJ	[X] Fem	[ ] SC	Placed: 7/25/22 [facility]  [X] Arterial Line		[ ] R	[X] L	[X] Fem	[ ] Rad	[ ] Ax	Placed: 7/25/22  [ ] PICC:					[ ] Mediport  [X] Urinary Catheter, Date Placed: 7/25/22  [x] Necessity of urinary, arterial, and venous catheters discussed    OTHER MEDICATIONS:  chlorhexidine 4% Liquid 1 Application(s) Topical <User Schedule>  CRRT Treatment    <Continuous>  PrismaSOL Filtration BGK 0 / 2.5 5000 milliLiter(s) CRRT <Continuous>  PrismaSOL Filtration BGK 0 / 2.5 5000 milliLiter(s) CRRT <Continuous>  PrismaSOL Filtration BGK 0 / 2.5 5000 milliLiter(s) CRRT <Continuous>      CODE STATUS:     IMAGING:

## 2022-07-26 NOTE — DIETITIAN INITIAL EVALUATION ADULT - SIGNS/SYMPTOMS
<75% of est nutr needs met over 1 week PTA w/ 11% wt loss over 3 weeks PTA and 2+ generalized edema initiation of CRRT 7/25

## 2022-07-27 DIAGNOSIS — E87.6 HYPOKALEMIA: ICD-10-CM

## 2022-07-27 LAB
ALBUMIN SERPL ELPH-MCNC: 2.6 G/DL — LOW (ref 3.3–5)
ALP SERPL-CCNC: 114 U/L — SIGNIFICANT CHANGE UP (ref 40–120)
ALT FLD-CCNC: 17 U/L — SIGNIFICANT CHANGE UP (ref 4–33)
ANION GAP SERPL CALC-SCNC: 19 MMOL/L — HIGH (ref 7–14)
ANION GAP SERPL CALC-SCNC: 20 MMOL/L — HIGH (ref 7–14)
AST SERPL-CCNC: 19 U/L — SIGNIFICANT CHANGE UP (ref 4–32)
BILIRUB SERPL-MCNC: 0.8 MG/DL — SIGNIFICANT CHANGE UP (ref 0.2–1.2)
BLOOD GAS ARTERIAL - LYTES,HGB,ICA,LACT RESULT: SIGNIFICANT CHANGE UP
BLOOD GAS ARTERIAL COMPREHENSIVE RESULT: SIGNIFICANT CHANGE UP
BUN SERPL-MCNC: 113 MG/DL — HIGH (ref 7–23)
BUN SERPL-MCNC: 117 MG/DL — HIGH (ref 7–23)
CALCIUM SERPL-MCNC: 8.2 MG/DL — LOW (ref 8.4–10.5)
CALCIUM SERPL-MCNC: 8.2 MG/DL — LOW (ref 8.4–10.5)
CHLORIDE SERPL-SCNC: 85 MMOL/L — LOW (ref 98–107)
CHLORIDE SERPL-SCNC: 85 MMOL/L — LOW (ref 98–107)
CO2 SERPL-SCNC: 23 MMOL/L — SIGNIFICANT CHANGE UP (ref 22–31)
CO2 SERPL-SCNC: 24 MMOL/L — SIGNIFICANT CHANGE UP (ref 22–31)
CREAT SERPL-MCNC: 5.29 MG/DL — HIGH (ref 0.5–1.3)
CREAT SERPL-MCNC: 5.92 MG/DL — HIGH (ref 0.5–1.3)
CULTURE RESULTS: SIGNIFICANT CHANGE UP
EGFR: 7 ML/MIN/1.73M2 — LOW
EGFR: 8 ML/MIN/1.73M2 — LOW
GLUCOSE BLDC GLUCOMTR-MCNC: 102 MG/DL — HIGH (ref 70–99)
GLUCOSE BLDC GLUCOMTR-MCNC: 111 MG/DL — HIGH (ref 70–99)
GLUCOSE BLDC GLUCOMTR-MCNC: 122 MG/DL — HIGH (ref 70–99)
GLUCOSE BLDC GLUCOMTR-MCNC: 92 MG/DL — SIGNIFICANT CHANGE UP (ref 70–99)
GLUCOSE SERPL-MCNC: 107 MG/DL — HIGH (ref 70–99)
GLUCOSE SERPL-MCNC: 78 MG/DL — SIGNIFICANT CHANGE UP (ref 70–99)
HCT VFR BLD CALC: 28 % — LOW (ref 34.5–45)
HCT VFR BLD CALC: 28.6 % — LOW (ref 34.5–45)
HGB BLD-MCNC: 9.1 G/DL — LOW (ref 11.5–15.5)
HGB BLD-MCNC: 9.2 G/DL — LOW (ref 11.5–15.5)
MAGNESIUM SERPL-MCNC: 1.8 MG/DL — SIGNIFICANT CHANGE UP (ref 1.6–2.6)
MAGNESIUM SERPL-MCNC: 1.9 MG/DL — SIGNIFICANT CHANGE UP (ref 1.6–2.6)
MCHC RBC-ENTMCNC: 26 PG — LOW (ref 27–34)
MCHC RBC-ENTMCNC: 26.1 PG — LOW (ref 27–34)
MCHC RBC-ENTMCNC: 32.2 GM/DL — SIGNIFICANT CHANGE UP (ref 32–36)
MCHC RBC-ENTMCNC: 32.5 GM/DL — SIGNIFICANT CHANGE UP (ref 32–36)
MCV RBC AUTO: 80 FL — SIGNIFICANT CHANGE UP (ref 80–100)
MCV RBC AUTO: 81.3 FL — SIGNIFICANT CHANGE UP (ref 80–100)
NRBC # BLD: 0 /100 WBCS — SIGNIFICANT CHANGE UP
NRBC # BLD: 0 /100 WBCS — SIGNIFICANT CHANGE UP
NRBC # FLD: 0 K/UL — SIGNIFICANT CHANGE UP
NRBC # FLD: 0 K/UL — SIGNIFICANT CHANGE UP
PHOSPHATE SERPL-MCNC: 7.1 MG/DL — HIGH (ref 2.5–4.5)
PHOSPHATE SERPL-MCNC: 8.5 MG/DL — HIGH (ref 2.5–4.5)
PLATELET # BLD AUTO: 265 K/UL — SIGNIFICANT CHANGE UP (ref 150–400)
PLATELET # BLD AUTO: 270 K/UL — SIGNIFICANT CHANGE UP (ref 150–400)
POTASSIUM SERPL-MCNC: 3 MMOL/L — LOW (ref 3.5–5.3)
POTASSIUM SERPL-MCNC: 3.2 MMOL/L — LOW (ref 3.5–5.3)
POTASSIUM SERPL-SCNC: 3 MMOL/L — LOW (ref 3.5–5.3)
POTASSIUM SERPL-SCNC: 3.2 MMOL/L — LOW (ref 3.5–5.3)
PROT SERPL-MCNC: 6.3 G/DL — SIGNIFICANT CHANGE UP (ref 6–8.3)
RBC # BLD: 3.5 M/UL — LOW (ref 3.8–5.2)
RBC # BLD: 3.52 M/UL — LOW (ref 3.8–5.2)
RBC # FLD: 16.2 % — HIGH (ref 10.3–14.5)
RBC # FLD: 16.5 % — HIGH (ref 10.3–14.5)
SODIUM SERPL-SCNC: 127 MMOL/L — LOW (ref 135–145)
SODIUM SERPL-SCNC: 129 MMOL/L — LOW (ref 135–145)
SPECIMEN SOURCE: SIGNIFICANT CHANGE UP
WBC # BLD: 6.1 K/UL — SIGNIFICANT CHANGE UP (ref 3.8–10.5)
WBC # BLD: 6.37 K/UL — SIGNIFICANT CHANGE UP (ref 3.8–10.5)
WBC # FLD AUTO: 6.1 K/UL — SIGNIFICANT CHANGE UP (ref 3.8–10.5)
WBC # FLD AUTO: 6.37 K/UL — SIGNIFICANT CHANGE UP (ref 3.8–10.5)

## 2022-07-27 PROCEDURE — 99232 SBSQ HOSP IP/OBS MODERATE 35: CPT | Mod: 24,GC

## 2022-07-27 PROCEDURE — 99233 SBSQ HOSP IP/OBS HIGH 50: CPT | Mod: GC

## 2022-07-27 PROCEDURE — ZZZZZ: CPT

## 2022-07-27 RX ORDER — DEXTROSE 50 % IN WATER 50 %
25 SYRINGE (ML) INTRAVENOUS ONCE
Refills: 0 | Status: DISCONTINUED | OUTPATIENT
Start: 2022-07-27 | End: 2022-07-29

## 2022-07-27 RX ORDER — POTASSIUM CHLORIDE 20 MEQ
40 PACKET (EA) ORAL ONCE
Refills: 0 | Status: COMPLETED | OUTPATIENT
Start: 2022-07-27 | End: 2022-07-27

## 2022-07-27 RX ORDER — DEXTROSE 50 % IN WATER 50 %
12.5 SYRINGE (ML) INTRAVENOUS ONCE
Refills: 0 | Status: DISCONTINUED | OUTPATIENT
Start: 2022-07-27 | End: 2022-07-29

## 2022-07-27 RX ORDER — GLUCAGON INJECTION, SOLUTION 0.5 MG/.1ML
1 INJECTION, SOLUTION SUBCUTANEOUS ONCE
Refills: 0 | Status: DISCONTINUED | OUTPATIENT
Start: 2022-07-27 | End: 2022-07-29

## 2022-07-27 RX ORDER — INSULIN LISPRO 100/ML
VIAL (ML) SUBCUTANEOUS
Refills: 0 | Status: DISCONTINUED | OUTPATIENT
Start: 2022-07-27 | End: 2022-07-29

## 2022-07-27 RX ORDER — SODIUM CHLORIDE 9 MG/ML
1000 INJECTION, SOLUTION INTRAVENOUS
Refills: 0 | Status: DISCONTINUED | OUTPATIENT
Start: 2022-07-27 | End: 2022-07-29

## 2022-07-27 RX ORDER — SODIUM CHLORIDE 9 MG/ML
500 INJECTION INTRAMUSCULAR; INTRAVENOUS; SUBCUTANEOUS ONCE
Refills: 0 | Status: COMPLETED | OUTPATIENT
Start: 2022-07-27 | End: 2022-07-27

## 2022-07-27 RX ORDER — POTASSIUM CHLORIDE 20 MEQ
10 PACKET (EA) ORAL
Refills: 0 | Status: COMPLETED | OUTPATIENT
Start: 2022-07-27 | End: 2022-07-27

## 2022-07-27 RX ORDER — DIPHENOXYLATE HCL/ATROPINE 2.5-.025MG
1 TABLET ORAL DAILY
Refills: 0 | Status: DISCONTINUED | OUTPATIENT
Start: 2022-07-27 | End: 2022-07-29

## 2022-07-27 RX ORDER — DEXTROSE 50 % IN WATER 50 %
15 SYRINGE (ML) INTRAVENOUS ONCE
Refills: 0 | Status: DISCONTINUED | OUTPATIENT
Start: 2022-07-27 | End: 2022-07-29

## 2022-07-27 RX ORDER — INSULIN LISPRO 100/ML
VIAL (ML) SUBCUTANEOUS AT BEDTIME
Refills: 0 | Status: DISCONTINUED | OUTPATIENT
Start: 2022-07-27 | End: 2022-07-29

## 2022-07-27 RX ORDER — ACETAMINOPHEN 500 MG
650 TABLET ORAL ONCE
Refills: 0 | Status: COMPLETED | OUTPATIENT
Start: 2022-07-27 | End: 2022-07-27

## 2022-07-27 RX ADMIN — Medication 100 MILLIEQUIVALENT(S): at 13:00

## 2022-07-27 RX ADMIN — Medication 4 MILLIGRAM(S): at 23:03

## 2022-07-27 RX ADMIN — HEPARIN SODIUM 5000 UNIT(S): 5000 INJECTION INTRAVENOUS; SUBCUTANEOUS at 06:06

## 2022-07-27 RX ADMIN — Medication 4 MILLIGRAM(S): at 06:05

## 2022-07-27 RX ADMIN — HEPARIN SODIUM 5000 UNIT(S): 5000 INJECTION INTRAVENOUS; SUBCUTANEOUS at 13:00

## 2022-07-27 RX ADMIN — Medication 400 MILLIGRAM(S): at 01:00

## 2022-07-27 RX ADMIN — CHLORHEXIDINE GLUCONATE 1 APPLICATION(S): 213 SOLUTION TOPICAL at 06:11

## 2022-07-27 RX ADMIN — SODIUM CHLORIDE 100 MILLILITER(S): 9 INJECTION, SOLUTION INTRAVENOUS at 10:10

## 2022-07-27 RX ADMIN — Medication 100 MILLIEQUIVALENT(S): at 10:10

## 2022-07-27 RX ADMIN — Medication 40 MILLIEQUIVALENT(S): at 04:12

## 2022-07-27 RX ADMIN — Medication 4 MILLIGRAM(S): at 19:39

## 2022-07-27 RX ADMIN — Medication 400 MILLIGRAM(S): at 06:56

## 2022-07-27 RX ADMIN — Medication 100 MILLIEQUIVALENT(S): at 11:56

## 2022-07-27 RX ADMIN — SODIUM CHLORIDE 100 MILLILITER(S): 9 INJECTION, SOLUTION INTRAVENOUS at 06:04

## 2022-07-27 RX ADMIN — SODIUM CHLORIDE 250 MILLILITER(S): 9 INJECTION INTRAMUSCULAR; INTRAVENOUS; SUBCUTANEOUS at 22:40

## 2022-07-27 RX ADMIN — CEFTRIAXONE 100 MILLIGRAM(S): 500 INJECTION, POWDER, FOR SOLUTION INTRAMUSCULAR; INTRAVENOUS at 06:04

## 2022-07-27 RX ADMIN — HEPARIN SODIUM 5000 UNIT(S): 5000 INJECTION INTRAVENOUS; SUBCUTANEOUS at 21:40

## 2022-07-27 RX ADMIN — Medication 1 TABLET(S): at 19:39

## 2022-07-27 RX ADMIN — Medication 4 MILLIGRAM(S): at 12:14

## 2022-07-27 NOTE — PROGRESS NOTE ADULT - ATTENDING COMMENTS
Hyperkalemia   JAMISON  Metabolic acidosis  Hyponatremia    IVF recommended overnight with improvement in numbers with NS and bicarb gtt.    Can stop bicarb gtt and continue fluid resuscitation for now. Will continue to monitor for RRT needs however currently not necessary.     Monitor labs, Is/Os and daily weights.

## 2022-07-27 NOTE — PROGRESS NOTE ADULT - ASSESSMENT
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 Pt now presenting with n/v found to have acute renal failure requiring urgent dialysis. Pt transferred to SICU for the above, pt's condition improving.    PLAN:  - Pain control prn, tylenol ATC  - Monitor EKG  - Heparin 5000u SQ q8h  - Trend lactate elevated to 5.3, now 1.5  - NPO, NGT to LCWS  - CT A/P to r/o SBO  - Monitor ileostomy function/output  - Daily midline wound dressing changes wet-to-dry  - imodium  - strict I&Os via rajput  - ceftriaxone + flagyl  - f/u UCx    B Team Surgery  [phone]

## 2022-07-27 NOTE — CHART NOTE - NSCHARTNOTEFT_GEN_A_CORE
SICU to Floor Note    Pt has arrived to the floor with family. As per family, she is feeling better, able to eat a regular diet without N/V. She is making adequate urine through rajput. Pt has downtrending creatinine and has not needed dialysis.       OBJECTIVE --  Vital Signs Last 24 Hrs  T(C): 36.7 (27 Jul 2022 14:00), Max: 36.9 (26 Jul 2022 20:00)  T(F): 98.1 (27 Jul 2022 14:00), Max: 98.5 (26 Jul 2022 20:00)  HR: 75 (27 Jul 2022 14:00) (63 - 83)  BP: 102/54 (27 Jul 2022 14:00) (102/54 - 102/67)  BP(mean): 75 (27 Jul 2022 14:00) (75 - 77)  RR: 16 (27 Jul 2022 14:00) (14 - 20)  SpO2: 100% (27 Jul 2022 14:00) (95% - 100%)    Parameters below as of 27 Jul 2022 14:00  Patient On (Oxygen Delivery Method): room air        I&O's Summary    26 Jul 2022 07:01  -  27 Jul 2022 07:00  --------------------------------------------------------  IN: 2875 mL / OUT: 3230 mL / NET: -355 mL    27 Jul 2022 07:01  -  27 Jul 2022 18:10  --------------------------------------------------------  IN: 900 mL / OUT: 420 mL / NET: 480 mL        MEDICATIONS  (STANDING):  cefTRIAXone   IVPB 1000 milliGRAM(s) IV Intermittent every 24 hours  chlorhexidine 4% Liquid 1 Application(s) Topical <User Schedule>  dextrose 5% + lactated ringers. 1000 milliLiter(s) (100 mL/Hr) IV Continuous <Continuous>  diphenoxylate/atropine 1 Tablet(s) Oral daily  heparin   Injectable 5000 Unit(s) SubCutaneous every 8 hours  loperamide 4 milliGRAM(s) Oral every 6 hours    MEDICATIONS  (PRN):      LABS                                            9.2                   Neurophils% (auto):   x      (07-27 @ 07:30):    6.10 )-----------(270          Lymphocytes% (auto):  x                                             28.6                   Eosinphils% (auto):   x        Manual%: Neutrophils x    ; Lymphocytes x    ; Eosinophils x    ; Bands%: x    ; Blasts x                                    129    |  85     |  113                 Calcium: 8.2   / iCa: x      (07-27 @ 07:30)    ----------------------------<  107       Magnesium: 1.80                             3.2     |  24     |  5.29             Phosphorous: 7.1      TPro  6.3    /  Alb  2.6    /  TBili  0.8    /  DBili  x      /  AST  19     /  ALT  17     /  AlkPhos  114    27 Jul 2022 01:25      ASSESSMENT  66yo F (Hebrew dialect speaking) h/o HTN, HLD, s/p lap to open repair of incarcerated ventral hernia c/b enterotomy (repaired), hernia defect repair 6/15 and enterotomy repair, SBR, and 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 Pt now presenting with n/v found to have acute renal failure requiring urgent dialysis. Pt transferred to SICU for the above, pt's condition improving.    PLAN:  - Pain control prn, tylenol  - Heparin 5000u SQ q8h  - Trend lactate elevated to 5.3, now 1.5  - regular diet  - CT A/P to r/o SBO  - Monitor ileostomy function/output  - Daily midline wound dressing changes wet-to-dry  - imodium, lomotil  - strict I&Os via rajput  - ceftriaxone + flagyl  - f/u UCx    B Team Surgery  [phone] SICU to Floor Note    Pt has arrived to the floor with family. As per family, she is feeling better, able to eat a regular diet without N/V. She is making adequate urine through rajput. Pt has downtrending creatinine and has not needed dialysis.       OBJECTIVE --  Vital Signs Last 24 Hrs  T(C): 36.7 (27 Jul 2022 14:00), Max: 36.9 (26 Jul 2022 20:00)  T(F): 98.1 (27 Jul 2022 14:00), Max: 98.5 (26 Jul 2022 20:00)  HR: 75 (27 Jul 2022 14:00) (63 - 83)  BP: 102/54 (27 Jul 2022 14:00) (102/54 - 102/67)  BP(mean): 75 (27 Jul 2022 14:00) (75 - 77)  RR: 16 (27 Jul 2022 14:00) (14 - 20)  SpO2: 100% (27 Jul 2022 14:00) (95% - 100%)    Parameters below as of 27 Jul 2022 14:00  Patient On (Oxygen Delivery Method): room air        I&O's Summary    26 Jul 2022 07:01  -  27 Jul 2022 07:00  --------------------------------------------------------  IN: 2875 mL / OUT: 3230 mL / NET: -355 mL    27 Jul 2022 07:01  -  27 Jul 2022 18:10  --------------------------------------------------------  IN: 900 mL / OUT: 420 mL / NET: 480 mL    Physical Examination:  GEN: NAD, resting quietly  PULM: symmetric chest rise bilaterally, no increased WOB  ABD: Abdominal: Soft, nontender, nondistended with open midline incision, ostomy inverted with stool in bag  EXTR: no LE erythema, moving all extremities      MEDICATIONS  (STANDING):  cefTRIAXone   IVPB 1000 milliGRAM(s) IV Intermittent every 24 hours  chlorhexidine 4% Liquid 1 Application(s) Topical <User Schedule>  dextrose 5% + lactated ringers. 1000 milliLiter(s) (100 mL/Hr) IV Continuous <Continuous>  diphenoxylate/atropine 1 Tablet(s) Oral daily  heparin   Injectable 5000 Unit(s) SubCutaneous every 8 hours  loperamide 4 milliGRAM(s) Oral every 6 hours    MEDICATIONS  (PRN):      LABS                                            9.2                   Neurophils% (auto):   x      (07-27 @ 07:30):    6.10 )-----------(270          Lymphocytes% (auto):  x                                             28.6                   Eosinphils% (auto):   x        Manual%: Neutrophils x    ; Lymphocytes x    ; Eosinophils x    ; Bands%: x    ; Blasts x                                    129    |  85     |  113                 Calcium: 8.2   / iCa: x      (07-27 @ 07:30)    ----------------------------<  107       Magnesium: 1.80                             3.2     |  24     |  5.29             Phosphorous: 7.1      TPro  6.3    /  Alb  2.6    /  TBili  0.8    /  DBili  x      /  AST  19     /  ALT  17     /  AlkPhos  114    27 Jul 2022 01:25      ASSESSMENT  66yo F ([place] dialect speaking) h/o HTN, HLD, s/p lap to open repair of incarcerated ventral hernia c/b enterotomy (repaired), hernia defect repair 6/15 and enterotomy repair, SBR, and 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 Pt now presenting with n/v found to have acute renal failure requiring urgent dialysis. Pt transferred to SICU for the above, pt's condition improving.    PLAN:  - Pain control prn, tylenol  - Heparin 5000u SQ q8h  - Trend lactate elevated to 5.3, now 1.5  - regular diet  - CT A/P to r/o SBO  - Monitor ileostomy function/output  - Daily midline wound dressing changes wet-to-dry  - imodium, lomotil  - strict I&Os via rajput  - ceftriaxone + flagyl  - f/u UCx    B Team Surgery  [phone]

## 2022-07-27 NOTE — PROGRESS NOTE ADULT - SUBJECTIVE AND OBJECTIVE BOX
HISTORY  67y Female    24 HOUR EVENTS:  Interval Events  - s/p CTAP w/ PO contrast yesterday, no SBO seen on imaging   - no CVVHD   - dc'd bicarb drip, NS changed to 100 cc/hr of LR   - Hypokalemia. Repleted      NEURO  Exam: awake, responsive to questions  Meds: acetaminophen   IVPB .. 1000 milliGRAM(s) IV Intermittent every 6 hours    [x] Adequacy of sedation and pain control has been assessed and adjusted      RESPIRATORY  RR: 17 (07-26-22 @ 22:00) (14 - 19)  SpO2: 96% (07-26-22 @ 22:00) (95% - 100%)  Wt(kg): --  Exam: unlabored, no increased WOB on RA  Mechanical Ventilation:   ABG - ( 26 Jul 2022 18:35 )  pH: 7.47  /  pCO2: 34    /  pO2: 110   / HCO3: 25    / Base Excess: 1.2   /  SaO2: 99.4    Lactate: x          CARDIOVASCULAR  HR: 77 (07-26-22 @ 22:00) (63 - 91)  BP: 88/70 (07-26-22 @ 00:30) (88/70 - 97/57)  BP(mean): 78 (07-26-22 @ 00:30) (69 - 78)  ABP: 103/62 (07-26-22 @ 22:00) (76/49 - 133/72)  ABP(mean): 74 (07-26-22 @ 22:00) (58 - 91)  Wt(kg): --  CVP(cm H2O): --  VBG - ( 25 Jul 2022 18:11 )  pH: 7.38  /  pCO2: 36    /  pO2: 41    / HCO3: 21    / Base Excess: -3.2  /  SaO2: 49.1   Lactate: 3.3      Exam: appears well perfused  Cardiac Rhythm: normal sinus rhythm    Meds:       GI/NUTRITION  Exam: soft, nondistended. Ileostomy bag with minimal stool. Midline open wound covered with dressing  Diet:  Meds: loperamide 4 milliGRAM(s) Oral every 6 hours      GENITOURINARY  I&O's Detail    07-25 @ 07:01  -  07-26 @ 07:00  --------------------------------------------------------  IN:    Phenylephrine: 10.5 mL    Phenylephrine: 7 mL    Sodium Bicarbonate: 675 mL    sodium chloride 0.9%: 1000 mL  Total IN: 1692.5 mL    OUT:    Indwelling Catheter - Urethral (mL): 685 mL    Nasogastric/Oral tube (mL): 300 mL  Total OUT: 985 mL    Total NET: 707.5 mL      07-26 @ 07:01  -  07-27 @ 00:11  --------------------------------------------------------  IN:    IV PiggyBack: 300 mL    Lactated Ringers: 1200 mL    Oral Fluid: 100 mL    Sodium Bicarbonate: 75 mL    sodium chloride 0.9%: 100 mL  Total IN: 1775 mL    OUT:    Ileostomy (mL): 900 mL    Indwelling Catheter - Urethral (mL): 1265 mL  Total OUT: 2165 mL    Total NET: -390 mL          07-26    128<L>  |  83<L>  |  123<H>  ----------------------------<  87  2.7<LL>   |  24  |  6.71<H>    Ca    7.8<L>      26 Jul 2022 18:35  Phos  10.2     07-26  Mg     2.10     07-26    TPro  7.4  /  Alb  3.2<L>  /  TBili  0.7  /  DBili  <0.2  /  AST  35<H>  /  ALT  25  /  AlkPhos  148<H>  07-25    [X] Pierce catheter, indication: acute renal failure  Meds: lactated ringers. 1000 milliLiter(s) IV Continuous <Continuous>        HEMATOLOGIC  Meds: heparin   Injectable 5000 Unit(s) SubCutaneous every 8 hours    [x] VTE Prophylaxis                        9.7    6.19  )-----------( 271      ( 26 Jul 2022 18:35 )             29.4     PT/INR - ( 26 Jul 2022 01:47 )   PT: 13.9 sec;   INR: 1.20 ratio         PTT - ( 26 Jul 2022 01:47 )  PTT:28.1 sec  Transfusion     [ ] PRBC   [ ] Platelets   [ ] FFP   [ ] Cryoprecipitate      INFECTIOUS DISEASES  T(C): 36.9 (07-26-22 @ 20:00), Max: 37.1 (07-26-22 @ 12:00)  Wt(kg): --  WBC Count: 6.19 K/uL (07-26 @ 18:35)  WBC Count: 7.53 K/uL (07-26 @ 13:50)  WBC Count: 7.36 K/uL (07-26 @ 07:21)  WBC Count: 11.03 K/uL (07-26 @ 01:47)    Recent Cultures:    Meds: cefTRIAXone   IVPB 1000 milliGRAM(s) IV Intermittent every 24 hours        ENDOCRINE  Capillary Blood Glucose    Meds:       PATIENT CARE ACCESS DEVICES:  [X] Peripheral IV  [X] Central Venous Line	[ ] R	[X] L	[ ] IJ	[X] Fem	[ ] SC	Placed: 7/25/22 SHILEY  [X] Arterial Line		[ ] R	[X] L	[X] Fem	[ ] Rad	[ ] Ax	Placed: 7/25/22  [ ] PICC:					[ ] Mediport  [X] Urinary Catheter, Date Placed: 7/25/22  [x] Necessity of urinary, arterial, and venous catheters discussed    OTHER MEDICATIONS:  chlorhexidine 4% Liquid 1 Application(s) Topical <User Schedule>  CRRT Treatment    <Continuous>  PrismaSOL Filtration BGK 0 / 2.5 5000 milliLiter(s) CRRT <Continuous>  PrismaSOL Filtration BGK 0 / 2.5 5000 milliLiter(s) CRRT <Continuous>  PrismaSOL Filtration BGK 0 / 2.5 5000 milliLiter(s) CRRT <Continuous>      CODE STATUS:     IMAGING:   HISTORY  67y Female    24 HOUR EVENTS:  Interval Events  - hypoglycemia to 90s, switched to D5LR  - s/p CTAP w/ PO contrast yesterday, no SBO seen on imaging   - no CVVHD   - dc'd bicarb drip   - Hypokalemia. Repleted    NEURO  Exam: awake, responsive to questions  Meds: acetaminophen   IVPB .. 1000 milliGRAM(s) IV Intermittent every 6 hours    [x] Adequacy of sedation and pain control has been assessed and adjusted      RESPIRATORY  RR: 17 (07-26-22 @ 22:00) (14 - 19)  SpO2: 96% (07-26-22 @ 22:00) (95% - 100%)  Wt(kg): --  Exam: unlabored, no increased WOB on RA  Mechanical Ventilation:   ABG - ( 26 Jul 2022 18:35 )  pH: 7.47  /  pCO2: 34    /  pO2: 110   / HCO3: 25    / Base Excess: 1.2   /  SaO2: 99.4    Lactate: x          CARDIOVASCULAR  HR: 77 (07-26-22 @ 22:00) (63 - 91)  BP: 88/70 (07-26-22 @ 00:30) (88/70 - 97/57)  BP(mean): 78 (07-26-22 @ 00:30) (69 - 78)  ABP: 103/62 (07-26-22 @ 22:00) (76/49 - 133/72)  ABP(mean): 74 (07-26-22 @ 22:00) (58 - 91)  Wt(kg): --  CVP(cm H2O): --  VBG - ( 25 Jul 2022 18:11 )  pH: 7.38  /  pCO2: 36    /  pO2: 41    / HCO3: 21    / Base Excess: -3.2  /  SaO2: 49.1   Lactate: 3.3      Exam: appears well perfused  Cardiac Rhythm: normal sinus rhythm    Meds:       GI/NUTRITION  Exam: soft, nondistended. Ileostomy bag with minimal stool. Midline open wound covered with dressing  Diet:  Meds: loperamide 4 milliGRAM(s) Oral every 6 hours      GENITOURINARY  I&O's Detail    07-25 @ 07:01  -  07-26 @ 07:00  --------------------------------------------------------  IN:    Phenylephrine: 10.5 mL    Phenylephrine: 7 mL    Sodium Bicarbonate: 675 mL    sodium chloride 0.9%: 1000 mL  Total IN: 1692.5 mL    OUT:    Indwelling Catheter - Urethral (mL): 685 mL    Nasogastric/Oral tube (mL): 300 mL  Total OUT: 985 mL    Total NET: 707.5 mL      07-26 @ 07:01  -  07-27 @ 00:11  --------------------------------------------------------  IN:    IV PiggyBack: 300 mL    Lactated Ringers: 1200 mL    Oral Fluid: 100 mL    Sodium Bicarbonate: 75 mL    sodium chloride 0.9%: 100 mL  Total IN: 1775 mL    OUT:    Ileostomy (mL): 900 mL    Indwelling Catheter - Urethral (mL): 1265 mL  Total OUT: 2165 mL    Total NET: -390 mL          07-26    128<L>  |  83<L>  |  123<H>  ----------------------------<  87  2.7<LL>   |  24  |  6.71<H>    Ca    7.8<L>      26 Jul 2022 18:35  Phos  10.2     07-26  Mg     2.10     07-26    TPro  7.4  /  Alb  3.2<L>  /  TBili  0.7  /  DBili  <0.2  /  AST  35<H>  /  ALT  25  /  AlkPhos  148<H>  07-25    [X] Pierce catheter, indication: acute renal failure  Meds: lactated ringers. 1000 milliLiter(s) IV Continuous <Continuous>        HEMATOLOGIC  Meds: heparin   Injectable 5000 Unit(s) SubCutaneous every 8 hours    [x] VTE Prophylaxis                        9.7    6.19  )-----------( 271      ( 26 Jul 2022 18:35 )             29.4     PT/INR - ( 26 Jul 2022 01:47 )   PT: 13.9 sec;   INR: 1.20 ratio         PTT - ( 26 Jul 2022 01:47 )  PTT:28.1 sec  Transfusion     [ ] PRBC   [ ] Platelets   [ ] FFP   [ ] Cryoprecipitate      INFECTIOUS DISEASES  T(C): 36.9 (07-26-22 @ 20:00), Max: 37.1 (07-26-22 @ 12:00)  Wt(kg): --  WBC Count: 6.19 K/uL (07-26 @ 18:35)  WBC Count: 7.53 K/uL (07-26 @ 13:50)  WBC Count: 7.36 K/uL (07-26 @ 07:21)  WBC Count: 11.03 K/uL (07-26 @ 01:47)    Recent Cultures:    Meds: cefTRIAXone   IVPB 1000 milliGRAM(s) IV Intermittent every 24 hours        ENDOCRINE  Capillary Blood Glucose    Meds:       PATIENT CARE ACCESS DEVICES:  [X] Peripheral IV  [X] Central Venous Line	[ ] R	[X] L	[ ] IJ	[X] Fem	[ ] SC	Placed: 7/25/22 SHILEY  [X] Arterial Line		[ ] R	[X] L	[X] Fem	[ ] Rad	[ ] Ax	Placed: 7/25/22  [ ] PICC:					[ ] Mediport  [X] Urinary Catheter, Date Placed: 7/25/22  [x] Necessity of urinary, arterial, and venous catheters discussed    OTHER MEDICATIONS:  chlorhexidine 4% Liquid 1 Application(s) Topical <User Schedule>  CRRT Treatment    <Continuous>  PrismaSOL Filtration BGK 0 / 2.5 5000 milliLiter(s) CRRT <Continuous>  PrismaSOL Filtration BGK 0 / 2.5 5000 milliLiter(s) CRRT <Continuous>  PrismaSOL Filtration BGK 0 / 2.5 5000 milliLiter(s) CRRT <Continuous>      CODE STATUS:     IMAGING:   HISTORY  67y Female    24 HOUR EVENTS:  Interval Events  - started on low fiber diet   - c/w imodium, added lomotil   - c/w IVF   - repeat BMP for K repletion   - d/c Xin   - listed for floor; goal ostomy output < 800cc-1L/day     NEURO  Exam: awake, responsive to questions  Meds: acetaminophen   IVPB .. 1000 milliGRAM(s) IV Intermittent every 6 hours    [x] Adequacy of sedation and pain control has been assessed and adjusted      RESPIRATORY  RR: 17 (07-26-22 @ 22:00) (14 - 19)  SpO2: 96% (07-26-22 @ 22:00) (95% - 100%)  Wt(kg): --  Exam: unlabored, no increased WOB on RA  Mechanical Ventilation:   ABG - ( 26 Jul 2022 18:35 )  pH: 7.47  /  pCO2: 34    /  pO2: 110   / HCO3: 25    / Base Excess: 1.2   /  SaO2: 99.4    Lactate: x          CARDIOVASCULAR  HR: 77 (07-26-22 @ 22:00) (63 - 91)  BP: 88/70 (07-26-22 @ 00:30) (88/70 - 97/57)  BP(mean): 78 (07-26-22 @ 00:30) (69 - 78)  ABP: 103/62 (07-26-22 @ 22:00) (76/49 - 133/72)  ABP(mean): 74 (07-26-22 @ 22:00) (58 - 91)  Wt(kg): --  CVP(cm H2O): --  VBG - ( 25 Jul 2022 18:11 )  pH: 7.38  /  pCO2: 36    /  pO2: 41    / HCO3: 21    / Base Excess: -3.2  /  SaO2: 49.1   Lactate: 3.3      Exam: appears well perfused  Cardiac Rhythm: normal sinus rhythm    Meds:       GI/NUTRITION  Exam: soft, nondistended. Ileostomy bag with minimal stool. Midline open wound covered with dressing  Diet:  Meds: loperamide 4 milliGRAM(s) Oral every 6 hours      GENITOURINARY  I&O's Detail    07-25 @ 07:01  -  07-26 @ 07:00  --------------------------------------------------------  IN:    Phenylephrine: 10.5 mL    Phenylephrine: 7 mL    Sodium Bicarbonate: 675 mL    sodium chloride 0.9%: 1000 mL  Total IN: 1692.5 mL    OUT:    Indwelling Catheter - Urethral (mL): 685 mL    Nasogastric/Oral tube (mL): 300 mL  Total OUT: 985 mL    Total NET: 707.5 mL      07-26 @ 07:01  -  07-27 @ 00:11  --------------------------------------------------------  IN:    IV PiggyBack: 300 mL    Lactated Ringers: 1200 mL    Oral Fluid: 100 mL    Sodium Bicarbonate: 75 mL    sodium chloride 0.9%: 100 mL  Total IN: 1775 mL    OUT:    Ileostomy (mL): 900 mL    Indwelling Catheter - Urethral (mL): 1265 mL  Total OUT: 2165 mL    Total NET: -390 mL          07-26    128<L>  |  83<L>  |  123<H>  ----------------------------<  87  2.7<LL>   |  24  |  6.71<H>    Ca    7.8<L>      26 Jul 2022 18:35  Phos  10.2     07-26  Mg     2.10     07-26    TPro  7.4  /  Alb  3.2<L>  /  TBili  0.7  /  DBili  <0.2  /  AST  35<H>  /  ALT  25  /  AlkPhos  148<H>  07-25    [X] Pierce catheter, indication: acute renal failure  Meds: lactated ringers. 1000 milliLiter(s) IV Continuous <Continuous>        HEMATOLOGIC  Meds: heparin   Injectable 5000 Unit(s) SubCutaneous every 8 hours    [x] VTE Prophylaxis                        9.7    6.19  )-----------( 271      ( 26 Jul 2022 18:35 )             29.4     PT/INR - ( 26 Jul 2022 01:47 )   PT: 13.9 sec;   INR: 1.20 ratio         PTT - ( 26 Jul 2022 01:47 )  PTT:28.1 sec  Transfusion     [ ] PRBC   [ ] Platelets   [ ] FFP   [ ] Cryoprecipitate      INFECTIOUS DISEASES  T(C): 36.9 (07-26-22 @ 20:00), Max: 37.1 (07-26-22 @ 12:00)  Wt(kg): --  WBC Count: 6.19 K/uL (07-26 @ 18:35)  WBC Count: 7.53 K/uL (07-26 @ 13:50)  WBC Count: 7.36 K/uL (07-26 @ 07:21)  WBC Count: 11.03 K/uL (07-26 @ 01:47)    Recent Cultures:    Meds: cefTRIAXone   IVPB 1000 milliGRAM(s) IV Intermittent every 24 hours        ENDOCRINE  Capillary Blood Glucose    Meds:       PATIENT CARE ACCESS DEVICES:  [X] Peripheral IV  [X] Central Venous Line	[ ] R	[X] L	[ ] IJ	[X] Fem	[ ] SC	Placed: 7/25/22 SHILEY  [X] Arterial Line		[ ] R	[X] L	[X] Fem	[ ] Rad	[ ] Ax	Placed: 7/25/22  [ ] PICC:					[ ] Mediport  [X] Urinary Catheter, Date Placed: 7/25/22  [x] Necessity of urinary, arterial, and venous catheters discussed    OTHER MEDICATIONS:  chlorhexidine 4% Liquid 1 Application(s) Topical <User Schedule>  CRRT Treatment    <Continuous>  PrismaSOL Filtration BGK 0 / 2.5 5000 milliLiter(s) CRRT <Continuous>  PrismaSOL Filtration BGK 0 / 2.5 5000 milliLiter(s) CRRT <Continuous>  PrismaSOL Filtration BGK 0 / 2.5 5000 milliLiter(s) CRRT <Continuous>      CODE STATUS:     IMAGING:

## 2022-07-27 NOTE — PROGRESS NOTE ADULT - SUBJECTIVE AND OBJECTIVE BOX
[facility] Division of Kidney Diseases & Hypertension  FOLLOW UP NOTE  [phone]--------------------------------------------------------------------------------  HPI: 68 yo F with h/o HTN, HLD, and recent incarcerated hernia s/p ileostomy presenting with N/V and decreased PO intake. Nephrology consulted for acute renal failure with hyperkalemia. CRRT was recommended initially, however pt's kidney function improved.     Pt seen and examined with daughter bedside this morning. Pt reports decreased appetite and decreased PO intake. Otherwise feels better. Denies any headaches, nausea, vomiting, fevers/chills, chest pain, palpitations, SOB, abdominal pain, and leg swelling.     PAST HISTORY  --------------------------------------------------------------------------------  No significant changes to PMH, PSH, FHx, SHx, unless otherwise noted    ALLERGIES & MEDICATIONS  --------------------------------------------------------------------------------  Allergies    No Known Allergies    Intolerances      Standing Inpatient Medications  cefTRIAXone   IVPB 1000 milliGRAM(s) IV Intermittent every 24 hours  chlorhexidine 4% Liquid 1 Application(s) Topical <User Schedule>  dextrose 5% + lactated ringers. 1000 milliLiter(s) IV Continuous <Continuous>  heparin   Injectable 5000 Unit(s) SubCutaneous every 8 hours  loperamide 4 milliGRAM(s) Oral every 6 hours    PRN Inpatient Medications      REVIEW OF SYSTEMS  --------------------------------------------------------------------------------  Gen: No fevers/chills  Skin: No rashes  Respiratory: No dyspnea  CV: No chest pain  GI: Positive for decreased appetite and decreased PO intake  : No increased frequency  MSK: No edema  Neuro: No dizziness/lightheadedness    All other systems were reviewed and are negative, except as noted.    VITALS/PHYSICAL EXAM  --------------------------------------------------------------------------------  T(C): 36.7 (07-27-22 @ 08:00), Max: 37.1 (07-26-22 @ 12:00)  HR: 74 (07-27-22 @ 08:00) (63 - 84)  BP: --  RR: 14 (07-27-22 @ 08:00) (14 - 20)  SpO2: 99% (07-27-22 @ 08:00) (95% - 100%)  Wt(kg): --  Height (cm): 149.9 (07-25-22 @ 12:42)  Weight (kg): 93.1 (07-25-22 @ 22:35)  BMI (kg/m2): 41.4 (07-25-22 @ 22:35)  BSA (m2): 1.86 (07-25-22 @ 22:35)      07-26-22 @ 07:01  -  07-27-22 @ 07:00  --------------------------------------------------------  IN: 2875 mL / OUT: 3230 mL / NET: -355 mL    07-27-22 @ 07:01  -  07-27-22 @ 08:56  --------------------------------------------------------  IN: 200 mL / OUT: 0 mL / NET: 200 mL      Physical Exam:  Gen: resting and in NAD.  HEENT: MMM  Pulm: CTA B/L  CV: S1S2  Abd: abdomen is distended. BS+  Ext: No LE edema B/L  Neuro: Awake  Skin: Warm and dry    LABS/STUDIES  --------------------------------------------------------------------------------              9.2    6.10  >-----------<  270      [07-27-22 @ 07:30]              28.6     129  |  85  |  113  ----------------------------<  107      [07-27-22 @ 07:30]  3.2   |  24  |  5.29        Ca     8.2     [07-27-22 @ 07:30]      Mg     1.80     [07-27-22 @ 07:30]      Phos  7.1     [07-27-22 @ 07:30]    TPro  6.3  /  Alb  2.6  /  TBili  0.8  /  DBili  x   /  AST  19  /  ALT  17  /  AlkPhos  114  [07-27-22 @ 01:25]    PT/INR: PT 13.9 , INR 1.20       [07-26-22 @ 01:47]  PTT: 28.1       [07-26-22 @ 01:47]    Serum Osmolality 328      [07-25-22 @ 19:18]    Creatinine Trend:  SCr 5.29 [07-27 @ 07:30]  SCr 5.92 [07-27 @ 01:25]  SCr 6.71 [07-26 @ 18:35]  SCr 7.57 [07-26 @ 13:50]  SCr 8.36 [07-26 @ 07:21]    Urinalysis - [07-25-22 @ 21:10]      Color Orange / Appearance Turbid / SG 1.012 / pH 7.0      Gluc Negative / Ketone Negative  / Bili Negative / Urobili <2 mg/dL       Blood Moderate / Protein >600 mg/dL / Leuk Est Large / Nitrite Negative      RBC 17 / WBC >720 / Hyaline 0 / Gran  / Sq Epi  / Non Sq Epi >27 / Bacteria Many      TSH 3.55      [07-25-22 @ 19:18]  Lipid: chol --, , HDL --, LDL --      [07-25-22 @ 19:18]    HBsAb <3.0      [07-26-22 @ 01:47]  HBsAg Nonreact      [07-26-22 @ 01:47]  HBcAb Nonreact      [07-26-22 @ 01:47]  HCV 0.13, Nonreact      [07-26-22 @ 01:47]

## 2022-07-27 NOTE — ADVANCED PRACTICE NURSE CONSULT - RECOMMEDATIONS
Topical recommendations:     Discussion with Surgical team B CELESTE Quinones #[id number], regarding NPWT VAC for vertical abdominal wound took place. Wound base is clean. However, as per PA, on previous discharge NPWT VAC was an issue with insurance for home vac coverage as per surgeon. Will keep wet to dry dressing. Expected (LOS)- length of stay short. If LOS changes, will reconsider NPWT VAC while inpatient.     RUQ end ileostomy- See below for item numbers of supplies recommended:   Stoma powder-[id number]   3M liquid barrier film-    2" steven moisture barrier rings- [id number]   one piece flat drainable 2 1/2"- [id number]    Plan of care discussed with Surgical team B CELESTE Quinones #[id number] and primary RN Melissa Lock.    Please contact Wound/Ostomy Care Service Line if we can be of further assistance (ext [phone]).

## 2022-07-27 NOTE — CHART NOTE - NSCHARTNOTEFT_GEN_A_CORE
Patient signed out to CELESTE Cano. Patient received bed on 8S 861B.    Kamryn DE LA CRUZ  Spectra: [phone]

## 2022-07-27 NOTE — ADVANCED PRACTICE NURSE CONSULT - REASON FOR CONSULT
Patient known to [facility] service line last seen on 07/06.2022. Patient seen on skin care rounds after wound/ostomy care referral received for assessment of skin impairment and recommendations of topical management. As per H and P note: Patient is 68yo F (speaks Nepali dialect) with a history of HTN, HLD, L tibia surgery, lap kevan (2011, [facility]) s/p repair of incarcerated  ventral hernia repair with diagnostic laparoscopy converted to open for repair of enterotomy, reduction of hernia, and hernia defect repair on 6/15/22 and hospital course complicated by fever and tachycardia requiring SICU admission with RTOR, exploratory laparotomy with small bowel resection and end ileostomy on 6/21/22 presents with nausea starting 1 week ago and 2 episodes of vomiting today, associated with decreased PO intake. Over the weekend patient has had worsening PO intake, with noted decrease in urine output. Patients mental status has also declined over the past few days, with patient drowsy but arousable and responds appropriately to questions. Patient has been passing stool in the ileostomy bag but daughter is not sure if she has seen any gas since discharge. Patient has an open midline wound managed by visiting RN at home. Per daughter she was advised to administer Zofran daily for nausea after discussion with Surgeon. Chart reviewed: WBC 6.10, H/H 9.2/28.6, Platelets 270, INR 1.20, Serum albumin 2.6, BMI 41.4, Wilson 13.

## 2022-07-27 NOTE — PROGRESS NOTE ADULT - SUBJECTIVE AND OBJECTIVE BOX
POD #    Overnight events:     SUBJECTIVE: Pt seen and examined at bedside.       OBJECTIVE:  Vital Signs Last 24 Hrs  T(C): 36.4 (27 Jul 2022 00:00), Max: 37.1 (26 Jul 2022 12:00)  T(F): 97.5 (27 Jul 2022 00:00), Max: 98.7 (26 Jul 2022 12:00)  HR: 77 (27 Jul 2022 05:00) (63 - 84)  BP: --  BP(mean): --  RR: 20 (27 Jul 2022 05:00) (14 - 20)  SpO2: 95% (27 Jul 2022 05:00) (95% - 100%)    Parameters below as of 27 Jul 2022 05:00  Patient On (Oxygen Delivery Method): room air          07-25-22 @ 07:01  -  07-26-22 @ 07:00  --------------------------------------------------------  IN: 1692.5 mL / OUT: 985 mL / NET: 707.5 mL    07-26-22 @ 07:01  -  07-27-22 @ 05:51  --------------------------------------------------------  IN: 2675 mL / OUT: 2530 mL / NET: 145 mL        Physical Examination:  GEN: NAD, resting quietly  PULM: symmetric chest rise bilaterally, no increased WOB  ABD: soft, appropriately tender, nondistended, no rebound or guarding, incision CDI  EXTR: no LE erythema, moving all extremities      LABS:                        9.1    6.37  )-----------( 265      ( 27 Jul 2022 01:25 )             28.0       07-27    127<L>  |  85<L>  |  117<H>  ----------------------------<  78  3.0<L>   |  23  |  5.92<H>    Ca    8.2<L>      27 Jul 2022 01:25  Phos  8.5     07-27  Mg     1.90     07-27    TPro  6.3  /  Alb  2.6<L>  /  TBili  0.8  /  DBili  x   /  AST  19  /  ALT  17  /  AlkPhos  114  07-27         Overnight events:   - hypoglycemia to 90s, switched to D5LR  - no CVVHD     SUBJECTIVE: Pt seen and examined at bedside. Pt resting comfortably in bed, NAD. Pt's daughter at bedside and stated that shes feeling better.    OBJECTIVE:  Vital Signs Last 24 Hrs  T(C): 36.4 (27 Jul 2022 00:00), Max: 37.1 (26 Jul 2022 12:00)  T(F): 97.5 (27 Jul 2022 00:00), Max: 98.7 (26 Jul 2022 12:00)  HR: 77 (27 Jul 2022 05:00) (63 - 84)  BP: --  BP(mean): --  RR: 20 (27 Jul 2022 05:00) (14 - 20)  SpO2: 95% (27 Jul 2022 05:00) (95% - 100%)    Parameters below as of 27 Jul 2022 05:00  Patient On (Oxygen Delivery Method): room air          07-25-22 @ 07:01  -  07-26-22 @ 07:00  --------------------------------------------------------  IN: 1692.5 mL / OUT: 985 mL / NET: 707.5 mL    07-26-22 @ 07:01  -  07-27-22 @ 05:51  --------------------------------------------------------  IN: 2675 mL / OUT: 2530 mL / NET: 145 mL        Physical Examination:  GEN: NAD, resting quietly  PULM: symmetric chest rise bilaterally, no increased WOB  ABD: Abdominal: Soft, nontender, nondistended with open midline incision, ostomy inverted with stool in bag  EXTR: no LE erythema, moving all extremities      LABS:                        9.2    6.10  )-----------( 270      ( 27 Jul 2022 07:30 )             28.6       07-27    129<L>  |  85<L>  |  113<H>  ----------------------------<  107<H>  3.2<L>   |  24  |  5.29<H>    Ca    8.2<L>      27 Jul 2022 07:30  Phos  7.1     07-27  Mg     1.80     07-27    TPro  6.3  /  Alb  2.6<L>  /  TBili  0.8  /  DBili  x   /  AST  19  /  ALT  17  /  AlkPhos  114  07-27

## 2022-07-27 NOTE — PROGRESS NOTE ADULT - ATTENDING COMMENTS
I agree with the history, physical, and plan, which I have reviewed and edited where appropriate.  I agree with notes/assessment of health care providers on my service.  I have personally examined the patient.  I was physically present for the key portions of the evaluation and management (E/M) service provided.  I reviewed data and laboratory tests/x-rays and all pertinent electronic images.  The patient is a critical care patient with life threatening hemodynamic and metabolic instability in SICU.  Risk benefit analyses discussed.    The patient is in SICU with diagnosis mentioned in the note.    The plan is specified below.    68yo F (Amharic dialect speaking) h/o HTN, HLD, s/p lap to open repair of incarcerated ventral hernia c/b enterotomy (repaired), hernia defect repair 6/15 and enterotomy repair, SBR, and 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 Pt now presenting with n/v found to have acute renal failure requiring urgent dialysis. Pt transferred to SICU for the above. Underlying Etiology dehydration/ileostomy losses + UTI. Responding well to hydration.     PLAN:  NEUROLOGIC:  - Pain control prn, tylenol Prn     RESPIRATORY:  -  on RA    CARDIOVASCULAR:  - Stable    GI/NUTRITION: h/o ileostomy, high output   - Advance from clears to LRD  - Add lomotil to imodium     /RENAL: acute renal failure likely due to dehydration from ileostomy output   - D/C elodia   - LR@100cc/hr  - Kari     HEMATOLOGIC:   - Heparin 5000u SQ q8h    INFECTIOUS DISEASE: UTI, Concern for sepsis   - ceftriaxone   - D/C flagyl Unlikely abdominal source   - f/u UCx -E.coli    ENDOCRINE:  - ISS .

## 2022-07-27 NOTE — ADVANCED PRACTICE NURSE CONSULT - ASSESSMENT
General:  A&Ox3, Turkmen speaking, daughter at bedside translating. Patient on room air, bedbound, presents with Pierce catheter and RUQ ileostomy with soft/liquid stool in the pouch. Skin warm, dry with increased moisture in intertriginous folds.    Abdomen- Vertical midline abdominal surgical wound- measuring 23.5cm x 7cm x 4.3cm, 100% adipose tissue. Scant serous drainage present, Periwound hyperpigmented.     RUQ- end Ileostomy, Patient seen for Ostomy reeval. Please see A & I flowsheet detailed assessment. Stoma size top to bottom=1" and side to side 1 1/4". Red, viable, moist, flashed with skin, denuded epidermis extending 0.3cm from stoma circumferentially, mucocutaneous junction intact.  General:  A&Ox3, Malay speaking, daughter at bedside translating, but refused to be taught at this time stating that she is not the daughter who lives with the patient and who helps her with stoma and pouching.  Patient on room air, bedbound, presents with Pierce catheter and RUQ ileostomy with soft/liquid stool in the pouch. Skin warm, dry with increased moisture in intertriginous folds.    Abdomen- Vertical midline abdominal surgical wound- measuring 23.5cm x 7cm x 4.3cm, 100% adipose tissue. Scant serous drainage present, Periwound hyperpigmented.     RUQ- end Ileostomy, Patient seen for Ostomy reeval. Please see A & I flowsheet detailed assessment. Stoma size top to bottom=1" and side to side 1 1/4". Red, viable, moist, flashed with skin, denuded epidermis extending 0.3cm from stoma circumferentially, mucocutaneous junction intact.

## 2022-07-27 NOTE — PROGRESS NOTE ADULT - ATTENDING COMMENTS
The patient was seen and examined, chart and notes reviewed.  The current diagnosis, plan of care and alternatives have been discussed with the patient.  All questions have been answered and updates have been discussed.  The case was discussed with B team residents/PA's and medical students at morning B surgical rounds and throughout the course of the day.    JAMISON, resolved    Diet as tolerated  Continue wound care, iloestomy maintenance  Complete IV abx course  Discharge planning

## 2022-07-27 NOTE — PROGRESS NOTE ADULT - ASSESSMENT
68yo F (Lithuanian dialect speaking) h/o HTN, HLD, s/p lap to open repair of incarcerated ventral hernia c/b enterotomy (repaired), hernia defect repair 6/15 and enterotomy repair, SBR, and 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 Pt now presenting with n/v found to have acute renal failure requiring urgent dialysis. Pt transferred to SICU for the above.    PLAN:  NEUROLOGIC:  - Pain control prn, tylenol ATC    RESPIRATORY:  - Satting well on RA  - pending PM ABG    CARDIOVASCULAR:  - off kiran  - Monitor EKG  - Lactate elevated to 5.3, now 1.5; trend    GI/NUTRITION:  - NPO, NGT to LCWS  - CT A/P to r/o SBO  - Monitor ileostomy function/output  - Midline wound dressing changes per primary team, wound care following     /RENAL: acute renal failure  - d/c sodium bicarb gtt  - Q6h BMP  - LR@100cc/hr  - strict I&Os via rajput    HEMATOLOGIC:  - Monitor H&H  - Heparin 5000u SQ q8h    INFECTIOUS DISEASE:  - UA with many bacteria, large LE  - Monitor WBC  - ceftriaxone + flagyl  - f/u UCx    ENDOCRINE:  - ISS    LINES:  - L femoral Shiley  - L femoral A line  - Arrow  - Rajput  - NGT  - PIVs    DISPO:  - SICU  - Full Code      68yo F (Kinyarwanda dialect speaking) h/o HTN, HLD, s/p lap to open repair of incarcerated ventral hernia c/b enterotomy (repaired), hernia defect repair 6/15 and enterotomy repair, SBR, and 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 Pt now presenting with n/v found to have acute renal failure requiring urgent dialysis. Pt transferred to SICU for the above.    PLAN:  NEUROLOGIC:  - Pain control prn, tylenol ATC    RESPIRATORY:  - Satting well on RA  - pending PM ABG    CARDIOVASCULAR:  - off pressors  - Monitor EKG  - Lactate now 1.3; continue trend    GI/NUTRITION:  - Low fiber diet  - Imodium, lomotil  - NGT dc'd  - Monitor ileostomy function/output  - Midline wound dressing changes per primary team, wound care following     /RENAL: acute renal failure  - Q6h BMP  - D5LR@100cc/hr  - strict I&Os via rajput    HEMATOLOGIC:  - Monitor H&H  - Heparin 5000u SQ q8h    INFECTIOUS DISEASE:  - UA with many bacteria, large LE  - UCx: >100,000 E. coli  - Monitor WBC  - c/w ceftriaxone  - d/c flagyl    ENDOCRINE:  - ISS    LINES:  - d/c L femoral Shiley  - L femoral A line, maintain  - Arrow  - Rajput  - PIVs    DISPO:  - SICU  - Full Code      66yo F (Vietnamese dialect speaking) h/o HTN, HLD, s/p lap to open repair of incarcerated ventral hernia c/b enterotomy (repaired), hernia defect repair 6/15 and enterotomy repair, SBR, and 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 Pt now presenting with n/v found to have acute renal failure requiring urgent dialysis. Pt transferred to SICU for the above.    PLAN:  NEUROLOGIC:  - Pain control prn, has not had pain since admission to SICU     RESPIRATORY:  - Satting well on RA  - pending PM ABG    CARDIOVASCULAR:  - MAP > 65   - HDS    GI/NUTRITION:  - Low fiber diet  - Imodium, lomotil  - NGT dc'd  - Monitor ileostomy function/output; goal < 800cc-1L daily   - Midline wound dressing changes per primary team, wound care following     /RENAL: acute renal failure  - Q6h BMP  - D5LR@100cc/hr  - strict I&Os via rajput    HEMATOLOGIC:  - Monitor H&H  - Heparin 5000u SQ q8h    INFECTIOUS DISEASE:  - UA with many bacteria, large LE  - UCx: >100,000 E. coli  - Monitor WBC  - c/w ceftriaxone, last day 7/28     ENDOCRINE:  - ISS    LINES:  - d/c L femoral Shiley  - L femoral A line, maintain  - Arrow  - Rajput  - PIVs    DISPO:  - listed for floors   - Full Code

## 2022-07-28 LAB
-  AMIKACIN: SIGNIFICANT CHANGE UP
-  AMOXICILLIN/CLAVULANIC ACID: SIGNIFICANT CHANGE UP
-  AMPICILLIN/SULBACTAM: SIGNIFICANT CHANGE UP
-  AMPICILLIN: SIGNIFICANT CHANGE UP
-  AZTREONAM: SIGNIFICANT CHANGE UP
-  CEFAZOLIN: SIGNIFICANT CHANGE UP
-  CEFEPIME: SIGNIFICANT CHANGE UP
-  CEFOXITIN: SIGNIFICANT CHANGE UP
-  CEFTRIAXONE: SIGNIFICANT CHANGE UP
-  CIPROFLOXACIN: SIGNIFICANT CHANGE UP
-  ERTAPENEM: SIGNIFICANT CHANGE UP
-  GENTAMICIN: SIGNIFICANT CHANGE UP
-  IMIPENEM: SIGNIFICANT CHANGE UP
-  LEVOFLOXACIN: SIGNIFICANT CHANGE UP
-  MEROPENEM: SIGNIFICANT CHANGE UP
-  NITROFURANTOIN: SIGNIFICANT CHANGE UP
-  PIPERACILLIN/TAZOBACTAM: SIGNIFICANT CHANGE UP
-  TIGECYCLINE: SIGNIFICANT CHANGE UP
-  TOBRAMYCIN: SIGNIFICANT CHANGE UP
-  TRIMETHOPRIM/SULFAMETHOXAZOLE: SIGNIFICANT CHANGE UP
A1C WITH ESTIMATED AVERAGE GLUCOSE RESULT: 4.9 % — SIGNIFICANT CHANGE UP (ref 4–5.6)
ALBUMIN SERPL ELPH-MCNC: 2.4 G/DL — LOW (ref 3.3–5)
ALP SERPL-CCNC: 109 U/L — SIGNIFICANT CHANGE UP (ref 40–120)
ALT FLD-CCNC: 13 U/L — SIGNIFICANT CHANGE UP (ref 4–33)
ANION GAP SERPL CALC-SCNC: 11 MMOL/L — SIGNIFICANT CHANGE UP (ref 7–14)
ANION GAP SERPL CALC-SCNC: 14 MMOL/L — SIGNIFICANT CHANGE UP (ref 7–14)
AST SERPL-CCNC: 16 U/L — SIGNIFICANT CHANGE UP (ref 4–32)
BILIRUB SERPL-MCNC: 0.3 MG/DL — SIGNIFICANT CHANGE UP (ref 0.2–1.2)
BUN SERPL-MCNC: 54 MG/DL — HIGH (ref 7–23)
BUN SERPL-MCNC: 69 MG/DL — HIGH (ref 7–23)
CALCIUM SERPL-MCNC: 8.3 MG/DL — LOW (ref 8.4–10.5)
CALCIUM SERPL-MCNC: 8.7 MG/DL — SIGNIFICANT CHANGE UP (ref 8.4–10.5)
CHLORIDE SERPL-SCNC: 101 MMOL/L — SIGNIFICANT CHANGE UP (ref 98–107)
CHLORIDE SERPL-SCNC: 97 MMOL/L — LOW (ref 98–107)
CO2 SERPL-SCNC: 26 MMOL/L — SIGNIFICANT CHANGE UP (ref 22–31)
CO2 SERPL-SCNC: 28 MMOL/L — SIGNIFICANT CHANGE UP (ref 22–31)
CREAT SERPL-MCNC: 1.81 MG/DL — HIGH (ref 0.5–1.3)
CREAT SERPL-MCNC: 2.53 MG/DL — HIGH (ref 0.5–1.3)
EGFR: 20 ML/MIN/1.73M2 — LOW
EGFR: 30 ML/MIN/1.73M2 — LOW
ESTIMATED AVERAGE GLUCOSE: 94 — SIGNIFICANT CHANGE UP
GLUCOSE BLDC GLUCOMTR-MCNC: 92 MG/DL — SIGNIFICANT CHANGE UP (ref 70–99)
GLUCOSE BLDC GLUCOMTR-MCNC: 94 MG/DL — SIGNIFICANT CHANGE UP (ref 70–99)
GLUCOSE BLDC GLUCOMTR-MCNC: 97 MG/DL — SIGNIFICANT CHANGE UP (ref 70–99)
GLUCOSE BLDC GLUCOMTR-MCNC: 98 MG/DL — SIGNIFICANT CHANGE UP (ref 70–99)
GLUCOSE SERPL-MCNC: 104 MG/DL — HIGH (ref 70–99)
GLUCOSE SERPL-MCNC: 96 MG/DL — SIGNIFICANT CHANGE UP (ref 70–99)
HCT VFR BLD CALC: 27.9 % — LOW (ref 34.5–45)
HGB BLD-MCNC: 8.7 G/DL — LOW (ref 11.5–15.5)
MAGNESIUM SERPL-MCNC: 1.6 MG/DL — SIGNIFICANT CHANGE UP (ref 1.6–2.6)
MAGNESIUM SERPL-MCNC: 2.2 MG/DL — SIGNIFICANT CHANGE UP (ref 1.6–2.6)
MCHC RBC-ENTMCNC: 26 PG — LOW (ref 27–34)
MCHC RBC-ENTMCNC: 31.2 GM/DL — LOW (ref 32–36)
MCV RBC AUTO: 83.3 FL — SIGNIFICANT CHANGE UP (ref 80–100)
METHOD TYPE: SIGNIFICANT CHANGE UP
NRBC # BLD: 0 /100 WBCS — SIGNIFICANT CHANGE UP
NRBC # FLD: 0 K/UL — SIGNIFICANT CHANGE UP
PHOSPHATE SERPL-MCNC: 2.6 MG/DL — SIGNIFICANT CHANGE UP (ref 2.5–4.5)
PHOSPHATE SERPL-MCNC: 3.5 MG/DL — SIGNIFICANT CHANGE UP (ref 2.5–4.5)
PLATELET # BLD AUTO: 222 K/UL — SIGNIFICANT CHANGE UP (ref 150–400)
POTASSIUM SERPL-MCNC: 2.7 MMOL/L — CRITICAL LOW (ref 3.5–5.3)
POTASSIUM SERPL-MCNC: 3.4 MMOL/L — LOW (ref 3.5–5.3)
POTASSIUM SERPL-SCNC: 2.7 MMOL/L — CRITICAL LOW (ref 3.5–5.3)
POTASSIUM SERPL-SCNC: 3.4 MMOL/L — LOW (ref 3.5–5.3)
PROT SERPL-MCNC: 5.9 G/DL — LOW (ref 6–8.3)
RBC # BLD: 3.35 M/UL — LOW (ref 3.8–5.2)
RBC # FLD: 16.7 % — HIGH (ref 10.3–14.5)
SODIUM SERPL-SCNC: 137 MMOL/L — SIGNIFICANT CHANGE UP (ref 135–145)
SODIUM SERPL-SCNC: 140 MMOL/L — SIGNIFICANT CHANGE UP (ref 135–145)
WBC # BLD: 5.51 K/UL — SIGNIFICANT CHANGE UP (ref 3.8–10.5)
WBC # FLD AUTO: 5.51 K/UL — SIGNIFICANT CHANGE UP (ref 3.8–10.5)

## 2022-07-28 PROCEDURE — 99232 SBSQ HOSP IP/OBS MODERATE 35: CPT | Mod: GC

## 2022-07-28 RX ORDER — MAGNESIUM SULFATE 500 MG/ML
2 VIAL (ML) INJECTION ONCE
Refills: 0 | Status: COMPLETED | OUTPATIENT
Start: 2022-07-28 | End: 2022-07-28

## 2022-07-28 RX ORDER — POTASSIUM CHLORIDE 20 MEQ
20 PACKET (EA) ORAL ONCE
Refills: 0 | Status: COMPLETED | OUTPATIENT
Start: 2022-07-28 | End: 2022-07-28

## 2022-07-28 RX ORDER — POTASSIUM CHLORIDE 20 MEQ
40 PACKET (EA) ORAL ONCE
Refills: 0 | Status: COMPLETED | OUTPATIENT
Start: 2022-07-28 | End: 2022-07-28

## 2022-07-28 RX ORDER — POTASSIUM CHLORIDE 20 MEQ
10 PACKET (EA) ORAL
Refills: 0 | Status: COMPLETED | OUTPATIENT
Start: 2022-07-28 | End: 2022-07-28

## 2022-07-28 RX ORDER — CHLORHEXIDINE GLUCONATE 213 G/1000ML
1 SOLUTION TOPICAL DAILY
Refills: 0 | Status: DISCONTINUED | OUTPATIENT
Start: 2022-07-28 | End: 2022-07-29

## 2022-07-28 RX ORDER — SODIUM,POTASSIUM PHOSPHATES 278-250MG
1 POWDER IN PACKET (EA) ORAL ONCE
Refills: 0 | Status: COMPLETED | OUTPATIENT
Start: 2022-07-28 | End: 2022-07-28

## 2022-07-28 RX ADMIN — Medication 1 PACKET(S): at 19:45

## 2022-07-28 RX ADMIN — Medication 40 MILLIEQUIVALENT(S): at 07:42

## 2022-07-28 RX ADMIN — Medication 1 TABLET(S): at 11:42

## 2022-07-28 RX ADMIN — HEPARIN SODIUM 5000 UNIT(S): 5000 INJECTION INTRAVENOUS; SUBCUTANEOUS at 05:03

## 2022-07-28 RX ADMIN — Medication 4 MILLIGRAM(S): at 05:04

## 2022-07-28 RX ADMIN — Medication 100 MILLIEQUIVALENT(S): at 07:38

## 2022-07-28 RX ADMIN — CEFTRIAXONE 100 MILLIGRAM(S): 500 INJECTION, POWDER, FOR SOLUTION INTRAMUSCULAR; INTRAVENOUS at 05:04

## 2022-07-28 RX ADMIN — Medication 20 MILLIEQUIVALENT(S): at 19:45

## 2022-07-28 RX ADMIN — HEPARIN SODIUM 5000 UNIT(S): 5000 INJECTION INTRAVENOUS; SUBCUTANEOUS at 21:37

## 2022-07-28 RX ADMIN — Medication 100 MILLIEQUIVALENT(S): at 11:41

## 2022-07-28 RX ADMIN — HEPARIN SODIUM 5000 UNIT(S): 5000 INJECTION INTRAVENOUS; SUBCUTANEOUS at 15:11

## 2022-07-28 RX ADMIN — Medication 25 GRAM(S): at 15:10

## 2022-07-28 RX ADMIN — CHLORHEXIDINE GLUCONATE 1 APPLICATION(S): 213 SOLUTION TOPICAL at 11:41

## 2022-07-28 RX ADMIN — Medication 4 MILLIGRAM(S): at 18:38

## 2022-07-28 RX ADMIN — Medication 4 MILLIGRAM(S): at 11:42

## 2022-07-28 RX ADMIN — Medication 100 MILLIEQUIVALENT(S): at 09:42

## 2022-07-28 RX ADMIN — Medication 4 MILLIGRAM(S): at 23:09

## 2022-07-28 NOTE — PROGRESS NOTE ADULT - PROBLEM SELECTOR PLAN 3
Pt initially with metabolic acidosis in the setting of acute renal failure. Pt received bicarbonate drip, which was discontinued yesterday (7/26). Pt now mildly alkalotic. ABG pH is 7.46, and SCO2 is 24. Continue to hold bicarbonate supplementation. Monitor SCO2 and pH.    If you have any questions, please feel free to contact me  Milagro Armijo  Nephrology Fellow  [phone] / [employer] Teams(Preferred)  (After 5pm or on weekends please page the on-call fellow)
Pt initially with metabolic acidosis in the setting of acute renal failure. Pt received bicarbonate drip, which was discontinued on 7/26. Now improved, SCO2 is 26 today. Continue to hold bicarbonate supplementation. Monitor SCO2 and pH.    If you have any questions, please feel free to contact me  Milagro Armijo  Nephrology Fellow  [phone] / [employer] Teams(Preferred)  (After 5pm or on weekends please page the on-call fellow)

## 2022-07-28 NOTE — PHYSICAL THERAPY INITIAL EVALUATION ADULT - ADDITIONAL COMMENTS
Patient's daughter at bedside reported pt. not ambulating much at home secondary to not feeling well. Pt. ambulates with a rolling walker.      Pt. was left in bed post PT Evaluation, no apparent distress, all lines intact. RN made aware.

## 2022-07-28 NOTE — PROGRESS NOTE ADULT - SUBJECTIVE AND OBJECTIVE BOX
Overnight events:   - hypoglycemia to 90s, switched to D5LR  - no CVVHD     SUBJECTIVE: Pt seen and examined at bedside. Pt resting comfortably in bed, NAD. Pt's daughter at bedside and stated that shes feeling better.    OBJECTIVE:        Physical Examination:  GEN: NAD, resting quietly  PULM: symmetric chest rise bilaterally, no increased WOB  ABD: Abdominal: Soft, nontender, nondistended with open midline incision, ostomy inverted with stool in bag  EXTR: no LE erythema, moving all extremities      LABS:                        Surgery Daily Progress Note  =====================================================  Interval / Overnight Events: No acute events overnight.      HPI:  Patient is a 67 year old female (speaks Luxembourgish dialect) with a PMHx of HTN, HLD, left tibia surgery, lap kevan (2011 at [facility]) and S/P repair of incarcerated  ventral hernia repair with diagnostic laparoscopy converted to open for repair of enterotomy, reduction of hernia and hernia defect repair on 6/15/22 with hospital course complicated by fever and tachycardia requiring SICU admission with return to OR for exploratory laparotomy with small bowel resection and end ileostomy on 6/21/22) who presented with nausea, vomiting today and decreased PO intake. (25 Jul 2022 20:52)      PAST MEDICAL & SURGICAL HISTORY:  Obesity  Hypertension  Hyperlipidemia  H/O fracture of tibia  and fibula (L)  History of cholecystectomy  2011  H/O ventral hernia repair  S/P small bowel resection  H/O ileostomy      ALLERGIES:  No Known Allergies    --------------------------------------------------------------------------------------    MEDICATIONS:    dextrose 5% + lactated ringers. 1000 milliLiter(s) IV Continuous <Continuous>  dextrose 5%. 1000 milliLiter(s) IV Continuous <Continuous>  dextrose 5%. 1000 milliLiter(s) IV Continuous <Continuous>  diphenoxylate/atropine 1 Tablet(s) Oral daily  loperamide 4 milliGRAM(s) Oral every 6 hours  magnesium sulfate  IVPB 2 Gram(s) IV Intermittent once    Hematologic/Oncologic Medications  heparin   Injectable 5000 Unit(s) SubCutaneous every 8 hours    Endocrine/Metabolic Medications  dextrose 50% Injectable 25 Gram(s) IV Push once  dextrose 50% Injectable 12.5 Gram(s) IV Push once  dextrose 50% Injectable 25 Gram(s) IV Push once  dextrose Oral Gel 15 Gram(s) Oral once PRN Blood Glucose LESS THAN 70 milliGRAM(s)/deciliter  glucagon  Injectable 1 milliGRAM(s) IntraMuscular once  insulin lispro (ADMELOG) corrective regimen sliding scale   SubCutaneous three times a day before meals  insulin lispro (ADMELOG) corrective regimen sliding scale   SubCutaneous at bedtime    Topical/Other Medications  chlorhexidine 2% Cloths 1 Application(s) Topical daily    --------------------------------------------------------------------------------------    VITAL SIGNS:  T(C): 37 (28 Jul 2022 10:05), Max: 37.1 (28 Jul 2022 01:50)  T(F): 98.6 (28 Jul 2022 10:05), Max: 98.7 (28 Jul 2022 01:50)  HR: 75 (28 Jul 2022 10:05) (66 - 76)  BP: 99/61 (28 Jul 2022 10:05) (90/60 - 115/62)  BP(mean): 75 (27 Jul 2022 14:00) (75 - 75)  RR: 18 (28 Jul 2022 10:05) (16 - 18)  SpO2: 100% (28 Jul 2022 10:05) (99% - 100%)    --------------------------------------------------------------------------------------    INS AND OUTS:    27 Jul 2022 07:01  -  28 Jul 2022 07:00  --------------------------------------------------------  IN:    dextrose 5% + lactated ringers: 1050 mL    IV PiggyBack: 300 mL    Oral Fluid: 220 mL  Total IN: 1570 mL    OUT:    Ileostomy (mL): 650 mL    Indwelling Catheter - Urethral (mL): 820 mL  Total OUT: 1470 mL    Total NET: 100 mL      28 Jul 2022 07:01  -  28 Jul 2022 13:27  --------------------------------------------------------  IN:    dextrose 5% + lactated ringers: 200 mL    IV PiggyBack: 300 mL    Oral Fluid: 50 mL  Total IN: 550 mL    OUT:    Ileostomy (mL): 250 mL    Indwelling Catheter - Urethral (mL): 350 mL  Total OUT: 600 mL    Total NET: -50 mL    --------------------------------------------------------------------------------------    EXAM    NEUROLOGY  Exam: Normal, in no acute distress.    HEENT  Exam: Normocephalic, atraumatic.    RESPIRATORY  Exam: Normal expansion / effort.    CARDIOVASCULAR  Exam: S1, S2.  Regular rate and rhythm.    GI/NUTRITION  Exam: Abdomen soft, Non-tender, Non-distended.  Abdominal incision clean - dressed in a wet to dry dressing.  Ostomy with gas and stool in bag.  Current Diet: Regular diet    MUSCULOSKELETAL  Exam: All extremities moving spontaneously without limitations.      METABOLIC / FLUIDS / ELECTROLYTES  dextrose 5% + lactated ringers. 1000 milliLiter(s) IV Continuous <Continuous>  dextrose 5%. 1000 milliLiter(s) IV Continuous <Continuous>  dextrose 5%. 1000 milliLiter(s) IV Continuous <Continuous>  magnesium sulfate  IVPB 2 Gram(s) IV Intermittent once      HEMATOLOGIC  [x] VTE Prophylaxis: heparin   Injectable 5000 Unit(s) SubCutaneous every 8 hours    --------------------------------------------------------------------------------------

## 2022-07-28 NOTE — CHART NOTE - NSCHARTNOTEFT_GEN_A_CORE
Called by RN for BP to 90/60 at 2300. Assessed patient at bedside. patient was asymptomatic, denying lightheadedness, shortness of breath, chest pain, nausea/vomiting. Manual BP taken showing 94/66. Plan to give 500cc bolus and reassess in 2 hours.     At 0130 7/28, BP was found to be 115/62 s/p bolus. Will continue to monitor.

## 2022-07-28 NOTE — PROGRESS NOTE ADULT - ATTENDING COMMENTS
Hyperkalemia   JAMISON  Metabolic acidosis  Hyponatremia    Improving. Recommend continuing IVF with NS.   Monitor labs, Is/Os and daily weights. Hyperkalemia   JAMISON  Metabolic acidosis  Hyponatremia    Improving. Recommend continuing IVF with NS.   Monitor labs, Is/Os and daily weights.    Will sign off now now, please call with questions

## 2022-07-28 NOTE — PHYSICAL THERAPY INITIAL EVALUATION ADULT - RANGE OF MOTION EXAMINATION, REHAB EVAL
except left knee and left hip not assessed/bilateral upper extremity ROM was WFL (within functional limits)/bilateral lower extremity ROM was WFL (within functional limits)

## 2022-07-28 NOTE — PROGRESS NOTE ADULT - ASSESSMENT
68yo F (Yoruba dialect speaking) h/o HTN, HLD, s/p lap to open repair of incarcerated ventral hernia c/b enterotomy (repaired), hernia defect repair 6/15 and enterotomy repair, SBR, and 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 Pt now presenting with n/v found to have acute renal failure requiring urgent dialysis. Pt transferred to SICU for the above, pt's condition improving.    PLAN:  - Pain control prn, tylenol ATC  - Monitor EKG  - Heparin 5000u SQ q8h  - Trend lactate elevated to 5.3, now 1.5  - NPO, NGT to LCWS  - CT A/P to r/o SBO  - Monitor ileostomy function/output  - Daily midline wound dressing changes wet-to-dry  - imodium  - strict I&Os via rajput  - ceftriaxone + flagyl  - f/u UCx    B Team Surgery  [id number]   Patient is a 67 year old female (speaks Kazakh dialect) with a PMHx of HTN, HLD, left tibia surgery, lap kevan (2011 at [facility]) and S/P repair of incarcerated  ventral hernia repair with diagnostic laparoscopy converted to open for repair of enterotomy, reduction of hernia and hernia defect repair on 6/15/22 with hospital course complicated by fever and tachycardia requiring SICU admission with return to OR for exploratory laparotomy with small bowel resection and end ileostomy on 6/21/22) who presented with nausea, vomiting today and decreased PO intake.  CT Abdomen / Pelvis performed showing no bowel obstruction.  Status post small bowel resection and ileostomy.  Postsurgical changes in the ventral abdominal wall.  No drainable fluid collection.  Patient was in acute renal failure requiring dialysis and SICU admission.  Now transferred to surgical floor.      PLAN:  - Regular diet  - Continue with Imodium and Lomotil  - Monitor ostomy output  - Trend creatinine  - Replete hypokalemia  - Appreciate nephrology recommendations  - PT evaluation pending  - VTE prophylaxis with Heparin subcutaneous  - Discharge planning      #[id number]  B Team Surgery

## 2022-07-28 NOTE — PROGRESS NOTE ADULT - PROBLEM SELECTOR PLAN 1
Pt with acute renal failure in the setting of  PO intake. Upon review of [place], pt had Scr of 0.84 on 22. Scr initially during this admission was 11.58 on  and is improved to 5.29 today following IV fluid administration. No prior h/o kidney disease. Ua showing proteinuria and positive for infection, although high epithelial cell count. SNa was initially 122, and SCl was 68 on . CT abdomen showing no evidence of obstruction. JAMISON likely of pre-renal etiology with a component of ATN. Renal function improving, no urgent indication for dialysis currently. Recommend to start IV normal saline at 100 cc/hr. Recommend to repeat UA. Obtain urine sodium, urine creatinine, urine chloride, and urine protein. Obtain kidney US. Monitor labs and urine output. Avoid nephrotoxins. Dose medications as per eGFR.
Pt with acute renal failure in the setting of  PO intake. Upon review of [place], pt had Scr of 0.84 on 22. Scr initially during this admission was 11.58 on  and is improved to 2.53 today following IV fluid administration. No prior h/o kidney disease. Ua showing proteinuria and positive for infection, although high epithelial cell count. SNa was initially 122, and Serum chloride was 68 on . CT abdomen showing no evidence of obstruction. JAMISON likely of pre-renal etiology with a component of ATN. Renal function improving, no urgent indication for dialysis currently. Recommend IV normal saline at 100 cc/hr. Obtain kidney US. Monitor labs and urine output. Avoid nephrotoxins. Dose medications as per eGFR.

## 2022-07-28 NOTE — PROGRESS NOTE ADULT - PROBLEM SELECTOR PLAN 2
Pt initially with hyperkalemia in the setting of acute renal failure. Pt is now hyperkalemic. Recommend potassium repletion. Recheck BMP. Monitor serum potassium.
Pt initially with hyperkalemia in the setting of acute renal failure. Pt is now hypokalemic, Serum potassium is 2.7. Recommend IV and PO potassium repletion. Recheck BMP. Monitor serum potassium.

## 2022-07-28 NOTE — PROGRESS NOTE ADULT - SUBJECTIVE AND OBJECTIVE BOX
[facility] Division of Kidney Diseases & Hypertension  FOLLOW UP NOTE  [phone]--------------------------------------------------------------------------------  HPI: 66 yo F with h/o HTN, HLD, and recent incarcerated hernia s/p ileostomy presenting with N/V and decreased PO intake. Nephrology consulted for acute renal failure with hyperkalemia. CRRT was recommended initially, however pt's kidney function improved.     Pt seen and examined with daughter bedside this morning. Pt reports decreased appetite and decreased PO intake. As per daughter, pt is able to tolerate food without vomiting but is eating less than usual. Otherwise feels better. Denies any headaches, nausea, vomiting, fevers/chills, chest pain, palpitations, SOB, abdominal pain, and leg swelling.     PAST HISTORY  --------------------------------------------------------------------------------  No significant changes to PMH, PSH, FHx, SHx, unless otherwise noted    ALLERGIES & MEDICATIONS  --------------------------------------------------------------------------------  Allergies    No Known Allergies    Intolerances      Standing Inpatient Medications  chlorhexidine 2% Cloths 1 Application(s) Topical daily  dextrose 5% + lactated ringers. 1000 milliLiter(s) IV Continuous <Continuous>  dextrose 5%. 1000 milliLiter(s) IV Continuous <Continuous>  dextrose 5%. 1000 milliLiter(s) IV Continuous <Continuous>  dextrose 50% Injectable 25 Gram(s) IV Push once  dextrose 50% Injectable 12.5 Gram(s) IV Push once  dextrose 50% Injectable 25 Gram(s) IV Push once  diphenoxylate/atropine 1 Tablet(s) Oral daily  glucagon  Injectable 1 milliGRAM(s) IntraMuscular once  heparin   Injectable 5000 Unit(s) SubCutaneous every 8 hours  insulin lispro (ADMELOG) corrective regimen sliding scale   SubCutaneous three times a day before meals  insulin lispro (ADMELOG) corrective regimen sliding scale   SubCutaneous at bedtime  loperamide 4 milliGRAM(s) Oral every 6 hours  magnesium sulfate  IVPB 2 Gram(s) IV Intermittent once    PRN Inpatient Medications  dextrose Oral Gel 15 Gram(s) Oral once PRN      REVIEW OF SYSTEMS  --------------------------------------------------------------------------------  Gen: No fevers/chills  Skin: No rashes  Respiratory: No dyspnea  CV: No chest pain  GI: Positive for decreased appetite and decreased PO intake  : No increased frequency  MSK: No edema  Neuro: No dizziness/lightheadedness    All other systems were reviewed and are negative, except as noted.    VITALS/PHYSICAL EXAM  --------------------------------------------------------------------------------  T(C): 37 (07-28-22 @ 10:05), Max: 37.1 (07-28-22 @ 01:50)  HR: 75 (07-28-22 @ 10:05) (66 - 76)  BP: 99/61 (07-28-22 @ 10:05) (90/60 - 115/62)  RR: 18 (07-28-22 @ 10:05) (16 - 18)  SpO2: 100% (07-28-22 @ 10:05) (96% - 100%)  Wt(kg): --  Height (cm): 149.9 (07-27-22 @ 14:00)  Weight (kg): 90.718 (07-27-22 @ 14:00)  BMI (kg/m2): 40.4 (07-27-22 @ 14:00)  BSA (m2): 1.84 (07-27-22 @ 14:00)      07-27-22 @ 07:01  -  07-28-22 @ 07:00  --------------------------------------------------------  IN: 1570 mL / OUT: 1470 mL / NET: 100 mL    Physical Exam:  Gen: resting and in NAD.  HEENT: MMM  Pulm: CTA B/L  CV: S1S2  Abd: abdomen is distended. BS+  Ext: No LE edema B/L  Neuro: Awake  Skin: Warm and dry    LABS/STUDIES  --------------------------------------------------------------------------------              8.7    5.51  >-----------<  222      [07-28-22 @ 05:55]              27.9     137  |  97  |  69  ----------------------------<  104      [07-28-22 @ 05:55]  2.7   |  26  |  2.53        Ca     8.3     [07-28-22 @ 05:55]      Mg     1.60     [07-28-22 @ 05:55]      Phos  3.5     [07-28-22 @ 05:55]    TPro  5.9  /  Alb  2.4  /  TBili  0.3  /  DBili  x   /  AST  16  /  ALT  13  /  AlkPhos  109  [07-28-22 @ 05:55]      Creatinine Trend:  SCr 2.53 [07-28 @ 05:55]  SCr 5.29 [07-27 @ 07:30]  SCr 5.92 [07-27 @ 01:25]  SCr 6.71 [07-26 @ 18:35]  SCr 7.57 [07-26 @ 13:50]    Urinalysis - [07-25-22 @ 21:10]      Color Orange / Appearance Turbid / SG 1.012 / pH 7.0      Gluc Negative / Ketone Negative  / Bili Negative / Urobili <2 mg/dL       Blood Moderate / Protein >600 mg/dL / Leuk Est Large / Nitrite Negative      RBC 17 / WBC >720 / Hyaline 0 / Gran  / Sq Epi  / Non Sq Epi >27 / Bacteria Many      TSH 3.55      [07-25-22 @ 19:18]  Lipid: chol --, , HDL --, LDL --      [07-25-22 @ 19:18]    HBsAb <3.0      [07-26-22 @ 01:47]  HBsAg Nonreact      [07-26-22 @ 01:47]  HBcAb Nonreact      [07-26-22 @ 01:47]  HCV 0.13, Nonreact      [07-26-22 @ 01:47]

## 2022-07-28 NOTE — PHYSICAL THERAPY INITIAL EVALUATION ADULT - PATIENT PROFILE REVIEW, REHAB EVAL
Spoke with Libby COTTER RN, pt. ok to be seen for PT Evaluation. Noted pt. with left groin shiley, PT to hold sitting at edge of bed and OOB activities at this time./yes

## 2022-07-29 ENCOUNTER — TRANSCRIPTION ENCOUNTER (OUTPATIENT)
Age: 67
End: 2022-07-29

## 2022-07-29 VITALS
DIASTOLIC BLOOD PRESSURE: 51 MMHG | OXYGEN SATURATION: 99 % | TEMPERATURE: 98 F | SYSTOLIC BLOOD PRESSURE: 147 MMHG | RESPIRATION RATE: 18 BRPM | HEART RATE: 68 BPM

## 2022-07-29 LAB
-  AMIKACIN: SIGNIFICANT CHANGE UP
-  AMOXICILLIN/CLAVULANIC ACID: SIGNIFICANT CHANGE UP
-  AMPICILLIN/SULBACTAM: SIGNIFICANT CHANGE UP
-  AMPICILLIN: SIGNIFICANT CHANGE UP
-  AZTREONAM: SIGNIFICANT CHANGE UP
-  CEFAZOLIN: SIGNIFICANT CHANGE UP
-  CEFEPIME: SIGNIFICANT CHANGE UP
-  CEFOXITIN: SIGNIFICANT CHANGE UP
-  CEFTRIAXONE: SIGNIFICANT CHANGE UP
-  CIPROFLOXACIN: SIGNIFICANT CHANGE UP
-  ERTAPENEM: SIGNIFICANT CHANGE UP
-  GENTAMICIN: SIGNIFICANT CHANGE UP
-  IMIPENEM: SIGNIFICANT CHANGE UP
-  LEVOFLOXACIN: SIGNIFICANT CHANGE UP
-  MEROPENEM: SIGNIFICANT CHANGE UP
-  NITROFURANTOIN: SIGNIFICANT CHANGE UP
-  PIPERACILLIN/TAZOBACTAM: SIGNIFICANT CHANGE UP
-  TIGECYCLINE: SIGNIFICANT CHANGE UP
-  TOBRAMYCIN: SIGNIFICANT CHANGE UP
-  TRIMETHOPRIM/SULFAMETHOXAZOLE: SIGNIFICANT CHANGE UP
ANION GAP SERPL CALC-SCNC: 9 MMOL/L — SIGNIFICANT CHANGE UP (ref 7–14)
BUN SERPL-MCNC: 39 MG/DL — HIGH (ref 7–23)
CALCIUM SERPL-MCNC: 9.1 MG/DL — SIGNIFICANT CHANGE UP (ref 8.4–10.5)
CHLORIDE SERPL-SCNC: 102 MMOL/L — SIGNIFICANT CHANGE UP (ref 98–107)
CO2 SERPL-SCNC: 28 MMOL/L — SIGNIFICANT CHANGE UP (ref 22–31)
CREAT SERPL-MCNC: 1.37 MG/DL — HIGH (ref 0.5–1.3)
CULTURE RESULTS: SIGNIFICANT CHANGE UP
EGFR: 42 ML/MIN/1.73M2 — LOW
GLUCOSE BLDC GLUCOMTR-MCNC: 100 MG/DL — HIGH (ref 70–99)
GLUCOSE BLDC GLUCOMTR-MCNC: 91 MG/DL — SIGNIFICANT CHANGE UP (ref 70–99)
GLUCOSE SERPL-MCNC: 107 MG/DL — HIGH (ref 70–99)
HCT VFR BLD CALC: 29.8 % — LOW (ref 34.5–45)
HGB BLD-MCNC: 9.1 G/DL — LOW (ref 11.5–15.5)
MAGNESIUM SERPL-MCNC: 2 MG/DL — SIGNIFICANT CHANGE UP (ref 1.6–2.6)
MCHC RBC-ENTMCNC: 26 PG — LOW (ref 27–34)
MCHC RBC-ENTMCNC: 30.5 GM/DL — LOW (ref 32–36)
MCV RBC AUTO: 85.1 FL — SIGNIFICANT CHANGE UP (ref 80–100)
METHOD TYPE: SIGNIFICANT CHANGE UP
NRBC # BLD: 0 /100 WBCS — SIGNIFICANT CHANGE UP
NRBC # FLD: 0 K/UL — SIGNIFICANT CHANGE UP
ORGANISM # SPEC MICROSCOPIC CNT: SIGNIFICANT CHANGE UP
PHOSPHATE SERPL-MCNC: 2.1 MG/DL — LOW (ref 2.5–4.5)
PLATELET # BLD AUTO: 224 K/UL — SIGNIFICANT CHANGE UP (ref 150–400)
POTASSIUM SERPL-MCNC: 3.8 MMOL/L — SIGNIFICANT CHANGE UP (ref 3.5–5.3)
POTASSIUM SERPL-SCNC: 3.8 MMOL/L — SIGNIFICANT CHANGE UP (ref 3.5–5.3)
RBC # BLD: 3.5 M/UL — LOW (ref 3.8–5.2)
RBC # FLD: 17.2 % — HIGH (ref 10.3–14.5)
SODIUM SERPL-SCNC: 139 MMOL/L — SIGNIFICANT CHANGE UP (ref 135–145)
SPECIMEN SOURCE: SIGNIFICANT CHANGE UP
WBC # BLD: 4.99 K/UL — SIGNIFICANT CHANGE UP (ref 3.8–10.5)
WBC # FLD AUTO: 4.99 K/UL — SIGNIFICANT CHANGE UP (ref 3.8–10.5)

## 2022-07-29 PROCEDURE — 99232 SBSQ HOSP IP/OBS MODERATE 35: CPT | Mod: 24

## 2022-07-29 RX ORDER — DIPHENOXYLATE HCL/ATROPINE 2.5-.025MG
1 TABLET ORAL
Qty: 30 | Refills: 0
Start: 2022-07-29 | End: 2022-08-27

## 2022-07-29 RX ORDER — POTASSIUM CHLORIDE 20 MEQ
40 PACKET (EA) ORAL ONCE
Refills: 0 | Status: COMPLETED | OUTPATIENT
Start: 2022-07-29 | End: 2022-07-29

## 2022-07-29 RX ORDER — SODIUM,POTASSIUM PHOSPHATES 278-250MG
2 POWDER IN PACKET (EA) ORAL ONCE
Refills: 0 | Status: COMPLETED | OUTPATIENT
Start: 2022-07-29 | End: 2022-07-29

## 2022-07-29 RX ORDER — LOPERAMIDE HCL 2 MG
2 TABLET ORAL
Qty: 240 | Refills: 0
Start: 2022-07-29 | End: 2022-08-27

## 2022-07-29 RX ORDER — LOPERAMIDE HCL 2 MG
2 TABLET ORAL
Qty: 0 | Refills: 0 | DISCHARGE
Start: 2022-07-29 | End: 2022-08-27

## 2022-07-29 RX ADMIN — Medication 4 MILLIGRAM(S): at 05:02

## 2022-07-29 RX ADMIN — Medication 40 MILLIEQUIVALENT(S): at 12:02

## 2022-07-29 RX ADMIN — Medication 2 PACKET(S): at 12:02

## 2022-07-29 RX ADMIN — Medication 1 TABLET(S): at 12:01

## 2022-07-29 RX ADMIN — HEPARIN SODIUM 5000 UNIT(S): 5000 INJECTION INTRAVENOUS; SUBCUTANEOUS at 05:02

## 2022-07-29 RX ADMIN — Medication 4 MILLIGRAM(S): at 12:01

## 2022-07-29 RX ADMIN — HEPARIN SODIUM 5000 UNIT(S): 5000 INJECTION INTRAVENOUS; SUBCUTANEOUS at 13:34

## 2022-07-29 NOTE — DISCHARGE NOTE PROVIDER - HOSPITAL COURSE
68yo F (Romanian dialect speaking) h/o HTN, HLD, s/p lap to open repair of incarcerated ventral hernia c/b enterotomy (repaired), hernia defect repair 6/15 and enterotomy repair, SBR, and 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 Pt now presenting with n/v found to have acute renal failure requiring urgent dialysis.   7/26 emergent shiley placed in SICU.   66yo F (French dialect speaking) h/o HTN, HLD, s/p lap to open repair of incarcerated ventral hernia c/b enterotomy (repaired), hernia defect repair 6/15 and enterotomy repair, SBR, and 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 Pt now presenting with n/v found to have acute renal failure and high ileostomy output.   7/26 Hyperkalemia to 8. Patient hydrated agressively with NS and Bicarb. Emergent shiley placed in SICU. Underlying cause of JAMISON/ARF being dehydration.  7/27 shiley removed. UA/UC positive, E.Coli., started on Ceftriaxone. Patient transferred to the floors from SICU.  Nephrology following for electrolyte optimization.  The patient was advanced to a regular diet and tolerated it well. The patient was placed on home medications. At the time of discharge, the patient was hemodynamically stable, was tolerating PO diet, was voiding urine and passing stool, was ambulating, and was comfortable with adequate pain control. The patient was instructed to follow up with Dr. Kaba within 2 weeks after discharge from the hospital. The patient/family felt comfortable with discharge.  The patient had no other issues.

## 2022-07-29 NOTE — PROGRESS NOTE ADULT - ATTENDING COMMENTS
The patient was seen and examined, chart and notes reviewed.  The current diagnosis, plan of care and alternatives have been discussed with the patient.  All questions have been answered and updates have been discussed.  The case was discussed with B team residents/PA's and medical students at morning B surgical rounds and throughout the course of the day.    Electrolytes abnormalities corrected  Ileostomy output controlled on imodium, lomotil  Wound care to midline wound, arrange VNS  Discharge planning

## 2022-07-29 NOTE — DISCHARGE NOTE NURSING/CASE MANAGEMENT/SOCIAL WORK - NSFLUVACAGEDISCH_IMM_ALL_CORE
Lab Results   Component Value Date    EGFR 45 05/17/2022    EGFR 53 05/16/2022    EGFR 50 05/15/2022    CREATININE 1 14 05/17/2022    CREATININE 1 00 05/16/2022    CREATININE 1 06 05/15/2022     · Creatinine back to baseline  · Discontinue IV fluid   · Nephrology on board Adult

## 2022-07-29 NOTE — DISCHARGE NOTE NURSING/CASE MANAGEMENT/SOCIAL WORK - PATIENT PORTAL LINK FT
You can access the FollowMyHealth Patient Portal offered by [facility] by registering at the following website: [email or web address]. By joining [employer]’s FollowMyHealth portal, you will also be able to view your health information using other applications (apps) compatible with our system.

## 2022-07-29 NOTE — DISCHARGE NOTE PROVIDER - NSDCCPCAREPLAN_GEN_ALL_CORE_FT
PRINCIPAL DISCHARGE DIAGNOSIS  Diagnosis: Acute kidney failure  Assessment and Plan of Treatment:        PRINCIPAL DISCHARGE DIAGNOSIS  Diagnosis: High output ileostomy  Assessment and Plan of Treatment: WOUND CARE:  MIDLINE WOUND   Please apply wet to dry dressing... saline soaked gauze packing covered with dry ABD and paper tape.   BATHING: You may shower and/or sponge bathe. You may use warm soapy water in the shower and rinse, pat dry.  ACTIVITY: No heavy lifting or straining. Otherwise, you may return to your usual level of physical activity. If you are taking narcotic pain medication DO NOT drive a car, operate machinery or make important decisions.  DIET: Return to your usual diet.  NOTIFY YOUR SURGEON IF YOU HAVE: any bleeding that does not stop, any pus draining from your wound(s), any fever (over 100.4 F) persistent nausea/vomiting, or if your pain is not controlled on your discharge pain medications, unable to urinate.  Please follow up with your primary care physician in one week regarding your hospitalization, bring copies of your discharge paperwork.  Please follow up with your surgeon, Dr. Kaba within 1-2 weeks of discharge.      SECONDARY DISCHARGE DIAGNOSES  Diagnosis: Hypertension  Assessment and Plan of Treatment: ** PLEASE HOLD YOUR AMLODIPINE AND YOUR LOSARTAN**  Your blood pressure was controlled during your hospital admission without these medications. There is a risk of low blood pressure if you take these medications. Please follow up with your primary care doctor regarding restarting these medications at a later date... if needed.     PRINCIPAL DISCHARGE DIAGNOSIS  Diagnosis: High output ileostomy  Assessment and Plan of Treatment: WOUND CARE:  MIDLINE WOUND   Please apply wet to dry dressing... saline soaked gauze packing covered with dry ABD and paper tape.   ** New medications: A prescription for Lomotil and Loperimide were sent to your pharmacy. Please take these medications as prescribe.   Please carefully monitor ileostomy output, this should be shown to you by the nursing staff. If the ileostomy output is greater than 1 liter in 24 hours, please call the office.   BATHING: You may shower and/or sponge bathe. You may use warm soapy water in the shower and rinse, pat dry.  ACTIVITY: No heavy lifting or straining. Otherwise, you may return to your usual level of physical activity. If you are taking narcotic pain medication DO NOT drive a car, operate machinery or make important decisions.  DIET: Return to your usual diet.  NOTIFY YOUR SURGEON IF YOU HAVE: any bleeding that does not stop, any pus draining from your wound(s), any fever (over 100.4 F) persistent nausea/vomiting, or if your pain is not controlled on your discharge pain medications, unable to urinate.  Please follow up with your primary care physician in one week regarding your hospitalization, bring copies of your discharge paperwork.  Please follow up with your surgeon, Dr. Kaba within 1-2 weeks of discharge.      SECONDARY DISCHARGE DIAGNOSES  Diagnosis: Hypertension  Assessment and Plan of Treatment: ** PLEASE HOLD YOUR AMLODIPINE AND YOUR LOSARTAN**  Your blood pressure was controlled during your hospital admission without these medications. There is a risk of low blood pressure if you take these medications. Please follow up with your primary care doctor regarding restarting these medications at a later date... if needed.

## 2022-07-29 NOTE — DISCHARGE NOTE PROVIDER - NSDCMRMEDTOKEN_GEN_ALL_CORE_FT
acetaminophen 325 mg oral tablet: 2 tab(s) orally every 6 hours  amLODIPine 10 mg oral tablet: 1 tab(s) orally once a day  atorvastatin 10 mg oral tablet: 1 tab(s) orally once a day  losartan 50 mg oral tablet: 1 tab(s) orally once a day  oxyCODONE 5 mg oral tablet: 1 tab(s) orally every 6 hours, As Needed -for moderate pain - for severe pain MDD:20mg (4 tabs)    iSTOP Reference #: [id number]    acetaminophen 325 mg oral tablet: 2 tab(s) orally every 6 hours  atorvastatin 10 mg oral tablet: 1 tab(s) orally once a day  diphenoxylate-atropine 2.5 mg-0.025 mg oral tablet: 1 tab(s) orally once a day MDD:1  loperamide 2 mg oral capsule: 2 cap(s) orally every 6 hours

## 2022-07-29 NOTE — PROGRESS NOTE ADULT - NUTRITIONAL ASSESSMENT
This patient has been assessed with a concern for Malnutrition and has been determined to have a diagnosis/diagnoses of Severe protein-calorie malnutrition and Morbid obesity (BMI > 40).    This patient is being managed with:   Diet Clear Liquid-  Entered: Jul 26 2022  3:51PM    
This patient has been assessed with a concern for Malnutrition and has been determined to have a diagnosis/diagnoses of Severe protein-calorie malnutrition and Morbid obesity (BMI > 40).    This patient is being managed with:   Diet Regular-  Fiber/Residue Restricted (LOWFIBER)  Roxana  Entered: Jul 27 2022 10:00AM    
This patient has been assessed with a concern for Malnutrition and has been determined to have a diagnosis/diagnoses of Severe protein-calorie malnutrition and Morbid obesity (BMI > 40).    This patient is being managed with:   Diet Regular-  Fiber/Residue Restricted (LOWFIBER)  Roxana  Entered: Jul 27 2022 10:00AM

## 2022-07-29 NOTE — DISCHARGE NOTE PROVIDER - DETAILS OF MALNUTRITION DIAGNOSIS/DIAGNOSES
This patient has been assessed with a concern for Malnutrition and was treated during this hospitalization for the following Nutrition diagnosis/diagnoses:     -  07/26/2022: Severe protein-calorie malnutrition   -  07/26/2022: Morbid obesity (BMI > 40)

## 2022-07-29 NOTE — DISCHARGE NOTE NURSING/CASE MANAGEMENT/SOCIAL WORK - NSSCTYPOFSERV_GEN_ALL_CORE
A visiting nurse will visit you 7/30, the day after discharge. The nurse will call you in the morning to set up a visit time. If you do not hear from the nurse, please call the agency. Physical therapy will follow.

## 2022-07-29 NOTE — PROGRESS NOTE ADULT - SUBJECTIVE AND OBJECTIVE BOX
Surgery Daily Progress Note  =====================================================  Interval / Overnight Events: No acute events overnight.      HPI:  Patient is a 67 year old female (speaks Khmer dialect) with a PMHx of HTN, HLD, left tibia surgery, lap kevan (2011 at [facility]) and S/P repair of incarcerated  ventral hernia repair with diagnostic laparoscopy converted to open for repair of enterotomy, reduction of hernia and hernia defect repair on 6/15/22 with hospital course complicated by fever and tachycardia requiring SICU admission with return to OR for exploratory laparotomy with small bowel resection and end ileostomy on 6/21/22) who presented with nausea, vomiting today and decreased PO intake. (25 Jul 2022 20:52)      PAST MEDICAL & SURGICAL HISTORY:  Obesity  Hypertension  Hyperlipidemia  H/O fracture of tibia  and fibula (L)  History of cholecystectomy  2011  H/O ventral hernia repair  S/P small bowel resection  H/O ileostomy      ALLERGIES:  No Known Allergies    --------------------------------------------------------------------------------------    MEDICATIONS:    dextrose 5% + lactated ringers. 1000 milliLiter(s) IV Continuous <Continuous>  dextrose 5%. 1000 milliLiter(s) IV Continuous <Continuous>  dextrose 5%. 1000 milliLiter(s) IV Continuous <Continuous>  diphenoxylate/atropine 1 Tablet(s) Oral daily  loperamide 4 milliGRAM(s) Oral every 6 hours  magnesium sulfate  IVPB 2 Gram(s) IV Intermittent once    Hematologic/Oncologic Medications  heparin   Injectable 5000 Unit(s) SubCutaneous every 8 hours    Endocrine/Metabolic Medications  dextrose 50% Injectable 25 Gram(s) IV Push once  dextrose 50% Injectable 12.5 Gram(s) IV Push once  dextrose 50% Injectable 25 Gram(s) IV Push once  dextrose Oral Gel 15 Gram(s) Oral once PRN Blood Glucose LESS THAN 70 milliGRAM(s)/deciliter  glucagon  Injectable 1 milliGRAM(s) IntraMuscular once  insulin lispro (ADMELOG) corrective regimen sliding scale   SubCutaneous three times a day before meals  insulin lispro (ADMELOG) corrective regimen sliding scale   SubCutaneous at bedtime    Topical/Other Medications  chlorhexidine 2% Cloths 1 Application(s) Topical daily    --------------------------------------------------------------------------------------    VITAL SIGNS:    --------------------------------------------------------------------------------------    INS AND OUTS:        --------------------------------------------------------------------------------------    EXAM    NEUROLOGY  Exam: Normal, in no acute distress.    HEENT  Exam: Normocephalic, atraumatic.    RESPIRATORY  Exam: Normal expansion / effort.    CARDIOVASCULAR  Exam: S1, S2.  Regular rate and rhythm.    GI/NUTRITION  Exam: Abdomen soft, Non-tender, Non-distended.  Abdominal incision clean - dressed in a wet to dry dressing.  Ostomy with gas and stool in bag.  Current Diet: Regular diet    MUSCULOSKELETAL  Exam: All extremities moving spontaneously without limitations.      METABOLIC / FLUIDS / ELECTROLYTES  dextrose 5% + lactated ringers. 1000 milliLiter(s) IV Continuous <Continuous>  dextrose 5%. 1000 milliLiter(s) IV Continuous <Continuous>  dextrose 5%. 1000 milliLiter(s) IV Continuous <Continuous>  magnesium sulfate  IVPB 2 Gram(s) IV Intermittent once      HEMATOLOGIC  [x] VTE Prophylaxis: heparin   Injectable 5000 Unit(s) SubCutaneous every 8 hours    -------------------------------------------------------------------------------------- Surgery Daily Progress Note  =====================================================  Interval / Overnight Events:   - No acute events overnight.      HPI:  Patient is a 67 year old female (speaks Faroese dialect) with a PMHx of HTN, HLD, left tibia surgery, lap kevan (2011 at [facility]) and S/P repair of incarcerated  ventral hernia repair with diagnostic laparoscopy converted to open for repair of enterotomy, reduction of hernia and hernia defect repair on 6/15/22 with hospital course complicated by fever and tachycardia requiring SICU admission with return to OR for exploratory laparotomy with small bowel resection and end ileostomy on 6/21/22) who presented with nausea, vomiting today and decreased PO intake. (25 Jul 2022 20:52)      PAST MEDICAL & SURGICAL HISTORY:  Obesity  Hypertension  Hyperlipidemia  H/O fracture of tibia  and fibula (L)  History of cholecystectomy  2011  H/O ventral hernia repair  S/P small bowel resection  H/O ileostomy      ALLERGIES:  No Known Allergies    --------------------------------------------------------------------------------------    MEDICATIONS:    dextrose 5% + lactated ringers. 1000 milliLiter(s) IV Continuous <Continuous>  dextrose 5%. 1000 milliLiter(s) IV Continuous <Continuous>  dextrose 5%. 1000 milliLiter(s) IV Continuous <Continuous>  diphenoxylate/atropine 1 Tablet(s) Oral daily  loperamide 4 milliGRAM(s) Oral every 6 hours  magnesium sulfate  IVPB 2 Gram(s) IV Intermittent once    Hematologic/Oncologic Medications  heparin   Injectable 5000 Unit(s) SubCutaneous every 8 hours    Endocrine/Metabolic Medications  dextrose 50% Injectable 25 Gram(s) IV Push once  dextrose 50% Injectable 12.5 Gram(s) IV Push once  dextrose 50% Injectable 25 Gram(s) IV Push once  dextrose Oral Gel 15 Gram(s) Oral once PRN Blood Glucose LESS THAN 70 milliGRAM(s)/deciliter  glucagon  Injectable 1 milliGRAM(s) IntraMuscular once  insulin lispro (ADMELOG) corrective regimen sliding scale   SubCutaneous three times a day before meals  insulin lispro (ADMELOG) corrective regimen sliding scale   SubCutaneous at bedtime    Topical/Other Medications  chlorhexidine 2% Cloths 1 Application(s) Topical daily    --------------------------------------------------------------------------------------    VITAL SIGNS:  Vital Signs Last 24 Hrs  T(C): 36.6 (29 Jul 2022 05:09), Max: 36.9 (28 Jul 2022 17:35)  T(F): 97.9 (29 Jul 2022 05:09), Max: 98.5 (28 Jul 2022 17:35)  HR: 72 (29 Jul 2022 05:09) (72 - 89)  BP: 104/56 (29 Jul 2022 05:09) (95/50 - 106/60)  BP(mean): --  RR: 18 (29 Jul 2022 05:09) (18 - 18)  SpO2: 100% (29 Jul 2022 05:09) (94% - 100%)    Parameters below as of 29 Jul 2022 05:09  Patient On (Oxygen Delivery Method): room air    --------------------------------------------------------------------------------------    INS AND OUTS:  I&O's Detail    28 Jul 2022 07:01  -  29 Jul 2022 07:00  --------------------------------------------------------  IN:    dextrose 5% + lactated ringers: 1050 mL    IV PiggyBack: 300 mL    Oral Fluid: 220 mL  Total IN: 1570 mL    OUT:    Ileostomy (mL): 900 mL    Indwelling Catheter - Urethral (mL): 650 mL    Voided (mL): 650 mL  Total OUT: 2200 mL    Total NET: -630 mL      29 Jul 2022 07:01  -  29 Jul 2022 10:47  --------------------------------------------------------  IN:    Oral Fluid: 100 mL  Total IN: 100 mL    OUT:  Total OUT: 0 mL    Total NET: 100 mL    --------------------------------------------------------------------------------------    EXAM    NEUROLOGY  Exam: Normal, in no acute distress.    HEENT  Exam: Normocephalic, atraumatic.    RESPIRATORY  Exam: Normal expansion / effort.    CARDIOVASCULAR  Exam: S1, S2.  Regular rate and rhythm.    GI/NUTRITION  Exam: Abdomen soft, Non-tender, Non-distended.  Abdominal incision clean - dressed in a wet to dry dressing.  Ostomy with gas and stool in bag.  Current Diet: Regular diet    MUSCULOSKELETAL  Exam: All extremities moving spontaneously without limitations.      METABOLIC / FLUIDS / ELECTROLYTES  dextrose 5% + lactated ringers. 1000 milliLiter(s) IV Continuous <Continuous>  dextrose 5%. 1000 milliLiter(s) IV Continuous <Continuous>  dextrose 5%. 1000 milliLiter(s) IV Continuous <Continuous>  magnesium sulfate  IVPB 2 Gram(s) IV Intermittent once      HEMATOLOGIC  [x] VTE Prophylaxis: heparin   Injectable 5000 Unit(s) SubCutaneous every 8 hours    --------------------------------------------------------------------------------------  LABS:                        9.1    4.99  )-----------( 224      ( 29 Jul 2022 07:40 )             29.8     07-29    139  |  102  |  39<H>  ----------------------------<  107<H>  3.8   |  28  |  1.37<H>    Ca    9.1      29 Jul 2022 07:40  Phos  2.1     07-29  Mg     2.00     07-29    TPro  5.9<L>  /  Alb  2.4<L>  /  TBili  0.3  /  DBili  x   /  AST  16  /  ALT  13  /  AlkPhos  109  07-28

## 2022-07-29 NOTE — DISCHARGE NOTE PROVIDER - CARE PROVIDER_API CALL
Diony Kaba)  Surgery; Surgical Critical Care  [address], [address]  [place] [address]  Phone: [phone]  Fax: [phone]  Follow Up Time: 1 week

## 2022-07-29 NOTE — DISCHARGE NOTE NURSING/CASE MANAGEMENT/SOCIAL WORK - NSDCPEFALRISK_GEN_ALL_CORE
For information on Fall & Injury Prevention, visit: [email or web address] OR  [email or web address] OR  https://www.cdc.gov/steadi/patient.html

## 2022-07-29 NOTE — PROGRESS NOTE ADULT - ASSESSMENT
Patient is a 67 year old female (speaks Romanian dialect) with a PMHx of HTN, HLD, left tibia surgery, lap kevan (2011 at [facility]) and S/P repair of incarcerated  ventral hernia repair with diagnostic laparoscopy converted to open for repair of enterotomy, reduction of hernia and hernia defect repair on 6/15/22 with hospital course complicated by fever and tachycardia requiring SICU admission with return to OR for exploratory laparotomy with small bowel resection and end ileostomy on 6/21/22) who presented with nausea, vomiting today and decreased PO intake.  CT Abdomen / Pelvis performed showing no bowel obstruction.  Status post small bowel resection and ileostomy.  Postsurgical changes in the ventral abdominal wall.  No drainable fluid collection.  Patient was in acute renal failure requiring dialysis and SICU admission.  Now transferred to surgical floor.      PLAN:  - Regular diet  - Continue with Imodium and Lomotil  - Monitor ostomy output  - Trend creatinine  - Replete hypokalemia  - Appreciate nephrology recommendations  - PT evaluation pending  - VTE prophylaxis with Heparin subcutaneous  - Discharge planning      #[id number]  B Team Surgery Patient is a 67 year old female (speaks Divehi dialect) with a PMHx of HTN, HLD, left tibia surgery, lap kevan (2011 at [facility]) and S/P repair of incarcerated  ventral hernia repair with diagnostic laparoscopy converted to open for repair of enterotomy, reduction of hernia and hernia defect repair on 6/15/22 with hospital course complicated by fever and tachycardia requiring SICU admission with return to OR for exploratory laparotomy with small bowel resection and end ileostomy on 6/21/22) who presented with nausea, vomiting today and decreased PO intake.  CT Abdomen / Pelvis performed showing no bowel obstruction.  Status post small bowel resection and ileostomy.  Postsurgical changes in the ventral abdominal wall.  No drainable fluid collection.  Patient was in acute renal failure requiring dialysis and SICU admission.  Transferred to surgical floor 7/27. Currently stable, mentating well with clinical resolution of acute issues.    PLAN:  - Regular diet  - Continue with Imodium and Lomotil  - Monitor ostomy output  - Trend creatinine  - Replete hypokalemia  - Appreciate nephrology recommendations  - PT evaluation pending  - VTE prophylaxis with Heparin subcutaneous  - Discharge planning      #[id number]  B Team Surgery

## 2022-08-04 ENCOUNTER — INPATIENT (INPATIENT)
Facility: HOSPITAL | Age: 67
LOS: 3 days | Discharge: HOME | End: 2022-08-08
Attending: INTERNAL MEDICINE | Admitting: INTERNAL MEDICINE

## 2022-08-04 VITALS
DIASTOLIC BLOOD PRESSURE: 51 MMHG | HEART RATE: 85 BPM | RESPIRATION RATE: 18 BRPM | OXYGEN SATURATION: 98 % | TEMPERATURE: 98 F | SYSTOLIC BLOOD PRESSURE: 119 MMHG | HEIGHT: 59 IN

## 2022-08-04 DIAGNOSIS — Z98.890 OTHER SPECIFIED POSTPROCEDURAL STATES: Chronic | ICD-10-CM

## 2022-08-04 DIAGNOSIS — Z90.49 ACQUIRED ABSENCE OF OTHER SPECIFIED PARTS OF DIGESTIVE TRACT: Chronic | ICD-10-CM

## 2022-08-04 LAB
ALBUMIN SERPL ELPH-MCNC: 2.7 G/DL — LOW (ref 3.3–5)
ALP SERPL-CCNC: 268 U/L — HIGH (ref 40–120)
ALT FLD-CCNC: 10 U/L — SIGNIFICANT CHANGE UP (ref 4–33)
ANION GAP SERPL CALC-SCNC: 15 MMOL/L — HIGH (ref 7–14)
APTT BLD: 28 SEC — SIGNIFICANT CHANGE UP (ref 27–36.3)
AST SERPL-CCNC: 23 U/L — SIGNIFICANT CHANGE UP (ref 4–32)
BASOPHILS # BLD AUTO: 0.02 K/UL — SIGNIFICANT CHANGE UP (ref 0–0.2)
BASOPHILS # BLD AUTO: 0.02 K/UL — SIGNIFICANT CHANGE UP (ref 0–0.2)
BASOPHILS NFR BLD AUTO: 0.2 % — SIGNIFICANT CHANGE UP (ref 0–2)
BASOPHILS NFR BLD AUTO: 0.2 % — SIGNIFICANT CHANGE UP (ref 0–2)
BILIRUB SERPL-MCNC: 0.6 MG/DL — SIGNIFICANT CHANGE UP (ref 0.2–1.2)
BLD GP AB SCN SERPL QL: NEGATIVE — SIGNIFICANT CHANGE UP
BUN SERPL-MCNC: 35 MG/DL — HIGH (ref 7–23)
CALCIUM SERPL-MCNC: 8.4 MG/DL — SIGNIFICANT CHANGE UP (ref 8.4–10.5)
CHLORIDE SERPL-SCNC: 92 MMOL/L — LOW (ref 98–107)
CO2 SERPL-SCNC: 24 MMOL/L — SIGNIFICANT CHANGE UP (ref 22–31)
CREAT SERPL-MCNC: 1.97 MG/DL — HIGH (ref 0.5–1.3)
EGFR: 27 ML/MIN/1.73M2 — LOW
EOSINOPHIL # BLD AUTO: 0.12 K/UL — SIGNIFICANT CHANGE UP (ref 0–0.5)
EOSINOPHIL # BLD AUTO: 0.15 K/UL — SIGNIFICANT CHANGE UP (ref 0–0.5)
EOSINOPHIL NFR BLD AUTO: 1.2 % — SIGNIFICANT CHANGE UP (ref 0–6)
EOSINOPHIL NFR BLD AUTO: 1.6 % — SIGNIFICANT CHANGE UP (ref 0–6)
GLUCOSE SERPL-MCNC: 102 MG/DL — HIGH (ref 70–99)
HCT VFR BLD CALC: 30.8 % — LOW (ref 34.5–45)
HCT VFR BLD CALC: 31.5 % — LOW (ref 34.5–45)
HGB BLD-MCNC: 9.5 G/DL — LOW (ref 11.5–15.5)
HGB BLD-MCNC: 9.8 G/DL — LOW (ref 11.5–15.5)
IANC: 6.48 K/UL — SIGNIFICANT CHANGE UP (ref 1.8–7.4)
IANC: 6.94 K/UL — SIGNIFICANT CHANGE UP (ref 1.8–7.4)
IMM GRANULOCYTES NFR BLD AUTO: 0.4 % — SIGNIFICANT CHANGE UP (ref 0–1.5)
IMM GRANULOCYTES NFR BLD AUTO: 0.4 % — SIGNIFICANT CHANGE UP (ref 0–1.5)
INR BLD: 1.3 RATIO — HIGH (ref 0.88–1.16)
LYMPHOCYTES # BLD AUTO: 1.75 K/UL — SIGNIFICANT CHANGE UP (ref 1–3.3)
LYMPHOCYTES # BLD AUTO: 1.9 K/UL — SIGNIFICANT CHANGE UP (ref 1–3.3)
LYMPHOCYTES # BLD AUTO: 19.2 % — SIGNIFICANT CHANGE UP (ref 13–44)
LYMPHOCYTES # BLD AUTO: 19.4 % — SIGNIFICANT CHANGE UP (ref 13–44)
MCHC RBC-ENTMCNC: 26.2 PG — LOW (ref 27–34)
MCHC RBC-ENTMCNC: 26.3 PG — LOW (ref 27–34)
MCHC RBC-ENTMCNC: 30.8 GM/DL — LOW (ref 32–36)
MCHC RBC-ENTMCNC: 31.1 GM/DL — LOW (ref 32–36)
MCV RBC AUTO: 84.5 FL — SIGNIFICANT CHANGE UP (ref 80–100)
MCV RBC AUTO: 84.8 FL — SIGNIFICANT CHANGE UP (ref 80–100)
MONOCYTES # BLD AUTO: 0.69 K/UL — SIGNIFICANT CHANGE UP (ref 0–0.9)
MONOCYTES # BLD AUTO: 0.78 K/UL — SIGNIFICANT CHANGE UP (ref 0–0.9)
MONOCYTES NFR BLD AUTO: 7.6 % — SIGNIFICANT CHANGE UP (ref 2–14)
MONOCYTES NFR BLD AUTO: 8 % — SIGNIFICANT CHANGE UP (ref 2–14)
NEUTROPHILS # BLD AUTO: 6.48 K/UL — SIGNIFICANT CHANGE UP (ref 1.8–7.4)
NEUTROPHILS # BLD AUTO: 6.94 K/UL — SIGNIFICANT CHANGE UP (ref 1.8–7.4)
NEUTROPHILS NFR BLD AUTO: 70.8 % — SIGNIFICANT CHANGE UP (ref 43–77)
NEUTROPHILS NFR BLD AUTO: 71 % — SIGNIFICANT CHANGE UP (ref 43–77)
NRBC # BLD: 0 /100 WBCS — SIGNIFICANT CHANGE UP
NRBC # BLD: 0 /100 WBCS — SIGNIFICANT CHANGE UP
NRBC # FLD: 0 K/UL — SIGNIFICANT CHANGE UP
NRBC # FLD: 0 K/UL — SIGNIFICANT CHANGE UP
PLATELET # BLD AUTO: 116 K/UL — LOW (ref 150–400)
PLATELET # BLD AUTO: 148 K/UL — LOW (ref 150–400)
POTASSIUM SERPL-MCNC: 4.5 MMOL/L — SIGNIFICANT CHANGE UP (ref 3.5–5.3)
POTASSIUM SERPL-SCNC: 4.5 MMOL/L — SIGNIFICANT CHANGE UP (ref 3.5–5.3)
PROT SERPL-MCNC: 6.9 G/DL — SIGNIFICANT CHANGE UP (ref 6–8.3)
PROTHROM AB SERPL-ACNC: 15.1 SEC — HIGH (ref 10.5–13.4)
RBC # BLD: 3.63 M/UL — LOW (ref 3.8–5.2)
RBC # BLD: 3.73 M/UL — LOW (ref 3.8–5.2)
RBC # FLD: 17.9 % — HIGH (ref 10.3–14.5)
RBC # FLD: 18.2 % — HIGH (ref 10.3–14.5)
RH IG SCN BLD-IMP: POSITIVE — SIGNIFICANT CHANGE UP
SARS-COV-2 RNA SPEC QL NAA+PROBE: SIGNIFICANT CHANGE UP
SODIUM SERPL-SCNC: 131 MMOL/L — LOW (ref 135–145)
WBC # BLD: 9.13 K/UL — SIGNIFICANT CHANGE UP (ref 3.8–10.5)
WBC # BLD: 9.8 K/UL — SIGNIFICANT CHANGE UP (ref 3.8–10.5)
WBC # FLD AUTO: 9.13 K/UL — SIGNIFICANT CHANGE UP (ref 3.8–10.5)
WBC # FLD AUTO: 9.8 K/UL — SIGNIFICANT CHANGE UP (ref 3.8–10.5)

## 2022-08-04 PROCEDURE — 74176 CT ABD & PELVIS W/O CONTRAST: CPT | Mod: 26,MA

## 2022-08-04 PROCEDURE — 93971 EXTREMITY STUDY: CPT | Mod: 26,LT

## 2022-08-04 PROCEDURE — 99284 EMERGENCY DEPT VISIT MOD MDM: CPT

## 2022-08-04 RX ORDER — SODIUM CHLORIDE 9 MG/ML
500 INJECTION INTRAMUSCULAR; INTRAVENOUS; SUBCUTANEOUS ONCE
Refills: 0 | Status: COMPLETED | OUTPATIENT
Start: 2022-08-04 | End: 2022-08-04

## 2022-08-04 RX ORDER — SODIUM CHLORIDE 9 MG/ML
250 INJECTION INTRAMUSCULAR; INTRAVENOUS; SUBCUTANEOUS ONCE
Refills: 0 | Status: COMPLETED | OUTPATIENT
Start: 2022-08-04 | End: 2022-08-04

## 2022-08-04 RX ADMIN — SODIUM CHLORIDE 500 MILLILITER(S): 9 INJECTION INTRAMUSCULAR; INTRAVENOUS; SUBCUTANEOUS at 14:10

## 2022-08-04 RX ADMIN — SODIUM CHLORIDE 500 MILLILITER(S): 9 INJECTION INTRAMUSCULAR; INTRAVENOUS; SUBCUTANEOUS at 15:15

## 2022-08-04 NOTE — ED PROVIDER NOTE - ATTENDING CONTRIBUTION TO CARE
I performed a face-to-face evaluation of the patient and performed a history and physical examination. I agree with the history and physical examination. If this was a PA visit, I personally saw the patient with the PA and performed a substantive portion of the visit including all aspects of the medical decision making.    Colectomy w/ ostomy bag. Recent admission for renal failure. New bleeding from bottom (? vaginal vs. rectum). Consider endometrial cancer. Check Hb and TVUS. F/u Gyn. I performed a face-to-face evaluation of the patient and performed a history and physical examination. I agree with the history and physical examination. If this was a PA visit, I personally saw the patient with the PA and performed a substantive portion of the visit including all aspects of the medical decision making.    Colectomy w/ ostomy bag. Recent admission for renal failure. New bleeding from bottom vagina (was on injectable anticoagulation last week when hospitalized). Consider endometrial cancer. Check Hb and TVUS. Call Gyn.

## 2022-08-04 NOTE — CONSULT NOTE ADULT - ATTENDING COMMENTS
anticoagulation if not contraindicated  elevated the leg
Patient seen at bedside with PGY2 Flip at 2am. History obtained from patient with daughter translating from Italian language. Patient denies dizziness/lightheadedness, nausea/vomiting, or abdominal pain though continues to c/o mild vaginal bleeding. VSS, /54, HR 75, SpO2 100% on RA, afebrile. Diaper 25% saturated with light red blood, trickling of dark red blood visualized from vagina. Repeat speculum exam performed though difficult given patient c/o pain when positioning her legs into "frog leg" position, cervical os visually closed and normal appearing without lesions or masses, 5cc dark red blood in vaginal vault, no active bright red bleeding appreciated. Patient initially refused TVUS so CT abd/pelvis was performed which showed uterus and adnexa within normal limits and suspected R femoral DVT so LE dopplers were performed which confirmed R common femoral, femoral, and popliteal DVT. Patient admitted to Medicine for heparin gtt for acute DVT treatment. Gyn consulted for vaginal bleeding.    Gyn Recommendations:  - VSS, patient asymptomatic, Hb stable 9.8>9.5>9.6  - Patient with persistent mild vaginal bleeding, no bleeding appreciated on repeat exam. Vaginal bleeding likely due to recent therapy of anticoagulation though differential also includes atrophic/unstable endometrium and endometrial cancer. Recommend endometrial biopsy while patient is in house to r/o endometrial cancer.   -  Medical management with estrogen or Tranexamic acid contraindicated in the setting of active DVT. Could consider UAE if bleeding becomes heavy/severe though no indication for acute Gyn intervention at this time. Recommend continuing with heparin gtt for acute DVT.  - Please continue with pad counts  - Recommend TVUS (per HPI, patient now amenable)  - Gyn will continue to follow.    MD Clint  Ob  Attending

## 2022-08-04 NOTE — ED PROVIDER NOTE - OBJECTIVE STATEMENT
68yo F (Bengali dialect speaking) h/o HTN, HLD, s/p lap to open repair of incarcerated ventral hernia c/b enterotomy (repaired), hernia defect repair 6/15 and enterotomy repair, SBR, and 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 recent admission d/c 1 week ago for acute renal failure require dialysis p/w 3 days of vaginal bleeding. translation provided by daughter at bedside. reports 3 days of vaginal bleeding changes pad once per day. No dysuria no abd pain no hematuria. Pt reporting intermittent dizziness. no weakness or loss of sensation. no f/c cp sob ha changes in vision n/v. no changes in color of ostomy contents.

## 2022-08-04 NOTE — CONSULT NOTE ADULT - ASSESSMENT
ASSESSMENT/RECOMMENDATIONS: 66yo F (Slovak dialect speaking) h/o HTN, HLD, s/p lap to open repair of incarcerated ventral hernia c/b enterotomy (repaired) 6/15 c/b enterotomy leak s/p repair, SBR, and end ileostomy 6/21, recent admission for acute renal failure, presenting with new onset vaginal bleeding x3 days found on imaging to have incidental new right common femoral, femoral, and popliteal DVTs.      - Medicine admission   - Hep gtt   - Ace wrap leg  - Appreciate GYN recs  - Wound care consult for management of midline wound    Patient discussed with attending, Dr. Blancas.    Lizy Nunez MD  PGY3 Consult Resident  C Team Surgery (Vascular Surgery)  [id number]  
68yo Greek speaking post-menopausal female LMP over 15 years ago s/p multiple bowel surgeries following ventral hernia incarceration, most recent admission for acute renal failure requiring dialysis, now presents with VBx3 days. Patient at baseline fatigued, however no signs of acute on chronic anemia. VSS. Serial H/H unchanged, patient without need for acute GYN intervention:    - Given TVUS cannot be performed due to patient intolerance of pelvic exam, unclear etiology of VB.  - To monitor VB, recommend pad counts. If >2 fully saturated pads in one hour, please contact GYN (Spectra [phone] or Pager [phone]) for vaginal packing. Please keep vaginal packing, ringed forceps and lubricating jelly by bedside.  - Recommend EMB for workup of post-menopausal. Possible EMB in-patient if admitted to surgery, otherwise to be completed on outpatient GYN follow-up (see clinic information below).  - Given diagnosis of active DVT on LE doppler,  medical management of AUB contraindicated at this time.    [facility]  Oncology [facility], [facility]  [address] [address]  [phone]    Amyeo Afroz Jereen, PGY-2  d/w Dr Jaramillo

## 2022-08-04 NOTE — ED ADULT NURSE NOTE - NSIMPLEMENTINTERV_GEN_ALL_ED
Implemented All Fall with Harm Risk Interventions:  [place] to call system. Call bell, personal items and telephone within reach. Instruct patient to call for assistance. Room bathroom lighting operational. Non-slip footwear when patient is off stretcher. Physically safe environment: no spills, clutter or unnecessary equipment. Stretcher in lowest position, wheels locked, appropriate side rails in place. Provide visual cue, wrist band, yellow gown, etc. Monitor gait and stability. Monitor for mental status changes and reorient to person, place, and time. Review medications for side effects contributing to fall risk. Reinforce activity limits and safety measures with patient and family. Provide visual clues: red socks.

## 2022-08-04 NOTE — ED PROVIDER NOTE - CLINICAL SUMMARY MEDICAL DECISION MAKING FREE TEXT BOX
Timi: COPD; hypoxia at home to 70s. POCUS: no impressive B lines. No increase in chronic LE edema. No F. Consider COPD exacerbation. DDx: CHF, PNA. Check CXR, BNP, trop. Give nebs, steroids. If BNP elevated or CXR c/w CHF, give Lasix. Likely admit. Timi: Colectomy w/ ostomy bag. Recent admission for renal failure. New bleeding from bottom vagina (was on injectable anticoagulation last week when hospitalized). Consider endometrial cancer. Check Hb and TVUS. Call Gyn. Timi: Colectomy w/ ostomy bag. Recent admission for renal failure. New bleeding from bottom vagina (was on injectable anticoagulation last week when hospitalized). Consider endometrial cancer. Check Hb and TVUS. Call Gyn. Call surg.

## 2022-08-04 NOTE — CONSULT NOTE ADULT - SUBJECTIVE AND OBJECTIVE BOX
VASCULAR SURGERY CONSULT NOTE  HPI: 66yo F (Danish dialect speaking) h/o HTN, HLD, s/p lap to open repair of incarcerated ventral hernia c/b enterotomy (repaired) 6/15 c/b enterotomy leak s/p repair, SBR, and end ileostomy 6/21, recent admission for acute renal failure, presenting with new onset vaginal bleeding x3 days that was noted by her daughter caretaker who found the bleeding when changing her clothing. Daughter endorses 2-3 pads/day. Patient at baseline with fatigue, currently w/out dizziness, heart palpitations or CP.     CT with concern for femoral DVT, RLE Duplex confirmed new right common femoral, femoral, and popliteal DVT. Vascular surgery consulted for further recommendations. Daughters report patient ambulatory at baseline, but has only been able to walk a few steps with a walker since her surgery. Per daughter, patient has reported RLE pain at night for the past 2 nights.       PAST MEDICAL & SURGICAL HISTORY:  Obesity      Hypertension      Hyperlipidemia      H/O fracture of tibia  and fibula (L)      History of cholecystectomy  2011      H/O ventral hernia repair      S/P small bowel resection      H/O ileostomy          MEDICATIONS  (STANDING):  heparin  Infusion 1800 Unit(s)/Hr (18 mL/Hr) IV Continuous <Continuous>  potassium phosphate / sodium phosphate Powder (PHOS-NaK) 1 Packet(s) Oral once    MEDICATIONS  (PRN):      Allergies    No Known Allergies    Intolerances        SOCIAL HISTORY: HTN and diabetes in multiple family members.      FAMILY HISTORY: Denies tobacco, EtOh, other drug use    Physical Exam:  General: NAD, resting comfortably  HEENT: NC/AT, EOM grossly intact, normal hearing  Pulmonary: normal resp effort on RA  Cardiovascular: RRR  Abdominal: obese, soft, ND/NT, ostomy productive of stool and gas, midline incision dressing c/d/i  Extremities: WWP, neurovascularly intact in bilateral lower extremities. no swelling, skin coloration changes, or evidence or phlegmasia in RLE  Neuro: A/O x 1-2  Vasc: palpable femoral, DP, and PT pulses bilaterally    Vital Signs Last 24 Hrs  T(C): 36.8 (05 Aug 2022 03:16), Max: 37.1 (05 Aug 2022 00:42)  T(F): 98.3 (05 Aug 2022 03:16), Max: 98.7 (05 Aug 2022 00:42)  HR: 74 (05 Aug 2022 03:16) (64 - 85)  BP: 109/59 (05 Aug 2022 03:16) (97/42 - 139/99)  BP(mean): --  RR: 14 (05 Aug 2022 03:16) (14 - 20)  SpO2: 100% (05 Aug 2022 03:16) (98% - 100%)    Parameters below as of 05 Aug 2022 03:16  Patient On (Oxygen Delivery Method): room air        I&O's Summary        LABS:                        9.6    9.24  )-----------( 149      ( 05 Aug 2022 04:18 )             30.9     08-04    131<L>  |  92<L>  |  35<H>  ----------------------------<  102<H>  4.5   |  24  |  1.97<H>    Ca    8.4      04 Aug 2022 13:40    TPro  6.9  /  Alb  2.7<L>  /  TBili  0.6  /  DBili  x   /  AST  23  /  ALT  10  /  AlkPhos  268<H>  08-04    PT/INR - ( 04 Aug 2022 13:40 )   PT: 15.1 sec;   INR: 1.30 ratio         PTT - ( 04 Aug 2022 13:40 )  PTT:28.0 sec    CAPILLARY BLOOD GLUCOSE      LIVER FUNCTIONS - ( 04 Aug 2022 13:40 )  Alb: 2.7 g/dL / Pro: 6.9 g/dL / ALK PHOS: 268 U/L / ALT: 10 U/L / AST: 23 U/L / GGT: x               RADIOLOGY & ADDITIONAL STUDIES:  < from: US Duplex Venous Lower Ext Ltd, Right (08.04.22 @ 18:55) >  INDINGS:    Acute DVT involving the common femoral vein, femoral vein, and popliteal   vein.  The contralateral common femoral vein is patent.    The calf veins were not well visualized due to patient condition.    IMPRESSION:  Acute DVT involving the common femoral vein, femoral vein, and popliteal   vein.    Findings discussed with Chun MERCADO at 7:00PM on 8/4/2022.    < end of copied text >    < from: CT Abdomen and Pelvis No Cont (08.04.22 @ 17:19) >    FINDINGS:  LOWER CHEST: Basilar atelectasis. Coronary artery calcifications.    LIVER: Within normal limits.  BILE DUCTS: Normal caliber.  GALLBLADDER: Cholecystectomy.  SPLEEN: Within normal limits.  PANCREAS: Within normal limits.  ADRENALS: Within normal limits.  KIDNEYS/URETERS: Left renal cyst. No renal stone or hydronephrosis   bilaterally.    BLADDER: Within normal limits.  REPRODUCTIVE ORGANS: Uterus and adnexa within normal limits.    BOWEL: No bowel obstruction. Appendix is normal.  PERITONEUM: No ascites. Similar appearing mesenteric edema, likely   postoperative.  VESSELS: Dilatation of the right femoral vein with mild adjacent fat   stranding (2: 1:30), concerning for DVT.  RETROPERITONEUM/LYMPH NODES: No lymphadenopathy.  ABDOMINAL WALL: Postsurgical changes of the anterior abdominal wall are   again seen, unchanged from 7/26/2022. Right lower quadrant ileostomy.  BONES: Mild degenerative changes.    IMPRESSION:  Dilatation of the right femoral vein with mild adjacent fat stranding,   concerning for DVT. Recommend dedicated lower extremity Doppler for   complete evaluation.    < end of copied text >

## 2022-08-04 NOTE — ED PROVIDER NOTE - PHYSICAL EXAMINATION
Gen: NAD, non-toxic appearing  Head: normal appearing  HEENT: normal conjunctiva, oral mucosa moist  Lung: no respiratory distress, speaking in full sentences, CTA b/l     CV: regular rate and rhythm, no murmurs  Abd: Ostomy draining non bloody feculent material. midline scar c/d/i  MSK: no visible deformities  Neuro: No focal deficits, AAOx3  Skin: Warm  Psych: normal affect  : chaperone Sales DO external vaginal bleeding

## 2022-08-04 NOTE — ED PROVIDER NOTE - PROGRESS NOTE DETAILS
Brett Quintero, PGY-3: Pt signed out to me, I agree with the plan. Pt is currently comfortable and hemodynamically stable. Labs/imaging reviewed. 66yo F w recent enterotomy and ostomy revision presenting for vaginal bleeding 2-3pads per day, refusing TVUS or speculum exam. On CT has suspicion for R DVT which was confirmed. Currently pending repeat CBC. Discussed with OB who arent concerned for acute heavy hemorrhage based on bimanual exam. Surgery consulted for post-op complication Brett Quintero, PGY-3: No large episodes of vaginal bleeding, reassessed by Gyn, discussed with surgery, plan to start heparin and admit to medicine given recent JAMISON. Hospitalist paged. Brett Quintero, PGY-3: Pt reexamined at bedside, resting comfortably, vitals stable. Patient to be admitted to an inpatient floor. Case discussed with and care endorsed to medical admitting resident. Admitting physician notified.

## 2022-08-04 NOTE — ED ADULT NURSE REASSESSMENT NOTE - NS ED NURSE REASSESS COMMENT FT1
received report from  SAYRA cade. pt Maori speaking daughter at bedside translating. Pt is a/o x 4. pt has no complaints at this time. abdominal midline dressing dry and intact. ostomy bag emptied. pt has  no complaints of chest pain, headache, nausea, dizziness, vomiting, SOB, fever, chills   verbalized. Pt has 20g iv placed to right finger with no redness or swelling noted. pt respirations even and unlabored with no accessory muscle use. pt normal sinus on monitor.

## 2022-08-04 NOTE — CONSULT NOTE ADULT - SUBJECTIVE AND OBJECTIVE BOX
VIPIN BI  67y  Female [id number]    HPI:   67 post-menopausal female presents w/       Name of GYN Physician: none    POB: NSVDx5    Pgyn: Denies fibroids, cysts, endometriosis, STI's, Abnormal pap smears     Home meds:     Hospital Meds:   MEDICATIONS  (STANDING):    MEDICATIONS  (PRN):      Allergies    No Known Allergies    Intolerances        PAST MEDICAL & SURGICAL HISTORY:  Obesity      Hypertension      Hyperlipidemia      H/O fracture of tibia  and fibula (L)      History of cholecystectomy  2011      H/O ventral hernia repair      S/P small bowel resection      H/O ileostomy          FAMILY HISTORY:      Social History:  Denies smoking use, drug use, alcohol use.   +occasional social alcohol use    Vital Signs Last 24 Hrs  T(C): 36.8 (04 Aug 2022 16:09), Max: 37 (04 Aug 2022 13:01)  T(F): 98.3 (04 Aug 2022 16:09), Max: 98.6 (04 Aug 2022 13:01)  HR: 68 (04 Aug 2022 17:55) (64 - 85)  BP: 139/99 (04 Aug 2022 17:55) (97/42 - 139/99)  BP(mean): --  RR: 20 (04 Aug 2022 17:55) (18 - 20)  SpO2: 100% (04 Aug 2022 17:55) (98% - 100%)    Parameters below as of 04 Aug 2022 17:55  Patient On (Oxygen Delivery Method): room air        Physical Exam:   General: sitting comftorably in bed, NAD   HEENT: neck supple, full ROM  CV: RR S1S2 no m/r/g  Lungs: CTA b/l, good air flow b/l   Back: No CVA tenderness  Abd: Soft, non-tender, non-distended.  Bowel sounds present.    :  No bleeding on pad.    External labia wnl.  Bimanual exam with no cervical motion tenderness, uterus wnl, adnexa non palpable b/l.  Cervix closed vs. Cervix dilated    cm   Speculum Exam: No active bleeding from os.  Physiologic discharge.    Ext: non-tender b/l, no edema     LABS:                              9.8    9.80  )-----------( 148      ( 04 Aug 2022 13:40 )             31.5     08-04    131<L>  |  92<L>  |  35<H>  ----------------------------<  102<H>  4.5   |  24  |  1.97<H>    Ca    8.4      04 Aug 2022 13:40    TPro  6.9  /  Alb  2.7<L>  /  TBili  0.6  /  DBili  x   /  AST  23  /  ALT  10  /  AlkPhos  268<H>  08-04    I&O's Detail    PT/INR - ( 04 Aug 2022 13:40 )   PT: 15.1 sec;   INR: 1.30 ratio         PTT - ( 04 Aug 2022 13:40 )  PTT:28.0 sec      RADIOLOGY & ADDITIONAL STUDIES: VIPIN TERRAZAS  67y  Female [id number]    HPI:   66yo Polish speaking post-menopausal female LMP over 15 years ago, hx of HTN HLD with multiple abdominal surgeries to manage complications in sequale of incarcerated ventral hernia, including small bowel resection with end ileostomy 6/21, most recently admitted for acute renal failure require dialysis, now presents with new onset vaginal bleeding x3 days that was noted by her daughter caretaker who found the bleeding when changing her clothing. Daughter endorses 2-3 pads/day. Patient at baseline with fatigue, currently w/out dizziness, heart palpitations or CP.     Patient has never had pap-smears and does not have an OBGYN, not has had any OBGYN issues per daughter at bedside.    Patient at baseline with poor health literacy and daughter at bedside endorses her HPI.      Name of GYN Physician: none    POB: NSVDx5    Pgyn: Denies fibroids, cysts, endometriosis, STI's, Abnormal pap smears     Home meds: Atorvastatin, Diphenoxylate-Atropine, Loperamide  Allergies No Known Allergies    Intolerances        PAST MEDICAL & SURGICAL HISTORY:  Obesity      Hypertension      Hyperlipidemia      H/O fracture of tibia  and fibula (L)      History of cholecystectomy  2011      H/O ventral hernia repair      S/P small bowel resection      H/O ileostomy          FAMILY HISTORY:      Social History:  Denies smoking use, drug use, alcohol use.   +occasional social alcohol use    Vital Signs Last 24 Hrs  T(C): 36.8 (04 Aug 2022 16:09), Max: 37 (04 Aug 2022 13:01)  T(F): 98.3 (04 Aug 2022 16:09), Max: 98.6 (04 Aug 2022 13:01)  HR: 68 (04 Aug 2022 17:55) (64 - 85)  BP: 139/99 (04 Aug 2022 17:55) (97/42 - 139/99)  BP(mean): --  RR: 20 (04 Aug 2022 17:55) (18 - 20)  SpO2: 100% (04 Aug 2022 17:55) (98% - 100%)    Parameters below as of 04 Aug 2022 17:55  Patient On (Oxygen Delivery Method): room air        Physical Exam:   General: sitting comfortably in bed, NAD   HEENT: neck supple, full ROM  CV: RR S1S2 no m/r/g  Lungs: CTA b/l, good air flow b/l   Back: No CVA tenderness  Abd: Soft, non-tender, non-distended.  Bowel sounds present.    : Clots, no fresh blood noted on pad. No active bleeding on exam. Small clots noted in posterior fornix. Bimanual exam declined.  Speculum Exam:  Os cannot be clearly seen due to patient intolerance of spec exam. Small clots noted within vaginal cannel.  Ext: non-tender b/l, no edema     LABS:                              9.8    9.80  )-----------( 148      ( 04 Aug 2022 13:40 )             31.5     08-04    131<L>  |  92<L>  |  35<H>  ----------------------------<  102<H>  4.5   |  24  |  1.97<H>    Ca    8.4      04 Aug 2022 13:40    TPro  6.9  /  Alb  2.7<L>  /  TBili  0.6  /  DBili  x   /  AST  23  /  ALT  10  /  AlkPhos  268<H>  08-04    I&O's Detail    PT/INR - ( 04 Aug 2022 13:40 )   PT: 15.1 sec;   INR: 1.30 ratio         PTT - ( 04 Aug 2022 13:40 )  PTT:28.0 sec      RADIOLOGY & ADDITIONAL STUDIES:

## 2022-08-05 ENCOUNTER — TRANSCRIPTION ENCOUNTER (OUTPATIENT)
Age: 67
End: 2022-08-05

## 2022-08-05 DIAGNOSIS — I82.409 ACUTE EMBOLISM AND THROMBOSIS OF UNSPECIFIED DEEP VEINS OF UNSPECIFIED LOWER EXTREMITY: ICD-10-CM

## 2022-08-05 DIAGNOSIS — N95.0 POSTMENOPAUSAL BLEEDING: ICD-10-CM

## 2022-08-05 DIAGNOSIS — Z29.9 ENCOUNTER FOR PROPHYLACTIC MEASURES, UNSPECIFIED: ICD-10-CM

## 2022-08-05 DIAGNOSIS — D64.9 ANEMIA, UNSPECIFIED: ICD-10-CM

## 2022-08-05 DIAGNOSIS — N93.9 ABNORMAL UTERINE AND VAGINAL BLEEDING, UNSPECIFIED: ICD-10-CM

## 2022-08-05 DIAGNOSIS — N17.9 ACUTE KIDNEY FAILURE, UNSPECIFIED: ICD-10-CM

## 2022-08-05 DIAGNOSIS — Z98.890 OTHER SPECIFIED POSTPROCEDURAL STATES: ICD-10-CM

## 2022-08-05 DIAGNOSIS — I80.10 PHLEBITIS AND THROMBOPHLEBITIS OF UNSPECIFIED FEMORAL VEIN: ICD-10-CM

## 2022-08-05 LAB
ALBUMIN SERPL ELPH-MCNC: 2.4 G/DL — LOW (ref 3.3–5)
ALP SERPL-CCNC: 257 U/L — HIGH (ref 40–120)
ALT FLD-CCNC: 7 U/L — SIGNIFICANT CHANGE UP (ref 4–33)
ANION GAP SERPL CALC-SCNC: 12 MMOL/L — SIGNIFICANT CHANGE UP (ref 7–14)
APTT BLD: 138.9 SEC — SIGNIFICANT CHANGE UP (ref 27–36.3)
APTT BLD: 60.2 SEC — HIGH (ref 27–36.3)
AST SERPL-CCNC: 15 U/L — SIGNIFICANT CHANGE UP (ref 4–32)
BILIRUB SERPL-MCNC: 0.6 MG/DL — SIGNIFICANT CHANGE UP (ref 0.2–1.2)
BLD GP AB SCN SERPL QL: NEGATIVE — SIGNIFICANT CHANGE UP
BUN SERPL-MCNC: 37 MG/DL — HIGH (ref 7–23)
CALCIUM SERPL-MCNC: 7.9 MG/DL — LOW (ref 8.4–10.5)
CHLORIDE SERPL-SCNC: 96 MMOL/L — LOW (ref 98–107)
CO2 SERPL-SCNC: 25 MMOL/L — SIGNIFICANT CHANGE UP (ref 22–31)
CREAT SERPL-MCNC: 1.47 MG/DL — HIGH (ref 0.5–1.3)
EGFR: 39 ML/MIN/1.73M2 — LOW
GLUCOSE SERPL-MCNC: 100 MG/DL — HIGH (ref 70–99)
HCT VFR BLD CALC: 28.5 % — LOW (ref 34.5–45)
HCT VFR BLD CALC: 30.9 % — LOW (ref 34.5–45)
HGB BLD-MCNC: 9 G/DL — LOW (ref 11.5–15.5)
HGB BLD-MCNC: 9.6 G/DL — LOW (ref 11.5–15.5)
INR BLD: 1.39 RATIO — HIGH (ref 0.88–1.16)
MAGNESIUM SERPL-MCNC: 1.6 MG/DL — SIGNIFICANT CHANGE UP (ref 1.6–2.6)
MCHC RBC-ENTMCNC: 26.2 PG — LOW (ref 27–34)
MCHC RBC-ENTMCNC: 26.6 PG — LOW (ref 27–34)
MCHC RBC-ENTMCNC: 31.1 GM/DL — LOW (ref 32–36)
MCHC RBC-ENTMCNC: 31.6 GM/DL — LOW (ref 32–36)
MCV RBC AUTO: 84.2 FL — SIGNIFICANT CHANGE UP (ref 80–100)
MCV RBC AUTO: 84.3 FL — SIGNIFICANT CHANGE UP (ref 80–100)
NRBC # BLD: 0 /100 WBCS — SIGNIFICANT CHANGE UP
NRBC # BLD: 0 /100 WBCS — SIGNIFICANT CHANGE UP
NRBC # FLD: 0 K/UL — SIGNIFICANT CHANGE UP
NRBC # FLD: 0 K/UL — SIGNIFICANT CHANGE UP
PHOSPHATE SERPL-MCNC: 3.6 MG/DL — SIGNIFICANT CHANGE UP (ref 2.5–4.5)
PLATELET # BLD AUTO: 149 K/UL — LOW (ref 150–400)
PLATELET # BLD AUTO: 169 K/UL — SIGNIFICANT CHANGE UP (ref 150–400)
POTASSIUM SERPL-MCNC: 3.3 MMOL/L — LOW (ref 3.5–5.3)
POTASSIUM SERPL-SCNC: 3.3 MMOL/L — LOW (ref 3.5–5.3)
PROT SERPL-MCNC: 6.6 G/DL — SIGNIFICANT CHANGE UP (ref 6–8.3)
PROTHROM AB SERPL-ACNC: 16.2 SEC — HIGH (ref 10.5–13.4)
RBC # BLD: 3.38 M/UL — LOW (ref 3.8–5.2)
RBC # BLD: 3.67 M/UL — LOW (ref 3.8–5.2)
RBC # FLD: 17.9 % — HIGH (ref 10.3–14.5)
RBC # FLD: 18 % — HIGH (ref 10.3–14.5)
RH IG SCN BLD-IMP: POSITIVE — SIGNIFICANT CHANGE UP
SODIUM SERPL-SCNC: 133 MMOL/L — LOW (ref 135–145)
WBC # BLD: 8.37 K/UL — SIGNIFICANT CHANGE UP (ref 3.8–10.5)
WBC # BLD: 9.24 K/UL — SIGNIFICANT CHANGE UP (ref 3.8–10.5)
WBC # FLD AUTO: 8.37 K/UL — SIGNIFICANT CHANGE UP (ref 3.8–10.5)
WBC # FLD AUTO: 9.24 K/UL — SIGNIFICANT CHANGE UP (ref 3.8–10.5)

## 2022-08-05 PROCEDURE — 76856 US EXAM PELVIC COMPLETE: CPT | Mod: 26

## 2022-08-05 PROCEDURE — 12345: CPT | Mod: NC

## 2022-08-05 PROCEDURE — 99223 1ST HOSP IP/OBS HIGH 75: CPT

## 2022-08-05 PROCEDURE — 99222 1ST HOSP IP/OBS MODERATE 55: CPT

## 2022-08-05 RX ORDER — ACETAMINOPHEN 500 MG
1000 TABLET ORAL ONCE
Refills: 0 | Status: COMPLETED | OUTPATIENT
Start: 2022-08-05 | End: 2022-08-05

## 2022-08-05 RX ORDER — MAGNESIUM SULFATE 500 MG/ML
2 VIAL (ML) INJECTION ONCE
Refills: 0 | Status: COMPLETED | OUTPATIENT
Start: 2022-08-05 | End: 2022-08-05

## 2022-08-05 RX ORDER — SODIUM CHLORIDE 9 MG/ML
1000 INJECTION INTRAMUSCULAR; INTRAVENOUS; SUBCUTANEOUS
Refills: 0 | Status: DISCONTINUED | OUTPATIENT
Start: 2022-08-05 | End: 2022-08-05

## 2022-08-05 RX ORDER — SODIUM,POTASSIUM PHOSPHATES 278-250MG
1 POWDER IN PACKET (EA) ORAL ONCE
Refills: 0 | Status: COMPLETED | OUTPATIENT
Start: 2022-08-05 | End: 2022-08-05

## 2022-08-05 RX ORDER — LOPERAMIDE HCL 2 MG
2 TABLET ORAL EVERY 6 HOURS
Refills: 0 | Status: DISCONTINUED | OUTPATIENT
Start: 2022-08-05 | End: 2022-08-08

## 2022-08-05 RX ORDER — SODIUM CHLORIDE 9 MG/ML
1000 INJECTION INTRAMUSCULAR; INTRAVENOUS; SUBCUTANEOUS ONCE
Refills: 0 | Status: COMPLETED | OUTPATIENT
Start: 2022-08-05 | End: 2022-08-05

## 2022-08-05 RX ORDER — HEPARIN SODIUM 5000 [USP'U]/ML
1800 INJECTION INTRAVENOUS; SUBCUTANEOUS
Qty: 25000 | Refills: 0 | Status: DISCONTINUED | OUTPATIENT
Start: 2022-08-05 | End: 2022-08-05

## 2022-08-05 RX ORDER — HYDROMORPHONE HYDROCHLORIDE 2 MG/ML
0.5 INJECTION INTRAMUSCULAR; INTRAVENOUS; SUBCUTANEOUS ONCE
Refills: 0 | Status: DISCONTINUED | OUTPATIENT
Start: 2022-08-05 | End: 2022-08-05

## 2022-08-05 RX ORDER — ATORVASTATIN CALCIUM 80 MG/1
10 TABLET, FILM COATED ORAL AT BEDTIME
Refills: 0 | Status: DISCONTINUED | OUTPATIENT
Start: 2022-08-05 | End: 2022-08-08

## 2022-08-05 RX ORDER — POTASSIUM CHLORIDE 20 MEQ
20 PACKET (EA) ORAL ONCE
Refills: 0 | Status: COMPLETED | OUTPATIENT
Start: 2022-08-05 | End: 2022-08-05

## 2022-08-05 RX ORDER — POTASSIUM CHLORIDE 20 MEQ
10 PACKET (EA) ORAL
Refills: 0 | Status: COMPLETED | OUTPATIENT
Start: 2022-08-05 | End: 2022-08-05

## 2022-08-05 RX ORDER — ACETAMINOPHEN 500 MG
650 TABLET ORAL EVERY 6 HOURS
Refills: 0 | Status: DISCONTINUED | OUTPATIENT
Start: 2022-08-05 | End: 2022-08-08

## 2022-08-05 RX ORDER — HEPARIN SODIUM 5000 [USP'U]/ML
1600 INJECTION INTRAVENOUS; SUBCUTANEOUS
Qty: 25000 | Refills: 0 | Status: DISCONTINUED | OUTPATIENT
Start: 2022-08-05 | End: 2022-08-06

## 2022-08-05 RX ORDER — DIPHENOXYLATE HCL/ATROPINE 2.5-.025MG
1 TABLET ORAL DAILY
Refills: 0 | Status: DISCONTINUED | OUTPATIENT
Start: 2022-08-05 | End: 2022-08-08

## 2022-08-05 RX ADMIN — HEPARIN SODIUM 1600 UNIT(S)/HR: 5000 INJECTION INTRAVENOUS; SUBCUTANEOUS at 20:41

## 2022-08-05 RX ADMIN — HEPARIN SODIUM 18 UNIT(S)/HR: 5000 INJECTION INTRAVENOUS; SUBCUTANEOUS at 01:54

## 2022-08-05 RX ADMIN — Medication 2 MILLIGRAM(S): at 13:58

## 2022-08-05 RX ADMIN — Medication 100 MILLIEQUIVALENT(S): at 08:00

## 2022-08-05 RX ADMIN — Medication 25 GRAM(S): at 06:33

## 2022-08-05 RX ADMIN — SODIUM CHLORIDE 100 MILLILITER(S): 9 INJECTION INTRAMUSCULAR; INTRAVENOUS; SUBCUTANEOUS at 12:05

## 2022-08-05 RX ADMIN — Medication 20 MILLIEQUIVALENT(S): at 13:57

## 2022-08-05 RX ADMIN — ATORVASTATIN CALCIUM 10 MILLIGRAM(S): 80 TABLET, FILM COATED ORAL at 23:05

## 2022-08-05 RX ADMIN — Medication 400 MILLIGRAM(S): at 02:58

## 2022-08-05 RX ADMIN — Medication 100 MILLIEQUIVALENT(S): at 08:55

## 2022-08-05 RX ADMIN — Medication 2 MILLIGRAM(S): at 06:36

## 2022-08-05 RX ADMIN — Medication 100 MILLIEQUIVALENT(S): at 06:33

## 2022-08-05 RX ADMIN — HEPARIN SODIUM 1600 UNIT(S)/HR: 5000 INJECTION INTRAVENOUS; SUBCUTANEOUS at 14:02

## 2022-08-05 RX ADMIN — SODIUM CHLORIDE 1000 MILLILITER(S): 9 INJECTION INTRAMUSCULAR; INTRAVENOUS; SUBCUTANEOUS at 10:22

## 2022-08-05 RX ADMIN — Medication 1 TABLET(S): at 13:57

## 2022-08-05 NOTE — H&P ADULT - PROBLEM SELECTOR PLAN 6
- DVT: already has DVT, on heparin gtt  - diet: will be NPO for now pending findings on TVUS and pending gyn procedure if necessary  - dispo: pending hospital course

## 2022-08-05 NOTE — DISCHARGE NOTE PROVIDER - NSDCMRMEDTOKEN_GEN_ALL_CORE_FT
acetaminophen 325 mg oral tablet: 2 tab(s) orally every 6 hours  atorvastatin 10 mg oral tablet: 1 tab(s) orally once a day  diphenoxylate-atropine 2.5 mg-0.025 mg oral tablet: 1 tab(s) orally once a day MDD:1  loperamide 2 mg oral capsule: 2 cap(s) orally every 6 hours   acetaminophen 325 mg oral tablet: 2 tab(s) orally every 6 hours  atorvastatin 10 mg oral tablet: 1 tab(s) orally once a day  diphenoxylate-atropine 2.5 mg-0.025 mg oral tablet: 1 tab(s) orally once a day MDD:1  loperamide 2 mg oral capsule: 2 cap(s) orally every 6 hours  Lovenox 30 mg/0.3 mL injectable solution: 0.9 milliliter(s) subcutaneously every 12 hours    acetaminophen 325 mg oral tablet: 2 tab(s) orally every 6 hours  atorvastatin 10 mg oral tablet: 1 tab(s) orally once a day  diphenoxylate-atropine 2.5 mg-0.025 mg oral tablet: 1 tab(s) orally once a day MDD:1  loperamide 2 mg oral capsule: 2 cap(s) orally every 6 hours  Lovenox 100 mg/mL injectable solution: 90 milligram(s) subcutaneously 2 times a day.    Please squeeze out 10mL of each vial before injection.

## 2022-08-05 NOTE — DISCHARGE NOTE PROVIDER - HOSPITAL COURSE
67F Mohawk speaking w/ PMH of incarcerated hernia s/p repair w/ ostomy bag, HTN, HLD, presented to the ED due to vaginal bleeding of 2d. In the ED the patient was given fluids and imaging was done of her left lower extremity which found a DVT and the patient was started on heparin gtt. OB/GYN was consulted and recommended a transvaginal ultrasound. The patient refused initially but later agreed. 67F Slovenian speaking w/ PMH of incarcerated hernia s/p repair w/ ostomy bag, HTN, HLD, presented to the ED due to vaginal bleeding of 2d. In the ED the patient was given fluids and imaging was done of her left lower extremity which found a DVT and the patient was started on heparin gtt. OB/GYN was consulted and recommended a transvaginal ultrasound. The patient refused initially but later agreed. A transabdominal ultrasound was performed due to difficulties with patient cooperation for the TVUS. The ultrasound showed a normal uterus. 67F Sinhala speaking w/ PMH of incarcerated hernia s/p repair w/ ostomy bag, HTN, HLD, presented to the ED due to vaginal bleeding of 2d. In the ED the patient was given fluids and imaging was done of her left lower extremity which found a DVT and the patient was started on heparin gtt. OB/GYN was consulted and recommended a transvaginal ultrasound. The patient refused initially but later agreed. A transabdominal ultrasound was performed due to difficulties with patient cooperation for the TVUS. The ultrasound showed a normal uterus. Pt is stable and ready for discharge with outpatient follow up. 67F Bulgarian speaking w/ PMH of incarcerated hernia s/p repair w/ ostomy bag, HTN, HLD, presented to the ED due to vaginal bleeding of 2d. In the ED the patient was given fluids and imaging was done of her left lower extremity which found a DVT and the patient was started on heparin gtt. Transitioned to therapeutic lovenox prior to d/c. OB/GYN was consulted and recommended a transvaginal ultrasound. The patient refused initially but later agreed. A transabdominal ultrasound was performed due to difficulties with patient cooperation for the TVUS. The ultrasound showed a normal uterus. Pt is stable and ready for discharge with outpatient follow up. 67F Yakut speaking w/ PMH of incarcerated hernia s/p repair w/ ostomy bag, HTN, HLD, presented to the ED due to vaginal bleeding of 2d. In the ED the patient was given fluids and imaging was done of her left lower extremity which found a DVT and the patient was started on heparin gtt. Transitioned to therapeutic lovenox prior to d/c. OB/GYN was consulted and recommended a transvaginal ultrasound. The patient refused initially but later agreed. A transabdominal ultrasound was performed due to difficulties with patient cooperation for the TVUS. The ultrasound showed a normal uterus. Pt is cleared from OBGYN perspective for outpatient follow up for endometrial biopsy. H/H stable while on lovenox BID. Surgery evaluated ostomy while in hospital, outpatient follow up with surgery scheduled on August 16, 2022. Pt is stable and ready for discharge with outpatient follow up.

## 2022-08-05 NOTE — PROGRESS NOTE ADULT - ASSESSMENT
67F Macedonian speaking w/ PMH of incarcerated hernia s/p repair w/ ostomy bag, HTN, HLD, presented to the ED due to vaginal bleeding of 2d. Was also found to have DVT on R femoral vein. Now on heparin gtt for DVT Tx. S/P gyn consult for vaginal bleeding, but no interventions for now as pt refused initial TVUS. H and H stable. 67F Bulgarian speaking w/ PMH of incarcerated hernia s/p repair w/ ostomy bag, HTN, HLD, presented to the ED due to vaginal bleeding of 2d prior to coming. Was also found to have DVT on R femoral vein. Now on heparin gtt for DVT Tx. S/P gyn consult for vaginal bleeding, but no interventions for now as pt refused initial TVUS. H and H stable.

## 2022-08-05 NOTE — DISCHARGE NOTE PROVIDER - CARE PROVIDERS DIRECT ADDRESSES
,[email or web address]

## 2022-08-05 NOTE — PROGRESS NOTE ADULT - PROBLEM SELECTOR PLAN 4
- likely in the setting of renal failure, as pt's Hgb dropped after her kidney function worsened  - no drop in Hgb iso vaginal bleeding  - active T/S, transfusion goal 7 - likely in the setting of renal failure, as pt's Hgb dropped after her kidney function worsened  - no drop in Hgb iso vaginal bleeding  - f/u iron studies  - active T/S, transfusion goal 7

## 2022-08-05 NOTE — DISCHARGE NOTE PROVIDER - NSDCCPTREATMENT_GEN_ALL_CORE_FT
PRINCIPAL PROCEDURE  Procedure: Transvaginal ultrasound  Findings and Treatment:        PRINCIPAL PROCEDURE  Procedure: Limited transabdominal ultrasound of pelvis  Findings and Treatment: An ultrasound was peformed of your pelvis to evaluate for a cause of your vaginal bleeding. The uterus on the ultrasound was normal. Please follow up with an OB/GYN outpatient for further testing and management.       PRINCIPAL PROCEDURE  Procedure: Limited transabdominal ultrasound of pelvis  Findings and Treatment: An ultrasound was peformed of your pelvis to evaluate for a cause of your vaginal bleeding. The uterus on the ultrasound was normal. Please follow up with an OB/GYN outpatient for further testing and management.      SECONDARY PROCEDURE  Procedure: Venous duplex scan RLE  Findings and Treatment: INTERPRETATION:  CLINICAL INFORMATION: Right Femoral DVT seen on CT scan.   Evaluate extent of clot.  COMPARISON: CT abdomen and pelvis 8/4/2022  TECHNIQUE: Duplex sonography of the RIGHT LOWER extremity veins with   color and spectral Doppler, with and without compression.  FINDINGS:  Acute DVT involving the common femoral vein, femoral vein, and popliteal   vein.  The contralateral common femoral vein is patent.  The calf veins were not well visualized due to patient condition.  IMPRESSION:  Acute DVT involving the common femoral vein, femoral vein, and popliteal   vein.

## 2022-08-05 NOTE — DISCHARGE NOTE PROVIDER - NSDCFUADDAPPT_GEN_ALL_CORE_FT
Please follow up with your PCP within 1-2 weeks after you are discharged. Please follow up with your PCP within 1-2 weeks after you are discharged for follow up for a newly found DVT after your recent surgery for your ostomy. Please follow up with general surgery for monitoring of the ostomy.

## 2022-08-05 NOTE — H&P ADULT - HISTORY OF PRESENT ILLNESS
67F Serbian speaking w/ PMH of incarcerated hernia s/p repair w/ ostomy bag, HTN, HLD, presented to the ED due to vaginal bleeding of 2d. Daughter at bedside provided translation and HPI as pt was lethargic and asleep.    Vaginal bleeding started Tuesday AM, as daughter noticed bleeding from vagina, and they used adult diapers for the blood, and would go over 3-4 pads every 24hrs, and per daughter the bleeding is "not a lot". Denied any trauma around the area, denied any recent sexual encounter, and denied Hx of fibroids. Her LMP was when she was 47-49y/o. She has never received a pap or HPV testing in the past and has never seen a gyn doc.    In addition, the daughter reported that pt has abd pain around the site of ostomy bag when she moved around. Currently pt has no pain. Reported compliance w/ dc meds. No blood in the ostomy bag. Prior to surgery pt was ambulatory but after surgery pt was cautious of moving around, uses a walker, and needs help w/ ADLs.    When asked about pt's worsening renal function at last admission requiring HD, daughter reported that pt had a poor appetite w/ decreased PO intake. However she reported that pt is actually having a better PO intake currently.    In the ED, pt received 750cc NS, and was seen by gyn who recommended transvaginal US, for which pt refused due to pain. After explaining to pt that this procedure is actually important, she agreed to get the imaging later in the AM. H&H stable, BP stable, Cr 1.97, LE US showed R sided DVT.

## 2022-08-05 NOTE — PROGRESS NOTE ADULT - SUBJECTIVE AND OBJECTIVE BOX
Surgery Progress Note    SUBJECTIVE:  - Patient seen and examined at bedside   --------------------------------------------------------------------------------------------------  OBJECTIVE:   Physical Exam:  General: NAD, resting comfortably  HEENT: NC/AT, EOM grossly intact, normal hearing  Pulmonary: normal resp effort on RA  Cardiovascular: RRR  Abdominal: obese, soft, ND/NT, ostomy productive of stool and gas, midline incision dressing c/d/i  Extremities: WWP, neurovascularly intact in bilateral lower extremities. no swelling, skin coloration changes, or evidence or phlegmasia in RLE  Neuro: A/O x 1-2  Vasc: palpable femoral, DP, and PT pulses bilaterally  --------------------------------------------------------------------------------------------------  V/S:  Vital Signs Last 24 Hrs  T(C): 37 (05 Aug 2022 10:30), Max: 37.1 (05 Aug 2022 00:42)  T(F): 98.6 (05 Aug 2022 10:30), Max: 98.7 (05 Aug 2022 00:42)  HR: 65 (05 Aug 2022 10:30) (64 - 81)  BP: 106/60 (05 Aug 2022 10:30) (101/51 - 139/99)  BP(mean): --  RR: 18 (05 Aug 2022 10:30) (14 - 20)  SpO2: 100% (05 Aug 2022 10:30) (97% - 100%)    Parameters below as of 05 Aug 2022 10:30  Patient On (Oxygen Delivery Method): room air        --------------------------------------------------------------------------------------------------  I/Os:    --------------------------------------------------------------------------------------------------  LABS:                        9.0    8.37  )-----------( 169      ( 05 Aug 2022 10:57 )             28.5     05 Aug 2022 04:48    133    |  96     |  37     ----------------------------<  100    3.3     |  25     |  1.47     Ca    7.9        05 Aug 2022 04:48  Phos  3.6       05 Aug 2022 04:48  Mg     1.60      05 Aug 2022 04:48    TPro  6.6    /  Alb  2.4    /  TBili  0.6    /  DBili  x      /  AST  15     /  ALT  7      /  AlkPhos  257    05 Aug 2022 04:48    PT/INR - ( 05 Aug 2022 04:18 )   PT: 16.2 sec;   INR: 1.39 ratio         PTT - ( 05 Aug 2022 10:57 )  PTT:138.9 sec  CAPILLARY BLOOD GLUCOSE            LIVER FUNCTIONS - ( 05 Aug 2022 04:48 )  Alb: 2.4 g/dL / Pro: 6.6 g/dL / ALK PHOS: 257 U/L / ALT: 7 U/L / AST: 15 U/L / GGT: x               --------------------------------------------------------------------------------------------------  MEDICATIONS  (STANDING):  atorvastatin 10 milliGRAM(s) Oral at bedtime  diphenoxylate/atropine 1 Tablet(s) Oral daily  heparin  Infusion. 1600 Unit(s)/Hr (16 mL/Hr) IV Continuous <Continuous>  loperamide 2 milliGRAM(s) Oral every 6 hours  sodium chloride 0.9%. 1000 milliLiter(s) (100 mL/Hr) IV Continuous <Continuous>    MEDICATIONS  (PRN):  acetaminophen     Tablet .. 650 milliGRAM(s) Oral every 6 hours PRN Temp greater or equal to 38C (100.4F), Mild Pain (1 - 3)    --------------------------------------------------------------------------------------------------     Surgery Progress Note    SUBJECTIVE:  - Patient seen and examined at bedside   --------------------------------------------------------------------------------------------------  OBJECTIVE:   Physical Exam:  General: NAD, resting comfortably  HEENT: NC/AT, EOM grossly intact, normal hearing  Pulmonary: normal resp effort on RA  Cardiovascular: RRR  Abdominal: obese, soft, ND/NT, ostomy productive of stool and gas, midline incision dressing c/d/i  Extremities: WWP, neurovascularly intact in bilateral lower extremities. no swelling, skin coloration changes, or evidence or phlegmasia in RLE  Neuro: A/O x 1-2  Vasc: palpable femoral, DP, and PT pulses bilaterally  --------------------------------------------------------------------------------------------------  V/S:  Vital Signs Last 24 Hrs  T(C): 37 (05 Aug 2022 10:30), Max: 37.1 (05 Aug 2022 00:42)  T(F): 98.6 (05 Aug 2022 10:30), Max: 98.7 (05 Aug 2022 00:42)  HR: 65 (05 Aug 2022 10:30) (64 - 81)  BP: 106/60 (05 Aug 2022 10:30) (101/51 - 139/99)  BP(mean): --  RR: 18 (05 Aug 2022 10:30) (14 - 20)  SpO2: 100% (05 Aug 2022 10:30) (97% - 100%)    Parameters below as of 05 Aug 2022 10:30  Patient On (Oxygen Delivery Method): room air    --------------------------------------------------------------------------------------------------  LABS:                        9.0    8.37  )-----------( 169      ( 05 Aug 2022 10:57 )             28.5     05 Aug 2022 04:48    133    |  96     |  37     ----------------------------<  100    3.3     |  25     |  1.47     Ca    7.9        05 Aug 2022 04:48  Phos  3.6       05 Aug 2022 04:48  Mg     1.60      05 Aug 2022 04:48    TPro  6.6    /  Alb  2.4    /  TBili  0.6    /  DBili  x      /  AST  15     /  ALT  7      /  AlkPhos  257    05 Aug 2022 04:48    PT/INR - ( 05 Aug 2022 04:18 )   PT: 16.2 sec;   INR: 1.39 ratio         PTT - ( 05 Aug 2022 10:57 )  PTT:138.9 sec  CAPILLARY BLOOD GLUCOSE            LIVER FUNCTIONS - ( 05 Aug 2022 04:48 )  Alb: 2.4 g/dL / Pro: 6.6 g/dL / ALK PHOS: 257 U/L / ALT: 7 U/L / AST: 15 U/L / GGT: x               --------------------------------------------------------------------------------------------------  MEDICATIONS  (STANDING):  atorvastatin 10 milliGRAM(s) Oral at bedtime  diphenoxylate/atropine 1 Tablet(s) Oral daily  heparin  Infusion. 1600 Unit(s)/Hr (16 mL/Hr) IV Continuous <Continuous>  loperamide 2 milliGRAM(s) Oral every 6 hours  sodium chloride 0.9%. 1000 milliLiter(s) (100 mL/Hr) IV Continuous <Continuous>    MEDICATIONS  (PRN):  acetaminophen     Tablet .. 650 milliGRAM(s) Oral every 6 hours PRN Temp greater or equal to 38C (100.4F), Mild Pain (1 - 3)    --------------------------------------------------------------------------------------------------

## 2022-08-05 NOTE — H&P ADULT - PROBLEM SELECTOR PLAN 4
- likely in the setting of renal failure, as pt's Hgb dropped after her kidney function worsened  - no drop in Hgb iso vaginal bleeding  - active T/S, transfusion goal 7

## 2022-08-05 NOTE — ED ADULT NURSE REASSESSMENT NOTE - NS ED NURSE REASSESS COMMENT FT1
pt at baseline mental status. 18g to the RFA via US by MD Quintero. pt respirations even and unlabored with no accessory muscle use. 20g to the right pointer digit in tact.

## 2022-08-05 NOTE — H&P ADULT - ATTENDING COMMENTS
67F Turkmen speaking w/ PMH of incarcerated hernia s/p repair w/ ostomy bag, HTN, HLD, presented to the ED due to vaginal bleeding of 2d and right sided femoral vein DVT    #Vaginal bleeding  -unknown etiology of this, appreciate gyn consult recs  -pending TVUS   -Hg stable, BP stable currently, transfuse if BP <90 if Hg <7    #Right femoral DVT  -continue with heparin drip, will watch for any bleeding events in patient  -discussed w/ family and pt that benefits of AC for DVT and PE causing cardiac arrest outweigh risks of bleeding events    #Ventral hernia  -s/p repair last admission  -can consult gen surg in am as a courtesy    #JAMISON  -f/u urine lytes  -agree w/ hydration as above

## 2022-08-05 NOTE — PATIENT PROFILE ADULT - FALL HARM RISK - HARM RISK INTERVENTIONS
Assistance with ambulation/Assistance OOB with selected safe patient handling equipment/Communicate Risk of Fall with Harm to all staff/Discuss with provider need for PT consult/Monitor gait and stability/Reinforce activity limits and safety measures with patient and family/Tailored Fall Risk Interventions/Visual Cue: Yellow wristband and red socks/Bed in lowest position, wheels locked, appropriate side rails in place/Call bell, personal items and telephone in reach/Instruct patient to call for assistance before getting out of bed or chair/Non-slip footwear when patient is out of bed/[place] to call system/Physically safe environment - no spills, clutter or unnecessary equipment/Purposeful Proactive Rounding/Room/bathroom lighting operational, light cord in reach

## 2022-08-05 NOTE — PROGRESS NOTE ADULT - PROBLEM SELECTOR PLAN 1
- per HPI pt had 2d of vaginal bleeding  - per gyn there are small clots w/o active bleeding  - H and H has been stable  - encouraged pt to receive TVUS which will guide further directions of treatment, as malignancy needs to be r/o for AUB in postmenopausal patient, and pt agreed to try again  - active T&S, monitor CBC Q8H,   - f/u gyn - per HPI pt had 2d of vaginal bleeding  - per gyn there are small clots w/o active bleeding  - H and H has been stable  - encouraged pt to receive TVUS which will guide further directions of treatment, as malignancy needs to be r/o for AUB in postmenopausal patient, and pt agreed to try again  - active T&S, monitor CBC Q8H,   - f/u gyn  - pending transabdominal ultrasound - per HPI pt had 2d of vaginal bleeding  - per gyn there are small clots w/o active bleeding  - H and H has been stable  - active T&S, monitor CBC Q8H,   - f/u gyn  - normal uterus on abd U/S

## 2022-08-05 NOTE — H&P ADULT - ASSESSMENT
67F Greek speaking w/ PMH of incarcerated hernia s/p repair w/ ostomy bag, HTN, HLD, presented to the ED due to vaginal bleeding of 2d. Was also found to have DVT on R femoral vein. Now on heparin gtt for DVT Tx. S/P gyn consult for vaginal bleeding, but no interventions for now as pt refused initial TVUS. H and H stable.

## 2022-08-05 NOTE — ED ADULT NURSE REASSESSMENT NOTE - NS ED NURSE REASSESS COMMENT FT1
pt refused medication. MD Sutherland made aware. awaiting further orders at this time. pt respirations even and unlabored with no accessory muscle use.

## 2022-08-05 NOTE — PROGRESS NOTE ADULT - SUBJECTIVE AND OBJECTIVE BOX
Romario Urbina    Patient is a 67y old  Female who presents with a chief complaint of vaginal bleeding (05 Aug 2022 04:21)      SUBJECTIVE / OVERNIGHT EVENTS: Pt examined at bedside sleeping. per daughter at bedside, pt has no new complaints since coming to the hospital. Pt has had a little bleeding since coming but no pain. No chest pain or SOB.    MEDICATIONS  (STANDING):  atorvastatin 10 milliGRAM(s) Oral at bedtime  diphenoxylate/atropine 1 Tablet(s) Oral daily  heparin  Infusion 1800 Unit(s)/Hr (18 mL/Hr) IV Continuous <Continuous>  loperamide 2 milliGRAM(s) Oral every 6 hours  potassium chloride  10 mEq/100 mL IVPB 10 milliEquivalent(s) IV Intermittent every 1 hour  sodium chloride 0.9%. 1000 milliLiter(s) (100 mL/Hr) IV Continuous <Continuous>    MEDICATIONS  (PRN):  acetaminophen     Tablet .. 650 milliGRAM(s) Oral every 6 hours PRN Temp greater or equal to 38C (100.4F), Mild Pain (1 - 3)      Vital Signs Last 24 Hrs  T(C): 37 (05 Aug 2022 06:30), Max: 37.1 (05 Aug 2022 00:42)  T(F): 98.6 (05 Aug 2022 06:30), Max: 98.7 (05 Aug 2022 00:42)  HR: 70 (05 Aug 2022 06:30) (64 - 85)  BP: 104/62 (05 Aug 2022 06:30) (97/42 - 139/99)  BP(mean): --  RR: 17 (05 Aug 2022 06:30) (14 - 20)  SpO2: 97% (05 Aug 2022 06:30) (97% - 100%)  CAPILLARY BLOOD GLUCOSE    I&O's Summary    GENERAL: lethargic but A&OX3  HEAD:  Atraumatic, normocephalic  EYES: EOMI, conjunctiva and sclera clear  ENT: Moist mucous membranes  NECK: Supple, no JVD  HEART: Regular rate and rhythm, no murmurs, rubs, or gallops  LUNGS: Unlabored respirations.  Clear to auscultation bilaterally, no crackles, wheezing, or rhonchi  ABDOMEN: morbidly obese abdomen, +BS, minimally tender to palpation at the ostomy bag site w/ yellow output, no active bleeding  EXTREMITIES: swollen LE   NERVOUS SYSTEM:  A&Ox3, no focal deficits   SKIN: No rashes or lesions    LABS:                        9.6    9.24  )-----------( 149      ( 05 Aug 2022 04:18 )             30.9     08-05    133<L>  |  96<L>  |  37<H>  ----------------------------<  100<H>  3.3<L>   |  25  |  1.47<H>    Ca    7.9<L>      05 Aug 2022 04:48  Phos  3.6     08-05  Mg     1.60     08-05    TPro  6.6  /  Alb  2.4<L>  /  TBili  0.6  /  DBili  x   /  AST  15  /  ALT  7   /  AlkPhos  257<H>  08-05    PT/INR - ( 05 Aug 2022 04:18 )   PT: 16.2 sec;   INR: 1.39 ratio         PTT - ( 05 Aug 2022 04:18 )  PTT:60.2 sec          RADIOLOGY & ADDITIONAL TESTS:    Imaging Personally Reviewed:    Consultant(s) Notes Reviewed:      Care Discussed with Consultants/Other Providers:

## 2022-08-05 NOTE — PROGRESS NOTE ADULT - ASSESSMENT
68yo F (Danish dialect speaking) h/o HTN, HLD, s/p lap to open repair of incarcerated ventral hernia c/b enterotomy (repaired) 6/15 c/b enterotomy leak s/p repair, SBR, and end ileostomy 6/21, recent admission for acute renal failure, presenting with new onset vaginal bleeding x3 days found on imaging to have incidental new right common femoral, femoral, and popliteal DVTs.      PLAN:  - Given current available CT imaging does not well visualize the external iliac veins, would recommend obtaining a CT Venogram of the distal IVC with bilateral lower extremity run-off to assess the extent of patient's DVT.  - C/w hep gtt at therapeutic levels  - ACE wrap RLE to aid with swelling    KATHERINE Alegria, PGY-3  [facility]  C Team Surgery  [id number] 66yo F (Mohawk dialect speaking) h/o HTN, HLD, s/p lap to open repair of incarcerated ventral hernia c/b enterotomy (repaired) 6/15 c/b enterotomy leak s/p repair, SBR, and end ileostomy 6/21, recent admission for acute renal failure, presenting with new onset vaginal bleeding x3 days found on imaging to have incidental new right common femoral, femoral, and popliteal DVTs.      PLAN:  - Vascular surgery team is signing off. We remain available should any concern or questions become available.  - Given current available CT imaging does not well visualize the external iliac veins, would recommend obtaining a CT Venogram of the distal IVC with bilateral lower extremity run-off to assess the extent of patient's DVT.  - C/w hep gtt at therapeutic levels  - ACE wrap RLE to aid with swelling      C Team Surgery  [id number] 66yo F (T[place] dialect speaking) h/o HTN, HLD, s/p lap to open repair of incarcerated ventral hernia c/b enterotomy (repaired) 6/15 c/b enterotomy leak s/p repair, SBR, and end ileostomy 6/21, recent admission for acute renal failure, presenting with new onset vaginal bleeding x3 days found on imaging to have incidental new right common femoral, femoral, and popliteal DVTs.      PLAN:  - Given the patient complex surgical history and deblitation, the risks for the CTV are more than benefits. The patient will be prone for few hours that questionable if she can tolerate.  - Vascular surgery team is signing off. We remain available should any concern or questions become available.  - C/w hep gtt at therapeutic levels  - ACE wrap RLE to aid with swelling      C Team Surgery  [id number]

## 2022-08-05 NOTE — ED ADULT NURSE REASSESSMENT NOTE - NS ED NURSE REASSESS COMMENT FT1
report rcd from break rn caro: pt a&ox4. pt has no new complaints. pt ostomy emptied. pt turned and positioned. heparin infusing to 20g right finger at 18 mL/hr with no redness or swelling. pt normal sinus on monitor. pt respirations even and unlabored with no accessory muscle use.

## 2022-08-05 NOTE — CHART NOTE - NSCHARTNOTEFT_GEN_A_CORE
Reviewed TAUS with GYN Attending, Dr. Lindsey:  < from: US Pelvis Complete (08.05.22 @ 12:47) >    FINDINGS:  Uterus: 7.9 cm x 3.7 cm x 4.7 cm. No myometrial mass visualized.  Endometrium: Grossly unremarkable endometrial echo measuring 3 mm.    Right ovary: Not visualized.  Left ovary: Not visualized.    Fluid: None.    IMPRESSION:  Limited transpelvic ultrasound.    Grossly unremarkable appearance of the uterus.    Nonvisualization of the ovaries.    --- End of Report ---    < end of copied text >      In setting of no current vaginal bleeding with stable H/H and ultrasound imaging with unremarkable findings:  - please have patient follow up outpatient for evaluation and likely endometrial biopsy   - c/w current team's recs re therapeutic anticoagulation  - if begins to bleed, please restart Pad Counting as noted in previous consult note   - please reach out if any further questions    d/w Dr. César Morris PGY-4  [phone] Reviewed TAUS with GYN Attending, Dr. Lindsey:  < from: US Pelvis Complete (08.05.22 @ 12:47) >    FINDINGS:  Uterus: 7.9 cm x 3.7 cm x 4.7 cm. No myometrial mass visualized.  Endometrium: Grossly unremarkable endometrial echo measuring 3 mm.    Right ovary: Not visualized.  Left ovary: Not visualized.    Fluid: None.    IMPRESSION:  Limited transpelvic ultrasound.    Grossly unremarkable appearance of the uterus.    Nonvisualization of the ovaries.    --- End of Report ---    < end of copied text >      In setting of no current vaginal bleeding with stable H/H and ultrasound imaging with unremarkable findings:  - please have patient follow up outpatient for evaluation and likely endometrial biopsy   - c/w current team's recs re therapeutic anticoagulation  - if begins to bleed, please restart Pad Counting as noted in previous consult note   - please reach out if any further questions    d/w Dr. César Morris PGY-4  [phone]    patient seen and examined at bedside. agree with above assessment and plan  Tim Lindsey MD

## 2022-08-05 NOTE — DISCHARGE NOTE PROVIDER - CARE PROVIDER_API CALL
Santhosh Mello)  Internal Medicine  [address], [place] [id number]  Phone: [phone]  Fax: [phone]  Follow Up Time:

## 2022-08-05 NOTE — PROGRESS NOTE ADULT - PROBLEM SELECTOR PLAN 6
- DVT: already has DVT, on heparin gtt  - diet: will be NPO for now pending findings on TVUS and pending gyn procedure if necessary  - dispo: pending hospital course - DVT: already has DVT, on heparin gtt  - dispo: pending hospital course

## 2022-08-05 NOTE — CHART NOTE - NSCHARTNOTEFT_GEN_A_CORE
Per chart review, patient possibly amenable to TVUS. Will await those results today.   - No need for NPO status per GYN Team (EMB procedure, if performed in house, does not require)  - Please document pad counts  - Will continue to follow as workup unfolds     ADomney PGY-4  [phone]

## 2022-08-05 NOTE — ED ADULT NURSE REASSESSMENT NOTE - NS ED NURSE REASSESS COMMENT FT1
pt at baseline mental status. repeat labs collected and sent. pt respirations even and unlabored with no accessory muscle use.

## 2022-08-05 NOTE — H&P ADULT - PROBLEM SELECTOR PLAN 3
- had hernia repair in June w/ ostomy bag in place  - c/w lomotil and imodium to decrease output  - monitor signs of bloody output

## 2022-08-05 NOTE — DISCHARGE NOTE PROVIDER - NSDCCPCAREPLAN_GEN_ALL_CORE_FT
PRINCIPAL DISCHARGE DIAGNOSIS  Diagnosis: Vaginal bleeding  Assessment and Plan of Treatment: You came to the hospital with 2 days of vaginal bleeding. In the hospital you had an ultrasound which found a normal uterus. Please follow up with your PCP and an OB/GYN after discharge.       PRINCIPAL DISCHARGE DIAGNOSIS  Diagnosis: Vaginal bleeding  Assessment and Plan of Treatment: You came to the hospital with 2 days of vaginal bleeding. In the hospital you had an ultrasound which found a normal uterus. Please follow up with your PCP and an OB/GYN after discharge.      SECONDARY DISCHARGE DIAGNOSES  Diagnosis: Deep vein thrombosis (DVT)  Assessment and Plan of Treatment: You were found to have a blood clot in your lower extremity. You were initially started on the blood thinner Heparin in the hospital before being transitioned to lovenox, an injection you can take at home. Please continue this at home and follow up with your PCP for further care.

## 2022-08-05 NOTE — H&P ADULT - NSHPPHYSICALEXAM_GEN_ALL_CORE
VITALS:   T(C): 36.8 (08-05-22 @ 03:16), Max: 37.1 (08-05-22 @ 00:42)  HR: 74 (08-05-22 @ 03:16) (64 - 85)  BP: 109/59 (08-05-22 @ 03:16) (97/42 - 139/99)  RR: 14 (08-05-22 @ 03:16) (14 - 20)  SpO2: 100% (08-05-22 @ 03:16) (98% - 100%)    GENERAL: lethargic but A&OX3  HEAD:  Atraumatic, normocephalic  EYES: EOMI, PERRLA, conjunctiva and sclera clear  ENT: Moist mucous membranes  NECK: Supple, no JVD  HEART: Regular rate and rhythm, no murmurs, rubs, or gallops  LUNGS: Unlabored respirations.  Clear to auscultation bilaterally, no crackles, wheezing, or rhonchi  ABDOMEN: morbidly obese abdomen, +BS, minimally tender to palpation at the ostomy bag site w/ yellow output, underneath the diaper there is no active oozing of the blood and the diaper is not saturated  EXTREMITIES: swollen LE, pain to passive ROM at b/l knee, pt refused to elevate legs during exam  NERVOUS SYSTEM:  A&Ox3, no focal deficits   SKIN: No rashes or lesions

## 2022-08-05 NOTE — H&P ADULT - PROBLEM SELECTOR PLAN 1
- per HPI pt had 2d of vaginal bleeding  - per gyn there are small clots w/o active bleeding  - H and H has been stable  - encouraged pt to receive TVUS which will guide further directions of treatment, as malignancy needs to be r/o for AUB in postmenopausal patient, and pt agreed to try again  - active T&S, monitor CBC Q8H, f/u gyn

## 2022-08-05 NOTE — H&P ADULT - NSHPREVIEWOFSYSTEMS_GEN_ALL_CORE
CONSTITUTIONAL: weakness, decreased ambulation, + wt loss due to decreased appetite  EYES/ENT: No visual changes;  No vertigo or throat pain   NECK: No pain or stiffness  RESPIRATORY: No cough, wheezing, hemoptysis; No shortness of breath  CARDIOVASCULAR: No chest pain or palpitations  GASTROINTESTINAL: No abdominal or epigastric pain. No nausea, vomiting, or hematemesis; output from ostomy bag adequate  GENITOURINARY: No dysuria, frequency or hematuria  NEUROLOGICAL: No numbness or weakness  SKIN: No itching, burning, rashes, or lesions   PSYCH: no hx depression, no hx anxiety

## 2022-08-05 NOTE — H&P ADULT - NSHPLABSRESULTS_GEN_ALL_CORE
08-04    131<L>  |  92<L>  |  35<H>  ----------------------------<  102<H>  4.5   |  24  |  1.97<H>    Ca    8.4      04 Aug 2022 13:40    TPro  6.9  /  Alb  2.7<L>  /  TBili  0.6  /  DBili  x   /  AST  23  /  ALT  10  /  AlkPhos  268<H>  08-04      PT/INR - ( 04 Aug 2022 13:40 )   PT: 15.1 sec;   INR: 1.30 ratio         PTT - ( 04 Aug 2022 13:40 )  PTT:28.0 sec                                        9.6    9.24  )-----------( 149      ( 05 Aug 2022 04:18 )             30.9                         9.5    9.13  )-----------( 116      ( 04 Aug 2022 19:50 )             30.8                         9.8    9.80  )-----------( 148      ( 04 Aug 2022 13:40 )             31.5     CAPILLARY BLOOD GLUCOSE    US of   TECHNIQUE: Duplex sonography of the RIGHT LOWER extremity veins with   color and spectral Doppler, with and without compression.    FINDINGS:    Acute DVT involving the common femoral vein, femoral vein, and popliteal   vein.  The contralateral common femoral vein is patent.    The calf veins were not well visualized due to patient condition.    IMPRESSION:  Acute DVT involving the common femoral vein, femoral vein, and popliteal   vein.

## 2022-08-05 NOTE — DISCHARGE NOTE PROVIDER - NSFOLLOWUPCLINICS_GEN_ALL_ED_FT
[facility] Gynecology and Obstetrics  Gynceology/OB  [address] [place] [address]  Phone: [phone]  Fax:

## 2022-08-05 NOTE — H&P ADULT - PROBLEM SELECTOR PLAN 5
- worsening JAMISON compared to previous discharge, likely due to her poor PO intake and inadequate fluids  - will c/w maintenance fluids for now (NS at 100cc/hr) and monitor Cr  - if unresponsive to fluids consider renal consult

## 2022-08-06 LAB
ALBUMIN SERPL ELPH-MCNC: 2.2 G/DL — LOW (ref 3.3–5)
ALP SERPL-CCNC: 225 U/L — HIGH (ref 40–120)
ALT FLD-CCNC: 6 U/L — SIGNIFICANT CHANGE UP (ref 4–33)
ANION GAP SERPL CALC-SCNC: 11 MMOL/L — SIGNIFICANT CHANGE UP (ref 7–14)
APTT BLD: 22.6 SEC — LOW (ref 27–36.3)
APTT BLD: 53.1 SEC — HIGH (ref 27–36.3)
APTT BLD: >200 SEC — SIGNIFICANT CHANGE UP (ref 27–36.3)
AST SERPL-CCNC: 10 U/L — SIGNIFICANT CHANGE UP (ref 4–32)
BILIRUB SERPL-MCNC: 0.4 MG/DL — SIGNIFICANT CHANGE UP (ref 0.2–1.2)
BUN SERPL-MCNC: 21 MG/DL — SIGNIFICANT CHANGE UP (ref 7–23)
CALCIUM SERPL-MCNC: 7.4 MG/DL — LOW (ref 8.4–10.5)
CHLORIDE SERPL-SCNC: 100 MMOL/L — SIGNIFICANT CHANGE UP (ref 98–107)
CO2 SERPL-SCNC: 23 MMOL/L — SIGNIFICANT CHANGE UP (ref 22–31)
CREAT SERPL-MCNC: 0.76 MG/DL — SIGNIFICANT CHANGE UP (ref 0.5–1.3)
EGFR: 86 ML/MIN/1.73M2 — SIGNIFICANT CHANGE UP
GLUCOSE SERPL-MCNC: 101 MG/DL — HIGH (ref 70–99)
HCT VFR BLD CALC: 27.6 % — LOW (ref 34.5–45)
HCT VFR BLD CALC: 29 % — LOW (ref 34.5–45)
HCT VFR BLD CALC: 30.9 % — LOW (ref 34.5–45)
HGB BLD-MCNC: 8.9 G/DL — LOW (ref 11.5–15.5)
HGB BLD-MCNC: 9.4 G/DL — LOW (ref 11.5–15.5)
HGB BLD-MCNC: 9.5 G/DL — LOW (ref 11.5–15.5)
MAGNESIUM SERPL-MCNC: 1.9 MG/DL — SIGNIFICANT CHANGE UP (ref 1.6–2.6)
MCHC RBC-ENTMCNC: 25.8 PG — LOW (ref 27–34)
MCHC RBC-ENTMCNC: 26.5 PG — LOW (ref 27–34)
MCHC RBC-ENTMCNC: 27 PG — SIGNIFICANT CHANGE UP (ref 27–34)
MCHC RBC-ENTMCNC: 30.7 GM/DL — LOW (ref 32–36)
MCHC RBC-ENTMCNC: 32.2 GM/DL — SIGNIFICANT CHANGE UP (ref 32–36)
MCHC RBC-ENTMCNC: 32.4 GM/DL — SIGNIFICANT CHANGE UP (ref 32–36)
MCV RBC AUTO: 82.1 FL — SIGNIFICANT CHANGE UP (ref 80–100)
MCV RBC AUTO: 83.3 FL — SIGNIFICANT CHANGE UP (ref 80–100)
MCV RBC AUTO: 84 FL — SIGNIFICANT CHANGE UP (ref 80–100)
NRBC # BLD: 0 /100 WBCS — SIGNIFICANT CHANGE UP
NRBC # FLD: 0 K/UL — SIGNIFICANT CHANGE UP
PHOSPHATE SERPL-MCNC: 2.5 MG/DL — SIGNIFICANT CHANGE UP (ref 2.5–4.5)
PLATELET # BLD AUTO: 158 K/UL — SIGNIFICANT CHANGE UP (ref 150–400)
PLATELET # BLD AUTO: 166 K/UL — SIGNIFICANT CHANGE UP (ref 150–400)
PLATELET # BLD AUTO: 177 K/UL — SIGNIFICANT CHANGE UP (ref 150–400)
POTASSIUM SERPL-MCNC: 3.4 MMOL/L — LOW (ref 3.5–5.3)
POTASSIUM SERPL-SCNC: 3.4 MMOL/L — LOW (ref 3.5–5.3)
PROT SERPL-MCNC: 5.9 G/DL — LOW (ref 6–8.3)
RBC # BLD: 3.36 M/UL — LOW (ref 3.8–5.2)
RBC # BLD: 3.48 M/UL — LOW (ref 3.8–5.2)
RBC # BLD: 3.68 M/UL — LOW (ref 3.8–5.2)
RBC # FLD: 17.8 % — HIGH (ref 10.3–14.5)
RBC # FLD: 18.1 % — HIGH (ref 10.3–14.5)
RBC # FLD: 18.1 % — HIGH (ref 10.3–14.5)
SODIUM SERPL-SCNC: 134 MMOL/L — LOW (ref 135–145)
WBC # BLD: 6.23 K/UL — SIGNIFICANT CHANGE UP (ref 3.8–10.5)
WBC # BLD: 6.41 K/UL — SIGNIFICANT CHANGE UP (ref 3.8–10.5)
WBC # BLD: 6.85 K/UL — SIGNIFICANT CHANGE UP (ref 3.8–10.5)
WBC # FLD AUTO: 6.23 K/UL — SIGNIFICANT CHANGE UP (ref 3.8–10.5)
WBC # FLD AUTO: 6.41 K/UL — SIGNIFICANT CHANGE UP (ref 3.8–10.5)
WBC # FLD AUTO: 6.85 K/UL — SIGNIFICANT CHANGE UP (ref 3.8–10.5)

## 2022-08-06 PROCEDURE — 99232 SBSQ HOSP IP/OBS MODERATE 35: CPT | Mod: GC

## 2022-08-06 RX ORDER — ENOXAPARIN SODIUM 100 MG/ML
0.9 INJECTION SUBCUTANEOUS
Qty: 54 | Refills: 0
Start: 2022-08-06 | End: 2022-09-04

## 2022-08-06 RX ORDER — HEPARIN SODIUM 5000 [USP'U]/ML
INJECTION INTRAVENOUS; SUBCUTANEOUS
Qty: 25000 | Refills: 0 | Status: DISCONTINUED | OUTPATIENT
Start: 2022-08-06 | End: 2022-08-07

## 2022-08-06 RX ORDER — HEPARIN SODIUM 5000 [USP'U]/ML
7500 INJECTION INTRAVENOUS; SUBCUTANEOUS EVERY 6 HOURS
Refills: 0 | Status: DISCONTINUED | OUTPATIENT
Start: 2022-08-06 | End: 2022-08-07

## 2022-08-06 RX ORDER — HEPARIN SODIUM 5000 [USP'U]/ML
3500 INJECTION INTRAVENOUS; SUBCUTANEOUS EVERY 6 HOURS
Refills: 0 | Status: DISCONTINUED | OUTPATIENT
Start: 2022-08-06 | End: 2022-08-07

## 2022-08-06 RX ORDER — POTASSIUM CHLORIDE 20 MEQ
40 PACKET (EA) ORAL ONCE
Refills: 0 | Status: COMPLETED | OUTPATIENT
Start: 2022-08-06 | End: 2022-08-06

## 2022-08-06 RX ORDER — HEPARIN SODIUM 5000 [USP'U]/ML
7500 INJECTION INTRAVENOUS; SUBCUTANEOUS ONCE
Refills: 0 | Status: COMPLETED | OUTPATIENT
Start: 2022-08-06 | End: 2022-08-06

## 2022-08-06 RX ADMIN — HEPARIN SODIUM 1700 UNIT(S)/HR: 5000 INJECTION INTRAVENOUS; SUBCUTANEOUS at 04:00

## 2022-08-06 RX ADMIN — HEPARIN SODIUM 1400 UNIT(S)/HR: 5000 INJECTION INTRAVENOUS; SUBCUTANEOUS at 11:31

## 2022-08-06 RX ADMIN — Medication 1 TABLET(S): at 11:36

## 2022-08-06 RX ADMIN — HEPARIN SODIUM 7500 UNIT(S): 5000 INJECTION INTRAVENOUS; SUBCUTANEOUS at 03:53

## 2022-08-06 RX ADMIN — Medication 2 MILLIGRAM(S): at 11:30

## 2022-08-06 RX ADMIN — Medication 40 MILLIEQUIVALENT(S): at 11:36

## 2022-08-06 RX ADMIN — Medication 2 MILLIGRAM(S): at 18:42

## 2022-08-06 RX ADMIN — Medication 2 MILLIGRAM(S): at 23:52

## 2022-08-06 RX ADMIN — HEPARIN SODIUM 0 UNIT(S)/HR: 5000 INJECTION INTRAVENOUS; SUBCUTANEOUS at 10:29

## 2022-08-06 RX ADMIN — ATORVASTATIN CALCIUM 10 MILLIGRAM(S): 80 TABLET, FILM COATED ORAL at 21:58

## 2022-08-06 RX ADMIN — HEPARIN SODIUM 1600 UNIT(S)/HR: 5000 INJECTION INTRAVENOUS; SUBCUTANEOUS at 20:27

## 2022-08-06 RX ADMIN — Medication 2 MILLIGRAM(S): at 06:37

## 2022-08-06 RX ADMIN — Medication 2 MILLIGRAM(S): at 00:02

## 2022-08-06 RX ADMIN — HEPARIN SODIUM 1600 UNIT(S)/HR: 5000 INJECTION INTRAVENOUS; SUBCUTANEOUS at 18:35

## 2022-08-06 RX ADMIN — HEPARIN SODIUM 3500 UNIT(S): 5000 INJECTION INTRAVENOUS; SUBCUTANEOUS at 18:41

## 2022-08-06 NOTE — PROGRESS NOTE ADULT - PROBLEM SELECTOR PLAN 2
- R femoral vein and popliteal vein DVT found on US  - heparin gtt for now  - no need for venogram at this time  - appreciate vascular recs

## 2022-08-06 NOTE — PROGRESS NOTE ADULT - PROBLEM SELECTOR PLAN 4
- likely in the setting of renal failure, as pt's Hgb dropped after her kidney function worsened  - no drop in Hgb iso vaginal bleeding  - likely anemia of chronic disease 2/2 iron labs  - active T/S, transfusion goal 7

## 2022-08-06 NOTE — PROGRESS NOTE ADULT - SUBJECTIVE AND OBJECTIVE BOX
Romario Urbina    Patient is a 67y old  Female who presents with a chief complaint of vaginal bleeding (05 Aug 2022 14:31)    SUBJECTIVE / OVERNIGHT EVENTS: Overnight it was difficult for providers to draw blood. Ultimately was successful after multiple attempts. No change in vaginal bleeding.    MEDICATIONS  (STANDING):  atorvastatin 10 milliGRAM(s) Oral at bedtime  diphenoxylate/atropine 1 Tablet(s) Oral daily  heparin  Infusion.  Unit(s)/Hr (17 mL/Hr) IV Continuous <Continuous>  loperamide 2 milliGRAM(s) Oral every 6 hours    MEDICATIONS  (PRN):  acetaminophen     Tablet .. 650 milliGRAM(s) Oral every 6 hours PRN Temp greater or equal to 38C (100.4F), Mild Pain (1 - 3)  heparin   Injectable 7500 Unit(s) IV Push every 6 hours PRN For aPTT less than 40  heparin   Injectable 3500 Unit(s) IV Push every 6 hours PRN For aPTT between 40 - 57      Vital Signs Last 24 Hrs  T(C): 36.7 (06 Aug 2022 06:37), Max: 37.1 (05 Aug 2022 22:32)  T(F): 98.1 (06 Aug 2022 06:37), Max: 98.7 (05 Aug 2022 22:32)  HR: 72 (06 Aug 2022 06:37) (65 - 86)  BP: 125/50 (06 Aug 2022 06:37) (99/60 - 125/50)  BP(mean): --  RR: 18 (06 Aug 2022 06:37) (16 - 18)  SpO2: 100% (06 Aug 2022 06:37) (97% - 100%)  CAPILLARY BLOOD GLUCOSE        I&O's Summary    PHYSICAL EXAM:  GENERAL: NAD  HEAD:  Atraumatic, normocephalic  EYES: EOMI, conjunctiva and sclera clear  ENT: Moist mucous membranes  NECK: Supple, no JVD  HEART: Regular rate and rhythm, no murmurs, rubs, or gallops  LUNGS: Unlabored respirations.  Clear to auscultation bilaterally, no crackles, wheezing, or rhonchi  ABDOMEN: morbidly obese abdomen, +BS, tender to palpation at the ostomy bag site w/ yellow output, no active bleeding  EXTREMITIES: swollen LE   NERVOUS SYSTEM:  A&Ox3, no focal deficits   SKIN: No rashes or lesions    LABS:                        9.4    6.85  )-----------( 177      ( 06 Aug 2022 00:55 )             29.0     08-05    133<L>  |  96<L>  |  37<H>  ----------------------------<  100<H>  3.3<L>   |  25  |  1.47<H>    Ca    7.9<L>      05 Aug 2022 04:48  Phos  3.6     08-05  Mg     1.60     08-05    TPro  6.6  /  Alb  2.4<L>  /  TBili  0.6  /  DBili  x   /  AST  15  /  ALT  7   /  AlkPhos  257<H>  08-05    PT/INR - ( 05 Aug 2022 04:18 )   PT: 16.2 sec;   INR: 1.39 ratio         PTT - ( 06 Aug 2022 02:55 )  PTT:22.6 sec          RADIOLOGY & ADDITIONAL TESTS:    Imaging Personally Reviewed:    Consultant(s) Notes Reviewed:      Care Discussed with Consultants/Other Providers:

## 2022-08-06 NOTE — PHYSICAL THERAPY INITIAL EVALUATION ADULT - RANGE OF MOTION EXAMINATION, REHAB EVAL
bilateral upper extremity ROM was WFL (within functional limits)/bilateral lower extremity ROM was WFL (within functional limits)
Yes

## 2022-08-06 NOTE — PROGRESS NOTE ADULT - ASSESSMENT
67F Swedish speaking w/ PMH of incarcerated hernia s/p repair w/ ostomy bag, HTN, HLD, presented to the ED due to vaginal bleeding of 2d prior to coming. Was also found to have DVT on R femoral vein. Now on heparin gtt for DVT Tx. S/P gyn consult for vaginal bleeding, but no interventions for now as pt refused initial TVUS. H and H stable. 67F Yoruba speaking w/ PMH of incarcerated hernia s/p repair w/ ostomy bag, HTN, HLD, presented to the ED due to vaginal bleeding of 2d prior to coming. Was also found to have DVT on R femoral vein. Now on heparin gtt for DVT Tx. S/P gyn consult for vaginal bleeding, but no interventions for now as pt refused initial TVUS. H and H stable.

## 2022-08-06 NOTE — PHYSICAL THERAPY INITIAL EVALUATION ADULT - ADDITIONAL COMMENTS
Pt daughter able to provide translation.  Pt likes in a home 4 steps to enter, has recently stayed on the first floor since having surgery a couple months ago.  Pt unable to ambulate long distances (mainly bed to commode with assistance).  No history of falls.  Pt owns a wheel chair.  Pt lives with her two daughters.

## 2022-08-06 NOTE — PROGRESS NOTE ADULT - PROBLEM SELECTOR PLAN 1
- per HPI pt had 2d of vaginal bleeding  - per gyn there are small clots w/o active bleeding  - H and H has been stable  - active T&S, monitor CBC Q8H,   - normal uterus on abd U/S  - f/u gyn recs

## 2022-08-06 NOTE — PHYSICAL THERAPY INITIAL EVALUATION ADULT - PERTINENT HX OF CURRENT PROBLEM, REHAB EVAL
67F Setswana speaking w/ PMH of incarcerated hernia s/p repair w/ ostomy bag, HTN, HLD, presented to the ED due to vaginal bleeding of 2d. Daughter at bedside provided translation and HPI as pt was lethargic and asleep.

## 2022-08-07 LAB
ALBUMIN SERPL ELPH-MCNC: 2.3 G/DL — LOW (ref 3.3–5)
ALP SERPL-CCNC: 233 U/L — HIGH (ref 40–120)
ALT FLD-CCNC: 6 U/L — SIGNIFICANT CHANGE UP (ref 4–33)
ANION GAP SERPL CALC-SCNC: 10 MMOL/L — SIGNIFICANT CHANGE UP (ref 7–14)
APTT BLD: 103.1 SEC — HIGH (ref 27–36.3)
APTT BLD: 63 SEC — HIGH (ref 27–36.3)
AST SERPL-CCNC: 11 U/L — SIGNIFICANT CHANGE UP (ref 4–32)
BASOPHILS # BLD AUTO: 0.02 K/UL — SIGNIFICANT CHANGE UP (ref 0–0.2)
BASOPHILS NFR BLD AUTO: 0.3 % — SIGNIFICANT CHANGE UP (ref 0–2)
BILIRUB SERPL-MCNC: 0.5 MG/DL — SIGNIFICANT CHANGE UP (ref 0.2–1.2)
BUN SERPL-MCNC: 17 MG/DL — SIGNIFICANT CHANGE UP (ref 7–23)
CALCIUM SERPL-MCNC: 8 MG/DL — LOW (ref 8.4–10.5)
CHLORIDE SERPL-SCNC: 98 MMOL/L — SIGNIFICANT CHANGE UP (ref 98–107)
CO2 SERPL-SCNC: 25 MMOL/L — SIGNIFICANT CHANGE UP (ref 22–31)
CREAT SERPL-MCNC: 0.77 MG/DL — SIGNIFICANT CHANGE UP (ref 0.5–1.3)
EGFR: 84 ML/MIN/1.73M2 — SIGNIFICANT CHANGE UP
EOSINOPHIL # BLD AUTO: 0.09 K/UL — SIGNIFICANT CHANGE UP (ref 0–0.5)
EOSINOPHIL NFR BLD AUTO: 1.5 % — SIGNIFICANT CHANGE UP (ref 0–6)
GLUCOSE SERPL-MCNC: 103 MG/DL — HIGH (ref 70–99)
HCT VFR BLD CALC: 29.5 % — LOW (ref 34.5–45)
HCT VFR BLD CALC: 31.1 % — LOW (ref 34.5–45)
HGB BLD-MCNC: 9.1 G/DL — LOW (ref 11.5–15.5)
HGB BLD-MCNC: 9.6 G/DL — LOW (ref 11.5–15.5)
IANC: 3.15 K/UL — SIGNIFICANT CHANGE UP (ref 1.8–7.4)
IMM GRANULOCYTES NFR BLD AUTO: 0.5 % — SIGNIFICANT CHANGE UP (ref 0–1.5)
LYMPHOCYTES # BLD AUTO: 2.11 K/UL — SIGNIFICANT CHANGE UP (ref 1–3.3)
LYMPHOCYTES # BLD AUTO: 35.8 % — SIGNIFICANT CHANGE UP (ref 13–44)
MAGNESIUM SERPL-MCNC: 1.8 MG/DL — SIGNIFICANT CHANGE UP (ref 1.6–2.6)
MCHC RBC-ENTMCNC: 25.7 PG — LOW (ref 27–34)
MCHC RBC-ENTMCNC: 25.9 PG — LOW (ref 27–34)
MCHC RBC-ENTMCNC: 30.8 GM/DL — LOW (ref 32–36)
MCHC RBC-ENTMCNC: 30.9 GM/DL — LOW (ref 32–36)
MCV RBC AUTO: 83.2 FL — SIGNIFICANT CHANGE UP (ref 80–100)
MCV RBC AUTO: 83.8 FL — SIGNIFICANT CHANGE UP (ref 80–100)
MONOCYTES # BLD AUTO: 0.5 K/UL — SIGNIFICANT CHANGE UP (ref 0–0.9)
MONOCYTES NFR BLD AUTO: 8.5 % — SIGNIFICANT CHANGE UP (ref 2–14)
NEUTROPHILS # BLD AUTO: 3.15 K/UL — SIGNIFICANT CHANGE UP (ref 1.8–7.4)
NEUTROPHILS NFR BLD AUTO: 53.4 % — SIGNIFICANT CHANGE UP (ref 43–77)
NRBC # BLD: 0 /100 WBCS — SIGNIFICANT CHANGE UP
NRBC # BLD: 0 /100 WBCS — SIGNIFICANT CHANGE UP
NRBC # FLD: 0 K/UL — SIGNIFICANT CHANGE UP
NRBC # FLD: 0 K/UL — SIGNIFICANT CHANGE UP
PHOSPHATE SERPL-MCNC: 2.3 MG/DL — LOW (ref 2.5–4.5)
PLATELET # BLD AUTO: 140 K/UL — LOW (ref 150–400)
PLATELET # BLD AUTO: 157 K/UL — SIGNIFICANT CHANGE UP (ref 150–400)
POTASSIUM SERPL-MCNC: 4.5 MMOL/L — SIGNIFICANT CHANGE UP (ref 3.5–5.3)
POTASSIUM SERPL-SCNC: 4.5 MMOL/L — SIGNIFICANT CHANGE UP (ref 3.5–5.3)
PROT SERPL-MCNC: 6.4 G/DL — SIGNIFICANT CHANGE UP (ref 6–8.3)
RBC # BLD: 3.52 M/UL — LOW (ref 3.8–5.2)
RBC # BLD: 3.74 M/UL — LOW (ref 3.8–5.2)
RBC # FLD: 18.1 % — HIGH (ref 10.3–14.5)
RBC # FLD: 18.3 % — HIGH (ref 10.3–14.5)
SODIUM SERPL-SCNC: 133 MMOL/L — LOW (ref 135–145)
WBC # BLD: 5.9 K/UL — SIGNIFICANT CHANGE UP (ref 3.8–10.5)
WBC # BLD: 6.1 K/UL — SIGNIFICANT CHANGE UP (ref 3.8–10.5)
WBC # FLD AUTO: 5.9 K/UL — SIGNIFICANT CHANGE UP (ref 3.8–10.5)
WBC # FLD AUTO: 6.1 K/UL — SIGNIFICANT CHANGE UP (ref 3.8–10.5)

## 2022-08-07 PROCEDURE — 99232 SBSQ HOSP IP/OBS MODERATE 35: CPT | Mod: GC

## 2022-08-07 RX ORDER — ENOXAPARIN SODIUM 100 MG/ML
90 INJECTION SUBCUTANEOUS EVERY 12 HOURS
Refills: 0 | Status: DISCONTINUED | OUTPATIENT
Start: 2022-08-08 | End: 2022-08-08

## 2022-08-07 RX ORDER — ENOXAPARIN SODIUM 100 MG/ML
90 INJECTION SUBCUTANEOUS
Qty: 60 | Refills: 0
Start: 2022-08-07 | End: 2022-09-05

## 2022-08-07 RX ORDER — ENOXAPARIN SODIUM 100 MG/ML
90 INJECTION SUBCUTANEOUS
Qty: 60 | Refills: 2
Start: 2022-08-07 | End: 2022-11-04

## 2022-08-07 RX ORDER — POTASSIUM PHOSPHATE, MONOBASIC POTASSIUM PHOSPHATE, DIBASIC 236; 224 MG/ML; MG/ML
15 INJECTION, SOLUTION INTRAVENOUS ONCE
Refills: 0 | Status: COMPLETED | OUTPATIENT
Start: 2022-08-07 | End: 2022-08-07

## 2022-08-07 RX ORDER — ENOXAPARIN SODIUM 100 MG/ML
90 INJECTION SUBCUTANEOUS ONCE
Refills: 0 | Status: COMPLETED | OUTPATIENT
Start: 2022-08-07 | End: 2022-08-07

## 2022-08-07 RX ADMIN — ENOXAPARIN SODIUM 90 MILLIGRAM(S): 100 INJECTION SUBCUTANEOUS at 13:07

## 2022-08-07 RX ADMIN — POTASSIUM PHOSPHATE, MONOBASIC POTASSIUM PHOSPHATE, DIBASIC 62.5 MILLIMOLE(S): 236; 224 INJECTION, SOLUTION INTRAVENOUS at 10:18

## 2022-08-07 RX ADMIN — HEPARIN SODIUM 1400 UNIT(S)/HR: 5000 INJECTION INTRAVENOUS; SUBCUTANEOUS at 00:55

## 2022-08-07 RX ADMIN — Medication 2 MILLIGRAM(S): at 18:13

## 2022-08-07 RX ADMIN — Medication 1 TABLET(S): at 12:16

## 2022-08-07 RX ADMIN — Medication 2 MILLIGRAM(S): at 12:16

## 2022-08-07 RX ADMIN — HEPARIN SODIUM 1400 UNIT(S)/HR: 5000 INJECTION INTRAVENOUS; SUBCUTANEOUS at 08:26

## 2022-08-07 RX ADMIN — ATORVASTATIN CALCIUM 10 MILLIGRAM(S): 80 TABLET, FILM COATED ORAL at 22:02

## 2022-08-07 RX ADMIN — Medication 2 MILLIGRAM(S): at 06:50

## 2022-08-07 NOTE — PROGRESS NOTE ADULT - SUBJECTIVE AND OBJECTIVE BOX
Cralton Davies MD  PGY2  Preferred contact via Microsoft Teams      Patient is a 67y old  Female who presents with a chief complaint of vaginal bleeding (06 Aug 2022 08:23)      SUBJECTIVE / OVERNIGHT EVENTS: NAEON.     MEDICATIONS  (STANDING):  atorvastatin 10 milliGRAM(s) Oral at bedtime  diphenoxylate/atropine 1 Tablet(s) Oral daily  heparin  Infusion.  Unit(s)/Hr (17 mL/Hr) IV Continuous <Continuous>  loperamide 2 milliGRAM(s) Oral every 6 hours    MEDICATIONS  (PRN):  acetaminophen     Tablet .. 650 milliGRAM(s) Oral every 6 hours PRN Temp greater or equal to 38C (100.4F), Mild Pain (1 - 3)  heparin   Injectable 7500 Unit(s) IV Push every 6 hours PRN For aPTT less than 40  heparin   Injectable 3500 Unit(s) IV Push every 6 hours PRN For aPTT between 40 - 57      CAPILLARY BLOOD GLUCOSE        I&O's Summary    06 Aug 2022 07:01  -  07 Aug 2022 07:00  --------------------------------------------------------  IN: 120 mL / OUT: 400 mL / NET: -280 mL        Vital Signs Last 24 Hrs  T(C): 37.1 (07 Aug 2022 06:50), Max: 37.7 (07 Aug 2022 01:30)  T(F): 98.8 (07 Aug 2022 06:50), Max: 99.9 (07 Aug 2022 01:30)  HR: 78 (07 Aug 2022 06:50) (69 - 78)  BP: 106/56 (07 Aug 2022 06:50) (106/56 - 122/62)  BP(mean): --  RR: 19 (07 Aug 2022 06:50) (16 - 20)  SpO2: 99% (07 Aug 2022 06:50) (99% - 100%)    Parameters below as of 07 Aug 2022 06:50  Patient On (Oxygen Delivery Method): room air        PHYSICAL EXAM:      LABS:                        9.1    6.10  )-----------( 140      ( 07 Aug 2022 00:20 )             29.5      08-06    134<L>  |  100  |  21  ----------------------------<  101<H>  3.4<L>   |  23  |  0.76    Ca    7.4<L>      06 Aug 2022 09:29  Phos  2.5     08-06  Mg     1.90     08-06    TPro  5.9<L>  /  Alb  2.2<L>  /  TBili  0.4  /  DBili  x   /  AST  10  /  ALT  6   /  AlkPhos  225<H>  08-06    PTT - ( 07 Aug 2022 06:39 )  PTT:63.0 sec          RADIOLOGY & ADDITIONAL TESTS:    Imaging Personally Reviewed:    Consultant(s) Notes Reviewed:      Care Discussed with Consultants/Other Providers:   Carlton Davies MD  PGY2  Preferred contact via Microsoft Teams      Patient is a 67y old  Female who presents with a chief complaint of vaginal bleeding (06 Aug 2022 08:23)    Patient opted to have daughter translate.    SUBJECTIVE / OVERNIGHT EVENTS: NAEON. Patient seen and examined at bedside. Stable red-tinged urine in primafit. Patient denies chest pain, abdominal pain, N/V/D, headache.     MEDICATIONS  (STANDING):  atorvastatin 10 milliGRAM(s) Oral at bedtime  diphenoxylate/atropine 1 Tablet(s) Oral daily  heparin  Infusion.  Unit(s)/Hr (17 mL/Hr) IV Continuous <Continuous>  loperamide 2 milliGRAM(s) Oral every 6 hours    MEDICATIONS  (PRN):  acetaminophen     Tablet .. 650 milliGRAM(s) Oral every 6 hours PRN Temp greater or equal to 38C (100.4F), Mild Pain (1 - 3)  heparin   Injectable 7500 Unit(s) IV Push every 6 hours PRN For aPTT less than 40  heparin   Injectable 3500 Unit(s) IV Push every 6 hours PRN For aPTT between 40 - 57      CAPILLARY BLOOD GLUCOSE        I&O's Summary    06 Aug 2022 07:01  -  07 Aug 2022 07:00  --------------------------------------------------------  IN: 120 mL / OUT: 400 mL / NET: -280 mL    Vital Signs Last 24 Hrs  T(C): 37.1 (07 Aug 2022 06:50), Max: 37.7 (07 Aug 2022 01:30)  T(F): 98.8 (07 Aug 2022 06:50), Max: 99.9 (07 Aug 2022 01:30)  HR: 78 (07 Aug 2022 06:50) (69 - 78)  BP: 106/56 (07 Aug 2022 06:50) (106/56 - 112/51)  BP(mean): --  RR: 19 (07 Aug 2022 06:50) (16 - 20)  SpO2: 99% (07 Aug 2022 06:50) (99% - 100%)    Parameters below as of 07 Aug 2022 06:50  Patient On (Oxygen Delivery Method): room air      PHYSICAL EXAM:  GENERAL: NAD, well-groomed, well-developed  HEAD:  Atraumatic, Normocephalic  EYES: EOMI, PERRLA, conjunctiva and sclera clear  ENMT: No tonsillar erythema, exudates, or enlargement; Moist mucous membranes, Good dentition  NECK: Supple, No JVD  NERVOUS SYSTEM: AOX3, motor and sensation grossly intact in b/l UE and b/l LE  PSYCHIATRIC: Appropriate affect and mood  CHEST/LUNG: Clear to auscultation bilaterally; No rales, rhonchi, wheezing, or rubs  HEART: Regular rate and rhythm; No murmurs, rubs, or gallops. No LE edema  ABDOMEN: Soft, Nontender, Nondistended; Bowel sounds present; ostomy draining brown stool  EXTREMITIES:  2+ Peripheral Pulses, No clubbing, cyanosis  SKIN: No rashes or lesions        LABS:                        9.1    6.10  )-----------( 140      ( 07 Aug 2022 00:20 )             29.5      08-06    134<L>  |  100  |  21  ----------------------------<  101<H>  3.4<L>   |  23  |  0.76    Ca    7.4<L>      06 Aug 2022 09:29  Phos  2.5     08-06  Mg     1.90     08-06    TPro  5.9<L>  /  Alb  2.2<L>  /  TBili  0.4  /  DBili  x   /  AST  10  /  ALT  6   /  AlkPhos  225<H>  08-06    PTT - ( 07 Aug 2022 06:39 )  PTT:63.0 sec          RADIOLOGY & ADDITIONAL TESTS:    Imaging Personally Reviewed:    Consultant(s) Notes Reviewed:      Care Discussed with Consultants/Other Providers:

## 2022-08-07 NOTE — PROGRESS NOTE ADULT - PROBLEM SELECTOR PLAN 2
- R femoral vein and popliteal vein DVT found on US  - heparin gtt for now  - no need for venogram at this time  - appreciate vascular recs - R femoral vein and popliteal vein DVT found on US  - transition to lovenox 90mg BID, stable for d/c on this regimen  - no need for venogram at this time  - appreciate vascular recs

## 2022-08-07 NOTE — PROVIDER CONTACT NOTE (OTHER) - DATE AND TIME:
05-Aug-2022 23:50
05-Aug-2022 21:13
06-Aug-2022 06:41
06-Aug-2022 06:41
05-Aug-2022 22:50
06-Aug-2022 23:50
06-Aug-2022 03:51

## 2022-08-07 NOTE — PROGRESS NOTE ADULT - ASSESSMENT
67F Polish speaking w/ PMH of incarcerated hernia s/p repair w/ ostomy bag, HTN, HLD, presented to the ED due to vaginal bleeding of 2d prior to coming. Was also found to have DVT on R femoral vein. Now on heparin gtt for DVT Tx. S/P gyn consult for vaginal bleeding, but no interventions for now as pt refused initial TVUS. H and H stable.

## 2022-08-07 NOTE — PROVIDER CONTACT NOTE (OTHER) - ACTION/TREATMENT ORDERED:
MD will come and perform Arterial stick  with ultra sound shortly as per MD
Adjust heparin rate per nomogram  as per MD
Nothing to do at this time. Continue to monitor as per MD
MD will come and perform Arterial stick as per Md
Will adjust heparin as per provider order
Nothing to do at this time. Continue to monitor as per MD
Nothing to do at this time. Continue to monitor as per MD

## 2022-08-07 NOTE — PROGRESS NOTE ADULT - PROBLEM SELECTOR PLAN 1
- per HPI pt had 2d of vaginal bleeding  - per gyn there are small clots w/o active bleeding  - H and H has been stable  - active T&S, monitor CBC Q8H,   - normal uterus on abd U/S  - f/u gyn recs - per HPI pt had 2d of vaginal bleeding  - per gyn there are small clots w/o active bleeding  - H and H has been stable  - active T&S, monitor CBC Q8H,   - normal uterus on abd U/S  - f/u gyn recs, o/p biopsy

## 2022-08-07 NOTE — PROVIDER CONTACT NOTE (OTHER) - REASON
Pt /50
adjust provider to RN Ok to hold heparin until Ptt is obtained
Pt Ptt t 22.6
Rn unable to draw Ptt and CBC form Pt. Pt hard stick. Day RN was unable to
Pt's Ptt 103.1

## 2022-08-07 NOTE — PROVIDER CONTACT NOTE (OTHER) - SITUATION
Rn unable to draw Ptt and CBC form Pt. Pt hard stick. Day RN was unable to to draw q6 Ptt and CBC for Pt on heparin drip, PCA and night RN tried and was unsuccessful.
Pt BP 12o0/50
Pt Ptt t 22.6
Pt /50
Pt's Ptt 103.1
Pt /50
adjust provider to RN Ok to hold heparin until Ptt is obtained

## 2022-08-07 NOTE — PROVIDER CONTACT NOTE (OTHER) - NAME OF MD/NP/PA/DO NOTIFIED:
MD KIRSTIE Galeano
MD Mendoza
MD KIRSTIE Galeano
MD KIRSTIE Galeano

## 2022-08-07 NOTE — PROVIDER CONTACT NOTE (OTHER) - BACKGROUND
Pt admitted for abnormal uterina and vaginal bleeding
s/p hernia repair
s/p hernia repair
Pt admitted for abnormal uterina and vaginal bleeding

## 2022-08-07 NOTE — PROVIDER CONTACT NOTE (OTHER) - ASSESSMENT
Pt A&O X4 vitally stable. No s/s of distress noted.
Pt A&O4 Pt /50, otherwise vitally stable. No acute distress noted
Pt Ptt t 22.6
Pt A&O4 Pt /50, otherwise vitally stable. No acute distress noted
Rn unable to draw Ptt and CBC form Pt. Pt hard stick. Day RN was unable to to draw q6 Ptt and CBC for Pt on heparin drip, PCA and night RN tried and was unsuccessful.
adjust provider to RN Ok to hold heparin until Ptt is obtained
Pt A&O4 Pt /50, otherwise vitally stable. No acute distress noted

## 2022-08-08 ENCOUNTER — TRANSCRIPTION ENCOUNTER (OUTPATIENT)
Age: 67
End: 2022-08-08

## 2022-08-08 VITALS
OXYGEN SATURATION: 100 % | RESPIRATION RATE: 18 BRPM | TEMPERATURE: 99 F | SYSTOLIC BLOOD PRESSURE: 124 MMHG | HEART RATE: 86 BPM | DIASTOLIC BLOOD PRESSURE: 67 MMHG

## 2022-08-08 LAB
ALBUMIN SERPL ELPH-MCNC: 2.2 G/DL — LOW (ref 3.3–5)
ALP SERPL-CCNC: 203 U/L — HIGH (ref 40–120)
ALT FLD-CCNC: 10 U/L — SIGNIFICANT CHANGE UP (ref 4–33)
ANION GAP SERPL CALC-SCNC: 12 MMOL/L — SIGNIFICANT CHANGE UP (ref 7–14)
AST SERPL-CCNC: 17 U/L — SIGNIFICANT CHANGE UP (ref 4–32)
BASOPHILS # BLD AUTO: 0.02 K/UL — SIGNIFICANT CHANGE UP (ref 0–0.2)
BASOPHILS NFR BLD AUTO: 0.4 % — SIGNIFICANT CHANGE UP (ref 0–2)
BILIRUB SERPL-MCNC: 0.4 MG/DL — SIGNIFICANT CHANGE UP (ref 0.2–1.2)
BUN SERPL-MCNC: 11 MG/DL — SIGNIFICANT CHANGE UP (ref 7–23)
CALCIUM SERPL-MCNC: 8.2 MG/DL — LOW (ref 8.4–10.5)
CHLORIDE SERPL-SCNC: 100 MMOL/L — SIGNIFICANT CHANGE UP (ref 98–107)
CO2 SERPL-SCNC: 24 MMOL/L — SIGNIFICANT CHANGE UP (ref 22–31)
CREAT SERPL-MCNC: 0.72 MG/DL — SIGNIFICANT CHANGE UP (ref 0.5–1.3)
EGFR: 92 ML/MIN/1.73M2 — SIGNIFICANT CHANGE UP
EOSINOPHIL # BLD AUTO: 0.09 K/UL — SIGNIFICANT CHANGE UP (ref 0–0.5)
EOSINOPHIL NFR BLD AUTO: 1.8 % — SIGNIFICANT CHANGE UP (ref 0–6)
GLUCOSE SERPL-MCNC: 90 MG/DL — SIGNIFICANT CHANGE UP (ref 70–99)
HCT VFR BLD CALC: 30.5 % — LOW (ref 34.5–45)
HGB BLD-MCNC: 9.4 G/DL — LOW (ref 11.5–15.5)
IANC: 2.67 K/UL — SIGNIFICANT CHANGE UP (ref 1.8–7.4)
IMM GRANULOCYTES NFR BLD AUTO: 0.4 % — SIGNIFICANT CHANGE UP (ref 0–1.5)
LYMPHOCYTES # BLD AUTO: 1.67 K/UL — SIGNIFICANT CHANGE UP (ref 1–3.3)
LYMPHOCYTES # BLD AUTO: 34.2 % — SIGNIFICANT CHANGE UP (ref 13–44)
MAGNESIUM SERPL-MCNC: 1.6 MG/DL — SIGNIFICANT CHANGE UP (ref 1.6–2.6)
MCHC RBC-ENTMCNC: 25.8 PG — LOW (ref 27–34)
MCHC RBC-ENTMCNC: 30.8 GM/DL — LOW (ref 32–36)
MCV RBC AUTO: 83.6 FL — SIGNIFICANT CHANGE UP (ref 80–100)
MONOCYTES # BLD AUTO: 0.42 K/UL — SIGNIFICANT CHANGE UP (ref 0–0.9)
MONOCYTES NFR BLD AUTO: 8.6 % — SIGNIFICANT CHANGE UP (ref 2–14)
NEUTROPHILS # BLD AUTO: 2.67 K/UL — SIGNIFICANT CHANGE UP (ref 1.8–7.4)
NEUTROPHILS NFR BLD AUTO: 54.6 % — SIGNIFICANT CHANGE UP (ref 43–77)
NRBC # BLD: 0 /100 WBCS — SIGNIFICANT CHANGE UP
NRBC # FLD: 0 K/UL — SIGNIFICANT CHANGE UP
PHOSPHATE SERPL-MCNC: 3 MG/DL — SIGNIFICANT CHANGE UP (ref 2.5–4.5)
PLATELET # BLD AUTO: 193 K/UL — SIGNIFICANT CHANGE UP (ref 150–400)
POTASSIUM SERPL-MCNC: 3.8 MMOL/L — SIGNIFICANT CHANGE UP (ref 3.5–5.3)
POTASSIUM SERPL-SCNC: 3.8 MMOL/L — SIGNIFICANT CHANGE UP (ref 3.5–5.3)
PROT SERPL-MCNC: 6.2 G/DL — SIGNIFICANT CHANGE UP (ref 6–8.3)
RBC # BLD: 3.65 M/UL — LOW (ref 3.8–5.2)
RBC # FLD: 18 % — HIGH (ref 10.3–14.5)
SODIUM SERPL-SCNC: 136 MMOL/L — SIGNIFICANT CHANGE UP (ref 135–145)
WBC # BLD: 4.89 K/UL — SIGNIFICANT CHANGE UP (ref 3.8–10.5)
WBC # FLD AUTO: 4.89 K/UL — SIGNIFICANT CHANGE UP (ref 3.8–10.5)

## 2022-08-08 PROCEDURE — 99239 HOSP IP/OBS DSCHRG MGMT >30: CPT

## 2022-08-08 RX ADMIN — Medication 2 MILLIGRAM(S): at 00:05

## 2022-08-08 RX ADMIN — ENOXAPARIN SODIUM 90 MILLIGRAM(S): 100 INJECTION SUBCUTANEOUS at 12:13

## 2022-08-08 RX ADMIN — Medication 2 MILLIGRAM(S): at 06:59

## 2022-08-08 RX ADMIN — ENOXAPARIN SODIUM 90 MILLIGRAM(S): 100 INJECTION SUBCUTANEOUS at 00:09

## 2022-08-08 RX ADMIN — Medication 2 MILLIGRAM(S): at 12:12

## 2022-08-08 RX ADMIN — Medication 2 MILLIGRAM(S): at 17:09

## 2022-08-08 RX ADMIN — Medication 1 TABLET(S): at 12:17

## 2022-08-08 NOTE — DISCHARGE NOTE NURSING/CASE MANAGEMENT/SOCIAL WORK - NSSCNAMETXT_GEN_ALL_CORE
[facility]. Nurse to visit on the day following discharge. Other appropriate services to be arranged thereafter.   Please contact the home care agency at the above phone number if you have not heard from them by approximately 12 noon on the day after your hospital discharge.

## 2022-08-08 NOTE — DISCHARGE NOTE NURSING/CASE MANAGEMENT/SOCIAL WORK - NSDCPEFALRISK_GEN_ALL_CORE
For information on Fall & Injury Prevention, visit: [email or web address] OR  [email or web address] OR  https://www.cdc.gov/steadi/patient.html

## 2022-08-08 NOTE — CHART NOTE - NSCHARTNOTEFT_GEN_A_CORE
Chart reviewed and patient examined with family at bedside.      Per daughter, patient is not complaining of ostomy pain at this time, however she does endorse very mild incisional pain since her surgery on 7/22. Ostomy was recently changed yesterday 8/7.    At this time ostomy is functioning well, without bleeding at site and with good stool output. No leakage of bag noted.      Of note, end ileostomy appears mildly retracted into abdominal wall (rather than prolapsed). Per daughter, ostomy has appeared this way since the date of the surgery.      Cleared from surgery perspective to be d/c today.    Yamil Garcia MD  PGY-2  B Team Surgery  #[id number] Chart reviewed and patient examined with family at bedside.      Per daughter, patient is not complaining of ostomy pain at this time, however she does endorse very mild incisional pain since her surgery on 7/22. Ostomy was recently changed yesterday 8/7.    At this time ostomy is functioning well, without bleeding at site and with good stool output. Ostomy pink, healthy No leakage of bag noted.      Of note, end ileostomy appears mildly retracted into abdominal wall (rather than prolapsed). Per daughter, ostomy has appeared this way since the date of the surgery.      Cleared from surgery perspective to be d/c today. Encourage good wound care and appliance care, ostomy wafer.    Yamil Garcia MD  PGY-2  B Team Surgery  #[id number]

## 2022-08-08 NOTE — PROGRESS NOTE ADULT - ATTENDING COMMENTS
67F with PMH of incarcerated hernia s/p repair w/ ostomy, HTN, HLD who presents w/ vaginal bleeding. Incidentally noted to have RLE DVT on hep drip. Gyn was consulted for further eval. No further recommendations for IP workup. Rec for OP endometrial bx. DVT likely provoked in setting of recent surgery. Transitioned to lovenox. discharge 40 min
67F with PMH of incarcerated hernia s/p repair w/ ostomy, HTN, HLD who presents w/ vaginal bleeding. Incidentally noted to have RLE DVT on hep drip. Gyn was consulted for further eval. No further recommendations for IP workup. Rec for OP endometrial bx. DVT likely provoked in setting of recent surgery. Vascular was also c/s for further input.     -Continue AC - will decide on lovenox vs. Eliquis on DC. Given pt's need for eventual endometrial bx, may be easier w/ lovenox. Will also need to price out Rx.  -Trend Hb  -Hypokalemia- Replete lytes.   -Will need to f/u with Gyn - family wants to pursue IP bx if feasible. Will discuss w/ team.   -Dispo- Home PT. Anticipate DC in next 24h if she remains stable.
67F with PMH of incarcerated hernia s/p repair w/ ostomy, HTN, HLD who presents w/ vaginal bleeding. Incidentally noted to have RLE DVT on hep drip. Gyn was consulted for further eval. No further recommendations for IP workup. Rec for OP endometrial bx. DVT likely provoked in setting of recent surgery. Vascular was also c/s for further input. Pt continues to tolerate AC. Hb stable. Transitioning to lovenox.    -Lovenox 90 q12h. Rx sent to pharmacy - reviewed w/ daughter. Ok to continue lovenox on DC as pt will need bx. AFter bx is done, patient should f/u with PMD for transitioning to DOAC.  -Per floor staff- pt is waiting on home care services to be arranged. F/u in AM.   -Otherwise med stable for DC.
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 Vaginal bleeding started Tuesday AM 3-4 pads x 24 hrs. Post op decreased ambulation needs help w/ ADLs. Found to have RT DVT common femoral vein, femoral vein, and popliteal vein. CT A/P Loculated fluid collection inferior to the coccyx. UVJ 10 mm calculus at the right ureteropelvic junction without hydronephrosis. GYN c/s. s/p GYN exam differential also includes recent AC/atrophic/unstable endometrium and endometrial cancer. Medical management of uterine bleeding CI in acute DVT.      Acute DVT in w/ acute blood loss anemia in setting of vaginal bleed- transabdominal Uterine US unable to tolerate TVUS                                                        - heparin drip                                                        - vascular consult- ? IVC filter                                                        - monitor H/H                                                         - f/u GYN recs  JAMISON- likely pre-renal       - monitor Cr       - IVF  Hyponatremia- likely poor PO intake

## 2022-08-08 NOTE — PROGRESS NOTE ADULT - SUBJECTIVE AND OBJECTIVE BOX
PROGRESS NOTE:   Authoted by Dr. Luba Craig MD    Pager [phone] [facility], [id number] [address]     Patient is a 67y old  Female who presents with a chief complaint of vaginal bleeding (07 Aug 2022 07:50)    SUBJECTIVE / OVERNIGHT EVENTS: No significant overnight events. Pt denies chest pain, palpitations, SOB, dizziness/lightheadedhess, syncope, adominal pain, diarrhea/melena/BRBPR, dysuria/hematuria, focal deficits/weakness/change in sensation.     MEDICATIONS  (STANDING):  atorvastatin 10 milliGRAM(s) Oral at bedtime  diphenoxylate/atropine 1 Tablet(s) Oral daily  enoxaparin Injectable 90 milliGRAM(s) SubCutaneous every 12 hours  loperamide 2 milliGRAM(s) Oral every 6 hours    MEDICATIONS  (PRN):  acetaminophen     Tablet .. 650 milliGRAM(s) Oral every 6 hours PRN Temp greater or equal to 38C (100.4F), Mild Pain (1 - 3)    CAPILLARY BLOOD GLUCOSE    I&O's Summary    07 Aug 2022 07:01  -  08 Aug 2022 07:00  --------------------------------------------------------  IN: 0 mL / OUT: 300 mL / NET: -300 mL      PHYSICAL EXAM:  Vital Signs Last 24 Hrs  T(C): 36.6 (08 Aug 2022 06:50), Max: 37.1 (07 Aug 2022 13:30)  T(F): 97.8 (08 Aug 2022 06:50), Max: 98.7 (07 Aug 2022 13:30)  HR: 74 (08 Aug 2022 06:50) (74 - 88)  BP: 139/64 (08 Aug 2022 06:50) (125/54 - 139/64)  RR: 18 (08 Aug 2022 06:50) (18 - 18)  SpO2: 100% (08 Aug 2022 06:50) (98% - 100%)    Parameters below as of 08 Aug 2022 06:50  Patient On (Oxygen Delivery Method): room air    PHYSICAL EXAM:  GENERAL: NAD  HEAD:  Atraumatic, normocephalic  EYES: EOMI, conjunctiva and sclera clear  ENT: Moist mucous membranes  NECK: Supple, no JVD  HEART: Regular rate and rhythm, no murmurs, rubs, or gallops  LUNGS: Unlabored respirations.  Clear to auscultation bilaterally, no crackles, wheezing, or rhonchi  ABDOMEN: morbidly obese abdomen, +BS, tender to palpation at the ostomy bag site w/ yellow output, no active bleeding  EXTREMITIES: swollen LE   NERVOUS SYSTEM:  A&Ox3, no focal deficits   SKIN: No rashes or lesions      LABS:                        9.4    4.89  )-----------( 193      ( 08 Aug 2022 08:02 )             30.5     08-08    136  |  100  |  11  ----------------------------<  90  3.8   |  24  |  0.72    Ca    8.2<L>      08 Aug 2022 08:02  Phos  3.0     08-08  Mg     1.60     08-08    TPro  6.2  /  Alb  2.2<L>  /  TBili  0.4  /  DBili  x   /  AST  17  /  ALT  10  /  AlkPhos  203<H>  08-08    PTT - ( 07 Aug 2022 06:39 )  PTT:63.0 sec    RADIOLOGY & ADDITIONAL TESTS:  No new imaging or tests    COORDINATION OF CARE:  Care Discussed with Consultants/Other Providers [Y/N]:  Prior or Outpatient Records Reviewed [Y/N]:

## 2022-08-08 NOTE — DISCHARGE NOTE NURSING/CASE MANAGEMENT/SOCIAL WORK - PATIENT PORTAL LINK FT
You can access the FollowMyHealth Patient Portal offered by [facility] by registering at the following website: [email or web address]. By joining [facility]’s FollowMyHealth portal, you will also be able to view your health information using other applications (apps) compatible with our system.

## 2022-08-08 NOTE — PROGRESS NOTE ADULT - PROBLEM SELECTOR PROBLEM 5
JAMISON (acute kidney injury)

## 2022-08-08 NOTE — PROGRESS NOTE ADULT - PROBLEM SELECTOR PROBLEM 2
Deep vein thrombosis (DVT)

## 2022-08-08 NOTE — PROGRESS NOTE ADULT - PROBLEM SELECTOR PROBLEM 1
Postmenopausal vaginal bleeding

## 2022-08-08 NOTE — DISCHARGE NOTE NURSING/CASE MANAGEMENT/SOCIAL WORK - NSDCFUADDAPPT_GEN_ALL_CORE_FT
Please follow up with your PCP within 1-2 weeks after you are discharged for follow up for a newly found DVT after your recent surgery for your ostomy. Please follow up with general surgery for monitoring of the ostomy.

## 2022-08-08 NOTE — PROGRESS NOTE ADULT - PROBLEM SELECTOR PLAN 2
- R femoral vein and popliteal vein DVT found on US  - transitioned to Lovenox 90mg BID, stable for d/c on this regimen, medication approved by pharmacy/insurance and daughter received teaching for injections   - no need for venogram at this time  - appreciate vascular recs

## 2022-08-08 NOTE — PROGRESS NOTE ADULT - PROBLEM SELECTOR PLAN 5
- worsening JAMISON compared to previous discharge, likely due to her poor PO intake and inadequate fluids  - monitor BUN/Cr  - if unresponsive to fluids consider renal consult
- worsening JAMISON compared to previous discharge, likely due to her poor PO intake and inadequate fluids  - will c/w maintenance fluids for now (NS at 100cc/hr) and monitor Cr  - if unresponsive to fluids consider renal consult
- worsening JAMISON compared to previous discharge, likely due to her poor PO intake and inadequate fluids  - monitor BUN/Cr  - if unresponsive to fluids consider renal consult
- worsening JAMISON compared to previous discharge, likely due to her poor PO intake and inadequate fluids  - monitor BUN/Cr  - if unresponsive to fluids consider renal consult

## 2022-08-08 NOTE — PROGRESS NOTE ADULT - PROBLEM SELECTOR PROBLEM 3
S/P repair of ventral hernia

## 2022-08-08 NOTE — PROGRESS NOTE ADULT - ASSESSMENT
67F Italian speaking w/ PMH of incarcerated hernia s/p repair w/ ostomy bag, HTN, HLD, presented to the ED due to vaginal bleeding of 2d prior to coming. Was also found to have DVT on R femoral vein. Now on heparin gtt for DVT Tx. S/P gyn consult for vaginal bleeding, but no interventions for now as pt refused initial TVUS. H and H stable.

## 2022-08-08 NOTE — PROGRESS NOTE ADULT - PROBLEM SELECTOR PLAN 1
- per HPI pt had 2d of vaginal bleeding  - per gyn there are small clots w/o active bleeding  - H and H has been stable  - active T&S, monitor CBC Q8H,   - normal uterus on abd U/S  - f/u gyn recs, o/p biopsy

## 2022-08-16 ENCOUNTER — APPOINTMENT (OUTPATIENT)
Dept: SURGERY | Facility: HOSPITAL | Age: 67
End: 2022-08-16

## 2022-08-16 VITALS
HEIGHT: 60 IN | SYSTOLIC BLOOD PRESSURE: 126 MMHG | HEART RATE: 87 BPM | WEIGHT: 220 LBS | TEMPERATURE: 97.3 F | BODY MASS INDEX: 43.19 KG/M2 | DIASTOLIC BLOOD PRESSURE: 55 MMHG

## 2022-08-17 NOTE — HISTORY OF PRESENT ILLNESS
[de-identified] : Patient s/p ex lap ventral hernia repair, small bowel enterotomy repair, complicated by leak, taken back for end ileostomy. Patient readmitted for dvt, and acute kidney injury. Patient now tolerating diet, ileostomy functioning. On exam incision is well granulating, dakins soaked dressing, no green drainage.

## 2022-08-17 NOTE — PLAN
[FreeTextEntry1] : see pmd for general health maintenace\par recommend melatonin for sleeping at night\par will consider reversal 3-6 months\par needs more nutrition, continue ensures

## 2022-08-17 NOTE — ASSESSMENT
[FreeTextEntry1] : Patient s/p end ileostomy after ex lap enterotomy repair with leak, ileostomy functioning, midline wound well healing.

## 2022-08-21 ENCOUNTER — INPATIENT (INPATIENT)
Facility: HOSPITAL | Age: 67
LOS: 4 days | Discharge: HOME | End: 2022-08-26
Attending: HOSPITALIST | Admitting: HOSPITALIST

## 2022-08-21 VITALS
SYSTOLIC BLOOD PRESSURE: 116 MMHG | HEART RATE: 82 BPM | DIASTOLIC BLOOD PRESSURE: 47 MMHG | RESPIRATION RATE: 16 BRPM | OXYGEN SATURATION: 100 % | HEIGHT: 60 IN | TEMPERATURE: 98 F

## 2022-08-21 DIAGNOSIS — Z98.890 OTHER SPECIFIED POSTPROCEDURAL STATES: Chronic | ICD-10-CM

## 2022-08-21 DIAGNOSIS — Z90.49 ACQUIRED ABSENCE OF OTHER SPECIFIED PARTS OF DIGESTIVE TRACT: Chronic | ICD-10-CM

## 2022-08-21 LAB
ALBUMIN SERPL ELPH-MCNC: 3.2 G/DL — LOW (ref 3.3–5)
ALP SERPL-CCNC: 230 U/L — HIGH (ref 40–120)
ALT FLD-CCNC: 27 U/L — SIGNIFICANT CHANGE UP (ref 4–33)
ANION GAP SERPL CALC-SCNC: 16 MMOL/L — HIGH (ref 7–14)
APTT BLD: 46.9 SEC — HIGH (ref 27–36.3)
AST SERPL-CCNC: 67 U/L — HIGH (ref 4–32)
B PERT DNA SPEC QL NAA+PROBE: SIGNIFICANT CHANGE UP
B PERT+PARAPERT DNA PNL SPEC NAA+PROBE: SIGNIFICANT CHANGE UP
BASE EXCESS BLDV CALC-SCNC: 3.9 MMOL/L — HIGH (ref -2–3)
BASOPHILS # BLD AUTO: 0.02 K/UL — SIGNIFICANT CHANGE UP (ref 0–0.2)
BASOPHILS NFR BLD AUTO: 0.2 % — SIGNIFICANT CHANGE UP (ref 0–2)
BILIRUB SERPL-MCNC: 0.5 MG/DL — SIGNIFICANT CHANGE UP (ref 0.2–1.2)
BLOOD GAS VENOUS COMPREHENSIVE RESULT: SIGNIFICANT CHANGE UP
BORDETELLA PARAPERTUSSIS (RAPRVP): SIGNIFICANT CHANGE UP
BUN SERPL-MCNC: 70 MG/DL — HIGH (ref 7–23)
C PNEUM DNA SPEC QL NAA+PROBE: SIGNIFICANT CHANGE UP
CALCIUM SERPL-MCNC: 9.9 MG/DL — SIGNIFICANT CHANGE UP (ref 8.4–10.5)
CHLORIDE BLDV-SCNC: 79 MMOL/L — LOW (ref 96–108)
CHLORIDE SERPL-SCNC: 74 MMOL/L — LOW (ref 98–107)
CO2 BLDV-SCNC: 31.2 MMOL/L — HIGH (ref 22–26)
CO2 SERPL-SCNC: 26 MMOL/L — SIGNIFICANT CHANGE UP (ref 22–31)
CREAT ?TM UR-MCNC: 141 MG/DL — SIGNIFICANT CHANGE UP
CREAT SERPL-MCNC: 2.75 MG/DL — HIGH (ref 0.5–1.3)
EGFR: 18 ML/MIN/1.73M2 — LOW
EOSINOPHIL # BLD AUTO: 0.02 K/UL — SIGNIFICANT CHANGE UP (ref 0–0.5)
EOSINOPHIL NFR BLD AUTO: 0.2 % — SIGNIFICANT CHANGE UP (ref 0–6)
FLUAV SUBTYP SPEC NAA+PROBE: SIGNIFICANT CHANGE UP
FLUBV RNA SPEC QL NAA+PROBE: SIGNIFICANT CHANGE UP
GAS PNL BLDV: 116 MMOL/L — CRITICAL LOW (ref 136–145)
GLUCOSE BLDV-MCNC: 87 MG/DL — SIGNIFICANT CHANGE UP (ref 70–99)
GLUCOSE SERPL-MCNC: 92 MG/DL — SIGNIFICANT CHANGE UP (ref 70–99)
HADV DNA SPEC QL NAA+PROBE: SIGNIFICANT CHANGE UP
HCO3 BLDV-SCNC: 30 MMOL/L — HIGH (ref 22–29)
HCOV 229E RNA SPEC QL NAA+PROBE: SIGNIFICANT CHANGE UP
HCOV HKU1 RNA SPEC QL NAA+PROBE: SIGNIFICANT CHANGE UP
HCOV NL63 RNA SPEC QL NAA+PROBE: SIGNIFICANT CHANGE UP
HCOV OC43 RNA SPEC QL NAA+PROBE: SIGNIFICANT CHANGE UP
HCT VFR BLD CALC: 35 % — SIGNIFICANT CHANGE UP (ref 34.5–45)
HCT VFR BLDA CALC: 35 % — SIGNIFICANT CHANGE UP (ref 34.5–46.5)
HEMOLYSIS INDEX: 209 — SIGNIFICANT CHANGE UP
HGB BLD CALC-MCNC: 11.7 G/DL — SIGNIFICANT CHANGE UP (ref 11.5–15.5)
HGB BLD-MCNC: 11.5 G/DL — SIGNIFICANT CHANGE UP (ref 11.5–15.5)
HMPV RNA SPEC QL NAA+PROBE: SIGNIFICANT CHANGE UP
HPIV1 RNA SPEC QL NAA+PROBE: SIGNIFICANT CHANGE UP
HPIV2 RNA SPEC QL NAA+PROBE: SIGNIFICANT CHANGE UP
HPIV3 RNA SPEC QL NAA+PROBE: SIGNIFICANT CHANGE UP
HPIV4 RNA SPEC QL NAA+PROBE: SIGNIFICANT CHANGE UP
IANC: 5.09 K/UL — SIGNIFICANT CHANGE UP (ref 1.8–7.4)
IMM GRANULOCYTES NFR BLD AUTO: 1.1 % — SIGNIFICANT CHANGE UP (ref 0–1.5)
INR BLD: 1.27 RATIO — HIGH (ref 0.88–1.16)
LACTATE BLDV-MCNC: 2.3 MMOL/L — HIGH (ref 0.5–2)
LYMPHOCYTES # BLD AUTO: 2.02 K/UL — SIGNIFICANT CHANGE UP (ref 1–3.3)
LYMPHOCYTES # BLD AUTO: 25 % — SIGNIFICANT CHANGE UP (ref 13–44)
M PNEUMO DNA SPEC QL NAA+PROBE: SIGNIFICANT CHANGE UP
MAGNESIUM SERPL-MCNC: 1.6 MG/DL — SIGNIFICANT CHANGE UP (ref 1.6–2.6)
MCHC RBC-ENTMCNC: 26.4 PG — LOW (ref 27–34)
MCHC RBC-ENTMCNC: 32.9 GM/DL — SIGNIFICANT CHANGE UP (ref 32–36)
MCV RBC AUTO: 80.3 FL — SIGNIFICANT CHANGE UP (ref 80–100)
MONOCYTES # BLD AUTO: 0.84 K/UL — SIGNIFICANT CHANGE UP (ref 0–0.9)
MONOCYTES NFR BLD AUTO: 10.4 % — SIGNIFICANT CHANGE UP (ref 2–14)
NEUTROPHILS # BLD AUTO: 5.09 K/UL — SIGNIFICANT CHANGE UP (ref 1.8–7.4)
NEUTROPHILS NFR BLD AUTO: 63.1 % — SIGNIFICANT CHANGE UP (ref 43–77)
NRBC # BLD: 0 /100 WBCS — SIGNIFICANT CHANGE UP (ref 0–0)
NRBC # FLD: 0 K/UL — SIGNIFICANT CHANGE UP (ref 0–0)
OSMOLALITY SERPL: 298 MOSM/KG — HIGH (ref 275–295)
OSMOLALITY UR: 382 MOSM/KG — SIGNIFICANT CHANGE UP (ref 50–1200)
PCO2 BLDV: 49 MMHG — HIGH (ref 39–42)
PH BLDV: 7.39 — SIGNIFICANT CHANGE UP (ref 7.32–7.43)
PHOSPHATE SERPL-MCNC: 6.4 MG/DL — HIGH (ref 2.5–4.5)
PLATELET # BLD AUTO: 311 K/UL — SIGNIFICANT CHANGE UP (ref 150–400)
PO2 BLDV: 36 MMHG — SIGNIFICANT CHANGE UP
POTASSIUM BLDV-SCNC: 5.1 MMOL/L — SIGNIFICANT CHANGE UP (ref 3.5–5.1)
POTASSIUM SERPL-MCNC: 5.6 MMOL/L — HIGH (ref 3.5–5.3)
POTASSIUM SERPL-MCNC: SIGNIFICANT CHANGE UP MMOL/L (ref 3.5–5.3)
POTASSIUM SERPL-SCNC: 5.6 MMOL/L — HIGH (ref 3.5–5.3)
POTASSIUM SERPL-SCNC: SIGNIFICANT CHANGE UP MMOL/L (ref 3.5–5.3)
POTASSIUM UR-SCNC: 82.4 MMOL/L — SIGNIFICANT CHANGE UP
PROT ?TM UR-MCNC: 93 MG/DL — SIGNIFICANT CHANGE UP
PROT SERPL-MCNC: 8.3 G/DL — SIGNIFICANT CHANGE UP (ref 6–8.3)
PROT/CREAT UR-RTO: 0.7 RATIO — HIGH (ref 0–0.2)
PROTHROM AB SERPL-ACNC: 14.8 SEC — HIGH (ref 10.5–13.4)
RAPID RVP RESULT: SIGNIFICANT CHANGE UP
RBC # BLD: 4.36 M/UL — SIGNIFICANT CHANGE UP (ref 3.8–5.2)
RBC # FLD: 17.2 % — HIGH (ref 10.3–14.5)
RSV RNA SPEC QL NAA+PROBE: SIGNIFICANT CHANGE UP
RV+EV RNA SPEC QL NAA+PROBE: SIGNIFICANT CHANGE UP
SAO2 % BLDV: 54.7 % — SIGNIFICANT CHANGE UP
SARS-COV-2 RNA SPEC QL NAA+PROBE: SIGNIFICANT CHANGE UP
SODIUM SERPL-SCNC: 116 MMOL/L — CRITICAL LOW (ref 135–145)
SODIUM UR-SCNC: <20 MMOL/L — SIGNIFICANT CHANGE UP
UUN UR-MCNC: 411.3 MG/DL — SIGNIFICANT CHANGE UP
WBC # BLD: 8.08 K/UL — SIGNIFICANT CHANGE UP (ref 3.8–10.5)
WBC # FLD AUTO: 8.08 K/UL — SIGNIFICANT CHANGE UP (ref 3.8–10.5)

## 2022-08-21 PROCEDURE — 71045 X-RAY EXAM CHEST 1 VIEW: CPT | Mod: 26

## 2022-08-21 PROCEDURE — 99285 EMERGENCY DEPT VISIT HI MDM: CPT

## 2022-08-21 NOTE — ED PROVIDER NOTE - COVID-19 ORDERING FACILITY
Change Prednisone to ONE tablet alternating with HALF tablet every other day    Start using Trilogy ventilator again    ______________________________________________________________________Thank you for choosing the Pulmonary Services Department, at [facility], as your Pulmonary Specialists.  We actively use feedback to constantly improve and deliver the best care possible. To provide the best experience, we are collecting feedback from you on how we performed.  You may receive a survey in the mail or through your e-mail to evaluate how we did. Please take a moment and share your thoughts.      If for any reason you feel that we did not meet your expectations or you want to share a positive experience, please give us a call. Your feedback helps us know how we are doing and what we can be doing better.    Office hours: 8:00 am to 4:30 pm, Monday - Friday  Phone: [phone] Option #2      YOUR TEST RESULTS    If you have any concerns regarding any testing ordered with your visit, please contact our office at the above number.    Otherwise, your test results will be communicated to you in one of the following ways:    - Follow-up office visit  - Phone call  - Through [facility]  - Mailed letter      If you are prescribed an inhaler or a medicine that you find to be too expensive, please be aware that your physician has no way to know what your cost will be. All copayments are specific to you and your insurance plan.    If you find the medication prescribed to be too expensive, please consider:  • Do you have a deductible you need to meet?  • Are you in your coverage gap (or donut hole)?  • Inhalers are very expensive, and most are only available as brand name (costing more than $500 without insurance)    If you are unable to afford your copay, please:  • Call your insurance company and ask which medication would be the least expensive for you (the telephone number is often located on the back of  your insurance card under “Member Services”)  • Contact our office with the name or names of alternate medications so we can consider prescribing for you    Thank You,  Pulmonary Services                                                                                                                                                                                                                                                                     [phone]     FADI/GONZALEZ/Armando

## 2022-08-21 NOTE — ED PROVIDER NOTE - OBJECTIVE STATEMENT
68 y/o F, PMH of incarcerated hernia s/p repair w/ ostomy bag, HTN, HLD, 68 y/o F, PMH of incarcerated hernia s/p repair w/ ostomy bag, HTN, HLD, and RLE DVT (on Lovenox) 66 y/o F, PMH of incarcerated hernia s/p repair w/ ostomy bag, HTN, HLD, and RLE DVT (on Lovenox), presents to ED for abnormal outpt labs. Pt reports K - 5.6 and Na - 119. Pt c/o nausea and generalized malaise x 3 days. Per daughter, pt has not been eating well at home. Pt denies CP, SOB, abd pain, urinary sx, weakness/numbness, lightheadedness/dizziness, f/c, vomiting, diarrhea, or any other symptoms at this time.

## 2022-08-21 NOTE — ED ADULT NURSE NOTE - CHIEF COMPLAINT QUOTE
Thai dialect speaking- daughter at bedside, oriented x3,  follow up visit md yesterday, abn labs from yesterday. k- 5.9,na 119.  c/o nausea x past 3 days. d/nelda  8/7. vag bleed, r leg dvt. pmh- htn ,elevated cholesterol ,recent colostomy. presently takes lovenox

## 2022-08-21 NOTE — ED PROVIDER NOTE - CLINICAL SUMMARY MEDICAL DECISION MAKING FREE TEXT BOX
68 y/o F, PMH of incarcerated hernia s/p repair w/ ostomy bag, HTN, HLD, and RLE DVT (on Lovenox), presents to ED for abnormal outpt labs (K - 5.6 and Na - 119). Pt c/o nausea and generalized malaise x 3 days.   Plan to check labs, UA, urine lytes, EKG and CXR. Likely will require admission.

## 2022-08-21 NOTE — ED PROVIDER NOTE - ATTENDING CONTRIBUTION TO CARE
66 y/o F, PMH of incarcerated hernia s/p repair w/ ostomy bag, HTN, HLD, and RLE DVT (on Lovenox), presents to ED for abnormal outpt labs. Pt reports K - 5.6 and Na - 119. Pt c/o nausea and generalized malaise x 3 days. Per daughter, pt has not been eating well at home. Pt denies CP, SOB, abd pain, urinary sx, weakness/numbness, lightheadedness/dizziness, f/c, vomiting, diarrhea, or any other symptoms at this time.

## 2022-08-21 NOTE — ED ADULT NURSE NOTE - ISOLATION TYPE:
January 24, 2020       Patient: Frank Yee   YOB: 1985   Date of Visit: 1/24/2020         To Whom It May Concern:    It is my medical opinion that Frank Yee return to full duty, no restrictions on 01/25/2020 due to medical illness.              Sincerely,          CLARK FarahPMAXIMUS  Electronically Signed     
None

## 2022-08-21 NOTE — ED ADULT NURSE NOTE - OBJECTIVE STATEMENT
Pt presents to ED brought in by EMS accompanied by family who speaks English and is translating for pt. Pt was sent by MD for abnormal labs, elevated K and low Na. Family states pt has been nauseous and vomiting mucous like liquid for the last 3 days. Pt was recently hospitalizes after complications from hernia sx in June, now with an ileostomy and open abdominal wound. Pt has no complaints at this time. NSR on cardiac monitor. 20G Constantino placed in right wrist. Flushed well with 10cc NS with good blood return. No s/s discomfort or erythema at insertion site. call bell within reach. Education provided to pt on how to and when to use call bell for assistance. Will continue to monitor. SAYRA Mckeon

## 2022-08-21 NOTE — ED ADULT TRIAGE NOTE - CHIEF COMPLAINT QUOTE
Welsh dialect speaking- daughter at bedside, oriented x3,  follow up visit md yesterday, abn labs from yesterday. k- 5.9,na 119.  c/o nausea x past 3 days. d/nelda  8/7. vag bleed, r leg dvt. pmh- htn ,elevated cholesterol ,recent colostomy. presently takes lovenox

## 2022-08-22 DIAGNOSIS — N17.9 ACUTE KIDNEY FAILURE, UNSPECIFIED: ICD-10-CM

## 2022-08-22 DIAGNOSIS — R62.7 ADULT FAILURE TO THRIVE: ICD-10-CM

## 2022-08-22 DIAGNOSIS — I82.409 ACUTE EMBOLISM AND THROMBOSIS OF UNSPECIFIED DEEP VEINS OF UNSPECIFIED LOWER EXTREMITY: ICD-10-CM

## 2022-08-22 DIAGNOSIS — E87.1 HYPO-OSMOLALITY AND HYPONATREMIA: ICD-10-CM

## 2022-08-22 DIAGNOSIS — N39.0 URINARY TRACT INFECTION, SITE NOT SPECIFIED: ICD-10-CM

## 2022-08-22 DIAGNOSIS — Z00.00 ENCOUNTER FOR GENERAL ADULT MEDICAL EXAMINATION WITHOUT ABNORMAL FINDINGS: ICD-10-CM

## 2022-08-22 LAB
ANION GAP SERPL CALC-SCNC: 15 MMOL/L — HIGH (ref 7–14)
ANION GAP SERPL CALC-SCNC: 18 MMOL/L — HIGH (ref 7–14)
APPEARANCE UR: ABNORMAL
BACTERIA # UR AUTO: ABNORMAL
BASE EXCESS BLDV CALC-SCNC: 3.2 MMOL/L — HIGH (ref -2–3)
BILIRUB UR-MCNC: NEGATIVE — SIGNIFICANT CHANGE UP
BLOOD GAS VENOUS COMPREHENSIVE RESULT: SIGNIFICANT CHANGE UP
BUN SERPL-MCNC: 67 MG/DL — HIGH (ref 7–23)
BUN SERPL-MCNC: 69 MG/DL — HIGH (ref 7–23)
CALCIUM SERPL-MCNC: 8.7 MG/DL — SIGNIFICANT CHANGE UP (ref 8.4–10.5)
CALCIUM SERPL-MCNC: 9.2 MG/DL — SIGNIFICANT CHANGE UP (ref 8.4–10.5)
CHLORIDE BLDV-SCNC: 81 MMOL/L — LOW (ref 96–108)
CHLORIDE SERPL-SCNC: 78 MMOL/L — LOW (ref 98–107)
CHLORIDE SERPL-SCNC: 82 MMOL/L — LOW (ref 98–107)
CO2 BLDV-SCNC: 30.5 MMOL/L — HIGH (ref 22–26)
CO2 SERPL-SCNC: 25 MMOL/L — SIGNIFICANT CHANGE UP (ref 22–31)
CO2 SERPL-SCNC: 25 MMOL/L — SIGNIFICANT CHANGE UP (ref 22–31)
COLOR SPEC: ABNORMAL
CREAT SERPL-MCNC: 2.46 MG/DL — HIGH (ref 0.5–1.3)
CREAT SERPL-MCNC: 2.6 MG/DL — HIGH (ref 0.5–1.3)
DIFF PNL FLD: ABNORMAL
EGFR: 20 ML/MIN/1.73M2 — LOW
EGFR: 21 ML/MIN/1.73M2 — LOW
EPI CELLS # UR: 3 /HPF — SIGNIFICANT CHANGE UP (ref 0–5)
GAS PNL BLDV: 118 MMOL/L — CRITICAL LOW (ref 136–145)
GLUCOSE BLDV-MCNC: 86 MG/DL — SIGNIFICANT CHANGE UP (ref 70–99)
GLUCOSE SERPL-MCNC: 88 MG/DL — SIGNIFICANT CHANGE UP (ref 70–99)
GLUCOSE SERPL-MCNC: 93 MG/DL — SIGNIFICANT CHANGE UP (ref 70–99)
GLUCOSE UR QL: NEGATIVE — SIGNIFICANT CHANGE UP
HCO3 BLDV-SCNC: 29 MMOL/L — SIGNIFICANT CHANGE UP (ref 22–29)
HCT VFR BLDA CALC: 32 % — LOW (ref 34.5–46.5)
HGB BLD CALC-MCNC: 10.6 G/DL — LOW (ref 11.5–15.5)
KETONES UR-MCNC: ABNORMAL
LACTATE BLDV-MCNC: 1.1 MMOL/L — SIGNIFICANT CHANGE UP (ref 0.5–2)
LEUKOCYTE ESTERASE UR-ACNC: ABNORMAL
MAGNESIUM SERPL-MCNC: 1.5 MG/DL — LOW (ref 1.6–2.6)
NITRITE UR-MCNC: NEGATIVE — SIGNIFICANT CHANGE UP
OSMOLALITY SERPL: 278 MOSM/KG — SIGNIFICANT CHANGE UP (ref 275–295)
OSMOLALITY UR: 413 MOSM/KG — SIGNIFICANT CHANGE UP (ref 50–1200)
PCO2 BLDV: 49 MMHG — HIGH (ref 39–42)
PH BLDV: 7.38 — SIGNIFICANT CHANGE UP (ref 7.32–7.43)
PH UR: 6 — SIGNIFICANT CHANGE UP (ref 5–8)
PHOSPHATE SERPL-MCNC: 6 MG/DL — HIGH (ref 2.5–4.5)
PO2 BLDV: 34 MMHG — SIGNIFICANT CHANGE UP
POTASSIUM BLDV-SCNC: 4.6 MMOL/L — SIGNIFICANT CHANGE UP (ref 3.5–5.1)
POTASSIUM SERPL-MCNC: 4.4 MMOL/L — SIGNIFICANT CHANGE UP (ref 3.5–5.3)
POTASSIUM SERPL-MCNC: 4.6 MMOL/L — SIGNIFICANT CHANGE UP (ref 3.5–5.3)
POTASSIUM SERPL-SCNC: 4.4 MMOL/L — SIGNIFICANT CHANGE UP (ref 3.5–5.3)
POTASSIUM SERPL-SCNC: 4.6 MMOL/L — SIGNIFICANT CHANGE UP (ref 3.5–5.3)
PROT UR-MCNC: ABNORMAL
RBC CASTS # UR COMP ASSIST: 11 /HPF — HIGH (ref 0–4)
SAO2 % BLDV: 53.1 % — SIGNIFICANT CHANGE UP
SODIUM SERPL-SCNC: 121 MMOL/L — LOW (ref 135–145)
SODIUM SERPL-SCNC: 122 MMOL/L — LOW (ref 135–145)
SODIUM UR-SCNC: <20 MMOL/L — SIGNIFICANT CHANGE UP
SP GR SPEC: 1.02 — SIGNIFICANT CHANGE UP (ref 1.01–1.05)
UROBILINOGEN FLD QL: ABNORMAL
WBC UR QL: >720 /HPF — HIGH (ref 0–5)

## 2022-08-22 PROCEDURE — 99223 1ST HOSP IP/OBS HIGH 75: CPT | Mod: GC

## 2022-08-22 PROCEDURE — 99232 SBSQ HOSP IP/OBS MODERATE 35: CPT

## 2022-08-22 PROCEDURE — 12345: CPT | Mod: NC

## 2022-08-22 RX ORDER — SODIUM CHLORIDE 9 MG/ML
1000 INJECTION INTRAMUSCULAR; INTRAVENOUS; SUBCUTANEOUS
Refills: 0 | Status: DISCONTINUED | OUTPATIENT
Start: 2022-08-22 | End: 2022-08-22

## 2022-08-22 RX ORDER — DIPHENOXYLATE HCL/ATROPINE 2.5-.025MG
1 TABLET ORAL DAILY
Refills: 0 | Status: DISCONTINUED | OUTPATIENT
Start: 2022-08-22 | End: 2022-08-22

## 2022-08-22 RX ORDER — APIXABAN 2.5 MG/1
5 TABLET, FILM COATED ORAL EVERY 12 HOURS
Refills: 0 | Status: DISCONTINUED | OUTPATIENT
Start: 2022-08-22 | End: 2022-08-26

## 2022-08-22 RX ORDER — ONDANSETRON 8 MG/1
4 TABLET, FILM COATED ORAL EVERY 8 HOURS
Refills: 0 | Status: DISCONTINUED | OUTPATIENT
Start: 2022-08-22 | End: 2022-08-26

## 2022-08-22 RX ORDER — ATORVASTATIN CALCIUM 80 MG/1
10 TABLET, FILM COATED ORAL DAILY
Refills: 0 | Status: DISCONTINUED | OUTPATIENT
Start: 2022-08-22 | End: 2022-08-26

## 2022-08-22 RX ORDER — CEFTRIAXONE 500 MG/1
1000 INJECTION, POWDER, FOR SOLUTION INTRAMUSCULAR; INTRAVENOUS EVERY 24 HOURS
Refills: 0 | Status: COMPLETED | OUTPATIENT
Start: 2022-08-22 | End: 2022-08-24

## 2022-08-22 RX ORDER — LOPERAMIDE HCL 2 MG
4 TABLET ORAL EVERY 6 HOURS
Refills: 0 | Status: DISCONTINUED | OUTPATIENT
Start: 2022-08-22 | End: 2022-08-22

## 2022-08-22 RX ORDER — ACETAMINOPHEN 500 MG
650 TABLET ORAL EVERY 6 HOURS
Refills: 0 | Status: DISCONTINUED | OUTPATIENT
Start: 2022-08-22 | End: 2022-08-26

## 2022-08-22 RX ORDER — SODIUM CHLORIDE 9 MG/ML
1000 INJECTION INTRAMUSCULAR; INTRAVENOUS; SUBCUTANEOUS ONCE
Refills: 0 | Status: DISCONTINUED | OUTPATIENT
Start: 2022-08-22 | End: 2022-08-22

## 2022-08-22 RX ORDER — MAGNESIUM SULFATE 500 MG/ML
1 VIAL (ML) INJECTION ONCE
Refills: 0 | Status: COMPLETED | OUTPATIENT
Start: 2022-08-22 | End: 2022-08-22

## 2022-08-22 RX ORDER — ENOXAPARIN SODIUM 100 MG/ML
90 INJECTION SUBCUTANEOUS EVERY 12 HOURS
Refills: 0 | Status: DISCONTINUED | OUTPATIENT
Start: 2022-08-22 | End: 2022-08-22

## 2022-08-22 RX ADMIN — SODIUM CHLORIDE 100 MILLILITER(S): 9 INJECTION INTRAMUSCULAR; INTRAVENOUS; SUBCUTANEOUS at 02:23

## 2022-08-22 RX ADMIN — CEFTRIAXONE 100 MILLIGRAM(S): 500 INJECTION, POWDER, FOR SOLUTION INTRAMUSCULAR; INTRAVENOUS at 11:14

## 2022-08-22 RX ADMIN — Medication 100 GRAM(S): at 07:55

## 2022-08-22 RX ADMIN — ATORVASTATIN CALCIUM 10 MILLIGRAM(S): 80 TABLET, FILM COATED ORAL at 14:42

## 2022-08-22 NOTE — PHYSICAL THERAPY INITIAL EVALUATION ADULT - GENERAL OBSERVATIONS, REHAB EVAL
Pt received semisupine on stretcher in ED, +IV, +telemetry, in NAD. Daughter present at bedside. Pt agreeable to participate in PT evaluation. Pt left semisupine in bed as found, all lines intact and RN aware.

## 2022-08-22 NOTE — H&P ADULT - NSHPLABSRESULTS_GEN_ALL_CORE
REVIEW OF SYSTEMS:  CONSTITUTIONAL: No weakness, fevers or chills  EYES/ENT: No visual changes;  No dysphagia  NECK: No pain or stiffness  RESPIRATORY: No cough, wheezing, hemoptysis; No shortness of breath  CARDIOVASCULAR: No chest pain or palpitations; No lower extremity edema  GASTROINTESTINAL: No abdominal or epigastric pain. No nausea, vomiting, or hematemesis; No diarrhea or constipation. No melena or hematochezia.  BACK: No back pain  GENITOURINARY: No dysuria, frequency or hematuria  NEUROLOGICAL: No numbness or weakness  SKIN: No itching, burning, rashes, or lesions   All other review of systems is negative unless indicated above. 11.5   8.08  )-----------( 311      ( 21 Aug 2022 19:43 )             35.0     08-21    x   |  x   |  x   ----------------------------<  x   5.6<H>   |  x   |  x     Ca    9.9      21 Aug 2022 19:43  Phos  6.4     08-21  Mg     1.60     08-21    TPro  8.3  /  Alb  3.2<L>  /  TBili  0.5  /  DBili  x   /  AST  67<H>  /  ALT  27  /  AlkPhos  230<H>  08-21    PT/INR - ( 21 Aug 2022 19:43 )   PT: 14.8 sec;   INR: 1.27 ratio         PTT - ( 21 Aug 2022 19:43 )  PTT:46.9 sec 11.5   8.08  )-----------( 311      ( 21 Aug 2022 19:43 )             35.0     08-21    x   |  x   |  x   ----------------------------<  x   5.6<H>   |  x   |  x     Ca    9.9      21 Aug 2022 19:43  Phos  6.4     08-21  Mg     1.60     08-21    TPro  8.3  /  Alb  3.2<L>  /  TBili  0.5  /  DBili  x   /  AST  67<H>  /  ALT  27  /  AlkPhos  230<H>  08-21    PT/INR - ( 21 Aug 2022 19:43 )   PT: 14.8 sec;   INR: 1.27 ratio         PTT - ( 21 Aug 2022 19:43 )  PTT:46.9 sec    CXR with clear lungs LABS and ADDITIONAL STUDIES:                 11.5   8.08  )-----------( 311      ( 21 Aug 2022 19:43 )              35.0     08-21  Comprehensive Metabolic Panel (08.21.22 @ 19:43)   Sodium, Serum: 116 mmol/L   Potassium, Serum: TNP: SPECIMEN SEVERELY HEMOLYZED mmol/L   Chloride, Serum: 74 mmol/L   Carbon Dioxide, Serum: 26 mmol/L   Anion Gap, Serum: 16 mmol/L   Blood Urea Nitrogen, Serum: 70 mg/dL   Creatinine, Serum: 2.75 mg/dL   Glucose, Serum: 92 mg/dL   Calcium, Total Serum: 9.9 mg/dL   Protein Total, Serum: 8.3 g/dL   Albumin, Serum: 3.2 g/dL   Bilirubin Total, Serum: 0.5 mg/dL   Alkaline Phosphatase, Serum: 230 U/L   Aspartate Aminotransferase (AST/SGOT): 67 U/L   Alanine Aminotransferase (ALT/SGPT): 27 U/L   eGFR: 18 mL/min/1.73m2     Ca    9.9      21 Aug 2022 19:43  Phos  6.4     08-21  Mg     1.60     08-21    TPro  8.3  /  Alb  3.2<L>  /  TBili  0.5  /  DBili  x   /  AST  67<H>  /  ALT  27  /  AlkPhos  230<H>  08-21    PT/INR - ( 21 Aug 2022 19:43 )   PT: 14.8 sec;   INR: 1.27 ratio    PTT - ( 21 Aug 2022 19:43 )  PTT:46.9 sec    Blood Gas Venous - Lactate (08.21.22 @ 19:43)   Blood Gas Venous - Lactate: 2.3 mmol/L     Blood Gas Venous - Lactate (08.22.22 @ 06:22)   Blood Gas Venous - Lactate: 1.1 mmol/L     CXR with clear lungs    EKG - NSR at 84, QTc 425, Q wave III/aVF, No significant ST-T wave changes

## 2022-08-22 NOTE — H&P ADULT - ATTENDING COMMENTS
Patient seen and examined on 8/22/22, case discussed with Dr. Blackburn. This is a 67F with history as above who presents to the hospital with abnormal outpatient lab work. Here noted to have significant hyponatremia and JAMISON. Suspect likely 2/2 poor PO intake and increased ostomy output. Daughter states recently ostomy output more liquid, concern for possible infection causing her increased output, would therefore place on IVF, check stool studies, monitor BMP q6h. Other management as above.

## 2022-08-22 NOTE — PHYSICAL THERAPY INITIAL EVALUATION ADULT - FOLLOWS COMMANDS/ANSWERS QUESTIONS, REHAB EVAL
Arabic speaking, pt prefers daughter to translate/100% of the time/able to follow single-step instructions

## 2022-08-22 NOTE — H&P ADULT - PROBLEM SELECTOR PLAN 3
Pt with hx of UTI, previously positive for E coli and Klebsiella. Pt denies symptoms, but UA positive with >720 white count, many bacteria.  - Start ceftriaxone

## 2022-08-22 NOTE — CONSULT NOTE ADULT - ASSESSMENT
67F PMHx HTN, HLD, RLE DVT (on lovenox), Incarcerated hernia s/p repair w/ ileoostomy bag in June with poor PO intake p/w hyponatremia.      Impression:  Hypovolemic Hypotonic Hyponatremia 2/2 dehydration (Poor PO and fluid loss via ileostomy) 67F PMHx HTN, HLD, RLE DVT (on lovenox), Incarcerated hernia s/p repair w/ ileoostomy bag in June with poor PO intake p/w hyponatremia.      Impression:  Hyponatremia 2/2 dehydration (Poor PO and fluid loss via ileostomy)

## 2022-08-22 NOTE — H&P ADULT - PROBLEM SELECTOR PLAN 1
Pt with hyponatremia to 116 on admission with osmolality 298. Pt with hypovolemic hyponatremia likely 2/2 decreased PO intake. Pt with similar mental status to previous, likely chronic in nature.   - Pt s/p 1L NaCl  - goal to not overcorrect more than 6-8mEq/L in 24 hours  - BMP q4, replete electrolytes as necessary  - Pt corrected to 122 with NaCl, will hold fluids and repeat BNP q4 Pt with hyponatremia to 116 on admission with osmolality 298. Pt with hypovolemic hyponatremia likely 2/2 decreased PO intake. Pt with similar mental status to previous, likely chronic in nature.   - Pt s/p 1L NaCl  - goal to not overcorrect more than 6-8mEq/L in 24 hours  - BMP q6, replete electrolytes as necessary  - Pt corrected to 122 with NaCl, will hold fluids and repeat BNP q6 Pt with hyponatremia to 116 on admission with osmolality 298. Pt with hypovolemic hyponatremia likely 2/2 decreased PO intake and high output from ostomy. Pt with similar mental status to previous, likely chronic in nature.   - Pt s/p 1L NaCl  - goal to not overcorrect more than 6-8mEq/L in 24 hours  - BMP q6, replete electrolytes as necessary  - Pt corrected to 122 with NaCl, will hold fluids and repeat BNP q6  - GI PCR and C.diff to evaluate high ostomy output Pt with hyponatremia to 116 on admission with osmolality 298. Pt with hypovolemic hyponatremia likely 2/2 decreased PO intake and high output from ostomy. Pt with similar mental status to previous, likely chronic in nature.   - Pt s/p 1L NaCl  - goal to not overcorrect more than 6-8mEq/L in 24 hours  - BMP q6, replete electrolytes as necessary  - Pt corrected to 122 with NaCl, will hold fluids and repeat BMP q6, if continues to uptrend then likely start D5  - GI PCR and C.diff to evaluate high ostomy output Pt with hyponatremia to 116 on admission with osmolality 298. Pt with hypovolemic hyponatremia likely 2/2 decreased PO intake and high output from ostomy. Pt with similar mental status to previous, likely chronic in nature.   - Pt s/p 1L NaCl  - goal to not overcorrect more than 6-8mEq/L in 24 hours  - BMP q6, replete electrolytes as necessary  - Pt corrected to 122 with NaCl, will hold fluids and repeat BMP q6, if continues to uptrend then likely start D5  - GI PCR and C.diff to evaluate high ostomy output  - Holding lomotil and imodium for now pending stool studies

## 2022-08-22 NOTE — CONSULT NOTE ADULT - ATTENDING COMMENTS
68 yo woman with h/o incarcerated hernia s/p repair and ileostomy (6/2022), DVT on lovenox,  poor po intake since surgery, subsequent admission for hyponatremia and JAMISON 2/2 hypovolemia which responded to IVF hydration; now presenting with same picture - hyponatremia to 116, JAMISON with creatinine 2.75 and poor po intake.  Uosm elevated and Kaela low c/w hypovolemic hyponatremia.  Her history is also signif for  She is awake, alert, baseline MS, in NAD.  PE signif for no peripheral edema and slightly dry MM.    Chronic hypovolemic hyponatremia, no signif or worrisome neuro c/o or sxs, hemodynamically stable  - Agree with ivf hydration, no need to rush or bolus; can give maintenance NS @ 100cc/hr and follow BMP as well as UOP and creatinine.  I suspect both will improve with hydration.  Goal is no more than 6-8mEq increase in Na over a 24 hour period.    - she does not require MICU level of care at this time  D/W ED team
JAMISON and Hyponatremia in the setting of significant ileostomy output.  likely pre-renal with Marya<20 s/p IVF with improvement as above   will need current serum Na and osm as well as urine osm( personally ordered)  further recommendation as per course

## 2022-08-22 NOTE — CONSULT NOTE ADULT - SUBJECTIVE AND OBJECTIVE BOX
[facility] DIVISION OF KIDNEY DISEASES AND HYPERTENSION -- [phone]  -- INITIAL CONSULT NOTE  --------------------------------------------------------------------------------  HPI: (Urdo speaking, daughters translating)  67F PMHx HTN, HLD, RLE DVT (on lovenox), Incarcerated hernia s/p repair w/ ostomy bag ~June 20th presenting from PCP office for hyponatremia 119. Since placement of ostomy, pt has chronic low PO intake. Her average meal consists of half bottle of ensure, some juice, and small amount of water. The ostomy output is about 600-800cc per day. Endorsed nausea, but no vomiting, had decreased urine output recently (twice a day, low volume output). Endorses chills.    Denies fever/cough, dizziness, lethargy, abdominal pain. Per daughter, pt at baseline would not know the year, but knows month and day. At this time, pt at baseline.  Pt with generalized chronic weakness    ED course: Soft BPs, otherwise VSS. Found to be hyponatremic to 116 with Mg 1.50, phos 6.0. AG 15.     Received 1L NS, 1g MgSulf in .1LD5, CTX            PAST HISTORY  --------------------------------------------------------------------------------  PAST MEDICAL & SURGICAL HISTORY:  Obesity      Hypertension      Hyperlipidemia      H/O fracture of tibia  and fibula (L)      History of cholecystectomy  2011      H/O ventral hernia repair      S/P small bowel resection      H/O ileostomy        FAMILY HISTORY:  No known problems  denied family Hx of cervical, ovarian, uterine CA      PAST SOCIAL HISTORY:    ALLERGIES & MEDICATIONS  --------------------------------------------------------------------------------  Allergies    No Known Allergies    Intolerances      Standing Inpatient Medications  atorvastatin Oral Tab/Cap - Peds 10 milliGRAM(s) Oral daily  cefTRIAXone   IVPB 1000 milliGRAM(s) IV Intermittent every 24 hours  enoxaparin Injectable 90 milliGRAM(s) SubCutaneous every 12 hours    PRN Inpatient Medications  acetaminophen     Tablet .. 650 milliGRAM(s) Oral every 6 hours PRN  ondansetron Injectable 4 milliGRAM(s) IV Push every 8 hours PRN      REVIEW OF SYSTEMS  --------------------------------------------------------------------------------  Gen: +Chills, No fevers  Head/Eyes/Ears: No HA  Respiratory: No dyspnea, cough  CV: No chest pain  GI: No abdominal pain, diarrhea  : Decrase UOP, still urinating twice daily but with reduced volumes. No dysuria, hematuria  MSK: Generalized weakness, No  edema    Heme: No easy bruising or bleeding    All other systems were reviewed and are negative, except as noted.    VITALS/PHYSICAL EXAM  --------------------------------------------------------------------------------  T(C): 36.7 (08-22-22 @ 09:00), Max: 36.7 (08-22-22 @ 00:27)  HR: 74 (08-22-22 @ 11:18) (73 - 82)  BP: 106/76 (08-22-22 @ 11:18) (105/65 - 137/91)  RR: 15 (08-22-22 @ 09:00) (15 - 18)  SpO2: 100% (08-22-22 @ 09:00) (100% - 100%)  Wt(kg): --  Height (cm): 152.4 (08-21-22 @ 17:11)        Physical Exam:  	Gen: NAD  	HEENT: Anicteric, pale MM  	Pulm: CTA B/L  	CV: S1S2+  	Abd: Open midline wound and R. Ileostomy. Soft, +BS    	Ext: No LE edema B/L  	Neuro: Awake alert  	Skin: Warm and dry    LABS/STUDIES  --------------------------------------------------------------------------------              11.5   8.08  >-----------<  311      [08-21-22 @ 19:43]              35.0     122  |  82  |  69  ----------------------------<  88      [08-22-22 @ 06:22]  4.4   |  25  |  2.60        Ca     8.7     [08-22-22 @ 06:22]      Mg     1.50     [08-22-22 @ 06:22]      Phos  6.0     [08-22-22 @ 06:22]    TPro  8.3  /  Alb  3.2  /  TBili  0.5  /  DBili  x   /  AST  67  /  ALT  27  /  AlkPhos  230  [08-21-22 @ 19:43]    PT/INR: PT 14.8 , INR 1.27       [08-21-22 @ 19:43]  PTT: 46.9       [08-21-22 @ 19:43]    Serum Osmolality 298      [08-21-22 @ 20:03]  Calculated Serum Osmolality 262 [From 8/21 19:43 [facility]]    Creatinine Trend:  SCr 2.60 [08-22 @ 06:22]  SCr 2.75 [08-21 @ 19:43]  SCr 0.72 [08-08 @ 08:02]  SCr 0.77 [08-07 @ 06:39]  SCr 0.76 [08-06 @ 09:29]    Urinalysis - [08-21-22 @ 23:09]      Color Light Orange / Appearance Turbid / SG 1.017 / pH 6.0      Gluc Negative / Ketone Trace  / Bili Negative / Urobili 3 mg/dL       Blood Moderate / Protein 30 mg/dL / Leuk Est Large / Nitrite Negative      RBC 11 / WBC >720 / Hyaline  / Gran  / Sq Epi  / Non Sq Epi 3 / Bacteria Many    Urine Creatinine 141      [08-21-22 @ 23:09]  Urine Protein 93      [08-21-22 @ 23:09]  Urine Sodium <20      [08-21-22 @ 23:09]  Urine Urea Nitrogen 411.3      [08-21-22 @ 23:09]  Urine Potassium 82.4      [08-21-22 @ 23:09]  Urine Osmolality 382      [08-21-22 @ 23:09]    HBsAb <3.0      [07-26-22 @ 01:47]  HBsAg Nonreact      [07-26-22 @ 01:47]  HBcAb Nonreact      [07-26-22 @ 01:47]  HCV 0.13, Nonreact      [07-26-22 @ 01:47]      Tacrolimus  Cyclosporine  Sirolimus  Mycophenolate  BK PCR  CMV PCR  Parvo PCR  EBV PCR

## 2022-08-22 NOTE — H&P ADULT - NSHPPHYSICALEXAM_GEN_ALL_CORE
Physical Exam:  T(F): 98.1 (08-22), Max: 98.1 (08-22)  HR: 77 (08-22) (77 - 82)  BP: 137/91 (08-22) (109/72 - 137/91)  RR: 18 (08-22)  SpO2: 100% (08-22)  GENERAL: No acute distress, well-developed  HEAD:  Atraumatic, Normocephalic  ENT: EOMI, PERRLA, conjunctiva and sclera clear, Neck supple, No JVD, moist mucosa  CHEST/LUNG: Clear to auscultation bilaterally; No wheeze, equal breath sounds bilaterally   BACK: No spinal tenderness  HEART: Regular rate and rhythm; No murmurs, rubs, or gallops  ABDOMEN: Soft, Nontender, Nondistended; Bowel sounds present  EXTREMITIES:  No clubbing, cyanosis, or edema  PSYCH: Nl behavior, nl affect  NEUROLOGY: AAOx3, non-focal, cranial nerves intact  SKIN: Normal color, No rashes or lesions Physical Exam:  T(F): 98.1 (08-22), Max: 98.1 (08-22)  HR: 77 (08-22) (77 - 82)  BP: 137/91 (08-22) (109/72 - 137/91)  RR: 18 (08-22)  SpO2: 100% (08-22)    GENERAL: Elderly female, laying in bed, sleeping  HEAD:  Atraumatic, Normocephalic  ENT: EOMI, PERRLA, conjunctiva and sclera clear, Neck supple, No JVD, dry mucosa  CHEST/LUNG: Clear to auscultation bilaterally; No wheeze, equal breath sounds bilaterally   HEART: Regular rate and rhythm; No murmurs, rubs, or gallops  ABDOMEN: + mildly tender around ostomy site. Ostomy draining brownish output, no purulence. Ostomy intact, skin non-erythematous   EXTREMITIES:  No clubbing, cyanosis, or edema  PSYCH: Nl behavior, nl affect  NEUROLOGY: AAOx3, non-focal, cranial nerves intact  SKIN: Normal color, No rashes or lesions Physical Exam:  T(F): 98.1 (08-22), Max: 98.1 (08-22)  HR: 77 (08-22) (77 - 82)  BP: 137/91 (08-22) (109/72 - 137/91)  RR: 18 (08-22)  SpO2: 100% (08-22)    GENERAL: Elderly female, laying in bed, sleeping  HEAD:  Atraumatic, Normocephalic  EYES: EOMI, PERRL, conjunctiva and sclera clear  ENT: Neck supple, No JVD, dry oral mucosa  CHEST/LUNG: Clear to auscultation bilaterally; No wheeze, equal breath sounds bilaterally   HEART: Regular rate and rhythm; No murmurs, rubs, or gallops  ABDOMEN: + mildly tender around ostomy site. Ostomy draining brownish output, no purulence. Ostomy intact, skin non-erythematous   EXTREMITIES: +DP/PT/Radial pulses, No clubbing, cyanosis, or edema  PSYCH: Nl behavior, nl affect  NEUROLOGY: AAOx3, non-focal, cranial nerves intact  SKIN: Normal color, No rashes or lesions

## 2022-08-22 NOTE — H&P ADULT - PROBLEM SELECTOR PLAN 2
Pt with SCr 2.74 with BUN 70 on admission, increase from SCr 0.72 Aug 8. Likely pre-renal in setting of decreased PO intake. With decreasing urine output, should exclude post-renal.   - Noted improvement after 1L IVF  - Obtain bladder scan to r/o retention   - Obtain urine Na, osmolality   - Consider renal consult if no improvement

## 2022-08-22 NOTE — CONSULT NOTE ADULT - SUBJECTIVE AND OBJECTIVE BOX
Mahesh is a Divehi-speaking 67 yof hx of HTN, HLD, lap kevan, SBO w/ ileostomy p/w hyponatremia in the ***     Recently admitted in July for SBO 2/2 to R ventral hernia. Diagnostic lap converted to open for enterotomy. C/b fever and tachycardia. Imaging showed SBO requiring re-entrance for small bowel resection and ileostomy.     Readmitted in late July for decreased PO intake, decreased UOP, increased ileostomy output. Found to have JAMISON and hyperkalemia to 8 requiring SICU placement. Shiley was placed for CRRT/HD but was never initiated d/t rapid correction of hyperkalemia 2/2 fluids i/s/o dehydration. Course c/b hypokalemia 2/2 overcorrection and also by UTI tx w/ CTX.     Also admitted in July for vaginal bleeding and was unfounded w/ stable hgb.        CHIEF COMPLAINT: hyponatremia    HPI:  Malay-speaking 67F PMH of incarcerated hernia s/p repair w/ ostomy bag, HTN, HLD presenting from PCP office for hyponatremia 119. MICU consulted for hyponatremia. History obtained from daughter at bedside. Per daughter, patient has chronic low PO intake since ostomy bag placement. Similar ostomy output of about 600-800cc per day. Endorsed nausea, but no vomiting, had decreased urine output recently. Denies fever/cough, dizziness, lethargy. AOx3 currently and at baseline. Does not take any diuretics.    Of note, had admission - for decreased PO intake, decreased UOP, increased ileostomy output. Found to have JAMISON and hyperkalemia to 8 requiring SICU placement. Shiley was placed for CRRT/HD but was never initiated d/t rapid correction of hyperkalemia 2/2 fluids i/s/o dehydration. Course c/b hypokalemia 2/2 overcorrection and also by UTI tx w/ CTX. UCx positive for e coli and klebsiella.            PAST MEDICAL & SURGICAL HISTORY:  Obesity      Hypertension      Hyperlipidemia      H/O fracture of tibia  and fibula (L)      History of cholecystectomy        H/O ventral hernia repair      S/P small bowel resection      H/O ileostomy    FAMILY HISTORY:  No known problems  denied family Hx of cervical, ovarian, uterine CA    Allergies    No Known Allergies    Intolerances    HOME MEDICATIONS: tylenol, atorvastatin, diphenylate atropine, loperamide, lovenox    REVIEW OF SYSTEMS:    CONSTITUTIONAL: No weakness, fevers or chills  EYES/ENT: No visual changes;  No vertigo or throat pain   NECK: No pain or stiffness  RESPIRATORY: No cough, wheezing, hemoptysis; No shortness of breath  CARDIOVASCULAR: No chest pain or palpitations  GASTROINTESTINAL: No abdominal or epigastric pain. + nausea, no vomiting, or hematemesis; No diarrhea or constipation. No melena or hematochezia.  GENITOURINARY: No dysuria, frequency or hematuria  NEUROLOGICAL: No numbness or weakness  SKIN: No itching, rashes      OBJECTIVE:  ICU Vital Signs Last 24 Hrs  T(C): 36.7 (22 Aug 2022 00:27), Max: 36.7 (22 Aug 2022 00:27)  T(F): 98.1 (22 Aug 2022 00:27), Max: 98.1 (22 Aug 2022 00:27)  HR: 78 (22 Aug 2022 00:27) (78 - 82)  BP: 109/72 (22 Aug 2022 00:27) (109/72 - 116/47)  BP(mean): --  ABP: --  ABP(mean): --  RR: 18 (22 Aug 2022 00:27) (16 - 18)  SpO2: 100% (22 Aug 2022 00:27) (100% - 100%)    O2 Parameters below as of 21 Aug 2022 17:11  Patient On (Oxygen Delivery Method): room air    CAPILLARY BLOOD GLUCOSE      PHYSICAL EXAM:  GENERAL: NAD, well-groomed, well-developed  HEAD:  Atraumatic, Normocephalic  EYES: EOMI, conjunctiva and sclera clear  ENMT: No tonsillar erythema, exudates, or enlargement; Moist mucous membranes  NECK: Supple  HEART: Regular rate and rhythm; No murmurs, rubs, or gallops  RESPIRATORY: CTA B/L, No W/R/R  ABDOMEN: Soft, mildly tender to palpation, Nondistended; Bowel sounds present, +ostomy bag  NEUROLOGY: A&Ox3, nonfocal, moving all extremities  EXTREMITIES:  2+ Peripheral Pulses, No clubbing, cyanosis, or edema  SKIN: warm, dry, normal color, no rash or abnormal lesions        HOSPITAL MEDICATIONS:  MEDICATIONS  (STANDING):    MEDICATIONS  (PRN):      LABS:                        11.5   8.08  )-----------( 311      ( 21 Aug 2022 19:43 )             35.0     08-21    x   |  x   |  x   ----------------------------<  x   5.6<H>   |  x   |  x     Ca    9.9      21 Aug 2022 19:43  Phos  6.4     08-  Mg     1.60     08-    TPro  8.3  /  Alb  3.2<L>  /  TBili  0.5  /  DBili  x   /  AST  67<H>  /  ALT  27  /  AlkPhos  230<H>  08-    PT/INR - ( 21 Aug 2022 19:43 )   PT: 14.8 sec;   INR: 1.27 ratio         PTT - ( 21 Aug 2022 19:43 )  PTT:46.9 sec  Urinalysis Basic - ( 21 Aug 2022 23:09 )    Color: Light Orange / Appearance: Turbid / S.017 / pH: x  Gluc: x / Ketone: Trace  / Bili: Negative / Urobili: 3 mg/dL   Blood: x / Protein: 30 mg/dL / Nitrite: Negative   Leuk Esterase: Large / RBC: 11 /HPF / WBC >720 /HPF   Sq Epi: x / Non Sq Epi: 3 /HPF / Bacteria: Many        Venous Blood Gas:   @ 19:43  7.39/49/36/30/54.7  VBG Lactate: 2.3

## 2022-08-22 NOTE — PHYSICAL THERAPY INITIAL EVALUATION ADULT - ADDITIONAL COMMENTS
Information obtained via Daughter at bedside. Pt lives in a house with 3-4 exterior steps to enter. Bedroom located on first floor. Pt had ex-lap in June, 2022 and since has declined functionally. New baseline: Pt ambulates short distances (bed to chair) with rolling walker and assist. Pt was receiving home PT 2x/wk.

## 2022-08-22 NOTE — H&P ADULT - NSHPREVIEWOFSYSTEMS_GEN_ALL_CORE
REVIEW OF SYSTEMS:  CONSTITUTIONAL: No weakness, fevers or chills  EYES/ENT: No visual changes;  No dysphagia  NECK: No pain or stiffness  RESPIRATORY: No cough, wheezing, hemoptysis; No shortness of breath  CARDIOVASCULAR: No chest pain or palpitations; No lower extremity edema  GASTROINTESTINAL: No abdominal or epigastric pain. No nausea, vomiting, or hematemesis; No diarrhea or constipation. No melena or hematochezia.  BACK: No back pain  GENITOURINARY: No dysuria, frequency or hematuria  NEUROLOGICAL: No numbness or weakness  SKIN: No itching, burning, rashes, or lesions   All other review of systems is negative unless indicated above.    Labs: Personally reviewed REVIEW OF SYSTEMS:  CONSTITUTIONAL: +weakness, No fevers or chills  EYES/ENT: No visual changes;  No dysphagia  NECK: No pain or stiffness  RESPIRATORY: No cough, wheezing, hemoptysis; No shortness of breath  CARDIOVASCULAR: No chest pain or palpitations; No lower extremity edema  GASTROINTESTINAL: +Nausea, no vomiting. No abdominal or epigastric pain. No diarrhea or constipation. No melena or hematochezia.  BACK: No back pain  GENITOURINARY: No dysuria, frequency or hematuria  NEUROLOGICAL: No numbness or weakness  SKIN: No itching, burning, rashes, or lesions   All other review of systems is negative unless indicated above. REVIEW OF SYSTEMS:  CONSTITUTIONAL: +weakness, No fevers or chills  EYES: No visual changes or eye discharge  ENT: No rhinorrhea or sore throat  NECK: No pain or stiffness  RESPIRATORY: No cough, wheezing, hemoptysis; No shortness of breath  CARDIOVASCULAR: No chest pain or palpitations; No lower extremity edema  GASTROINTESTINAL: +Nausea, no vomiting. No abdominal or epigastric pain. No diarrhea or constipation. No melena or hematochezia.  BACK: No back pain, no flank pain  GENITOURINARY: No dysuria, frequency or hematuria  NEUROLOGICAL: No numbness or weakness  SKIN: No itching, burning, rashes, or lesions   All other review of systems is negative unless indicated above.

## 2022-08-22 NOTE — H&P ADULT - ASSESSMENT
67 Frisian speaking female PMH of incarcerated hernia s/p repair w/ ostomy bag, HTN, HLD presenting from PCP office for hyponatremia 119, found to also have an JAMISON, admitted for further management

## 2022-08-22 NOTE — H&P ADULT - NSHPSOCIALHISTORY_GEN_ALL_CORE
Pt lives with daughter. Denies smoking, EtOH use, drug use. Pt lives with daughter. Denies smoking, EtOH use, drug use. Ambulates with walker/assistance.

## 2022-08-22 NOTE — CONSULT NOTE ADULT - PROBLEM SELECTOR RECOMMENDATION 9
Hyponatremia ISO poor oral intake 2/2 nausea s/p hernia repair with ostomy    Recommendations pending attending attestation  -Encourage PO intake (SS eval, Zofran PRN if QTc not concerning)  -Salt tabs 1g PO QD  -BMP BID Hyponatremia ISO poor oral intake 2/2 nausea s/p hernia repair with ostomy    Recommendations pending attending attestation  -Encourage PO intake (SS eval, Zofran PRN if QTc not concerning)  -Stat BMP and Osmolality  -Further recs to follow Hyponatremia ISO poor oral intake 2/2 nausea s/p hernia repair with ostomy    116-->122 over ~6hours     Recommendations pending attending attestation  -Encourage PO intake (SS eval, Zofran PRN if QTc not concerning)  -Stat BMP and Osmolality  -Further recs to follow

## 2022-08-22 NOTE — ED ADULT NURSE REASSESSMENT NOTE - NS ED NURSE REASSESS COMMENT FT1
Pt critical lab sodium 118, MD at bedside, aware of critical, pt maintained on cardiac monitor, NSR noted, will continue to monitor
Report received from night RNPrince. A&Jamie3 and bedbound at baseline. Swedish speaking. Family at bedside to translate. Patient speaking in primary language. Respirations even and unlabored. Denies CP, SOB, nausea, vomiting, fever or chills. Right sided colostomy bag noted. 250 mL brown and liquidy stool emptied from colostomy. NSR on bedside cardiac monitor. Bed in lowest position, wheels locked, side rails up, safety maintained. Awaiting further orders.

## 2022-08-22 NOTE — H&P ADULT - HISTORY OF PRESENT ILLNESS
Patient seen and evaluated at bedside  Chief Complaint:  HPI:              LINES:    Cardiovascular Diagnostic Testing:    ECG: Personally reviewed:    Echo: Personally reviewed:    Stress Testing:    Cath:    Imaging:    CXR: Personally reviewed    Labs: Personally reviewed                        11.5   8.08  )-----------( 311      ( 21 Aug 2022 19:43 )             35.0     08-21    x   |  x   |  x   ----------------------------<  x   5.6<H>   |  x   |  x     Ca    9.9      21 Aug 2022 19:43  Phos  6.4     08-21  Mg     1.60     08-21    TPro  8.3  /  Alb  3.2<L>  /  TBili  0.5  /  DBili  x   /  AST  67<H>  /  ALT  27  /  AlkPhos  230<H>  08-21    PT/INR - ( 21 Aug 2022 19:43 )   PT: 14.8 sec;   INR: 1.27 ratio         PTT - ( 21 Aug 2022 19:43 )  PTT:46.9 sec                   HPI:  67 Frisian speaking female PMH of incarcerated hernia s/p repair w/ ostomy bag, HTN, HLD presenting from PCP office for hyponatremia 119. History obtained from daughter at bedside. Since placement of ostomy, pt has chronic low PO intake. Similar ostomy output of about 600-800cc per day. Endorsed nausea, but no vomiting, had decreased urine output recently. Denies fever/cough, dizziness, lethargy. AOx3 currently and at baseline. Does not take any diuretics.    Of note, had admission July 25-29 for decreased PO intake, decreased UOP, increased ileostomy output. Found to have JAMISON and hyperkalemia to 8 requiring SICU placement. Shiley was placed for CRRT/HD but was never initiated d/t rapid correction of hyperkalemia 2/2 fluids i/s/o dehydration. Course c/b hypokalemia 2/2 overcorrection and also by UTI tx w/ CTX. UCx positive for e coli and klebsiella.                       HPI:  67 Maori speaking female PMH of incarcerated hernia s/p repair w/ ostomy bag, HTN, HLD, RLE DVT on lovenox, presenting from PCP office for hyponatremia 119. History obtained from daughter at bedside. Since placement of ostomy, pt has chronic low PO intake. Her average meal consists of half bottle of ensure, some juice, and small amount of water. The ostomy output is about 600-800cc per day. Endorsed nausea, but no vomiting, had decreased urine output recently. Denies fever/cough, dizziness, lethargy, abdominal pain. Per daughter, pt at baseline would not know the year, but knows month and day. At this time, pt  at baseline.  Pt with generalized chronic weakness.      Pt was admitted July 25-29 for decreased PO intake, decreased UOP, increased ileostomy output. Found to have JAMISON and hyperkalemia to 8. CRRT/HD but was never initiated d/t rapid correction of hyperkalemia 2/2 fluids i/s/o dehydration. Course c/b hypokalemia 2/2 overcorrection and also by UTI tx w/ CTX. UCx positive for e coli and klebsiella.                 HPI:  67 Kiswahili speaking female PMH of incarcerated hernia s/p repair w/ ostomy bag, HTN, HLD, RLE DVT on lovenox, presenting from PCP office for hyponatremia 119. History obtained from daughter at bedside. Since placement of ostomy, pt has chronic low PO intake. Her average meal consists of half bottle of ensure, some juice, and small amount of water. The ostomy output is about 600-800cc per day. Endorsed nausea, but no vomiting, had decreased urine output recently. Denies fever/cough, dizziness, lethargy, abdominal pain. Per daughter, pt at baseline would not know the year, but knows month and day. At this time, pt  at baseline.  Pt with generalized chronic weakness.      Pt was admitted July 25-29 for decreased PO intake, decreased UOP, increased ileostomy output. Found to have JAMISON and hyperkalemia to 8. CRRT/HD but was never initiated d/t rapid correction of hyperkalemia 2/2 fluids i/s/o dehydration. Course c/b hypokalemia 2/2 overcorrection and also by UTI tx w/ CTX. UCx positive for e coli and klebsiella.

## 2022-08-22 NOTE — PROGRESS NOTE ADULT - SUBJECTIVE AND OBJECTIVE BOX
PROGRESS NOTE:     Patient is a 67y old  Female who presents with a chief complaint of Hyponatremia (22 Aug 2022 06:33)      SUBJECTIVE / OVERNIGHT EVENTS: Feels weak, poor appetite. Nausea improved.     ADDITIONAL REVIEW OF SYSTEMS:    MEDICATIONS  (STANDING):  atorvastatin Oral Tab/Cap - Peds 10 milliGRAM(s) Oral daily  cefTRIAXone   IVPB 1000 milliGRAM(s) IV Intermittent every 24 hours  enoxaparin Injectable 90 milliGRAM(s) SubCutaneous every 12 hours    MEDICATIONS  (PRN):  acetaminophen     Tablet .. 650 milliGRAM(s) Oral every 6 hours PRN Temp greater or equal to 38C (100.4F), Mild Pain (1 - 3)  ondansetron Injectable 4 milliGRAM(s) IV Push every 8 hours PRN Nausea and/or Vomiting      CAPILLARY BLOOD GLUCOSE        I&O's Summary      PHYSICAL EXAM:  Vital Signs Last 24 Hrs  T(C): 36.7 (22 Aug 2022 09:00), Max: 36.7 (22 Aug 2022 00:27)  T(F): 98.1 (22 Aug 2022 09:00), Max: 98.1 (22 Aug 2022 00:27)  HR: 74 (22 Aug 2022 11:18) (73 - 82)  BP: 106/76 (22 Aug 2022 11:18) (105/65 - 137/91)  BP(mean): --  RR: 15 (22 Aug 2022 09:00) (15 - 18)  SpO2: 100% (22 Aug 2022 09:00) (100% - 100%)    Parameters below as of 22 Aug 2022 09:00  Patient On (Oxygen Delivery Method): room air      GENERAL: Elderly female, NAD  EYES: EOMI, PERRL, conjunctiva and sclera clear  ENT: Neck supple, No JVD, dry oral mucosa  CHEST/LUNG: Clear to auscultation bilaterally; No wheeze, equal breath sounds bilaterally   HEART: Regular rate and rhythm; No murmurs, rubs, or gallops  ABDOMEN: + mildly tender around ostomy site. Ostomy draining brownish output, no purulence. Ostomy intact, skin non-erythematous   EXTREMITIES: +DP/PT/Radial pulses, No clubbing, cyanosis, or edema  PSYCH: Nl behavior, nl affect  NEUROLOGY: AAOx3, non-focal, cranial nerves intact  SKIN: Normal color, No rashes or lesions    LABS:                        11.5   8.08  )-----------( 311      ( 21 Aug 2022 19:43 )             35.0         122<L>  |  82<L>  |  69<H>  ----------------------------<  88  4.4   |  25  |  2.60<H>    Ca    8.7      22 Aug 2022 06:22  Phos  6.0       Mg     1.50         TPro  8.3  /  Alb  3.2<L>  /  TBili  0.5  /  DBili  x   /  AST  67<H>  /  ALT  27  /  AlkPhos  230<H>      PT/INR - ( 21 Aug 2022 19:43 )   PT: 14.8 sec;   INR: 1.27 ratio         PTT - ( 21 Aug 2022 19:43 )  PTT:46.9 sec      Urinalysis Basic - ( 21 Aug 2022 23:09 )    Color: Light Orange / Appearance: Turbid / S.017 / pH: x  Gluc: x / Ketone: Trace  / Bili: Negative / Urobili: 3 mg/dL   Blood: x / Protein: 30 mg/dL / Nitrite: Negative   Leuk Esterase: Large / RBC: 11 /HPF / WBC >720 /HPF   Sq Epi: x / Non Sq Epi: 3 /HPF / Bacteria: Many          RADIOLOGY & ADDITIONAL TESTS:  Results Reviewed:   Imaging Personally Reviewed:  Electrocardiogram Personally Reviewed:    COORDINATION OF CARE:  Care Discussed with Consultants/Other Providers [Y/N]:  Prior or Outpatient Records Reviewed [Y/N]:

## 2022-08-22 NOTE — PHYSICAL THERAPY INITIAL EVALUATION ADULT - PERTINENT HX OF CURRENT PROBLEM, REHAB EVAL
Pt is a 67 year old female presenting from PCP office for hyponatremia. Found to also have an JAMISON, and UTI, admitted for further management. PMH listed below.

## 2022-08-22 NOTE — PROGRESS NOTE ADULT - ASSESSMENT
67 Tajik speaking female PMH of incarcerated hernia s/p repair w/ ostomy bag, HTN, HLD presenting from PCP office for hyponatremia 119, found to also have an JAMISON, admitted for further management

## 2022-08-22 NOTE — CONSULT NOTE ADULT - ASSESSMENT
67 Bengali-speaking F PMH of incarcerated hernia s/p repair w/ ostomy bag, HTN, HLD presenting from PCP office for hyponatremia 119, found to have Na 116 in ED. MICU consulted for hyponatremia.    #Hyponatremia  -Pt presenting with Na 116 and JAMISON (Cr 2.75, baseline about 0.7). Urine studies consistent with pre-renal JAMISON, and hypovolemic hyponatremia iso low PO intake  -Currently at baseline mental status, AOx3, no confusion, lethargy, dizziness, seizures noted  -Recommend fluid repletion, can trial 1L NS   -Goal to increase Na 6-8mEq/L in 24 hours  -BMP q4, replete electrolytes as necessary  -UA also shows +LE and bacteria, recommend ceftriaxone  -Not a MICU candidate at this time    Andra Peter  MICU consult resident

## 2022-08-22 NOTE — H&P ADULT - PROBLEM SELECTOR PLAN 4
Pt with chronic decreased PO intake, worsening chronic generalized weakness  - Nutrition consult  - Swallow eval for best cosistency diet  - PT consult Pt with chronic decreased PO intake, worsening chronic generalized weakness  - Nutrition consult  - Swallow eval for best consistency diet  - PT consult

## 2022-08-23 DIAGNOSIS — R19.7 DIARRHEA, UNSPECIFIED: ICD-10-CM

## 2022-08-23 LAB
ANION GAP SERPL CALC-SCNC: 11 MMOL/L — SIGNIFICANT CHANGE UP (ref 7–14)
ANION GAP SERPL CALC-SCNC: 12 MMOL/L — SIGNIFICANT CHANGE UP (ref 7–14)
ANION GAP SERPL CALC-SCNC: 16 MMOL/L — HIGH (ref 7–14)
BUN SERPL-MCNC: 54 MG/DL — HIGH (ref 7–23)
BUN SERPL-MCNC: 65 MG/DL — HIGH (ref 7–23)
BUN SERPL-MCNC: 68 MG/DL — HIGH (ref 7–23)
CALCIUM SERPL-MCNC: 8.9 MG/DL — SIGNIFICANT CHANGE UP (ref 8.4–10.5)
CALCIUM SERPL-MCNC: 9 MG/DL — SIGNIFICANT CHANGE UP (ref 8.4–10.5)
CALCIUM SERPL-MCNC: 9.4 MG/DL — SIGNIFICANT CHANGE UP (ref 8.4–10.5)
CHLORIDE SERPL-SCNC: 80 MMOL/L — LOW (ref 98–107)
CHLORIDE SERPL-SCNC: 80 MMOL/L — LOW (ref 98–107)
CHLORIDE SERPL-SCNC: 89 MMOL/L — LOW (ref 98–107)
CO2 SERPL-SCNC: 24 MMOL/L — SIGNIFICANT CHANGE UP (ref 22–31)
CO2 SERPL-SCNC: 25 MMOL/L — SIGNIFICANT CHANGE UP (ref 22–31)
CO2 SERPL-SCNC: 27 MMOL/L — SIGNIFICANT CHANGE UP (ref 22–31)
CREAT SERPL-MCNC: 1.45 MG/DL — HIGH (ref 0.5–1.3)
CREAT SERPL-MCNC: 1.85 MG/DL — HIGH (ref 0.5–1.3)
CREAT SERPL-MCNC: 2.07 MG/DL — HIGH (ref 0.5–1.3)
E COLI SXT SPEC: DETECTED
EGFR: 26 ML/MIN/1.73M2 — LOW
EGFR: 30 ML/MIN/1.73M2 — LOW
EGFR: 40 ML/MIN/1.73M2 — LOW
GI PCR PANEL: DETECTED
GLUCOSE SERPL-MCNC: 126 MG/DL — HIGH (ref 70–99)
GLUCOSE SERPL-MCNC: 90 MG/DL — SIGNIFICANT CHANGE UP (ref 70–99)
GLUCOSE SERPL-MCNC: 98 MG/DL — SIGNIFICANT CHANGE UP (ref 70–99)
HCT VFR BLD CALC: 34.5 % — SIGNIFICANT CHANGE UP (ref 34.5–45)
HGB BLD-MCNC: 11.2 G/DL — LOW (ref 11.5–15.5)
MAGNESIUM SERPL-MCNC: 1.8 MG/DL — SIGNIFICANT CHANGE UP (ref 1.6–2.6)
MAGNESIUM SERPL-MCNC: 1.9 MG/DL — SIGNIFICANT CHANGE UP (ref 1.6–2.6)
MAGNESIUM SERPL-MCNC: 2 MG/DL — SIGNIFICANT CHANGE UP (ref 1.6–2.6)
MCHC RBC-ENTMCNC: 26.2 PG — LOW (ref 27–34)
MCHC RBC-ENTMCNC: 32.5 GM/DL — SIGNIFICANT CHANGE UP (ref 32–36)
MCV RBC AUTO: 80.6 FL — SIGNIFICANT CHANGE UP (ref 80–100)
NRBC # BLD: 0 /100 WBCS — SIGNIFICANT CHANGE UP (ref 0–0)
NRBC # FLD: 0 K/UL — SIGNIFICANT CHANGE UP (ref 0–0)
PHOSPHATE SERPL-MCNC: 4 MG/DL — SIGNIFICANT CHANGE UP (ref 2.5–4.5)
PHOSPHATE SERPL-MCNC: 5 MG/DL — HIGH (ref 2.5–4.5)
PHOSPHATE SERPL-MCNC: 5.9 MG/DL — HIGH (ref 2.5–4.5)
PLATELET # BLD AUTO: 254 K/UL — SIGNIFICANT CHANGE UP (ref 150–400)
POTASSIUM SERPL-MCNC: 3.8 MMOL/L — SIGNIFICANT CHANGE UP (ref 3.5–5.3)
POTASSIUM SERPL-MCNC: 3.8 MMOL/L — SIGNIFICANT CHANGE UP (ref 3.5–5.3)
POTASSIUM SERPL-MCNC: 4.4 MMOL/L — SIGNIFICANT CHANGE UP (ref 3.5–5.3)
POTASSIUM SERPL-SCNC: 3.8 MMOL/L — SIGNIFICANT CHANGE UP (ref 3.5–5.3)
POTASSIUM SERPL-SCNC: 3.8 MMOL/L — SIGNIFICANT CHANGE UP (ref 3.5–5.3)
POTASSIUM SERPL-SCNC: 4.4 MMOL/L — SIGNIFICANT CHANGE UP (ref 3.5–5.3)
RBC # BLD: 4.28 M/UL — SIGNIFICANT CHANGE UP (ref 3.8–5.2)
RBC # FLD: 17.2 % — HIGH (ref 10.3–14.5)
SODIUM SERPL-SCNC: 119 MMOL/L — CRITICAL LOW (ref 135–145)
SODIUM SERPL-SCNC: 120 MMOL/L — CRITICAL LOW (ref 135–145)
SODIUM SERPL-SCNC: 125 MMOL/L — LOW (ref 135–145)
WBC # BLD: 5.7 K/UL — SIGNIFICANT CHANGE UP (ref 3.8–10.5)
WBC # FLD AUTO: 5.7 K/UL — SIGNIFICANT CHANGE UP (ref 3.8–10.5)

## 2022-08-23 PROCEDURE — 99223 1ST HOSP IP/OBS HIGH 75: CPT | Mod: GC,24

## 2022-08-23 PROCEDURE — 99233 SBSQ HOSP IP/OBS HIGH 50: CPT | Mod: GC

## 2022-08-23 PROCEDURE — 99233 SBSQ HOSP IP/OBS HIGH 50: CPT

## 2022-08-23 RX ORDER — SODIUM CHLORIDE 9 MG/ML
1000 INJECTION INTRAMUSCULAR; INTRAVENOUS; SUBCUTANEOUS
Refills: 0 | Status: DISCONTINUED | OUTPATIENT
Start: 2022-08-23 | End: 2022-08-26

## 2022-08-23 RX ORDER — SODIUM CHLORIDE 9 MG/ML
1 INJECTION INTRAMUSCULAR; INTRAVENOUS; SUBCUTANEOUS THREE TIMES A DAY
Refills: 0 | Status: DISCONTINUED | OUTPATIENT
Start: 2022-08-23 | End: 2022-08-25

## 2022-08-23 RX ORDER — MIRTAZAPINE 45 MG/1
15 TABLET, ORALLY DISINTEGRATING ORAL DAILY
Refills: 0 | Status: DISCONTINUED | OUTPATIENT
Start: 2022-08-23 | End: 2022-08-26

## 2022-08-23 RX ORDER — SODIUM CHLORIDE 9 MG/ML
1000 INJECTION INTRAMUSCULAR; INTRAVENOUS; SUBCUTANEOUS
Refills: 0 | Status: DISCONTINUED | OUTPATIENT
Start: 2022-08-23 | End: 2022-08-23

## 2022-08-23 RX ADMIN — APIXABAN 5 MILLIGRAM(S): 2.5 TABLET, FILM COATED ORAL at 18:04

## 2022-08-23 RX ADMIN — APIXABAN 5 MILLIGRAM(S): 2.5 TABLET, FILM COATED ORAL at 06:18

## 2022-08-23 RX ADMIN — SODIUM CHLORIDE 100 MILLILITER(S): 9 INJECTION INTRAMUSCULAR; INTRAVENOUS; SUBCUTANEOUS at 06:16

## 2022-08-23 RX ADMIN — MIRTAZAPINE 15 MILLIGRAM(S): 45 TABLET, ORALLY DISINTEGRATING ORAL at 13:55

## 2022-08-23 RX ADMIN — SODIUM CHLORIDE 100 MILLILITER(S): 9 INJECTION INTRAMUSCULAR; INTRAVENOUS; SUBCUTANEOUS at 21:24

## 2022-08-23 RX ADMIN — SODIUM CHLORIDE 1 GRAM(S): 9 INJECTION INTRAMUSCULAR; INTRAVENOUS; SUBCUTANEOUS at 13:56

## 2022-08-23 RX ADMIN — ATORVASTATIN CALCIUM 10 MILLIGRAM(S): 80 TABLET, FILM COATED ORAL at 13:56

## 2022-08-23 RX ADMIN — SODIUM CHLORIDE 1 GRAM(S): 9 INJECTION INTRAMUSCULAR; INTRAVENOUS; SUBCUTANEOUS at 21:31

## 2022-08-23 RX ADMIN — CEFTRIAXONE 100 MILLIGRAM(S): 500 INJECTION, POWDER, FOR SOLUTION INTRAMUSCULAR; INTRAVENOUS at 11:44

## 2022-08-23 RX ADMIN — SODIUM CHLORIDE 100 MILLILITER(S): 9 INJECTION INTRAMUSCULAR; INTRAVENOUS; SUBCUTANEOUS at 14:39

## 2022-08-23 NOTE — PROGRESS NOTE ADULT - SUBJECTIVE AND OBJECTIVE BOX
[facility] DIVISION OF KIDNEY DISEASES AND HYPERTENSION   FOLLOW UP NOTE    --------------------------------------------------------------------------------  Chief Complaint:    24 hour events/subjective: Pt. was seen and examined today. Continuing to have poor oral intake, especially of solids. Tolerating fluids well.      PAST HISTORY  --------------------------------------------------------------------------------  No significant changes to PMH, PSH, FHx, SHx, unless otherwise noted    ALLERGIES & MEDICATIONS  --------------------------------------------------------------------------------  Allergies    No Known Allergies    Intolerances      Standing Inpatient Medications  apixaban 5 milliGRAM(s) Oral every 12 hours  atorvastatin Oral Tab/Cap - Peds 10 milliGRAM(s) Oral daily  cefTRIAXone   IVPB 1000 milliGRAM(s) IV Intermittent every 24 hours  sodium chloride 0.9%. 1000 milliLiter(s) IV Continuous <Continuous>    PRN Inpatient Medications  acetaminophen     Tablet .. 650 milliGRAM(s) Oral every 6 hours PRN  ondansetron Injectable 4 milliGRAM(s) IV Push every 8 hours PRN      REVIEW OF SYSTEMS  --------------------------------------------------------------------------------  Gen: No fevers/chills  Head/Eyes/Ears: No HA   Respiratory: No dyspnea, cough  CV: No chest pain  GI: No abdominal pain, diarrhea  : No dysuria, hematuria  MSK: No  edema  Skin: No rashes  Heme: No easy bruising or bleeding  Neuro: per daughter, often disoriented upon awakening but reorients easily within minutes. New since surgery in june.    All other systems were reviewed and are negative, except as noted.    VITALS/PHYSICAL EXAM  --------------------------------------------------------------------------------  T(C): 36.8 (08-23-22 @ 06:18), Max: 36.8 (08-23-22 @ 06:18)  HR: 77 (08-23-22 @ 06:18) (71 - 86)  BP: 124/87 (08-23-22 @ 06:18) (102/61 - 128/98)  RR: 17 (08-23-22 @ 06:18) (15 - 18)  SpO2: 98% (08-23-22 @ 06:18) (98% - 100%)  Wt(kg): --  Height (cm): 152.4 (08-21-22 @ 17:11)  Weight (kg): 88.9 (08-23-22 @ 06:18)  BMI (kg/m2): 38.3 (08-23-22 @ 06:18)  BSA (m2): 1.85 (08-23-22 @ 06:18)      08-22-22 @ 07:01  -  08-23-22 @ 07:00  --------------------------------------------------------  IN: 0 mL / OUT: 250 mL / NET: -250 mL        Physical Exam:  	Gen: NAD  	HEENT: Anicteric  	Pulm: CTA B/L  	CV: S1S2+  	Abd: Soft, +BS   	Ext: No LE edema B/L  	Neuro: Found asleep, easily aroused to voice.  	Skin: Warm and dry      LABS/STUDIES  --------------------------------------------------------------------------------              11.2   5.70  >-----------<  254      [08-23-22 @ 03:27]              34.5     120  |  80  |  68  ----------------------------<  90      [08-23-22 @ 03:27]  4.4   |  24  |  2.07        Ca     9.4     [08-23-22 @ 03:27]      Mg     2.00     [08-23-22 @ 03:27]      Phos  5.9     [08-23-22 @ 03:27]    TPro  8.3  /  Alb  3.2  /  TBili  0.5  /  DBili  x   /  AST  67  /  ALT  27  /  AlkPhos  230  [08-21-22 @ 19:43]    PT/INR: PT 14.8 , INR 1.27       [08-21-22 @ 19:43]  PTT: 46.9       [08-21-22 @ 19:43]    Serum Osmolality 278      [08-22-22 @ 18:04]    Creatinine Trend:  SCr 2.07 [08-23 @ 03:27]  SCr 2.46 [08-22 @ 18:04]  SCr 2.60 [08-22 @ 06:22]  SCr 2.75 [08-21 @ 19:43]  SCr 0.72 [08-08 @ 08:02]    Urinalysis - [08-21-22 @ 23:09]      Color Light Orange / Appearance Turbid / SG 1.017 / pH 6.0      Gluc Negative / Ketone Trace  / Bili Negative / Urobili 3 mg/dL       Blood Moderate / Protein 30 mg/dL / Leuk Est Large / Nitrite Negative      RBC 11 / WBC >720 / Hyaline  / Gran  / Sq Epi  / Non Sq Epi 3 / Bacteria Many    Urine Creatinine 141      [08-21-22 @ 23:09]  Urine Protein 93      [08-21-22 @ 23:09]  Urine Sodium <20      [08-22-22 @ 14:00]  Urine Urea Nitrogen 411.3      [08-21-22 @ 23:09]  Urine Potassium 82.4      [08-21-22 @ 23:09]  Urine Osmolality 413      [08-22-22 @ 14:00]    HBsAb <3.0      [07-26-22 @ 01:47]  HBsAg Nonreact      [07-26-22 @ 01:47]  HBcAb Nonreact      [07-26-22 @ 01:47]  HCV 0.13, Nonreact      [07-26-22 @ 01:47]

## 2022-08-23 NOTE — PATIENT PROFILE ADULT - FALL HARM RISK - HARM RISK INTERVENTIONS
Assistance with ambulation/Assistance OOB with selected safe patient handling equipment/Communicate Risk of Fall with Harm to all staff/Discuss with provider need for PT consult/Monitor for mental status changes/Monitor gait and stability/Reinforce activity limits and safety measures with patient and family/Reorient to person, place and time as needed/Review medications for side effects contributing to fall risk/Sit up slowly, dangle for a short time, stand at bedside before walking/Tailored Fall Risk Interventions/Toileting schedule using arm’s reach rule for commode and bathroom/Use of alarms - bed, chair and/or voice tab/Visual Cue: Yellow wristband and red socks/Bed in lowest position, wheels locked, appropriate side rails in place/Call bell, personal items and telephone in reach/Instruct patient to call for assistance before getting out of bed or chair/Non-slip footwear when patient is out of bed/[place] to call system/Physically safe environment - no spills, clutter or unnecessary equipment/Purposeful Proactive Rounding/Room/bathroom lighting operational, light cord in reach

## 2022-08-23 NOTE — SWALLOW BEDSIDE ASSESSMENT ADULT - COMMENTS
Pt was alert, cooperative and positioned upright in bed for a clinical assessment of swallow function this PM. Per charting, pt is a "67 [place]yr[place] speaking female PMH of incarcerated hernia s/p repair w/ ostomy bag, HTN, HLD presenting from PCP office for hyponatremia 119, found to also have an JAMISON, admitted for further management" Recent CXR revealed "Clear lungs."    At the time of today's assessment, pt's daughter is present at bedside and served as a reliable informant and  as pt is primarily Urdo speaking (free translation services offered provided, which pt's daughter declined). Per familial report, pt with decreased PO intake prior to admission stating most days pt consumes "only liquids". Upon questioning, pt states "food gets stuck" and "doesn't go down". Family denies coughing with meals, change in breathing and/or reports of odynophagia.

## 2022-08-23 NOTE — PROGRESS NOTE ADULT - PROBLEM SELECTOR PLAN 1
Pt with hyponatremia to 116 on admission with osmolality 298. Pt with hypovolemic hyponatremia likely 2/2 decreased PO intake and high output from ostomy.   - Na levels dropped to 120 this AM  - Restart NS at 100cc/hr for 1 liter   - start NaCL 1gm TID   - goal to not overcorrect more than 6-8mEq/L in 24 hours  - Serial BMP's   - Renal input appreciated

## 2022-08-23 NOTE — SWALLOW BEDSIDE ASSESSMENT ADULT - ADDITIONAL RECOMMENDATIONS
Pt would benefit from swallow therapy. This dept to continue to f/u as schedule permits while pt is admitted to [facility]. Consider outpatient swallow therapy upon discharge which can be scheduled at the [facility] at [phone].

## 2022-08-23 NOTE — PROGRESS NOTE ADULT - PROBLEM SELECTOR PLAN 1
JAMISON and Hyponatremia in the setting of significant ileostomy output and poor PO intake.  likely pre-renal with Marya<20 s/p IVF with improvement as above     Recommendations:  Pending attending attestation:  -Encourage PO intake (SS eval, Zofran PRN if QTc not concerning)  -Salt tabs  -C/W Fluids JAMISON and Hyponatremia in the setting of significant ileostomy output and poor PO intake.  likely pre-renal with Marya<20 s/p IVF with improvement as above     Recommendations:  -Encourage PO intake (SS eval, Zofran PRN if QTc not concerning)  -Salt tabs 1g TID  -C/W 1L NS 100ml/hr  -BMP BID

## 2022-08-23 NOTE — SWALLOW BEDSIDE ASSESSMENT ADULT - PHARYNGEAL PHASE
Delayed pharyngeal swallow/Decreased laryngeal elevation Delayed pharyngeal swallow/Decreased laryngeal elevation/Complaints of pharyngeal stasis

## 2022-08-23 NOTE — SWALLOW BEDSIDE ASSESSMENT ADULT - ASR SWALLOW REFERRAL
to ensure adequate caloric intake given reported decreased PO intake prior to and during this admission/Registered Dietitian

## 2022-08-23 NOTE — CONSULT NOTE ADULT - NS ATTEND AMEND GEN_ALL_CORE FT
Pt seen and examined.  Agree with resident eval and plan.  Imp: Dehydration secondary to ileostomy output with poor nutritional intake now with hyponatremia.  IV hydration with NS and Nutrition eval.  Continue imodium.

## 2022-08-23 NOTE — ADVANCED PRACTICE NURSE CONSULT - REASON FOR CONSULT
Patient seen on skin care rounds after wound care referral received for assessment of skin impairment and recommendations of topical management. Patient is a 68y/o Persian speaking female PMH of incarcerated hernia s/p repair w/ ileostomy, HTN, HLD, RLE DVT (on Lovenox) presented from PCP office for hyponatremia 119, found to also have an JAMISON, and UTI, admitted for further management . Patient known to wound care service line, closely followed during prior admission for ileostomy teaching and midline abdominal wound NPWT VAC therapy. Consulted by surgery for ileostomy and midline abdominal wound. As per daughter Ifhat at bedside, midline abdominal wound previously had grey drainage, patient had seen PCP Dr. Ochoa who ordered dakins wet to dry dressing for wound with improvement.  Chart reviewed: WBC 5.70 , hA1c 4.9, BMI 38.8, rah 13.

## 2022-08-23 NOTE — ADVANCED PRACTICE NURSE CONSULT - RECOMMEDATIONS
Recommend wound culture for midline abdominal wound to r/o pseudomonas.     Topical recommendations:     RUQ ileostomy - Cleanse with water, pat dry. Apply antifungal powder to peristomal skin, seal with Liquid barrier film with tapping motions (allow to dry). Cut ostomy wafer to size, mold skin barrier ring to wafer, apply wafer and pouch. Change pouch every 3 days and PRN.     Midline abdominal wound - Cleanse with NS, pat dry. Apply Dakins quarter strength soaked gauzed to dead space, cover with abdominal pad, secure with paper tape. Change BID and PRN.     Continue low air loss bed therapy, continue heel elevation, continue to turn & reposition per protocol, continue moisture management with  single breathable pad, continue measures to decrease friction/shear/pressure. Continue with nutritional support as per dietary/orders.   Plan discussed with daughter Ifhat at bedside.     Please contact if Wound/Ostomy Care Service Line if we can be of further assistance (ext [phone]).  Recommend wound culture for midline abdominal wound to r/o pseudomonas.     Topical recommendations:     RUQ ileostomy - Cleanse with water, pat dry. Apply antifungal powder to peristomal skin, seal with Liquid barrier film with tapping motions (allow to dry). Cut ostomy wafer to size, mold skin barrier ring to wafer, apply wafer and pouch. Change pouch every 3 days and PRN.     Midline abdominal wound - Cleanse with NS, pat dry. Apply Dakins quarter strength soaked gauzed to dead space, cover with abdominal pad, secure with paper tape. Change BID and PRN.     Bilateral breast folds, abdominal pannus, bilateral groin - Apply Interdry textile sheeting, under intertriginous folds leaving 2 inches exposed at ends to wick, remove to wash & dry affected area, then replace. Individual sheeting may be used for up to 5 days unless soiled. Inspect skin daily.     Sacrum to bilateral buttocks - Cleanse with skin cleanser, pat dry. Apply Juli moisture barrier cream twice a day and PRN with incontinent episodes.     Continue low air loss bed therapy, continue heel elevation, continue to turn & reposition per protocol, continue moisture management with  single breathable pad, continue measures to decrease friction/shear/pressure. Continue with nutritional support as per dietary/orders.   Plan discussed with daughter Ifhat at bedside.     Please contact if Wound/Ostomy Care Service Line if we can be of further assistance (ext [phone]).

## 2022-08-23 NOTE — SWALLOW BEDSIDE ASSESSMENT ADULT - ORAL PHASE
stasis clears given liquid wash/Decreased anterior-posterior movement of the bolus/Delayed oral transit time/Lingual stasis Decreased anterior-posterior movement of the bolus/Delayed oral transit time/Lingual stasis

## 2022-08-23 NOTE — PROGRESS NOTE ADULT - ASSESSMENT
67F PMHx HTN, HLD, RLE DVT (on lovenox), Incarcerated hernia s/p repair w/ ileoostomy bag in June with poor PO intake p/w hyponatremia.      Impression:  Hyponatremia 2/2 dehydration (Poor PO and fluid loss via ileostomy)

## 2022-08-23 NOTE — CONSULT NOTE ADULT - SUBJECTIVE AND OBJECTIVE BOX
SURGERY CONSULT NOTE    HPI:  67 Latvian speaking female PMH of incarcerated hernia s/p repair w/ ostomy bag, HTN, HLD, RLE DVT on lovenox, presenting from PCP office for hyponatremia 119. History obtained from daughter at bedside. Since placement of ostomy, pt has chronic low PO intake. Her average meal consists of half bottle of ensure, some juice, and small amount of water. The ostomy output is about 600-800cc per day. Endorsed nausea, but no vomiting, had decreased urine output recently. Denies fever/cough, dizziness, lethargy, abdominal pain. Per daughter, pt at baseline would not know the year, but knows month and day. At this time, pt  at baseline.  Pt with generalized chronic weakness.      Pt was admitted - for decreased PO intake, decreased UOP, increased ileostomy output. Found to have JAMISON and hyperkalemia to 8. CRRT/HD but was never initiated d/t rapid correction of hyperkalemia 2/2 fluids i/s/o dehydration. Course c/b hypokalemia 2/2 overcorrection and also by UTI tx w/ CTX. UCx positive for e coli and klebsiella.   (22 Aug 2022 06:33)    Surgery consulted for increased ostomy output. Per pt family member ileostomy putting out approximately 800 while at home, which was within goal. Was receiving imodium Pending information about current ileostomy output since not updated..      PAST MEDICAL & SURGICAL HISTORY:  Obesity      Hypertension      Hyperlipidemia      H/O fracture of tibia  and fibula (L)      History of cholecystectomy        H/O ventral hernia repair      S/P small bowel resection      H/O ileostomy          MEDICATIONS  (STANDING):  apixaban 5 milliGRAM(s) Oral every 12 hours  atorvastatin Oral Tab/Cap - Peds 10 milliGRAM(s) Oral daily  cefTRIAXone   IVPB 1000 milliGRAM(s) IV Intermittent every 24 hours  mirtazapine 15 milliGRAM(s) Oral daily  sodium chloride 1 Gram(s) Oral three times a day  sodium chloride 0.9%. 1000 milliLiter(s) (100 mL/Hr) IV Continuous <Continuous>    MEDICATIONS  (PRN):  acetaminophen     Tablet .. 650 milliGRAM(s) Oral every 6 hours PRN Temp greater or equal to 38C (100.4F), Mild Pain (1 - 3)  ondansetron Injectable 4 milliGRAM(s) IV Push every 8 hours PRN Nausea and/or Vomiting      Allergies    No Known Allergies    Intolerances        SOCIAL HISTORY:    FAMILY HISTORY:  No known problems  denied family Hx of cervical, ovarian, uterine CA        Physical Exam:  General: NAD, resting comfortably  HEENT: NC/AT, EOMI, normal hearing, no oral lesions, no LAD, neck supple  Pulmonary: normal resp effort, CTA-B  Cardiovascular: NSR, no murmurs  Abdominal: midline incision with packing, c/d/i, ostomy with stool in bag    Vital Signs Last 24 Hrs  T(C): 36.4 (23 Aug 2022 11:30), Max: 36.8 (23 Aug 2022 06:18)  T(F): 97.6 (23 Aug 2022 11:30), Max: 98.2 (23 Aug 2022 06:18)  HR: 78 (23 Aug 2022 11:30) (77 - 86)  BP: 103/62 (23 Aug 2022 11:30) (103/62 - 128/98)  BP(mean): --  RR: 18 (23 Aug 2022 11:30) (17 - 18)  SpO2: 98% (23 Aug 2022 11:30) (98% - 100%)    Parameters below as of 23 Aug 2022 11:30  Patient On (Oxygen Delivery Method): room air        I&O's Summary    22 Aug 2022 07:01  -  23 Aug 2022 07:00  --------------------------------------------------------  IN: 0 mL / OUT: 250 mL / NET: -250 mL            LABS:                        11.2   5.70  )-----------( 254      ( 23 Aug 2022 03:27 )             34.5     0823    119<LL>  |  80<L>  |  65<H>  ----------------------------<  126<H>  3.8   |  27  |  1.85<H>    Ca    9.0      23 Aug 2022 12:08  Phos  5.0     08  Mg     1.90         TPro  8.3  /  Alb  3.2<L>  /  TBili  0.5  /  DBili  x   /  AST  67<H>  /  ALT  27  /  AlkPhos  230<H>      PT/INR - ( 21 Aug 2022 19:43 )   PT: 14.8 sec;   INR: 1.27 ratio         PTT - ( 21 Aug 2022 19:43 )  PTT:46.9 sec  Urinalysis Basic - ( 21 Aug 2022 23:09 )    Color: Light Orange / Appearance: Turbid / S.017 / pH: x  Gluc: x / Ketone: Trace  / Bili: Negative / Urobili: 3 mg/dL   Blood: x / Protein: 30 mg/dL / Nitrite: Negative   Leuk Esterase: Large / RBC: 11 /HPF / WBC >720 /HPF   Sq Epi: x / Non Sq Epi: 3 /HPF / Bacteria: Many      CAPILLARY BLOOD GLUCOSE        LIVER FUNCTIONS - ( 21 Aug 2022 19:43 )  Alb: 3.2 g/dL / Pro: 8.3 g/dL / ALK PHOS: 230 U/L / ALT: 27 U/L / AST: 67 U/L / GGT: x             Cultures:  Culture Results:   >100,000 CFU/ml Escherichia coli ( @ 23:09)      RADIOLOGY & ADDITIONAL STUDIES:      Plan:  67 Latvian speaking female PMH of incarcerated hernia s/p repair w/ ostomy bag, HTN, HLD, RLE DVT on lovenox, presenting from PCP office for hyponatremia 119. Surgery consulted for exam of midline incision and ostomy output    - pending ostomy output for further information    B Team Surgery, [id number]         SURGERY CONSULT NOTE    HPI:  67 Korean speaking female PMH of incarcerated hernia s/p repair w/ ostomy bag, HTN, HLD, RLE DVT on lovenox, presenting from PCP office for hyponatremia 119. History obtained from daughter at bedside. Since placement of ostomy, pt has chronic low PO intake. Her average meal consists of half bottle of ensure, some juice, and small amount of water. The ostomy output is about 600-800cc per day. Endorsed nausea, but no vomiting, had decreased urine output recently. Denies fever/cough, dizziness, lethargy, abdominal pain. Per daughter, pt at baseline would not know the year, but knows month and day. At this time, pt  at baseline.  Pt with generalized chronic weakness.      Pt was admitted - for decreased PO intake, decreased UOP, increased ileostomy output. Found to have JAMISON and hyperkalemia to 8. CRRT/HD but was never initiated d/t rapid correction of hyperkalemia 2/2 fluids i/s/o dehydration. Course c/b hypokalemia 2/2 overcorrection and also by UTI tx w/ CTX. UCx positive for e coli and klebsiella.   (22 Aug 2022 06:33)    Surgery consulted for increased ostomy output. 500 in last 24 hours per primary team. Patient came in with electrolyte imbalances. Per pt family member ileostomy putting out approximately 800 while at home, which was within goal. Was receiving imodium at home.     PAST MEDICAL & SURGICAL HISTORY:  Obesity      Hypertension      Hyperlipidemia      H/O fracture of tibia  and fibula (L)      History of cholecystectomy        H/O ventral hernia repair      S/P small bowel resection      H/O ileostomy          MEDICATIONS  (STANDING):  apixaban 5 milliGRAM(s) Oral every 12 hours  atorvastatin Oral Tab/Cap - Peds 10 milliGRAM(s) Oral daily  cefTRIAXone   IVPB 1000 milliGRAM(s) IV Intermittent every 24 hours  mirtazapine 15 milliGRAM(s) Oral daily  sodium chloride 1 Gram(s) Oral three times a day  sodium chloride 0.9%. 1000 milliLiter(s) (100 mL/Hr) IV Continuous <Continuous>    MEDICATIONS  (PRN):  acetaminophen     Tablet .. 650 milliGRAM(s) Oral every 6 hours PRN Temp greater or equal to 38C (100.4F), Mild Pain (1 - 3)  ondansetron Injectable 4 milliGRAM(s) IV Push every 8 hours PRN Nausea and/or Vomiting      Allergies    No Known Allergies    Intolerances        SOCIAL HISTORY:    FAMILY HISTORY:  No known problems  denied family Hx of cervical, ovarian, uterine CA        Physical Exam:  General: NAD, resting comfortably  HEENT: NC/AT, EOMI, normal hearing, no oral lesions, no LAD, neck supple  Pulmonary: normal resp effort, CTA-B  Cardiovascular: NSR, no murmurs  Abdominal: midline incision with packing, c/d/i, ostomy with stool in bag    Vital Signs Last 24 Hrs  T(C): 36.4 (23 Aug 2022 11:30), Max: 36.8 (23 Aug 2022 06:18)  T(F): 97.6 (23 Aug 2022 11:30), Max: 98.2 (23 Aug 2022 06:18)  HR: 78 (23 Aug 2022 11:30) (77 - 86)  BP: 103/62 (23 Aug 2022 11:30) (103/62 - 128/98)  BP(mean): --  RR: 18 (23 Aug 2022 11:30) (17 - 18)  SpO2: 98% (23 Aug 2022 11:30) (98% - 100%)    Parameters below as of 23 Aug 2022 11:30  Patient On (Oxygen Delivery Method): room air        I&O's Summary    22 Aug 2022 07:01  -  23 Aug 2022 07:00  --------------------------------------------------------  IN: 0 mL / OUT: 250 mL / NET: -250 mL            LABS:                        11.2   5.70  )-----------( 254      ( 23 Aug 2022 03:27 )             34.5     08-23    119<LL>  |  80<L>  |  65<H>  ----------------------------<  126<H>  3.8   |  27  |  1.85<H>    Ca    9.0      23 Aug 2022 12:08  Phos  5.0     08  Mg     1.90     08    TPro  8.3  /  Alb  3.2<L>  /  TBili  0.5  /  DBili  x   /  AST  67<H>  /  ALT  27  /  AlkPhos  230<H>      PT/INR - ( 21 Aug 2022 19:43 )   PT: 14.8 sec;   INR: 1.27 ratio         PTT - ( 21 Aug 2022 19:43 )  PTT:46.9 sec  Urinalysis Basic - ( 21 Aug 2022 23:09 )    Color: Light Orange / Appearance: Turbid / S.017 / pH: x  Gluc: x / Ketone: Trace  / Bili: Negative / Urobili: 3 mg/dL   Blood: x / Protein: 30 mg/dL / Nitrite: Negative   Leuk Esterase: Large / RBC: 11 /HPF / WBC >720 /HPF   Sq Epi: x / Non Sq Epi: 3 /HPF / Bacteria: Many      CAPILLARY BLOOD GLUCOSE        LIVER FUNCTIONS - ( 21 Aug 2022 19:43 )  Alb: 3.2 g/dL / Pro: 8.3 g/dL / ALK PHOS: 230 U/L / ALT: 27 U/L / AST: 67 U/L / GGT: x             Cultures:  Culture Results:   >100,000 CFU/ml Escherichia coli ( @ 23:09)      RADIOLOGY & ADDITIONAL STUDIES:      Plan:  67 Korean speaking female PMH of incarcerated hernia s/p repair w/ ostomy bag, HTN, HLD, RLE DVT on lovenox, presenting from PCP office for hyponatremia 119. Surgery consulted for exam of midline incision and ostomy output of 500 cc over 34 hours per primary team    - midline incision clean/dry; will heal on its own, c/w packing   - ostomy output wnl (<1L); if increases >1L over 24 hours can replete as necessary with LR and consider imodium to slow output which she was sent home with   - please call back with any questions or acute concerns    to be d/w attending    B Team Surgery, [id number]         SURGERY CONSULT NOTE    HPI:  67 Macedonian speaking female PMH of incarcerated hernia s/p repair w/ ostomy bag, HTN, HLD, RLE DVT on lovenox, presenting from PCP office for hyponatremia 119. History obtained from daughter at bedside. Since placement of ostomy, pt has chronic low PO intake. Her average meal consists of half bottle of ensure, some juice, and small amount of water. The ostomy output is about 600-800cc per day. Endorsed nausea, but no vomiting, had decreased urine output recently. Denies fever/cough, dizziness, lethargy, abdominal pain. Per daughter, pt at baseline would not know the year, but knows month and day. At this time, pt  at baseline.  Pt with generalized chronic weakness.      Pt was admitted - for decreased PO intake, decreased UOP, increased ileostomy output. Found to have JAMISON and hyperkalemia to 8. CRRT/HD but was never initiated d/t rapid correction of hyperkalemia 2/2 fluids i/s/o dehydration. Course c/b hypokalemia 2/2 overcorrection and also by UTI tx w/ CTX. UCx positive for e coli and klebsiella.   (22 Aug 2022 06:33)    Surgery consulted for increased ostomy output. 500 in last 24 hours per primary team. Patient came in with electrolyte imbalances. Per pt family member ileostomy putting out approximately 800 while at home, which was within goal. Was receiving imodium at home.     PAST MEDICAL & SURGICAL HISTORY:  Obesity      Hypertension      Hyperlipidemia      H/O fracture of tibia  and fibula (L)      History of cholecystectomy        H/O ventral hernia repair      S/P small bowel resection      H/O ileostomy          MEDICATIONS  (STANDING):  apixaban 5 milliGRAM(s) Oral every 12 hours  atorvastatin Oral Tab/Cap - Peds 10 milliGRAM(s) Oral daily  cefTRIAXone   IVPB 1000 milliGRAM(s) IV Intermittent every 24 hours  mirtazapine 15 milliGRAM(s) Oral daily  sodium chloride 1 Gram(s) Oral three times a day  sodium chloride 0.9%. 1000 milliLiter(s) (100 mL/Hr) IV Continuous <Continuous>    MEDICATIONS  (PRN):  acetaminophen     Tablet .. 650 milliGRAM(s) Oral every 6 hours PRN Temp greater or equal to 38C (100.4F), Mild Pain (1 - 3)  ondansetron Injectable 4 milliGRAM(s) IV Push every 8 hours PRN Nausea and/or Vomiting      Allergies    No Known Allergies    Intolerances        SOCIAL HISTORY:    FAMILY HISTORY:  No known problems  denied family Hx of cervical, ovarian, uterine CA        Physical Exam:  General: NAD, resting comfortably  HEENT: NC/AT, EOMI, normal hearing, no oral lesions, no LAD, neck supple  Pulmonary: normal resp effort, CTA-B  Cardiovascular: NSR, no murmurs  Abdominal: midline incision with packing, c/d/i, ostomy with stool in bag    Vital Signs Last 24 Hrs  T(C): 36.4 (23 Aug 2022 11:30), Max: 36.8 (23 Aug 2022 06:18)  T(F): 97.6 (23 Aug 2022 11:30), Max: 98.2 (23 Aug 2022 06:18)  HR: 78 (23 Aug 2022 11:30) (77 - 86)  BP: 103/62 (23 Aug 2022 11:30) (103/62 - 128/98)  BP(mean): --  RR: 18 (23 Aug 2022 11:30) (17 - 18)  SpO2: 98% (23 Aug 2022 11:30) (98% - 100%)    Parameters below as of 23 Aug 2022 11:30  Patient On (Oxygen Delivery Method): room air        I&O's Summary    22 Aug 2022 07:01  -  23 Aug 2022 07:00  --------------------------------------------------------  IN: 0 mL / OUT: 250 mL / NET: -250 mL            LABS:                        11.2   5.70  )-----------( 254      ( 23 Aug 2022 03:27 )             34.5     08-23    119<LL>  |  80<L>  |  65<H>  ----------------------------<  126<H>  3.8   |  27  |  1.85<H>    Ca    9.0      23 Aug 2022 12:08  Phos  5.0     08  Mg     1.90     08    TPro  8.3  /  Alb  3.2<L>  /  TBili  0.5  /  DBili  x   /  AST  67<H>  /  ALT  27  /  AlkPhos  230<H>      PT/INR - ( 21 Aug 2022 19:43 )   PT: 14.8 sec;   INR: 1.27 ratio         PTT - ( 21 Aug 2022 19:43 )  PTT:46.9 sec  Urinalysis Basic - ( 21 Aug 2022 23:09 )    Color: Light Orange / Appearance: Turbid / S.017 / pH: x  Gluc: x / Ketone: Trace  / Bili: Negative / Urobili: 3 mg/dL   Blood: x / Protein: 30 mg/dL / Nitrite: Negative   Leuk Esterase: Large / RBC: 11 /HPF / WBC >720 /HPF   Sq Epi: x / Non Sq Epi: 3 /HPF / Bacteria: Many      CAPILLARY BLOOD GLUCOSE        LIVER FUNCTIONS - ( 21 Aug 2022 19:43 )  Alb: 3.2 g/dL / Pro: 8.3 g/dL / ALK PHOS: 230 U/L / ALT: 27 U/L / AST: 67 U/L / GGT: x             Cultures:  Culture Results:   >100,000 CFU/ml Escherichia coli ( @ 23:09)      RADIOLOGY & ADDITIONAL STUDIES:      Plan:  Patient is a 67 year old female (speaks Macedonian dialect) with a PMHx of HTN, HLD, left tibia surgery, lap kevan ( at [facility]) and S/P repair of incarcerated  ventral hernia repair with diagnostic laparoscopy converted to open for repair of enterotomy, reduction of hernia and hernia defect repair on 6/15/22 with hospital course complicated by fever and tachycardia requiring SICU admission with return to OR for exploratory laparotomy with small bowel resection and end ileostomy on 22) who presented with nausea, vomiting today and decreased PO intake. Surgery consulted for exam of midline incision and ostomy output of 500 cc over 34 hours per primary team    - midline incision clean/dry; will heal on its own, c/w packing   - ostomy output wnl (<1L); if increases >1L over 24 hours can replete as necessary with LR and consider imodium to slow output which she was sent home with   - please call back with any questions or acute concerns    to be d/w attending    B Team Surgery, [id number]         SURGERY CONSULT NOTE    HPI:  67 Divehi speaking female PMH of incarcerated hernia s/p repair w/ ostomy bag, HTN, HLD, RLE DVT on lovenox, presenting from PCP office for hyponatremia 119. History obtained from daughter at bedside. Since placement of ostomy, pt has chronic low PO intake. Her average meal consists of half bottle of ensure, some juice, and small amount of water. The ostomy output is about 600-800cc per day. Endorsed nausea, but no vomiting, had decreased urine output recently. Denies fever/cough, dizziness, lethargy, abdominal pain. Per daughter, pt at baseline would not know the year, but knows month and day. At this time, pt  at baseline.  Pt with generalized chronic weakness.      Pt was admitted - for decreased PO intake, decreased UOP, increased ileostomy output. Found to have JAMISON and hyperkalemia to 8. CRRT/HD but was never initiated d/t rapid correction of hyperkalemia 2/2 fluids i/s/o dehydration. Course c/b hypokalemia 2/2 overcorrection and also by UTI tx w/ CTX. UCx positive for e coli and klebsiella.   (22 Aug 2022 06:33)    Surgery consulted for increased ostomy output. 500 in last 24 hours per primary team. Patient came in with electrolyte imbalances. Per pt family member ileostomy putting out approximately 800 while at home, which was within goal. Was receiving imodium at home.     PAST MEDICAL & SURGICAL HISTORY:  Obesity      Hypertension      Hyperlipidemia      H/O fracture of tibia  and fibula (L)      History of cholecystectomy        H/O ventral hernia repair      S/P small bowel resection      H/O ileostomy          MEDICATIONS  (STANDING):  apixaban 5 milliGRAM(s) Oral every 12 hours  atorvastatin Oral Tab/Cap - Peds 10 milliGRAM(s) Oral daily  cefTRIAXone   IVPB 1000 milliGRAM(s) IV Intermittent every 24 hours  mirtazapine 15 milliGRAM(s) Oral daily  sodium chloride 1 Gram(s) Oral three times a day  sodium chloride 0.9%. 1000 milliLiter(s) (100 mL/Hr) IV Continuous <Continuous>    MEDICATIONS  (PRN):  acetaminophen     Tablet .. 650 milliGRAM(s) Oral every 6 hours PRN Temp greater or equal to 38C (100.4F), Mild Pain (1 - 3)  ondansetron Injectable 4 milliGRAM(s) IV Push every 8 hours PRN Nausea and/or Vomiting      Allergies    No Known Allergies    Intolerances        SOCIAL HISTORY:    FAMILY HISTORY:  No known problems  denied family Hx of cervical, ovarian, uterine CA        Physical Exam:  General: NAD, resting comfortably  HEENT: NC/AT, EOMI, normal hearing, no oral lesions, no LAD, neck supple  Pulmonary: normal resp effort, CTA-B  Cardiovascular: NSR, no murmurs  Abdominal: midline incision with packing, c/d/i, ostomy with stool in bag    Vital Signs Last 24 Hrs  T(C): 36.4 (23 Aug 2022 11:30), Max: 36.8 (23 Aug 2022 06:18)  T(F): 97.6 (23 Aug 2022 11:30), Max: 98.2 (23 Aug 2022 06:18)  HR: 78 (23 Aug 2022 11:30) (77 - 86)  BP: 103/62 (23 Aug 2022 11:30) (103/62 - 128/98)  BP(mean): --  RR: 18 (23 Aug 2022 11:30) (17 - 18)  SpO2: 98% (23 Aug 2022 11:30) (98% - 100%)    Parameters below as of 23 Aug 2022 11:30  Patient On (Oxygen Delivery Method): room air        I&O's Summary    22 Aug 2022 07:01  -  23 Aug 2022 07:00  --------------------------------------------------------  IN: 0 mL / OUT: 250 mL / NET: -250 mL            LABS:                        11.2   5.70  )-----------( 254      ( 23 Aug 2022 03:27 )             34.5     08-23    119<LL>  |  80<L>  |  65<H>  ----------------------------<  126<H>  3.8   |  27  |  1.85<H>    Ca    9.0      23 Aug 2022 12:08  Phos  5.0     08  Mg     1.90     08    TPro  8.3  /  Alb  3.2<L>  /  TBili  0.5  /  DBili  x   /  AST  67<H>  /  ALT  27  /  AlkPhos  230<H>      PT/INR - ( 21 Aug 2022 19:43 )   PT: 14.8 sec;   INR: 1.27 ratio         PTT - ( 21 Aug 2022 19:43 )  PTT:46.9 sec  Urinalysis Basic - ( 21 Aug 2022 23:09 )    Color: Light Orange / Appearance: Turbid / S.017 / pH: x  Gluc: x / Ketone: Trace  / Bili: Negative / Urobili: 3 mg/dL   Blood: x / Protein: 30 mg/dL / Nitrite: Negative   Leuk Esterase: Large / RBC: 11 /HPF / WBC >720 /HPF   Sq Epi: x / Non Sq Epi: 3 /HPF / Bacteria: Many      CAPILLARY BLOOD GLUCOSE        LIVER FUNCTIONS - ( 21 Aug 2022 19:43 )  Alb: 3.2 g/dL / Pro: 8.3 g/dL / ALK PHOS: 230 U/L / ALT: 27 U/L / AST: 67 U/L / GGT: x             Cultures:  Culture Results:   >100,000 CFU/ml Escherichia coli ( @ 23:09)      RADIOLOGY & ADDITIONAL STUDIES:      Plan:  Patient is a 67 year old female (speaks Divehi dialect) with a PMHx of HTN, HLD, left tibia surgery, lap kevan ( at [facility]) and S/P repair of incarcerated  ventral hernia repair with diagnostic laparoscopy converted to open for repair of enterotomy, reduction of hernia and hernia defect repair on 6/15/22 with hospital course complicated by fever and tachycardia requiring SICU admission with return to OR for exploratory laparotomy with small bowel resection and end ileostomy on 22) who presented with nausea, vomiting today and decreased PO intake. Surgery consulted for exam of midline incision and ostomy output of 500 cc over 34 hours per primary team    - midline incision clean/dry; will heal on its own, c/w packing   - ostomy output wnl (<1L); if increases >1L over 24 hours can replete as necessary with LR and consider imodium to slow output which she was sent home with   - replete electrolytes prn    d/w Dr. Rodriguez, ACS attending    B Team Surgery, [id number]

## 2022-08-23 NOTE — PROGRESS NOTE ADULT - PROBLEM SELECTOR PLAN 5
Pt with chronic decreased PO intake, worsening chronic generalized weakness  - Nutrition consult  - will start Remeron for appetite stimulation, daughter agrees   - Swallow eval for best consistency diet  - PT recommends KEVIN

## 2022-08-23 NOTE — ADVANCED PRACTICE NURSE CONSULT - ASSESSMENT
General: A&Ox 4, bedbound, incontinent of urine. Skin warm, dry with increased moisture in intertriginous folds bilateral breast folds, abdominal pannus, bilateral groin at risk for moisture associated dermatitis). Hyperpigmentation and blanchable erythema to sacrum extending to bilateral buttocks.     RUQ ileostomy - pink oval stoma flush with skin, 1 1/4" with peristomal hyperpigmentation, erythema and suspected candidiasis as evidenced by pinpoint satellite lesions visible at the periphery.     Midline abdominal surgical wound - [id number]. Wound bed 90% pink moist granular tissue, 10% slough. Scant serosanguinous drainage. Prior gauze dressing with small-moderate amount of purulent green and bright blue drainage, no odor. Reepithelialization at wound edges. Periwound with hyperpigmentation circumferentially, no erythema, no increased warmth, no edema, no induration to periwound. Goals of care: antimicrobial support, pack dead space, protect periwound skin.        General: A&Ox 4, bedbound, incontinent of urine. Skin warm, dry with increased moisture in intertriginous folds bilateral breast folds, abdominal pannus, bilateral groin at risk for moisture associated dermatitis). Hyperpigmentation and blanchable erythema to sacrum extending to bilateral buttocks.     RUQ ileostomy - pink oval stoma flush with skin, 1 1/4" with peristomal hyperpigmentation, erythema and suspected candidiasis as evidenced by pinpoint satellite lesions visible at the periphery.     Midline abdominal surgical wound - [id number]. Wound bed 90% pink moist granular tissue, 10% slough. Scant serosanguinous drainage. Prior gauze dressing with small-moderate amount of purulent green and bright blue drainage, no odor. Reepithelialization at wound edges. Periwound with hyperpigmentation circumferentially, no erythema, no increased warmth, no edema, no induration to periwound. Goals of care: antimicrobial support, pack dead space, protect periwound skin.      General: A&Ox 4, bedbound, incontinent of urine. Skin warm, dry with increased moisture in intertriginous folds bilateral breast folds, abdominal pannus, bilateral groin at risk for moisture associated dermatitis). Hyperpigmentation and blanchable erythema to sacrum extending to bilateral buttocks.     RUQ ileostomy - pink oval stoma flush with skin, 1 1/4" with peristomal hyperpigmentation, erythema and suspected candidiasis as evidenced by pinpoint satellite lesions visible at the periphery. See A&I flowsheet for full assessment details.     Midline abdominal surgical wound - [id number]. Wound bed 90% pink moist granular tissue, 10% slough. Prior gauze dressing removed with small-moderate amount of purulent green and bright blue drainage, no odor. Reepithelialization at wound edges. Periwound with hyperpigmentation circumferentially, no erythema, no increased warmth, no edema, no induration to periwound. Goals of care: antimicrobial support, pack dead space, protect periwound skin.      General: A&Ox 4, bedbound, incontinent of urine. Skin warm, dry with increased moisture in intertriginous folds bilateral breast folds, abdominal pannus, bilateral groin at risk for moisture associated dermatitis). Hyperpigmentation and blanchable erythema to sacrum extending to bilateral buttocks.     RUQ ileostomy - pink oval stoma flush with skin, 1 1/4" with peristomal hyperpigmentation, erythema and suspected candidiasis as evidenced by pinpoint satellite lesions visible at the periphery. See A&I flowsheet for full assessment details.     Midline abdominal surgical wound - [id number]. Wound bed 90% pink moist granular tissue, 10% slough. Prior gauze dressing removed with small amount of purulent green and bright blue drainage, no odor. Reepithelialization at wound edges. Periwound with hyperpigmentation circumferentially, no erythema, no increased warmth, no edema, no induration to periwound. Goals of care: antimicrobial support, pack dead space, protect periwound skin.

## 2022-08-23 NOTE — PROGRESS NOTE ADULT - SUBJECTIVE AND OBJECTIVE BOX
PROGRESS NOTE:     Patient is a 67y old  Female who presents with a chief complaint of Hyponatremia (23 Aug 2022 08:32)      SUBJECTIVE / OVERNIGHT EVENTS: Poor appetite.     ADDITIONAL REVIEW OF SYSTEMS:    MEDICATIONS  (STANDING):  apixaban 5 milliGRAM(s) Oral every 12 hours  atorvastatin Oral Tab/Cap - Peds 10 milliGRAM(s) Oral daily  cefTRIAXone   IVPB 1000 milliGRAM(s) IV Intermittent every 24 hours  sodium chloride 1 Gram(s) Oral three times a day  sodium chloride 0.9%. 1000 milliLiter(s) (100 mL/Hr) IV Continuous <Continuous>    MEDICATIONS  (PRN):  acetaminophen     Tablet .. 650 milliGRAM(s) Oral every 6 hours PRN Temp greater or equal to 38C (100.4F), Mild Pain (1 - 3)  ondansetron Injectable 4 milliGRAM(s) IV Push every 8 hours PRN Nausea and/or Vomiting      CAPILLARY BLOOD GLUCOSE        I&O's Summary    22 Aug 2022 07:01  -  23 Aug 2022 07:00  --------------------------------------------------------  IN: 0 mL / OUT: 250 mL / NET: -250 mL        PHYSICAL EXAM:  Vital Signs Last 24 Hrs  T(C): 36.8 (23 Aug 2022 06:18), Max: 36.8 (23 Aug 2022 06:18)  T(F): 98.2 (23 Aug 2022 06:18), Max: 98.2 (23 Aug 2022 06:18)  HR: 77 (23 Aug 2022 06:18) (71 - 86)  BP: 124/87 (23 Aug 2022 06:18) (102/61 - 128/98)  BP(mean): --  RR: 17 (23 Aug 2022 06:18) (16 - 18)  SpO2: 98% (23 Aug 2022 06:18) (98% - 100%)    Parameters below as of 23 Aug 2022 06:18  Patient On (Oxygen Delivery Method): room air        GENERAL: Elderly female, NAD  EYES: EOMI, PERRL, conjunctiva and sclera clear  ENT: Neck supple, No JVD, dry oral mucosa  CHEST/LUNG: Clear to auscultation bilaterally; No wheeze, equal breath sounds bilaterally   HEART: Regular rate and rhythm; No murmurs, rubs, or gallops  ABDOMEN: + mildly tender around ostomy site. Ostomy draining brownish output, no purulence. Ostomy intact, skin non-erythematous   EXTREMITIES: +DP/PT/Radial pulses, No clubbing, cyanosis, trace LE edema  PSYCH: Nl behavior, nl affect  NEUROLOGY: AAOx3, non-focal, cranial nerves intact  SKIN: Normal color, No rashes or lesions      LABS:                        11.2   5.70  )-----------( 254      ( 23 Aug 2022 03:27 )             34.5     08-23    120<LL>  |  80<L>  |  68<H>  ----------------------------<  90  4.4   |  24  |  2.07<H>    Ca    9.4      23 Aug 2022 03:27  Phos  5.9     08-23  Mg     2.00     08-23    TPro  8.3  /  Alb  3.2<L>  /  TBili  0.5  /  DBili  x   /  AST  67<H>  /  ALT  27  /  AlkPhos  230<H>  08-21    PT/INR - ( 21 Aug 2022 19:43 )   PT: 14.8 sec;   INR: 1.27 ratio         PTT - ( 21 Aug 2022 19:43 )  PTT:46.9 sec      Urinalysis Basic - ( 21 Aug 2022 23:09 )    Color: Light Orange / Appearance: Turbid / S.017 / pH: x  Gluc: x / Ketone: Trace  / Bili: Negative / Urobili: 3 mg/dL   Blood: x / Protein: 30 mg/dL / Nitrite: Negative   Leuk Esterase: Large / RBC: 11 /HPF / WBC >720 /HPF   Sq Epi: x / Non Sq Epi: 3 /HPF / Bacteria: Many        Culture - Urine (collected 21 Aug 2022 23:09)  Source: Clean Catch Clean Catch (Midstream)  Preliminary Report (22 Aug 2022 23:53):    >100,000 CFU/ml Escherichia coli        RADIOLOGY & ADDITIONAL TESTS:  Results Reviewed:   Imaging Personally Reviewed:  Electrocardiogram Personally Reviewed:    COORDINATION OF CARE:  Care Discussed with Consultants/Other Providers [Y/N]:  Prior or Outpatient Records Reviewed [Y/N]:

## 2022-08-23 NOTE — SWALLOW BEDSIDE ASSESSMENT ADULT - ASR SWALLOW RECOMMEND DIAG
to objectively assess swallow function given pt c/o pharyngeal stasis with associated decreased PO intake/VFSS/MBS

## 2022-08-23 NOTE — PROVIDER CONTACT NOTE (CRITICAL VALUE NOTIFICATION) - RECOMMENDATIONS
NAEL Gaming aware
As per CELESTE Garcia, continue sodium chloride tablets and normal saline. WIll continue to monitor BMP q6 hours.

## 2022-08-23 NOTE — PATIENT PROFILE ADULT - FUNCTIONAL SCREEN CURRENT LEVEL: COMMUNICATION, MLM
pt is Persian speaking. daughter at bedside to provide translation/0 = understands/communicates without difficulty

## 2022-08-23 NOTE — SWALLOW BEDSIDE ASSESSMENT ADULT - SWALLOW EVAL: DIAGNOSIS
1- mild oral dysphagia given minced and moist solids, puree, mildly thick liquid and thin liquid textures marked by delayed bolus collection, transfer and transport. trace lingual stasis remained post swallow given minced and moist solids and pureed textures which clears given liquid wash. 2- moderate oral dysphagia given soft and bite sized solids marked by prolonged mastication likely secondary to absent lower dentition. mild-moderate lingual stasis remained post swallow which reduces, though does not clear given liquid wash. 3- mild pharyngeal dysphagia given soft and bite sized solids, minced and moist solids, puree, mildly thick liquid and thin liquid textures marked by delayed swallow trigger and reduced hyolaryngeal excursion. pt with c/o pharyngeal stasis given soft and bite sized solids, minced and moist solids and pureed textures which reportedly clears given liquid wash. no overt s/s of penetration/aspiration noted.

## 2022-08-24 DIAGNOSIS — E87.6 HYPOKALEMIA: ICD-10-CM

## 2022-08-24 LAB
-  AMIKACIN: SIGNIFICANT CHANGE UP
-  AMOXICILLIN/CLAVULANIC ACID: SIGNIFICANT CHANGE UP
-  AMPICILLIN/SULBACTAM: SIGNIFICANT CHANGE UP
-  AMPICILLIN: SIGNIFICANT CHANGE UP
-  AZTREONAM: SIGNIFICANT CHANGE UP
-  CEFAZOLIN: SIGNIFICANT CHANGE UP
-  CEFEPIME: SIGNIFICANT CHANGE UP
-  CEFTRIAXONE: SIGNIFICANT CHANGE UP
-  CIPROFLOXACIN: SIGNIFICANT CHANGE UP
-  ERTAPENEM: SIGNIFICANT CHANGE UP
-  GENTAMICIN: SIGNIFICANT CHANGE UP
-  IMIPENEM: SIGNIFICANT CHANGE UP
-  LEVOFLOXACIN: SIGNIFICANT CHANGE UP
-  MEROPENEM: SIGNIFICANT CHANGE UP
-  NITROFURANTOIN: SIGNIFICANT CHANGE UP
-  PIPERACILLIN/TAZOBACTAM: SIGNIFICANT CHANGE UP
-  TIGECYCLINE: SIGNIFICANT CHANGE UP
-  TOBRAMYCIN: SIGNIFICANT CHANGE UP
-  TRIMETHOPRIM/SULFAMETHOXAZOLE: SIGNIFICANT CHANGE UP
ANION GAP SERPL CALC-SCNC: 11 MMOL/L — SIGNIFICANT CHANGE UP (ref 7–14)
ANION GAP SERPL CALC-SCNC: 11 MMOL/L — SIGNIFICANT CHANGE UP (ref 7–14)
ANION GAP SERPL CALC-SCNC: 14 MMOL/L — SIGNIFICANT CHANGE UP (ref 7–14)
BASOPHILS # BLD AUTO: 0.01 K/UL — SIGNIFICANT CHANGE UP (ref 0–0.2)
BASOPHILS NFR BLD AUTO: 0.2 % — SIGNIFICANT CHANGE UP (ref 0–2)
BUN SERPL-MCNC: 41 MG/DL — HIGH (ref 7–23)
BUN SERPL-MCNC: 44 MG/DL — HIGH (ref 7–23)
BUN SERPL-MCNC: 46 MG/DL — HIGH (ref 7–23)
CALCIUM SERPL-MCNC: 8.6 MG/DL — SIGNIFICANT CHANGE UP (ref 8.4–10.5)
CALCIUM SERPL-MCNC: 9 MG/DL — SIGNIFICANT CHANGE UP (ref 8.4–10.5)
CALCIUM SERPL-MCNC: 9.1 MG/DL — SIGNIFICANT CHANGE UP (ref 8.4–10.5)
CHLORIDE SERPL-SCNC: 91 MMOL/L — LOW (ref 98–107)
CHLORIDE SERPL-SCNC: 93 MMOL/L — LOW (ref 98–107)
CHLORIDE SERPL-SCNC: 94 MMOL/L — LOW (ref 98–107)
CO2 SERPL-SCNC: 23 MMOL/L — SIGNIFICANT CHANGE UP (ref 22–31)
CO2 SERPL-SCNC: 26 MMOL/L — SIGNIFICANT CHANGE UP (ref 22–31)
CO2 SERPL-SCNC: 27 MMOL/L — SIGNIFICANT CHANGE UP (ref 22–31)
CREAT SERPL-MCNC: 1.1 MG/DL — SIGNIFICANT CHANGE UP (ref 0.5–1.3)
CREAT SERPL-MCNC: 1.13 MG/DL — SIGNIFICANT CHANGE UP (ref 0.5–1.3)
CREAT SERPL-MCNC: 1.18 MG/DL — SIGNIFICANT CHANGE UP (ref 0.5–1.3)
CULTURE RESULTS: SIGNIFICANT CHANGE UP
EGFR: 51 ML/MIN/1.73M2 — LOW
EGFR: 53 ML/MIN/1.73M2 — LOW
EGFR: 55 ML/MIN/1.73M2 — LOW
EOSINOPHIL # BLD AUTO: 0.02 K/UL — SIGNIFICANT CHANGE UP (ref 0–0.5)
EOSINOPHIL NFR BLD AUTO: 0.5 % — SIGNIFICANT CHANGE UP (ref 0–6)
GLUCOSE SERPL-MCNC: 102 MG/DL — HIGH (ref 70–99)
GLUCOSE SERPL-MCNC: 92 MG/DL — SIGNIFICANT CHANGE UP (ref 70–99)
GLUCOSE SERPL-MCNC: 93 MG/DL — SIGNIFICANT CHANGE UP (ref 70–99)
HCT VFR BLD CALC: 31.6 % — LOW (ref 34.5–45)
HGB BLD-MCNC: 10 G/DL — LOW (ref 11.5–15.5)
IANC: 2.62 K/UL — SIGNIFICANT CHANGE UP (ref 1.8–7.4)
IMM GRANULOCYTES NFR BLD AUTO: 1.1 % — SIGNIFICANT CHANGE UP (ref 0–1.5)
LYMPHOCYTES # BLD AUTO: 1.26 K/UL — SIGNIFICANT CHANGE UP (ref 1–3.3)
LYMPHOCYTES # BLD AUTO: 28.4 % — SIGNIFICANT CHANGE UP (ref 13–44)
MAGNESIUM SERPL-MCNC: 1.8 MG/DL — SIGNIFICANT CHANGE UP (ref 1.6–2.6)
MCHC RBC-ENTMCNC: 26.2 PG — LOW (ref 27–34)
MCHC RBC-ENTMCNC: 31.6 GM/DL — LOW (ref 32–36)
MCV RBC AUTO: 82.7 FL — SIGNIFICANT CHANGE UP (ref 80–100)
METHOD TYPE: SIGNIFICANT CHANGE UP
MONOCYTES # BLD AUTO: 0.48 K/UL — SIGNIFICANT CHANGE UP (ref 0–0.9)
MONOCYTES NFR BLD AUTO: 10.8 % — SIGNIFICANT CHANGE UP (ref 2–14)
NEUTROPHILS # BLD AUTO: 2.62 K/UL — SIGNIFICANT CHANGE UP (ref 1.8–7.4)
NEUTROPHILS NFR BLD AUTO: 59 % — SIGNIFICANT CHANGE UP (ref 43–77)
NRBC # BLD: 0 /100 WBCS — SIGNIFICANT CHANGE UP (ref 0–0)
NRBC # FLD: 0 K/UL — SIGNIFICANT CHANGE UP (ref 0–0)
ORGANISM # SPEC MICROSCOPIC CNT: SIGNIFICANT CHANGE UP
ORGANISM # SPEC MICROSCOPIC CNT: SIGNIFICANT CHANGE UP
PHOSPHATE SERPL-MCNC: 3.3 MG/DL — SIGNIFICANT CHANGE UP (ref 2.5–4.5)
PHOSPHATE SERPL-MCNC: 3.6 MG/DL — SIGNIFICANT CHANGE UP (ref 2.5–4.5)
PHOSPHATE SERPL-MCNC: 4.1 MG/DL — SIGNIFICANT CHANGE UP (ref 2.5–4.5)
PLATELET # BLD AUTO: 211 K/UL — SIGNIFICANT CHANGE UP (ref 150–400)
POTASSIUM SERPL-MCNC: 3.3 MMOL/L — LOW (ref 3.5–5.3)
POTASSIUM SERPL-MCNC: 3.5 MMOL/L — SIGNIFICANT CHANGE UP (ref 3.5–5.3)
POTASSIUM SERPL-MCNC: 3.9 MMOL/L — SIGNIFICANT CHANGE UP (ref 3.5–5.3)
POTASSIUM SERPL-SCNC: 3.3 MMOL/L — LOW (ref 3.5–5.3)
POTASSIUM SERPL-SCNC: 3.5 MMOL/L — SIGNIFICANT CHANGE UP (ref 3.5–5.3)
POTASSIUM SERPL-SCNC: 3.9 MMOL/L — SIGNIFICANT CHANGE UP (ref 3.5–5.3)
RBC # BLD: 3.82 M/UL — SIGNIFICANT CHANGE UP (ref 3.8–5.2)
RBC # FLD: 17.4 % — HIGH (ref 10.3–14.5)
SODIUM SERPL-SCNC: 128 MMOL/L — LOW (ref 135–145)
SODIUM SERPL-SCNC: 130 MMOL/L — LOW (ref 135–145)
SODIUM SERPL-SCNC: 132 MMOL/L — LOW (ref 135–145)
SPECIMEN SOURCE: SIGNIFICANT CHANGE UP
WBC # BLD: 4.44 K/UL — SIGNIFICANT CHANGE UP (ref 3.8–10.5)
WBC # FLD AUTO: 4.44 K/UL — SIGNIFICANT CHANGE UP (ref 3.8–10.5)

## 2022-08-24 PROCEDURE — 74230 X-RAY XM SWLNG FUNCJ C+: CPT | Mod: 26

## 2022-08-24 PROCEDURE — 99232 SBSQ HOSP IP/OBS MODERATE 35: CPT | Mod: GC

## 2022-08-24 PROCEDURE — 99232 SBSQ HOSP IP/OBS MODERATE 35: CPT

## 2022-08-24 RX ORDER — POTASSIUM CHLORIDE 20 MEQ
20 PACKET (EA) ORAL ONCE
Refills: 0 | Status: COMPLETED | OUTPATIENT
Start: 2022-08-24 | End: 2022-08-24

## 2022-08-24 RX ORDER — ERTAPENEM SODIUM 1 G/1
1000 INJECTION, POWDER, LYOPHILIZED, FOR SOLUTION INTRAMUSCULAR; INTRAVENOUS EVERY 24 HOURS
Refills: 0 | Status: DISCONTINUED | OUTPATIENT
Start: 2022-08-24 | End: 2022-08-26

## 2022-08-24 RX ADMIN — APIXABAN 5 MILLIGRAM(S): 2.5 TABLET, FILM COATED ORAL at 06:29

## 2022-08-24 RX ADMIN — SODIUM CHLORIDE 1 GRAM(S): 9 INJECTION INTRAMUSCULAR; INTRAVENOUS; SUBCUTANEOUS at 14:57

## 2022-08-24 RX ADMIN — CEFTRIAXONE 100 MILLIGRAM(S): 500 INJECTION, POWDER, FOR SOLUTION INTRAMUSCULAR; INTRAVENOUS at 12:57

## 2022-08-24 RX ADMIN — SODIUM CHLORIDE 1 GRAM(S): 9 INJECTION INTRAMUSCULAR; INTRAVENOUS; SUBCUTANEOUS at 06:29

## 2022-08-24 RX ADMIN — SODIUM CHLORIDE 1 GRAM(S): 9 INJECTION INTRAMUSCULAR; INTRAVENOUS; SUBCUTANEOUS at 22:15

## 2022-08-24 RX ADMIN — MIRTAZAPINE 15 MILLIGRAM(S): 45 TABLET, ORALLY DISINTEGRATING ORAL at 12:59

## 2022-08-24 RX ADMIN — ERTAPENEM SODIUM 120 MILLIGRAM(S): 1 INJECTION, POWDER, LYOPHILIZED, FOR SOLUTION INTRAMUSCULAR; INTRAVENOUS at 14:56

## 2022-08-24 RX ADMIN — APIXABAN 5 MILLIGRAM(S): 2.5 TABLET, FILM COATED ORAL at 18:00

## 2022-08-24 RX ADMIN — Medication 20 MILLIEQUIVALENT(S): at 14:56

## 2022-08-24 RX ADMIN — ATORVASTATIN CALCIUM 10 MILLIGRAM(S): 80 TABLET, FILM COATED ORAL at 12:58

## 2022-08-24 NOTE — PROGRESS NOTE ADULT - SUBJECTIVE AND OBJECTIVE BOX
[facility] DIVISION OF KIDNEY DISEASES AND HYPERTENSION   FOLLOW UP NOTE    --------------------------------------------------------------------------------  Chief Complaint:    24 hour events/subjective: Pt. was seen and examined today. Went for barium swallow this am. Othernise no overnight events. Daughter translating at bedside. Notes sleeping well (better than at home) with less confusion upon awakening than prior (but never knew date at baseline or now). Tolerating diet. Voiding well.      PAST HISTORY  --------------------------------------------------------------------------------  No significant changes to PMH, PSH, FHx, SHx, unless otherwise noted    ALLERGIES & MEDICATIONS  --------------------------------------------------------------------------------  Allergies    No Known Allergies    Intolerances      Standing Inpatient Medications  apixaban 5 milliGRAM(s) Oral every 12 hours  atorvastatin Oral Tab/Cap - Peds 10 milliGRAM(s) Oral daily  cefTRIAXone   IVPB 1000 milliGRAM(s) IV Intermittent every 24 hours  mirtazapine 15 milliGRAM(s) Oral daily  potassium chloride   Powder 20 milliEquivalent(s) Oral once  sodium chloride 1 Gram(s) Oral three times a day  sodium chloride 0.9%. 1000 milliLiter(s) IV Continuous <Continuous>    PRN Inpatient Medications  acetaminophen     Tablet .. 650 milliGRAM(s) Oral every 6 hours PRN  ondansetron Injectable 4 milliGRAM(s) IV Push every 8 hours PRN      REVIEW OF SYSTEMS  --------------------------------------------------------------------------------  Gen: No fevers/chills  Head/Eyes/Ears: No HA   Respiratory: No dyspnea, cough  CV: No chest pain  GI: No abdominal pain, diarrhea  : No dysuria, hematuria  MSK: No  edema  Skin: No rashes  Heme: No easy bruising or bleeding    All other systems were reviewed and are negative, except as noted.    VITALS/PHYSICAL EXAM  --------------------------------------------------------------------------------  T(C): 36.7 (08-24-22 @ 05:25), Max: 36.9 (08-23-22 @ 21:25)  HR: 70 (08-24-22 @ 05:25) (70 - 82)  BP: 115/92 (08-24-22 @ 05:25) (115/60 - 121/78)  RR: 17 (08-24-22 @ 05:25) (17 - 18)  SpO2: 98% (08-24-22 @ 05:25) (98% - 99%)  Wt(kg): --    Weight (kg): 88.9 (08-23-22 @ 06:18)      08-23-22 @ 07:01  -  08-24-22 @ 07:00  --------------------------------------------------------  IN: 0 mL / OUT: 1050 mL / NET: -1050 mL        Physical Exam:  	Gen: NAD  	HEENT: Anicteric  	Pulm: CTA B/L  	CV: S1S2+ no murmurs  	Abd: ileostomy and wound noted. NDNT  	Ext: No LE edema B/L  	Neuro: Found asleep, easily aroused to voice. Oritented to person, place, events, but not the date (per daughter, never pays attention to date at home)  	Skin: Warm and dry      LABS/STUDIES  --------------------------------------------------------------------------------              10.0   4.44  >-----------<  211      [08-24-22 @ 05:30]              31.6     128  |  91  |  46  ----------------------------<  92      [08-24-22 @ 05:30]  3.3   |  23  |  1.18        Ca     8.6     [08-24-22 @ 05:30]      Mg     1.80     [08-24-22 @ 05:30]      Phos  4.1     [08-24-22 @ 05:30]          Serum Osmolality 278      [08-22-22 @ 18:04]    Creatinine Trend:  SCr 1.18 [08-24 @ 05:30]  SCr 1.45 [08-23 @ 21:36]  SCr 1.85 [08-23 @ 12:08]  SCr 2.07 [08-23 @ 03:27]  SCr 2.46 [08-22 @ 18:04]    Urinalysis - [08-21-22 @ 23:09]      Color Light Orange / Appearance Turbid / SG 1.017 / pH 6.0      Gluc Negative / Ketone Trace  / Bili Negative / Urobili 3 mg/dL       Blood Moderate / Protein 30 mg/dL / Leuk Est Large / Nitrite Negative      RBC 11 / WBC >720 / Hyaline  / Gran  / Sq Epi  / Non Sq Epi 3 / Bacteria Many    Urine Creatinine 141      [08-21-22 @ 23:09]  Urine Protein 93      [08-21-22 @ 23:09]  Urine Sodium <20      [08-22-22 @ 14:00]  Urine Urea Nitrogen 411.3      [08-21-22 @ 23:09]  Urine Potassium 82.4      [08-21-22 @ 23:09]  Urine Osmolality 413      [08-22-22 @ 14:00]    HBsAb <3.0      [07-26-22 @ 01:47]  HBsAg Nonreact      [07-26-22 @ 01:47]  HBcAb Nonreact      [07-26-22 @ 01:47]  HCV 0.13, Nonreact      [07-26-22 @ 01:47]

## 2022-08-24 NOTE — PROVIDER CONTACT NOTE (OTHER) - SITUATION
patient sacha to 39 unsustained, went back up to 78 patient sacha to 39, unsustained. patient's pulse then went back up to 78

## 2022-08-24 NOTE — PROGRESS NOTE ADULT - SUBJECTIVE AND OBJECTIVE BOX
PROGRESS NOTE:     Patient is a 67y old  Female who presents with a chief complaint of Hyponatremia (24 Aug 2022 11:42)      SUBJECTIVE / OVERNIGHT EVENTS: No acute events.     ADDITIONAL REVIEW OF SYSTEMS:    MEDICATIONS  (STANDING):  apixaban 5 milliGRAM(s) Oral every 12 hours  atorvastatin Oral Tab/Cap - Peds 10 milliGRAM(s) Oral daily  mirtazapine 15 milliGRAM(s) Oral daily  potassium chloride   Powder 20 milliEquivalent(s) Oral once  sodium chloride 1 Gram(s) Oral three times a day  sodium chloride 0.9%. 1000 milliLiter(s) (100 mL/Hr) IV Continuous <Continuous>    MEDICATIONS  (PRN):  acetaminophen     Tablet .. 650 milliGRAM(s) Oral every 6 hours PRN Temp greater or equal to 38C (100.4F), Mild Pain (1 - 3)  ondansetron Injectable 4 milliGRAM(s) IV Push every 8 hours PRN Nausea and/or Vomiting      CAPILLARY BLOOD GLUCOSE        I&O's Summary    23 Aug 2022 07:01  -  24 Aug 2022 07:00  --------------------------------------------------------  IN: 0 mL / OUT: 1050 mL / NET: -1050 mL        PHYSICAL EXAM:  Vital Signs Last 24 Hrs  T(C): 36.7 (24 Aug 2022 05:25), Max: 36.9 (23 Aug 2022 21:25)  T(F): 98 (24 Aug 2022 05:25), Max: 98.5 (23 Aug 2022 21:25)  HR: 70 (24 Aug 2022 05:25) (70 - 82)  BP: 115/92 (24 Aug 2022 05:25) (115/60 - 121/78)  BP(mean): --  RR: 17 (24 Aug 2022 05:25) (17 - 18)  SpO2: 98% (24 Aug 2022 05:25) (98% - 99%)    Parameters below as of 24 Aug 2022 05:25  Patient On (Oxygen Delivery Method): room air        GENERAL: Elderly female, NAD  EYES: EOMI, PERRL, conjunctiva and sclera clear  ENT: Neck supple, No JVD, dry oral mucosa  CHEST/LUNG: Clear to auscultation bilaterally; No wheeze, equal breath sounds bilaterally   HEART: Regular rate and rhythm; No murmurs, rubs, or gallops  ABDOMEN: + mildly tender around ostomy site. Ostomy draining brownish output, no purulence. Ostomy intact, skin non-erythematous   EXTREMITIES: +DP/PT/Radial pulses, No clubbing, cyanosis, trace LE edema  PSYCH: Nl behavior, nl affect  NEUROLOGY: AAOx3, non-focal, cranial nerves intact  SKIN: Normal color, No rashes or lesions      LABS:                        10.0   4.44  )-----------( 211      ( 24 Aug 2022 05:30 )             31.6     08-24    128<L>  |  91<L>  |  46<H>  ----------------------------<  92  3.3<L>   |  23  |  1.18    Ca    8.6      24 Aug 2022 05:30  Phos  4.1     08-24  Mg     1.80     08-24                Culture - Urine (collected 21 Aug 2022 23:09)  Source: Clean Catch Clean Catch (Midstream)  Final Report (24 Aug 2022 11:44):    >100,000 CFU/ml Escherichia coli ESBL  Organism: Escherichia coli ESBL (24 Aug 2022 11:44)  Organism: Escherichia coli ESBL (24 Aug 2022 11:44)        RADIOLOGY & ADDITIONAL TESTS:  Results Reviewed:   Imaging Personally Reviewed:  Electrocardiogram Personally Reviewed:    COORDINATION OF CARE:  Care Discussed with Consultants/Other Providers [Y/N]:  Prior or Outpatient Records Reviewed [Y/N]:

## 2022-08-24 NOTE — DIETITIAN INITIAL EVALUATION ADULT - PROBLEM SELECTOR PROBLEM 6
Healthcare maintenance Consent (Scalp)/Introductory Paragraph: The rationale for Mohs was explained to the patient and consent was obtained. The risks, benefits and alternatives to therapy were discussed in detail. Specifically, the risks of changes in hair growth pattern secondary to repair, infection, scarring, bleeding, prolonged wound healing, incomplete removal, allergy to anesthesia, nerve injury and recurrence were addressed. Prior to the procedure, the treatment site was clearly identified and confirmed by the patient. All components of Universal Protocol/PAUSE Rule completed.

## 2022-08-24 NOTE — SWALLOW VFSS/MBS ASSESSMENT ADULT - RECOMMENDED CONSISTENCY
1.) Soft Bite Size with Thin Liquids  2.) Feeding/Swallowing Guidelines: Upright position, small bites, chew/mash well, single cup sip of thin liquids; alternate with a liquid wash after every two bites.  3.) Aspiration Precautions  4.) Reflux Precautions  5.) Maintain Good Oral Hygiene Care

## 2022-08-24 NOTE — PROGRESS NOTE ADULT - PROBLEM SELECTOR PLAN 6
Pt w/ RLE DVT   - Switched from Lovenox to Eliquis in setting of JAMISON, can switch back to Lovenox if Cr remains stable

## 2022-08-24 NOTE — PROGRESS NOTE ADULT - PROBLEM SELECTOR PLAN 1
Pt with hyponatremia to 116 on admission with osmolality 298. Pt with hypovolemic hyponatremia likely 2/2 decreased PO intake and high output from ostomy.   - Na improved to 128 this AM   - s/p IV fluids   - c/w NaCL 1gm TID   - goal to not overcorrect more than 6-8mEq/L in 24 hours  - Serial BMP's   - Renal input appreciated

## 2022-08-24 NOTE — SWALLOW VFSS/MBS ASSESSMENT ADULT - COMMENTS
Medicine Note 8/23/2022 - 67 Vietnamese speaking female PMH of incarcerated hernia s/p repair w/ ostomy bag, HTN, HLD presenting from PCP office for hyponatremia 119, found to also have an JAMISON, admitted for further management    Of Note: Patient was seen for a Clinical Swallow Eval on 8/23/2022 (See Consult).      Patient arrived to Radiology for Cinesophagram. Patient was accompanied by her daughter. Patient is Vietnamese speaking. Patient's daughter provided translation. [employer] also utilized ID# for Vietnamese.

## 2022-08-24 NOTE — PROGRESS NOTE ADULT - SUBJECTIVE AND OBJECTIVE BOX
Surgery Progress Note    Subjective:     Patient seen and examined at bedside. VSS, AF. Patient without complaints this AM. Hyponatremia resolving. 350cc/24 hr ostomy    OBJECTIVE:     T(C): 36.7 (08-24-22 @ 05:25), Max: 36.9 (08-23-22 @ 21:25)  HR: 70 (08-24-22 @ 05:25) (70 - 82)  BP: 115/92 (08-24-22 @ 05:25) (103/62 - 121/78)  RR: 17 (08-24-22 @ 05:25) (17 - 18)  SpO2: 98% (08-24-22 @ 05:25) (98% - 99%)  Wt(kg): --    I&O's Detail    23 Aug 2022 07:01  -  24 Aug 2022 07:00  --------------------------------------------------------  IN:  Total IN: 0 mL    OUT:    Ileostomy (mL): 650 mL    Voided (mL): 400 mL  Total OUT: 1050 mL    Total NET: -1050 mL          PHYSICAL EXAM:    GENERAL: NAD, lying in bed comfortably  ABDOMEN: End ileostomy pink, minimal output. Midline ex lap being packed, granulation tissue present  LUNGS: Comfortable on room air    MEDICATIONS  (STANDING):  apixaban 5 milliGRAM(s) Oral every 12 hours  atorvastatin Oral Tab/Cap - Peds 10 milliGRAM(s) Oral daily  cefTRIAXone   IVPB 1000 milliGRAM(s) IV Intermittent every 24 hours  mirtazapine 15 milliGRAM(s) Oral daily  potassium chloride   Powder 20 milliEquivalent(s) Oral once  sodium chloride 1 Gram(s) Oral three times a day  sodium chloride 0.9%. 1000 milliLiter(s) (100 mL/Hr) IV Continuous <Continuous>    MEDICATIONS  (PRN):  acetaminophen     Tablet .. 650 milliGRAM(s) Oral every 6 hours PRN Temp greater or equal to 38C (100.4F), Mild Pain (1 - 3)  ondansetron Injectable 4 milliGRAM(s) IV Push every 8 hours PRN Nausea and/or Vomiting      LABS:                          10.0   4.44  )-----------( 211      ( 24 Aug 2022 05:30 )             31.6     08-24    128<L>  |  91<L>  |  46<H>  ----------------------------<  92  3.3<L>   |  23  |  1.18    Ca    8.6      24 Aug 2022 05:30  Phos  4.1     08-24  Mg     1.80     08-24

## 2022-08-24 NOTE — SWALLOW VFSS/MBS ASSESSMENT ADULT - DEMONSTRATES NEED FOR REFERRAL TO ANOTHER SERVICE
ensure adequate caloric intake and may benefit oral supplements to maximize caloric needs/Registered Dietitian

## 2022-08-24 NOTE — DIETITIAN INITIAL EVALUATION ADULT - PERTINENT LABORATORY DATA
08-24 Na 130 mmol/L<L> Glu 102 mg/dL<H> K+ 3.5 mmol/L Cr 1.13 mg/dL BUN 44 mg/dL<H> Phos 3.6 mg/dL    A1C with Estimated Average Glucose Result: 4.9 % (07-28-22 @ 05:55)

## 2022-08-24 NOTE — SWALLOW VFSS/MBS ASSESSMENT ADULT - ADDITIONAL RECOMMENDATIONS
Reconsult as patient continues to be medically optimized. This service will follow as schedule permits.

## 2022-08-24 NOTE — PROGRESS NOTE ADULT - PROBLEM SELECTOR PLAN 5
Pt with chronic decreased PO intake, worsening chronic generalized weakness  - Nutrition consult, c/w nepro   - started Remeron for appetite stimulation   - S/p cinesophagram, speech and swallow recommending soft bite size with thin liquids  - PT recommends KEVIN

## 2022-08-24 NOTE — PROGRESS NOTE ADULT - PROBLEM SELECTOR PLAN 1
JAMISON and Hyponatremia in the setting of significant ileostomy output and poor PO intake.  likely pre-renal with Marya<20 s/p IVF with improvement as above     8/24: Sodium improved to 128, Potassium 3.3 (repleted)    Recommendations:    pending attending attestation  -Encourage PO intake (Soft Bite Size with Thin Liquids per MBBS , Zofran PRN if QTc not concerning)  -c/w Salt tabs 1g TID  -BMP BID until sodium stabilized   -Replete electrolytes PRN

## 2022-08-24 NOTE — DIETITIAN INITIAL EVALUATION ADULT - ORAL NUTRITION SUPPLEMENTS
Recommend d/c Nepro supplement and replace with Vital Health Shake 1x daily (520 mallory and 22 gm protein) to optimize p.o. intake.

## 2022-08-24 NOTE — DIETITIAN INITIAL EVALUATION ADULT - OTHER INFO
Medical course: Per chart 67 year old female PMH of incarcerated hernia s/p repair w/ ostomy bag, HTN, HLD presenting from PCP.    Nutrition interview: Patient is Turkish speaking,  services offered, however daughter at bedside and preferred to translate. No recent episodes of nausea, vomiting. Patient with colostomy bag, last BM 8/23 per RN flow sheet. Denies any chewing/swallowing difficulties with current diet texture minced and moist. Bedside swallow assessment done 8/24 with recommendations for minced and moist diet. No food allergies. Stated UBW: 200-210#. Food preferences explored and noted. Intake is 50-75% per RN flowsheets. Observed with 50% of breakfast tray consumed at bedside. Patient being provided with Nepro supplement 3x daily. Per daughter patient consumes about 1 Nepro throughout the day. Feeding skills: minimal assistance. Patient started on remeron to increase appetite.

## 2022-08-24 NOTE — PROGRESS NOTE ADULT - ASSESSMENT
Patient is a 67 year old female (speaks Kinyarwanda dialect) with a PMHx of HTN, HLD, left tibia surgery, lap kevan (2011 at [facility]) and S/P repair of incarcerated  ventral hernia repair with diagnostic laparoscopy converted to open for repair of enterotomy, reduction of hernia and hernia defect repair on 6/15/22 with hospital course complicated by fever and tachycardia requiring SICU admission with return to OR for exploratory laparotomy with small bowel resection and end ileostomy on 6/21/22) who presented with nausea, vomiting today and decreased PO intake. Surgery consulted for exam of midline incision and ostomy output of 500 cc over 34 hours per primary team    - C/w packing of midline wound and local wound care  - Recommend PICC and IV hydration outpatient given poor PO intake  - ostomy output wnl (<1L); if increases >1L over 24 hours can replete as necessary with LR and consider imodium to slow output which she was sent home with   - replete electrolytes prn        B Team Surgery  #[id number]

## 2022-08-24 NOTE — DIETITIAN INITIAL EVALUATION ADULT - WEIGHT FOR BMI (KG)
Patient called back.  He thought Karolyn worked with his cardiologist.  He was confused as to why Karolyn was on his Patient Portal.  Writer explained that it may be due to Karolyn's hx of working with Dr Nelson.  Patient will reach out to cardiology.   88.9

## 2022-08-24 NOTE — PROGRESS NOTE ADULT - ASSESSMENT
67 French speaking female PMH of incarcerated hernia s/p repair w/ ostomy bag, HTN, HLD presenting from PCP office for hyponatremia 119, found to also have an JAMISON, admitted for further management

## 2022-08-24 NOTE — DIETITIAN INITIAL EVALUATION ADULT - NSFNSGIIOFT_GEN_A_CORE
08-23-22 @ 07:01  -  08-24-22 @ 07:00  --------------------------------------------------------  OUT:    Ileostomy (mL): 650 mL  Total OUT: 650 mL    Total NET: -650 mL

## 2022-08-24 NOTE — PROVIDER CONTACT NOTE (OTHER) - ASSESSMENT
daughter stated pt had small amount of yellow emesis at that time. denies sob, denies chest pain, asymptomatic Patient's daughter stated that patient had small amount of yellow emesis at that time. Patient denies sob, denies chest pain. Patient asymptomatic. T 99.2. P 78. /57. R 16. O2 97.

## 2022-08-24 NOTE — DIETITIAN INITIAL EVALUATION ADULT - ADD RECOMMEND
1)  to provide Vital Health Shake 1x daily (520 mallory and 22 gm protein)    2) Continue current diet order: regular   3) Monitor weights, labs, BM's, skin integrity, p.o. intake.   4) RD available prn.

## 2022-08-24 NOTE — SWALLOW VFSS/MBS ASSESSMENT ADULT - DIAGNOSTIC IMPRESSIONS
Patient presents with Mild Oral Stage and Functional Pharyngeal Stage swallowing mechanism. The Oral Stage is characterized by adequate oral containment, slow/prolonged chewing/mashing for solid due to partially edentulous state, slow bolus manipulation, slow tongue motion with slow anterior to posterior transfer for solid/puree trials; with trace/oral clearance deficit located on tongue surface for solid trial. A liquid wash assist to clear oral residue.  The Pharyngeal Stage is characterized by adequate initiation of the pharyngeal swallow, adequate laryngeal elevation, adequate tongue base retraction and adequate pharyngeal constriction. There is adequate pharyngeal clearance post primary swallow for puree/solid trials.  There was No Laryngeal Penetration for Puree/Solids/Mildly Thick Liquids. There was Laryngeal Penetration during the swallow for Thin Liquids with retrieval and airway protection maintained. Of Note: Limited view of the Trachea due to patient's body habitus.

## 2022-08-24 NOTE — DIETITIAN INITIAL EVALUATION ADULT - PERTINENT MEDS FT
MEDICATIONS  (STANDING):  apixaban 5 milliGRAM(s) Oral every 12 hours  atorvastatin Oral Tab/Cap - Peds 10 milliGRAM(s) Oral daily  ertapenem  IVPB 1000 milliGRAM(s) IV Intermittent every 24 hours  mirtazapine 15 milliGRAM(s) Oral daily  potassium chloride   Powder 20 milliEquivalent(s) Oral once  sodium chloride 1 Gram(s) Oral three times a day  sodium chloride 0.9%. 1000 milliLiter(s) (100 mL/Hr) IV Continuous <Continuous>    MEDICATIONS  (PRN):  acetaminophen     Tablet .. 650 milliGRAM(s) Oral every 6 hours PRN Temp greater or equal to 38C (100.4F), Mild Pain (1 - 3)  ondansetron Injectable 4 milliGRAM(s) IV Push every 8 hours PRN Nausea and/or Vomiting

## 2022-08-24 NOTE — PROGRESS NOTE ADULT - PROBLEM SELECTOR PLAN 7
Diet: soft+bite sized with Nepro supplementation   DVT: Eliquis   Dispo: KEVIN when stable - supplement K

## 2022-08-24 NOTE — DIETITIAN INITIAL EVALUATION ADULT - ORAL INTAKE PTA/DIET HISTORY
Pt lives at home with 2 daughters. They cook together at home. No difficulty obtaining groceries. No specific diet followed PTA. Ensure supplements consumed 1x daily PTA. Family mostly consumed home cooked meals.

## 2022-08-25 DIAGNOSIS — R00.1 BRADYCARDIA, UNSPECIFIED: ICD-10-CM

## 2022-08-25 LAB
ANION GAP SERPL CALC-SCNC: 12 MMOL/L — SIGNIFICANT CHANGE UP (ref 7–14)
ANION GAP SERPL CALC-SCNC: 15 MMOL/L — HIGH (ref 7–14)
BASOPHILS # BLD AUTO: 0.03 K/UL — SIGNIFICANT CHANGE UP (ref 0–0.2)
BASOPHILS NFR BLD AUTO: 0.5 % — SIGNIFICANT CHANGE UP (ref 0–2)
BUN SERPL-MCNC: 35 MG/DL — HIGH (ref 7–23)
BUN SERPL-MCNC: 38 MG/DL — HIGH (ref 7–23)
CALCIUM SERPL-MCNC: 9.2 MG/DL — SIGNIFICANT CHANGE UP (ref 8.4–10.5)
CALCIUM SERPL-MCNC: 9.4 MG/DL — SIGNIFICANT CHANGE UP (ref 8.4–10.5)
CHLORIDE SERPL-SCNC: 93 MMOL/L — LOW (ref 98–107)
CHLORIDE SERPL-SCNC: 93 MMOL/L — LOW (ref 98–107)
CO2 SERPL-SCNC: 22 MMOL/L — SIGNIFICANT CHANGE UP (ref 22–31)
CO2 SERPL-SCNC: 26 MMOL/L — SIGNIFICANT CHANGE UP (ref 22–31)
CREAT SERPL-MCNC: 0.93 MG/DL — SIGNIFICANT CHANGE UP (ref 0.5–1.3)
CREAT SERPL-MCNC: 1.07 MG/DL — SIGNIFICANT CHANGE UP (ref 0.5–1.3)
EGFR: 57 ML/MIN/1.73M2 — LOW
EGFR: 67 ML/MIN/1.73M2 — SIGNIFICANT CHANGE UP
EOSINOPHIL # BLD AUTO: 0.03 K/UL — SIGNIFICANT CHANGE UP (ref 0–0.5)
EOSINOPHIL NFR BLD AUTO: 0.5 % — SIGNIFICANT CHANGE UP (ref 0–6)
GLUCOSE SERPL-MCNC: 100 MG/DL — HIGH (ref 70–99)
GLUCOSE SERPL-MCNC: 93 MG/DL — SIGNIFICANT CHANGE UP (ref 70–99)
HCT VFR BLD CALC: 34.1 % — LOW (ref 34.5–45)
HGB BLD-MCNC: 10.9 G/DL — LOW (ref 11.5–15.5)
IANC: 3.82 K/UL — SIGNIFICANT CHANGE UP (ref 1.8–7.4)
IMM GRANULOCYTES NFR BLD AUTO: 0.5 % — SIGNIFICANT CHANGE UP (ref 0–1.5)
LYMPHOCYTES # BLD AUTO: 1.83 K/UL — SIGNIFICANT CHANGE UP (ref 1–3.3)
LYMPHOCYTES # BLD AUTO: 29.2 % — SIGNIFICANT CHANGE UP (ref 13–44)
MAGNESIUM SERPL-MCNC: 1.8 MG/DL — SIGNIFICANT CHANGE UP (ref 1.6–2.6)
MAGNESIUM SERPL-MCNC: 1.8 MG/DL — SIGNIFICANT CHANGE UP (ref 1.6–2.6)
MCHC RBC-ENTMCNC: 26.1 PG — LOW (ref 27–34)
MCHC RBC-ENTMCNC: 32 GM/DL — SIGNIFICANT CHANGE UP (ref 32–36)
MCV RBC AUTO: 81.6 FL — SIGNIFICANT CHANGE UP (ref 80–100)
MONOCYTES # BLD AUTO: 0.53 K/UL — SIGNIFICANT CHANGE UP (ref 0–0.9)
MONOCYTES NFR BLD AUTO: 8.5 % — SIGNIFICANT CHANGE UP (ref 2–14)
NEUTROPHILS # BLD AUTO: 3.82 K/UL — SIGNIFICANT CHANGE UP (ref 1.8–7.4)
NEUTROPHILS NFR BLD AUTO: 60.8 % — SIGNIFICANT CHANGE UP (ref 43–77)
NRBC # BLD: 0 /100 WBCS — SIGNIFICANT CHANGE UP (ref 0–0)
NRBC # FLD: 0 K/UL — SIGNIFICANT CHANGE UP (ref 0–0)
PHOSPHATE SERPL-MCNC: 3.2 MG/DL — SIGNIFICANT CHANGE UP (ref 2.5–4.5)
PHOSPHATE SERPL-MCNC: 3.7 MG/DL — SIGNIFICANT CHANGE UP (ref 2.5–4.5)
PLATELET # BLD AUTO: 227 K/UL — SIGNIFICANT CHANGE UP (ref 150–400)
POTASSIUM SERPL-MCNC: 3.4 MMOL/L — LOW (ref 3.5–5.3)
POTASSIUM SERPL-MCNC: 3.8 MMOL/L — SIGNIFICANT CHANGE UP (ref 3.5–5.3)
POTASSIUM SERPL-SCNC: 3.4 MMOL/L — LOW (ref 3.5–5.3)
POTASSIUM SERPL-SCNC: 3.8 MMOL/L — SIGNIFICANT CHANGE UP (ref 3.5–5.3)
RBC # BLD: 4.18 M/UL — SIGNIFICANT CHANGE UP (ref 3.8–5.2)
RBC # FLD: 17.8 % — HIGH (ref 10.3–14.5)
SODIUM SERPL-SCNC: 130 MMOL/L — LOW (ref 135–145)
SODIUM SERPL-SCNC: 131 MMOL/L — LOW (ref 135–145)
WBC # BLD: 6.27 K/UL — SIGNIFICANT CHANGE UP (ref 3.8–10.5)
WBC # FLD AUTO: 6.27 K/UL — SIGNIFICANT CHANGE UP (ref 3.8–10.5)

## 2022-08-25 PROCEDURE — 99232 SBSQ HOSP IP/OBS MODERATE 35: CPT | Mod: GC

## 2022-08-25 PROCEDURE — 99232 SBSQ HOSP IP/OBS MODERATE 35: CPT

## 2022-08-25 RX ORDER — SODIUM CHLORIDE 9 MG/ML
2 INJECTION INTRAMUSCULAR; INTRAVENOUS; SUBCUTANEOUS THREE TIMES A DAY
Refills: 0 | Status: DISCONTINUED | OUTPATIENT
Start: 2022-08-25 | End: 2022-08-26

## 2022-08-25 RX ORDER — POTASSIUM CHLORIDE 20 MEQ
40 PACKET (EA) ORAL ONCE
Refills: 0 | Status: COMPLETED | OUTPATIENT
Start: 2022-08-25 | End: 2022-08-26

## 2022-08-25 RX ADMIN — SODIUM CHLORIDE 1 GRAM(S): 9 INJECTION INTRAMUSCULAR; INTRAVENOUS; SUBCUTANEOUS at 14:46

## 2022-08-25 RX ADMIN — APIXABAN 5 MILLIGRAM(S): 2.5 TABLET, FILM COATED ORAL at 18:49

## 2022-08-25 RX ADMIN — SODIUM CHLORIDE 2 GRAM(S): 9 INJECTION INTRAMUSCULAR; INTRAVENOUS; SUBCUTANEOUS at 21:59

## 2022-08-25 RX ADMIN — ATORVASTATIN CALCIUM 10 MILLIGRAM(S): 80 TABLET, FILM COATED ORAL at 13:17

## 2022-08-25 RX ADMIN — SODIUM CHLORIDE 1 GRAM(S): 9 INJECTION INTRAMUSCULAR; INTRAVENOUS; SUBCUTANEOUS at 06:44

## 2022-08-25 RX ADMIN — MIRTAZAPINE 15 MILLIGRAM(S): 45 TABLET, ORALLY DISINTEGRATING ORAL at 13:17

## 2022-08-25 RX ADMIN — ERTAPENEM SODIUM 120 MILLIGRAM(S): 1 INJECTION, POWDER, LYOPHILIZED, FOR SOLUTION INTRAMUSCULAR; INTRAVENOUS at 14:46

## 2022-08-25 RX ADMIN — APIXABAN 5 MILLIGRAM(S): 2.5 TABLET, FILM COATED ORAL at 06:44

## 2022-08-25 NOTE — PROGRESS NOTE ADULT - PROBLEM SELECTOR PLAN 5
Pt with chronic decreased PO intake, worsening chronic generalized weakness  - Nutrition consult, c/w nepro   - started Remeron for appetite stimulation   - S/p cinesophagram, speech and swallow recommending soft bite size with thin liquids  - PT recommends KEVIN but patient refusing Brief episodes of sinus bradycardia overnight while vomiting, asymptomatic and HR improved   - monitor HR on telemetry Brief episodes of sinus bradycardia overnight while vomiting, asymptomatic and HR improved   - monitor HR

## 2022-08-25 NOTE — PROGRESS NOTE ADULT - ATTENDING COMMENTS
JAMISON and Hyponatremia in the setting of significant ileostomy output and UTI  likely pre-renal with Marya<20 s/p IVF with improvement as above   Na 120this am. restart NS at 100cc/hr for 1 liter   needs strict I/os   start NaCL 1gm TID   repeat serum Na level this evening  further recommendation as per course
JAMISON and Hyponatremia in the setting of significant ileostomy output and UTI  likely pre-renal with Marya<20 s/p IVF with improvement as above   needs strict I/os   start NaCL 1gm TID   further recommendation as per course
JAMISON and Hyponatremia in the setting of significant ileostomy output and UTI  likely pre-renal with Marya<20 s/p IVF with improvement as above   needs strict I/os   increase NaCL to 2 gm TID. continue upon discharge as this would ensure that she can keep well hydrated   replete K  further recommendation as per course  plan discussed with patient's daughter at length at bedside

## 2022-08-25 NOTE — CHART NOTE - NSCHARTNOTEFT_GEN_A_CORE
Nephro consulted for hyponatremia/JAMISON    ACP  [id number]
Pt with Hx of DVT (early August) on Lovenox. As per pharmacy, recommend hold Lovenox given JAMISON. Recommend heparin gtt, however, pt is hard stick and may not be able to obtain labwork every 6 hours to monitor aPTT. Will change to Eliquis 5 mg BID and continue to monitor.     ACP     [id number]
Renal service will sign off at this time. please re-consult if needed
Spoke with Nephrology regarding hyponatremia. Na from 1400 is 119 and has improved to 125 this evening. Recommends to continue current orders and check BMP in am. Will continue to monitor.     CELESTE Chowdary  Department of Medicine   In House # [id number]
Case discussed with Dr. Cruz, Pt improving with more formed stool, ok to discontinue Cdiff and isolation precautions.
Patient with Na 120. Osm - 278. Urine osm 413 Urine Na<120. Will give IV NS @100cc/hr x 10 hours. Check BMP Q 8 hours - Next ~ 12pm. F/U Further Renal recs.

## 2022-08-25 NOTE — PROGRESS NOTE ADULT - PROBLEM SELECTOR PLAN 7
- supplement K prn Pt w/ RLE DVT   - Switched from Lovenox to Eliquis in setting of JAMISON, can switch back to Lovenox if Cr remains stable

## 2022-08-25 NOTE — PROGRESS NOTE ADULT - ASSESSMENT
Patient is a 67 year old female (speaks Luxembourgish dialect) with a PMHx of HTN, HLD, left tibia surgery, lap kevan (2011 at [facility]) and S/P repair of incarcerated  ventral hernia repair with diagnostic laparoscopy converted to open for repair of enterotomy, reduction of hernia and hernia defect repair on 6/15/22 with hospital course complicated by fever and tachycardia requiring SICU admission with return to OR for exploratory laparotomy with small bowel resection and end ileostomy on 6/21/22) who presented with nausea, vomiting today and decreased PO intake. Surgery consulted for exam of midline incision and ostomy output of 500 cc over 34 hours per primary team    - C/w packing of midline wound and local wound care  - Recommend PICC and IV hydration outpatient given poor PO intake  - ostomy output wnl (<1L); if increases >1L over 24 hours can replete as necessary with LR and consider imodium to slow output which she was sent home with   - replete electrolytes prn  - Treat E.Coli infection        B Team Surgery  #[id number] Patient is a 67 year old female (speaks Albanian dialect) with a PMHx of HTN, HLD, left tibia surgery, lap kevan (2011 at [facility]) and S/P repair of incarcerated  ventral hernia repair with diagnostic laparoscopy converted to open for repair of enterotomy, reduction of hernia and hernia defect repair on 6/15/22 with hospital course complicated by fever and tachycardia requiring SICU admission with return to OR for exploratory laparotomy with small bowel resection and end ileostomy on 6/21/22) who presented with nausea, vomiting today and decreased PO intake. Surgery consulted for exam of midline incision and ostomy output of 500 cc over 34 hours per primary team    - C/w packing of midline wound and local wound care  - Recommend PICC and IV hydration outpatient given poor PO intake  - ostomy output wnl (<1L); if increases >1L over 24 hours can replete as necessary with LR and consider imodium to slow output which she was sent home with   - replete electrolytes prn  - Supportive care for STEC toxin infection        B Team Surgery  #[id number]

## 2022-08-25 NOTE — PROGRESS NOTE ADULT - SUBJECTIVE AND OBJECTIVE BOX
PROGRESS NOTE:     Patient is a 67y old  Female who presents with a chief complaint of Hyponatremia (25 Aug 2022 12:02)      SUBJECTIVE / OVERNIGHT EVENTS:    ADDITIONAL REVIEW OF SYSTEMS:    MEDICATIONS  (STANDING):  apixaban 5 milliGRAM(s) Oral every 12 hours  atorvastatin Oral Tab/Cap - Peds 10 milliGRAM(s) Oral daily  ertapenem  IVPB 1000 milliGRAM(s) IV Intermittent every 24 hours  mirtazapine 15 milliGRAM(s) Oral daily  potassium chloride   Powder 40 milliEquivalent(s) Oral once  sodium chloride 1 Gram(s) Oral three times a day  sodium chloride 0.9%. 1000 milliLiter(s) (100 mL/Hr) IV Continuous <Continuous>    MEDICATIONS  (PRN):  acetaminophen     Tablet .. 650 milliGRAM(s) Oral every 6 hours PRN Temp greater or equal to 38C (100.4F), Mild Pain (1 - 3)  ondansetron Injectable 4 milliGRAM(s) IV Push every 8 hours PRN Nausea and/or Vomiting      CAPILLARY BLOOD GLUCOSE        I&O's Summary      PHYSICAL EXAM:  Vital Signs Last 24 Hrs  T(C): 37.1 (25 Aug 2022 06:55), Max: 37.3 (24 Aug 2022 22:30)  T(F): 98.8 (25 Aug 2022 06:55), Max: 99.2 (24 Aug 2022 22:30)  HR: 66 (25 Aug 2022 06:55) (37 - 78)  BP: 90/55 (25 Aug 2022 06:55) (90/55 - 104/57)  BP(mean): --  RR: 16 (25 Aug 2022 06:55) (16 - 16)  SpO2: 95% (25 Aug 2022 06:55) (95% - 97%)    Parameters below as of 25 Aug 2022 06:55  Patient On (Oxygen Delivery Method): room air      CONSTITUTIONAL: NAD, well-developed  RESPIRATORY: Normal respiratory effort; lungs are clear to auscultation bilaterally  CARDIOVASCULAR: Regular rate and rhythm, normal S1 and S2, no murmur/rub/gallop; No lower extremity edema; Peripheral pulses are 2+ bilaterally  ABDOMEN: Nontender to palpation, normoactive bowel sounds, no rebound/guarding; No hepatosplenomegaly  MUSCLOSKELETAL: no clubbing or cyanosis of digits; no joint swelling or tenderness to palpation  PSYCH: A+O to person, place, and time; affect appropriate    LABS:                        10.9   6.27  )-----------( 227      ( 25 Aug 2022 06:30 )             34.1     08-25    131<L>  |  93<L>  |  38<H>  ----------------------------<  100<H>  3.4<L>   |  26  |  1.07    Ca    9.4      25 Aug 2022 06:30  Phos  3.7     08-25  Mg     1.80     08-25                  RADIOLOGY & ADDITIONAL TESTS:  Results Reviewed:   Imaging Personally Reviewed:  Electrocardiogram Personally Reviewed:    COORDINATION OF CARE:  Care Discussed with Consultants/Other Providers [Y/N]:  Prior or Outpatient Records Reviewed [Y/N]:

## 2022-08-25 NOTE — PROGRESS NOTE ADULT - PROBLEM SELECTOR PLAN 1
JAMISON and Hyponatremia in the setting of significant ileostomy output and poor PO intake.  likely pre-renal with Marya<20 s/p IVF with improvement as above     8/25: Sodium improved to 131, Potassium 3.4 (repleted)    Recommendations:  ##Pending attending attestation  -Encourage PO intake (Soft Bite Size with Thin Liquids per MBBS , Zofran PRN if QTc not concerning, pt not c/o nausea this am)  --Agree with mirtazapine for appetite  -Increase salt tabs to 2g TID  -BMP TID until sodium stabilized   -Continue to Replete electrolytes PRN  -Would not start imodium in setting of STEC in stool JAMISON and Hyponatremia in the setting of significant ileostomy output and poor PO intake.  likely pre-renal with Marya<20 s/p IVF with improvement as above     8/25: Sodium improved to 131, Potassium 3.4 (repleted)    Recommendations:  ##Pending attending attestation    --Agree with mirtazapine for appetite  -Increase salt tabs to 2g TID  -BMP TID until sodium stabilized   -Continue to Replete electrolytes PRN

## 2022-08-25 NOTE — PROGRESS NOTE ADULT - PROBLEM SELECTOR PLAN 1
Pt with hyponatremia to 116 on admission with osmolality 298. Pt with hypovolemic hyponatremia likely 2/2 decreased PO intake and high output from ostomy.   - Na improved   - s/p IV fluids   - c/w NaCL 1gm TID   - Monitor BMP  - Renal input appreciated

## 2022-08-25 NOTE — PROGRESS NOTE ADULT - PROBLEM SELECTOR PLAN 6
Pt w/ RLE DVT   - Switched from Lovenox to Eliquis in setting of JAMISON, can switch back to Lovenox if Cr remains stable Pt with chronic decreased PO intake, worsening chronic generalized weakness  - Nutrition consult, c/w nepro   - started Remeron for appetite stimulation   - S/p cinesophagram, speech and swallow recommending soft bite size with thin liquids  - PT recommends KEVIN but patient refusing

## 2022-08-25 NOTE — PROGRESS NOTE ADULT - ASSESSMENT
67F PMHx HTN, HLD, RLE DVT (on lovenox), Incarcerated hernia s/p repair w/ ileoostomy bag in June with poor PO intake p/w poor PO, AMS, hyponatremia, Intermittent hypokalemia found to have STEC and UTI w/ ESBL. Sodium and mental status improving on salt tabs      Impression:  Hyponatremia 2/2 dehydration (Poor PO and fluid loss via ileostomy) iso UTI (ESBL) and STEC

## 2022-08-25 NOTE — PROVIDER CONTACT NOTE (OTHER) - ASSESSMENT
pt denies any chest pain or shortness of breathe, asymptomatic and resting comfortably in bed at this time.

## 2022-08-25 NOTE — PROGRESS NOTE ADULT - PROBLEM SELECTOR PLAN 8
Diet: soft+bite sized with Nepro supplementation   DVT: Eliquis   Dispo: KEVIN recommended but patient refusing - supplement K prn

## 2022-08-25 NOTE — PROGRESS NOTE ADULT - SUBJECTIVE AND OBJECTIVE BOX
Surgery Progress Note    Subjective:     Patient seen and examined at bedside. Bradycardic overnight. Daughter at bedside. Patient with nausea today, not taking in much food. Positive for STEC toxin overnight. CDiff pending. Ertapenem. Ostomy output not recorded.    OBJECTIVE:     T(C): 37.1 (08-25-22 @ 06:55), Max: 37.3 (08-24-22 @ 22:30)  HR: 66 (08-25-22 @ 06:55) (37 - 78)  BP: 90/55 (08-25-22 @ 06:55) (90/55 - 112/86)  RR: 16 (08-25-22 @ 06:55) (16 - 17)  SpO2: 95% (08-25-22 @ 06:55) (95% - 99%)  Wt(kg): --    I&O's Detail      PHYSICAL EXAM:    GENERAL: NAD, lying in bed comfortably  ABDOMEN: End ileostomy pink, significant watery output. Midline ex lap being packed, granulation tissue present  LUNGS: Comfortable on room air    MEDICATIONS  (STANDING):  apixaban 5 milliGRAM(s) Oral every 12 hours  atorvastatin Oral Tab/Cap - Peds 10 milliGRAM(s) Oral daily  ertapenem  IVPB 1000 milliGRAM(s) IV Intermittent every 24 hours  mirtazapine 15 milliGRAM(s) Oral daily  potassium chloride   Powder 40 milliEquivalent(s) Oral once  sodium chloride 1 Gram(s) Oral three times a day  sodium chloride 0.9%. 1000 milliLiter(s) (100 mL/Hr) IV Continuous <Continuous>    MEDICATIONS  (PRN):  acetaminophen     Tablet .. 650 milliGRAM(s) Oral every 6 hours PRN Temp greater or equal to 38C (100.4F), Mild Pain (1 - 3)  ondansetron Injectable 4 milliGRAM(s) IV Push every 8 hours PRN Nausea and/or Vomiting      LABS:                          10.9   6.27  )-----------( 227      ( 25 Aug 2022 06:30 )             34.1     08-25    131<L>  |  93<L>  |  38<H>  ----------------------------<  100<H>  3.4<L>   |  26  |  1.07    Ca    9.4      25 Aug 2022 06:30  Phos  3.7     08-25  Mg     1.80     08-25

## 2022-08-25 NOTE — PROGRESS NOTE ADULT - SUBJECTIVE AND OBJECTIVE BOX
[facility] DIVISION OF KIDNEY DISEASES AND HYPERTENSION   FOLLOW UP NOTE    --------------------------------------------------------------------------------  Chief Complaint:    24 hour events/subjective: Pt. was seen and examined today.  Yellow emesis last night and briefly sacha to 39. Clear emesis this am. Otherwise sleeping well. Tolerating fluids. Low appetite but denies nausea and abd pain.      PAST HISTORY  --------------------------------------------------------------------------------  No significant changes to PMH, PSH, FHx, SHx, unless otherwise noted    ALLERGIES & MEDICATIONS  --------------------------------------------------------------------------------  Allergies    No Known Allergies    Intolerances      Standing Inpatient Medications  apixaban 5 milliGRAM(s) Oral every 12 hours  atorvastatin Oral Tab/Cap - Peds 10 milliGRAM(s) Oral daily  ertapenem  IVPB 1000 milliGRAM(s) IV Intermittent every 24 hours  mirtazapine 15 milliGRAM(s) Oral daily  potassium chloride   Powder 40 milliEquivalent(s) Oral once  sodium chloride 1 Gram(s) Oral three times a day  sodium chloride 0.9%. 1000 milliLiter(s) IV Continuous <Continuous>    PRN Inpatient Medications  acetaminophen     Tablet .. 650 milliGRAM(s) Oral every 6 hours PRN  ondansetron Injectable 4 milliGRAM(s) IV Push every 8 hours PRN      REVIEW OF SYSTEMS  --------------------------------------------------------------------------------  Gen: Decreased appetite. No fevers/chills  Head/Eyes/Ears: No HA   Respiratory: No dyspnea, cough  CV: No chest pain  GI: No abdominal pain, diarrhea  : No dysuria, hematuria  MSK: No  edema  Skin: No rashes  Heme: No easy bruising or bleeding    All other systems were reviewed and are negative, except as noted.    VITALS/PHYSICAL EXAM  --------------------------------------------------------------------------------  T(C): 37.1 (08-25-22 @ 06:55), Max: 37.3 (08-24-22 @ 22:30)  HR: 66 (08-25-22 @ 06:55) (37 - 78)  BP: 90/55 (08-25-22 @ 06:55) (90/55 - 112/86)  RR: 16 (08-25-22 @ 06:55) (16 - 17)  SpO2: 95% (08-25-22 @ 06:55) (95% - 99%)  Wt(kg): --          Physical Exam:  	Gen: NAD  	HEENT: Anicteric  	Pulm: CTA B/L  	CV: S1S2+  	Abd: Soft, ileostoma and abd wound noted  	Ext: No LE edema B/L  	Neuro: Found asleep. Easily aroused to voice. Oriented to person, place, events, but not date (same as baseline per daughter)  	Skin: Warm and dry    LABS/STUDIES  --------------------------------------------------------------------------------              10.9   6.27  >-----------<  227      [08-25-22 @ 06:30]              34.1     131  |  93  |  38  ----------------------------<  100      [08-25-22 @ 06:30]  3.4   |  26  |  1.07        Ca     9.4     [08-25-22 @ 06:30]      Mg     1.80     [08-25-22 @ 06:30]      Phos  3.7     [08-25-22 @ 06:30]            Creatinine Trend:  SCr 1.07 [08-25 @ 06:30]  SCr 1.10 [08-24 @ 20:30]  SCr 1.13 [08-24 @ 12:42]  SCr 1.18 [08-24 @ 05:30]  SCr 1.45 [08-23 @ 21:36]    Urinalysis - [08-21-22 @ 23:09]      Color Light Orange / Appearance Turbid / SG 1.017 / pH 6.0      Gluc Negative / Ketone Trace  / Bili Negative / Urobili 3 mg/dL       Blood Moderate / Protein 30 mg/dL / Leuk Est Large / Nitrite Negative      RBC 11 / WBC >720 / Hyaline  / Gran  / Sq Epi  / Non Sq Epi 3 / Bacteria Many    Urine Creatinine 141      [08-21-22 @ 23:09]  Urine Protein 93      [08-21-22 @ 23:09]  Urine Sodium <20      [08-22-22 @ 14:00]  Urine Urea Nitrogen 411.3      [08-21-22 @ 23:09]  Urine Potassium 82.4      [08-21-22 @ 23:09]  Urine Osmolality 413      [08-22-22 @ 14:00]    Shiga-like toxin-producing E.coli (STEC) stx1/stx2: Detected (08.22.22 @ 22:59)   Culture - Urine (08.21.22 @ 23:09)   - Amikacin: S <=16   - Amoxicillin/Clavulanic Acid: S <=8/4   - Ampicillin: R >16 These ampicillin results predict results for amoxicillin   - Ampicillin/Sulbactam: I 16/8 Enterobacter, Klebsiella aerogenes, Citrobacter, and Serratia may develop resistance during prolonged therapy (3-4 days)   - Aztreonam: R 16   - Cefazolin: R >16 (MIC_CL_COM_ENTERIC_CEFAZU) For uncomplicated UTI with K. pneumoniae, E. coli, or P. mirablis: ERVIN <=16 is sensitive and ERVIN >=32 is resistant. This also predicts results for oral agents cefaclor, cefdinir, cefpodoxime, cefprozil, cefuroxime axetil, cephalexin and locarbef for uncomplicated UTI. Note that some isolates may be susceptible to these agents while testing resistant to cefazolin.   - Cefepime: R >16   - Ceftriaxone: R >32 Enterobacter, Klebsiella aerogenes, Citrobacter, and Serratia may develop resistance during prolonged therapy   - Ciprofloxacin: R >2   - Ertapenem: S <=0.5   - Gentamicin: S <=2   - Imipenem: S <=1   - Levofloxacin: R >4   - Meropenem: S <=1   - Nitrofurantoin: S <=32 Should not be used to treat pyelonephritis   - Piperacillin/Tazobactam: S <=8   - Tigecycline: S <=2   - Tobramycin: S <=2   - Trimethoprim/Sulfamethoxazole: R >2/38   Specimen Source: Clean Catch Clean Catch (Midstream)   Culture Results:   >100,000 CFU/ml Escherichia coli ESBL   Organism Identification: Escherichia coli ESBL   Organism: Escherichia coli ESBL   Method Type: ERVIN    [facility] DIVISION OF KIDNEY DISEASES AND HYPERTENSION   FOLLOW UP NOTE    --------------------------------------------------------------------------------  Chief Complaint:    24 hour events/subjective: Pt. was seen and examined today.  Yellow emesis last night and briefly sacha to 39. Clear emesis this am. Otherwise sleeping well. Tolerating fluids. Low appetite but denies nausea and abd pain.      PAST HISTORY  --------------------------------------------------------------------------------  No significant changes to PMH, PSH, FHx, SHx, unless otherwise noted    ALLERGIES & MEDICATIONS  --------------------------------------------------------------------------------  Allergies    No Known Allergies    Intolerances      Standing Inpatient Medications  apixaban 5 milliGRAM(s) Oral every 12 hours  atorvastatin Oral Tab/Cap - Peds 10 milliGRAM(s) Oral daily  ertapenem  IVPB 1000 milliGRAM(s) IV Intermittent every 24 hours  mirtazapine 15 milliGRAM(s) Oral daily  potassium chloride   Powder 40 milliEquivalent(s) Oral once  sodium chloride 1 Gram(s) Oral three times a day  sodium chloride 0.9%. 1000 milliLiter(s) IV Continuous <Continuous>    PRN Inpatient Medications  acetaminophen     Tablet .. 650 milliGRAM(s) Oral every 6 hours PRN  ondansetron Injectable 4 milliGRAM(s) IV Push every 8 hours PRN      REVIEW OF SYSTEMS  --------------------------------------------------------------------------------  Gen: Decreased appetite. No fevers/chills  Head/Eyes/Ears: No HA   Respiratory: No dyspnea, cough  CV: No chest pain  GI: No abdominal pain, diarrhea  : No dysuria, hematuria  MSK: No  edema  Skin: No rashes  Heme: No easy bruising or bleeding    All other systems were reviewed and are negative, except as noted.    VITALS/PHYSICAL EXAM  --------------------------------------------------------------------------------  T(C): 37.1 (08-25-22 @ 06:55), Max: 37.3 (08-24-22 @ 22:30)  HR: 66 (08-25-22 @ 06:55) (37 - 78)  BP: 90/55 (08-25-22 @ 06:55) (90/55 - 112/86)  RR: 16 (08-25-22 @ 06:55) (16 - 17)  SpO2: 95% (08-25-22 @ 06:55) (95% - 99%)  Wt(kg): --          Physical Exam:  	Gen: NAD  	HEENT: Anicteric  	Pulm: CTA B/L  	CV: S1S2+  	Abd: Soft, ileostoma and abd wound noted  	Ext: No LE edema B/L  	Neuro: Found asleep. Easily aroused to voice. Oriented to person, place, events, but not date (same as baseline per daughter)  	Skin: Warm and dry    LABS/STUDIES  --------------------------------------------------------------------------------              10.9   6.27  >-----------<  227      [08-25-22 @ 06:30]              34.1     131  |  93  |  38  ----------------------------<  100      [08-25-22 @ 06:30]  3.4   |  26  |  1.07        Ca     9.4     [08-25-22 @ 06:30]      Mg     1.80     [08-25-22 @ 06:30]      Phos  3.7     [08-25-22 @ 06:30]            Creatinine Trend:  SCr 1.07 [08-25 @ 06:30]  SCr 1.10 [08-24 @ 20:30]  SCr 1.13 [08-24 @ 12:42]  SCr 1.18 [08-24 @ 05:30]  SCr 1.45 [08-23 @ 21:36]    Urinalysis - [08-21-22 @ 23:09]      Color Light Orange / Appearance Turbid / SG 1.017 / pH 6.0      Gluc Negative / Ketone Trace  / Bili Negative / Urobili 3 mg/dL       Blood Moderate / Protein 30 mg/dL / Leuk Est Large / Nitrite Negative      RBC 11 / WBC >720 / Hyaline  / Gran  / Sq Epi  / Non Sq Epi 3 / Bacteria Many    Urine Creatinine 141      [08-21-22 @ 23:09]  Urine Protein 93      [08-21-22 @ 23:09]  Urine Sodium <20      [08-22-22 @ 14:00]  Urine Urea Nitrogen 411.3      [08-21-22 @ 23:09]  Urine Potassium 82.4      [08-21-22 @ 23:09]  Urine Osmolality 413      [08-22-22 @ 14:00]

## 2022-08-25 NOTE — PROVIDER CONTACT NOTE (OTHER) - ASSESSMENT
Patient's daughter stated that patient had small amount of yellow emesis again. Patient denies sob, denies chest pain. Patient asymptomatic. T 98.8. P 66. BP 90/55. R 16. O2 95%.

## 2022-08-26 ENCOUNTER — TRANSCRIPTION ENCOUNTER (OUTPATIENT)
Age: 67
End: 2022-08-26

## 2022-08-26 VITALS
DIASTOLIC BLOOD PRESSURE: 55 MMHG | OXYGEN SATURATION: 96 % | TEMPERATURE: 98 F | HEART RATE: 82 BPM | SYSTOLIC BLOOD PRESSURE: 100 MMHG | RESPIRATION RATE: 18 BRPM

## 2022-08-26 LAB
ANION GAP SERPL CALC-SCNC: 13 MMOL/L — SIGNIFICANT CHANGE UP (ref 7–14)
BUN SERPL-MCNC: 35 MG/DL — HIGH (ref 7–23)
CALCIUM SERPL-MCNC: 9.5 MG/DL — SIGNIFICANT CHANGE UP (ref 8.4–10.5)
CHLORIDE SERPL-SCNC: 95 MMOL/L — LOW (ref 98–107)
CO2 SERPL-SCNC: 26 MMOL/L — SIGNIFICANT CHANGE UP (ref 22–31)
CREAT SERPL-MCNC: 0.99 MG/DL — SIGNIFICANT CHANGE UP (ref 0.5–1.3)
EGFR: 62 ML/MIN/1.73M2 — SIGNIFICANT CHANGE UP
GLUCOSE SERPL-MCNC: 100 MG/DL — HIGH (ref 70–99)
HCT VFR BLD CALC: 34.8 % — SIGNIFICANT CHANGE UP (ref 34.5–45)
HGB BLD-MCNC: 10.8 G/DL — LOW (ref 11.5–15.5)
MAGNESIUM SERPL-MCNC: 1.9 MG/DL — SIGNIFICANT CHANGE UP (ref 1.6–2.6)
MCHC RBC-ENTMCNC: 26.1 PG — LOW (ref 27–34)
MCHC RBC-ENTMCNC: 31 GM/DL — LOW (ref 32–36)
MCV RBC AUTO: 84.1 FL — SIGNIFICANT CHANGE UP (ref 80–100)
NRBC # BLD: 0 /100 WBCS — SIGNIFICANT CHANGE UP (ref 0–0)
NRBC # FLD: 0 K/UL — SIGNIFICANT CHANGE UP (ref 0–0)
PHOSPHATE SERPL-MCNC: 3.6 MG/DL — SIGNIFICANT CHANGE UP (ref 2.5–4.5)
PLATELET # BLD AUTO: 231 K/UL — SIGNIFICANT CHANGE UP (ref 150–400)
POTASSIUM SERPL-MCNC: 3.3 MMOL/L — LOW (ref 3.5–5.3)
POTASSIUM SERPL-SCNC: 3.3 MMOL/L — LOW (ref 3.5–5.3)
RBC # BLD: 4.14 M/UL — SIGNIFICANT CHANGE UP (ref 3.8–5.2)
RBC # FLD: 17.9 % — HIGH (ref 10.3–14.5)
SODIUM SERPL-SCNC: 134 MMOL/L — LOW (ref 135–145)
WBC # BLD: 5.85 K/UL — SIGNIFICANT CHANGE UP (ref 3.8–10.5)
WBC # FLD AUTO: 5.85 K/UL — SIGNIFICANT CHANGE UP (ref 3.8–10.5)

## 2022-08-26 PROCEDURE — 99239 HOSP IP/OBS DSCHRG MGMT >30: CPT

## 2022-08-26 RX ORDER — SODIUM CHLORIDE 9 MG/ML
2 INJECTION INTRAMUSCULAR; INTRAVENOUS; SUBCUTANEOUS
Qty: 180 | Refills: 0
Start: 2022-08-26 | End: 2022-09-24

## 2022-08-26 RX ORDER — POTASSIUM CHLORIDE 20 MEQ
10 PACKET (EA) ORAL
Refills: 0 | Status: DISCONTINUED | OUTPATIENT
Start: 2022-08-26 | End: 2022-08-26

## 2022-08-26 RX ORDER — MIRTAZAPINE 45 MG/1
1 TABLET, ORALLY DISINTEGRATING ORAL
Qty: 30 | Refills: 0
Start: 2022-08-26 | End: 2022-09-24

## 2022-08-26 RX ADMIN — ATORVASTATIN CALCIUM 10 MILLIGRAM(S): 80 TABLET, FILM COATED ORAL at 13:02

## 2022-08-26 RX ADMIN — ERTAPENEM SODIUM 120 MILLIGRAM(S): 1 INJECTION, POWDER, LYOPHILIZED, FOR SOLUTION INTRAMUSCULAR; INTRAVENOUS at 13:03

## 2022-08-26 RX ADMIN — Medication 40 MILLIEQUIVALENT(S): at 10:06

## 2022-08-26 RX ADMIN — SODIUM CHLORIDE 2 GRAM(S): 9 INJECTION INTRAMUSCULAR; INTRAVENOUS; SUBCUTANEOUS at 06:30

## 2022-08-26 RX ADMIN — APIXABAN 5 MILLIGRAM(S): 2.5 TABLET, FILM COATED ORAL at 06:29

## 2022-08-26 RX ADMIN — MIRTAZAPINE 15 MILLIGRAM(S): 45 TABLET, ORALLY DISINTEGRATING ORAL at 13:03

## 2022-08-26 RX ADMIN — SODIUM CHLORIDE 2 GRAM(S): 9 INJECTION INTRAMUSCULAR; INTRAVENOUS; SUBCUTANEOUS at 13:02

## 2022-08-26 RX ADMIN — Medication 100 MILLIEQUIVALENT(S): at 10:00

## 2022-08-26 NOTE — PROGRESS NOTE ADULT - PROBLEM SELECTOR PLAN 6
Pt with chronic decreased PO intake, worsening chronic generalized weakness  - Nutrition consult, c/w nepro   - started Remeron for appetite stimulation   - S/p cinesophagram, speech and swallow recommending soft bite size with thin liquids  - PT recommends KEVIN but patient refusing

## 2022-08-26 NOTE — DISCHARGE NOTE PROVIDER - HOSPITAL COURSE
67 Turkmen speaking female with PMHx of incarcerated hernia s/p repair w/ ostomy bag, HTN and HLD who presented from PCP office for hyponatremia level of 119, found to mild JAMISON.     1. Hyponatremia: Noted to have hyponatremia to 116 on admission with osmolality 298 - likely hypovolemic hyponatremia likely 2/2 decreased PO intake. Seen by renal and started on IVF and salt tabs with improvement in level to 134. IVF discontinued and salt tabs to be continued upon discharge.   Noted JAMISON likely pre-renal in setting of decreased PO intake, now improved. Mirtazapine inititated for appetite stimulation - s/p cinesophagram, speech and swallow recommending soft bite size with thin liquids    2. Urinary tract infection: Urine culture growing ESBL EColi - treated with Ertapenem x 3 days.    3. Diarrhea: Concern for high ileostomy output and watery stool in ostomy bag. GI PCR positive for STEC - supportive care. Output improving. Also seen by surgery for evaluatio nof midline incision. Per surgery, to continue packing of midline wound and local wound care as family has been doing. Her ostomy output should remain less than 1L. If greater, recommend repletion or return to doctor/ER. May use Imodium to slow output as outpatient.     4. Hx of RLE DVT: Switched from Lovenox to Eliquis in setting of JAMISON. To switch back to Lovenox as Cr remained stable. Per chart review, patient pending endometrial biopsy for prior complaints of endometrial biopsy. Plan to transition to DOAC after biopsy/GYN follow up.     PT - SubAcute REHAB - patient and family refusing - prefer home.   Patient seen and evaluated, no acute somatic complaints. Reviewed discharge medications with patient; All new medications requiring new prescriptions were sent to the pharmacy of patient's choice. Reviewed need for prescription for previous home medications and new prescriptions sent if requested. Medically cleared/stable for discharge as per Dr. Cruz with close outpatient follow up within 1-2 weeks of discharge. Patient understands and agrees with plan of care.

## 2022-08-26 NOTE — PROGRESS NOTE ADULT - PROBLEM SELECTOR PROBLEM 2
JAMISON (acute kidney injury)

## 2022-08-26 NOTE — DISCHARGE NOTE PROVIDER - NSDCMRMEDTOKEN_GEN_ALL_CORE_FT
acetaminophen 325 mg oral tablet: 2 tab(s) orally every 6 hours  atorvastatin 10 mg oral tablet: 1 tab(s) orally once a day  diphenoxylate-atropine 2.5 mg-0.025 mg oral tablet: 1 tab(s) orally once a day MDD:1  loperamide 2 mg oral capsule: 2 cap(s) orally every 6 hours  Lovenox 100 mg/mL injectable solution: 90 milligram(s) subcutaneously 2 times a day.    Please squeeze out 10mL of each vial before injection.   mirtazapine 15 mg oral tablet: 1 tab(s) orally once a day  sodium chloride 1 g oral tablet: 2 tab(s) orally 3 times a day

## 2022-08-26 NOTE — PROVIDER CONTACT NOTE (OTHER) - BACKGROUND
Patient admitted for hyponatremia, UTI. PMH HLD, HTN.
pt admitted for hyponatremia. PMH HLD, HTN, and DVT.
pt admitted for hyponatremia. PMH HLD, HTN, and DVT.
Patient admitted for hyponatremia, UTI. PMH HLD, HTN.

## 2022-08-26 NOTE — PROGRESS NOTE ADULT - PROVIDER SPECIALTY LIST ADULT
Surgery
Nephrology
Surgery
Nephrology
Surgery
Nephrology
Hospitalist

## 2022-08-26 NOTE — PROGRESS NOTE ADULT - SUBJECTIVE AND OBJECTIVE BOX
PROGRESS NOTE:     Patient is a 67y old  Female who presents with a chief complaint of Hyponatremia (26 Aug 2022 12:14)      SUBJECTIVE / OVERNIGHT EVENTS: No acute events.     ADDITIONAL REVIEW OF SYSTEMS:    MEDICATIONS  (STANDING):  apixaban 5 milliGRAM(s) Oral every 12 hours  atorvastatin Oral Tab/Cap - Peds 10 milliGRAM(s) Oral daily  ertapenem  IVPB 1000 milliGRAM(s) IV Intermittent every 24 hours  mirtazapine 15 milliGRAM(s) Oral daily  sodium chloride 2 Gram(s) Oral three times a day  sodium chloride 0.9%. 1000 milliLiter(s) (100 mL/Hr) IV Continuous <Continuous>    MEDICATIONS  (PRN):  acetaminophen     Tablet .. 650 milliGRAM(s) Oral every 6 hours PRN Temp greater or equal to 38C (100.4F), Mild Pain (1 - 3)  ondansetron Injectable 4 milliGRAM(s) IV Push every 8 hours PRN Nausea and/or Vomiting      CAPILLARY BLOOD GLUCOSE        I&O's Summary    25 Aug 2022 07:01  -  26 Aug 2022 07:00  --------------------------------------------------------  IN: 0 mL / OUT: 1025 mL / NET: -1025 mL        PHYSICAL EXAM:  Vital Signs Last 24 Hrs  T(C): 36.4 (26 Aug 2022 05:55), Max: 37.3 (25 Aug 2022 14:35)  T(F): 97.6 (26 Aug 2022 05:55), Max: 99.1 (25 Aug 2022 14:35)  HR: 78 (26 Aug 2022 05:55) (67 - 78)  BP: 94/55 (26 Aug 2022 05:55) (87/53 - 103/63)  BP(mean): 75 (25 Aug 2022 14:35) (75 - 75)  RR: 17 (26 Aug 2022 05:55) (16 - 17)  SpO2: 95% (26 Aug 2022 05:55) (95% - 100%)    Parameters below as of 26 Aug 2022 05:55  Patient On (Oxygen Delivery Method): room air      CONSTITUTIONAL: NAD, well-developed  RESPIRATORY: Normal respiratory effort; lungs are clear to auscultation bilaterally  CARDIOVASCULAR: Regular rate and rhythm, normal S1 and S2, no murmur/rub/gallop; No lower extremity edema; Peripheral pulses are 2+ bilaterally  ABDOMEN: Nontender to palpation, normoactive bowel sounds, no rebound/guarding; No hepatosplenomegaly  MUSCLOSKELETAL: no clubbing or cyanosis of digits; no joint swelling or tenderness to palpation  PSYCH: A+O to person, place, and time; affect appropriate    LABS:                        10.8   5.85  )-----------( 231      ( 26 Aug 2022 06:10 )             34.8     08-26    134<L>  |  95<L>  |  35<H>  ----------------------------<  100<H>  3.3<L>   |  26  |  0.99    Ca    9.5      26 Aug 2022 06:10  Phos  3.6     08-26  Mg     1.90     08-26                  RADIOLOGY & ADDITIONAL TESTS:  Results Reviewed:   Imaging Personally Reviewed:  Electrocardiogram Personally Reviewed:    COORDINATION OF CARE:  Care Discussed with Consultants/Other Providers [Y/N]:  Prior or Outpatient Records Reviewed [Y/N]:

## 2022-08-26 NOTE — PROGRESS NOTE ADULT - PROBLEM SELECTOR PLAN 7
Pt w/ RLE DVT   - Switched from Lovenox to Eliquis in setting of JAMISON, can switch back to Lovenox now

## 2022-08-26 NOTE — PROGRESS NOTE ADULT - PROBLEM SELECTOR PLAN 9
Diet: soft+bite sized with Nepro supplementation   DVT: Eliquis   Dispo: Pt recommending KEVIN, but patient refusing. Family willing to take patient back home. DC time 36 minutes
Diet: soft+bite sized with Nepro supplementation   DVT: Eliquis   Dispo: KEVIN recommended but patient refusing

## 2022-08-26 NOTE — PROGRESS NOTE ADULT - PROBLEM SELECTOR PLAN 1
Pt with hyponatremia to 116 on admission with osmolality 298. Pt with hypovolemic hyponatremia likely 2/2 decreased PO intake and high output from ostomy.   - Na improved   - s/p IV fluids   - c/w NaCL 2gm TID   - Monitor BMP  - Renal input appreciated

## 2022-08-26 NOTE — PROGRESS NOTE ADULT - SUBJECTIVE AND OBJECTIVE BOX
GENERAL SURGERY PROGRESS NOTE    SUBJECTIVE: resting comfortably. No acute issues overnight. Tolerating diet.     10-point review of systems completed and negative except as noted above.      OBJECTIVE    MEDICATIONS  acetaminophen     Tablet .. 650 milliGRAM(s) Oral every 6 hours PRN  apixaban 5 milliGRAM(s) Oral every 12 hours  atorvastatin Oral Tab/Cap - Peds 10 milliGRAM(s) Oral daily  ertapenem  IVPB 1000 milliGRAM(s) IV Intermittent every 24 hours  mirtazapine 15 milliGRAM(s) Oral daily  ondansetron Injectable 4 milliGRAM(s) IV Push every 8 hours PRN  sodium chloride 2 Gram(s) Oral three times a day  sodium chloride 0.9%. 1000 milliLiter(s) IV Continuous <Continuous>      PHYSICAL EXAM  T(C): 36.6 (08-26-22 @ 13:12), Max: 37.3 (08-25-22 @ 14:35)  HR: 82 (08-26-22 @ 13:12) (67 - 82)  BP: 100/55 (08-26-22 @ 13:12) (87/53 - 103/63)  RR: 18 (08-26-22 @ 13:12) (16 - 18)  SpO2: 96% (08-26-22 @ 13:12) (95% - 100%)    08-25-22 @ 07:01  -  08-26-22 @ 07:00  --------------------------------------------------------  IN: 0 mL / OUT: 1025 mL / NET: -1025 mL    GENERAL: NAD, lying in bed comfortably  ABDOMEN: End ileostomy pink, significant watery output. Midline ex lap being packed, granulation tissue present  LUNGS: Comfortable on room air    LABS                        10.8   5.85  )-----------( 231      ( 26 Aug 2022 06:10 )             34.8     08-26    134<L>  |  95<L>  |  35<H>  ----------------------------<  100<H>  3.3<L>   |  26  |  0.99    Ca    9.5      26 Aug 2022 06:10  Phos  3.6     08-26  Mg     1.90     08-26

## 2022-08-26 NOTE — PROVIDER CONTACT NOTE (OTHER) - RECOMMENDATIONS
Awaiting further recommendations as per CELESTE Thomas.
Awaiting further recommendations as per CELESTE Thomas.

## 2022-08-26 NOTE — DISCHARGE NOTE NURSING/CASE MANAGEMENT/SOCIAL WORK - NSDCFUADDAPPT_GEN_ALL_CORE_FT
Follow up with PCP within 1-2 weeks of discharge. If you are in need of a general medicine physician and post-discharge medical follow-up for further care/recommendations you may contact the [facility] for an appointment at [phone].     Follow up with nephrology within 1-2 weeks of discharge.     Follow up with surgery within 3-4  weeks of discharge.

## 2022-08-26 NOTE — DISCHARGE NOTE PROVIDER - CARE PROVIDER_API CALL
Donna Sol)  Internal Medicine; Nephrology  [address] [place] [address], [address] [place] [address]  Phone: [phone]  Fax: [phone]  Follow Up Time:

## 2022-08-26 NOTE — PROVIDER CONTACT NOTE (CRITICAL VALUE NOTIFICATION) - ASSESSMENT
Patient A&Ox4, denies any symptoms, pain or distress. All vitals stable. Sodium chloride tablet given at this time.
Pt sleeping in bed no s/s of distress
critical value from [facility]: urine culture ecoli > [id number]

## 2022-08-26 NOTE — PROGRESS NOTE ADULT - ASSESSMENT
Patient is a 67 year old female S/P repair of incarcerated  ventral hernia repair with diagnostic laparoscopy converted to open for repair of enterotomy, reduction of hernia and hernia defect repair on 6/15/22 with hospital course complicated by fever and tachycardia requiring SICU admission with return to OR for exploratory laparotomy with small bowel resection and end ileostomy on 6/21/22 who presented with nausea, vomiting and decreased PO intake. Surgery consulted for exam of midline incision and increased ostomy output.    Since admission output has stabilized and has put approx. 500cc daily. Patient is now tolerating regular diet. + for Shiga toxin     - C/w packing of midline wound and local wound care  - Recommend PICC and IV hydration outpatient given poor PO intake  - ostomy output wnl (<1L); if increases >1L over 24 hours can replete as necessary with LR and consider imodium to slow output which she was sent home with   - replete electrolytes prn  -Treatment per primary team for presence of Shiga toxin  -No surgical intervention indicated at this time    -please reconsult as needed        B Team Surgery  #[id number]

## 2022-08-26 NOTE — PROGRESS NOTE ADULT - PROBLEM SELECTOR PLAN 4
High ileostomy output   - output improving   - GI PCR positive for STEC  - supportive care
Pt with chronic decreased PO intake, worsening chronic generalized weakness  - Nutrition consult  - Swallow eval for best consistency diet  - PT consult
High ileostomy output   - output improving   - GI PCR positive for STEC  - supportive care
High ileostomy output   - GI PCR positive for STEC  - supportive care
High ileostomy output   - F/up GI PCR

## 2022-08-26 NOTE — DISCHARGE NOTE PROVIDER - NSDCFUADDAPPT_GEN_ALL_CORE_FT
Vaccine status unknown Follow up with PCP within 1-2 weeks of discharge. If you are in need of a general medicine physician and post-discharge medical follow-up for further care/recommendations you may contact the [facility] for an appointment at [phone].     Follow up with nephrology within 1-2 weeks of discharge.     Follow up with surgery within 3-4  weeks of discharge.

## 2022-08-26 NOTE — PROVIDER CONTACT NOTE (CRITICAL VALUE NOTIFICATION) - ACTION/TREATMENT ORDERED:
No orders at this time
acp aware
As per CELESTE Garcia, continue sodium chloride tablets and normal saline. WIll continue to monitor BMP q6 hours.

## 2022-08-26 NOTE — DISCHARGE NOTE NURSING/CASE MANAGEMENT/SOCIAL WORK - NSDCPEFALRISK_GEN_ALL_CORE
For information on Fall & Injury Prevention, visit: [email or web address] OR  [email or web address] OR  https://www.cdc.gov/steadi/patient.html

## 2022-08-26 NOTE — PROGRESS NOTE ADULT - ASSESSMENT
67 Estonian speaking female PMH of incarcerated hernia s/p repair w/ ostomy bag, HTN, HLD presenting from PCP office for hyponatremia 119, found to also have an JAMISON, admitted for further management

## 2022-08-26 NOTE — PROVIDER CONTACT NOTE (OTHER) - ACTION/TREATMENT ORDERED:
acp aware  Pt pending d/c
no new orders given at this time
Awaiting further recommendations as per CELESTE Thomas.
no new orders given at this time

## 2022-08-26 NOTE — PROGRESS NOTE ADULT - PROBLEM SELECTOR PLAN 3
Pt with hx of UTI, previously positive for E coli and Klebsiella. Pt denies symptoms, but UA positive with >720 white count, many bacteria.  - Started ceftriaxone  - F/up urine culture
- Urine culture growing EColi, f/up sensitivities   - c/w ceftriaxone
- Urine culture growing ESBL EColi   - d/c Ceftriaxone and start Ertapenem x 3 days
- Urine culture growing ESBL EColi   - c/w Ertapenem x 3 days, will complete today
- Urine culture growing ESBL EColi   - c/w Ertapenem x 3 days

## 2022-08-26 NOTE — DISCHARGE NOTE PROVIDER - NSDCCPCAREPLAN_GEN_ALL_CORE_FT
PRINCIPAL DISCHARGE DIAGNOSIS  Diagnosis: Hyponatremia  Assessment and Plan of Treatment: You were noted to have low sodium to 116. You were started on IV fluids and and salt tabs with improvement. You are to continue the salt tablets at home. We have sent them to your pharmacy. We also initiated Mirtazapine for appetite stimulation - we sent a prescription to your pharmacy. Continue soft and bite-sized diet at home.      SECONDARY DISCHARGE DIAGNOSES  Diagnosis: Diarrhea  Assessment and Plan of Treatment: You had loose stools in your ostomy bag. Your stool had bacteria but the treatment is supportive care, as you and your family have been doing. Your output has improved. You should continue packing of your  midline wound and local wound care. If you observe more than 1L in your ostomy bag, please return to the doctor/Emergency room. Continue Imodium.    Diagnosis: DVT, lower extremity  Assessment and Plan of Treatment: You have a known clot to your right leg. You are to continue Lovenox as prescribed. Your primary care doctor may consider switching you to a pill after you pursue your possible biopsy of the endometrium with the GYN team.    Diagnosis: Urinary tract infection  Assessment and Plan of Treatment: You were treated for 3 days with antibiotics for a urinary tract infection.

## 2022-08-26 NOTE — PROGRESS NOTE ADULT - PROBLEM SELECTOR PLAN 2
Likely pre-renal in setting of decreased PO intake and high ileostomy output  - Cr improved s/p IVF  - avoid nephrotoxic agents   - monitor Cr  - renal input appreciated
Likely pre-renal in setting of decreased PO intake. With decreasing urine output, should exclude post-renal.   - Obtain bladder scan to r/o retention   - Obtain urine lytes  - s/p IV fluids   - avoid nephrotoxic agents   - monitor Cr  - renal consult
resolving   pre-renal
resolving   pre-renal
Likely pre-renal in setting of decreased PO intake and high ileostomy output  - Cr improved s/p IVF  - avoid nephrotoxic agents   - monitor Cr  - renal input appreciated
Likely pre-renal in setting of decreased PO intake and high ileostomy output. Resolved   - Cr improved s/p IVF  - avoid nephrotoxic agents   - monitor Cr  - renal input appreciated
Likely pre-renal in setting of decreased PO intake and high ileostomy output  - Cr improving   - IV fluids as per renal   - avoid nephrotoxic agents   - monitor Cr  - renal input appreciated

## 2022-08-26 NOTE — DISCHARGE NOTE NURSING/CASE MANAGEMENT/SOCIAL WORK - PATIENT PORTAL LINK FT
You can access the FollowMyHealth Patient Portal offered by [facility] by registering at the following website: [email or web address]. By joining [facility]’s FollowMyHealth portal, you will also be able to view your health information using other applications (apps) compatible with our system.

## 2022-09-08 RX ORDER — DIPHENOXYLATE HYDROCHLORIDE AND ATROPINE SULFATE 2.5; .025 MG/1; MG/1
2.5-0.025 TABLET ORAL 4 TIMES DAILY
Qty: 480 | Refills: 2 | Status: ACTIVE | OUTPATIENT
Start: 2022-09-08

## 2022-09-09 RX ORDER — DIPHENOXYLATE HYDROCHLORIDE AND ATROPINE SULFATE 2.5; .025 MG/1; MG/1
2.5-0.025 TABLET ORAL 4 TIMES DAILY
Qty: 240 | Refills: 2 | Status: COMPLETED | OUTPATIENT
Start: 2022-09-09 | End: 2022-09-09

## 2022-09-09 RX ORDER — DIPHENOXYLATE HYDROCHLORIDE AND ATROPINE SULFATE 2.5; .025 MG/1; MG/1
2.5-0.025 TABLET ORAL 4 TIMES DAILY
Qty: 240 | Refills: 0 | Status: ACTIVE | OUTPATIENT
Start: 2022-09-09

## 2022-09-09 RX ORDER — DIPHENOXYLATE HYDROCHLORIDE AND ATROPINE SULFATE 2.5; .025 MG/1; MG/1
2.5-0.025 TABLET ORAL
Qty: 60 | Refills: 3 | Status: ACTIVE | COMMUNITY
Start: 2022-09-09 | End: 1900-01-01

## 2022-09-12 ENCOUNTER — TRANSCRIPTION ENCOUNTER (OUTPATIENT)
Age: 67
End: 2022-09-12

## 2022-09-20 ENCOUNTER — APPOINTMENT (OUTPATIENT)
Dept: SURGERY | Facility: HOSPITAL | Age: 67
End: 2022-09-20

## 2022-09-20 VITALS
TEMPERATURE: 98.6 F | WEIGHT: 190 LBS | BODY MASS INDEX: 37.3 KG/M2 | HEIGHT: 60 IN | HEART RATE: 82 BPM | SYSTOLIC BLOOD PRESSURE: 117 MMHG | DIASTOLIC BLOOD PRESSURE: 67 MMHG

## 2022-09-20 NOTE — PLAN
[FreeTextEntry1] : f/u 1 month\par discussed risk of surgery for reversal of ileostomy higher risk given poor healing capabilities and poor nutrition and not reversing is an option\par will reassess each month for her general health\par plan for 6 months possible reversal in december

## 2022-09-20 NOTE — HISTORY OF PRESENT ILLNESS
[de-identified] : 67yf s/p repair ventral hernia complicated by small bowel injury and enterotomy repaired that leaked requiring takeback and small bowel resection and end ileostomy. Patient on imodium and lomotil, midline wound granulating well and appears shallow.

## 2022-09-20 NOTE — ASSESSMENT
[FreeTextEntry1] : s/p ex lap end ileostomy for failure of small bowel repair appears lethargic now with ckd and protein malnutrition

## 2022-09-22 ENCOUNTER — APPOINTMENT (OUTPATIENT)
Dept: NEPHROLOGY | Facility: CLINIC | Age: 67
End: 2022-09-22

## 2022-09-22 DIAGNOSIS — Z86.39 PERSONAL HISTORY OF OTHER ENDOCRINE, NUTRITIONAL AND METABOLIC DISEASE: ICD-10-CM

## 2022-09-22 DIAGNOSIS — I10 ESSENTIAL (PRIMARY) HYPERTENSION: ICD-10-CM

## 2022-09-22 DIAGNOSIS — Z78.9 OTHER SPECIFIED HEALTH STATUS: ICD-10-CM

## 2022-09-22 PROCEDURE — 99205 OFFICE O/P NEW HI 60 MIN: CPT

## 2022-09-22 RX ORDER — ENOXAPARIN SODIUM 100 MG/ML
100 INJECTION, SOLUTION SUBCUTANEOUS
Qty: 60 | Refills: 0 | Status: ACTIVE | COMMUNITY
Start: 2022-08-07

## 2022-09-22 RX ORDER — MIRTAZAPINE 15 MG/1
15 TABLET, FILM COATED ORAL
Qty: 30 | Refills: 0 | Status: ACTIVE | COMMUNITY
Start: 2022-08-26

## 2022-09-22 RX ORDER — LOSARTAN POTASSIUM 100 MG/1
100 TABLET, FILM COATED ORAL
Qty: 90 | Refills: 0 | Status: DISCONTINUED | COMMUNITY
Start: 2022-04-30

## 2022-09-22 RX ORDER — OXYCODONE 5 MG/1
5 TABLET ORAL
Qty: 8 | Refills: 0 | Status: ACTIVE | COMMUNITY
Start: 2022-06-17

## 2022-09-22 RX ORDER — SERTRALINE 25 MG/1
25 TABLET, FILM COATED ORAL
Qty: 14 | Refills: 0 | Status: DISCONTINUED | COMMUNITY
Start: 2022-07-23

## 2022-09-22 RX ORDER — LOPERAMIDE HYDROCHLORIDE 2 MG/1
2 CAPSULE ORAL
Qty: 120 | Refills: 0 | Status: ACTIVE | COMMUNITY
Start: 2022-09-09

## 2022-09-22 RX ORDER — OMEPRAZOLE 20 MG/1
20 CAPSULE, DELAYED RELEASE ORAL
Qty: 30 | Refills: 0 | Status: ACTIVE | COMMUNITY
Start: 2022-04-30

## 2022-09-22 RX ORDER — NORMAL SALT TABLETS 1 G/G
1 TABLET ORAL
Qty: 810 | Refills: 0 | Status: ACTIVE | COMMUNITY
Start: 2022-08-26 | End: 1900-01-01

## 2022-09-22 RX ORDER — SULFAMETHOXAZOLE AND TRIMETHOPRIM 800; 160 MG/1; MG/1
800-160 TABLET ORAL TWICE DAILY
Qty: 20 | Refills: 0 | Status: DISCONTINUED | COMMUNITY
Start: 2021-09-08 | End: 2022-09-22

## 2022-09-22 NOTE — HISTORY OF PRESENT ILLNESS
[FreeTextEntry1] : Referral for JAMISON. pt. accompanied by her daughter Dilma during the visit who served as the  and also provided supplemental history. \par \par Ms. Aragon is a 67 year old Patwari speaking female, with HTN, HLD, hx of DVT, bowel infection initially treated laproscopically however led to bowel perforation, complicated by sepsis requiring exploratory laprotomy leading to bowel resection with ileostomy in June 2022, anxiety, here to establish care for hyponatremia. \par \par Pt. was recently admitted to [facility] for JAMISON and hyponatremia, that was thought to be hypovolemia secondary to increased ostomy output, and improved with IVF. SHe was discharged home on salt tabs. The daughter states that she has been taking salt tabs 2 tabs BID or once a day. \par \par Overall, the patient has been feeling low since the surgery and mostly needs help for all her ADLs. Recently, dose of loperamide was increased by her surgeon that has helped in slowing down ostomy output. Pt. also endorses to have low PO intake, nausea, episodes of vomiting and takes only liquids as feels she is unable to swallow solid foods. Denies use of any NSAIds, and take PPIs only as needed. Endorses occasional itching while urinating.

## 2022-09-22 NOTE — CONSULT LETTER
[Dear  ___] : Dear  [unfilled], [Consult Letter:] : I had the pleasure of evaluating your patient, [unfilled]. [( Thank you for referring [unfilled] for consultation for _____ )] : Thank you for referring [unfilled] for consultation for [unfilled] [Please see my note below.] : Please see my note below. [Consult Closing:] : Thank you very much for allowing me to participate in the care of this patient.  If you have any questions, please do not hesitate to contact me. [Sincerely,] : Sincerely, [FreeTextEntry3] : Amanda Ram MD

## 2022-09-22 NOTE — PHYSICAL EXAM
[General Appearance - Alert] : alert [General Appearance - In No Acute Distress] : in no acute distress [Outer Ear] : the ears and nose were normal in appearance [Hearing Threshold Finger Rub Not [place]] : hearing was normal [Jugular Venous Distention Increased] : there was no jugular-venous distention [] : no respiratory distress [Exaggerated Use Of Accessory Muscles For Inspiration] : no accessory muscle use [Auscultation Breath Sounds / Voice Sounds] : lungs were clear to auscultation bilaterally [Heart Sounds] : normal S1 and S2 [Heart Sounds Pericardial Friction Rub] : no pericardial rub [Edema] : there was no peripheral edema [Abdomen Soft] : soft [No CVA Tenderness] : no ~M costovertebral angle tenderness [FreeTextEntry1] : wheel chair bound [Oriented To Time, Place, And Person] : oriented to person, place, and time

## 2022-09-22 NOTE — REVIEW OF SYSTEMS
[Feeling Poorly] : feeling poorly [Vomiting] : vomiting [Difficulty Walking] : difficulty walking [Anxiety] : anxiety [Negative] : Heme/Lymph [FreeTextEntry7] : nausea [FreeTextEntry8] : itching on urination

## 2022-09-22 NOTE — ASSESSMENT
[FreeTextEntry1] : JAMISON: Pt. with baseline normal kidney function (Scr was 0.99 on 8/26/22), with JAMISON likely hemodynamically mediated/ prerenal in the setting of high ostomy output, with decreased PO Intake, nausea and vomiting. \par Recent Scr elevated at 3.7 on labs done on 9/14/22. \par As per daughter, pt. now with decrease in ostomy output with use of higher dose of loperamide, and also has vomiting less frequently. \par Repeat labs. \par Consider switching PPI to H2 blockers.\par Check urine culture (recent UTI with ?sxs of UTI during the visit)\par Encourage po Intake. \par \par Hyponatremia: Pt. with chronic hyponatremia in the setting of decreased PO intake and increased ostomy output. Pt. also with ?SIADH in the setting of episodes of severe nausea. \par Currently taking 2 salt tabs BID. \par Check sodium, serum osm, urine osm and uric acid levels. \par \par Hyperkalemia: in the setting of JAMISON. \par Recent potassium level wnl. \par Check potassium and Mg levels. \par \par \par Follow up pending results of labs.

## 2022-09-23 ENCOUNTER — INPATIENT (INPATIENT)
Facility: HOSPITAL | Age: 67
LOS: 3 days | Discharge: HOME HEALTH | End: 2022-09-27
Attending: HOSPITALIST | Admitting: HOSPITALIST

## 2022-09-23 VITALS
TEMPERATURE: 98 F | HEIGHT: 60 IN | DIASTOLIC BLOOD PRESSURE: 67 MMHG | RESPIRATION RATE: 16 BRPM | HEART RATE: 87 BPM | SYSTOLIC BLOOD PRESSURE: 123 MMHG | OXYGEN SATURATION: 100 %

## 2022-09-23 DIAGNOSIS — Z98.890 OTHER SPECIFIED POSTPROCEDURAL STATES: Chronic | ICD-10-CM

## 2022-09-23 DIAGNOSIS — Z29.9 ENCOUNTER FOR PROPHYLACTIC MEASURES, UNSPECIFIED: ICD-10-CM

## 2022-09-23 DIAGNOSIS — N17.9 ACUTE KIDNEY FAILURE, UNSPECIFIED: ICD-10-CM

## 2022-09-23 DIAGNOSIS — N39.0 URINARY TRACT INFECTION, SITE NOT SPECIFIED: ICD-10-CM

## 2022-09-23 DIAGNOSIS — Z87.19 PERSONAL HISTORY OF OTHER DISEASES OF THE DIGESTIVE SYSTEM: ICD-10-CM

## 2022-09-23 DIAGNOSIS — E87.1 HYPO-OSMOLALITY AND HYPONATREMIA: ICD-10-CM

## 2022-09-23 DIAGNOSIS — I82.409 ACUTE EMBOLISM AND THROMBOSIS OF UNSPECIFIED DEEP VEINS OF UNSPECIFIED LOWER EXTREMITY: ICD-10-CM

## 2022-09-23 DIAGNOSIS — Z90.49 ACQUIRED ABSENCE OF OTHER SPECIFIED PARTS OF DIGESTIVE TRACT: Chronic | ICD-10-CM

## 2022-09-23 DIAGNOSIS — Z71.89 OTHER SPECIFIED COUNSELING: ICD-10-CM

## 2022-09-23 DIAGNOSIS — U07.1 COVID-19: ICD-10-CM

## 2022-09-23 DIAGNOSIS — Z93.2 ILEOSTOMY STATUS: ICD-10-CM

## 2022-09-23 DIAGNOSIS — I80.00 PHLEBITIS AND THROMBOPHLEBITIS OF SUPERFICIAL VESSELS OF UNSPECIFIED LOWER EXTREMITY: ICD-10-CM

## 2022-09-23 LAB
ALBUMIN SERPL ELPH-MCNC: 3 G/DL — LOW (ref 3.3–5)
ALBUMIN SERPL ELPH-MCNC: 3.3 G/DL
ALP SERPL-CCNC: 148 U/L — HIGH (ref 40–120)
ALT FLD-CCNC: 16 U/L — SIGNIFICANT CHANGE UP (ref 4–33)
ANION GAP SERPL CALC-SCNC: 18 MMOL/L — HIGH (ref 7–14)
ANION GAP SERPL CALC-SCNC: 20 MMOL/L
APPEARANCE UR: ABNORMAL
AST SERPL-CCNC: 47 U/L — HIGH (ref 4–32)
BACTERIA # UR AUTO: ABNORMAL
BASE EXCESS BLDV CALC-SCNC: 2.8 MMOL/L — SIGNIFICANT CHANGE UP (ref -2–3)
BASOPHILS # BLD AUTO: 0 K/UL
BASOPHILS # BLD AUTO: 0.02 K/UL — SIGNIFICANT CHANGE UP (ref 0–0.2)
BASOPHILS NFR BLD AUTO: 0 %
BASOPHILS NFR BLD AUTO: 0.3 % — SIGNIFICANT CHANGE UP (ref 0–2)
BILIRUB SERPL-MCNC: 0.4 MG/DL — SIGNIFICANT CHANGE UP (ref 0.2–1.2)
BILIRUB UR-MCNC: NEGATIVE — SIGNIFICANT CHANGE UP
BLOOD GAS VENOUS COMPREHENSIVE RESULT: SIGNIFICANT CHANGE UP
BUN SERPL-MCNC: 91 MG/DL — HIGH (ref 7–23)
BUN SERPL-MCNC: 95 MG/DL
CALCIUM SERPL-MCNC: 9.2 MG/DL
CALCIUM SERPL-MCNC: 9.5 MG/DL — SIGNIFICANT CHANGE UP (ref 8.4–10.5)
CHLORIDE BLDV-SCNC: 95 MMOL/L — LOW (ref 96–108)
CHLORIDE SERPL-SCNC: 89 MMOL/L
CHLORIDE SERPL-SCNC: 93 MMOL/L — LOW (ref 98–107)
CO2 BLDV-SCNC: 30.4 MMOL/L — HIGH (ref 22–26)
CO2 SERPL-SCNC: 19 MMOL/L — LOW (ref 22–31)
CO2 SERPL-SCNC: 23 MMOL/L
COLOR SPEC: YELLOW — SIGNIFICANT CHANGE UP
CREAT ?TM UR-MCNC: 201 MG/DL — SIGNIFICANT CHANGE UP
CREAT SERPL-MCNC: 3.73 MG/DL — HIGH (ref 0.5–1.3)
CREAT SERPL-MCNC: 3.81 MG/DL
DEPRECATED KAPPA LC FREE/LAMBDA SER: 1.25 RATIO
DIFF PNL FLD: ABNORMAL
EGFR: 12 ML/MIN/1.73M2
EGFR: 13 ML/MIN/1.73M2 — LOW
EOSINOPHIL # BLD AUTO: 0.03 K/UL — SIGNIFICANT CHANGE UP (ref 0–0.5)
EOSINOPHIL # BLD AUTO: 0.05 K/UL
EOSINOPHIL NFR BLD AUTO: 0.4 % — SIGNIFICANT CHANGE UP (ref 0–6)
EOSINOPHIL NFR BLD AUTO: 0.9 %
GAS PNL BLDV: 131 MMOL/L — LOW (ref 136–145)
GLUCOSE BLDV-MCNC: 90 MG/DL — SIGNIFICANT CHANGE UP (ref 70–99)
GLUCOSE SERPL-MCNC: 102 MG/DL — HIGH (ref 70–99)
GLUCOSE SERPL-MCNC: 72 MG/DL
GLUCOSE UR QL: NEGATIVE — SIGNIFICANT CHANGE UP
HBV SURFACE AG SER QL: NONREACTIVE
HCO3 BLDV-SCNC: 29 MMOL/L — SIGNIFICANT CHANGE UP (ref 22–29)
HCT VFR BLD CALC: 39.8 %
HCT VFR BLD CALC: 39.9 % — SIGNIFICANT CHANGE UP (ref 34.5–45)
HCT VFR BLDA CALC: 36 % — SIGNIFICANT CHANGE UP (ref 34.5–46.5)
HCV AB SER QL: NONREACTIVE
HCV S/CO RATIO: 0.15 S/CO
HGB BLD CALC-MCNC: 11.9 G/DL — SIGNIFICANT CHANGE UP (ref 11.5–15.5)
HGB BLD-MCNC: 12.6 G/DL
HGB BLD-MCNC: 12.7 G/DL — SIGNIFICANT CHANGE UP (ref 11.5–15.5)
IANC: 3.91 K/UL — SIGNIFICANT CHANGE UP (ref 1.8–7.4)
IMM GRANULOCYTES NFR BLD AUTO: 0.2 %
IMM GRANULOCYTES NFR BLD AUTO: 0.4 % — SIGNIFICANT CHANGE UP (ref 0–0.9)
KAPPA LC CSF-MCNC: 15.44 MG/DL
KAPPA LC SERPL-MCNC: 19.35 MG/DL
KETONES UR-MCNC: NEGATIVE — SIGNIFICANT CHANGE UP
LACTATE BLDV-MCNC: 2 MMOL/L — SIGNIFICANT CHANGE UP (ref 0.5–2)
LEUKOCYTE ESTERASE UR-ACNC: ABNORMAL
LYMPHOCYTES # BLD AUTO: 2.2 K/UL
LYMPHOCYTES # BLD AUTO: 2.29 K/UL — SIGNIFICANT CHANGE UP (ref 1–3.3)
LYMPHOCYTES # BLD AUTO: 33.8 % — SIGNIFICANT CHANGE UP (ref 13–44)
LYMPHOCYTES NFR BLD AUTO: 40.7 %
MAGNESIUM SERPL-MCNC: 1.5 MG/DL
MAGNESIUM SERPL-MCNC: 1.5 MG/DL — LOW (ref 1.6–2.6)
MAN DIFF?: NORMAL
MCHC RBC-ENTMCNC: 26.9 PG — LOW (ref 27–34)
MCHC RBC-ENTMCNC: 27.2 PG
MCHC RBC-ENTMCNC: 31.7 GM/DL
MCHC RBC-ENTMCNC: 31.8 GM/DL — LOW (ref 32–36)
MCV RBC AUTO: 84.5 FL — SIGNIFICANT CHANGE UP (ref 80–100)
MCV RBC AUTO: 85.8 FL
MONOCYTES # BLD AUTO: 0.35 K/UL
MONOCYTES # BLD AUTO: 0.49 K/UL — SIGNIFICANT CHANGE UP (ref 0–0.9)
MONOCYTES NFR BLD AUTO: 6.5 %
MONOCYTES NFR BLD AUTO: 7.2 % — SIGNIFICANT CHANGE UP (ref 2–14)
NEUTROPHILS # BLD AUTO: 2.79 K/UL
NEUTROPHILS # BLD AUTO: 3.91 K/UL — SIGNIFICANT CHANGE UP (ref 1.8–7.4)
NEUTROPHILS NFR BLD AUTO: 51.7 %
NEUTROPHILS NFR BLD AUTO: 57.9 % — SIGNIFICANT CHANGE UP (ref 43–77)
NITRITE UR-MCNC: NEGATIVE — SIGNIFICANT CHANGE UP
NRBC # BLD: 0 /100 WBCS — SIGNIFICANT CHANGE UP (ref 0–0)
NRBC # FLD: 0 K/UL — SIGNIFICANT CHANGE UP (ref 0–0)
OSMOLALITY SERPL: 314 MOSMOL/KG
OSMOLALITY UR: 390 MOSM/KG — SIGNIFICANT CHANGE UP (ref 50–1200)
PCO2 BLDV: 50 MMHG — HIGH (ref 39–42)
PH BLDV: 7.37 — SIGNIFICANT CHANGE UP (ref 7.32–7.43)
PH UR: 5.5 — SIGNIFICANT CHANGE UP (ref 5–8)
PHOSPHATE SERPL-MCNC: 5 MG/DL
PHOSPHATE SERPL-MCNC: 5.2 MG/DL — HIGH (ref 2.5–4.5)
PLATELET # BLD AUTO: 167 K/UL — SIGNIFICANT CHANGE UP (ref 150–400)
PLATELET # BLD AUTO: 241 K/UL
PO2 BLDV: 31 MMHG — SIGNIFICANT CHANGE UP
POTASSIUM BLDV-SCNC: 4.4 MMOL/L — SIGNIFICANT CHANGE UP (ref 3.5–5.1)
POTASSIUM SERPL-MCNC: 4.9 MMOL/L — SIGNIFICANT CHANGE UP (ref 3.5–5.3)
POTASSIUM SERPL-SCNC: 4.8 MMOL/L
POTASSIUM SERPL-SCNC: 4.9 MMOL/L — SIGNIFICANT CHANGE UP (ref 3.5–5.3)
PROT ?TM UR-MCNC: 71 MG/DL — SIGNIFICANT CHANGE UP
PROT SERPL-MCNC: 6.9 G/DL — SIGNIFICANT CHANGE UP (ref 6–8.3)
PROT UR-MCNC: ABNORMAL
PROT/CREAT UR-RTO: 0.4 RATIO — HIGH (ref 0–0.2)
RBC # BLD: 4.64 M/UL
RBC # BLD: 4.72 M/UL — SIGNIFICANT CHANGE UP (ref 3.8–5.2)
RBC # FLD: 16.3 % — HIGH (ref 10.3–14.5)
RBC # FLD: 16.9 %
RBC CASTS # UR COMP ASSIST: SIGNIFICANT CHANGE UP /HPF (ref 0–4)
SAO2 % BLDV: 51.5 % — SIGNIFICANT CHANGE UP
SARS-COV-2 RNA SPEC QL NAA+PROBE: DETECTED
SODIUM SERPL-SCNC: 130 MMOL/L — LOW (ref 135–145)
SODIUM SERPL-SCNC: 132 MMOL/L
SP GR SPEC: 1.02 — SIGNIFICANT CHANGE UP (ref 1.01–1.05)
TSH SERPL-ACNC: 5.34 UIU/ML
URATE SERPL-MCNC: 17.5 MG/DL
UROBILINOGEN FLD QL: SIGNIFICANT CHANGE UP
WBC # BLD: 6.77 K/UL — SIGNIFICANT CHANGE UP (ref 3.8–10.5)
WBC # FLD AUTO: 5.4 K/UL
WBC # FLD AUTO: 6.77 K/UL — SIGNIFICANT CHANGE UP (ref 3.8–10.5)
WBC UR QL: >50 /HPF — SIGNIFICANT CHANGE UP (ref 0–5)

## 2022-09-23 PROCEDURE — 99223 1ST HOSP IP/OBS HIGH 75: CPT | Mod: GC

## 2022-09-23 PROCEDURE — 76770 US EXAM ABDO BACK WALL COMP: CPT | Mod: 26

## 2022-09-23 PROCEDURE — 99285 EMERGENCY DEPT VISIT HI MDM: CPT

## 2022-09-23 RX ORDER — DIPHENOXYLATE HCL/ATROPINE 2.5-.025MG
1 TABLET ORAL DAILY
Refills: 0 | Status: DISCONTINUED | OUTPATIENT
Start: 2022-09-23 | End: 2022-09-27

## 2022-09-23 RX ORDER — CEFTRIAXONE 500 MG/1
1000 INJECTION, POWDER, FOR SOLUTION INTRAMUSCULAR; INTRAVENOUS EVERY 24 HOURS
Refills: 0 | Status: COMPLETED | OUTPATIENT
Start: 2022-09-23 | End: 2022-09-25

## 2022-09-23 RX ORDER — SODIUM CHLORIDE 9 MG/ML
1000 INJECTION INTRAMUSCULAR; INTRAVENOUS; SUBCUTANEOUS
Refills: 0 | Status: DISCONTINUED | OUTPATIENT
Start: 2022-09-23 | End: 2022-09-23

## 2022-09-23 RX ORDER — ONDANSETRON 8 MG/1
4 TABLET, FILM COATED ORAL ONCE
Refills: 0 | Status: COMPLETED | OUTPATIENT
Start: 2022-09-23 | End: 2022-09-23

## 2022-09-23 RX ORDER — MIRTAZAPINE 45 MG/1
15 TABLET, ORALLY DISINTEGRATING ORAL DAILY
Refills: 0 | Status: DISCONTINUED | OUTPATIENT
Start: 2022-09-23 | End: 2022-09-27

## 2022-09-23 RX ORDER — PANTOPRAZOLE SODIUM 20 MG/1
40 TABLET, DELAYED RELEASE ORAL
Refills: 0 | Status: DISCONTINUED | OUTPATIENT
Start: 2022-09-23 | End: 2022-09-27

## 2022-09-23 RX ORDER — LOPERAMIDE HCL 2 MG
4 TABLET ORAL
Refills: 0 | Status: DISCONTINUED | OUTPATIENT
Start: 2022-09-23 | End: 2022-09-27

## 2022-09-23 RX ORDER — MAGNESIUM SULFATE 500 MG/ML
1 VIAL (ML) INJECTION ONCE
Refills: 0 | Status: COMPLETED | OUTPATIENT
Start: 2022-09-23 | End: 2022-09-23

## 2022-09-23 RX ORDER — SODIUM CHLORIDE 9 MG/ML
1000 INJECTION INTRAMUSCULAR; INTRAVENOUS; SUBCUTANEOUS
Refills: 0 | Status: DISCONTINUED | OUTPATIENT
Start: 2022-09-23 | End: 2022-09-24

## 2022-09-23 RX ORDER — ATORVASTATIN CALCIUM 80 MG/1
1 TABLET, FILM COATED ORAL
Qty: 0 | Refills: 0 | DISCHARGE

## 2022-09-23 RX ORDER — SODIUM CHLORIDE 9 MG/ML
2 INJECTION INTRAMUSCULAR; INTRAVENOUS; SUBCUTANEOUS THREE TIMES A DAY
Refills: 0 | Status: DISCONTINUED | OUTPATIENT
Start: 2022-09-23 | End: 2022-09-27

## 2022-09-23 RX ADMIN — CEFTRIAXONE 100 MILLIGRAM(S): 500 INJECTION, POWDER, FOR SOLUTION INTRAMUSCULAR; INTRAVENOUS at 23:58

## 2022-09-23 RX ADMIN — ONDANSETRON 4 MILLIGRAM(S): 8 TABLET, FILM COATED ORAL at 16:08

## 2022-09-23 RX ADMIN — Medication 100 GRAM(S): at 21:33

## 2022-09-23 NOTE — H&P ADULT - PROBLEM SELECTOR PLAN 1
-Ddx prerenal iso poor intake and increased ostomy output vs postrenal from obstruction/UTI, however doesn't seem to have much change in I/Os per daughter  -no hydronephrosis or calculi seen  -less likely renal vein thrombosis, pt on AC already for DVT    Plan:  -send urine lytes  -send UA and urine culture  -monitor bladder scans q8  -strict I/Os  -renal diet  -switch full dose lovenox to heparin gtt  [ ] neph consult in AM -Ddx prerenal iso poor intake and increased ostomy output vs postrenal from obstruction/UTI, however doesn't seem to have much change in I/Os per daughter  -no hydronephrosis or calculi seen  -less likely renal vein thrombosis, pt on AC already for DVT    Plan:  -start IV NS @ 100cc/hr x10 hours   -send urine lytes  -send UA and urine culture  -monitor bladder scans q8  -strict I/Os  -renal diet  -switch full dose lovenox to heparin gtt  [ ] neph consult in AM Baseline Scr=1, Currently admitted with Scr=3.7  Suspect prerenal etiology due to poor po intake, high ostomy output, losses from surgical wound;   No evidence of post renal obstruction. Low I/Os per daughter who is the primary caretaker.   -US Renal ordered: no hydronephrosis or calculi seen  -Low suspicion of renal vein thrombosis, on full AC already for DVT  Plan:  -start IV NS @ 100cc/hr x10 hours   -monitor renal function response to IV hydration   -send urine lytes  -send UA and urine culture  -check bladder scan: discussed with 5N RN, low PVR   -strict I/Os  -renal diet  -switch full dose lovenox to heparin gtt for now  [ ] Formal Nephrology consult in AM Baseline Scr=1, Currently admitted with Scr=3.7  Suspect prerenal etiology due to poor po intake, high ostomy output, losses from surgical wound;   No evidence of post renal obstruction. Low I/Os per daughter who is the primary caretaker.   -US Renal ordered: no hydronephrosis or calculi seen  -Low suspicion of renal vein thrombosis, on full AC already for DVT  Plan:  -start IV NS @ 100cc/hr x10 hours   -monitor renal function response to IV hydration   -send urine lytes  -send UA and urine culture  -check bladder scan: discussed with 5N RN, low PVR   -strict I/Os  -renal diet  -switch full dose lovenox to heparin gtt for now     [ ] Formal Nephrology consult in AM  May require IV hydration at home

## 2022-09-23 NOTE — ED ADULT NURSE REASSESSMENT NOTE - NS ED NURSE REASSESS COMMENT FT1
report received from day shift RN. pt at baseline mental status, offering no complaints at this time. pt daughter at bedside translating. ileostomy intact, draining well, stoma on R lower Abd  beefy red in color. abd soft and nondistended, non tender. respirations even and unlabored. upper extremity tremors noted, daughter said this is baseline for pt. VS as noted in flowsheet. medicated as per MD orders. stage 3 pressure ulcer noted on sacral area. pt awaiting bed assignment. comfort measures provided.

## 2022-09-23 NOTE — H&P ADULT - ASSESSMENT
67F with h/o HTN, HLD, incarcerated ventral hernia s/p repairs and enterotomy repairs, small bowel resection s/p end ileostomy admitted with JAMISON.  67F with h/o HTN, HLD, DVT on lovenox, incarcerated ventral hernia s/p repairs and enterotomy repairs, small bowel resection s/p end ileostomy admitted with JAMISON. Likely prernal from poor po intake, high ostomy output, and losses from surgical wound 67F with h/o HTN, HLD, DVT on lovenox, incarcerated ventral hernia s/p repairs and enterotomy repairs, small bowel resection s/p end ileostomy admitted with JAMISON and +COVID. Likely prerenal from poor po intake, high ostomy output, and losses from surgical wound 68yo Female with h/o HTN, HLD, DVT on Lovenox, incarcerated ventral hernia s/p repairs and enterotomy repairs, small bowel resection s/p end ileostomy, recently admitted with hyponatrenia and JAMISON; Pt admitted with JAMISON and UTI; Found to be Covid-19 positive;

## 2022-09-23 NOTE — H&P ADULT - MOUTH
Spiral Flap Text: The defect edges were debeveled with a #15 scalpel blade.  Given the location of the defect, shape of the defect and the proximity to free margins a spiral flap was deemed most appropriate.  Using a sterile surgical marker, an appropriate rotation flap was drawn incorporating the defect and placing the expected incisions within the relaxed skin tension lines where possible. The area thus outlined was incised deep to adipose tissue with a #15 scalpel blade.  The skin margins were undermined to an appropriate distance in all directions utilizing iris scissors. missing lower dentition/normal mouth and gums/dry

## 2022-09-23 NOTE — ED PROVIDER NOTE - PHYSICAL EXAMINATION
gen: well appearing  Mentation: AAO x 3  psych: mood appropriate  HEENT: airway patent, conjunctivae clear bilaterally  Cardio: RRR, no m/r/g  Resp: normal BS b/l  GI: soft/nondistended/nontender  Neuro: sensation and motor function grossly intact  Skin: No evidence of rash  MSK: normal movement of all extremities  Lymph/Vasc: no LE edema

## 2022-09-23 NOTE — ED ADULT TRIAGE NOTE - CHIEF COMPLAINT QUOTE
Pt presents to ED via EMS from home with c/o elevated creatinine and BUN. Pt was advised by nephrologist to come to ED for further eval. Pt has hx of renal failure.

## 2022-09-23 NOTE — H&P ADULT - PROBLEM SELECTOR PLAN 7
DVT ppx: full ac with heaprin  DIet: renal  Dispo: pending workup JOVITA witnessed by RN and completed in chart, pt DNR/DNI with daughter as HCP  -pt open to dialysis if needed -pt was found to have acute RLE DVT 8/2022 and was on home lovenox 90mg BID  -switch lovenox to heparin gtt given JAMISON

## 2022-09-23 NOTE — H&P ADULT - PROBLEM SELECTOR PLAN 5
-pt was found to have acute RLE DVT 8/2022 and was on home lovenox 90mg BID  -switch lovenox to heparin gtt given JAMISON pt with lauri-incisional pain, chronic  -wound care consulted  [ ] surgery consult in AM to check surgical site -ostomy site appears clean  -monitor ostomy output  -wound care consulted

## 2022-09-23 NOTE — H&P ADULT - PROBLEM SELECTOR PLAN 9
DVT ppx: full ac with heaprin  DIet: renal  Dispo: pending workup DVT ppx: full ac with heaprin gtt  DIet: renal  Dispo: pending workup

## 2022-09-23 NOTE — ED PROVIDER NOTE - ATTENDING CONTRIBUTION TO CARE
Dr. Vail:  I have personally performed a face to face bedside history and physical examination of this patient. I have discussed the history, examination, review of systems, assessment and plan of management with the resident. I have reviewed the electronic medical record and amended it to reflect my history, review of systems, physical exam, assessment and plan.    67F sent in by nephrologist for increased creatinine on outpatient labs.  Pt recently had incarcerated hernia complicated by JAMISON that resolved.  Cr 0.99 in Aug and now 3.8 on yesterday's labs.  Pt c/o mild N/V, otherwise ROS negative.    Exam:  - nad  - rrr  - ctab   -abd soft ntnd    A/P  - JAMISON on labs  - cbc, cmp, ua, urine culture

## 2022-09-23 NOTE — ED PROVIDER NOTE - CLINICAL SUMMARY MEDICAL DECISION MAKING FREE TEXT BOX
67 year old female with a PMHx of HTN, HLD, s/p lap to open repair of incarcerated ventral hernia c/b enterotomy (repaired), hernia defect repair 6/15 and enterotomy repair, SBR, and end ileostomy 6/21 presenting with worsening renal function on outpatient labs sent in for further work up by nephrology. Associated nausea and intermittent vomiting. Labs, mag, phos, ekg, covid test.

## 2022-09-23 NOTE — H&P ADULT - PROBLEM SELECTOR PLAN 4
pt with lauri-incisional pain, chronic  -wound care consulted pt with lauri-incisional pain, chronic  -wound care consulted  [ ] surgery consult in AM to check surgical site -ostomy site appears clean  -monitor ostomy output  -wound care consulted -pt incidentally found to be covid positive, nonhypoxic on room air  -monitor off therapy  -trend esr/crp, d-dimer, ldh Found to be covid-19 positive, O2 sat wnl on room air  -monitor off therapy  -screening CXR routine ordered   -trend esr/crp, d-dimer, ldh

## 2022-09-23 NOTE — ED ADULT NURSE NOTE - OBJECTIVE STATEMENT
daughter states my mom is here with abnormal labs, s/p bowel obstruction and had surgery and has ileostomy bag and now she is sent in here by her nephrologist for abnormal labs. patient is bed bound  breathing even and unlabored. labs done and meds given as ordered. awaiting results and re eval.

## 2022-09-23 NOTE — H&P ADULT - PROBLEM SELECTOR PLAN 2
-Na 130, possibly hypovolemic hyponatremia iso low po intake and increased ostomy output and possible siadh component  -serum osms added on, check urine lytes and osms  -c/w home salt tabs 2g TID  -c/w mirtazapine as appetite stimulant  -nutrition consult -Na 130, possibly hypovolemic hyponatremia iso low po intake and increased ostomy output and possible siadh component  -serum osms added on, check urine lytes and osms  -c/w home loperamide  -c/w home salt tabs 2g TID  -c/w mirtazapine as appetite stimulant  -nutrition consult UA positive, will start ceftriaxone empirically  -f/u UCx UA c/w UTI  - start ceftriaxone empirically iso multiple comorbidities and deconditioning  -f/u UCx

## 2022-09-23 NOTE — H&P ADULT - PROBLEM SELECTOR PLAN 3
-ostomy site appears clean  -monitor ostomy output  -wound care consulted -pt incidentally found to be covid positive, nonhypoxic on room air  -monitor off therapy  -trend esr/crp, d-dimer, ldh -Na 130, possibly hypovolemic hyponatremia iso low po intake and increased ostomy output and possible siadh component  -serum osms added on, check urine lytes and osms  -c/w home loperamide  -c/w home salt tabs 2g TID  -c/w mirtazapine as appetite stimulant  -nutrition consult -Na 130, possibly hypovolemic hyponatremia iso low po intake and increased ostomy output and possible siadh component  -serum osms added on, check urine lytes and osms  -c/w home loperamide  -c/w home salt tabs 2g TID  -c/w mirtazapine as appetite stimulant  -nutrition consult  -EKG: grossly unchanged compared to 8/21/2022, however, very poor baseline due to body tremor - repeat EKG ordered and pending for AM

## 2022-09-23 NOTE — H&P ADULT - MUSCULOSKELETAL
details… no joint swelling/no joint erythema/strength 5/5 bilateral upper extremities/strength 5/5 bilateral lower extremities

## 2022-09-23 NOTE — H&P ADULT - HISTORY OF PRESENT ILLNESS
67F with h/o HTN, HLD, incarcerated ventral hernia s/p repairs and enterotomy repairs, small bowel resection s/p end ileostomy sent in by outpatient nephrologist for JAMISON. Notably pt was recently admitted at [facility] 8/22-8/26 with hyponatrenia and JAMISON 2/2 poor po intake and increased ileostomy output. Pt was given mirtazapine, salt tabs, and loperamide, and Cr 2.7-->0.9 and Na 120-->134 on discharge. Pt's daughter states pt has been having about the same amount of po intake, ostomy output, and urine amount since she was discharged. She has been adherent to the loperamide which has decreased the amount of ostomy output compared to her earlier admission. She makes very little urine but usually is incontinent in diaper 4 times a day. Pt states she has had some burning on urination for the past several days. Pt states she has chronic abdominal pain around the surgical incision sites. She eats a soft diet and was seen by Speech last admission and cleared. Pt denies fevers/chills, HA, chest pain, SOB. GOC were discussed with pt and daughter, pt asked to appoint her daughter as her HCP and requested DNR/DNI. They are still open to dialysis if needed.    ED course: HDS, Cr 3.73, Na 130. Given zofran. US kidneys/bladder without hydronephrosis. 67F with h/o HTN, HLD, DVT on lovenox, incarcerated ventral hernia s/p repairs and enterotomy repairs, small bowel resection s/p end ileostomy sent in by outpatient nephrologist for JAMISON. Notably pt was recently admitted at [facility] 8/22-8/26 with hyponatrenia and JAMISON 2/2 poor po intake and increased ileostomy output. Pt was given mirtazapine, salt tabs, and loperamide, and Cr 2.7-->0.9 and Na 120-->134 on discharge. Pt's daughter states pt has been having about the same amount of po intake, ostomy output, and urine amount since she was discharged. She has been adherent to the loperamide which has decreased the amount of ostomy output compared to her earlier admission. She makes very little urine but usually is incontinent in diaper 4 times a day. Pt states she has had some burning on urination for the past several days. Pt states she has chronic abdominal pain around the surgical incision sites. She eats a soft diet and was seen by Speech last admission and cleared. Pt denies fevers/chills, HA, chest pain, SOB. GOC were discussed with pt and daughter, pt asked to appoint her daughter as her HCP and requested DNR/DNI. They are still open to dialysis if needed.    ED course: HDS, Cr 3.73, Na 130. Given zofran. US kidneys/bladder without hydronephrosis. 66yo Female with h/o HTN, HLD, DVT on Lovenox, incarcerated ventral hernia s/p repairs and enterotomy repairs, small bowel resection s/p end ileostomy sent in by outpatient nephrologist for evaluation of JAMISON. Notably pt was recently admitted at [facility] 8/22-8/26 with hyponatrenia and JAMISON 2/2 poor po intake and increased ileostomy output. Pt was given mirtazapine, salt tabs, and loperamide, and Cr 2.7-->0.9 and Na 120-->134 on discharge. Pt's daughter states pt has been having about the same amount of po intake, ostomy output, and urine amount since she was discharged. She has been adherent to the loperamide which has decreased the amount of ostomy output compared to her earlier admission. She makes very little urine but usually is incontinent in diaper 4 times a day. Pt states she has had some burning on urination for the past several days. Pt states she has chronic abdominal pain around the surgical incision sites. She eats a soft diet and was seen by Speech last admission and cleared. Pt reports no fevers/chills, HA, chest pain, SOB. GOC were discussed with pt and daughter, pt asked to appoint her daughter as her HCP and requested DNR/DNI. They are still open to dialysis if needed.  Of note, two family members including daughter and grandchild were dx with Covid19 last week. Pt eats soft diet and thin fluids at home, per the daughter, with no cough.     ED course: Covid-19 (+), Given Zofran

## 2022-09-23 NOTE — H&P ADULT - NSHPPHYSICALEXAM_GEN_ALL_CORE
T(C): 36.6 (09-23-22 @ 13:49), Max: 36.6 (09-23-22 @ 13:49)  HR: 87 (09-23-22 @ 13:49) (87 - 87)  BP: 123/67 (09-23-22 @ 13:49) (123/67 - 123/67)  RR: 16 (09-23-22 @ 13:49) (16 - 16)  SpO2: 100% (09-23-22 @ 13:49) (100% - 100%)  T(C): 36.6 (09-23-22 @ 13:49), Max: 36.6 (09-23-22 @ 13:49)  HR: 87 (09-23-22 @ 13:49) (87 - 87)  BP: 123/67 (09-23-22 @ 13:49) (123/67 - 123/67)  RR: 16 (09-23-22 @ 13:49) (16 - 16)  SpO2: 100% (09-23-22 @ 13:49) (100% - 100%)      GENERAL: NAD, well appearing   EYES: EOMI, PERRL  ENT: EOMI, MMM, no oropharyngeal lesions or erythema appreciated  Pulm: normal work of breathing, CTABL  CV: RRR, S1&S2+, no m/r/g appreciated  ABDOMEN: soft, Open wound packed over L abdomen from surgical incision. Tender on sides, skin dry nonerythematous. Ostomy appears clean, pink with brown pasty output in bag. No CVAT  MSK: nl ROM  EXTREMITIES:  trace edema in b/l LE  Neuro: alert, awake, answering questions, no focal deficits, JOE  SKIN: warm and dry, no visible rash Vital Signs Last 24 Hrs  T(C): 36.7 (23 Sep 2022 23:21), Max: 36.7 (23 Sep 2022 23:21)  T(F): 98 (23 Sep 2022 23:21), Max: 98 (23 Sep 2022 23:21)  HR: 74 (23 Sep 2022 23:21) (74 - 87)  BP: 106/60 (23 Sep 2022 23:21) (106/60 - 123/67)  BP(mean): --  RR: 16 (23 Sep 2022 23:21) (16 - 16)  SpO2: 100% (23 Sep 2022 23:21) (100% - 100%)    Parameters below as of 23 Sep 2022 23:21  Patient On (Oxygen Delivery Method): room air    GENERAL: NAD, well appearing   EYES: EOMI, PERRL  ENT: EOMI, MMM, no oropharyngeal lesions or erythema appreciated  Pulm: normal work of breathing, CTABL  CV: RRR, S1&S2+, no m/r/g appreciated  ABDOMEN: soft, (+) open surgical wound over LL abdomen, packed, clean, no associated erythema, tender on edges, skin dry. Ostomy appears clean, pink with brown pasty output in bag. No CVAT  MSK: nl ROM  EXTREMITIES:  trace edema in b/l LE  Neuro: alert, awake, answering questions, no focal deficits, JOE  SKIN: warm and dry, no visible rash    additional elements of PE below

## 2022-09-23 NOTE — H&P ADULT - PROBLEM SELECTOR PLAN 6
DVT ppx: full ac with heaprin  DIet: renal  Dispo: pending workup JOVITA witnessed by RN and completed in chart, pt DNR/DNI with daughter as HCP  -pt open to dialysis if needed -pt was found to have acute RLE DVT 8/2022 and was on home lovenox 90mg BID  -switch lovenox to heparin gtt given JAMISON pt with lauri-incisional pain, chronic  -wound care consulted  [ ] surgery consult in AM to check surgical site pt with lauri-incisional pain, chronic  -wound care consulted  [ ] Non-emergent surgery consult in AM to evaluated the surgical site

## 2022-09-23 NOTE — H&P ADULT - PROBLEM SELECTOR PROBLEM 6
Need for prophylactic measure Counseling regarding goals of care DVT, lower extremity H/O ventral hernia

## 2022-09-23 NOTE — H&P ADULT - ATTENDING COMMENTS
68yo Female with h/o HTN, HLD, DVT on Lovenox, incarcerated ventral hernia s/p repairs and enterotomy repairs, small bowel resection s/p end ileostomy, recently admitted with hyponatrenia and JAMISON; Pt admitted with JAMISON and UTI; Found to be Covid-19 positive;     Assessment and plan supplemented and modified where indicated;

## 2022-09-23 NOTE — H&P ADULT - NSHPSOCIALHISTORY_GEN_ALL_CORE
Lives with daughter. Baseline mostly bedbound, able to stand but unable to ambulate significantly. works with PT at home. No alcohol, tobacco, or IVDU. Lives with daughter. Baseline mostly bedbound, able to stand but unable to ambulate with daughter's assistance, works with PT at home. No h/o alcohol, tobacco, or IVD use.

## 2022-09-23 NOTE — H&P ADULT - PROBLEM SELECTOR PLAN 8
DVT ppx: full ac with heaprin  DIet: renal  Dispo: pending workup JOVITA witnessed by RN and completed in chart, pt DNR/DNI with daughter as HCP  -pt open to dialysis if needed

## 2022-09-23 NOTE — ED PROVIDER NOTE - NS ED MD DISPO ADMITTING SERVICE
c/o generalized HA x 4 weeks with dizziness described as lightheadedness with nausea. worse in the morning. denies LOC, head injury. OTC meds help with sx.
MED

## 2022-09-23 NOTE — H&P ADULT - NSHPLABSRESULTS_GEN_ALL_CORE
12.7   6.77  )-----------( 167      ( 23 Sep 2022 15:45 )             39.9       09-23    130<L>  |  93<L>  |  91<H>  ----------------------------<  102<H>  4.9   |  19<L>  |  3.73<H>    Ca    9.5      23 Sep 2022 15:45  Phos  5.2     09-23  Mg     1.50     09-23    TPro  6.9  /  Alb  3.0<L>  /  TBili  0.4  /  DBili  x   /  AST  47<H>  /  ALT  16  /  AlkPhos  148<H>  09-23                      Lactate Trend            CAPILLARY BLOOD GLUCOSE                  Cultures: pending ucx    Radiology:  < from: US Kidney and Bladder (09.23.22 @ 18:35) >    No hydronephrosis    Hepatic steatosis    < end of copied text >        EKG: not in chart .    -personally interpreted EKG: pending    -personally reviewed labs:      12.7   6.77  )-----------( 167      ( 23 Sep 2022 15:45 )             39.9       09-23    130<L>  |  93<L>  |  91<H>  ----------------------------<  102<H>  4.9   |  19<L>  |  3.73<H>    Ca    9.5      23 Sep 2022 15:45  Phos  5.2     09-23  Mg     1.50     09-23    TPro  6.9  /  Alb  3.0<L>  /  TBili  0.4  /  DBili  x   /  AST  47<H>  /  ALT  16  /  AlkPhos  148<H>  09-23          -personally reviewed US Kidney and Bladder (09.23.22 @ 18:35)    Best obtainable imaging is provided limited by patient   positioning. Images of the right kidney which include the liver   demonstrates increased hepatic echogenicity in keeping with hepatic   steatosis.  Right kidney: 9.6 cm. No hydronephrosis or calculi.  Left kidney: 9.6 cm. No hydronephrosis or calculi. Approximately 3.9 cm   cyst within the kidney  Urinary bladder: Patient declined assessment due to pain according to the   sonographer worksheet    IMPRESSION:  No hydronephrosis  Hepatic steatosis .    -personally interpreted EKG: nsr 75bpm, poor baseline due to body tremor, no acute Tw or ST changes, no PACs or PVCs, grossly unchanged compared to EKG dated 8/21/2022    -personally reviewed labs:      12.7   6.77  )-----------( 167      ( 23 Sep 2022 15:45 )             39.9       09-23    130<L>  |  93<L>  |  91<H>  ----------------------------<  102<H>  4.9   |  19<L>  |  3.73<H>    Ca    9.5      23 Sep 2022 15:45  Phos  5.2     09-23  Mg     1.50     09-23    TPro  6.9  /  Alb  3.0<L>  /  TBili  0.4  /  DBili  x   /  AST  47<H>  /  ALT  16  /  AlkPhos  148<H>  09-23          -personally reviewed US Kidney and Bladder (09.23.22 @ 18:35)    Best obtainable imaging is provided limited by patient   positioning. Images of the right kidney which include the liver   demonstrates increased hepatic echogenicity in keeping with hepatic   steatosis.  Right kidney: 9.6 cm. No hydronephrosis or calculi.  Left kidney: 9.6 cm. No hydronephrosis or calculi. Approximately 3.9 cm   cyst within the kidney  Urinary bladder: Patient declined assessment due to pain according to the   sonographer worksheet    IMPRESSION:  No hydronephrosis  Hepatic steatosis

## 2022-09-23 NOTE — H&P ADULT - NSHPREVIEWOFSYSTEMS_GEN_ALL_CORE
In additional the that documented in the HPI, the additional ROS was obtained:    CONSTITUTIONAL: No fever, no chills  EYES: no change in vision, no blurred vision  HEENT: no trouble swallowing, no trouble speaking, no sore throat  CV: no chest pain, no palpitations  RESP: no cough, no shortness of breath  GI: no abdominal pain, no nausea, no vomiting, no diarrhea, no constipation. Pain around surgical incision site  : +dysuria and urinary frequency  NEURO: no headache, no new numbness, no weakness, no dizziness  SKIN: no new rashes  HEME: No easy bruising or bleeding  MSK: no recent trauma CONSTITUTIONAL: No fever, no chills  EYES: no change in vision, no blurred vision  HEENT: no trouble swallowing, no trouble speaking, no sore throat  CV: no chest pain, no palpitations  RESP: no cough, no shortness of breath  GI: no abdominal pain, no nausea, no vomiting, no diarrhea, no constipation. Pain around surgical incision site  : +dysuria and urinary frequency  NEURO: no headache, no new numbness, no weakness, no dizziness  SKIN: no new rashes, no pruritus   HEME: No easy bruising or bleeding  MSK: no recent trauma, no joint swelling    additional elements of ROS below

## 2022-09-23 NOTE — ED PROVIDER NOTE - OBJECTIVE STATEMENT
67 year old female with a PMHx of HTN, HLD, s/p lap to open repair of incarcerated ventral hernia c/b enterotomy (repaired), hernia defect repair 6/15 and enterotomy repair, SBR, and end ileostomy 6/21 presenting with abnormal labs. Patient had JAMISON following her surgery 3 months ago. Last week she went to her PCP, routine blood work revealed another JAMISON, sent to nephrologist yesterday for further evaluation. Labs drawn yesterday showed worsening of JAMISON over the week and sent in by Nephrology for further workup. Associated nausea with intermittent vomiting over the last two weeks. Denies fevers, cp, sob, abdominal pain.

## 2022-09-24 LAB
ALBUMIN SERPL ELPH-MCNC: 2.7 G/DL — LOW (ref 3.3–5)
ALP SERPL-CCNC: 137 U/L — HIGH (ref 40–120)
ALT FLD-CCNC: 21 U/L — SIGNIFICANT CHANGE UP (ref 4–33)
ANION GAP SERPL CALC-SCNC: 18 MMOL/L — HIGH (ref 7–14)
APTT BLD: 140.7 SEC — SIGNIFICANT CHANGE UP (ref 27–36.3)
APTT BLD: 176.4 SEC — CRITICAL HIGH (ref 27–36.3)
APTT BLD: 37.8 SEC — HIGH (ref 27–36.3)
AST SERPL-CCNC: 58 U/L — HIGH (ref 4–32)
BILIRUB SERPL-MCNC: 0.4 MG/DL — SIGNIFICANT CHANGE UP (ref 0.2–1.2)
BUN SERPL-MCNC: 87 MG/DL — HIGH (ref 7–23)
CALCIUM SERPL-MCNC: 9.1 MG/DL — SIGNIFICANT CHANGE UP (ref 8.4–10.5)
CHLORIDE SERPL-SCNC: 93 MMOL/L — LOW (ref 98–107)
CO2 SERPL-SCNC: 17 MMOL/L — LOW (ref 22–31)
CREAT SERPL-MCNC: 3.55 MG/DL — HIGH (ref 0.5–1.3)
CRP SERPL-MCNC: 12.3 MG/L — HIGH
EGFR: 14 ML/MIN/1.73M2 — LOW
FERRITIN SERPL-MCNC: 553 NG/ML — HIGH (ref 15–150)
GLUCOSE SERPL-MCNC: 91 MG/DL — SIGNIFICANT CHANGE UP (ref 70–99)
HCT VFR BLD CALC: 36.9 % — SIGNIFICANT CHANGE UP (ref 34.5–45)
HCT VFR BLD CALC: 37.1 % — SIGNIFICANT CHANGE UP (ref 34.5–45)
HGB BLD-MCNC: 11.3 G/DL — LOW (ref 11.5–15.5)
HGB BLD-MCNC: 11.4 G/DL — LOW (ref 11.5–15.5)
LDH SERPL L TO P-CCNC: 343 U/L — HIGH (ref 135–225)
MAGNESIUM SERPL-MCNC: 2 MG/DL — SIGNIFICANT CHANGE UP (ref 1.6–2.6)
MCHC RBC-ENTMCNC: 26.1 PG — LOW (ref 27–34)
MCHC RBC-ENTMCNC: 26.1 PG — LOW (ref 27–34)
MCHC RBC-ENTMCNC: 30.6 GM/DL — LOW (ref 32–36)
MCHC RBC-ENTMCNC: 30.7 GM/DL — LOW (ref 32–36)
MCV RBC AUTO: 84.9 FL — SIGNIFICANT CHANGE UP (ref 80–100)
MCV RBC AUTO: 85.2 FL — SIGNIFICANT CHANGE UP (ref 80–100)
NRBC # BLD: 0 /100 WBCS — SIGNIFICANT CHANGE UP (ref 0–0)
NRBC # BLD: 0 /100 WBCS — SIGNIFICANT CHANGE UP (ref 0–0)
NRBC # FLD: 0 K/UL — SIGNIFICANT CHANGE UP (ref 0–0)
NRBC # FLD: 0 K/UL — SIGNIFICANT CHANGE UP (ref 0–0)
PHOSPHATE SERPL-MCNC: 5.5 MG/DL — HIGH (ref 2.5–4.5)
PLATELET # BLD AUTO: 206 K/UL — SIGNIFICANT CHANGE UP (ref 150–400)
PLATELET # BLD AUTO: 208 K/UL — SIGNIFICANT CHANGE UP (ref 150–400)
POTASSIUM SERPL-MCNC: 5.1 MMOL/L — SIGNIFICANT CHANGE UP (ref 3.5–5.3)
POTASSIUM SERPL-SCNC: 5.1 MMOL/L — SIGNIFICANT CHANGE UP (ref 3.5–5.3)
PROT SERPL-MCNC: 7.3 G/DL — SIGNIFICANT CHANGE UP (ref 6–8.3)
RBC # BLD: 4.33 M/UL — SIGNIFICANT CHANGE UP (ref 3.8–5.2)
RBC # BLD: 4.37 M/UL — SIGNIFICANT CHANGE UP (ref 3.8–5.2)
RBC # FLD: 16.4 % — HIGH (ref 10.3–14.5)
RBC # FLD: 16.4 % — HIGH (ref 10.3–14.5)
SODIUM SERPL-SCNC: 128 MMOL/L — LOW (ref 135–145)
WBC # BLD: 4.12 K/UL — SIGNIFICANT CHANGE UP (ref 3.8–10.5)
WBC # BLD: 4.42 K/UL — SIGNIFICANT CHANGE UP (ref 3.8–10.5)
WBC # FLD AUTO: 4.12 K/UL — SIGNIFICANT CHANGE UP (ref 3.8–10.5)
WBC # FLD AUTO: 4.42 K/UL — SIGNIFICANT CHANGE UP (ref 3.8–10.5)

## 2022-09-24 PROCEDURE — 99233 SBSQ HOSP IP/OBS HIGH 50: CPT

## 2022-09-24 PROCEDURE — 93010 ELECTROCARDIOGRAM REPORT: CPT

## 2022-09-24 PROCEDURE — 71045 X-RAY EXAM CHEST 1 VIEW: CPT | Mod: 26

## 2022-09-24 RX ORDER — SODIUM CHLORIDE 9 MG/ML
1000 INJECTION INTRAMUSCULAR; INTRAVENOUS; SUBCUTANEOUS
Refills: 0 | Status: DISCONTINUED | OUTPATIENT
Start: 2022-09-24 | End: 2022-09-26

## 2022-09-24 RX ORDER — HEPARIN SODIUM 5000 [USP'U]/ML
INJECTION INTRAVENOUS; SUBCUTANEOUS
Qty: 25000 | Refills: 0 | Status: DISCONTINUED | OUTPATIENT
Start: 2022-09-24 | End: 2022-09-27

## 2022-09-24 RX ORDER — ASCORBIC ACID 60 MG
500 TABLET,CHEWABLE ORAL DAILY
Refills: 0 | Status: DISCONTINUED | OUTPATIENT
Start: 2022-09-24 | End: 2022-09-27

## 2022-09-24 RX ORDER — INFLUENZA VIRUS VACCINE 15; 15; 15; 15 UG/.5ML; UG/.5ML; UG/.5ML; UG/.5ML
0.7 SUSPENSION INTRAMUSCULAR ONCE
Refills: 0 | Status: DISCONTINUED | OUTPATIENT
Start: 2022-09-24 | End: 2022-09-27

## 2022-09-24 RX ADMIN — HEPARIN SODIUM 0 UNIT(S)/HR: 5000 INJECTION INTRAVENOUS; SUBCUTANEOUS at 15:21

## 2022-09-24 RX ADMIN — HEPARIN SODIUM 1200 UNIT(S)/HR: 5000 INJECTION INTRAVENOUS; SUBCUTANEOUS at 16:29

## 2022-09-24 RX ADMIN — HEPARIN SODIUM 1500 UNIT(S)/HR: 5000 INJECTION INTRAVENOUS; SUBCUTANEOUS at 08:02

## 2022-09-24 RX ADMIN — MIRTAZAPINE 15 MILLIGRAM(S): 45 TABLET, ORALLY DISINTEGRATING ORAL at 13:23

## 2022-09-24 RX ADMIN — SODIUM CHLORIDE 100 MILLILITER(S): 9 INJECTION INTRAMUSCULAR; INTRAVENOUS; SUBCUTANEOUS at 00:34

## 2022-09-24 RX ADMIN — Medication 1 TABLET(S): at 13:23

## 2022-09-24 RX ADMIN — HEPARIN SODIUM 1200 UNIT(S)/HR: 5000 INJECTION INTRAVENOUS; SUBCUTANEOUS at 19:14

## 2022-09-24 RX ADMIN — Medication 1 TABLET(S): at 18:05

## 2022-09-24 RX ADMIN — SODIUM CHLORIDE 2 GRAM(S): 9 INJECTION INTRAMUSCULAR; INTRAVENOUS; SUBCUTANEOUS at 05:36

## 2022-09-24 RX ADMIN — Medication 4 MILLIGRAM(S): at 05:37

## 2022-09-24 RX ADMIN — PANTOPRAZOLE SODIUM 40 MILLIGRAM(S): 20 TABLET, DELAYED RELEASE ORAL at 05:36

## 2022-09-24 RX ADMIN — CEFTRIAXONE 100 MILLIGRAM(S): 500 INJECTION, POWDER, FOR SOLUTION INTRAMUSCULAR; INTRAVENOUS at 21:43

## 2022-09-24 RX ADMIN — SODIUM CHLORIDE 2 GRAM(S): 9 INJECTION INTRAMUSCULAR; INTRAVENOUS; SUBCUTANEOUS at 13:23

## 2022-09-24 RX ADMIN — Medication 4 MILLIGRAM(S): at 18:04

## 2022-09-24 RX ADMIN — Medication 500 MILLIGRAM(S): at 18:06

## 2022-09-24 RX ADMIN — HEPARIN SODIUM 1500 UNIT(S)/HR: 5000 INJECTION INTRAVENOUS; SUBCUTANEOUS at 08:33

## 2022-09-24 RX ADMIN — SODIUM CHLORIDE 100 MILLILITER(S): 9 INJECTION INTRAMUSCULAR; INTRAVENOUS; SUBCUTANEOUS at 18:01

## 2022-09-24 NOTE — CONSULT NOTE ADULT - PROBLEM SELECTOR RECOMMENDATION 9
Pt. with JAMISON likely in the setting of poor oral intake, increased ileostomy output and recent COVID infection. Pt had multiple admissions to [facility] and JAMISON events in the past. Recent admission was at [facility] for similar complaints (8/21/22-8/26/22). Scr was elevated at 2.75 on that admission on 8/21/22. Scr down trended to 0.99 on discharge on 8/26/22. Scr was subsequently elevated at 3.7 on 9/14/22 on labs done at PMDs office. Pt. followed with Dr. Ram (nephrologist) with first visit being on 9/22/22. Blood work during that visit showed elevated Scr at 3.81 on 9/22/22. On admission Scr was elevated at 3.73 on 9/23/22. UA showed trace proteins and findings concerning for UTI. Spot urine TP/CR was mildly elevated at 0.4. Kidney sonogram showed WNL with no hydronephrosis on 9/23/22. Pt. likely has pre-renal JAMISON secondary to increased ileostomy output. Recommend NS @ 100cc/hr today. Monitor labs and urine output. Avoid any potential nephrotoxins. Dose medications as per eGFR. Pt. with hemodynamically mediated JAMISON in the setting of poor oral intake, increased ileostomy output and COVID-19. On review of [place], pt. noted to have several episodes of JAMISON during previous hospital stays at [facility]. During recent hospital stay at [facility] (8/21/22-8/26/22), Scr was elevated at 2.75 on admission (8/21/22). Scr downtrended to 0.99 on discharge on 8/26/22. Scr was subsequently elevated at 3.7 on 9/14/22 on labs done at PMD's office. Pt. followed with Dr. Ram (nephrologist) with first visit being on 9/22/22. Blood work during that visit showed elevated Scr at 3.81 on 9/22/22. On admission Scr was elevated at 3.73 on 9/23/22. UA showed trace proteins and findings concerning for UTI. Spot urine TP/CR was mildly elevated at 0.4. Kidney sonogram showed WNL with no hydronephrosis on 9/23/22. Recommend IV NS @ 100cc/hr today. Monitor labs and urine output. Avoid any potential nephrotoxins. Dose medications as per eGFR.

## 2022-09-24 NOTE — DIETITIAN INITIAL EVALUATION ADULT - PERTINENT MEDS FT
MEDICATIONS  (STANDING):  cefTRIAXone   IVPB 1000 milliGRAM(s) IV Intermittent every 24 hours  diphenoxylate/atropine 1 Tablet(s) Oral daily  heparin  Infusion.  Unit(s)/Hr (15 mL/Hr) IV Continuous <Continuous>  influenza  Vaccine (HIGH DOSE) 0.7 milliLiter(s) IntraMuscular once  loperamide 4 milliGRAM(s) Oral two times a day  mirtazapine 15 milliGRAM(s) Oral daily  pantoprazole    Tablet 40 milliGRAM(s) Oral before breakfast  sodium chloride 2 Gram(s) Oral three times a day  sodium chloride 0.9%. 1000 milliLiter(s) (100 mL/Hr) IV Continuous <Continuous>    MEDICATIONS  (PRN):

## 2022-09-24 NOTE — DIETITIAN INITIAL EVALUATION ADULT - PERTINENT LABORATORY DATA
09-24    128<L>  |  93<L>  |  87<H>  ----------------------------<  91  5.1   |  17<L>  |  3.55<H>    Ca    9.1      24 Sep 2022 05:29  Phos  5.5     09-24  Mg     2.00     09-24    TPro  7.3  /  Alb  2.7<L>  /  TBili  0.4  /  DBili  x   /  AST  58<H>  /  ALT  21  /  AlkPhos  137<H>  09-24  A1C with Estimated Average Glucose Result: 4.9 % (07-28-22 @ 05:55)

## 2022-09-24 NOTE — PATIENT PROFILE ADULT - FALL HARM RISK - HARM RISK INTERVENTIONS

## 2022-09-24 NOTE — CONSULT NOTE ADULT - ATTENDING COMMENTS
Pt. with JAMISON, resolving now. Pt. seen and examined today (9/25/22), in presence of daughter. Pt. feels well. No fever, CP or SOB during rounds today. Pt. clinically stable. Scr decreased to 2.68 today (9/25/22). SNa improved to 138 today. Continue with IVF. Discontinue oral salt tablets. Monitor labs and urine output. Avoid nephrotoxins. Dose medications as per eGFR.

## 2022-09-24 NOTE — PATIENT PROFILE ADULT - FUNCTIONAL ASSESSMENT - BASIC MOBILITY ASSESSMENT TYPE
Diclofenac gel Pending    Insurance response  Prescription Drug Insurance: [employer]  Notes: Prior authorization submitted - will update provider when decision has been made by insurance.          Admission

## 2022-09-24 NOTE — DIETITIAN INITIAL EVALUATION ADULT - NS FNS DIET ORDER
Diet, Soft and Bite Sized:   No Concentrated Potassium  No Concentrated Phosphorus  [place]al (09-23-22 @ 20:08)

## 2022-09-24 NOTE — PHYSICAL THERAPY INITIAL EVALUATION ADULT - ADDITIONAL COMMENTS
Pt daughter at bedside providing social history. Pt lives in a house with 2 steps to enter with her daughters, remains on the main floor. Pt has been dependent in ADLs and bedbound for several months since a previous surgery. Prior to that, pt was independent in ADLs and ambulated without device. Pt was receiving home PT prior to admission.    Following evaluation, pt was left semireclined in bed in no distress, call bell in reach. RN aware.

## 2022-09-24 NOTE — PHYSICAL THERAPY INITIAL EVALUATION ADULT - PERTINENT HX OF CURRENT PROBLEM, REHAB EVAL
67 year old female sent in by outpatient nephrologist for evaluation of JAMISON. Admitted with JAMISON and UTI; Found to be Covid-19 positive.

## 2022-09-24 NOTE — DIETITIAN INITIAL EVALUATION ADULT - ORAL NUTRITION SUPPLEMENTS
1. Recommend adding Nepro 8oz 2x/day (840kcal, 38 gm protein) for nutrient support.  2. Recommend adding MVI, vit c to promote wound healing.

## 2022-09-24 NOTE — CONSULT NOTE ADULT - SUBJECTIVE AND OBJECTIVE BOX
[facility] DIVISION OF KIDNEY DISEASES AND HYPERTENSION -- [phone]  -- INITIAL CONSULT NOTE  --------------------------------------------------------------------------------  History obtained from daughter present at bedside.   HPI: 67-year-old Patwari/Vietnamese speaking Female with significant h/o HTN, HLD, DVT on Lovenox, incarcerated ventral hernia s/p repairs and enterotomy repairs, small bowel resection s/p end ileostomy (June 2022) who was sent to [facility] on 9/23/22 from outpatient nephrologist's office for elevated Scr. Initial labs in ER concerning for elevated Scr of 3.73 on 9/23/22. Scr is elevated /stable at 3.55 today (9/24/22). Initial labs also showed low SNa of 130. SNa remain low at 128 today. Pt also tested positive for COVID infection in ER. Nephrology team consulted for JAMISON and hyponatremia.     On review of [facility], it was noted that Pt had multiple admissions to [facility] and JAMISON events in the past. Pt. was admitted at [facility] on 7/25/22-8/8/22 for JAMISON and hyponatremia. During that admission , Scr was elevated at 11.58. Pt. received IVF. Scr improved to 0.72 on discharge on 9/8/22. Recent admission was at [facility] for similar complaints (8/21/22-8/26/22). Scr was elevated at 2.75 on admission on 8/21/22. Scr down trended to 0.99 on discharge on 8/26/22. Scr was subsequently elevated at 3.7 on 9/14/22 on labs done at PMDs office. Pt. followed with Dr. Ram (nephrologist) with first visit being on 9/22/22. Blood work during that visit showed elevated Scr at 3.81 on 9/22/22. Pt also has history of chronic hyponatremia and currently on salt tabs 2 gm oral BID. Per pts daughter, pt has poor oral intake. Also reported of cough and increased output from ileostomy. Denies fever, chills, N/V, abdominal pain or dizziness.     PAST HISTORY  --------------------------------------------------------------------------------  PAST MEDICAL & SURGICAL HISTORY:  Obesity  Hypertension  Hyperlipidemia  H/O fracture of tibia  and fibula (L)    History of cholecystectomy  2011  H/O ventral hernia repair  S/P small bowel resection  H/O ileostomy    FAMILY HISTORY:  No known problems  denied family Hx of cervical, ovarian, uterine CA    PAST SOCIAL HISTORY:    ALLERGIES & MEDICATIONS  --------------------------------------------------------------------------------  Allergies    No Known Allergies    Intolerances    Standing Inpatient Medications  ascorbic acid 500 milliGRAM(s) Oral daily  cefTRIAXone   IVPB 1000 milliGRAM(s) IV Intermittent every 24 hours  diphenoxylate/atropine 1 Tablet(s) Oral daily  heparin  Infusion.  Unit(s)/Hr IV Continuous <Continuous>  influenza  Vaccine (HIGH DOSE) 0.7 milliLiter(s) IntraMuscular once  loperamide 4 milliGRAM(s) Oral two times a day  mirtazapine 15 milliGRAM(s) Oral daily  multivitamin 1 Tablet(s) Oral daily  pantoprazole    Tablet 40 milliGRAM(s) Oral before breakfast  sodium chloride 2 Gram(s) Oral three times a day  sodium chloride 0.9%. 1000 milliLiter(s) IV Continuous <Continuous>    PRN Inpatient Medications    REVIEW OF SYSTEMS  --------------------------------------------------------------------------------  Gen: No fevers/chills  Skin: No rashes  Head/Eyes/Ears: Normal hearing,   Respiratory: No dyspnea, cough  CV: No chest pain  GI: No abdominal pain, diarrhea  : No dysuria, hematuria  MSK: No  edema  Heme: No easy bruising or bleeding  Psych: No significant depression    All other systems were reviewed and are negative, except as noted.    VITALS/PHYSICAL EXAM  --------------------------------------------------------------------------------  T(C): 36.8 (09-24-22 @ 13:00), Max: 36.8 (09-24-22 @ 13:00)  HR: 78 (09-24-22 @ 13:00) (73 - 78)  BP: 102/61 (09-24-22 @ 13:00) (102/61 - 117/57)  RR: 17 (09-24-22 @ 13:00) (16 - 17)  SpO2: 100% (09-24-22 @ 13:00) (100% - 100%)  Wt(kg): --  Height (cm): 152.4 (09-23-22 @ 23:21)  Weight (kg): 82.6 (09-23-22 @ 23:21)  BMI (kg/m2): 35.6 (09-23-22 @ 23:21)  BSA (m2): 1.79 (09-23-22 @ 23:21)    09-23-22 @ 07:01  -  09-24-22 @ 07:00  --------------------------------------------------------  IN: 0 mL / OUT: 200 mL / NET: -200 mL    Physical Exam:  	Gen: NAD, resting  	HEENT: MMM  	Pulm: CTA B/L  	CV: S1S2  	Abd: Soft, +BS, RLQ ileostomy with ileostomy bag evident   	Ext: No LE edema B/L  	Neuro: Awake  	Skin: Warm and dry  	Vascular access: IV peripheral canula    LABS/STUDIES  --------------------------------------------------------------------------------              11.4   4.12  >-----------<  206      [09-24-22 @ 14:08]              37.1     128  |  93  |  87  ----------------------------<  91      [09-24-22 @ 05:29]  5.1   |  17  |  3.55        Ca     9.1     [09-24-22 @ 05:29]      Mg     2.00     [09-24-22 @ 05:29]      Phos  5.5     [09-24-22 @ 05:29]    TPro  7.3  /  Alb  2.7  /  TBili  0.4  /  DBili  x   /  AST  58  /  ALT  21  /  AlkPhos  137  [09-24-22 @ 05:29]    PTT: 176.4      [09-24-22 @ 14:08]          [09-24-22 @ 05:29]    Creatinine Trend:  SCr 3.55 [09-24 @ 05:29]  SCr 3.73 [09-23 @ 15:45]  SCr 0.99 [08-26 @ 06:10]  SCr 0.93 [08-25 @ 20:30]    Urinalysis - [09-23-22 @ 22:00]      Color Yellow / Appearance Slightly Turbid / SG 1.018 / pH 5.5      Gluc Negative / Ketone Negative  / Bili Negative / Urobili <2 mg/dL       Blood Large / Protein 30 mg/dL / Leuk Est Large / Nitrite Negative      RBC 11-25 / WBC >50 / Hyaline  / Gran  / Sq Epi  / Non Sq Epi  / Bacteria Many    Urine Creatinine 201      [09-23-22 @ 22:00]  Urine Protein 71      [09-23-22 @ 22:00]  Urine Osmolality 390      [09-23-22 @ 22:00]    Iron 19, TIBC 157, %sat 12      [08-05-22 @ 04:48]  Ferritin 553      [09-24-22 @ 05:29]  TSH 3.55      [07-25-22 @ 19:18]  Lipid: chol --, , HDL --, LDL --      [07-25-22 @ 19:18]    HBsAb <3.0      [07-26-22 @ 01:47]  HBsAg Nonreact      [07-26-22 @ 01:47]  HBcAb Nonreact      [07-26-22 @ 01:47]  HCV 0.13, Nonreact      [07-26-22 @ 01:47] [facility] DIVISION OF KIDNEY DISEASES AND HYPERTENSION -- [phone]  -- INITIAL CONSULT NOTE  --------------------------------------------------------------------------------  History obtained from patient and daughter at bedside.     HPI: 67-year-old Patwari/German speaking Female with significant h/o HTN, HLD, DVT on Lovenox, incarcerated ventral hernia s/p repairs and enterotomy repairs, small bowel resection s/p end ileostomy (June 2022) who was sent to [facility] on 9/23/22 from outpatient nephrologist's office for elevated Scr. Initial labs in ER concerning for elevated Scr of 3.73 on 9/23/22. Scr is elevated /stable at 3.55 today (9/24/22). Initial labs also showed low SNa of 130. SNa remain low at 128 today. Pt also tested positive for COVID-19 in ER. Nephrology team consulted for JAMISON and hyponatremia.     On review of [facility], pt. noted to have multiple JAMISON episodes during previous hospital stays at [facility]. Pt. was admitted at [facility] on 7/25/22-8/8/22 for JAMISON and hyponatremia. During that admission, Scr was elevated at 11.58. Pt. received IVF. Scr improved to 0.72 on discharge on 9/8/22. Recent admission was at [facility] for similar complaints (8/21/22-8/26/22). Scr was elevated at 2.75 on admission on 8/21/22. Scr down trended to 0.99 on discharge on 8/26/22. Scr was subsequently elevated at 3.7 on 9/14/22 on labs done at [facility] office. Pt. followed with Dr. Ram (nephrologist) with first visit being on 9/22/22. Blood work during that visit showed elevated Scr at 3.81 on 9/22/22. Pt also has history of chronic hyponatremia and currently on salt tabs 2 gm oral BID. Per pts daughter, pt has poor oral intake. Also reported of cough and increased output from ileostomy. Denies fever, chills, N/V, abdominal pain or dizziness.     PAST HISTORY  --------------------------------------------------------------------------------  PAST MEDICAL & SURGICAL HISTORY:  Obesity  Hypertension  Hyperlipidemia  H/O fracture of tibia  and fibula (L)    History of cholecystectomy  2011  H/O ventral hernia repair  S/P small bowel resection  H/O ileostomy    FAMILY HISTORY:  No known problems  denied family Hx of cervical, ovarian, uterine CA    PAST SOCIAL HISTORY:    ALLERGIES & MEDICATIONS  --------------------------------------------------------------------------------  Allergies    No Known Allergies    Intolerances    Standing Inpatient Medications  ascorbic acid 500 milliGRAM(s) Oral daily  cefTRIAXone   IVPB 1000 milliGRAM(s) IV Intermittent every 24 hours  diphenoxylate/atropine 1 Tablet(s) Oral daily  heparin  Infusion.  Unit(s)/Hr IV Continuous <Continuous>  influenza  Vaccine (HIGH DOSE) 0.7 milliLiter(s) IntraMuscular once  loperamide 4 milliGRAM(s) Oral two times a day  mirtazapine 15 milliGRAM(s) Oral daily  multivitamin 1 Tablet(s) Oral daily  pantoprazole    Tablet 40 milliGRAM(s) Oral before breakfast  sodium chloride 2 Gram(s) Oral three times a day  sodium chloride 0.9%. 1000 milliLiter(s) IV Continuous <Continuous>    PRN Inpatient Medications    REVIEW OF SYSTEMS  --------------------------------------------------------------------------------  Gen: No fevers/chills, decreased oral intake+  Skin: No rashes  Head/Eyes/Ears: No HA   Respiratory: No dyspnea, cough  CV: No chest pain  GI: Increased ostomy output, no abdominal pain, diarrhea  : No dysuria, hematuria  MSK: No edema  Heme: No easy bruising or bleeding  Psych: No significant depression    All other systems were reviewed and are negative, except as noted.    VITALS/PHYSICAL EXAM  --------------------------------------------------------------------------------  T(C): 36.8 (09-24-22 @ 13:00), Max: 36.8 (09-24-22 @ 13:00)  HR: 78 (09-24-22 @ 13:00) (73 - 78)  BP: 102/61 (09-24-22 @ 13:00) (102/61 - 117/57)  RR: 17 (09-24-22 @ 13:00) (16 - 17)  SpO2: 100% (09-24-22 @ 13:00) (100% - 100%)  Wt(kg): --  Height (cm): 152.4 (09-23-22 @ 23:21)  Weight (kg): 82.6 (09-23-22 @ 23:21)  BMI (kg/m2): 35.6 (09-23-22 @ 23:21)  BSA (m2): 1.79 (09-23-22 @ 23:21)    09-23-22 @ 07:01  -  09-24-22 @ 07:00  --------------------------------------------------------  IN: 0 mL / OUT: 200 mL / NET: -200 mL    Physical Exam:  	Gen: NAD, resting  	HEENT: MMM  	Pulm: CTA B/L  	CV: S1S2  	Abd: Soft, +BS, ileostomy with ileostomy bag+    	Ext: No LE edema B/L  	Neuro: Awake, alert  	Skin: Warm and dry  	Vascular access: IV peripheral canula    LABS/STUDIES  --------------------------------------------------------------------------------              11.4   4.12  >-----------<  206      [09-24-22 @ 14:08]              37.1     128  |  93  |  87  ----------------------------<  91      [09-24-22 @ 05:29]  5.1   |  17  |  3.55        Ca     9.1     [09-24-22 @ 05:29]      Mg     2.00     [09-24-22 @ 05:29]      Phos  5.5     [09-24-22 @ 05:29]    TPro  7.3  /  Alb  2.7  /  TBili  0.4  /  DBili  x   /  AST  58  /  ALT  21  /  AlkPhos  137  [09-24-22 @ 05:29]    09-25    138  |  105  |  66<H>  ----------------------------<  86  3.1<L>   |  19<L>  |  2.68<H>    Ca    7.9<L>      25 Sep 2022 06:38  Phos  4.1     09-25  Mg     1.60     09-25    TPro  6.0  /  Alb  2.1<L>  /  TBili  0.2  /  DBili  x   /  AST  42<H>  /  ALT  18  /  AlkPhos  110  09-25    Creatinine Trend:  SCr 3.55 [09-24 @ 05:29]  SCr 3.73 [09-23 @ 15:45]  SCr 0.99 [08-26 @ 06:10]  SCr 0.93 [08-25 @ 20:30]    Urinalysis - [09-23-22 @ 22:00]      Color Yellow / Appearance Slightly Turbid / SG 1.018 / pH 5.5      Gluc Negative / Ketone Negative  / Bili Negative / Urobili <2 mg/dL       Blood Large / Protein 30 mg/dL / Leuk Est Large / Nitrite Negative      RBC 11-25 / WBC >50 / Hyaline  / Gran  / Sq Epi  / Non Sq Epi  / Bacteria Many    Urine Creatinine 201      [09-23-22 @ 22:00]  Urine Protein 71      [09-23-22 @ 22:00]  Urine Osmolality 390      [09-23-22 @ 22:00]

## 2022-09-24 NOTE — DIETITIAN INITIAL EVALUATION ADULT - NSFNSGIIOFT_GEN_A_CORE
09-23-22 @ 07:01  -  09-24-22 @ 07:00  --------------------------------------------------------  OUT:    Ileostomy (mL): 200 mL  Total OUT: 200 mL    Total NET: -200 mL

## 2022-09-24 NOTE — DIETITIAN NUTRITION RISK NOTIFICATION - TREATMENT: THE FOLLOWING DIET HAS BEEN RECOMMENDED
Diet, Soft and Bite Sized:   No Concentrated Potassium  No Concentrated Phosphorus  Halal (09-23-22 @ 20:08) [Active]

## 2022-09-24 NOTE — DIETITIAN INITIAL EVALUATION ADULT - ADD RECOMMEND
1. Recommend a speech evaluation to determine diet consistency.   2. Monitor weight, labs, po intake and tolerance.  3. Honor food preference.

## 2022-09-24 NOTE — DIETITIAN INITIAL EVALUATION ADULT - OTHER INFO
67 year old Female with h/o HTN, HLD, DVT on Lovenox, incarcerated ventral hernia s/p repairs and enterotomy repairs, small bowel resection s/p end ileostomy, recently admitted with hyponatrenia and JAMISON; Pt admitted with JAMISON and UTI; Found to be Covid-19 positive, per chart.  Patient's daughter reports patient has poor appetite, denies any GI distress during visit, has a colostomy bag. As per chart review, patient was evaluated by speech during her last stay, SLP recommended minced and moist diet consistency on 8/24/2022, recommend a speech evaluation to determine diet consistency. Patient is currently on mirtazapine as appetite stimulant. As per flow sheet, patient weighs 82.6kg/182.1lbs (9/23/2022). Noted with 37.9lbs weight loss/-17% BW x 3 months. Patient's daughter is amendable to Nepro supplement. As per chart review, noted patient has elevated phosphorus-5.5 (9/24/2022), patient is on sodium chloride, potassium is 5.1 WNL. Due to poor po intake, recommend liberalizing diet to no concentrated phosphorus, halal.   RD provided nutrition education on adequate nutrition intake, including list of energy and protein dense foods, list of foods high in phosphorus, small frequent meals. Patient's daughter is receptive to the information provided.

## 2022-09-24 NOTE — PROGRESS NOTE ADULT - PROBLEM SELECTOR PLAN 3
-Na 130, possibly hypovolemic hyponatremia iso low po intake and increased ostomy output and possible SIADH component  -c/w home salt tabs 2g TID  -c/w mirtazapine as appetite stimulant

## 2022-09-24 NOTE — DIETITIAN INITIAL EVALUATION ADULT - ORAL INTAKE PTA/DIET HISTORY
Patient is asleep during visit. Information obtained from patient's daughter/ health care proxy by bedside. Patient's daughter reports height of 5'0", usual body weight of around 220lbs in June 2022. Reports patient is consuming soft foods at home, consuming a lot less amount of food prior to her surgery in June, has change in sense of taste and has decreased appetite. Patient is able to feed self with tray setup, sometimes noticing with difficulty swallowing certain foods. Patient is consuming Nepro supplement 1-2x/day at home. Patient has no known food allergies, following a halal diet.

## 2022-09-24 NOTE — CONSULT NOTE ADULT - PROBLEM SELECTOR RECOMMENDATION 2
Pt. with chronic hyponatremia in the setting of increased output from ileostomy. SNa is low at 128 today (Sna 131 on VBG)(9/24/22).. U Osm is elevated at 390. Currently on salt tabs 2 gm oral TID. Recommend continuing salt tabs for now. Avoid overcorrection (not more then 3-5 mEq/day). Monitor SNa q12.    If you have any questions, please feel free to contact me  Douglas Stevens  Nephrology Fellow  [phone]/ [employer] Teams(Preferred)  (After 5pm or on weekends please page the on-call fellow). Pt. with hyponatremia in the setting of increased output from ileostomy. SNa is low at 128 today (SNa 131 on VBG)(9/24/22). UOsm is elevated at 390. Currently on salt tabs 2 gm oral TID. Recommend continuing salt tabs for now. Avoid overcorrection (not more then 3-5 mEq/day). Monitor SNa q12.    If you have any questions, please feel free to contact me  Douglas Setvens  Nephrology Fellow  [phone]/ [employer] Teams(Preferred)  (After 5pm or on weekends please page the on-call fellow).

## 2022-09-25 LAB
ALBUMIN SERPL ELPH-MCNC: 2.1 G/DL — LOW (ref 3.3–5)
ALP SERPL-CCNC: 110 U/L — SIGNIFICANT CHANGE UP (ref 40–120)
ALT FLD-CCNC: 18 U/L — SIGNIFICANT CHANGE UP (ref 4–33)
ANION GAP SERPL CALC-SCNC: 12 MMOL/L — SIGNIFICANT CHANGE UP (ref 7–14)
ANION GAP SERPL CALC-SCNC: 14 MMOL/L — SIGNIFICANT CHANGE UP (ref 7–14)
APTT BLD: 104.1 SEC — HIGH (ref 27–36.3)
APTT BLD: 44.5 SEC — HIGH (ref 27–36.3)
APTT BLD: 90.8 SEC — HIGH (ref 27–36.3)
AST SERPL-CCNC: 42 U/L — HIGH (ref 4–32)
BILIRUB SERPL-MCNC: 0.2 MG/DL — SIGNIFICANT CHANGE UP (ref 0.2–1.2)
BUN SERPL-MCNC: 56 MG/DL — HIGH (ref 7–23)
BUN SERPL-MCNC: 66 MG/DL — HIGH (ref 7–23)
CALCIUM SERPL-MCNC: 7.9 MG/DL — LOW (ref 8.4–10.5)
CALCIUM SERPL-MCNC: 8.8 MG/DL — SIGNIFICANT CHANGE UP (ref 8.4–10.5)
CHLORIDE SERPL-SCNC: 105 MMOL/L — SIGNIFICANT CHANGE UP (ref 98–107)
CHLORIDE SERPL-SCNC: 106 MMOL/L — SIGNIFICANT CHANGE UP (ref 98–107)
CO2 SERPL-SCNC: 19 MMOL/L — LOW (ref 22–31)
CO2 SERPL-SCNC: 22 MMOL/L — SIGNIFICANT CHANGE UP (ref 22–31)
CREAT SERPL-MCNC: 2.07 MG/DL — HIGH (ref 0.5–1.3)
CREAT SERPL-MCNC: 2.68 MG/DL — HIGH (ref 0.5–1.3)
EGFR: 19 ML/MIN/1.73M2 — LOW
EGFR: 26 ML/MIN/1.73M2 — LOW
GLUCOSE SERPL-MCNC: 103 MG/DL — HIGH (ref 70–99)
GLUCOSE SERPL-MCNC: 86 MG/DL — SIGNIFICANT CHANGE UP (ref 70–99)
HCT VFR BLD CALC: 33.3 % — LOW (ref 34.5–45)
HGB BLD-MCNC: 10.4 G/DL — LOW (ref 11.5–15.5)
INR BLD: 1.17 RATIO — HIGH (ref 0.88–1.16)
MAGNESIUM SERPL-MCNC: 1.6 MG/DL — SIGNIFICANT CHANGE UP (ref 1.6–2.6)
MAGNESIUM SERPL-MCNC: 1.6 MG/DL — SIGNIFICANT CHANGE UP (ref 1.6–2.6)
MCHC RBC-ENTMCNC: 26.4 PG — LOW (ref 27–34)
MCHC RBC-ENTMCNC: 31.2 GM/DL — LOW (ref 32–36)
MCV RBC AUTO: 84.5 FL — SIGNIFICANT CHANGE UP (ref 80–100)
MELD SCORE WITH DIALYSIS: 21 POINTS — SIGNIFICANT CHANGE UP
MELD SCORE WITHOUT DIALYSIS: 18 POINTS — SIGNIFICANT CHANGE UP
NRBC # BLD: 0 /100 WBCS — SIGNIFICANT CHANGE UP (ref 0–0)
NRBC # FLD: 0 K/UL — SIGNIFICANT CHANGE UP (ref 0–0)
PHOSPHATE SERPL-MCNC: 3.5 MG/DL — SIGNIFICANT CHANGE UP (ref 2.5–4.5)
PHOSPHATE SERPL-MCNC: 4.1 MG/DL — SIGNIFICANT CHANGE UP (ref 2.5–4.5)
PLATELET # BLD AUTO: 184 K/UL — SIGNIFICANT CHANGE UP (ref 150–400)
POTASSIUM SERPL-MCNC: 3.1 MMOL/L — LOW (ref 3.5–5.3)
POTASSIUM SERPL-MCNC: 3.7 MMOL/L — SIGNIFICANT CHANGE UP (ref 3.5–5.3)
POTASSIUM SERPL-SCNC: 3.1 MMOL/L — LOW (ref 3.5–5.3)
POTASSIUM SERPL-SCNC: 3.7 MMOL/L — SIGNIFICANT CHANGE UP (ref 3.5–5.3)
PROT SERPL-MCNC: 6 G/DL — SIGNIFICANT CHANGE UP (ref 6–8.3)
PROTHROM AB SERPL-ACNC: 13.6 SEC — HIGH (ref 10.5–13.4)
RBC # BLD: 3.94 M/UL — SIGNIFICANT CHANGE UP (ref 3.8–5.2)
RBC # FLD: 16.3 % — HIGH (ref 10.3–14.5)
SODIUM SERPL-SCNC: 138 MMOL/L — SIGNIFICANT CHANGE UP (ref 135–145)
SODIUM SERPL-SCNC: 140 MMOL/L — SIGNIFICANT CHANGE UP (ref 135–145)
WBC # BLD: 4.4 K/UL — SIGNIFICANT CHANGE UP (ref 3.8–10.5)
WBC # FLD AUTO: 4.4 K/UL — SIGNIFICANT CHANGE UP (ref 3.8–10.5)

## 2022-09-25 PROCEDURE — 99223 1ST HOSP IP/OBS HIGH 75: CPT | Mod: GC

## 2022-09-25 PROCEDURE — 99232 SBSQ HOSP IP/OBS MODERATE 35: CPT

## 2022-09-25 RX ORDER — POTASSIUM CHLORIDE 20 MEQ
40 PACKET (EA) ORAL ONCE
Refills: 0 | Status: COMPLETED | OUTPATIENT
Start: 2022-09-25 | End: 2022-09-25

## 2022-09-25 RX ORDER — MAGNESIUM SULFATE 500 MG/ML
1 VIAL (ML) INJECTION ONCE
Refills: 0 | Status: COMPLETED | OUTPATIENT
Start: 2022-09-25 | End: 2022-09-25

## 2022-09-25 RX ADMIN — PANTOPRAZOLE SODIUM 40 MILLIGRAM(S): 20 TABLET, DELAYED RELEASE ORAL at 05:05

## 2022-09-25 RX ADMIN — SODIUM CHLORIDE 2 GRAM(S): 9 INJECTION INTRAMUSCULAR; INTRAVENOUS; SUBCUTANEOUS at 15:47

## 2022-09-25 RX ADMIN — SODIUM CHLORIDE 100 MILLILITER(S): 9 INJECTION INTRAMUSCULAR; INTRAVENOUS; SUBCUTANEOUS at 23:55

## 2022-09-25 RX ADMIN — HEPARIN SODIUM 700 UNIT(S)/HR: 5000 INJECTION INTRAVENOUS; SUBCUTANEOUS at 19:02

## 2022-09-25 RX ADMIN — HEPARIN SODIUM 0 UNIT(S)/HR: 5000 INJECTION INTRAVENOUS; SUBCUTANEOUS at 00:05

## 2022-09-25 RX ADMIN — HEPARIN SODIUM 900 UNIT(S)/HR: 5000 INJECTION INTRAVENOUS; SUBCUTANEOUS at 07:14

## 2022-09-25 RX ADMIN — Medication 4 MILLIGRAM(S): at 15:48

## 2022-09-25 RX ADMIN — Medication 100 GRAM(S): at 14:35

## 2022-09-25 RX ADMIN — Medication 1 TABLET(S): at 15:51

## 2022-09-25 RX ADMIN — HEPARIN SODIUM 900 UNIT(S)/HR: 5000 INJECTION INTRAVENOUS; SUBCUTANEOUS at 06:25

## 2022-09-25 RX ADMIN — MIRTAZAPINE 15 MILLIGRAM(S): 45 TABLET, ORALLY DISINTEGRATING ORAL at 15:48

## 2022-09-25 RX ADMIN — SODIUM CHLORIDE 2 GRAM(S): 9 INJECTION INTRAMUSCULAR; INTRAVENOUS; SUBCUTANEOUS at 21:47

## 2022-09-25 RX ADMIN — HEPARIN SODIUM 900 UNIT(S)/HR: 5000 INJECTION INTRAVENOUS; SUBCUTANEOUS at 01:06

## 2022-09-25 RX ADMIN — Medication 500 MILLIGRAM(S): at 15:47

## 2022-09-25 RX ADMIN — Medication 4 MILLIGRAM(S): at 05:04

## 2022-09-25 RX ADMIN — CEFTRIAXONE 100 MILLIGRAM(S): 500 INJECTION, POWDER, FOR SOLUTION INTRAMUSCULAR; INTRAVENOUS at 21:47

## 2022-09-25 RX ADMIN — Medication 40 MILLIEQUIVALENT(S): at 14:34

## 2022-09-25 RX ADMIN — HEPARIN SODIUM 700 UNIT(S)/HR: 5000 INJECTION INTRAVENOUS; SUBCUTANEOUS at 14:04

## 2022-09-25 RX ADMIN — HEPARIN SODIUM 900 UNIT(S)/HR: 5000 INJECTION INTRAVENOUS; SUBCUTANEOUS at 23:55

## 2022-09-25 RX ADMIN — SODIUM CHLORIDE 2 GRAM(S): 9 INJECTION INTRAMUSCULAR; INTRAVENOUS; SUBCUTANEOUS at 05:04

## 2022-09-25 RX ADMIN — HEPARIN SODIUM 900 UNIT(S)/HR: 5000 INJECTION INTRAVENOUS; SUBCUTANEOUS at 21:45

## 2022-09-25 NOTE — PROVIDER CONTACT NOTE (CRITICAL VALUE NOTIFICATION) - ASSESSMENT
patient PTT is 176.4 . denies any chest pain and SOB . No s/s OF Bleeding .
Pt is on heparin drip @12 mL/hr. Pt is in bed resting comfortably, no complaints.

## 2022-09-25 NOTE — PROVIDER CONTACT NOTE (CRITICAL VALUE NOTIFICATION) - NS PROVIDER READ BACK TO LAB
yes
yes
amLODIPine 10 mg oral tablet: 1 tab(s) orally once a day  Aricept 5 mg oral tablet: 1 tab(s) orally once a day (at bedtime)  aspirin 81 mg oral tablet, chewable: 1 tab(s) orally once a day  atorvastatin 20 mg oral tablet: 1 tab(s) orally once a day (at bedtime)  carvedilol 3.125 mg oral tablet: 1 tab(s) orally 2 times a day  Cipro 250 mg oral tablet: 1 tab(s) orally 2 times a day   ferrous sulfate 325 mg (65 mg elemental iron) oral tablet: 1 tab(s) orally once a day  Janumet  mg-1000 mg oral tablet, extended release: 1 tab(s) orally once a day (in the evening)  latanoprost 0.005% ophthalmic solution: 1 drop(s) to each affected eye once a day (at bedtime)  Nitrostat 0.4 mg sublingual tablet: 1 tab(s) sublingual , As Needed  ocular lubricant ophthalmic solution: 1 drop(s) to each affected eye 2 times a day  pantoprazole 40 mg oral delayed release tablet: 1 tab(s) orally once a day (before a meal)  QUEtiapine 25 mg oral tablet: 1 tab(s) orally once a day (at bedtime) at 8pm  Can give an extra dose in the morning if needed for agitation  rivaroxaban 20 mg oral tablet: 1 tab(s) orally once a day (in the evening)  timolol maleate 0.25% ophthalmic solution: 1 drop(s) to each affected eye 2 times a day  Vitamin C 500 mg oral capsule: 1 cap(s) orally once a day with food

## 2022-09-25 NOTE — PROGRESS NOTE ADULT - PROBLEM SELECTOR PLAN 3
-Likely hypovolemic hyponatremia iso low po intake and increased ostomy output and possible SIADH component  -c/w home salt tabs 2g TID  -c/w mirtazapine as appetite stimulant -Likely hypovolemic hyponatremia iso low po intake and increased ostomy output and possible SIADH component  -c/w home salt tabs 2g TID  -c/w IV fluids   -c/w mirtazapine as appetite stimulant

## 2022-09-25 NOTE — PROVIDER CONTACT NOTE (CRITICAL VALUE NOTIFICATION) - ACTION/TREATMENT ORDERED:
PA made aware and continue monitoring
Aj Cueto made aware. Heparin drip paused for 60 minutes and continued @ 9 ml/hr.

## 2022-09-25 NOTE — PROVIDER CONTACT NOTE (CRITICAL VALUE NOTIFICATION) - BACKGROUND
67 y.o female admitted for acute renal failure and UTI, found to be COVID positive
66yo Female with h/o HTN, HLD, DVT on Lovenox, incarcerated ventral hernia s/p repairs and enterotomy repairs, small bowel resection s/p end ileostomy, r

## 2022-09-26 LAB
ALBUMIN SERPL ELPH-MCNC: 2.6 G/DL — LOW (ref 3.3–5)
ALP SERPL-CCNC: 115 U/L — SIGNIFICANT CHANGE UP (ref 40–120)
ALT FLD-CCNC: 19 U/L — SIGNIFICANT CHANGE UP (ref 4–33)
ANION GAP SERPL CALC-SCNC: 10 MMOL/L — SIGNIFICANT CHANGE UP (ref 7–14)
APTT BLD: 56.1 SEC — HIGH (ref 27–36.3)
APTT BLD: 84.1 SEC — HIGH (ref 27–36.3)
APTT BLD: 85.6 SEC — HIGH (ref 27–36.3)
AST SERPL-CCNC: 35 U/L — HIGH (ref 4–32)
BILIRUB SERPL-MCNC: 0.2 MG/DL — SIGNIFICANT CHANGE UP (ref 0.2–1.2)
BUN SERPL-MCNC: 54 MG/DL — HIGH (ref 7–23)
CALCIUM SERPL-MCNC: 8.6 MG/DL — SIGNIFICANT CHANGE UP (ref 8.4–10.5)
CHLORIDE SERPL-SCNC: 108 MMOL/L — HIGH (ref 98–107)
CO2 SERPL-SCNC: 24 MMOL/L — SIGNIFICANT CHANGE UP (ref 22–31)
CREAT SERPL-MCNC: 2.04 MG/DL — HIGH (ref 0.5–1.3)
EGFR: 26 ML/MIN/1.73M2 — LOW
GLUCOSE SERPL-MCNC: 88 MG/DL — SIGNIFICANT CHANGE UP (ref 70–99)
HCT VFR BLD CALC: 33.1 % — LOW (ref 34.5–45)
HGB BLD-MCNC: 10 G/DL — LOW (ref 11.5–15.5)
MAGNESIUM SERPL-MCNC: 1.8 MG/DL — SIGNIFICANT CHANGE UP (ref 1.6–2.6)
MCHC RBC-ENTMCNC: 26.3 PG — LOW (ref 27–34)
MCHC RBC-ENTMCNC: 30.2 GM/DL — LOW (ref 32–36)
MCV RBC AUTO: 87.1 FL — SIGNIFICANT CHANGE UP (ref 80–100)
NRBC # BLD: 0 /100 WBCS — SIGNIFICANT CHANGE UP (ref 0–0)
NRBC # FLD: 0 K/UL — SIGNIFICANT CHANGE UP (ref 0–0)
PHOSPHATE SERPL-MCNC: 3.9 MG/DL — SIGNIFICANT CHANGE UP (ref 2.5–4.5)
PLATELET # BLD AUTO: 197 K/UL — SIGNIFICANT CHANGE UP (ref 150–400)
POTASSIUM SERPL-MCNC: 3.4 MMOL/L — LOW (ref 3.5–5.3)
POTASSIUM SERPL-SCNC: 3.4 MMOL/L — LOW (ref 3.5–5.3)
PROT SERPL-MCNC: 6.3 G/DL — SIGNIFICANT CHANGE UP (ref 6–8.3)
RBC # BLD: 3.8 M/UL — SIGNIFICANT CHANGE UP (ref 3.8–5.2)
RBC # FLD: 16.7 % — HIGH (ref 10.3–14.5)
SODIUM SERPL-SCNC: 142 MMOL/L — SIGNIFICANT CHANGE UP (ref 135–145)
WBC # BLD: 4.07 K/UL — SIGNIFICANT CHANGE UP (ref 3.8–10.5)
WBC # FLD AUTO: 4.07 K/UL — SIGNIFICANT CHANGE UP (ref 3.8–10.5)

## 2022-09-26 PROCEDURE — 99232 SBSQ HOSP IP/OBS MODERATE 35: CPT

## 2022-09-26 PROCEDURE — 99233 SBSQ HOSP IP/OBS HIGH 50: CPT | Mod: GC

## 2022-09-26 RX ORDER — ACETAMINOPHEN 500 MG
650 TABLET ORAL EVERY 6 HOURS
Refills: 0 | Status: DISCONTINUED | OUTPATIENT
Start: 2022-09-26 | End: 2022-09-27

## 2022-09-26 RX ORDER — POTASSIUM CHLORIDE 20 MEQ
40 PACKET (EA) ORAL ONCE
Refills: 0 | Status: COMPLETED | OUTPATIENT
Start: 2022-09-26 | End: 2022-09-26

## 2022-09-26 RX ORDER — SODIUM CHLORIDE 9 MG/ML
1000 INJECTION, SOLUTION INTRAVENOUS
Refills: 0 | Status: DISCONTINUED | OUTPATIENT
Start: 2022-09-26 | End: 2022-09-27

## 2022-09-26 RX ADMIN — Medication 1 TABLET(S): at 16:16

## 2022-09-26 RX ADMIN — HEPARIN SODIUM 1100 UNIT(S)/HR: 5000 INJECTION INTRAVENOUS; SUBCUTANEOUS at 21:29

## 2022-09-26 RX ADMIN — Medication 500 MILLIGRAM(S): at 16:16

## 2022-09-26 RX ADMIN — HEPARIN SODIUM 900 UNIT(S)/HR: 5000 INJECTION INTRAVENOUS; SUBCUTANEOUS at 07:02

## 2022-09-26 RX ADMIN — Medication 4 MILLIGRAM(S): at 16:17

## 2022-09-26 RX ADMIN — Medication 650 MILLIGRAM(S): at 22:27

## 2022-09-26 RX ADMIN — Medication 650 MILLIGRAM(S): at 21:27

## 2022-09-26 RX ADMIN — PANTOPRAZOLE SODIUM 40 MILLIGRAM(S): 20 TABLET, DELAYED RELEASE ORAL at 05:17

## 2022-09-26 RX ADMIN — HEPARIN SODIUM 1100 UNIT(S)/HR: 5000 INJECTION INTRAVENOUS; SUBCUTANEOUS at 10:53

## 2022-09-26 RX ADMIN — SODIUM CHLORIDE 2 GRAM(S): 9 INJECTION INTRAMUSCULAR; INTRAVENOUS; SUBCUTANEOUS at 21:05

## 2022-09-26 RX ADMIN — SODIUM CHLORIDE 100 MILLILITER(S): 9 INJECTION, SOLUTION INTRAVENOUS at 21:05

## 2022-09-26 RX ADMIN — HEPARIN SODIUM 1100 UNIT(S)/HR: 5000 INJECTION INTRAVENOUS; SUBCUTANEOUS at 14:26

## 2022-09-26 RX ADMIN — Medication 40 MILLIEQUIVALENT(S): at 16:16

## 2022-09-26 RX ADMIN — Medication 4 MILLIGRAM(S): at 05:17

## 2022-09-26 RX ADMIN — SODIUM CHLORIDE 2 GRAM(S): 9 INJECTION INTRAMUSCULAR; INTRAVENOUS; SUBCUTANEOUS at 05:17

## 2022-09-26 RX ADMIN — MIRTAZAPINE 15 MILLIGRAM(S): 45 TABLET, ORALLY DISINTEGRATING ORAL at 16:17

## 2022-09-26 RX ADMIN — HEPARIN SODIUM 1100 UNIT(S)/HR: 5000 INJECTION INTRAVENOUS; SUBCUTANEOUS at 19:12

## 2022-09-26 RX ADMIN — HEPARIN SODIUM 1100 UNIT(S)/HR: 5000 INJECTION INTRAVENOUS; SUBCUTANEOUS at 07:18

## 2022-09-26 RX ADMIN — SODIUM CHLORIDE 2 GRAM(S): 9 INJECTION INTRAMUSCULAR; INTRAVENOUS; SUBCUTANEOUS at 16:17

## 2022-09-26 NOTE — ADVANCED PRACTICE NURSE CONSULT - RECOMMEDATIONS
Topical Recommendations    RUQ Ileostomy: Stoma size: 1 1/8"  Supplies utilized:   Liquid barrier film    Skin barrier ring- item # [id number]  Flat waffer (1 3/4")- item # [id number]  Drainable pouch (1 3/4")- item # [id number]    Midline abdominal surgical wound: Cleanse with NS, pat dry. Apply Liquid barrier film to periwound skin (allow to dry). Loosely pack cavity with hydrofiber (Aquacel) as secondary dressing to absorb and fill dead space. Secure with abdominal pad and paper tape. Change twice a day and PRN if soiled.    BL buttocks/sacrum/perineal area: Cleanse with skin cleanser, pat dry. Apply critic-aide moisture barrier ointment twice a day and PRN with incontinent episodes.     Apply SWEEN daily moisturizer to BL UEs and LEs, avoiding in between toes.     Continue low air loss bed therapy, continue heel elevation, continue to turn & reposition per protocol, soft pillow between bony prominences, continue moisture management with barrier creams & single breathable pad, continue measures to decrease friction/shear/pressure.     Plan of care discussed with Arina and primary team NAEL Solares.    Please contact Wound Care Service Line if we can be of further assistance (ext [phone]).

## 2022-09-26 NOTE — PROGRESS NOTE ADULT - PROBLEM SELECTOR PLAN 3
-Likely hypovolemic hyponatremia iso low po intake and increased ostomy output and possible SIADH component  -c/w home salt tabs 2g TID  -c/w IV fluids   -c/w mirtazapine as appetite stimulant

## 2022-09-26 NOTE — ADVANCED PRACTICE NURSE CONSULT - ASSESSMENT
General: A&Ox4, able to turn with 1x assistance, incontinent of urine and stool. Skin warm, dry with increased moisture in intertriginous folds, bilateral feet dry, blanchable erythema on bilateral heels.     Midline abdominal surgical wound: measuring [id number], 30% adipose tissue, 70% red, moist granular tissue, friable. Periwound with reepithelialization, hyper and hypopigmentation circumferentially. No edema, no erythema, no induration, no temperature changes and no overt signs of infection noted.     RUQ Ileostomy: pink stoma flush to skin measuring 1 1/8" with hypopigmentation to the peristomal site. Please see A&I flowsheet for full assessment.    Bilateral buttocks: Incontinence and moisture associated dermatitis with area of open denudation on L buttocks exposing 20% pink dermis and 80% fibrin. Periwound hyper and hypopigmentation

## 2022-09-26 NOTE — ADVANCED PRACTICE NURSE CONSULT - REASON FOR CONSULT
Patient known to [facility] service line from previous admissions, last seen 8/23/22. Patient seen on skin care rounds after wound care referral received for assessment of skin impairment and recommendations of topical management of midline abdomen surgical wound and L buttocks stage 2. Patient is a 66yo Female with h/o HTN, HLD, DVT on Lovenox, incarcerated ventral hernia s/p repairs and enterotomy repairs, small bowel resection s/p end ileostomy, recently admitted with hyponatrenia and JAMISON; Pt admitted with JAMISON and UTI; Found to be Covid-19 positive. Chart reviewed: WBC: 4.07, H&H: 10/33.1, Platelets: 197 INR: 1.17, A1C: 4.9, BMI: 35.6, Wilson 13.

## 2022-09-27 ENCOUNTER — TRANSCRIPTION ENCOUNTER (OUTPATIENT)
Age: 67
End: 2022-09-27

## 2022-09-27 VITALS
HEART RATE: 64 BPM | OXYGEN SATURATION: 100 % | DIASTOLIC BLOOD PRESSURE: 53 MMHG | RESPIRATION RATE: 17 BRPM | SYSTOLIC BLOOD PRESSURE: 123 MMHG | TEMPERATURE: 98 F

## 2022-09-27 LAB
ANION GAP SERPL CALC-SCNC: 9 MMOL/L — SIGNIFICANT CHANGE UP (ref 7–14)
APTT BLD: 55.2 SEC — HIGH (ref 27–36.3)
BASOPHILS # BLD AUTO: 0.01 K/UL — SIGNIFICANT CHANGE UP (ref 0–0.2)
BASOPHILS NFR BLD AUTO: 0.3 % — SIGNIFICANT CHANGE UP (ref 0–2)
BUN SERPL-MCNC: 37 MG/DL — HIGH (ref 7–23)
CALCIUM SERPL-MCNC: 9.1 MG/DL — SIGNIFICANT CHANGE UP (ref 8.4–10.5)
CHLORIDE SERPL-SCNC: 113 MMOL/L — HIGH (ref 98–107)
CO2 SERPL-SCNC: 22 MMOL/L — SIGNIFICANT CHANGE UP (ref 22–31)
CREAT SERPL-MCNC: 1.34 MG/DL — HIGH (ref 0.5–1.3)
EGFR: 43 ML/MIN/1.73M2 — LOW
EOSINOPHIL # BLD AUTO: 0.15 K/UL — SIGNIFICANT CHANGE UP (ref 0–0.5)
EOSINOPHIL NFR BLD AUTO: 4.2 % — SIGNIFICANT CHANGE UP (ref 0–6)
GLUCOSE SERPL-MCNC: 97 MG/DL — SIGNIFICANT CHANGE UP (ref 70–99)
HCT VFR BLD CALC: 33.5 % — LOW (ref 34.5–45)
HGB BLD-MCNC: 10 G/DL — LOW (ref 11.5–15.5)
IANC: 1.4 K/UL — LOW (ref 1.8–7.4)
IMM GRANULOCYTES NFR BLD AUTO: 0.3 % — SIGNIFICANT CHANGE UP (ref 0–0.9)
LYMPHOCYTES # BLD AUTO: 1.71 K/UL — SIGNIFICANT CHANGE UP (ref 1–3.3)
LYMPHOCYTES # BLD AUTO: 47.9 % — HIGH (ref 13–44)
MAGNESIUM SERPL-MCNC: 1.4 MG/DL — LOW (ref 1.6–2.6)
MCHC RBC-ENTMCNC: 26.2 PG — LOW (ref 27–34)
MCHC RBC-ENTMCNC: 29.9 GM/DL — LOW (ref 32–36)
MCV RBC AUTO: 87.9 FL — SIGNIFICANT CHANGE UP (ref 80–100)
MONOCYTES # BLD AUTO: 0.29 K/UL — SIGNIFICANT CHANGE UP (ref 0–0.9)
MONOCYTES NFR BLD AUTO: 8.1 % — SIGNIFICANT CHANGE UP (ref 2–14)
NEUTROPHILS # BLD AUTO: 1.4 K/UL — LOW (ref 1.8–7.4)
NEUTROPHILS NFR BLD AUTO: 39.2 % — LOW (ref 43–77)
NRBC # BLD: 0 /100 WBCS — SIGNIFICANT CHANGE UP (ref 0–0)
NRBC # FLD: 0 K/UL — SIGNIFICANT CHANGE UP (ref 0–0)
PHOSPHATE SERPL-MCNC: 3.4 MG/DL — SIGNIFICANT CHANGE UP (ref 2.5–4.5)
PLATELET # BLD AUTO: 204 K/UL — SIGNIFICANT CHANGE UP (ref 150–400)
POTASSIUM SERPL-MCNC: 3.4 MMOL/L — LOW (ref 3.5–5.3)
POTASSIUM SERPL-SCNC: 3.4 MMOL/L — LOW (ref 3.5–5.3)
RBC # BLD: 3.81 M/UL — SIGNIFICANT CHANGE UP (ref 3.8–5.2)
RBC # FLD: 16.7 % — HIGH (ref 10.3–14.5)
SODIUM SERPL-SCNC: 144 MMOL/L — SIGNIFICANT CHANGE UP (ref 135–145)
WBC # BLD: 3.57 K/UL — LOW (ref 3.8–10.5)
WBC # FLD AUTO: 3.57 K/UL — LOW (ref 3.8–10.5)

## 2022-09-27 PROCEDURE — 99239 HOSP IP/OBS DSCHRG MGMT >30: CPT

## 2022-09-27 PROCEDURE — 99232 SBSQ HOSP IP/OBS MODERATE 35: CPT | Mod: GC

## 2022-09-27 RX ORDER — ENOXAPARIN SODIUM 100 MG/ML
90 INJECTION SUBCUTANEOUS EVERY 12 HOURS
Refills: 0 | Status: DISCONTINUED | OUTPATIENT
Start: 2022-09-27 | End: 2022-09-27

## 2022-09-27 RX ORDER — ENOXAPARIN SODIUM 100 MG/ML
80 INJECTION SUBCUTANEOUS EVERY 12 HOURS
Refills: 0 | Status: DISCONTINUED | OUTPATIENT
Start: 2022-09-27 | End: 2022-09-27

## 2022-09-27 RX ORDER — MAGNESIUM SULFATE 500 MG/ML
2 VIAL (ML) INJECTION ONCE
Refills: 0 | Status: COMPLETED | OUTPATIENT
Start: 2022-09-27 | End: 2022-09-27

## 2022-09-27 RX ORDER — POTASSIUM CHLORIDE 20 MEQ
40 PACKET (EA) ORAL ONCE
Refills: 0 | Status: COMPLETED | OUTPATIENT
Start: 2022-09-27 | End: 2022-09-27

## 2022-09-27 RX ADMIN — SODIUM CHLORIDE 2 GRAM(S): 9 INJECTION INTRAMUSCULAR; INTRAVENOUS; SUBCUTANEOUS at 05:12

## 2022-09-27 RX ADMIN — HEPARIN SODIUM 1300 UNIT(S)/HR: 5000 INJECTION INTRAVENOUS; SUBCUTANEOUS at 07:28

## 2022-09-27 RX ADMIN — Medication 4 MILLIGRAM(S): at 15:39

## 2022-09-27 RX ADMIN — Medication 40 MILLIEQUIVALENT(S): at 08:33

## 2022-09-27 RX ADMIN — SODIUM CHLORIDE 2 GRAM(S): 9 INJECTION INTRAMUSCULAR; INTRAVENOUS; SUBCUTANEOUS at 15:39

## 2022-09-27 RX ADMIN — Medication 4 MILLIGRAM(S): at 05:12

## 2022-09-27 RX ADMIN — MIRTAZAPINE 15 MILLIGRAM(S): 45 TABLET, ORALLY DISINTEGRATING ORAL at 15:40

## 2022-09-27 RX ADMIN — Medication 500 MILLIGRAM(S): at 15:39

## 2022-09-27 RX ADMIN — Medication 25 GRAM(S): at 08:30

## 2022-09-27 RX ADMIN — ENOXAPARIN SODIUM 80 MILLIGRAM(S): 100 INJECTION SUBCUTANEOUS at 15:39

## 2022-09-27 RX ADMIN — Medication 1 TABLET(S): at 15:39

## 2022-09-27 RX ADMIN — PANTOPRAZOLE SODIUM 40 MILLIGRAM(S): 20 TABLET, DELAYED RELEASE ORAL at 05:12

## 2022-09-27 NOTE — PROGRESS NOTE ADULT - ATTENDING COMMENTS
-- DO NOT REPLY / DO NOT REPLY ALL --  -- Message is from the [facility]--    COVID-19 Universal Screening: N/A - Not about scheduling    General Patient Message      Reason for Call: patient  States facial nerve pain returned yesterday and would like prescription for carbamazepine  sent to pharmacy As soon as possible . She stated she will be contacting neurologist tomorrow .     Caller Information       Type Contact Phone    07/28/2020 10:41 AM Phone (Incoming) Raissa Brown (Self) [phone] (M)          Alternative phone number: none    Turnaround time given to caller:   \"This message will be sent to [state Provider's name]. The clinical team will fulfill your request as soon as they review your message.\"    
JAMISON    Cr improving, cont IVF. Will sign off for now, please call with questions
JAMISON     Monitor labs and urine output. Avoid nephrotoxins. Dose medications as per eGFR.

## 2022-09-27 NOTE — PROGRESS NOTE ADULT - PROBLEM SELECTOR PLAN 9
DVT ppx: full ac with heaprin gtt  DIet: renal  Dispo: pending workup
DVT ppx: Lovenox   DIet: renal  Dispo: CHIOMA home, d/c time 38 minutes
DVT ppx: full ac with heaprin gtt  DIet: renal  Dispo: pending workup
DVT ppx: full ac with heaprin gtt  DIet: renal  Dispo: pending workup

## 2022-09-27 NOTE — PROGRESS NOTE ADULT - PROBLEM SELECTOR PLAN 6
pt with lauri-incisional pain, chronic  -outpatient f/up
pt with lauri-incisional pain, chronic  -wound care consulted

## 2022-09-27 NOTE — DISCHARGE NOTE PROVIDER - HOSPITAL COURSE
68yo Female with h/o HTN, HLD, DVT on Lovenox, incarcerated ventral hernia s/p repairs and enterotomy repairs, small bowel resection s/p end ileostomy, recently admitted with hyponatrenia and JAMISON; Pt admitted with JAMISON and UTI; Found to be Covid-19 positive    JAMISON (acute kidney injury).   - Likely prerenal etiology due to poor po intake, high ostomy output, losses from surgical wound  No evidence of post renal obstruction. Low I/Os per daughter who is the primary caretaker.   - Cr improving w/ IVF   - US Renal: no hydronephrosis or calculi seen  - c/w IV fluids   - avoid nephrotoxic agents   - monitor Cr and UO.  - resolved, restarted on Home Lovenox     Acute UTI.   - UA c/w UTI  - s/p ceftriaxone  - Urine culture not sent.    Hyponatremia.   - Likely hypovolemic hyponatremia iso low po intake and increased ostomy output and possible SIADH component  - c/w home salt tabs 2g TID  - c/w IV fluids   - c/w mirtazapine as appetite stimulant.    2019 novel coronavirus disease (COVID-19).   - Found to be covid-19 positive, O2 sat wnl on room air  - no indication for Remdesivir or Dexamethasone   - CXR w/ clear lungs   - Monitor inflammatory markers.    S/P ileostomy.   - ostomy site appears clean  - monitor ostomy output  - wound care consulted.    H/O ventral hernia.   - pt with lauri-incisional pain, chronic  - wound care consulted.    DVT, lower extremity.   - pt was found to have acute RLE DVT 8/2022 and was on home Lovenox 90mg BID  - switched Lovenox to heparin gtt given JAMISON.    Counseling regarding goals of care.   - MOLST witnessed by RN and completed in chart, pt DNR/DNI with daughter as HCP.    Need for prophylactic measure.   - DVT ppx: full ac with heparin gtt  - DIet: renal  - Dispo: pending workup.    Patient seen and evaluated. Reviewed discharge medications with patient and attending. All new medications requiring new prescriptions were sent to the pharmacy of patient's choice. Reviewed need for prescription for previous home medications and new prescriptions sent if requested. Medically cleared/stable for discharge as per Dr. Cruz on 9/27/2022 with appropriate follow up. Patient understands and agrees with plan of care.

## 2022-09-27 NOTE — DISCHARGE NOTE NURSING/CASE MANAGEMENT/SOCIAL WORK - PATIENT PORTAL LINK FT
You can access the FollowMyHealth Patient Portal offered by [facility] by registering at the following website: [email or web address]. By joining [employer]’s FollowMyHealth portal, you will also be able to view your health information using other applications (apps) compatible with our system.

## 2022-09-27 NOTE — PROGRESS NOTE ADULT - SUBJECTIVE AND OBJECTIVE BOX
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 Initial labs in ER concerning for elevated Scr of 3.73 on 9/23/22. Scr is elevated /stable at 3.55 today (9/24/22). Initial labs also showed low SNa of 130. SNa remain low at 128 today. Pt also tested positive for COVID-19 in ER. Nephrology team consulted for JAMISON and hyponatremia. On review of [facility], pt. noted to have multiple JAMISON episodes during previous hospital stays at [facility]. Pt. was admitted at [facility] on 7/25/22-8/8/22 for JAMISON and hyponatremia. During that admission, Scr was elevated at 11.58. Pt. received IVF. Scr improved to 0.72 on discharge on 9/8/22. Recent admission was at [facility] for similar complaints (8/21/22-8/26/22). Scr was elevated at 2.75 on admission on 8/21/22. Scr down trended to 0.99 on discharge on 8/26/22. Scr was subsequently elevated at 3.7 on 9/14/22 on labs done at PMDs office. Pt. followed with Dr. Ram (nephrologist) with first visit being on 9/22/22. Blood work during that visit showed elevated Scr at 3.81 on 9/22/22. Pt also has history of chronic hyponatremia and currently on salt tabs 2 gm oral BID.    Pt. seen this AM with improving SCr. and normal Na. Per daughter at bedside Pt. has been eating food from home. Remains on IVF.    PAST HISTORY  --------------------------------------------------------------------------------  No significant changes to PMH, PSH, FHx, SHx, unless otherwise noted    ALLERGIES & MEDICATIONS  --------------------------------------------------------------------------------  Allergies    No Known Allergies    Intolerances      Standing Inpatient Medications  ascorbic acid 500 milliGRAM(s) Oral daily  diphenoxylate/atropine 1 Tablet(s) Oral daily  heparin  Infusion.  Unit(s)/Hr IV Continuous <Continuous>  influenza  Vaccine (HIGH DOSE) 0.7 milliLiter(s) IntraMuscular once  loperamide 4 milliGRAM(s) Oral two times a day  mirtazapine 15 milliGRAM(s) Oral daily  multivitamin 1 Tablet(s) Oral daily  pantoprazole    Tablet 40 milliGRAM(s) Oral before breakfast  potassium chloride    Tablet ER 40 milliEquivalent(s) Oral once  sodium chloride 2 Gram(s) Oral three times a day  sodium chloride 0.9%. 1000 milliLiter(s) IV Continuous <Continuous>    PRN Inpatient Medications      REVIEW OF SYSTEMS  --------------------------------------------------------------------------------  Gen: No fevers/chills  Head/Eyes/Ears: Normal hearing,   Respiratory: No dyspnea, cough  CV: No chest pain  GI: No abdominal pain, diarrhea  : No urinary complaints  MSK: No edema    All other systems were reviewed and are negative, except as noted.    VITALS/PHYSICAL EXAM  --------------------------------------------------------------------------------  T(C): 37.1 (09-26-22 @ 11:47), Max: 37.1 (09-26-22 @ 11:47)  HR: 67 (09-26-22 @ 11:47) (67 - 71)  BP: 104/56 (09-26-22 @ 11:47) (104/56 - 115/70)  RR: 17 (09-26-22 @ 11:47) (17 - 17)  SpO2: 100% (09-26-22 @ 11:47) (100% - 100%)  Wt(kg): --        09-25-22 @ 07:01  -  09-26-22 @ 07:00  --------------------------------------------------------  IN: 0 mL / OUT: 550 mL / NET: -550 mL    Physical Exam:  	Gen: NAD, resting  	HEENT: MMM  	Pulm: CTA B/L  	CV: S1S2  	Abd: Soft, +BS, ileostomy with ileostomy bag+    	Ext: No LE edema B/L  	Neuro: Awake, alert  	Skin: Warm and dry  	Vascular access: IV peripheral canula    LABS/STUDIES  --------------------------------------------------------------------------------              10.0   4.07  >-----------<  197      [09-26-22 @ 06:00]              33.1     142  |  108  |  54  ----------------------------<  88      [09-26-22 @ 06:00]  3.4   |  24  |  2.04        Ca     8.6     [09-26-22 @ 06:00]      Mg     1.80     [09-26-22 @ 06:00]      Phos  3.9     [09-26-22 @ 06:00]    TPro  6.3  /  Alb  2.6  /  TBili  0.2  /  DBili  x   /  AST  35  /  ALT  19  /  AlkPhos  115  [09-26-22 @ 06:00]    PT/INR: PT 13.6 , INR 1.17       [09-25-22 @ 06:38]  PTT: 56.1       [09-26-22 @ 06:00]      Creatinine Trend:  SCr 2.04 [09-26 @ 06:00]  SCr 2.07 [09-25 @ 22:40]  SCr 2.68 [09-25 @ 06:38]  SCr 3.55 [09-24 @ 05:29]  SCr 3.73 [09-23 @ 15:45]    Urinalysis - [09-23-22 @ 22:00]      Color Yellow / Appearance Slightly Turbid / SG 1.018 / pH 5.5      Gluc Negative / Ketone Negative  / Bili Negative / Urobili <2 mg/dL       Blood Large / Protein 30 mg/dL / Leuk Est Large / Nitrite Negative      RBC 11-25 / WBC >50 / Hyaline  / Gran  / Sq Epi  / Non Sq Epi  / Bacteria Many    Urine Creatinine 201      [09-23-22 @ 22:00]  Urine Protein 71      [09-23-22 @ 22:00]  Urine Osmolality 390      [09-23-22 @ 22:00]    Iron 19, TIBC 157, %sat 12      [08-05-22 @ 04:48]  Ferritin 553      [09-24-22 @ 05:29]  TSH 3.55      [07-25-22 @ 19:18]  Lipid: chol --, , HDL --, LDL --      [07-25-22 @ 19:18]      
PROGRESS NOTE:     Patient is a 67y old  Female who presents with a chief complaint of Referred by nephrologist for evaluation of JAMISON (24 Sep 2022 16:24)      SUBJECTIVE / OVERNIGHT EVENTS: No acute events.     ADDITIONAL REVIEW OF SYSTEMS:    MEDICATIONS  (STANDING):  ascorbic acid 500 milliGRAM(s) Oral daily  cefTRIAXone   IVPB 1000 milliGRAM(s) IV Intermittent every 24 hours  diphenoxylate/atropine 1 Tablet(s) Oral daily  heparin  Infusion.  Unit(s)/Hr (15 mL/Hr) IV Continuous <Continuous>  influenza  Vaccine (HIGH DOSE) 0.7 milliLiter(s) IntraMuscular once  loperamide 4 milliGRAM(s) Oral two times a day  mirtazapine 15 milliGRAM(s) Oral daily  multivitamin 1 Tablet(s) Oral daily  pantoprazole    Tablet 40 milliGRAM(s) Oral before breakfast  sodium chloride 2 Gram(s) Oral three times a day  sodium chloride 0.9%. 1000 milliLiter(s) (100 mL/Hr) IV Continuous <Continuous>    MEDICATIONS  (PRN):      CAPILLARY BLOOD GLUCOSE        I&O's Summary    24 Sep 2022 07:01  -  25 Sep 2022 07:00  --------------------------------------------------------  IN: 800 mL / OUT: 800 mL / NET: 0 mL        PHYSICAL EXAM:  Vital Signs Last 24 Hrs  T(C): 36.8 (25 Sep 2022 05:00), Max: 36.8 (24 Sep 2022 13:00)  T(F): 98.2 (25 Sep 2022 05:00), Max: 98.2 (24 Sep 2022 13:00)  HR: 67 (25 Sep 2022 05:00) (67 - 98)  BP: 102/62 (25 Sep 2022 05:00) (102/61 - 107/68)  BP(mean): --  RR: 17 (25 Sep 2022 05:00) (17 - 18)  SpO2: 100% (25 Sep 2022 05:00) (98% - 100%)    Parameters below as of 25 Sep 2022 05:00  Patient On (Oxygen Delivery Method): room air      CONSTITUTIONAL: NAD  RESPIRATORY: Normal respiratory effort; lungs are clear to auscultation bilaterally  CARDIOVASCULAR: Regular rate and rhythm, normal S1 and S2, no murmur/rub/gallop; trace lower extremity edema; Peripheral pulses are 2+ bilaterally  ABDOMEN: soft, (+) open surgical wound over LL abdomen, packed, clean, no associated erythema, tender on edges, skin dry. Ostomy appears clean, pink with brown pasty output in bag, no rebound/guarding  MUSCLOSKELETAL: no clubbing or cyanosis of digits; no joint swelling or tenderness to palpation  PSYCH: A+O to person, place, and time; affect appropriate  NEURO: alert, awake, answering questions, no focal deficits, JOE      LABS:                        10.4   4.40  )-----------( 184      ( 25 Sep 2022 05:40 )             33.3         138  |  105  |  66<H>  ----------------------------<  86  3.1<L>   |  19<L>  |  2.68<H>    Ca    7.9<L>      25 Sep 2022 06:38  Phos  4.1       Mg     1.60         TPro  6.0  /  Alb  2.1<L>  /  TBili  0.2  /  DBili  x   /  AST  42<H>  /  ALT  18  /  AlkPhos  110  -    PT/INR - ( 25 Sep 2022 06:38 )   PT: 13.6 sec;   INR: 1.17 ratio         PTT - ( 25 Sep 2022 06:38 )  PTT:90.8 sec      Urinalysis Basic - ( 23 Sep 2022 22:00 )    Color: Yellow / Appearance: Slightly Turbid / S.018 / pH: x  Gluc: x / Ketone: Negative  / Bili: Negative / Urobili: <2 mg/dL   Blood: x / Protein: 30 mg/dL / Nitrite: Negative   Leuk Esterase: Large / RBC: 11-25 /HPF / WBC >50 /HPF   Sq Epi: x / Non Sq Epi: x / Bacteria: Many          RADIOLOGY & ADDITIONAL TESTS:  Results Reviewed:   Imaging Personally Reviewed:  Electrocardiogram Personally Reviewed:    COORDINATION OF CARE:  Care Discussed with Consultants/Other Providers [Y/N]:  Prior or Outpatient Records Reviewed [Y/N]:  
[facility] DIVISION OF KIDNEY DISEASES AND HYPERTENSION -- FOLLOW UP NOTE  --------------------------------------------------------------------------------  HPI: 67-year-old Patwari/Hebrew speaking Female with significant h/o HTN, HLD, DVT on Lovenox, incarcerated ventral hernia s/p repairs and enterotomy repairs, small bowel resection s/p end ileostomy (June 2022) who was sent to [facility] on 9/23/22 from outpatient nephrologist's office for elevated Scr. Initial labs in ER concerning for elevated Scr of 3.73 on 9/23/22. Scr is elevated /stable at 3.55 today (9/24/22). Initial labs also showed low SNa of 130. SNa remain low at 128 today. Pt also tested positive for COVID-19 in ER. Nephrology team consulted for JAMISON and hyponatremia. On review of [facility], pt. noted to have multiple JAMISON episodes during previous hospital stays at [facility]. Pt. was admitted at [facility] on 7/25/22-8/8/22 for JAMISON and hyponatremia. During that admission, Scr was elevated at 11.58. Pt. received IVF. Scr improved to 0.72 on discharge on 9/8/22. Recent admission was at [facility] for similar complaints (8/21/22-8/26/22). Scr was elevated at 2.75 on admission on 8/21/22. Scr down trended to 0.99 on discharge on 8/26/22. Scr was subsequently elevated at 3.7 on 9/14/22 on labs done at [facility] office. Pt. followed with Dr. Ram (nephrologist) with first visit being on 9/22/22. Blood work during that visit showed elevated Scr at 3.81 on 9/22/22. Pt also has history of chronic hyponatremia and currently on salt tabs 2 gm oral BID.    Pt. seen this AM with improving SCr. Tolerating more Po intake, now off IVF because of some edema noted yesterday by daughter at bedside.     PAST HISTORY  --------------------------------------------------------------------------------  No significant changes to PMH, PSH, FHx, SHx, unless otherwise noted    ALLERGIES & MEDICATIONS  --------------------------------------------------------------------------------  Allergies    No Known Allergies    Intolerances      Standing Inpatient Medications  ascorbic acid 500 milliGRAM(s) Oral daily  diphenoxylate/atropine 1 Tablet(s) Oral daily  enoxaparin Injectable 90 milliGRAM(s) SubCutaneous every 12 hours  influenza  Vaccine (HIGH DOSE) 0.7 milliLiter(s) IntraMuscular once  loperamide 4 milliGRAM(s) Oral two times a day  mirtazapine 15 milliGRAM(s) Oral daily  multivitamin 1 Tablet(s) Oral daily  pantoprazole    Tablet 40 milliGRAM(s) Oral before breakfast  sodium chloride 2 Gram(s) Oral three times a day    PRN Inpatient Medications  acetaminophen     Tablet .. 650 milliGRAM(s) Oral every 6 hours PRN      REVIEW OF SYSTEMS  --------------------------------------------------------------------------------  As per HPI    VITALS/PHYSICAL EXAM  --------------------------------------------------------------------------------  T(C): 36.6 (09-27-22 @ 11:15), Max: 37.1 (09-26-22 @ 11:47)  HR: 64 (09-27-22 @ 11:15) (63 - 71)  BP: 123/53 (09-27-22 @ 11:15) (100/53 - 123/53)  RR: 17 (09-27-22 @ 11:15) (17 - 18)  SpO2: 100% (09-27-22 @ 11:15) (100% - 100%)  Wt(kg): --        09-26-22 @ 07:01  -  09-27-22 @ 07:00  --------------------------------------------------------  IN: 0 mL / OUT: 800 mL / NET: -800 mL      Physical Exam:  	Gen: NAD, resting  	HEENT: MMM  	Pulm: CTA B/L  	CV: S1S2  	Abd: Soft, +BS, ileostomy with ileostomy bag+    	Ext: No LE edema B/L  	Neuro: Awake, alert  	Skin: Warm and dry  	Vascular access: IV peripheral canula        LABS/STUDIES  --------------------------------------------------------------------------------              10.0   3.57  >-----------<  204      [09-27-22 @ 06:12]              33.5     144  |  113  |  37  ----------------------------<  97      [09-27-22 @ 06:12]  3.4   |  22  |  1.34        Ca     9.1     [09-27-22 @ 06:12]      Mg     1.40     [09-27-22 @ 06:12]      Phos  3.4     [09-27-22 @ 06:12]    TPro  6.3  /  Alb  2.6  /  TBili  0.2  /  DBili  x   /  AST  35  /  ALT  19  /  AlkPhos  115  [09-26-22 @ 06:00]      PTT: 55.2       [09-27-22 @ 06:12]      Creatinine Trend:  SCr 1.34 [09-27 @ 06:12]  SCr 2.04 [09-26 @ 06:00]  SCr 2.07 [09-25 @ 22:40]  SCr 2.68 [09-25 @ 06:38]  SCr 3.55 [09-24 @ 05:29]    Urinalysis - [09-23-22 @ 22:00]      Color Yellow / Appearance Slightly Turbid / SG 1.018 / pH 5.5      Gluc Negative / Ketone Negative  / Bili Negative / Urobili <2 mg/dL       Blood Large / Protein 30 mg/dL / Leuk Est Large / Nitrite Negative      RBC 11-25 / WBC >50 / Hyaline  / Gran  / Sq Epi  / Non Sq Epi  / Bacteria Many    Urine Creatinine 201      [09-23-22 @ 22:00]  Urine Protein 71      [09-23-22 @ 22:00]  Urine Osmolality 390      [09-23-22 @ 22:00]    Iron 19, TIBC 157, %sat 12      [08-05-22 @ 04:48]  Ferritin 553      [09-24-22 @ 05:29]  TSH 3.55      [07-25-22 @ 19:18]  Lipid: chol --, , HDL --, LDL --      [07-25-22 @ 19:18]      
PROGRESS NOTE:     Patient is a 67y old  Female who presents with a chief complaint of Referred by nephrologist for evaluation of JAMISON (25 Sep 2022 11:46)      SUBJECTIVE / OVERNIGHT EVENTS: No new complaints.     ADDITIONAL REVIEW OF SYSTEMS:    MEDICATIONS  (STANDING):  ascorbic acid 500 milliGRAM(s) Oral daily  diphenoxylate/atropine 1 Tablet(s) Oral daily  heparin  Infusion.  Unit(s)/Hr (15 mL/Hr) IV Continuous <Continuous>  influenza  Vaccine (HIGH DOSE) 0.7 milliLiter(s) IntraMuscular once  loperamide 4 milliGRAM(s) Oral two times a day  mirtazapine 15 milliGRAM(s) Oral daily  multivitamin 1 Tablet(s) Oral daily  pantoprazole    Tablet 40 milliGRAM(s) Oral before breakfast  potassium chloride    Tablet ER 40 milliEquivalent(s) Oral once  sodium chloride 2 Gram(s) Oral three times a day  sodium chloride 0.9%. 1000 milliLiter(s) (100 mL/Hr) IV Continuous <Continuous>    MEDICATIONS  (PRN):      CAPILLARY BLOOD GLUCOSE        I&O's Summary    25 Sep 2022 07:01  -  26 Sep 2022 07:00  --------------------------------------------------------  IN: 0 mL / OUT: 550 mL / NET: -550 mL        PHYSICAL EXAM:  Vital Signs Last 24 Hrs  T(C): 37.1 (26 Sep 2022 11:47), Max: 37.1 (26 Sep 2022 11:47)  T(F): 98.7 (26 Sep 2022 11:47), Max: 98.7 (26 Sep 2022 11:47)  HR: 67 (26 Sep 2022 11:47) (67 - 71)  BP: 104/56 (26 Sep 2022 11:47) (104/56 - 115/70)  BP(mean): --  RR: 17 (26 Sep 2022 11:47) (17 - 17)  SpO2: 100% (26 Sep 2022 11:47) (100% - 100%)    Parameters below as of 26 Sep 2022 11:47  Patient On (Oxygen Delivery Method): room air      CONSTITUTIONAL: NAD  RESPIRATORY: Normal respiratory effort; lungs are clear to auscultation bilaterally  CARDIOVASCULAR: Regular rate and rhythm, normal S1 and S2, no murmur/rub/gallop; trace lower extremity edema; Peripheral pulses are 2+ bilaterally  ABDOMEN: soft, (+) open surgical wound over LL abdomen, packed, clean, no associated erythema, tender on edges, skin dry. Ostomy appears clean, pink with brown pasty output in bag, no rebound/guarding  MUSCLOSKELETAL: no clubbing or cyanosis of digits; no joint swelling or tenderness to palpation  PSYCH: A+O to person, place, and time; affect appropriate  NEURO: alert, awake, answering questions, no focal deficits, JOE    LABS:                        10.0   4.07  )-----------( 197      ( 26 Sep 2022 06:00 )             33.1     09-26    142  |  108<H>  |  54<H>  ----------------------------<  88  3.4<L>   |  24  |  2.04<H>    Ca    8.6      26 Sep 2022 06:00  Phos  3.9     09-26  Mg     1.80     09-26    TPro  6.3  /  Alb  2.6<L>  /  TBili  0.2  /  DBili  x   /  AST  35<H>  /  ALT  19  /  AlkPhos  115  09-26    PT/INR - ( 25 Sep 2022 06:38 )   PT: 13.6 sec;   INR: 1.17 ratio         PTT - ( 26 Sep 2022 06:00 )  PTT:56.1 sec            RADIOLOGY & ADDITIONAL TESTS:  Results Reviewed:   Imaging Personally Reviewed:  Electrocardiogram Personally Reviewed:    COORDINATION OF CARE:  Care Discussed with Consultants/Other Providers [Y/N]:  Prior or Outpatient Records Reviewed [Y/N]:  
PROGRESS NOTE:     Patient is a 67y old  Female who presents with a chief complaint of Referred by nephrologist for evaluation of JAMISON (27 Sep 2022 10:51)      SUBJECTIVE / OVERNIGHT EVENTS: No acute events.     ADDITIONAL REVIEW OF SYSTEMS:    MEDICATIONS  (STANDING):  ascorbic acid 500 milliGRAM(s) Oral daily  diphenoxylate/atropine 1 Tablet(s) Oral daily  enoxaparin Injectable 90 milliGRAM(s) SubCutaneous every 12 hours  influenza  Vaccine (HIGH DOSE) 0.7 milliLiter(s) IntraMuscular once  lactated ringers. 1000 milliLiter(s) (100 mL/Hr) IV Continuous <Continuous>  loperamide 4 milliGRAM(s) Oral two times a day  mirtazapine 15 milliGRAM(s) Oral daily  multivitamin 1 Tablet(s) Oral daily  pantoprazole    Tablet 40 milliGRAM(s) Oral before breakfast  sodium chloride 2 Gram(s) Oral three times a day    MEDICATIONS  (PRN):  acetaminophen     Tablet .. 650 milliGRAM(s) Oral every 6 hours PRN Temp greater or equal to 38C (100.4F), Mild Pain (1 - 3), Moderate Pain (4 - 6)      CAPILLARY BLOOD GLUCOSE        I&O's Summary    26 Sep 2022 07:01  -  27 Sep 2022 07:00  --------------------------------------------------------  IN: 0 mL / OUT: 800 mL / NET: -800 mL        PHYSICAL EXAM:  Vital Signs Last 24 Hrs  T(C): 36.7 (27 Sep 2022 05:10), Max: 37.1 (26 Sep 2022 11:47)  T(F): 98.1 (27 Sep 2022 05:10), Max: 98.7 (26 Sep 2022 11:47)  HR: 63 (27 Sep 2022 05:10) (63 - 71)  BP: 100/53 (27 Sep 2022 05:10) (100/53 - 114/54)  BP(mean): --  RR: 18 (27 Sep 2022 05:10) (17 - 18)  SpO2: 100% (27 Sep 2022 05:10) (100% - 100%)    Parameters below as of 27 Sep 2022 05:10  Patient On (Oxygen Delivery Method): room air      CONSTITUTIONAL: NAD  RESPIRATORY: Normal respiratory effort; lungs are clear to auscultation bilaterally  CARDIOVASCULAR: Regular rate and rhythm, normal S1 and S2, no murmur/rub/gallop; trace lower extremity edema; Peripheral pulses are 2+ bilaterally  ABDOMEN: soft, (+) open surgical wound over LL abdomen, packed, clean, no associated erythema, tender on edges, skin dry. Ostomy appears clean, pink with brown pasty output in bag, no rebound/guarding  MUSCLOSKELETAL: no clubbing or cyanosis of digits; no joint swelling or tenderness to palpation  PSYCH: A+O to person, place, and time; affect appropriate  NEURO: alert, awake, answering questions, no focal deficits, JOE      LABS:                        10.0   3.57  )-----------( 204      ( 27 Sep 2022 06:12 )             33.5     09-27    144  |  113<H>  |  37<H>  ----------------------------<  97  3.4<L>   |  22  |  1.34<H>    Ca    9.1      27 Sep 2022 06:12  Phos  3.4     09-27  Mg     1.40     09-27    TPro  6.3  /  Alb  2.6<L>  /  TBili  0.2  /  DBili  x   /  AST  35<H>  /  ALT  19  /  AlkPhos  115  09-26    PTT - ( 27 Sep 2022 06:12 )  PTT:55.2 sec            RADIOLOGY & ADDITIONAL TESTS:  Results Reviewed:   Imaging Personally Reviewed:  Electrocardiogram Personally Reviewed:    COORDINATION OF CARE:  Care Discussed with Consultants/Other Providers [Y/N]:  Prior or Outpatient Records Reviewed [Y/N]:  
PROGRESS NOTE:     Patient is a 67y old  Female who presents with a chief complaint of Referred by nephrologist for evaluation of JAMISON (23 Sep 2022 21:14)      SUBJECTIVE / OVERNIGHT EVENTS: No acute events.     ADDITIONAL REVIEW OF SYSTEMS:    MEDICATIONS  (STANDING):  cefTRIAXone   IVPB 1000 milliGRAM(s) IV Intermittent every 24 hours  diphenoxylate/atropine 1 Tablet(s) Oral daily  heparin  Infusion.  Unit(s)/Hr (15 mL/Hr) IV Continuous <Continuous>  influenza  Vaccine (HIGH DOSE) 0.7 milliLiter(s) IntraMuscular once  loperamide 4 milliGRAM(s) Oral two times a day  mirtazapine 15 milliGRAM(s) Oral daily  pantoprazole    Tablet 40 milliGRAM(s) Oral before breakfast  sodium chloride 2 Gram(s) Oral three times a day  sodium chloride 0.9%. 1000 milliLiter(s) (100 mL/Hr) IV Continuous <Continuous>    MEDICATIONS  (PRN):      CAPILLARY BLOOD GLUCOSE        I&O's Summary    23 Sep 2022 07:01  -  24 Sep 2022 07:00  --------------------------------------------------------  IN: 0 mL / OUT: 200 mL / NET: -200 mL        PHYSICAL EXAM:  Vital Signs Last 24 Hrs  T(C): 36.2 (24 Sep 2022 05:30), Max: 36.7 (23 Sep 2022 23:21)  T(F): 97.2 (24 Sep 2022 05:30), Max: 98 (23 Sep 2022 23:21)  HR: 73 (24 Sep 2022 05:30) (73 - 87)  BP: 103/58 (24 Sep 2022 05:30) (103/58 - 123/67)  BP(mean): --  RR: 16 (24 Sep 2022 05:30) (16 - 16)  SpO2: 100% (24 Sep 2022 05:30) (100% - 100%)    Parameters below as of 24 Sep 2022 05:30  Patient On (Oxygen Delivery Method): room air        CONSTITUTIONAL: NAD  RESPIRATORY: Normal respiratory effort; lungs are clear to auscultation bilaterally  CARDIOVASCULAR: Regular rate and rhythm, normal S1 and S2, no murmur/rub/gallop; trace lower extremity edema; Peripheral pulses are 2+ bilaterally  ABDOMEN: soft, (+) open surgical wound over LL abdomen, packed, clean, no associated erythema, tender on edges, skin dry. Ostomy appears clean, pink with brown pasty output in bag, no rebound/guarding  MUSCLOSKELETAL: no clubbing or cyanosis of digits; no joint swelling or tenderness to palpation  PSYCH: A+O to person, place, and time; affect appropriate  NEURO: alert, awake, answering questions, no focal deficits, JOE    LABS:                        11.3   4.42  )-----------( 208      ( 24 Sep 2022 05:29 )             36.9     -    128<L>  |  93<L>  |  87<H>  ----------------------------<  91  5.1   |  17<L>  |  3.55<H>    Ca    9.1      24 Sep 2022 05:29  Phos  5.5       Mg     2.00         TPro  7.3  /  Alb  2.7<L>  /  TBili  0.4  /  DBili  x   /  AST  58<H>  /  ALT  21  /  AlkPhos  137<H>      PTT - ( 24 Sep 2022 05:29 )  PTT:37.8 sec      Urinalysis Basic - ( 23 Sep 2022 22:00 )    Color: Yellow / Appearance: Slightly Turbid / S.018 / pH: x  Gluc: x / Ketone: Negative  / Bili: Negative / Urobili: <2 mg/dL   Blood: x / Protein: 30 mg/dL / Nitrite: Negative   Leuk Esterase: Large / RBC: 11-25 /HPF / WBC >50 /HPF   Sq Epi: x / Non Sq Epi: x / Bacteria: Many          RADIOLOGY & ADDITIONAL TESTS:  Results Reviewed:   Imaging Personally Reviewed:  Electrocardiogram Personally Reviewed:    COORDINATION OF CARE:  Care Discussed with Consultants/Other Providers [Y/N]:  Prior or Outpatient Records Reviewed [Y/N]:

## 2022-09-27 NOTE — SWALLOW BEDSIDE ASSESSMENT ADULT - SWALLOW EVAL: DIAGNOSIS
Patient presents with oropharyngeal dysphagia given thin liquids, purees, and regular solids. Adequate bolus containment, adequate bolus manipulation, and slowed mastication with adequate ability to break down regular solids. slowed anterior-posterior transport noted with mild oral residue post primary swallow. Adequate oral clearance with use of thin liquid wash. Pharyngeal stage of swallow marked by observed initiation of the swallow trigger judged via laryngeal palpation. No overt s/x of impaired airway protection.

## 2022-09-27 NOTE — SWALLOW BEDSIDE ASSESSMENT ADULT - ADDITIONAL RECOMMENDATIONS
Monitor diet tolerance and reconsult this deptartment as indicated Otc Regimen: - \\n- DermaNail and cutemol cream daily \\nWear a finger guard to avoid nail picking Detail Level: Zone

## 2022-09-27 NOTE — PROGRESS NOTE ADULT - REASON FOR ADMISSION
Referred by nephrologist for evaluation of JAMISON

## 2022-09-27 NOTE — PROGRESS NOTE ADULT - PROBLEM SELECTOR PLAN 4
Found to be covid-19 positive, O2 sat wnl on room air  -no indication for Remdesivir or Dexamethasone   -CXR w/ clear lungs   -Monitor inflammatory markers

## 2022-09-27 NOTE — PROGRESS NOTE ADULT - PROBLEM SELECTOR PLAN 2
UA c/w UTI  -s/p ceftriaxone  -Urine culture not sent
UA c/w UTI  - start ceftriaxone empirically iso multiple comorbidities and deconditioning  -f/u UCx
UA c/w UTI  -c/w ceftriaxone  -f/u UCx
UA c/w UTI  -s/p ceftriaxone  -Urine culture not sent

## 2022-09-27 NOTE — PROGRESS NOTE ADULT - PROBLEM SELECTOR PLAN 7
-pt was found to have acute RLE DVT 8/2022 and was on home Lovenox 90mg BID  -switched Lovenox to heparin gtt given JAMISON
-pt was found to have acute RLE DVT 8/2022 and was on home Lovenox 90mg BID  -JAMISON improved, will transition from heparin gtt back to therapeutic Lovenox
-pt was found to have acute RLE DVT 8/2022 and was on home Lovenox 90mg BID  -switched Lovenox to heparin gtt given JAMISON
-pt was found to have acute RLE DVT 8/2022 and was on home Lovenox 90mg BID  -switched Lovenox to heparin gtt given JAMISON

## 2022-09-27 NOTE — DISCHARGE NOTE PROVIDER - NSDCCPCAREPLAN_GEN_ALL_CORE_FT
PRINCIPAL DISCHARGE DIAGNOSIS  Diagnosis: JAMISON (acute kidney injury)  Assessment and Plan of Treatment: You came into the hospital with elevated kidney function, You were found to have a urinary tract infection in which you were treated for with antibiotics, your kidney fuction has since normalized, please follow up with your PCP within 1-2 weeks for further evaluation      SECONDARY DISCHARGE DIAGNOSES  Diagnosis: S/P ileostomy  Assessment and Plan of Treatment: Please continue your home regimine and follow up with your PCP    Diagnosis: 2019 novel coronavirus disease (COVID-19)  Assessment and Plan of Treatment: You have Covid-19, your breathing is normal and you do not require supplemental oxygen, please follow up with your PCP for further management    Diagnosis: Hyponatremia  Assessment and Plan of Treatment: When you came inot the hospital your sodium was low, this has since normalized, this was most likely due to poor oral intake, please follow up with your PCP    Diagnosis: H/O ventral hernia  Assessment and Plan of Treatment: Please continue your home regimine and follow up with your PCP    Diagnosis: DVT, lower extremity  Assessment and Plan of Treatment: Please continue your home regimine LOVENOX and follow up with your PCP

## 2022-09-27 NOTE — DISCHARGE NOTE PROVIDER - DETAILS OF MALNUTRITION DIAGNOSIS/DIAGNOSES
This patient has been assessed with a concern for Malnutrition and was treated during this hospitalization for the following Nutrition diagnosis/diagnoses:     -  09/24/2022: Severe protein-calorie malnutrition

## 2022-09-27 NOTE — PROGRESS NOTE ADULT - ASSESSMENT
68yo Female with h/o HTN, HLD, DVT on Lovenox, incarcerated ventral hernia s/p repairs and enterotomy repairs, small bowel resection s/p end ileostomy, recently admitted with hyponatrenia and JAMISON; Pt admitted with JAMISON and UTI; Found to be Covid-19 positive; 
Pt. with JAMISON and chronic hyponatremia.
Pt. with JAMISON and chronic hyponatremia.
66yo Female with h/o HTN, HLD, DVT on Lovenox, incarcerated ventral hernia s/p repairs and enterotomy repairs, small bowel resection s/p end ileostomy, recently admitted with hyponatrenia and JAMISON; Pt admitted with JAMISON and UTI; Found to be Covid-19 positive
68yo Female with h/o HTN, HLD, DVT on Lovenox, incarcerated ventral hernia s/p repairs and enterotomy repairs, small bowel resection s/p end ileostomy, recently admitted with hyponatrenia and JAMISON; Pt admitted with JAMISON and UTI; Found to be Covid-19 positive
68yo Female with h/o HTN, HLD, DVT on Lovenox, incarcerated ventral hernia s/p repairs and enterotomy repairs, small bowel resection s/p end ileostomy, recently admitted with hyponatrenia and JAMISON; Pt admitted with JAMISON and UTI; Found to be Covid-19 positive

## 2022-09-27 NOTE — DISCHARGE NOTE PROVIDER - NSDCMRMEDTOKEN_GEN_ALL_CORE_FT
diphenoxylate-atropine 2.5 mg-0.025 mg oral tablet: 1 tab(s) orally once a day MDD:1  loperamide 2 mg oral capsule: 2 cap(s) orally 2 times a day  Lovenox 100 mg/mL injectable solution: 90 milligram(s) subcutaneously 2 times a day.    Please squeeze out 10mL of each vial before injection.   mirtazapine 15 mg oral tablet: 1 tab(s) orally once a day  omeprazole 20 mg oral delayed release tablet: 1 tab(s) orally once a day  sodium chloride 1 g oral tablet: 2 tab(s) orally 3 times a day

## 2022-09-27 NOTE — SWALLOW BEDSIDE ASSESSMENT ADULT - COMMENTS
Per Hospitalist Note 9/27/22 "68yo Female with h/o HTN, HLD, DVT on Lovenox, incarcerated ventral hernia s/p repairs and enterotomy repairs, small bowel resection s/p end ileostomy, recently admitted with hyponatrenia and JAMISON; Pt admitted with JAMISON and UTI; Found to be Covid-19 positive"     Patient is familiar to this department as the patient was seen during previous admission for a cinesophagram on 8/24/2022 with recommendation of soft and bite sized with thin liquids. (see report for full details).     Patient received upright in bed, awake and alert with daughter at bedside who offered Turkmen translation throughout the evaluation.  Patient was able to engage in conversation with SLP, answer yes/no questions and follow multi- step commands. Patient denied any dysphagia symptoms, however, reported to decreased appetite since her abdominal surgery (~3 months ago)

## 2022-09-27 NOTE — PROGRESS NOTE ADULT - PROBLEM SELECTOR PLAN 5
-ostomy site appears clean  -monitor ostomy output  -wound care consulted
-ostomy site appears clean  -monitor ostomy output

## 2022-09-27 NOTE — PROGRESS NOTE ADULT - PROBLEM SELECTOR PLAN 8
JOVITA witnessed by RN and completed in chart, pt DNR/DNI with daughter as HCP

## 2022-09-27 NOTE — PROGRESS NOTE ADULT - NUTRITIONAL ASSESSMENT
This patient has been assessed with a concern for Malnutrition and has been determined to have a diagnosis/diagnoses of Severe protein-calorie malnutrition.    This patient is being managed with:   Diet Soft and Bite Sized-  No Concentrated Potassium  No Concentrated Phosphorus  Halal  Supplement Feeding Modality:  Oral  Nepro Cans or Servings Per Day:  2       Frequency:  Two Times a day  Entered: Sep 24 2022  4:11PM    

## 2022-09-27 NOTE — PROGRESS NOTE ADULT - PROBLEM SELECTOR PLAN 1
Pt. with hemodynamically mediated JAMISON in the setting of poor oral intake, increased ileostomy output and COVID-19. On review of [place], pt. noted to have several episodes of JAMISON during previous hospital stays at [facility]. Pt. followed with Dr. Rma (nephrologist) with first visit being on 9/22/22. Blood work during that visit showed elevated Scr at 3.81 on 9/22/22. On admission Scr was elevated at 3.73 on 9/23/22 now improving but remains elevated to 1.3 (9/27). UA showed trace proteins and findings concerning for UTI. Spot urine TP/CR was mildly elevated at 0.4. Kidney sonogram showed WNL with no hydronephrosis on 9/23/22. Off IVF. Encourage PO intake. Monitor labs and urine output. Avoid any potential nephrotoxins. Dose medications as per eGFR.    Nephrology signing off at this time. Please call for any questions. Please have Pt. follow up with Dr. Ram in 2-3 weeks after discharge. Upon discharge, for appointment scheduling please email Nephrology at [email or web address]      If you have any questions, please feel free to contact me  Scotty Yost  Nephrology Fellow  [phone]; Prefer [employer] TEAMS  (After 5pm or on weekends please page the on-call fellow)
Pt. with hemodynamically mediated JAMISON in the setting of poor oral intake, increased ileostomy output and COVID-19. On review of [place], pt. noted to have several episodes of JAMISON during previous hospital stays at [facility]. Pt. followed with Dr. Ram (nephrologist) with first visit being on 9/22/22. Blood work during that visit showed elevated Scr at 3.81 on 9/22/22. On admission Scr was elevated at 3.73 on 9/23/22 now improving but remains elevated to 2.0. UA showed trace proteins and findings concerning for UTI. Spot urine TP/CR was mildly elevated at 0.4. Kidney sonogram showed WNL with no hydronephrosis on 9/23/22. Continue IVF, consider switching to LR today. Monitor labs and urine output. Avoid any potential nephrotoxins. Dose medications as per eGFR.    If you have any questions, please feel free to contact me  Scotty Yost  Nephrology Fellow  [phone]; Prefer [employer] TEAMS  (After 5pm or on weekends please page the on-call fellow)
Likely prerenal etiology due to poor po intake, high ostomy output, losses from surgical wound  No evidence of post renal obstruction. Low I/Os per daughter who is the primary caretaker.   -Cr improving w/ IVF   -US Renal: no hydronephrosis or calculi seen  -c/w IV fluids   -avoid nephrotoxic agents   -monitor Cr and UO
Suspect prerenal etiology due to poor po intake, high ostomy output, losses from surgical wound  No evidence of post renal obstruction. Low I/Os per daughter who is the primary caretaker.   -Cr improving   -US Renal: no hydronephrosis or calculi seen  -c/w IV fluids   -avoid nephrotoxic agents   -monitor Cr and UO
Suspect prerenal etiology due to poor po intake, high ostomy output, losses from surgical wound  No evidence of post renal obstruction. Low I/Os per daughter who is the primary caretaker.   -US Renal: no hydronephrosis or calculi seen  -s/p IV fluids w/ some improvement in Cr   -avoid nephrotoxic agents   -monitor Cr and UO
Likely prerenal etiology due to poor po intake, high ostomy output, losses from surgical wound  No evidence of post renal obstruction. Low I/Os per daughter who is the primary caretaker.   -Cr improving w/ IVF, down to 1.3 today   - Renal: no hydronephrosis or calculi seen  -may stop IV fluids at this time   -avoid nephrotoxic agents   -monitor Cr and UO  -outpatient renal f/up

## 2022-09-27 NOTE — PROGRESS NOTE ADULT - PROBLEM SELECTOR PLAN 3
-Likely hypovolemic hyponatremia iso low po intake and increased ostomy output and possible SIADH component  -c/w home salt tabs 2g TID  -s/p IV fluids   -c/w mirtazapine as appetite stimulant

## 2022-09-27 NOTE — DISCHARGE NOTE NURSING/CASE MANAGEMENT/SOCIAL WORK - NSDCPEFALRISK_GEN_ALL_CORE
For information on Fall & Injury Prevention, visit: [email or web address] OR  [email or web address] OR  https://www.cdc.gov/steadi/patient.html

## 2022-09-27 NOTE — PROGRESS NOTE ADULT - PROBLEM SELECTOR PROBLEM 1
JAMISON (acute kidney injury)

## 2022-10-19 ENCOUNTER — NON-APPOINTMENT (OUTPATIENT)
Age: 67
End: 2022-10-19

## 2022-10-19 LAB — M PROTEIN SPEC IFE-MCNC: NORMAL

## 2022-10-20 ENCOUNTER — APPOINTMENT (OUTPATIENT)
Dept: NEPHROLOGY | Facility: CLINIC | Age: 67
End: 2022-10-20

## 2022-10-20 ENCOUNTER — INPATIENT (INPATIENT)
Facility: HOSPITAL | Age: 67
LOS: 4 days | Discharge: HOME HEALTH | End: 2022-10-25
Attending: STUDENT IN AN ORGANIZED HEALTH CARE EDUCATION/TRAINING PROGRAM | Admitting: STUDENT IN AN ORGANIZED HEALTH CARE EDUCATION/TRAINING PROGRAM

## 2022-10-20 VITALS
OXYGEN SATURATION: 99 % | SYSTOLIC BLOOD PRESSURE: 122 MMHG | RESPIRATION RATE: 18 BRPM | TEMPERATURE: 98 F | DIASTOLIC BLOOD PRESSURE: 55 MMHG | HEART RATE: 82 BPM | HEIGHT: 60 IN

## 2022-10-20 DIAGNOSIS — N17.9 ACUTE KIDNEY FAILURE, UNSPECIFIED: ICD-10-CM

## 2022-10-20 DIAGNOSIS — Z90.49 ACQUIRED ABSENCE OF OTHER SPECIFIED PARTS OF DIGESTIVE TRACT: Chronic | ICD-10-CM

## 2022-10-20 DIAGNOSIS — N12 TUBULO-INTERSTITIAL NEPHRITIS, NOT SPECIFIED AS ACUTE OR CHRONIC: ICD-10-CM

## 2022-10-20 DIAGNOSIS — I10 ESSENTIAL (PRIMARY) HYPERTENSION: ICD-10-CM

## 2022-10-20 DIAGNOSIS — Z86.718 PERSONAL HISTORY OF OTHER VENOUS THROMBOSIS AND EMBOLISM: ICD-10-CM

## 2022-10-20 DIAGNOSIS — E78.5 HYPERLIPIDEMIA, UNSPECIFIED: ICD-10-CM

## 2022-10-20 DIAGNOSIS — E87.1 HYPO-OSMOLALITY AND HYPONATREMIA: ICD-10-CM

## 2022-10-20 DIAGNOSIS — N39.0 URINARY TRACT INFECTION, SITE NOT SPECIFIED: ICD-10-CM

## 2022-10-20 DIAGNOSIS — Z98.890 OTHER SPECIFIED POSTPROCEDURAL STATES: Chronic | ICD-10-CM

## 2022-10-20 DIAGNOSIS — Z29.9 ENCOUNTER FOR PROPHYLACTIC MEASURES, UNSPECIFIED: ICD-10-CM

## 2022-10-20 DIAGNOSIS — U07.1 COVID-19: ICD-10-CM

## 2022-10-20 DIAGNOSIS — Z98.890 OTHER SPECIFIED POSTPROCEDURAL STATES: ICD-10-CM

## 2022-10-20 LAB
ALBUMIN SERPL ELPH-MCNC: 3.2 G/DL — LOW (ref 3.3–5)
ALP SERPL-CCNC: 144 U/L — HIGH (ref 40–120)
ALT FLD-CCNC: 25 U/L — SIGNIFICANT CHANGE UP (ref 4–33)
ANION GAP SERPL CALC-SCNC: 15 MMOL/L — HIGH (ref 7–14)
APPEARANCE UR: ABNORMAL
APTT BLD: 28.7 SEC — SIGNIFICANT CHANGE UP (ref 27–36.3)
AST SERPL-CCNC: 49 U/L — HIGH (ref 4–32)
BACTERIA # UR AUTO: ABNORMAL
BASOPHILS # BLD AUTO: 0.02 K/UL — SIGNIFICANT CHANGE UP (ref 0–0.2)
BASOPHILS NFR BLD AUTO: 0.3 % — SIGNIFICANT CHANGE UP (ref 0–2)
BILIRUB SERPL-MCNC: 0.4 MG/DL — SIGNIFICANT CHANGE UP (ref 0.2–1.2)
BILIRUB UR-MCNC: NEGATIVE — SIGNIFICANT CHANGE UP
BUN SERPL-MCNC: 41 MG/DL — HIGH (ref 7–23)
CALCIUM SERPL-MCNC: 9.4 MG/DL — SIGNIFICANT CHANGE UP (ref 8.4–10.5)
CHLORIDE SERPL-SCNC: 95 MMOL/L — LOW (ref 98–107)
CO2 SERPL-SCNC: 22 MMOL/L — SIGNIFICANT CHANGE UP (ref 22–31)
COLOR SPEC: YELLOW — SIGNIFICANT CHANGE UP
CREAT SERPL-MCNC: 2.77 MG/DL — HIGH (ref 0.5–1.3)
DIFF PNL FLD: NEGATIVE — SIGNIFICANT CHANGE UP
EGFR: 18 ML/MIN/1.73M2 — LOW
EOSINOPHIL # BLD AUTO: 0.09 K/UL — SIGNIFICANT CHANGE UP (ref 0–0.5)
EOSINOPHIL NFR BLD AUTO: 1.5 % — SIGNIFICANT CHANGE UP (ref 0–6)
EPI CELLS # UR: 5 /HPF — SIGNIFICANT CHANGE UP (ref 0–5)
FLUAV AG NPH QL: SIGNIFICANT CHANGE UP
FLUBV AG NPH QL: SIGNIFICANT CHANGE UP
GLUCOSE SERPL-MCNC: 100 MG/DL — HIGH (ref 70–99)
GLUCOSE UR QL: NEGATIVE — SIGNIFICANT CHANGE UP
HCT VFR BLD CALC: 38.8 % — SIGNIFICANT CHANGE UP (ref 34.5–45)
HGB BLD-MCNC: 12.4 G/DL — SIGNIFICANT CHANGE UP (ref 11.5–15.5)
HYALINE CASTS # UR AUTO: 0 /LPF — SIGNIFICANT CHANGE UP (ref 0–7)
IANC: 3 K/UL — SIGNIFICANT CHANGE UP (ref 1.8–7.4)
IMM GRANULOCYTES NFR BLD AUTO: 0.3 % — SIGNIFICANT CHANGE UP (ref 0–0.9)
INR BLD: 1.16 RATIO — SIGNIFICANT CHANGE UP (ref 0.88–1.16)
KETONES UR-MCNC: NEGATIVE — SIGNIFICANT CHANGE UP
LEUKOCYTE ESTERASE UR-ACNC: ABNORMAL
LYMPHOCYTES # BLD AUTO: 2.23 K/UL — SIGNIFICANT CHANGE UP (ref 1–3.3)
LYMPHOCYTES # BLD AUTO: 37.8 % — SIGNIFICANT CHANGE UP (ref 13–44)
MCHC RBC-ENTMCNC: 26.6 PG — LOW (ref 27–34)
MCHC RBC-ENTMCNC: 32 GM/DL — SIGNIFICANT CHANGE UP (ref 32–36)
MCV RBC AUTO: 83.1 FL — SIGNIFICANT CHANGE UP (ref 80–100)
MONOCYTES # BLD AUTO: 0.54 K/UL — SIGNIFICANT CHANGE UP (ref 0–0.9)
MONOCYTES NFR BLD AUTO: 9.2 % — SIGNIFICANT CHANGE UP (ref 2–14)
NEUTROPHILS # BLD AUTO: 3 K/UL — SIGNIFICANT CHANGE UP (ref 1.8–7.4)
NEUTROPHILS NFR BLD AUTO: 50.9 % — SIGNIFICANT CHANGE UP (ref 43–77)
NITRITE UR-MCNC: NEGATIVE — SIGNIFICANT CHANGE UP
NRBC # BLD: 0 /100 WBCS — SIGNIFICANT CHANGE UP (ref 0–0)
NRBC # FLD: 0 K/UL — SIGNIFICANT CHANGE UP (ref 0–0)
PH UR: 6 — SIGNIFICANT CHANGE UP (ref 5–8)
PLATELET # BLD AUTO: 221 K/UL — SIGNIFICANT CHANGE UP (ref 150–400)
POTASSIUM SERPL-MCNC: 4.4 MMOL/L — SIGNIFICANT CHANGE UP (ref 3.5–5.3)
POTASSIUM SERPL-SCNC: 4.4 MMOL/L — SIGNIFICANT CHANGE UP (ref 3.5–5.3)
PROT SERPL-MCNC: 6.5 G/DL — SIGNIFICANT CHANGE UP (ref 6–8.3)
PROT SERPL-MCNC: 7.7 G/DL — SIGNIFICANT CHANGE UP (ref 6–8.3)
PROT UR-MCNC: ABNORMAL
PROTHROM AB SERPL-ACNC: 13.5 SEC — HIGH (ref 10.5–13.4)
RBC # BLD: 4.67 M/UL — SIGNIFICANT CHANGE UP (ref 3.8–5.2)
RBC # FLD: 15.5 % — HIGH (ref 10.3–14.5)
RBC CASTS # UR COMP ASSIST: 4 /HPF — SIGNIFICANT CHANGE UP (ref 0–4)
RSV RNA NPH QL NAA+NON-PROBE: SIGNIFICANT CHANGE UP
SARS-COV-2 RNA SPEC QL NAA+PROBE: DETECTED
SODIUM SERPL-SCNC: 132 MMOL/L — LOW (ref 135–145)
SP GR SPEC: 1.02 — SIGNIFICANT CHANGE UP (ref 1.01–1.05)
UROBILINOGEN FLD QL: SIGNIFICANT CHANGE UP
WBC # BLD: 5.9 K/UL — SIGNIFICANT CHANGE UP (ref 3.8–10.5)
WBC # FLD AUTO: 5.9 K/UL — SIGNIFICANT CHANGE UP (ref 3.8–10.5)
WBC UR QL: >10 /HPF — HIGH (ref 0–5)

## 2022-10-20 PROCEDURE — 99223 1ST HOSP IP/OBS HIGH 75: CPT

## 2022-10-20 PROCEDURE — 84165 PROTEIN E-PHORESIS SERUM: CPT | Mod: 26

## 2022-10-20 PROCEDURE — 99285 EMERGENCY DEPT VISIT HI MDM: CPT

## 2022-10-20 PROCEDURE — 86334 IMMUNOFIX E-PHORESIS SERUM: CPT | Mod: 26

## 2022-10-20 RX ORDER — ACETAMINOPHEN 500 MG
650 TABLET ORAL EVERY 6 HOURS
Refills: 0 | Status: DISCONTINUED | OUTPATIENT
Start: 2022-10-20 | End: 2022-10-25

## 2022-10-20 RX ORDER — SODIUM CHLORIDE 9 MG/ML
1000 INJECTION INTRAMUSCULAR; INTRAVENOUS; SUBCUTANEOUS
Refills: 0 | Status: DISCONTINUED | OUTPATIENT
Start: 2022-10-20 | End: 2022-10-21

## 2022-10-20 RX ORDER — HEPARIN SODIUM 5000 [USP'U]/ML
6500 INJECTION INTRAVENOUS; SUBCUTANEOUS EVERY 6 HOURS
Refills: 0 | Status: DISCONTINUED | OUTPATIENT
Start: 2022-10-20 | End: 2022-10-21

## 2022-10-20 RX ORDER — PANTOPRAZOLE SODIUM 20 MG/1
40 TABLET, DELAYED RELEASE ORAL
Refills: 0 | Status: DISCONTINUED | OUTPATIENT
Start: 2022-10-20 | End: 2022-10-25

## 2022-10-20 RX ORDER — DIPHENOXYLATE HCL/ATROPINE 2.5-.025MG
1 TABLET ORAL DAILY
Refills: 0 | Status: DISCONTINUED | OUTPATIENT
Start: 2022-10-20 | End: 2022-10-22

## 2022-10-20 RX ORDER — SODIUM CHLORIDE 9 MG/ML
1000 INJECTION INTRAMUSCULAR; INTRAVENOUS; SUBCUTANEOUS ONCE
Refills: 0 | Status: COMPLETED | OUTPATIENT
Start: 2022-10-20 | End: 2022-10-20

## 2022-10-20 RX ORDER — MEROPENEM 1 G/30ML
500 INJECTION INTRAVENOUS ONCE
Refills: 0 | Status: DISCONTINUED | OUTPATIENT
Start: 2022-10-20 | End: 2022-10-20

## 2022-10-20 RX ORDER — ERTAPENEM SODIUM 1 G/1
500 INJECTION, POWDER, LYOPHILIZED, FOR SOLUTION INTRAMUSCULAR; INTRAVENOUS EVERY 24 HOURS
Refills: 0 | Status: COMPLETED | OUTPATIENT
Start: 2022-10-20 | End: 2022-10-22

## 2022-10-20 RX ORDER — ATORVASTATIN CALCIUM 80 MG/1
10 TABLET, FILM COATED ORAL AT BEDTIME
Refills: 0 | Status: DISCONTINUED | OUTPATIENT
Start: 2022-10-20 | End: 2022-10-25

## 2022-10-20 RX ORDER — HEPARIN SODIUM 5000 [USP'U]/ML
1500 INJECTION INTRAVENOUS; SUBCUTANEOUS
Qty: 25000 | Refills: 0 | Status: DISCONTINUED | OUTPATIENT
Start: 2022-10-20 | End: 2022-10-21

## 2022-10-20 RX ORDER — HEPARIN SODIUM 5000 [USP'U]/ML
3000 INJECTION INTRAVENOUS; SUBCUTANEOUS EVERY 6 HOURS
Refills: 0 | Status: DISCONTINUED | OUTPATIENT
Start: 2022-10-20 | End: 2022-10-21

## 2022-10-20 RX ORDER — MIRTAZAPINE 45 MG/1
15 TABLET, ORALLY DISINTEGRATING ORAL DAILY
Refills: 0 | Status: DISCONTINUED | OUTPATIENT
Start: 2022-10-20 | End: 2022-10-25

## 2022-10-20 RX ORDER — LANOLIN ALCOHOL/MO/W.PET/CERES
3 CREAM (GRAM) TOPICAL AT BEDTIME
Refills: 0 | Status: DISCONTINUED | OUTPATIENT
Start: 2022-10-20 | End: 2022-10-21

## 2022-10-20 RX ORDER — SODIUM CHLORIDE 9 MG/ML
1 INJECTION INTRAMUSCULAR; INTRAVENOUS; SUBCUTANEOUS
Refills: 0 | Status: DISCONTINUED | OUTPATIENT
Start: 2022-10-20 | End: 2022-10-21

## 2022-10-20 RX ORDER — LOPERAMIDE HCL 2 MG
4 TABLET ORAL DAILY
Refills: 0 | Status: DISCONTINUED | OUTPATIENT
Start: 2022-10-20 | End: 2022-10-21

## 2022-10-20 RX ADMIN — SODIUM CHLORIDE 1000 MILLILITER(S): 9 INJECTION INTRAMUSCULAR; INTRAVENOUS; SUBCUTANEOUS at 17:12

## 2022-10-20 RX ADMIN — ATORVASTATIN CALCIUM 10 MILLIGRAM(S): 80 TABLET, FILM COATED ORAL at 22:42

## 2022-10-20 RX ADMIN — ERTAPENEM SODIUM 100 MILLIGRAM(S): 1 INJECTION, POWDER, LYOPHILIZED, FOR SOLUTION INTRAMUSCULAR; INTRAVENOUS at 22:42

## 2022-10-20 NOTE — H&P ADULT - NSHPREVIEWOFSYSTEMS_GEN_ALL_CORE
ROS:    Constitutional: [ ] fevers [ ] weight gain  HEENT:  [ ] postnasal drip [ ] nasal congestion  CV: [ ] chest pain [ ] orthopnea [ ] palpitations  Resp: [x ] cough [ ] shortness of breath [ ] dyspnea [ ] wheezing   GI: [ ] nausea [ ] vomiting [ ] diarrhea [ ] constipation [ ] abd pain   : [x ] dysuria  [ ] hematuria [ ] increased urinary frequency  Musculoskeletal: [ ] back pain [ ] myalgias [ ] arthralgias   Skin: [ ] rash [ ] itch  Neurological: [ ] headache [ ] dizziness [ ] syncope  Endocrine: [ ] diabetes [ ] thyroid problem  Hematologic/Lymphatic: [ ] anemia [ ] bleeding problem  [x ] All other systems negative

## 2022-10-20 NOTE — H&P ADULT - NSHPLABSRESULTS_GEN_ALL_CORE
I have personally reviewed this patient's labs below:                        12.4   5.90  )-----------( 221      ( 20 Oct 2022 15:11 )             38.8     10-20-22 @ 15:11    132<L>  |  95<L>  |  41<H>             --------------------------< 100<H>     4.4  |  22  | 2.77<H>    eGFR AA: --  eGFR N-AA: --    Calcium: 9.4  Phosphorus: --  Magnesium: --    AST: 49<H>    ALT: 25  AlkPhos: 144<H>  Protein: 7.7  Albumin: 3.2<L>  TBili: 0.4  D-Bili: --      I have personally reviewed this patient's EKG and my independent interpretation is NSR, left axis deviation, no STD or STEPH      Review and summation of old records:  US kidney and bladder 9/23/2022  IMPRESSION:    No hydronephrosis    Hepatic steatosis    Cinesophagram 8/2022  IMPRESSION:    There was Laryngeal Penetration during the swallow for Thin Liquids with   retrieval and airway protection maintained. There was no evidence of   penetration or aspiration with the other tested consistencies.

## 2022-10-20 NOTE — ED ADULT TRIAGE NOTE - CHIEF COMPLAINT QUOTE
Patient brought to ER by EMS from home after she had labs drawn at her PMD on Monday and Creatinine was 3.38. Pt sen t in for rule out JAMISON.

## 2022-10-20 NOTE — H&P ADULT - PROBLEM SELECTOR PLAN 1
Creatinine 3.38 outpatient and repeat here 2.77. Last known creatinine was 1.34 9/27/2022.  Has had repeated admissions for dehydration and creatinine usually improved with IVF. Daughter denies worsened ostomy output but notes decreased UOP  -s/p 1L IVF, will give another 100cc/hr x10 hours  -US kidney and bladder last admission without hydro. Per ED provider, bladder scan without bladder distension  -Repeat BMP in AM    Elevated kappa and lambda on SIFE (on allscripts)  -ordered for SPEP, UPEP, serum immunofixation, FLC assay, IG panel - f/u hematology as outpatient. Daughter advised and will make appointment. Discussed possibility of plasma cell dyscrasia and needs to be ruled out

## 2022-10-20 NOTE — ED ADULT NURSE REASSESSMENT NOTE - NS ED NURSE REASSESS COMMENT FT1
AAOx4, no signs of distress, patient able to give urine, IVF administered per orders through ultrasound-guided LUE PIV, pending results, fall precautions maintained

## 2022-10-20 NOTE — ED PROVIDER NOTE - PROGRESS NOTE DETAILS
Ramya Wolf PGY3: UA+ and Cr elevated 2.77; will admit to medicine for JAMISON. Will need hydration and IV abx.

## 2022-10-20 NOTE — ED ADULT NURSE REASSESSMENT NOTE - NS ED NURSE REASSESS COMMENT FT1
BREAK RN: pt. remains A&Ox4, awake and resting. pt. offers no new complaints at this time. no acute distress noted. respirations even and unlabored. Attempted IV access. MD Wolf made aware of pt. needing US Line.

## 2022-10-20 NOTE — ED ADULT NURSE NOTE - OBJECTIVE STATEMENT
Patient is a 68 yo female, h/x HTN, HLD, ileostomy, RLE DVT on lovenox, presenting with creatinine 3.38 on labs at MD office 3 days ago. AAOx4, no signs of distress, patient denies any symptoms except insomnia. Daughter at bedside translating per patient request. Reports patient frequently has issues with JAMISON. Denies chest pain, shortness of breath, nausea, vomiting. VSS. Fall precautions maintained.

## 2022-10-20 NOTE — H&P ADULT - NSICDXPASTMEDICALHX_GEN_ALL_CORE_FT
PAST MEDICAL HISTORY:  H/O fracture of tibia and fibula (L)    History of deep vein thrombosis     Hyperlipidemia     Hypertension     Obesity

## 2022-10-20 NOTE — H&P ADULT - PROBLEM SELECTOR PLAN 9
Heparin gtt  DASH/TLC soft and bite sized  S&S eval  PT eval    Discussed with daughter/HCP Ifhat and patient regarding MOLST previously filled out 9/23/2022 on patient window. MOLST was not signed by physician. At the time decision was DNR/DNI. Discussed with daughter that MOLST is not valid. Discussed with patient as well who defers to daughter for decision and that she does not know if she wants CPR or intubation when described to her. At this time the daughter wants her to be full code. Informed to let providers know should this change.    Pt is oriented x2 (name, placed but not month/year). Per daughter she usually does not pay attention to months and year. This is her baseline

## 2022-10-20 NOTE — ED PROVIDER NOTE - CLINICAL SUMMARY MEDICAL DECISION MAKING FREE TEXT BOX
68 y/o F, PMH of HTN, HLD, hx DVT (on Lovenox), hernia repair, and SBO s/p end ileostomy (4 months ago), presents to ED c/o elevated Cr (outpt 3.38). Pt endorses some dysuria and decreased urine outpt. Denies f/c, CP, SOB, n/v.  Bedside US shows non-distended bladder. No obvious bladder outlet obstruction.  Plan to check basic labs, UA/UCx, and reassess. IVF for relief. Reassess.

## 2022-10-20 NOTE — H&P ADULT - ASSESSMENT
67F with PMHx HTN, HLD, RLE DVT 8/2022 on lovenox, hernia repair and SBO s/p ileostomy 6/2022 presenting sent in by nephrologist Dr. Ram for reported creatinine of 3.38. Admitted for JAMISON, UTI

## 2022-10-20 NOTE — H&P ADULT - NSHPPHYSICALEXAM_GEN_ALL_CORE
PHYSICAL EXAM:  Vital Signs Last 24 Hrs  T(C): 36.6 (10-20-22 @ 16:24)  T(F): 97.9 (10-20-22 @ 16:24), Max: 98 (10-20-22 @ 12:12)  HR: 72 (10-20-22 @ 16:24) (72 - 82)  BP: 112/63 (10-20-22 @ 16:24)  BP(mean): 75 (10-20-22 @ 13:54) (75 - 75)  RR: 19 (10-20-22 @ 16:24) (18 - 20)  SpO2: 100% (10-20-22 @ 16:24) (99% - 100%)  Wt(kg): --    Constitutional: NAD, awake and alert, well developed  EYES: EOMI, conjunctiva clear  ENT:  Normal Hearing, no tonsillar exudates   Neck: Soft and supple , no thyromegaly   Respiratory: Breath sounds are clear bilaterally, No wheezing, rales or rhonchi, no tachypnea, no accessory muscle use  Cardiovascular: S1 and S2, regular rate and rhythm, no Murmurs, gallops or rubs, no JVD, no leg edema  Gastrointestinal: Bowel Sounds present, soft, nontender, nondistended, no guarding, no rebound  Ileostomy bag with yellow brown stool  Extremities: No cyanosis or clubbing; warm to touch  Vascular: 2+ peripheral pulses lower ex  Neurological: No focal deficits, CN II-XII intact bilaterally, sensation to light touch intact in all extremities.   Musculoskeletal: 5/5 strength b/l upper and lower extremities; no joint swelling.  Skin: midline abdominal scar with gauze packed, mild blood on gauze. No active bleeding or purulence. No signs of surrounding erythema or induration

## 2022-10-20 NOTE — ED PROVIDER NOTE - NS ED ROS FT
Gen: Denies fever, weight loss  HEENT: Denies vision changes, ear pain, epistaxis, sore throat  CV: Denies chest pain, palpitations  Skin: Denies rash, erythema, color changes  Resp: Denies SOB, cough  Endo: Denies sensitivity to heat, cold, increased urination  GI: Denies abdominal pain, constipation, nausea, vomiting, diarrhea  Msk: Denies back pain, LE swelling, extremity pain  : +dysuria, +decreased urine outpt  Neuro: Denies LOC, weakness, numbness, tingling  Psych: Denies hx of psych, hallucinations  ROS statement: all other ROS negative except as per HPI

## 2022-10-20 NOTE — ED ADULT NURSE NOTE - NSIMPLEMENTINTERV_GEN_ALL_ED
Implemented All Fall Risk Interventions:  [place] to call system. Call bell, personal items and telephone within reach. Instruct patient to call for assistance. Room bathroom lighting operational. Non-slip footwear when patient is off stretcher. Physically safe environment: no spills, clutter or unnecessary equipment. Stretcher in lowest position, wheels locked, appropriate side rails in place. Provide visual cue, wrist band, yellow gown, etc. Monitor gait and stability. Monitor for mental status changes and reorient to person, place, and time. Review medications for side effects contributing to fall risk. Reinforce activity limits and safety measures with patient and family.

## 2022-10-20 NOTE — H&P ADULT - HISTORY OF PRESENT ILLNESS
67F with PMHx HTN, HLD, RLE DVT 8/2022 on lovenox, hernia repair and SBO s/p ileostomy 6/2022 presenting sent in by nephrologist Dr. Ram for reported creatinine of 3.38. Chu is daughter and HCP and was at bedside providing collateral info. She is also primary caretaker of patient and lives with her. Pt was recently admitted for similar reasons 9/23--9/27 for JAMISON, UTI and hyponatremia. Pt was also COVID positive. Pt has been maintaining PO intake and on prior admissions seen by S&S eval and rec'd soft and bite sized with thin liquids. She does not have cough, dysphagia or aspiration events at home per daughter when taking in PO intake. She denies fevers, CP, SOB, vomiting, abdominal pain but does endorse on and off dysuria and somewhat decreased UOP. Also endorses on and off dry cough. Pt also has stable ostomy output and daughter monitors to make sure it is less than 1000cc output per day. If it exceeds this amount she gives additional loperamide. She is currently taking it 4mg daily. She also is taking NaCl tabs but 2g either once a day or BID at times. The daughter notes being told of abnormal serum immunofixation by nephrology and to eventually see hematology regarding this    PCP Dr. GUERRA  Nephrologist Dr. Ram  General Surgery Dr. Fidel Ochoa - per daughter, plan is for eventual ileostomy reversal but progress has been slow for patient. Next appointment is Tuesday  Daughter is working on arranging heme f/u outpatient for DVT as recommended by her PCP and for positive immunofixation testing

## 2022-10-20 NOTE — ED PROVIDER NOTE - PHYSICAL EXAMINATION
PHYSICAL EXAM:  GENERAL: non-toxic appearing; in no respiratory distress  HEENT: Atraumatic, Normocephalic, PERRL, EOMs intact b/l w/out deficits, no conjunctival pallor, DMM  NECK: No JVD; FROM  CHEST/LUNG: CTAB no wheezes/rhonchi/rales  HEART: RRR no murmur/gallops/rubs  ABDOMEN: +BS, soft, NT, ND; ostomy bag in place, site is c/d/i, appropriate outpt; no CVAT  EXTREMITIES: No LE edema, +2 radial pulses b/l, +2 DP/PT pulses b/l  MUSCULOSKELETAL: FROM of all 4 extremities  NERVOUS SYSTEM:  A&Ox3, No motor deficits or sensory deficits; no focal neurologic deficits  SKIN:  No new rashes

## 2022-10-20 NOTE — H&P ADULT - PROBLEM SELECTOR PLAN 3
Na 132. Does not take NaCl tabs TID but rather qd or BID  May be 2/2 dehydration?  -sodium tabs 2g BID for now  -repeat BMP in AM  -IVF as above

## 2022-10-20 NOTE — ED PROVIDER NOTE - ATTENDING CONTRIBUTION TO CARE
Patient is a 66 yo F with history of HTN, HLD, hx DVT (on Lovenox), hernia repair, and SBO s/p end ileostomy (4 months ago), here for evaluation of elevated Cr level. Patient is present with daughter who states that since patient has had ostomy placed, she has periodically developed JAMISON. Patient had recent kidney/ bladder US which was unremarkable. Ostomy output has been normal per daughter. Patient drinks fluids as well. No fevers, chills, nausea, vomiting. No chest pain or shortness of breath. +dysuria and decreased urination.     VS noted  Gen. no acute distress, chronically ill  HEENT: EOMI, mmm,   Lungs: CTAB/L no C/ W /R   CVS: RRR   Abd; Soft non tender, ostomy site is C/D/I, bag just emptied  Ext: no edema  Skin: no rash  Neuro AAOx3 non focal clear speech  a/p: JAMISON on outpatient labs - plan for IVF, labs, u/a, admission.   - Charisse MERCADO

## 2022-10-20 NOTE — ED PROVIDER NOTE - OBJECTIVE STATEMENT
68 y/o F, PMH of HTN, HLD, hx DVT (on Lovenox), hernia repair, and SBO s/p end ileostomy (4 months ago), presents to ED c/o elevated Cr. Pt reports had 68 y/o F, PMH of HTN, HLD, hx DVT (on Lovenox), hernia repair, and SBO s/p end ileostomy (4 months ago), presents to ED c/o elevated Cr. Pt reports had outpt labs done w/ PMD 2 days ago, and Cr was elevated to 3.38 so was sent to ED for possible JAMISON. Pt endorses dysuria and decreased urine outpt over the last few days. Per daughter ostomy outpt has been appropriate. Per daughter pt has had frequent UTI/JAMISON since her SBO surgeries a few months ago. Denies f/c, CP, SOB, abd pain, weakness/numbness, lightheadedness/dizziness, or any other symptoms at this time.

## 2022-10-21 ENCOUNTER — TRANSCRIPTION ENCOUNTER (OUTPATIENT)
Age: 67
End: 2022-10-21

## 2022-10-21 DIAGNOSIS — Z93.9 ARTIFICIAL OPENING STATUS, UNSPECIFIED: ICD-10-CM

## 2022-10-21 DIAGNOSIS — N95.0 POSTMENOPAUSAL BLEEDING: ICD-10-CM

## 2022-10-21 LAB
A1C WITH ESTIMATED AVERAGE GLUCOSE RESULT: 5.1 % — SIGNIFICANT CHANGE UP (ref 4–5.6)
ANION GAP SERPL CALC-SCNC: 11 MMOL/L — SIGNIFICANT CHANGE UP (ref 7–14)
ANION GAP SERPL CALC-SCNC: 15 MMOL/L — HIGH (ref 7–14)
APTT BLD: 191.6 SEC — CRITICAL HIGH (ref 27–36.3)
APTT BLD: 31.9 SEC — SIGNIFICANT CHANGE UP (ref 27–36.3)
BASOPHILS # BLD AUTO: 0.02 K/UL — SIGNIFICANT CHANGE UP (ref 0–0.2)
BASOPHILS NFR BLD AUTO: 0.4 % — SIGNIFICANT CHANGE UP (ref 0–2)
BUN SERPL-MCNC: 33 MG/DL — HIGH (ref 7–23)
BUN SERPL-MCNC: 35 MG/DL — HIGH (ref 7–23)
CALCIUM SERPL-MCNC: 8.8 MG/DL — SIGNIFICANT CHANGE UP (ref 8.4–10.5)
CALCIUM SERPL-MCNC: 9.1 MG/DL — SIGNIFICANT CHANGE UP (ref 8.4–10.5)
CHLORIDE SERPL-SCNC: 100 MMOL/L — SIGNIFICANT CHANGE UP (ref 98–107)
CHLORIDE SERPL-SCNC: 101 MMOL/L — SIGNIFICANT CHANGE UP (ref 98–107)
CHOLEST SERPL-MCNC: 117 MG/DL — SIGNIFICANT CHANGE UP
CO2 SERPL-SCNC: 15 MMOL/L — LOW (ref 22–31)
CO2 SERPL-SCNC: 21 MMOL/L — LOW (ref 22–31)
CREAT SERPL-MCNC: 2.25 MG/DL — HIGH (ref 0.5–1.3)
CREAT SERPL-MCNC: 2.39 MG/DL — HIGH (ref 0.5–1.3)
EGFR: 22 ML/MIN/1.73M2 — LOW
EGFR: 23 ML/MIN/1.73M2 — LOW
EOSINOPHIL # BLD AUTO: 0.12 K/UL — SIGNIFICANT CHANGE UP (ref 0–0.5)
EOSINOPHIL NFR BLD AUTO: 2.5 % — SIGNIFICANT CHANGE UP (ref 0–6)
ESTIMATED AVERAGE GLUCOSE: 100 — SIGNIFICANT CHANGE UP
GLUCOSE SERPL-MCNC: 102 MG/DL — HIGH (ref 70–99)
GLUCOSE SERPL-MCNC: 92 MG/DL — SIGNIFICANT CHANGE UP (ref 70–99)
HCT VFR BLD CALC: 34.9 % — SIGNIFICANT CHANGE UP (ref 34.5–45)
HDLC SERPL-MCNC: 28 MG/DL — LOW
HGB BLD-MCNC: 11.1 G/DL — LOW (ref 11.5–15.5)
IANC: 2.06 K/UL — SIGNIFICANT CHANGE UP (ref 1.8–7.4)
IGA FLD-MCNC: 331 MG/DL — SIGNIFICANT CHANGE UP (ref 84–499)
IGG FLD-MCNC: 2005 MG/DL — HIGH (ref 610–1660)
IGM SERPL-MCNC: 125 MG/DL — SIGNIFICANT CHANGE UP (ref 35–242)
IMM GRANULOCYTES NFR BLD AUTO: 0.2 % — SIGNIFICANT CHANGE UP (ref 0–0.9)
INTERPRETATION SERPL IFE-IMP: SIGNIFICANT CHANGE UP
KAPPA LC SER QL IFE: 12.9 MG/DL — HIGH (ref 0.33–1.94)
KAPPA LC SER QL IFE: 12.9 MG/DL — HIGH (ref 0.33–1.94)
KAPPA/LAMBDA FREE LIGHT CHAIN RATIO, SERUM: 1.56 RATIO — SIGNIFICANT CHANGE UP (ref 0.26–1.65)
KAPPA/LAMBDA FREE LIGHT CHAIN RATIO, SERUM: 1.56 RATIO — SIGNIFICANT CHANGE UP (ref 0.26–1.65)
LAMBDA LC SER QL IFE: 8.29 MG/DL — HIGH (ref 0.57–2.63)
LAMBDA LC SER QL IFE: 8.29 MG/DL — HIGH (ref 0.57–2.63)
LIPID PNL WITH DIRECT LDL SERPL: 70 MG/DL — SIGNIFICANT CHANGE UP
LYMPHOCYTES # BLD AUTO: 2.05 K/UL — SIGNIFICANT CHANGE UP (ref 1–3.3)
LYMPHOCYTES # BLD AUTO: 43.4 % — SIGNIFICANT CHANGE UP (ref 13–44)
MAGNESIUM SERPL-MCNC: 1.2 MG/DL — LOW (ref 1.6–2.6)
MCHC RBC-ENTMCNC: 26.7 PG — LOW (ref 27–34)
MCHC RBC-ENTMCNC: 31.8 GM/DL — LOW (ref 32–36)
MCV RBC AUTO: 83.9 FL — SIGNIFICANT CHANGE UP (ref 80–100)
MONOCYTES # BLD AUTO: 0.46 K/UL — SIGNIFICANT CHANGE UP (ref 0–0.9)
MONOCYTES NFR BLD AUTO: 9.7 % — SIGNIFICANT CHANGE UP (ref 2–14)
NEUTROPHILS # BLD AUTO: 2.06 K/UL — SIGNIFICANT CHANGE UP (ref 1.8–7.4)
NEUTROPHILS NFR BLD AUTO: 43.8 % — SIGNIFICANT CHANGE UP (ref 43–77)
NON HDL CHOLESTEROL: 89 MG/DL — SIGNIFICANT CHANGE UP
NRBC # BLD: 0 /100 WBCS — SIGNIFICANT CHANGE UP (ref 0–0)
NRBC # FLD: 0 K/UL — SIGNIFICANT CHANGE UP (ref 0–0)
PHOSPHATE SERPL-MCNC: 4.4 MG/DL — SIGNIFICANT CHANGE UP (ref 2.5–4.5)
PLATELET # BLD AUTO: 191 K/UL — SIGNIFICANT CHANGE UP (ref 150–400)
POTASSIUM SERPL-MCNC: 4.2 MMOL/L — SIGNIFICANT CHANGE UP (ref 3.5–5.3)
POTASSIUM SERPL-MCNC: 5.3 MMOL/L — SIGNIFICANT CHANGE UP (ref 3.5–5.3)
POTASSIUM SERPL-SCNC: 4.2 MMOL/L — SIGNIFICANT CHANGE UP (ref 3.5–5.3)
POTASSIUM SERPL-SCNC: 5.3 MMOL/L — SIGNIFICANT CHANGE UP (ref 3.5–5.3)
RBC # BLD: 4.16 M/UL — SIGNIFICANT CHANGE UP (ref 3.8–5.2)
RBC # FLD: 15.4 % — HIGH (ref 10.3–14.5)
SODIUM SERPL-SCNC: 130 MMOL/L — LOW (ref 135–145)
SODIUM SERPL-SCNC: 133 MMOL/L — LOW (ref 135–145)
T3 SERPL-MCNC: 103 NG/DL — SIGNIFICANT CHANGE UP (ref 80–200)
T4 AB SER-ACNC: 7.08 UG/DL — SIGNIFICANT CHANGE UP (ref 5.1–13)
TRIGL SERPL-MCNC: 93 MG/DL — SIGNIFICANT CHANGE UP
TSH SERPL-MCNC: 3.66 UIU/ML — SIGNIFICANT CHANGE UP (ref 0.27–4.2)
WBC # BLD: 4.72 K/UL — SIGNIFICANT CHANGE UP (ref 3.8–10.5)
WBC # FLD AUTO: 4.72 K/UL — SIGNIFICANT CHANGE UP (ref 3.8–10.5)

## 2022-10-21 PROCEDURE — 99233 SBSQ HOSP IP/OBS HIGH 50: CPT

## 2022-10-21 RX ORDER — MAGNESIUM OXIDE 400 MG ORAL TABLET 241.3 MG
400 TABLET ORAL
Refills: 0 | Status: DISCONTINUED | OUTPATIENT
Start: 2022-10-21 | End: 2022-10-21

## 2022-10-21 RX ORDER — INFLUENZA VIRUS VACCINE 15; 15; 15; 15 UG/.5ML; UG/.5ML; UG/.5ML; UG/.5ML
0.7 SUSPENSION INTRAMUSCULAR ONCE
Refills: 0 | Status: DISCONTINUED | OUTPATIENT
Start: 2022-10-21 | End: 2022-10-25

## 2022-10-21 RX ORDER — HEPARIN SODIUM 5000 [USP'U]/ML
1200 INJECTION INTRAVENOUS; SUBCUTANEOUS
Qty: 25000 | Refills: 0 | Status: DISCONTINUED | OUTPATIENT
Start: 2022-10-21 | End: 2022-10-21

## 2022-10-21 RX ORDER — OMEPRAZOLE 10 MG/1
1 CAPSULE, DELAYED RELEASE ORAL
Qty: 0 | Refills: 0 | DISCHARGE

## 2022-10-21 RX ORDER — ATORVASTATIN CALCIUM 80 MG/1
0 TABLET, FILM COATED ORAL
Qty: 0 | Refills: 0 | DISCHARGE

## 2022-10-21 RX ORDER — SODIUM CHLORIDE 9 MG/ML
1 INJECTION INTRAMUSCULAR; INTRAVENOUS; SUBCUTANEOUS EVERY 8 HOURS
Refills: 0 | Status: DISCONTINUED | OUTPATIENT
Start: 2022-10-21 | End: 2022-10-25

## 2022-10-21 RX ORDER — APIXABAN 2.5 MG/1
5 TABLET, FILM COATED ORAL
Refills: 0 | Status: DISCONTINUED | OUTPATIENT
Start: 2022-10-21 | End: 2022-10-23

## 2022-10-21 RX ORDER — SODIUM CHLORIDE 9 MG/ML
1000 INJECTION INTRAMUSCULAR; INTRAVENOUS; SUBCUTANEOUS
Refills: 0 | Status: DISCONTINUED | OUTPATIENT
Start: 2022-10-21 | End: 2022-10-22

## 2022-10-21 RX ORDER — MAGNESIUM SULFATE 500 MG/ML
2 VIAL (ML) INJECTION ONCE
Refills: 0 | Status: COMPLETED | OUTPATIENT
Start: 2022-10-21 | End: 2022-10-21

## 2022-10-21 RX ORDER — APIXABAN 2.5 MG/1
1 TABLET, FILM COATED ORAL
Qty: 60 | Refills: 0
Start: 2022-10-21 | End: 2022-11-19

## 2022-10-21 RX ORDER — SODIUM BICARBONATE 1 MEQ/ML
650 SYRINGE (ML) INTRAVENOUS THREE TIMES A DAY
Refills: 0 | Status: DISCONTINUED | OUTPATIENT
Start: 2022-10-21 | End: 2022-10-22

## 2022-10-21 RX ORDER — SODIUM CHLORIDE 9 MG/ML
1000 INJECTION INTRAMUSCULAR; INTRAVENOUS; SUBCUTANEOUS ONCE
Refills: 0 | Status: COMPLETED | OUTPATIENT
Start: 2022-10-21 | End: 2022-10-21

## 2022-10-21 RX ORDER — LOPERAMIDE HCL 2 MG
2 TABLET ORAL EVERY 8 HOURS
Refills: 0 | Status: DISCONTINUED | OUTPATIENT
Start: 2022-10-21 | End: 2022-10-23

## 2022-10-21 RX ADMIN — ATORVASTATIN CALCIUM 10 MILLIGRAM(S): 80 TABLET, FILM COATED ORAL at 21:27

## 2022-10-21 RX ADMIN — Medication 650 MILLIGRAM(S): at 21:27

## 2022-10-21 RX ADMIN — SODIUM CHLORIDE 500 MILLILITER(S): 9 INJECTION INTRAMUSCULAR; INTRAVENOUS; SUBCUTANEOUS at 16:35

## 2022-10-21 RX ADMIN — HEPARIN SODIUM 1200 UNIT(S)/HR: 5000 INJECTION INTRAVENOUS; SUBCUTANEOUS at 12:07

## 2022-10-21 RX ADMIN — MIRTAZAPINE 15 MILLIGRAM(S): 45 TABLET, ORALLY DISINTEGRATING ORAL at 14:22

## 2022-10-21 RX ADMIN — HEPARIN SODIUM 0 UNIT(S)/HR: 5000 INJECTION INTRAVENOUS; SUBCUTANEOUS at 08:38

## 2022-10-21 RX ADMIN — SODIUM CHLORIDE 1 GRAM(S): 9 INJECTION INTRAMUSCULAR; INTRAVENOUS; SUBCUTANEOUS at 21:28

## 2022-10-21 RX ADMIN — Medication 25 GRAM(S): at 16:35

## 2022-10-21 RX ADMIN — SODIUM CHLORIDE 1 GRAM(S): 9 INJECTION INTRAMUSCULAR; INTRAVENOUS; SUBCUTANEOUS at 05:30

## 2022-10-21 RX ADMIN — HEPARIN SODIUM 0 UNIT(S)/HR: 5000 INJECTION INTRAVENOUS; SUBCUTANEOUS at 08:43

## 2022-10-21 RX ADMIN — Medication 2 MILLIGRAM(S): at 18:02

## 2022-10-21 RX ADMIN — SODIUM CHLORIDE 100 MILLILITER(S): 9 INJECTION INTRAMUSCULAR; INTRAVENOUS; SUBCUTANEOUS at 21:32

## 2022-10-21 RX ADMIN — Medication 1 TABLET(S): at 14:22

## 2022-10-21 RX ADMIN — HEPARIN SODIUM 1500 UNIT(S)/HR: 5000 INJECTION INTRAVENOUS; SUBCUTANEOUS at 00:38

## 2022-10-21 RX ADMIN — APIXABAN 5 MILLIGRAM(S): 2.5 TABLET, FILM COATED ORAL at 18:02

## 2022-10-21 RX ADMIN — ERTAPENEM SODIUM 100 MILLIGRAM(S): 1 INJECTION, POWDER, LYOPHILIZED, FOR SOLUTION INTRAMUSCULAR; INTRAVENOUS at 21:28

## 2022-10-21 RX ADMIN — SODIUM CHLORIDE 100 MILLILITER(S): 9 INJECTION INTRAMUSCULAR; INTRAVENOUS; SUBCUTANEOUS at 01:25

## 2022-10-21 RX ADMIN — HEPARIN SODIUM 0 UNIT(S)/HR: 5000 INJECTION INTRAVENOUS; SUBCUTANEOUS at 08:45

## 2022-10-21 RX ADMIN — PANTOPRAZOLE SODIUM 40 MILLIGRAM(S): 20 TABLET, DELAYED RELEASE ORAL at 05:29

## 2022-10-21 NOTE — PROGRESS NOTE ADULT - PROBLEM SELECTOR PLAN 8
Old first + on 9/23  - remains positive, no sx, suspect old viral shedding Noted on admission in August, seen by gyn, unremarkable TVUS  - OP gyn for EMB

## 2022-10-21 NOTE — PROGRESS NOTE ADULT - SUBJECTIVE AND OBJECTIVE BOX
Patient is a 67y old  Female who presents with a chief complaint of JAMISON on outpatient labs, UTI    SUBJECTIVE / OVERNIGHT EVENTS:    Seen in ESS, daughter at beside whom she lives with provides Korean translation  No CP, SOB, f/c/n/v  reports ostomy output seems at baseline but is often a lot, thinks she "drinks enough"  has no COVID sx, not sure where she got it previously, feels cold  reports ostomy was created in ? for adhesion related SBO, per daughter no plan for reversal for 6m, has wound healing by secondary intention much improved  reports at baseline lives w/ family have no HHA, had home PT which finished, has some one to check on wound  not sure why she is on lovenox injection vs pills, was on them for DVT which occurred from surgery     MEDICATIONS  (STANDING):  atorvastatin 10 milliGRAM(s) Oral at bedtime  diphenoxylate/atropine 1 Tablet(s) Oral daily  ertapenem  IVPB 500 milliGRAM(s) IV Intermittent every 24 hours  heparin  Infusion. 1200 Unit(s)/Hr (12 mL/Hr) IV Continuous <Continuous>  loperamide 4 milliGRAM(s) Oral daily  magnesium sulfate  IVPB 2 Gram(s) IV Intermittent once  mirtazapine 15 milliGRAM(s) Oral daily  pantoprazole    Tablet 40 milliGRAM(s) Oral before breakfast  sodium chloride 1 Gram(s) Oral two times a day  sodium chloride 0.9% Bolus 1000 milliLiter(s) IV Bolus once    MEDICATIONS  (PRN):  acetaminophen     Tablet .. 650 milliGRAM(s) Oral every 6 hours PRN Temp greater or equal to 38C (100.4F), Mild Pain (1 - 3)  heparin   Injectable 6500 Unit(s) IV Push every 6 hours PRN For aPTT less than 40  heparin   Injectable 3000 Unit(s) IV Push every 6 hours PRN For aPTT between 40 - 57  melatonin 3 milliGRAM(s) Oral at bedtime PRN Insomnia    T(C): 36.2 (10-21-22 @ 05:25), Max: 36.6 (10-20-22 @ 16:24)  HR: 72 (10-21-22 @ 05:25) (72 - 80)  BP: 102/53 (10-21-22 @ 05:25) (100/52 - 112/63)  RR: 17 (10-21-22 @ 05:25) (17 - 19)  SpO2: 100% (10-21-22 @ 05:25) (100% - 100%)    PHYSICAL EXAM:  GENERAL: NAD, obese   CHEST/LUNG: Clear to auscultation bilaterally; No wheeze  HEART: Regular rate and rhythm; No murmurs, rubs, or gallops  ABDOMEN: Soft, Nontender, Nondistended; Bowel sounds present: RLQ ostomy   EXTREMITIES:   warm and well perfused, No clubbing, cyanosis, or edema  PSYCH: AAOx2  NEUROLOGY: non-focal  SKIN: midline wound superficial with granulation tissue, no s/s of infxn    LABS:                        11.1   4.72  )-----------( 191      ( 21 Oct 2022 06:35 )             34.9     10-21    130<L>  |  100  |  33<H>  ----------------------------<  92  5.3   |  15<L>  |  2.25<H>    Ca    9.1      21 Oct 2022 12:20  Phos  4.4     10-  Mg     1.20     10-    TPro  6.5  /  Alb  x   /  TBili  x   /  DBili  x   /  AST  x   /  ALT  x   /  AlkPhos  x   10-20    PT/INR - ( 20 Oct 2022 22:35 )   PT: 13.5 sec;   INR: 1.16 ratio    PTT - ( 21 Oct 2022 12:43 )  PTT:31.9 sec    Urinalysis Basic - ( 20 Oct 2022 17:23 )  Color: Yellow / Appearance: Slightly Turbid / S.020 / pH: x  Gluc: x / Ketone: Negative  / Bili: Negative / Urobili: <2 mg/dL   Blood: x / Protein: 30 mg/dL / Nitrite: Negative   Leuk Esterase: Large / RBC: 4 /HPF / WBC >10 /HPF   Sq Epi: x / Non Sq Epi: 5 /HPF / Bacteria: Many    Consultant(s) Notes Reviewed:    Care Discussed with Consultants/Other Providers:   Patient is a 67y old  Female who presents with a chief complaint of JAMISON on outpatient labs, UTI    SUBJECTIVE / OVERNIGHT EVENTS:    Seen in ESS, daughter at beside whom she lives with provides Chinese translation  No CP, SOB, f/c/n/v  reports ostomy output seems at baseline but is often a lot, thinks she "drinks enough"  has no COVID sx, not sure where she got it previously, feels cold  reports ostomy was created in  for SBO, per daughter no plan for reversal for 6m, has wound healing by secondary intention much improved  reports at baseline lives w/ family have no HHA, had home PT which finished, has some one to check on wound  not sure why she is on lovenox injection vs pills, was on them for DVT which occurred from surgery     MEDICATIONS  (STANDING):  atorvastatin 10 milliGRAM(s) Oral at bedtime  diphenoxylate/atropine 1 Tablet(s) Oral daily  ertapenem  IVPB 500 milliGRAM(s) IV Intermittent every 24 hours  heparin  Infusion. 1200 Unit(s)/Hr (12 mL/Hr) IV Continuous <Continuous>  loperamide 4 milliGRAM(s) Oral daily  magnesium sulfate  IVPB 2 Gram(s) IV Intermittent once  mirtazapine 15 milliGRAM(s) Oral daily  pantoprazole    Tablet 40 milliGRAM(s) Oral before breakfast  sodium chloride 1 Gram(s) Oral two times a day  sodium chloride 0.9% Bolus 1000 milliLiter(s) IV Bolus once    MEDICATIONS  (PRN):  acetaminophen     Tablet .. 650 milliGRAM(s) Oral every 6 hours PRN Temp greater or equal to 38C (100.4F), Mild Pain (1 - 3)  heparin   Injectable 6500 Unit(s) IV Push every 6 hours PRN For aPTT less than 40  heparin   Injectable 3000 Unit(s) IV Push every 6 hours PRN For aPTT between 40 - 57  melatonin 3 milliGRAM(s) Oral at bedtime PRN Insomnia    T(C): 36.2 (10-21-22 @ 05:25), Max: 36.6 (10-20-22 @ 16:24)  HR: 72 (10-21-22 @ 05:25) (72 - 80)  BP: 102/53 (10-21-22 @ 05:25) (100/52 - 112/63)  RR: 17 (10-21-22 @ 05:25) (17 - 19)  SpO2: 100% (10-21-22 @ 05:25) (100% - 100%)    PHYSICAL EXAM:  GENERAL: NAD, obese   CHEST/LUNG: Clear to auscultation bilaterally; No wheeze  HEART: Regular rate and rhythm; No murmurs, rubs, or gallops  ABDOMEN: Soft, Nontender, Nondistended; Bowel sounds present: RLQ ostomy   EXTREMITIES:   warm and well perfused, No clubbing, cyanosis, or edema  PSYCH: AAOx2  NEUROLOGY: non-focal  SKIN: midline wound superficial with granulation tissue, no s/s of infxn    LABS:                        11.1   4.72  )-----------( 191      ( 21 Oct 2022 06:35 )             34.9     10-    130<L>  |  100  |  33<H>  ----------------------------<  92  5.3   |  15<L>  |  2.25<H>    Ca    9.1      21 Oct 2022 12:20  Phos  4.4     10-  Mg     1.20     10-    TPro  6.5  /  Alb  x   /  TBili  x   /  DBili  x   /  AST  x   /  ALT  x   /  AlkPhos  x   10-20    PT/INR - ( 20 Oct 2022 22:35 )   PT: 13.5 sec;   INR: 1.16 ratio    PTT - ( 21 Oct 2022 12:43 )  PTT:31.9 sec    Urinalysis Basic - ( 20 Oct 2022 17:23 )  Color: Yellow / Appearance: Slightly Turbid / S.020 / pH: x  Gluc: x / Ketone: Negative  / Bili: Negative / Urobili: <2 mg/dL   Blood: x / Protein: 30 mg/dL / Nitrite: Negative   Leuk Esterase: Large / RBC: 4 /HPF / WBC >10 /HPF   Sq Epi: x / Non Sq Epi: 5 /HPF / Bacteria: Many    Consultant(s) Notes Reviewed:    Care Discussed with Consultants/Other Providers:

## 2022-10-21 NOTE — DISCHARGE NOTE PROVIDER - NSDCCPCAREPLAN_GEN_ALL_CORE_FT
PRINCIPAL DISCHARGE DIAGNOSIS  Diagnosis: JAMISON (acute kidney injury)  Assessment and Plan of Treatment: You came to the hospital due to acute kidney injury detected at your primary doctor's office, this was most likely due to your chronic osteomy and you have had multple prior admissions for this reason. Your kidney levels was trended throughout your hospital stay and they stablized and you were       PRINCIPAL DISCHARGE DIAGNOSIS  Diagnosis: JAMISON (acute kidney injury)  Assessment and Plan of Treatment: You came to the hospital due to acute kidney injury detected at your primary doctor's office, this was most likely due to your chronic osteomy and you have had multple prior admissions for this reason. Your kidney levels was trended throughout your hospital stay and they stablized. Please have repeat blood work performed at your primary care doctor's office in 1-2 weeks.      SECONDARY DISCHARGE DIAGNOSES  Diagnosis: Acute UTI  Assessment and Plan of Treatment: Durign your hospital course you tested positive for a urinary tract infection, you were given antibiotics and you finished the course of antibiotics in the hospital. Please have repeat urine test in 1-2 weeks at your primary care doctor's office.    Diagnosis: Hyponatremia  Assessment and Plan of Treatment: During your hospital course your sodium levels was found to be low and you were treated for it with IV fluids. Your sodium levels stablized throughout your hospital course and it resolved.    Diagnosis: History of DVT (deep vein thrombosis)  Assessment and Plan of Treatment: You have a hisotry of deep vein thrombosis, you had a repeat duplex ultrasound of your legs and you were found to have a nonocculsive DVT in on of your veins. You were transitioned to eliquis in the hospital and you will continue to take the blood thinner at home. Please see your primary care doctor in 2 weeks to refill your home dose of eliquis and to monitor your blood clot in your leg.    Diagnosis: History of creation of ostomy  Assessment and Plan of Treatment: During this admission surgery was consulted for possible removal of your ostomy. You and surgeyr have agreed to defer the operation until you are better medically optimized and you are to follow up with Dr. Fidel Ochoa in two months and call [phone] to confirm or suhale an appoint in 1 months.  Wound care instructions of Ileostomy   UQ Ileostomy: Stoma size: Top to bottom: 1", Side to side: 1 1/4"  Supplies utilized:   Liquid barrier film    Flat waffer (1 3/4")- item # [id number]  Drainable pouch (1 3/4")- item # [id number]  Midline abdominal surgical wound: Cleanse with NS, pat dry. Apply Liquid barrier film to periwound skin (allow to dry). Loosely pack cavity with hydrofiber (Aquacel) to absorb and fill dead space. Secure with abdominal pad and paper tape. Change twice a day and PRN if soiled.  BL buttocks/sacrum/perineal area: Cleanse with skin cleanser, pat dry. Apply Juli moisture barrier cream twice a day and PRN with incontinent episodes.   BL under breasts/abdominal pannus/inner groin: Interdry textile sheeting, under intertriginous folds leaving 2 inches exposed at ends to wick, remove to wash & dry affected area, then replace. Individual sheeting may be used for up to 5 days unless soiled.   Apply SWEEN daily moisturizer to BL UEs and LEs, avoiding in between toes.   Continue low air loss bed therapy, continue heel elevation, continue to turn & reposition per protocol, soft pillow between bony prominences, continue moisture management with barrier creams & single breathable pad, continue measures to decrease friction/shear/pressure.       Diagnosis: Postmenopausal vaginal bleeding  Assessment and Plan of Treatment: You have a history of vaginal bleeding, Please follow up with your OBGYN for a biopsy of your endometrium for evaluation     PRINCIPAL DISCHARGE DIAGNOSIS  Diagnosis: JAMISON (acute kidney injury)  Assessment and Plan of Treatment: You came to the hospital due to elevated kidney numbers detected at your primary doctor's office, this was most likely due to your diarrhea from chronic osteomy. We started you on Lomotil- 1 tab THREE times a day, Metamucil and Imodium TWO times a day. Please continue these at home. Your kidney levels was trended throughout your hospital stay and they stablized. Please have repeat blood work performed at your primary care doctor's office in 1-2 weeks.      SECONDARY DISCHARGE DIAGNOSES  Diagnosis: Acute UTI  Assessment and Plan of Treatment: Durign your hospital course you tested positive for a urinary tract infection, you were given antibiotics and you finished the course of antibiotics in the hospital.    Diagnosis: Hyponatremia  Assessment and Plan of Treatment: During your hospital course your sodium levels was found to be low and you were treated for it with salt tabs. Please continue to take them on discharge.    Diagnosis: History of DVT (deep vein thrombosis)  Assessment and Plan of Treatment: You have a hisotry of deep vein thrombosis, you had a repeat duplex ultrasound of your legs and you were found to have a chronic DVT in on of your veins. You were transitioned to eliquis in the hospital and you will continue to take the blood thinner at home. Please see your primary care doctor in 2 weeks to refill your home dose of eliquis and to monitor your blood clot in your leg.    Diagnosis: Postmenopausal vaginal bleeding  Assessment and Plan of Treatment: You have a history of vaginal bleeding, Please follow up with your OBGYN for a biopsy of your endometrium for evaluation    Diagnosis: History of creation of ostomy  Assessment and Plan of Treatment: During this admission surgery was consulted for possible removal of your ostomy. You and surgeyr have agreed to defer the operation until you are better medically optimized and you are to follow up with Dr. Fidel Ochoa in two months and call [phone] to confirm or suhale an appoint in 1 months.  Wound care instructions of Ileostomy   UQ Ileostomy: Stoma size: Top to bottom: 1", Side to side: 1 1/4"  Supplies utilized:   Liquid barrier film    Flat waffer (1 3/4")- item # [id number]  Drainable pouch (1 3/4")- item # [id number]  Midline abdominal surgical wound: Cleanse with NS, pat dry. Apply Liquid barrier film to periwound skin (allow to dry). Loosely pack cavity with hydrofiber (Aquacel) to absorb and fill dead space. Secure with abdominal pad and paper tape. Change twice a day and PRN if soiled.  BL buttocks/sacrum/perineal area: Cleanse with skin cleanser, pat dry. Apply Juli moisture barrier cream twice a day and PRN with incontinent episodes.   BL under breasts/abdominal pannus/inner groin: Interdry textile sheeting, under intertriginous folds leaving 2 inches exposed at ends to wick, remove to wash & dry affected area, then replace. Individual sheeting may be used for up to 5 days unless soiled.   Apply SWEEN daily moisturizer to BL UEs and LEs, avoiding in between toes.   Continue low air loss bed therapy, continue heel elevation, continue to turn & reposition per protocol, soft pillow between bony prominences, continue moisture management with barrier creams & single breathable pad, continue measures to decrease friction/shear/pressure.

## 2022-10-21 NOTE — PROGRESS NOTE ADULT - PROBLEM SELECTOR PLAN 1
Creatinine 3.38 as OP.  Last creatinine was 1.34 9/27/2022.  - c/w IVF  - Cr downtrending 2.25   - add bicarb tab for bicarb of 15 for now, can dc once >20 w/ renal recovery   - monitor ostomy output, should match PO intake to output, also can increase immodium prn  - has elevated kappa and lambda on SIFE (on allscripts)--to f/u w/ OP renal and heme Creatinine 3.38 as OP.  Last creatinine was 1.34 9/27/2022.  - c/w IVF  - Cr downtrending 2.25   - add bicarb tab for bicarb of 15 for now, can dc once >20 w/ renal recovery   - monitor ostomy output, should match PO intake to output, also can increase immodium prn  - has elevated kappa and lambda however ratios are wnl, can f/u with OP renal and heme Creatinine 3.38 as OP, prior dc Cr was 1.34 9/27/2022.  Per chart since ostomy multiple episodes of JAMISON; dc from surgical service on 7/6/22 readmitted 7/25/22 w/ Cr 11 requiring ICU and CVVH.  August 2x JAMISON 1.97 and 2.7. Sept 3.7 w/ COVID.   - c/w IVF  - Cr downtrending 2.25   - add bicarb tab for bicarb of 15 for now, can dc once >20 w/ renal recovery   - monitor ostomy output, should match PO intake to output, also can increase imodium dose as need  - would benefit from ostomy reversal given frequent complications/admissions  - has elevated kappa and lambda however ratios are wnl, can f/u with OP renal and heme

## 2022-10-21 NOTE — DISCHARGE NOTE PROVIDER - HOSPITAL COURSE
Addended by: TK RODRIGUEZ on: 10/19/2020 02:46 PM     Modules accepted: Orders     67F obese Wolof/Kumhari speaking HTN (no longer on meds), HLD, R DVT 8/2022 on Lovenox, largely immobile 2/2 prior L tibial fx, SBO in ventral hernia c/b RTOR 2/2 leak s/p end ileostomy 6/2022 with multiple episodes JAMISON since sent in by nephrologist Dr. Ram for reported Cr 3.38 admitted JAMISON, UTI.      JAMISON.   -Creatinine 3.38 as OP, prior dc Cr was 1.34 9/27/2022.  Per chart since ostomy multiple episodes of JAMISON; dc from surgical service on 7/6/22 readmitted 7/25/22 w/ Cr 11 requiring ICU and CVVH.  August 2x JAMISON 1.97 and 2.7. Sept 3.7 w/ COVID.   - Cr improving with PO intake and improvement in ostomy output   - Monitor ostomy output, monitor I/O's  - Continue lomotil 1 tab BID,  imodium 2 mg q6h- ostomy improvement output improved, Cr stable on current regimen so will continue as is   - surgery consulted and recs appreciated, possible ostomy reversal this admission, however w/ daughter wants pt to be better optimized prior to procedure. Outpt f/u in 2 months.   -elevated kappa and lambda however ratios are wnl, f/u with OP renal and heme.     Acute UTI.   - Reported dysuria  - urine cx E cloacae   - s/p ertapenem 3 days.    Hyponatremia.   -Na 132. Does not take NaCl tabs TID but rather qd or BID  - NaCl 1g q8h, c/w Na to assist water absorption   - trend Na levels, now resolved     History of DVT   -Had presented for vaginal bleeding in August, imaging notable for concern for DVT. LE US on 8/4: Acute DVT involving the common femoral vein, femoral vein, and popliteal vein. Presumed provoked 2/2 surgery and limited post-op mobility.   - seems was started on Lovenox given may need EMB, daughter states sister gives injections but are painful and prefers oral pills  - Eliquis covered, transitioned now   - daughter states has yet to get EMB due to frequent admits, advised may need to hold prior to procedure, will need to d/w gyn as OP  - Repeat LE Doppler shows non occlusive thrombosis of left femoral vein. C/w AC on DC        History of ostomy.   -Patient presented w/ incarcerated ventral hernia to [facility] June 2022 s/p lap to open repair 6/15/22 course c/b sepsis due to anastomotic leak s/p small bowel resection and end ileostomy w/ fascia closed and wound VAC 6/21/22  - c/w ostomy care  - monitor output, match oral intake   - has f/u w/ Dr. Ochoa 10/25/22 as OP    Postmenopausal vaginal bleeding.   - Noted on admission in August, seen by gyn, unremarkable TVUS  - OP gyn for EMB.    On 10/25/22, case was discussed with Dr. Mathew Merlin, patient is medically cleared and optimized for discharge today. All medications were reviewed with attending, and sent to mutually agreed upon pharmacy. 67F obese Georgian/Kumhari speaking HTN (no longer on meds), HLD, R DVT 8/2022 on Lovenox, largely immobile 2/2 prior L tibial fx, SBO in ventral hernia c/b RTOR 2/2 leak s/p end ileostomy 6/2022 with multiple episodes JAMISON since sent in by nephrologist Dr. Ram for reported Cr 3.38 admitted JAMISON, UTI.     Patient found to have an JAMISON likely 2/2 increased ostomy output. She was given IVF with improvement in Cr. Patient also started on Metamucil, Lomotil and Imodium to help with diarrhea with improvement. Cr remained stable off of IVF once stool was formed. Surgery was consulted, recommended outpatient follow up for ostomy reversal.     JAMISON.   -Creatinine 3.38 as OP, prior dc Cr was 1.34 9/27/2022.  Per chart since ostomy multiple episodes of JAMISON; dc from surgical service on 7/6/22 readmitted 7/25/22 w/ Cr 11 requiring ICU and CVVH.  August 2x JAMISON 1.97 and 2.7. Sept 3.7 w/ COVID.   - Cr improving with PO intake and improvement in ostomy output   - Continue lomotil 1 tab TID,  imodium 2 mg q6h  - surgery consulted and recs appreciated, possible ostomy reversal this admission, however w/ daughter wants pt to be better optimized prior to procedure. Outpt f/u in 2 months.   -elevated kappa and lambda however ratios are wnl, f/u with OP renal and heme.     Acute UTI.   - Reported dysuria  - urine cx E cloacae   - s/p ertapenem 3 days.    Hyponatremia.   -Na 132. Does not take NaCl tabs TID but rather qd or BID  - NaCl 1g q8h, c/w Na to assist water absorption   - trend Na levels, now resolved     History of DVT   -Had presented for vaginal bleeding in August, imaging notable for concern for DVT. LE US on 8/4: Acute DVT involving the common femoral vein, femoral vein, and popliteal vein. Presumed provoked 2/2 surgery and limited post-op mobility.   - seems was started on Lovenox given may need EMB, daughter states sister gives injections but are painful and prefers oral pills  - Eliquis covered, transitioned now   - daughter states has yet to get EMB due to frequent admits, advised may need to hold prior to procedure, will need to d/w gyn as OP  - Repeat LE Doppler shows non occlusive thrombosis of left femoral vein. C/w AC on DC        History of ostomy.   -Patient presented w/ incarcerated ventral hernia to [facility] June 2022 s/p lap to open repair 6/15/22 course c/b sepsis due to anastomotic leak s/p small bowel resection and end ileostomy w/ fascia closed and wound VAC 6/21/22  - c/w ostomy care  - monitor output, match oral intake   - has f/u w/ Dr. Ochoa 10/25/22 as OP    Postmenopausal vaginal bleeding.   - Noted on admission in August, seen by gyn, unremarkable TVUS  - OP gyn for EMB.    On 10/25/22, case was discussed with Dr. Alexandra, patient is medically cleared and optimized for discharge today. All medications were reviewed with attending, and sent to mutually agreed upon pharmacy.

## 2022-10-21 NOTE — PROGRESS NOTE ADULT - PROBLEM SELECTOR PLAN 7
- wound care c/s  - OP surgery follow-up with Dr. Fidel Ochoa on Tuesday per daughter Patient presented w/ incarcerated ventral hernia to [facility] June 2022 s/p lap to open repair 6/15/22 course c/b sepsis due to anastomotic leak s/p small bowel resection and end ileostomy w/ fascia closed and wound VAC 6/21/22  - c/w ostomy care  - monitor output, match oral intake;  to c/w lomotil and change imodium dosing  - has f/u w/ Dr. Ochoa 10/25/22 as OP

## 2022-10-21 NOTE — PROGRESS NOTE ADULT - PROBLEM SELECTOR PLAN 4
LE US on 8/4: Acute DVT involving the common femoral vein, femoral vein, and popliteal vein. Presumed provoked 2/2 surgery and limited post-op mobility.   - c/w heparin gtt  - unclear why on lovenox at home vs DOAC, daughter states sister gives injections but are painful  - f/u DOAC pricing as would preferably be on oral agent per daughter Had presented for vaginal bleeding in August, imaging notable for concern for DVT. LE US on 8/4: Acute DVT involving the common femoral vein, femoral vein, and popliteal vein. Presumed provoked 2/2 surgery and limited post-op mobility.   - c/w heparin gtt  - seems was started on lovenox given may need EMB, daughter states sister gives injections but are painful and prefers oral pills  - f/u DOAC pricing as would preferably be on oral agent per daughter

## 2022-10-21 NOTE — PATIENT PROFILE ADULT - FALL HARM RISK - HARM RISK INTERVENTIONS
Assistance with ambulation/Assistance OOB with selected safe patient handling equipment/Communicate Risk of Fall with Harm to all staff/Discuss with provider need for PT consult/Monitor gait and stability/Reinforce activity limits and safety measures with patient and family/Tailored Fall Risk Interventions/Visual Cue: Yellow wristband and red socks/Bed in lowest position, wheels locked, appropriate side rails in place/Call bell, personal items and telephone in reach/Instruct patient to call for assistance before getting out of bed or chair/Non-slip footwear when patient is out of bed/[place] to call system/Physically safe environment - no spills, clutter or unnecessary equipment/Purposeful Proactive Rounding/Room/bathroom lighting operational, light cord in reach

## 2022-10-21 NOTE — PATIENT PROFILE ADULT - MONEY FOR FOOD
Cibinqo Pregnancy And Lactation Text: It is unknown if this medication will adversely affect pregnancy or breast feeding.  You should not take this medication if you are currently pregnant or planning a pregnancy or while breastfeeding. no

## 2022-10-21 NOTE — PROGRESS NOTE ADULT - ASSESSMENT
67F HTN, HLD, R DVT 8/2022 on lovenox, hernia repair and SBO s/p ileostomy 6/2022 presenting sent in by nephrologist Dr. Ram for reported Cr 3.38 admitted JAMISON, UTI.  67F obese Italian/Kumhari speaking HTN (no longer on meds), HLD, R DVT 8/2022 on Lovenox, largely immobile 2/2 prior L tibial fx, SBO in ventral hernia c/b RTOR 2/2 leak s/p end ileostomy 6/2022 with multiple episodes JAMISON since sent in by nephrologist Dr. Ram for reported Cr 3.38 admitted JAMISON, UTI.

## 2022-10-21 NOTE — PROGRESS NOTE ADULT - PROBLEM SELECTOR PLAN 9
- c/w ostomy care  - monitor output, match oral intake  - c/w lomotil and change imodium dosing Patient presented w/ incarcerated ventral hernia to [facility] June 2022 s/p open repair 6/1522  - c/w ostomy care  - monitor output, match oral intake  - c/w lomotil and change imodium dosing Heparin gtt  S&S eval  PT eval  Full code per admitters d/w daughter

## 2022-10-21 NOTE — DISCHARGE NOTE PROVIDER - CARE PROVIDER_API CALL
Fidel Ochoa)  Surgery; Surgical Critical Care  [address] [place] [address]  Phone: [phone]  Fax: [phone]  Follow Up Time:

## 2022-10-21 NOTE — DISCHARGE NOTE PROVIDER - NSDCMRMEDTOKEN_GEN_ALL_CORE_FT
ATORVASTATIN 10 MG TABLET: take 1 tablet by mouth once daily  diphenoxylate-atropine 2.5 mg-0.025 mg oral tablet: 1 tab(s) orally once a day MDD:1  Last dispensed on 10/13/22 x #60 tablets (7 day supply).   Eliquis 5 mg oral tablet: 1 tab(s) orally 2 times a day       *******price check, please call [phone]  loperamide 2 mg oral capsule: 2 cap(s) orally 2 times a day  Lovenox 100 mg/mL injectable solution: 90 milligram(s) subcutaneously 2 times a day.    Please squeeze out 10mL of each vial before injection.   mirtazapine 15 mg oral tablet: 1 tab(s) orally once a day  omeprazole 20 mg oral delayed release tablet: 1 tab(s) orally once a day  sodium chloride 1 g oral tablet: Pharmacy: 2 tab(s) orally 3 times a day  (QD - BID).    apixaban 5 mg oral tablet: 1 tab(s) orally 2 times a day  ATORVASTATIN 10 MG TABLET: take 1 tablet by mouth once daily  diphenoxylate-atropine 2.5 mg-0.025 mg oral tablet: 1 tab(s) orally once a day MDD:1  Last dispensed on 10/13/22 x #60 tablets (7 day supply).   Eliquis 5 mg oral tablet: 1 tab(s) orally 2 times a day       *******price check, please call [phone]  loperamide 2 mg oral capsule: 2 cap(s) orally 2 times a day  mirtazapine 15 mg oral tablet: 1 tab(s) orally once a day  omeprazole 20 mg oral delayed release tablet: 1 tab(s) orally once a day  sodium chloride 1 g oral tablet: Pharmacy: 2 tab(s) orally 3 times a day  (QD - BID).    apixaban 5 mg oral tablet: 1 tab(s) orally 2 times a day  ATORVASTATIN 10 MG TABLET: take 1 tablet by mouth once daily  Eliquis 5 mg oral tablet: 1 tab(s) orally 2 times a day       *******price check, please call [phone]  loperamide 2 mg oral capsule: 2 cap(s) orally 2 times a day  mirtazapine 15 mg oral tablet: 1 tab(s) orally once a day  omeprazole 20 mg oral delayed release tablet: 1 tab(s) orally once a day  sodium chloride 1 g oral tablet: Pharmacy: 2 tab(s) orally 3 times a day  (QD - BID).    apixaban 5 mg oral tablet: 1 tab(s) orally 2 times a day  ATORVASTATIN 10 MG TABLET: take 1 tablet by mouth once daily  diphenoxylate-atropine 2.5 mg-0.025 mg oral tablet: 1 tab(s) orally once a day MDD:1  Eliquis 5 mg oral tablet: 1 tab(s) orally 2 times a day       *******price check, please call [phone]  loperamide 2 mg oral capsule: 2 cap(s) orally 2 times a day  mirtazapine 15 mg oral tablet: 1 tab(s) orally once a day  omeprazole 20 mg oral delayed release tablet: 1 tab(s) orally once a day  sodium chloride 1 g oral tablet: Pharmacy: 2 tab(s) orally 3 times a day  (QD - BID).    apixaban 5 mg oral tablet: 1 tab(s) orally 2 times a day  ATORVASTATIN 10 MG TABLET: take 1 tablet by mouth once daily  diphenoxylate-atropine 2.5 mg-0.025 mg oral tablet: 1 tab(s) orally 3 times a day MDD:3   loperamide 2 mg oral capsule: 2 cap(s) orally 2 times a day  Metamucil Unflavored Coarse Milled Original 3.4 g/7 g oral powder for reconstitution: 1 packet orally 3 times a day   mirtazapine 15 mg oral tablet: 1 tab(s) orally once a day  omeprazole 20 mg oral delayed release tablet: 1 tab(s) orally once a day  sodium chloride 1 g oral tablet: 1 tab(s) orally 3 times a day Pharmacy:   (QD - BID).

## 2022-10-22 LAB
ANION GAP SERPL CALC-SCNC: 11 MMOL/L — SIGNIFICANT CHANGE UP (ref 7–14)
ANION GAP SERPL CALC-SCNC: 7 MMOL/L — SIGNIFICANT CHANGE UP (ref 7–14)
BUN SERPL-MCNC: 22 MG/DL — SIGNIFICANT CHANGE UP (ref 7–23)
BUN SERPL-MCNC: 27 MG/DL — HIGH (ref 7–23)
CALCIUM SERPL-MCNC: 8.3 MG/DL — LOW (ref 8.4–10.5)
CALCIUM SERPL-MCNC: 8.9 MG/DL — SIGNIFICANT CHANGE UP (ref 8.4–10.5)
CHLORIDE SERPL-SCNC: 106 MMOL/L — SIGNIFICANT CHANGE UP (ref 98–107)
CHLORIDE SERPL-SCNC: 109 MMOL/L — HIGH (ref 98–107)
CO2 SERPL-SCNC: 21 MMOL/L — LOW (ref 22–31)
CO2 SERPL-SCNC: 22 MMOL/L — SIGNIFICANT CHANGE UP (ref 22–31)
CREAT ?TM UR-MCNC: 171 MG/DL — SIGNIFICANT CHANGE UP
CREAT SERPL-MCNC: 1.55 MG/DL — HIGH (ref 0.5–1.3)
CREAT SERPL-MCNC: 1.92 MG/DL — HIGH (ref 0.5–1.3)
EGFR: 28 ML/MIN/1.73M2 — LOW
EGFR: 36 ML/MIN/1.73M2 — LOW
GLUCOSE SERPL-MCNC: 107 MG/DL — HIGH (ref 70–99)
GLUCOSE SERPL-MCNC: 90 MG/DL — SIGNIFICANT CHANGE UP (ref 70–99)
HCT VFR BLD CALC: 36 % — SIGNIFICANT CHANGE UP (ref 34.5–45)
HGB BLD-MCNC: 11.3 G/DL — LOW (ref 11.5–15.5)
IGD SER-MCNC: 2 MG/DL — SIGNIFICANT CHANGE UP
MAGNESIUM SERPL-MCNC: 1.7 MG/DL — SIGNIFICANT CHANGE UP (ref 1.6–2.6)
MAGNESIUM SERPL-MCNC: 1.9 MG/DL — SIGNIFICANT CHANGE UP (ref 1.6–2.6)
MCHC RBC-ENTMCNC: 26.5 PG — LOW (ref 27–34)
MCHC RBC-ENTMCNC: 31.4 GM/DL — LOW (ref 32–36)
MCV RBC AUTO: 84.5 FL — SIGNIFICANT CHANGE UP (ref 80–100)
NRBC # BLD: 0 /100 WBCS — SIGNIFICANT CHANGE UP (ref 0–0)
NRBC # FLD: 0 K/UL — SIGNIFICANT CHANGE UP (ref 0–0)
OSMOLALITY UR: 624 MOSM/KG — SIGNIFICANT CHANGE UP (ref 50–1200)
PHOSPHATE SERPL-MCNC: 3 MG/DL — SIGNIFICANT CHANGE UP (ref 2.5–4.5)
PHOSPHATE SERPL-MCNC: 3.9 MG/DL — SIGNIFICANT CHANGE UP (ref 2.5–4.5)
PLATELET # BLD AUTO: 197 K/UL — SIGNIFICANT CHANGE UP (ref 150–400)
POTASSIUM SERPL-MCNC: 4.1 MMOL/L — SIGNIFICANT CHANGE UP (ref 3.5–5.3)
POTASSIUM SERPL-MCNC: 4.4 MMOL/L — SIGNIFICANT CHANGE UP (ref 3.5–5.3)
POTASSIUM SERPL-SCNC: 4.1 MMOL/L — SIGNIFICANT CHANGE UP (ref 3.5–5.3)
POTASSIUM SERPL-SCNC: 4.4 MMOL/L — SIGNIFICANT CHANGE UP (ref 3.5–5.3)
RBC # BLD: 4.26 M/UL — SIGNIFICANT CHANGE UP (ref 3.8–5.2)
RBC # FLD: 15.5 % — HIGH (ref 10.3–14.5)
SODIUM SERPL-SCNC: 138 MMOL/L — SIGNIFICANT CHANGE UP (ref 135–145)
SODIUM SERPL-SCNC: 138 MMOL/L — SIGNIFICANT CHANGE UP (ref 135–145)
SODIUM UR-SCNC: <20 MMOL/L — SIGNIFICANT CHANGE UP
WBC # BLD: 5.65 K/UL — SIGNIFICANT CHANGE UP (ref 3.8–10.5)
WBC # FLD AUTO: 5.65 K/UL — SIGNIFICANT CHANGE UP (ref 3.8–10.5)

## 2022-10-22 PROCEDURE — 99233 SBSQ HOSP IP/OBS HIGH 50: CPT

## 2022-10-22 RX ORDER — DIPHENOXYLATE HCL/ATROPINE 2.5-.025MG
1 TABLET ORAL
Refills: 0 | Status: DISCONTINUED | OUTPATIENT
Start: 2022-10-22 | End: 2022-10-25

## 2022-10-22 RX ORDER — PSYLLIUM SEED (WITH DEXTROSE)
1 POWDER (GRAM) ORAL THREE TIMES A DAY
Refills: 0 | Status: DISCONTINUED | OUTPATIENT
Start: 2022-10-22 | End: 2022-10-25

## 2022-10-22 RX ORDER — DIPHENOXYLATE HCL/ATROPINE 2.5-.025MG
1 TABLET ORAL ONCE
Refills: 0 | Status: DISCONTINUED | OUTPATIENT
Start: 2022-10-22 | End: 2022-10-22

## 2022-10-22 RX ORDER — SODIUM CHLORIDE 9 MG/ML
1000 INJECTION INTRAMUSCULAR; INTRAVENOUS; SUBCUTANEOUS ONCE
Refills: 0 | Status: COMPLETED | OUTPATIENT
Start: 2022-10-22 | End: 2022-10-22

## 2022-10-22 RX ORDER — DIPHENOXYLATE HCL/ATROPINE 2.5-.025MG
1 TABLET ORAL
Refills: 0 | Status: DISCONTINUED | OUTPATIENT
Start: 2022-10-22 | End: 2022-10-22

## 2022-10-22 RX ADMIN — PANTOPRAZOLE SODIUM 40 MILLIGRAM(S): 20 TABLET, DELAYED RELEASE ORAL at 05:14

## 2022-10-22 RX ADMIN — ERTAPENEM SODIUM 100 MILLIGRAM(S): 1 INJECTION, POWDER, LYOPHILIZED, FOR SOLUTION INTRAMUSCULAR; INTRAVENOUS at 21:15

## 2022-10-22 RX ADMIN — Medication 1 PACKET(S): at 13:51

## 2022-10-22 RX ADMIN — ATORVASTATIN CALCIUM 10 MILLIGRAM(S): 80 TABLET, FILM COATED ORAL at 21:15

## 2022-10-22 RX ADMIN — Medication 2 MILLIGRAM(S): at 01:03

## 2022-10-22 RX ADMIN — APIXABAN 5 MILLIGRAM(S): 2.5 TABLET, FILM COATED ORAL at 18:16

## 2022-10-22 RX ADMIN — Medication 2 MILLIGRAM(S): at 10:24

## 2022-10-22 RX ADMIN — SODIUM CHLORIDE 250 MILLILITER(S): 9 INJECTION INTRAMUSCULAR; INTRAVENOUS; SUBCUTANEOUS at 16:02

## 2022-10-22 RX ADMIN — MIRTAZAPINE 15 MILLIGRAM(S): 45 TABLET, ORALLY DISINTEGRATING ORAL at 12:52

## 2022-10-22 RX ADMIN — SODIUM CHLORIDE 1 GRAM(S): 9 INJECTION INTRAMUSCULAR; INTRAVENOUS; SUBCUTANEOUS at 21:15

## 2022-10-22 RX ADMIN — Medication 1 PACKET(S): at 21:16

## 2022-10-22 RX ADMIN — Medication 1 TABLET(S): at 20:30

## 2022-10-22 RX ADMIN — Medication 650 MILLIGRAM(S): at 05:14

## 2022-10-22 RX ADMIN — APIXABAN 5 MILLIGRAM(S): 2.5 TABLET, FILM COATED ORAL at 05:14

## 2022-10-22 RX ADMIN — SODIUM CHLORIDE 1 GRAM(S): 9 INJECTION INTRAMUSCULAR; INTRAVENOUS; SUBCUTANEOUS at 05:14

## 2022-10-22 RX ADMIN — Medication 2 MILLIGRAM(S): at 18:16

## 2022-10-22 RX ADMIN — SODIUM CHLORIDE 1 GRAM(S): 9 INJECTION INTRAMUSCULAR; INTRAVENOUS; SUBCUTANEOUS at 13:53

## 2022-10-22 NOTE — SWALLOW BEDSIDE ASSESSMENT ADULT - SWALLOW EVAL: DIAGNOSIS
1. Functional oral stage for thin liquids marked by adequate oral acceptance, collection and transfer. 2. Mild oral dysphagia for regular solids marked by adequate acceptance with slow chewing likely given no bottom dentition with eventual transfer. 3. Functional pharyngeal phase for regular solids and thin liquids marked by a present pharyngeal swallow trigger with hyolarygneal elevation upon palpation without evidence of airway penetration/aspiration.

## 2022-10-22 NOTE — PROGRESS NOTE ADULT - PROBLEM SELECTOR PLAN 4
Had presented for vaginal bleeding in August, imaging notable for concern for DVT. LE US on 8/4: Acute DVT involving the common femoral vein, femoral vein, and popliteal vein. Presumed provoked 2/2 surgery and limited post-op mobility.   - seems was started on lovenox given may need EMB, daughter states sister gives injections but are painful and prefers oral pills  - eliquis covered, transitioned  - daughter states has yet to get EMB due to frequent admits, advised may need to hold prior to procedure, will need to d/w gyn as OP

## 2022-10-22 NOTE — PROGRESS NOTE ADULT - PROBLEM SELECTOR PLAN 1
Creatinine 3.38 as OP, prior dc Cr was 1.34 9/27/2022.  Per chart since ostomy multiple episodes of JAMISON; dc from surgical service on 7/6/22 readmitted 7/25/22 w/ Cr 11 requiring ICU and CVVH.  August 2x JAMISON 1.97 and 2.7. Sept 3.7 w/ COVID.   - Cr downtrending 1.92  - c/w IVF  - dc bicarb, now 21   - monitor ostomy output, should match PO intake to output; document output  - increase lomotil,  imodium 2 TID  - surgery c/s called, pt has OP f/u 10/25; would benefit from ostomy reversal when feasible given frequent complications/admissions  - add metamucil for bulking of stook  - has elevated kappa and lambda however ratios are wnl, can f/u with OP renal and heme

## 2022-10-22 NOTE — PHYSICAL THERAPY INITIAL EVALUATION ADULT - ADDITIONAL COMMENTS
translation services provided as per daughter   Pt receiving home PT services prior to admission     Pt. left comfortable in bed with all tubes/lines intact, call bell in reach and in NAD.

## 2022-10-22 NOTE — PHYSICAL THERAPY INITIAL EVALUATION ADULT - NSPTDISCHREC_GEN_A_CORE
anticipate discharge to home with home PT services for caregiver and transfer training to optimize safety within the home

## 2022-10-22 NOTE — CONSULT NOTE ADULT - ASSESSMENT
68yo F (Romansh dialect speaking) h/o HTN, HLD, s/p lap to open repair of incarcerated ventral hernia c/b enterotomy (repaired) 6/15 c/b enterotomy leak s/p repair, SBR, and end ileostomy 6/21 c/b by multiple medical admissions for JAMISON presents with JAMISON and UTI. Ostomy function at baseline however patient with repeated admissions for JAMISON 2/2 dehydration.     Plan:  - No acute surgical intervention  - Recommend PICC placement and IV hydration outpatient 2/2 to inadequate oral intake  - Continue with imodium 4 mg TID and lomotil QD. Can increase lomotil dose if patient ostomy output >1L       Plan discussed with Dr. Bradley PGY-2   B team surgery   [id number]  66yo F (Turkish dialect speaking) h/o HTN, HLD, s/p lap to open repair of incarcerated ventral hernia c/b enterotomy (repaired) 6/15 c/b enterotomy leak s/p repair, SBR, and end ileostomy 6/21 c/b by multiple medical admissions for JAMISON presents with JAMISON and UTI. Ostomy function at baseline however patient with repeated admissions for JAMISON 2/2 dehydration.     Plan:  - No acute surgical intervention  - Recommend PICC placement and IV hydration outpatient 2/2 to inadequate oral intake  - Continue with imodium 2 mg TID and lomotil QD. Can increase lomotil dose if patient ostomy output >1L   - Patient to follow up with Dr. Ochoa outpatient.       Plan discussed with Dr. Bradley PGY-2   B team surgery   [id number]

## 2022-10-22 NOTE — PROGRESS NOTE ADULT - SUBJECTIVE AND OBJECTIVE BOX
Patient is a 67y old  Female who presents with a chief complaint of JAMISON on outpatient labs, UTI    SUBJECTIVE / OVERNIGHT EVENTS:    No complaints  Reports no CP, SOB, n/v/f/c/  per daughter typical output at home 800-1L    MEDICATIONS  (STANDING):  apixaban 5 milliGRAM(s) Oral two times a day  atorvastatin 10 milliGRAM(s) Oral at bedtime  diphenoxylate/atropine 1 Tablet(s) Oral <User Schedule>  ertapenem  IVPB 500 milliGRAM(s) IV Intermittent every 24 hours  influenza  Vaccine (HIGH DOSE) 0.7 milliLiter(s) IntraMuscular once  loperamide 2 milliGRAM(s) Oral every 8 hours  mirtazapine 15 milliGRAM(s) Oral daily  pantoprazole    Tablet 40 milliGRAM(s) Oral before breakfast  psyllium Powder 1 Packet(s) Oral three times a day  sodium chloride 1 Gram(s) Oral every 8 hours  sodium chloride 0.9% Bolus 1000 milliLiter(s) IV Bolus once    MEDICATIONS  (PRN):  acetaminophen     Tablet .. 650 milliGRAM(s) Oral every 6 hours PRN Temp greater or equal to 38C (100.4F), Mild Pain (1 - 3)    T(C): 36.9 (10-22-22 @ 05:16), Max: 37.2 (10-21-22 @ 18:45)  HR: 83 (10-22-22 @ 05:16) (72 - 83)  BP: 116/54 (10-22-22 @ 05:16) (100/56 - 116/54)  RR: 16 (10-22-22 @ 05:16) (16 - 18)  SpO2: 100% (10-22-22 @ 05:16) (100% - 100%)    I&O's Summary    21 Oct 2022 07:01  -  22 Oct 2022 07:00  --------------------------------------------------------  IN: 0 mL / OUT: 610 mL / NET: -610 mL    PHYSICAL EXAM:  GENERAL: NAD, obese   CHEST/LUNG: Clear to auscultation bilaterally; No wheeze  HEART: Regular rate and rhythm; No murmurs, rubs, or gallops  ABDOMEN: Soft, Nontender, Nondistended; Bowel sounds present: RLQ ostomy   EXTREMITIES:   warm and well perfused, No clubbing, cyanosis, or edema  PSYCH: AAOx2  NEUROLOGY: non-focal  SKIN: midline wound superficial with granulation tissue, no s/s of infxn    LABS:                        11.3   5.65  )-----------( 197      ( 22 Oct 2022 05:30 )             36.0     10-22    138  |  106  |  27<H>  ----------------------------<  90  4.4   |  21<L>  |  1.92<H>    Ca    8.9      22 Oct 2022 05:30  Phos  3.9     10-22  Mg     1.90     10-22    TPro  6.5  /  Alb  x   /  TBili  x   /  DBili  x   /  AST  x   /  ALT  x   /  AlkPhos  x   10-20    PT/INR - ( 20 Oct 2022 22:35 )   PT: 13.5 sec;   INR: 1.16 ratio    PTT - ( 21 Oct 2022 12:43 )  PTT:31.9 sec    Consultant(s) Notes Reviewed:    Care Discussed with Consultants/Other Providers:

## 2022-10-22 NOTE — PROGRESS NOTE ADULT - PROBLEM SELECTOR PLAN 7
Patient presented w/ incarcerated ventral hernia to [facility] June 2022 s/p lap to open repair 6/15/22 course c/b sepsis due to anastomotic leak s/p small bowel resection and end ileostomy w/ fascia closed and wound VAC 6/21/22  - c/w ostomy care  - monitor output, match oral intake   - has f/u w/ Dr. Ochoa 10/25/22 as OP

## 2022-10-22 NOTE — PROGRESS NOTE ADULT - ASSESSMENT
67F obese Divehi/Kumhari speaking HTN (no longer on meds), HLD, R DVT 8/2022 on Lovenox, largely immobile 2/2 prior L tibial fx, SBO in ventral hernia c/b RTOR 2/2 leak s/p end ileostomy 6/2022 with multiple episodes JAMISON since sent in by nephrologist Dr. Ram for reported Cr 3.38 admitted JAMISON, UTI.

## 2022-10-22 NOTE — CONSULT NOTE ADULT - SUBJECTIVE AND OBJECTIVE BOX
GENERAL SURGERY CONSULT NOTE  Consulting surgical team: B team surgery   Consulting attending: Dr. Hogue     HPI:  HPI:   68yo F (Luxembourgish dialect speaking) h/o HTN, HLD, s/p lap to open repair of incarcerated ventral hernia c/b enterotomy (repaired) 6/15 c/b enterotomy leak s/p repair, SBR, and end ileostomy  c/b by multiple medical admissions for JAMISON presents with JAMISON and UTI. Per patient's daughter, her ostomy output has been between 800-1000 cc daily. Patient takes immodium TID and lomotil 2 tablets daily to control ostomy output. Patient has been slowly increasing her oral intake and does not feel that she can drink any more, drinks about 10-15 cups of water a day. Food intake has been improving, although patient still not at baseline. Last saw Dr. Ochoa in September and has appointment to follow up on Tuesday, 10/25    Surgery consulted for repeated medical admissions for JAMISON  to ostomy output.       PAST MEDICAL HISTORY:  Obesity    Hypertension    Hyperlipidemia    H/O fracture of tibia    History of deep vein thrombosis        PAST SURGICAL HISTORY:  History of cholecystectomy    H/O ventral hernia repair    S/P small bowel resection    H/O ileostomy        SOCIAL HISTORY:  - Denies EtOH abuse, smoking, IVDA    MEDICATIONS:  acetaminophen     Tablet .. 650 milliGRAM(s) Oral every 6 hours PRN  apixaban 5 milliGRAM(s) Oral two times a day  atorvastatin 10 milliGRAM(s) Oral at bedtime  diphenoxylate/atropine 1 Tablet(s) Oral <User Schedule>  ertapenem  IVPB 500 milliGRAM(s) IV Intermittent every 24 hours  influenza  Vaccine (HIGH DOSE) 0.7 milliLiter(s) IntraMuscular once  loperamide 2 milliGRAM(s) Oral every 8 hours  mirtazapine 15 milliGRAM(s) Oral daily  pantoprazole    Tablet 40 milliGRAM(s) Oral before breakfast  psyllium Powder 1 Packet(s) Oral three times a day  sodium chloride 1 Gram(s) Oral every 8 hours      ALLERGIES:  No Known Allergies      VITALS & I/Os:  Vital Signs Last 24 Hrs  T(C): 36.8 (22 Oct 2022 13:34), Max: 37.2 (21 Oct 2022 18:45)  T(F): 98.2 (22 Oct 2022 13:34), Max: 98.9 (21 Oct 2022 18:45)  HR: 68 (22 Oct 2022 13:34) (68 - 83)  BP: 101/44 (22 Oct 2022 13:34) (100/56 - 116/54)  BP(mean): --  RR: 17 (22 Oct 2022 13:34) (16 - 17)  SpO2: 100% (22 Oct 2022 13:34) (100% - 100%)    Parameters below as of 22 Oct 2022 13:34  Patient On (Oxygen Delivery Method): room air        I&O's Summary    21 Oct 2022 07:01  -  22 Oct 2022 07:00  --------------------------------------------------------  IN: 0 mL / OUT: 610 mL / NET: -610 mL    22 Oct 2022 07:01  -  22 Oct 2022 16:23  --------------------------------------------------------  IN: 0 mL / OUT: 225 mL / NET: -225 mL        PHYSICAL EXAM:  GEN: resting comfortably in bed, in NAD  RESP: no acute respiratory distress, breathing comfortably   ABD: soft, non-distended, non-tender. Ostomy with liquid green output.   EXT:  WWP, NICHOLAS   NEURO:  no focal neuro deficits     LABS:                        11.3   5.65  )-----------( 197      ( 22 Oct 2022 05:30 )             36.0     10-22    138  |  106  |  27<H>  ----------------------------<  90  4.4   |  21<L>  |  1.92<H>    Ca    8.9      22 Oct 2022 05:30  Phos  3.9     10-22  Mg     1.90     10-22    TPro  6.5  /  Alb  x   /  TBili  x   /  DBili  x   /  AST  x   /  ALT  x   /  AlkPhos  x   10-20    Lactate:    PT/INR - ( 20 Oct 2022 22:35 )   PT: 13.5 sec;   INR: 1.16 ratio         PTT - ( 21 Oct 2022 12:43 )  PTT:31.9 sec          Urinalysis Basic - ( 20 Oct 2022 17:23 )    Color: Yellow / Appearance: Slightly Turbid / S.020 / pH: x  Gluc: x / Ketone: Negative  / Bili: Negative / Urobili: <2 mg/dL   Blood: x / Protein: 30 mg/dL / Nitrite: Negative   Leuk Esterase: Large / RBC: 4 /HPF / WBC >10 /HPF   Sq Epi: x / Non Sq Epi: 5 /HPF / Bacteria: Many        IMAGING:

## 2022-10-22 NOTE — SWALLOW BEDSIDE ASSESSMENT ADULT - ASR SWALLOW RECOMMEND DIAG
Objective testing NOT warranted given no overt signs of penetration/aspiration and no recent CXR during this admission

## 2022-10-22 NOTE — SWALLOW BEDSIDE ASSESSMENT ADULT - ADDITIONAL RECOMMENDATIONS
1. This service to follow up as schedule permits for diet tolerance and/or need for objective testing. 2. Medical team advised to reconsult if change in medical status and/or patients tolerance to recommended PO diet.

## 2022-10-22 NOTE — PHYSICAL THERAPY INITIAL EVALUATION ADULT - PERTINENT HX OF CURRENT PROBLEM, REHAB EVAL
67 year old Female with PMHx HTN, HLD, Right LE DVT 8/2022, hernia repair and SBO s/p ileostomy 6/2022 presenting sent in by nephrologist s/p tubulointerstitial nephritis.

## 2022-10-22 NOTE — SWALLOW BEDSIDE ASSESSMENT ADULT - COMMENTS
Hospitalist note 10/22 "67F obese Tamazight/Kumhari speaking HTN (no longer on meds), HLD, R DVT 8/2022 on Lovenox, largely immobile 2/2 prior L tibial fx, SBO in ventral hernia c/b RTOR 2/2 leak s/p end ileostomy 6/2022 with multiple episodes JAMISON since sent in by nephrologist Dr. Ram for reported Cr 3.38 admitted JAMISON, UTI."    No recent CXR    Patient is known to this service as patient was seen during previous admissions at which time patient completed a Cinesophagram on 8/24 at which time soft and bite sized solids with thin liquids was recommended. Patient seen during a recent admission on 9/27 at which time soft and bite sized solids with thin liquids was recommended (please see report/consult).    Patient seen at bedside this afternoon for an initial assessment of the swallow at which time patient was alert. Daughter present at bedside and provided Tamazight translation per patient preference. Daughter states that during a previous admission, patient did not have difficulty swallowing, however, patient felt decreased appetite.

## 2022-10-23 DIAGNOSIS — E55.9 VITAMIN D DEFICIENCY, UNSPECIFIED: ICD-10-CM

## 2022-10-23 LAB
-  AMIKACIN: SIGNIFICANT CHANGE UP
-  AMOXICILLIN/CLAVULANIC ACID: SIGNIFICANT CHANGE UP
-  AMPICILLIN/SULBACTAM: SIGNIFICANT CHANGE UP
-  AMPICILLIN: SIGNIFICANT CHANGE UP
-  AZTREONAM: SIGNIFICANT CHANGE UP
-  CEFAZOLIN: SIGNIFICANT CHANGE UP
-  CEFEPIME: SIGNIFICANT CHANGE UP
-  CEFOXITIN: SIGNIFICANT CHANGE UP
-  CEFTRIAXONE: SIGNIFICANT CHANGE UP
-  CIPROFLOXACIN: SIGNIFICANT CHANGE UP
-  ERTAPENEM: SIGNIFICANT CHANGE UP
-  GENTAMICIN: SIGNIFICANT CHANGE UP
-  IMIPENEM: SIGNIFICANT CHANGE UP
-  LEVOFLOXACIN: SIGNIFICANT CHANGE UP
-  MEROPENEM: SIGNIFICANT CHANGE UP
-  NITROFURANTOIN: SIGNIFICANT CHANGE UP
-  PIPERACILLIN/TAZOBACTAM: SIGNIFICANT CHANGE UP
-  TIGECYCLINE: SIGNIFICANT CHANGE UP
-  TOBRAMYCIN: SIGNIFICANT CHANGE UP
-  TRIMETHOPRIM/SULFAMETHOXAZOLE: SIGNIFICANT CHANGE UP
24R-OH-CALCIDIOL SERPL-MCNC: 8 NG/ML — LOW (ref 30–80)
ANION GAP SERPL CALC-SCNC: 9 MMOL/L — SIGNIFICANT CHANGE UP (ref 7–14)
BUN SERPL-MCNC: 21 MG/DL — SIGNIFICANT CHANGE UP (ref 7–23)
CALCIUM SERPL-MCNC: 8.7 MG/DL — SIGNIFICANT CHANGE UP (ref 8.4–10.5)
CHLORIDE SERPL-SCNC: 109 MMOL/L — HIGH (ref 98–107)
CO2 SERPL-SCNC: 20 MMOL/L — LOW (ref 22–31)
CREAT SERPL-MCNC: 1.54 MG/DL — HIGH (ref 0.5–1.3)
CULTURE RESULTS: SIGNIFICANT CHANGE UP
EGFR: 37 ML/MIN/1.73M2 — LOW
GLUCOSE SERPL-MCNC: 90 MG/DL — SIGNIFICANT CHANGE UP (ref 70–99)
MAGNESIUM SERPL-MCNC: 1.6 MG/DL — SIGNIFICANT CHANGE UP (ref 1.6–2.6)
METHOD TYPE: SIGNIFICANT CHANGE UP
ORGANISM # SPEC MICROSCOPIC CNT: SIGNIFICANT CHANGE UP
ORGANISM # SPEC MICROSCOPIC CNT: SIGNIFICANT CHANGE UP
PHOSPHATE SERPL-MCNC: 3.5 MG/DL — SIGNIFICANT CHANGE UP (ref 2.5–4.5)
POTASSIUM SERPL-MCNC: 4.3 MMOL/L — SIGNIFICANT CHANGE UP (ref 3.5–5.3)
POTASSIUM SERPL-SCNC: 4.3 MMOL/L — SIGNIFICANT CHANGE UP (ref 3.5–5.3)
SODIUM SERPL-SCNC: 138 MMOL/L — SIGNIFICANT CHANGE UP (ref 135–145)
SPECIMEN SOURCE: SIGNIFICANT CHANGE UP

## 2022-10-23 PROCEDURE — 99232 SBSQ HOSP IP/OBS MODERATE 35: CPT

## 2022-10-23 RX ORDER — APIXABAN 2.5 MG/1
5 TABLET, FILM COATED ORAL
Refills: 0 | Status: DISCONTINUED | OUTPATIENT
Start: 2022-10-23 | End: 2022-10-25

## 2022-10-23 RX ORDER — LOPERAMIDE HCL 2 MG
2 TABLET ORAL EVERY 6 HOURS
Refills: 0 | Status: DISCONTINUED | OUTPATIENT
Start: 2022-10-23 | End: 2022-10-25

## 2022-10-23 RX ORDER — ERGOCALCIFEROL 1.25 MG/1
50000 CAPSULE ORAL
Refills: 0 | Status: DISCONTINUED | OUTPATIENT
Start: 2022-10-23 | End: 2022-10-25

## 2022-10-23 RX ADMIN — SODIUM CHLORIDE 1 GRAM(S): 9 INJECTION INTRAMUSCULAR; INTRAVENOUS; SUBCUTANEOUS at 05:37

## 2022-10-23 RX ADMIN — MIRTAZAPINE 15 MILLIGRAM(S): 45 TABLET, ORALLY DISINTEGRATING ORAL at 11:17

## 2022-10-23 RX ADMIN — Medication 1 PACKET(S): at 14:09

## 2022-10-23 RX ADMIN — Medication 1 TABLET(S): at 19:36

## 2022-10-23 RX ADMIN — Medication 1 PACKET(S): at 21:36

## 2022-10-23 RX ADMIN — PANTOPRAZOLE SODIUM 40 MILLIGRAM(S): 20 TABLET, DELAYED RELEASE ORAL at 05:39

## 2022-10-23 RX ADMIN — SODIUM CHLORIDE 1 GRAM(S): 9 INJECTION INTRAMUSCULAR; INTRAVENOUS; SUBCUTANEOUS at 21:36

## 2022-10-23 RX ADMIN — Medication 1 PACKET(S): at 05:40

## 2022-10-23 RX ADMIN — Medication 2 MILLIGRAM(S): at 01:46

## 2022-10-23 RX ADMIN — Medication 2 MILLIGRAM(S): at 22:13

## 2022-10-23 RX ADMIN — APIXABAN 5 MILLIGRAM(S): 2.5 TABLET, FILM COATED ORAL at 17:45

## 2022-10-23 RX ADMIN — Medication 2 MILLIGRAM(S): at 11:16

## 2022-10-23 RX ADMIN — SODIUM CHLORIDE 1 GRAM(S): 9 INJECTION INTRAMUSCULAR; INTRAVENOUS; SUBCUTANEOUS at 14:09

## 2022-10-23 RX ADMIN — ATORVASTATIN CALCIUM 10 MILLIGRAM(S): 80 TABLET, FILM COATED ORAL at 21:36

## 2022-10-23 RX ADMIN — APIXABAN 5 MILLIGRAM(S): 2.5 TABLET, FILM COATED ORAL at 05:36

## 2022-10-23 RX ADMIN — Medication 1 TABLET(S): at 07:42

## 2022-10-23 RX ADMIN — Medication 2 MILLIGRAM(S): at 17:45

## 2022-10-23 RX ADMIN — ERGOCALCIFEROL 50000 UNIT(S): 1.25 CAPSULE ORAL at 16:03

## 2022-10-23 NOTE — PROVIDER CONTACT NOTE (OTHER) - ASSESSMENT
Pt is resting comfortably in bed with no complaints. BP 98/64. All other vitals within stable limits. pt denies chest pain, SOB, and shows no s/s of discomfort.

## 2022-10-23 NOTE — PROGRESS NOTE ADULT - PROBLEM SELECTOR PLAN 1
Creatinine 3.38 as OP, prior dc Cr was 1.34 9/27/2022.  Per chart since ostomy multiple episodes of JAMISON; dc from surgical service on 7/6/22 readmitted 7/25/22 w/ Cr 11 requiring ICU and CVVH.  August 2x JAMISON 1.97 and 2.7. Sept 3.7 w/ COVID.   - Cr downtrending 1.54  - last IVF 10/22, monitor Cr off IVF  - monitor ostomy output, should match PO intake to output; document output  - increase lomotil 1 tab BID,  imodium 2 mg q6h  - surgery c/s, rec PICC w/ IVF for home, will attempt to see how Cr trend/ostomy output is given risk associated w/ PICC such as BSI  - would benefit from ostomy reversal when feasible given frequent complications/admissions  - has elevated kappa and lambda however ratios are wnl, can f/u with OP renal and heme

## 2022-10-23 NOTE — PROGRESS NOTE ADULT - ASSESSMENT
67F obese Armenian/Kumhari speaking HTN (no longer on meds), HLD, R DVT 8/2022 on Lovenox, largely immobile 2/2 prior L tibial fx, SBO in ventral hernia c/b RTOR 2/2 leak s/p end ileostomy 6/2022 with multiple episodes JAMISON since sent in by nephrologist Dr. Ram for reported Cr 3.38 admitted JAMISON, UTI.

## 2022-10-23 NOTE — PROGRESS NOTE ADULT - SUBJECTIVE AND OBJECTIVE BOX
Patient is a 67y old  Female who presents with a chief complaint of JAMISON on outpatient labs, UTI    SUBJECTIVE / OVERNIGHT EVENTS:    daughter translated   Doing OK, no CP, SOB, f/c/n/v  eating more solids per daughter     MEDICATIONS  (STANDING):  apixaban 5 milliGRAM(s) Oral two times a day  atorvastatin 10 milliGRAM(s) Oral at bedtime  diphenoxylate/atropine 1 Tablet(s) Oral <User Schedule>  ergocalciferol 22370 Unit(s) Oral <User Schedule>  influenza  Vaccine (HIGH DOSE) 0.7 milliLiter(s) IntraMuscular once  loperamide 2 milliGRAM(s) Oral every 6 hours  mirtazapine 15 milliGRAM(s) Oral daily  pantoprazole    Tablet 40 milliGRAM(s) Oral before breakfast  psyllium Powder 1 Packet(s) Oral three times a day  sodium chloride 1 Gram(s) Oral every 8 hours    MEDICATIONS  (PRN):  acetaminophen     Tablet .. 650 milliGRAM(s) Oral every 6 hours PRN Temp greater or equal to 38C (100.4F), Mild Pain (1 - 3)    T(C): 37 (10-23-22 @ 12:25), Max: 37 (10-23-22 @ 12:25)  HR: 70 (10-23-22 @ 12:25) (67 - 77)  BP: 135/110 (10-23-22 @ 12:25) (91/54 - 135/110)  RR: 18 (10-23-22 @ 12:25) (17 - 18)  SpO2: 98% (10-23-22 @ 12:25) (97% - 100%)    I&O's Summary    22 Oct 2022 07:01  -  23 Oct 2022 07:00  --------------------------------------------------------  IN: 0 mL / OUT: 525 mL / NET: -525 mL    PHYSICAL EXAM:  GENERAL: NAD, obese   CHEST/LUNG: Clear to auscultation bilaterally; No wheeze  HEART: Regular rate and rhythm; No murmurs, rubs, or gallops  ABDOMEN: Soft, Nontender, Nondistended; Bowel sounds present: RLQ ostomy   EXTREMITIES:   warm and well perfused, No clubbing, cyanosis, or edema  PSYCH: AAOx2  NEUROLOGY: non-focal  SKIN: midline wound superficial with granulation tissue, no s/s of infxn  LABS:                        11.3   5.65  )-----------( 197      ( 22 Oct 2022 05:30 )             36.0     10-23    138  |  109<H>  |  21  ----------------------------<  90  4.3   |  20<L>  |  1.54<H>    Ca    8.7      23 Oct 2022 06:12  Phos  3.5     10-23  Mg     1.60     10-23    Microbiology: Culture Results:   >100,000 CFU/ml Enterobacter cloacae complex (10-20 @ 17:23)    Consultant(s) Notes Reviewed:    Care Discussed with Consultants/Other Providers:

## 2022-10-23 NOTE — PROGRESS NOTE ADULT - PROBLEM SELECTOR PLAN 7
Patient presented w/ incarcerated ventral hernia to [facility] June 2022 s/p lap to open repair 6/15/22 course c/b sepsis due to anastomotic leak s/p small bowel resection and end ileostomy w/ fascia closed and wound VAC 6/21/22  - c/w ostomy care  - monitor output, match oral intake   - has f/u w/ Dr. Ochoa 10/25/22 as OP

## 2022-10-24 LAB
ALBUMIN SERPL ELPH-MCNC: 2.2 G/DL — LOW (ref 3.3–5)
ALP SERPL-CCNC: 109 U/L — SIGNIFICANT CHANGE UP (ref 40–120)
ALT FLD-CCNC: 12 U/L — SIGNIFICANT CHANGE UP (ref 4–33)
ANION GAP SERPL CALC-SCNC: 8 MMOL/L — SIGNIFICANT CHANGE UP (ref 7–14)
ANION GAP SERPL CALC-SCNC: 9 MMOL/L — SIGNIFICANT CHANGE UP (ref 7–14)
AST SERPL-CCNC: 30 U/L — SIGNIFICANT CHANGE UP (ref 4–32)
BILIRUB DIRECT SERPL-MCNC: <0.2 MG/DL — SIGNIFICANT CHANGE UP (ref 0–0.3)
BILIRUB INDIRECT FLD-MCNC: >0.1 MG/DL — SIGNIFICANT CHANGE UP (ref 0–1)
BILIRUB SERPL-MCNC: 0.3 MG/DL — SIGNIFICANT CHANGE UP (ref 0.2–1.2)
BUN SERPL-MCNC: 15 MG/DL — SIGNIFICANT CHANGE UP (ref 7–23)
BUN SERPL-MCNC: 16 MG/DL — SIGNIFICANT CHANGE UP (ref 7–23)
CALCIUM SERPL-MCNC: 8.8 MG/DL — SIGNIFICANT CHANGE UP (ref 8.4–10.5)
CALCIUM SERPL-MCNC: 8.9 MG/DL — SIGNIFICANT CHANGE UP (ref 8.4–10.5)
CHLORIDE SERPL-SCNC: 104 MMOL/L — SIGNIFICANT CHANGE UP (ref 98–107)
CHLORIDE SERPL-SCNC: 106 MMOL/L — SIGNIFICANT CHANGE UP (ref 98–107)
CO2 SERPL-SCNC: 21 MMOL/L — LOW (ref 22–31)
CO2 SERPL-SCNC: 22 MMOL/L — SIGNIFICANT CHANGE UP (ref 22–31)
CREAT SERPL-MCNC: 1.29 MG/DL — SIGNIFICANT CHANGE UP (ref 0.5–1.3)
CREAT SERPL-MCNC: 1.33 MG/DL — HIGH (ref 0.5–1.3)
EGFR: 44 ML/MIN/1.73M2 — LOW
EGFR: 45 ML/MIN/1.73M2 — LOW
GLUCOSE SERPL-MCNC: 108 MG/DL — HIGH (ref 70–99)
GLUCOSE SERPL-MCNC: 97 MG/DL — SIGNIFICANT CHANGE UP (ref 70–99)
HCT VFR BLD CALC: 35.7 % — SIGNIFICANT CHANGE UP (ref 34.5–45)
HGB BLD-MCNC: 11 G/DL — LOW (ref 11.5–15.5)
MAGNESIUM SERPL-MCNC: 1.5 MG/DL — LOW (ref 1.6–2.6)
MAGNESIUM SERPL-MCNC: 2.1 MG/DL — SIGNIFICANT CHANGE UP (ref 1.6–2.6)
MCHC RBC-ENTMCNC: 26.4 PG — LOW (ref 27–34)
MCHC RBC-ENTMCNC: 30.8 GM/DL — LOW (ref 32–36)
MCV RBC AUTO: 85.8 FL — SIGNIFICANT CHANGE UP (ref 80–100)
NRBC # BLD: 0 /100 WBCS — SIGNIFICANT CHANGE UP (ref 0–0)
NRBC # FLD: 0 K/UL — SIGNIFICANT CHANGE UP (ref 0–0)
PHOSPHATE SERPL-MCNC: 3.7 MG/DL — SIGNIFICANT CHANGE UP (ref 2.5–4.5)
PHOSPHATE SERPL-MCNC: 3.9 MG/DL — SIGNIFICANT CHANGE UP (ref 2.5–4.5)
PLATELET # BLD AUTO: 191 K/UL — SIGNIFICANT CHANGE UP (ref 150–400)
POTASSIUM SERPL-MCNC: 4.3 MMOL/L — SIGNIFICANT CHANGE UP (ref 3.5–5.3)
POTASSIUM SERPL-MCNC: 4.4 MMOL/L — SIGNIFICANT CHANGE UP (ref 3.5–5.3)
POTASSIUM SERPL-SCNC: 4.3 MMOL/L — SIGNIFICANT CHANGE UP (ref 3.5–5.3)
POTASSIUM SERPL-SCNC: 4.4 MMOL/L — SIGNIFICANT CHANGE UP (ref 3.5–5.3)
PROT SERPL-MCNC: 5.9 G/DL — LOW (ref 6–8.3)
RBC # BLD: 4.16 M/UL — SIGNIFICANT CHANGE UP (ref 3.8–5.2)
RBC # FLD: 15.3 % — HIGH (ref 10.3–14.5)
SODIUM SERPL-SCNC: 134 MMOL/L — LOW (ref 135–145)
SODIUM SERPL-SCNC: 136 MMOL/L — SIGNIFICANT CHANGE UP (ref 135–145)
VIT B12 SERPL-MCNC: 420 PG/ML — SIGNIFICANT CHANGE UP (ref 200–900)
WBC # BLD: 4.82 K/UL — SIGNIFICANT CHANGE UP (ref 3.8–10.5)
WBC # FLD AUTO: 4.82 K/UL — SIGNIFICANT CHANGE UP (ref 3.8–10.5)

## 2022-10-24 PROCEDURE — 99233 SBSQ HOSP IP/OBS HIGH 50: CPT

## 2022-10-24 PROCEDURE — 93970 EXTREMITY STUDY: CPT | Mod: 26

## 2022-10-24 RX ORDER — MAGNESIUM SULFATE 500 MG/ML
2 VIAL (ML) INJECTION ONCE
Refills: 0 | Status: COMPLETED | OUTPATIENT
Start: 2022-10-24 | End: 2022-10-24

## 2022-10-24 RX ADMIN — APIXABAN 5 MILLIGRAM(S): 2.5 TABLET, FILM COATED ORAL at 05:30

## 2022-10-24 RX ADMIN — Medication 1 TABLET(S): at 07:11

## 2022-10-24 RX ADMIN — SODIUM CHLORIDE 1 GRAM(S): 9 INJECTION INTRAMUSCULAR; INTRAVENOUS; SUBCUTANEOUS at 22:30

## 2022-10-24 RX ADMIN — Medication 1 TABLET(S): at 22:31

## 2022-10-24 RX ADMIN — SODIUM CHLORIDE 1 GRAM(S): 9 INJECTION INTRAMUSCULAR; INTRAVENOUS; SUBCUTANEOUS at 14:55

## 2022-10-24 RX ADMIN — MIRTAZAPINE 15 MILLIGRAM(S): 45 TABLET, ORALLY DISINTEGRATING ORAL at 13:16

## 2022-10-24 RX ADMIN — PANTOPRAZOLE SODIUM 40 MILLIGRAM(S): 20 TABLET, DELAYED RELEASE ORAL at 05:31

## 2022-10-24 RX ADMIN — Medication 1 PACKET(S): at 22:30

## 2022-10-24 RX ADMIN — SODIUM CHLORIDE 1 GRAM(S): 9 INJECTION INTRAMUSCULAR; INTRAVENOUS; SUBCUTANEOUS at 05:31

## 2022-10-24 RX ADMIN — Medication 2 MILLIGRAM(S): at 13:16

## 2022-10-24 RX ADMIN — Medication 1 PACKET(S): at 14:55

## 2022-10-24 RX ADMIN — Medication 1 PACKET(S): at 05:30

## 2022-10-24 RX ADMIN — APIXABAN 5 MILLIGRAM(S): 2.5 TABLET, FILM COATED ORAL at 18:09

## 2022-10-24 RX ADMIN — Medication 2 MILLIGRAM(S): at 05:31

## 2022-10-24 RX ADMIN — Medication 25 GRAM(S): at 18:09

## 2022-10-24 RX ADMIN — Medication 2 MILLIGRAM(S): at 18:09

## 2022-10-24 RX ADMIN — ATORVASTATIN CALCIUM 10 MILLIGRAM(S): 80 TABLET, FILM COATED ORAL at 22:30

## 2022-10-24 NOTE — ADVANCED PRACTICE NURSE CONSULT - ASSESSMENT
General: A&Ox4, able to turn with 1x assistance, incontinent of urine and stool with external urinary collection device (prima fit) in place. Skin warm, dry with increased moisture in intertriginous folds, bilateral feet dry, blanchable erythema on boggy, bilateral heels.     Midline abdominal surgical wound: measuring 16.8cmx6.3cmx0.4cm, 80% red, friable, moist granular tissue, 20% red, agranular tissue. Periwound with reepithelialization and hyperpigmentation circumferentially. Small serosanguinous drainage noted with no odor. No edema, no erythema, no induration, no temperature changes and no overt signs of infection noted.     RUQ Ileostomy: pink oval shaped stoma flush to skin measuring top to bottom 1" and side to side 1 1/4", hyperpigmentation to the peristomal site. Please see A&I flowsheet for full assessment.    Bilateral buttocks: Incontinence and moisture associated dermatitis evidenced by hyperpigmentation to BL buttocks, no open ulcerations or skin breakdown noted at this time.    BL under breasts/abdominal pannus/inner groin: Moisture associated dermatis as evidenced by increased moisture and hyperpigmentation.

## 2022-10-24 NOTE — PROGRESS NOTE ADULT - PROBLEM SELECTOR PLAN 4
Had presented for vaginal bleeding in August, imaging notable for concern for DVT. LE US on 8/4: Acute DVT involving the common femoral vein, femoral vein, and popliteal vein. Presumed provoked 2/2 surgery and limited post-op mobility.   - seems was started on Lovenox given may need EMB, daughter states sister gives injections but are painful and prefers oral pills  - Eliquis covered, transitioned  - daughter states has yet to get EMB due to frequent admits, advised may need to hold prior to procedure, will need to d/w gyn as OP  [ ]  Repeat LE Doppler

## 2022-10-24 NOTE — ADVANCED PRACTICE NURSE CONSULT - REASON FOR CONSULT
Patient known to [facility] service line last seen on previous admission 9/26/22, Patient seen on skin care rounds after wound care referral received for assessment of skin impairment and recommendations of topical management of chronic abdominal surgical wound. Patient is a 67F obese Lao/Kumhari speaking HTN (no longer on meds), HLD, R DVT 8/2022 on Lovenox, largely immobile 2/2 prior L tibial fx, SBO in ventral hernia c/b RTOR 2/2 leak s/p end ileostomy 6/2022 with multiple episodes JAMISON since sent in by nephrologist Dr. Ram for reported Cr 3.38 admitted JAMISON, UTI. Chart reviewed: WBC: 4.82, H&H: 11/35.7, Platelets: 191, INR: 1.16, A1C: 5.1, BMI: 35.2, Wilson 16.

## 2022-10-24 NOTE — PROGRESS NOTE ADULT - ATTENDING COMMENTS
I have personally interviewed and examined this patient, reviewed pertinent labs and imaging, and discussed the case with colleagues, residents, and physician assistants on B Team rounds.  More than 50% of this 35 minute encounter including face to face with the patient was spent counseling and/or coordination of care on ileostomy output.  Time included review of vitals, labs, imaging, discussion with consultants and coordination with the operating room/emergency department.    68yo F with h/o umbilical hernia repair complicated by enterotomy repaired primarily, post op course notable for leak and RTOR for ileostomy. Pt readmitted again for JAMISON due to high ileostomy output and poor PO intake. Pt's daughter expresses that she has made some progress in the past month - with more PO intake of solid food and improved strength (sitting up with minimal assistance, feeding self). She noticed improved wound healing of her midline wound (remains open with granulation tissue) more recently. Since admission, she has started metamucil which also seems to be thickening up the output. Because the patient has started to make some progress with her nutrition and strength, the pt's daughter would prefer to defer surgery until she is better optimized.     - Recommend increasing lomotil to max (Four times daily).   - Continue to monitor ileostomy output   - Recommend outpatient IV infusions for hydration if output remains high (> 1000) despite max lomotil    - Continue PT/mobilization and pre-hab    The active care issues are:  1. malnutrition   2. high ileostomy output   3. JAMISON     The Acute Care Surgery (B Team) Attending Group Practice:  Dr. Elise Mccord    urgent issues - spectra [id number]  nonurgent issues - [phone]  patient appointments or afterhours - [phone]

## 2022-10-24 NOTE — ADVANCED PRACTICE NURSE CONSULT - RECOMMEDATIONS
Topical Recommendations    UQ Ileostomy: Stoma size: Top to bottom: 1", Side to side: 1 1/4"  Supplies utilized:   Liquid barrier film    Flat waffer (1 3/4")- item # [id number]  Drainable pouch (1 3/4")- item # [id number]    Midline abdominal surgical wound: Cleanse with NS, pat dry. Apply Liquid barrier film to periwound skin (allow to dry). Loosely pack cavity with hydrofiber (Aquacel) to absorb and fill dead space. Secure with abdominal pad and paper tape. Change twice a day and PRN if soiled.    BL buttocks/sacrum/perineal area: Cleanse with skin cleanser, pat dry. Apply Juli moisture barrier cream twice a day and PRN with incontinent episodes.     BL under breasts/abdominal pannus/inner groin: Interdry textile sheeting, under intertriginous folds leaving 2 inches exposed at ends to wick, remove to wash & dry affected area, then replace. Individual sheeting may be used for up to 5 days unless soiled.     Apply SWEEN daily moisturizer to BL UEs and LEs, avoiding in between toes.     Continue low air loss bed therapy, continue heel elevation, continue to turn & reposition per protocol, soft pillow between bony prominences, continue moisture management with barrier creams & single breathable pad, continue measures to decrease friction/shear/pressure.     Plan of care discussed with Arina and primary team NAEL Solares.    Please contact Wound Care Service Line if we can be of further assistance (ext [phone]).

## 2022-10-24 NOTE — PROGRESS NOTE ADULT - PROBLEM SELECTOR PLAN 7
Patient presented w/ incarcerated ventral hernia to [facility] June 2022 s/p lap to open repair 6/15/22 course c/b sepsis due to anastomotic leak s/p small bowel resection and end ileostomy w/ fascia closed and wound VAC 6/21/22  - c/w ostomy care  - monitor output, match oral intake   - has f/u w/ Dr. Ochoa 10/25/22 as OP  - Plan for reversal this admission, awaiting final date by Vascular

## 2022-10-24 NOTE — PROGRESS NOTE ADULT - PROBLEM SELECTOR PLAN 1
Creatinine 3.38 as OP, prior dc Cr was 1.34 9/27/2022.  Per chart since ostomy multiple episodes of JAMISON; dc from surgical service on 7/6/22 readmitted 7/25/22 w/ Cr 11 requiring ICU and CVVH.  August 2x JAMISON 1.97 and 2.7. Sept 3.7 w/ COVID.   - Cr improving with PO intake and improvement in ostomy output   - Monitor ostomy output, monitor I/O's  - Continue lomotil 1 tab BID,  imodium 2 mg q6h- ostomy improvement output improved, Cr stable on current regimen so will continue as is   [ ] surgery c/s, possible ostomy reversal this admission  [ ] has elevated kappa and lambda however ratios are wnl, can f/u with OP renal and heme

## 2022-10-24 NOTE — PROGRESS NOTE ADULT - SUBJECTIVE AND OBJECTIVE BOX
Merlin Mathew, MD   Hospitalist  Pager #[phone]    PROGRESS NOTE:     Patient is a 67y old  Female who presents with a chief complaint of JAMISON on outpatient labs, UTI (24 Oct 2022 10:05)      SUBJECTIVE / OVERNIGHT EVENTS: NEON   Patient feels well  Daughter at bedside- reports ostomy output has improved greatly- is more formed and output has decreased  Has been tolerating some PO intake- overall improved since surgery but not back to her baseline pre-op.     ADDITIONAL REVIEW OF SYSTEMS:    MEDICATIONS  (STANDING):  apixaban 5 milliGRAM(s) Oral two times a day  atorvastatin 10 milliGRAM(s) Oral at bedtime  diphenoxylate/atropine 1 Tablet(s) Oral <User Schedule>  ergocalciferol 65865 Unit(s) Oral <User Schedule>  influenza  Vaccine (HIGH DOSE) 0.7 milliLiter(s) IntraMuscular once  loperamide 2 milliGRAM(s) Oral every 6 hours  magnesium sulfate  IVPB 2 Gram(s) IV Intermittent once  mirtazapine 15 milliGRAM(s) Oral daily  pantoprazole    Tablet 40 milliGRAM(s) Oral before breakfast  psyllium Powder 1 Packet(s) Oral three times a day  sodium chloride 1 Gram(s) Oral every 8 hours    MEDICATIONS  (PRN):  acetaminophen     Tablet .. 650 milliGRAM(s) Oral every 6 hours PRN Temp greater or equal to 38C (100.4F), Mild Pain (1 - 3)      CAPILLARY BLOOD GLUCOSE        I&O's Summary    23 Oct 2022 07:01  -  24 Oct 2022 07:00  --------------------------------------------------------  IN: 870 mL / OUT: 1000 mL / NET: -130 mL    24 Oct 2022 07:01  -  24 Oct 2022 13:50  --------------------------------------------------------  IN: 0 mL / OUT: 150 mL / NET: -150 mL        PHYSICAL EXAM:  Vital Signs Last 24 Hrs  T(C): 36.7 (24 Oct 2022 05:30), Max: 36.8 (23 Oct 2022 22:00)  T(F): 98.1 (24 Oct 2022 05:30), Max: 98.3 (23 Oct 2022 22:00)  HR: 71 (24 Oct 2022 05:30) (70 - 79)  BP: 106/51 (24 Oct 2022 05:30) (95/56 - 106/51)  BP(mean): --  RR: 18 (24 Oct 2022 05:30) (18 - 18)  SpO2: 100% (24 Oct 2022 05:30) (100% - 100%)    Parameters below as of 24 Oct 2022 05:30  Patient On (Oxygen Delivery Method): room air      GENERAL: NAD, obese   CHEST/LUNG: Clear to auscultation bilaterally; No wheeze  HEART: Regular rate and rhythm; No murmurs, rubs, or gallops  ABDOMEN: Soft, Nontender, Nondistended; Bowel sounds present: RLQ ostomy   EXTREMITIES:   warm and well perfused, No clubbing, cyanosis, or edema  PSYCH: AAOx2  NEUROLOGY: non-focal  SKIN: midline wound superficial with granulation tissue, no s/s of infxn    LABS:                        11.0   4.82  )-----------( 191      ( 24 Oct 2022 06:03 )             35.7     10-24    136  |  106  |  16  ----------------------------<  97  4.3   |  21<L>  |  1.33<H>    Ca    8.9      24 Oct 2022 06:03  Phos  3.9     10-24  Mg     1.50     10-24    TPro  5.9<L>  /  Alb  2.2<L>  /  TBili  0.3  /  DBili  <0.2  /  AST  30  /  ALT  12  /  AlkPhos  109  10-24                RADIOLOGY & ADDITIONAL TESTS:  Results Reviewed:   Imaging Personally Reviewed:  Electrocardiogram Personally Reviewed:    COORDINATION OF CARE:  Care Discussed with Consultants/Other Providers [Y/N]:  Prior or Outpatient Records Reviewed [Y/N]:

## 2022-10-24 NOTE — PROGRESS NOTE ADULT - SUBJECTIVE AND OBJECTIVE BOX
SUBJECTIVE:   Seen and examined. no acute events overnight.     OBJECTIVE: T(C): 36.7 (10-24-22 @ 05:30), Max: 37 (10-23-22 @ 12:25)  HR: 71 (10-24-22 @ 05:30) (70 - 79)  BP: 106/51 (10-24-22 @ 05:30) (95/56 - 135/110)  RR: 18 (10-24-22 @ 05:30) (18 - 18)  SpO2: 100% (10-24-22 @ 05:30) (98% - 100%)  Wt(kg): --  I&O's Summary    23 Oct 2022 07:01  -  24 Oct 2022 07:00  --------------------------------------------------------  IN: 870 mL / OUT: 1000 mL / NET: -130 mL      I&O's Detail    23 Oct 2022 07:01  -  24 Oct 2022 07:00  --------------------------------------------------------  IN:    Oral Fluid: 870 mL  Total IN: 870 mL    OUT:    Ileostomy (mL): 700 mL    Voided (mL): 300 mL  Total OUT: 1000 mL    Total NET: -130 mL        GEN: resting comfortably in bed, in NAD  RESP: no acute respiratory distress, breathing comfortably   ABD: soft, non-distended, non-tender. Ostomy with liquid output.   EXT:  WWP, PETERSON   NEURO:  no focal neuro deficits     MEDICATIONS  (STANDING):  apixaban 5 milliGRAM(s) Oral two times a day  atorvastatin 10 milliGRAM(s) Oral at bedtime  diphenoxylate/atropine 1 Tablet(s) Oral <User Schedule>  ergocalciferol 63601 Unit(s) Oral <User Schedule>  influenza  Vaccine (HIGH DOSE) 0.7 milliLiter(s) IntraMuscular once  loperamide 2 milliGRAM(s) Oral every 6 hours  mirtazapine 15 milliGRAM(s) Oral daily  pantoprazole    Tablet 40 milliGRAM(s) Oral before breakfast  psyllium Powder 1 Packet(s) Oral three times a day  sodium chloride 1 Gram(s) Oral every 8 hours    MEDICATIONS  (PRN):  acetaminophen     Tablet .. 650 milliGRAM(s) Oral every 6 hours PRN Temp greater or equal to 38C (100.4F), Mild Pain (1 - 3)      LABS:                        11.0   4.82  )-----------( 191      ( 24 Oct 2022 06:03 )             35.7     10-24    136  |  106  |  16  ----------------------------<  97  4.3   |  21<L>  |  1.33<H>    Ca    8.9      24 Oct 2022 06:03  Phos  3.9     10-24  Mg     1.50     10-24    TPro  5.9<L>  /  Alb  2.2<L>  /  TBili  0.3  /  DBili  <0.2  /  AST  30  /  ALT  12  /  AlkPhos  109  10-24

## 2022-10-24 NOTE — PROGRESS NOTE ADULT - ASSESSMENT
66yo F (Wolof dialect speaking) h/o HTN, HLD, s/p lap to open repair of incarcerated ventral hernia c/b enterotomy (repaired) 6/15 c/b enterotomy leak s/p repair, SBR, and end ileostomy 6/21 c/b by multiple medical admissions for JAMISON presents with JAMISON and UTI. Ostomy function at baseline however patient with repeated admissions for JAMISON 2/2 dehydration.     Plan:  - No acute surgical intervention  - Continue with imodium 2 mg TID and lomotil QD. Can increase lomotil dose if patient ostomy output >1L   - please repeat lower extremity venous duplex to evaluate her recent DVT   - please document medical optimization for potential reversal during this admission  - UTI tx     B team surgery  [id number] 66yo F (Tajik dialect speaking) h/o HTN, HLD, s/p lap to open repair of incarcerated ventral hernia c/b enterotomy (repaired) 6/15 c/b enterotomy leak s/p repair, SBR, and end ileostomy 6/21 c/b by multiple medical admissions for JAMISON presents with JAMISON and UTI. Ostomy function at baseline however patient with repeated admissions for JAMISON 2/2 dehydration.     Plan:  - No acute surgical intervention  - Continue with imodium 2 mg TID and lomotil QD. Can increase lomotil dose if patient ostomy output >1L   - no plan for surgical intervention during this admission, patient can be discharged with outpatient follow up with Dr. Fidel Ochoa  - please call us with any questions     B team surgery  [id number]

## 2022-10-24 NOTE — PROGRESS NOTE ADULT - ASSESSMENT
67F obese Tamazight/Kumhari speaking HTN (no longer on meds), HLD, R DVT 8/2022 on Lovenox, largely immobile 2/2 prior L tibial fx, SBO in ventral hernia c/b RTOR 2/2 leak s/p end ileostomy 6/2022 with multiple episodes JAMISON since sent in by nephrologist Dr. Ram for reported Cr 3.38 admitted JAMISON, UTI.

## 2022-10-25 ENCOUNTER — TRANSCRIPTION ENCOUNTER (OUTPATIENT)
Age: 67
End: 2022-10-25

## 2022-10-25 ENCOUNTER — APPOINTMENT (OUTPATIENT)
Dept: SURGERY | Facility: HOSPITAL | Age: 67
End: 2022-10-25

## 2022-10-25 VITALS
HEART RATE: 88 BPM | SYSTOLIC BLOOD PRESSURE: 111 MMHG | RESPIRATION RATE: 18 BRPM | DIASTOLIC BLOOD PRESSURE: 52 MMHG | OXYGEN SATURATION: 100 % | TEMPERATURE: 99 F

## 2022-10-25 LAB
% ALBUMIN: 33.6 % — SIGNIFICANT CHANGE UP
% ALPHA 1: 4.1 % — SIGNIFICANT CHANGE UP
% ALPHA 2: 13.9 % — SIGNIFICANT CHANGE UP
% BETA: 12.8 % — SIGNIFICANT CHANGE UP
% GAMMA: 35.7 % — SIGNIFICANT CHANGE UP
ALBUMIN SERPL ELPH-MCNC: 2.18 G/DL — LOW (ref 3.3–4.4)
ALBUMIN/GLOB SERPL ELPH: 0.5 RATIO — SIGNIFICANT CHANGE UP
ALPHA1 GLOB SERPL ELPH-MCNC: 0.27 G/DL — SIGNIFICANT CHANGE UP (ref 0.1–0.3)
ALPHA2 GLOB SERPL ELPH-MCNC: 0.9 G/DL — SIGNIFICANT CHANGE UP (ref 0.6–1)
B-GLOBULIN SERPL ELPH-MCNC: 0.83 G/DL — SIGNIFICANT CHANGE UP (ref 0.6–1.1)
BASOPHILS # BLD AUTO: 0.01 K/UL — SIGNIFICANT CHANGE UP (ref 0–0.2)
BASOPHILS NFR BLD AUTO: 0.2 % — SIGNIFICANT CHANGE UP (ref 0–2)
BLD GP AB SCN SERPL QL: NEGATIVE — SIGNIFICANT CHANGE UP
EOSINOPHIL # BLD AUTO: 0.18 K/UL — SIGNIFICANT CHANGE UP (ref 0–0.5)
EOSINOPHIL NFR BLD AUTO: 3.7 % — SIGNIFICANT CHANGE UP (ref 0–6)
GAMMA GLOBULIN: 2.32 G/DL — HIGH (ref 0.7–1.7)
HCT VFR BLD CALC: 35.3 % — SIGNIFICANT CHANGE UP (ref 34.5–45)
HGB BLD-MCNC: 10.8 G/DL — LOW (ref 11.5–15.5)
IANC: 2.24 K/UL — SIGNIFICANT CHANGE UP (ref 1.8–7.4)
IGE SERPL-ACNC: 16 KU/L — SIGNIFICANT CHANGE UP
IMM GRANULOCYTES NFR BLD AUTO: 0.4 % — SIGNIFICANT CHANGE UP (ref 0–0.9)
LYMPHOCYTES # BLD AUTO: 2.09 K/UL — SIGNIFICANT CHANGE UP (ref 1–3.3)
LYMPHOCYTES # BLD AUTO: 42.9 % — SIGNIFICANT CHANGE UP (ref 13–44)
MCHC RBC-ENTMCNC: 26.5 PG — LOW (ref 27–34)
MCHC RBC-ENTMCNC: 30.6 GM/DL — LOW (ref 32–36)
MCV RBC AUTO: 86.5 FL — SIGNIFICANT CHANGE UP (ref 80–100)
MONOCYTES # BLD AUTO: 0.33 K/UL — SIGNIFICANT CHANGE UP (ref 0–0.9)
MONOCYTES NFR BLD AUTO: 6.8 % — SIGNIFICANT CHANGE UP (ref 2–14)
NEUTROPHILS # BLD AUTO: 2.24 K/UL — SIGNIFICANT CHANGE UP (ref 1.8–7.4)
NEUTROPHILS NFR BLD AUTO: 46 % — SIGNIFICANT CHANGE UP (ref 43–77)
NRBC # BLD: 0 /100 WBCS — SIGNIFICANT CHANGE UP (ref 0–0)
NRBC # FLD: 0 K/UL — SIGNIFICANT CHANGE UP (ref 0–0)
PLATELET # BLD AUTO: 172 K/UL — SIGNIFICANT CHANGE UP (ref 150–400)
PROT PATTERN SERPL ELPH-IMP: SIGNIFICANT CHANGE UP
PROT SERPL-MCNC: 6.5 G/DL — SIGNIFICANT CHANGE UP
RBC # BLD: 4.08 M/UL — SIGNIFICANT CHANGE UP (ref 3.8–5.2)
RBC # FLD: 15.4 % — HIGH (ref 10.3–14.5)
RH IG SCN BLD-IMP: POSITIVE — SIGNIFICANT CHANGE UP
WBC # BLD: 4.87 K/UL — SIGNIFICANT CHANGE UP (ref 3.8–10.5)
WBC # FLD AUTO: 4.87 K/UL — SIGNIFICANT CHANGE UP (ref 3.8–10.5)

## 2022-10-25 PROCEDURE — 99232 SBSQ HOSP IP/OBS MODERATE 35: CPT

## 2022-10-25 RX ORDER — DIPHENOXYLATE HCL/ATROPINE 2.5-.025MG
1 TABLET ORAL
Qty: 30 | Refills: 0
Start: 2022-10-25 | End: 2022-11-23

## 2022-10-25 RX ORDER — DIPHENOXYLATE HCL/ATROPINE 2.5-.025MG
1 TABLET ORAL
Qty: 80 | Refills: 0
Start: 2022-10-25 | End: 2022-11-13

## 2022-10-25 RX ORDER — APIXABAN 2.5 MG/1
1 TABLET, FILM COATED ORAL
Qty: 60 | Refills: 0
Start: 2022-10-25 | End: 2022-11-23

## 2022-10-25 RX ORDER — PSYLLIUM SEED (WITH DEXTROSE)
1 POWDER (GRAM) ORAL
Qty: 90 | Refills: 0
Start: 2022-10-25

## 2022-10-25 RX ORDER — DIPHENOXYLATE HCL/ATROPINE 2.5-.025MG
1 TABLET ORAL
Refills: 0 | Status: DISCONTINUED | OUTPATIENT
Start: 2022-10-25 | End: 2022-10-25

## 2022-10-25 RX ORDER — DIPHENOXYLATE HCL/ATROPINE 2.5-.025MG
1 TABLET ORAL
Qty: 90 | Refills: 0
Start: 2022-10-25 | End: 2022-11-23

## 2022-10-25 RX ORDER — SODIUM CHLORIDE 9 MG/ML
1 INJECTION INTRAMUSCULAR; INTRAVENOUS; SUBCUTANEOUS
Qty: 90 | Refills: 0
Start: 2022-10-25 | End: 2022-11-23

## 2022-10-25 RX ADMIN — Medication 1 PACKET(S): at 13:17

## 2022-10-25 RX ADMIN — Medication 2 MILLIGRAM(S): at 13:18

## 2022-10-25 RX ADMIN — APIXABAN 5 MILLIGRAM(S): 2.5 TABLET, FILM COATED ORAL at 05:27

## 2022-10-25 RX ADMIN — Medication 1 PACKET(S): at 05:26

## 2022-10-25 RX ADMIN — MIRTAZAPINE 15 MILLIGRAM(S): 45 TABLET, ORALLY DISINTEGRATING ORAL at 13:17

## 2022-10-25 RX ADMIN — Medication 2 MILLIGRAM(S): at 05:27

## 2022-10-25 RX ADMIN — Medication 2 MILLIGRAM(S): at 00:15

## 2022-10-25 RX ADMIN — PANTOPRAZOLE SODIUM 40 MILLIGRAM(S): 20 TABLET, DELAYED RELEASE ORAL at 09:06

## 2022-10-25 RX ADMIN — SODIUM CHLORIDE 1 GRAM(S): 9 INJECTION INTRAMUSCULAR; INTRAVENOUS; SUBCUTANEOUS at 05:28

## 2022-10-25 RX ADMIN — SODIUM CHLORIDE 1 GRAM(S): 9 INJECTION INTRAMUSCULAR; INTRAVENOUS; SUBCUTANEOUS at 13:17

## 2022-10-25 RX ADMIN — Medication 1 TABLET(S): at 09:06

## 2022-10-25 NOTE — PROGRESS NOTE ADULT - PROBLEM SELECTOR PLAN 2
Reported dysuria  - urine cx E cloacae   - s/p ertapenem, D 3/3
Reported dysuria  - f/u urine cx  - c/w ertapenem, D 2/3
Reported dysuria  - urine cx E cloacae   - s/p ertapenem 3 days
Reported dysuria  - urine cx E cloacae   - s/p ertapenem 3 days
Reported dysuria  - f/u urine cx  - c/w ertapenem, D 3/3

## 2022-10-25 NOTE — PROGRESS NOTE ADULT - PROBLEM SELECTOR PLAN 4
Had presented for vaginal bleeding in August, imaging notable for concern for DVT. LE US on 8/4: Acute DVT involving the common femoral vein, femoral vein, and popliteal vein. Presumed provoked 2/2 surgery and limited post-op mobility.   - seems was started on Lovenox given may need EMB, daughter states sister gives injections but are painful and prefers oral pills  - Eliquis covered, transitioned  - daughter states has yet to get EMB due to frequent admits, advised may need to hold prior to procedure, will need to d/w gyn as OP  - RLE Doppler with subacute, chronic R DVT

## 2022-10-25 NOTE — PROGRESS NOTE ADULT - PROBLEM SELECTOR PLAN 10
Eliquis  S&S eval c/w soft and bite size w/ thin liquids   PT eval- home PT  Full code
Heparin gtt  S&S eval  PT eval  Full code per admitters d/w daughter
Eliquis  S&S eval c/w soft and bite size w/ thin liquids   PT eval- home PT  Full code
Eliquis  S&S eval c/w soft and bite size w/ thin liquids   PT eval- home PT  Full code

## 2022-10-25 NOTE — PROGRESS NOTE ADULT - PROBLEM SELECTOR PLAN 3
Na 132. Does not take NaCl tabs TID but rather qd or BID  - NaCl 1g q8h  - trend Na levels
Na 132. Does not take NaCl tabs TID but rather qd or BID  - NaCl 1g q8h, c/w Na to assist water absorption   - trend Na levels, now 138
Na 132. Does not take NaCl tabs TID but rather qd or BID  - NaCl 1g q8h  - trend Na levels, now 138
Na 132. Does not take NaCl tabs TID but rather qd or BID  - NaCl 1g q8h, c/w Na to assist water absorption   - trend Na levels, now stable
Na 132. Does not take NaCl tabs TID but rather qd or BID  - NaCl 1g q8h, c/w Na to assist water absorption   - trend Na levels, now stable

## 2022-10-25 NOTE — PROGRESS NOTE ADULT - ASSESSMENT
67F obese Swedish/Kumhari speaking HTN (no longer on meds), HLD, R DVT 8/2022 on Lovenox, largely immobile 2/2 prior L tibial fx, SBO in ventral hernia c/b RTOR 2/2 leak s/p end ileostomy 6/2022 with multiple episodes JAMISON since sent in by nephrologist Dr. Ram for reported Cr 3.38 admitted JAMISON, UTI.

## 2022-10-25 NOTE — DISCHARGE NOTE NURSING/CASE MANAGEMENT/SOCIAL WORK - NSDCPEELIQUISFU_GEN_ALL_CORE
quit 20 yrs ago/cigarettes Go for blood tests as directed. Your doctor will do lab tests at regular visits to monitor the effects of this medicine. Please follow up with your doctor and keep your health care provider appointments.

## 2022-10-25 NOTE — PROGRESS NOTE ADULT - PROBLEM SELECTOR PLAN 7
Patient presented w/ incarcerated ventral hernia to [facility] June 2022 s/p lap to open repair 6/15/22 course c/b sepsis due to anastomotic leak s/p small bowel resection and end ileostomy w/ fascia closed and wound VAC 6/21/22  - c/w ostomy care  - monitor output, match oral intake   - has f/u w/ Dr. Ochoa 10/25/22 as OP  - Plan for reversal this admission, awaiting final date by Vascular

## 2022-10-25 NOTE — DISCHARGE NOTE NURSING/CASE MANAGEMENT/SOCIAL WORK - NSSCTYPOFSERV_GEN_ALL_CORE
RN/Wound care. Nurse to visit on 10/26/2022. Nurse to call prior to visit to arrange. Nurse to evaluate for PT services.

## 2022-10-25 NOTE — PROGRESS NOTE ADULT - PROBLEM SELECTOR PROBLEM 1
JAMISON (acute kidney injury)

## 2022-10-25 NOTE — PROGRESS NOTE ADULT - PROBLEM SELECTOR PLAN 1
Creatinine 3.38 as OP, prior dc Cr was 1.34 9/27/2022.  Per chart since ostomy multiple episodes of JAMISON; dc from surgical service on 7/6/22 readmitted 7/25/22 w/ Cr 11 requiring ICU and CVVH.  August 2x JAMISON 1.97 and 2.7. Sept 3.7 w/ COVID.   - Cr improving with PO intake and improvement in ostomy output   - Monitor ostomy output, monitor I/O's  - Continue lomotil 1 tab QID,  imodium 2 mg q6h- ostomy improvement output improved, Cr stable on current regimen so will continue as is   [ ] surgery c/s, possible ostomy reversal this admission- though family possibly declining? Awaiting clarification from attending   [ ] has elevated kappa and lambda however ratios are wnl, can f/u with OP renal and heme

## 2022-10-25 NOTE — DISCHARGE NOTE NURSING/CASE MANAGEMENT/SOCIAL WORK - PATIENT PORTAL LINK FT
You can access the FollowMyHealth Patient Portal offered by [facility] by registering at the following website: [email or web address]. By joining [facility]’s FollowMyHealth portal, you will also be able to view your health information using other applications (apps) compatible with our system.

## 2022-10-25 NOTE — PROGRESS NOTE ADULT - SUBJECTIVE AND OBJECTIVE BOX
Merlin Mathew, MD   Hospitalist  Pager #[phone]    PROGRESS NOTE:     Patient is a 67y old  Female who presents with a chief complaint of JAMISON on outpatient labs, UTI (24 Oct 2022 13:50)      SUBJECTIVE / OVERNIGHT EVENTS:   NEON   Stool is more formed per daughter at bedside   PO intake stable   No abdominal pain   Daughter is not declining ostomy reversal- will consider if recommended by surgical team     ADDITIONAL REVIEW OF SYSTEMS:    MEDICATIONS  (STANDING):  apixaban 5 milliGRAM(s) Oral two times a day  atorvastatin 10 milliGRAM(s) Oral at bedtime  diphenoxylate/atropine 1 Tablet(s) Oral four times a day  ergocalciferol 12132 Unit(s) Oral <User Schedule>  influenza  Vaccine (HIGH DOSE) 0.7 milliLiter(s) IntraMuscular once  loperamide 2 milliGRAM(s) Oral every 6 hours  mirtazapine 15 milliGRAM(s) Oral daily  pantoprazole    Tablet 40 milliGRAM(s) Oral before breakfast  psyllium Powder 1 Packet(s) Oral three times a day  sodium chloride 1 Gram(s) Oral every 8 hours    MEDICATIONS  (PRN):  acetaminophen     Tablet .. 650 milliGRAM(s) Oral every 6 hours PRN Temp greater or equal to 38C (100.4F), Mild Pain (1 - 3)      CAPILLARY BLOOD GLUCOSE        I&O's Summary    24 Oct 2022 07:01  -  25 Oct 2022 07:00  --------------------------------------------------------  IN: 0 mL / OUT: 830 mL / NET: -830 mL    25 Oct 2022 07:01  -  25 Oct 2022 14:30  --------------------------------------------------------  IN: 0 mL / OUT: 300 mL / NET: -300 mL        PHYSICAL EXAM:  Vital Signs Last 24 Hrs  T(C): 36.9 (25 Oct 2022 13:29), Max: 36.9 (24 Oct 2022 15:00)  T(F): 98.4 (25 Oct 2022 13:29), Max: 98.5 (24 Oct 2022 15:00)  HR: 88 (25 Oct 2022 13:29) (69 - 88)  BP: 91/58 (25 Oct 2022 13:29) (91/58 - 133/46)  BP(mean): --  RR: 18 (25 Oct 2022 13:29) (17 - 18)  SpO2: 100% (25 Oct 2022 13:29) (98% - 100%)    Parameters below as of 25 Oct 2022 13:29  Patient On (Oxygen Delivery Method): room air        PHYSICAL EXAM:  GENERAL: NAD, obese   CHEST/LUNG: Clear to auscultation bilaterally; No wheeze  HEART: Regular rate and rhythm; No murmurs, rubs, or gallops  ABDOMEN: Soft, Nontender, Nondistended; Bowel sounds present: RLQ ostomy   EXTREMITIES:   warm and well perfused, No clubbing, cyanosis, or edema  PSYCH: AAOx2  NEUROLOGY: non-focal  SKIN: midline wound superficial with granulation tissue, no s/s of infxn    LABS:                        10.8   4.87  )-----------( 172      ( 25 Oct 2022 06:20 )             35.3     10-24    134<L>  |  104  |  15  ----------------------------<  108<H>  4.4   |  22  |  1.29    Ca    8.8      24 Oct 2022 22:54  Phos  3.7     10-24  Mg     2.10     10-24    TPro  5.9<L>  /  Alb  2.2<L>  /  TBili  0.3  /  DBili  <0.2  /  AST  30  /  ALT  12  /  AlkPhos  109  10-24                RADIOLOGY & ADDITIONAL TESTS:  Results Reviewed:   Imaging Personally Reviewed:  Electrocardiogram Personally Reviewed:    COORDINATION OF CARE:  Care Discussed with Consultants/Other Providers [Y/N]:  Prior or Outpatient Records Reviewed [Y/N]:

## 2022-11-01 NOTE — BRIEF OPERATIVE NOTE - NSICDXBRIEFPROCEDURE_GEN_ALL_CORE_FT
PROCEDURES:  Diagnostic laparoscopy 15-Rambo-2022 18:27:58  Jake العلي  Ventral hernia repair 15-Rambo-2022 18:28:26  Jake العلي  Exploratory laparotomy with small bowel resection 21-Jun-2022 21:22:10  Jake العلي  
PROCEDURES:  Diagnostic laparoscopy 15-Rambo-2022 18:27:58  Jake العلي  Ventral hernia repair 15-Rambo-2022 18:28:26  Jake العلي  
No

## 2022-11-07 PROBLEM — Z86.718 PERSONAL HISTORY OF OTHER VENOUS THROMBOSIS AND EMBOLISM: Chronic | Status: ACTIVE | Noted: 2022-10-20

## 2022-11-22 ENCOUNTER — APPOINTMENT (OUTPATIENT)
Dept: SURGERY | Facility: HOSPITAL | Age: 67
End: 2022-11-22

## 2022-11-22 VITALS
HEIGHT: 60 IN | DIASTOLIC BLOOD PRESSURE: 84 MMHG | TEMPERATURE: 96.6 F | WEIGHT: 190 LBS | BODY MASS INDEX: 37.3 KG/M2 | HEART RATE: 86 BPM | SYSTOLIC BLOOD PRESSURE: 118 MMHG

## 2022-11-23 NOTE — HISTORY OF PRESENT ILLNESS
[de-identified] : Patient well known to me s/p vhr repair complicated by enterotomy requiring repair c/b postop ileus and leak requiring end ileostomy. Patient has since had issues with acute kidney injury for high output ileostomy. Otherwise patient's appetite appears to be stunted estimated caloric intake of 1500. Daughter reports she has low energy and has gotten weaker over last few months and is engaging in PT daily. She reports wound is well taken care of as wound is healing well with good granulation tissue approximately 10x4cm area of granulation tissue with good contraction.

## 2022-11-23 NOTE — PLAN
[FreeTextEntry1] : Provide wound supply care, gauze, telfa\par nutritional optimization\par will discuss regarding need for gattex\par looking for prescription of lomotil for 90 days supply next time

## 2022-11-23 NOTE — ASSESSMENT
[FreeTextEntry1] : s/p end ileostomy, with failure to thrive, poor po intake, and recent admission for acute kidney injury. She is planning to move to [place] with more supportive care and would prefer to have surgery done there with more social support.

## 2022-12-07 NOTE — ED PROVIDER NOTE - NSICDXPASTMEDICALHX_GEN_ALL_CORE_FT
Date of service 12/07/2022   Preop diagnosis:  Mediastinal adenopathy  Postop diagnosis:  Same  Procedure:  Mediastinoscopy with lymph node biopsies   Surgeon:  Marysol  Anesthesia:  General endotracheal    Procedure in detail:  The patient was taken to the operating room under informed consent placed on table in a supine position.  General endotracheal anesthesia was induced by the anesthesia department.  Was prepped and draped in usual sterile fashion over the chest and neck.  Incision made at the base of the neck and carried down to the pretracheal fascia.  The pretracheal fascia was entered and then blunt dissection used in a caudad fashion just anterior to the trachea.  This was dissected all way down to the corie.  A lymph node was identified that the takeoff of the right mainstem bronchus.  This was biopsied and sent for permanent analysis.  Along the right paratracheal space, there was a very large lymph node that was carefully dissected and essentially the entire thing was excised and sent as a frozen section and as culture.  This was clearly the lymph node that was lighting up on the PET scan.  There were no other significant lymph nodes available.  The hemostasis was assured.  The wound irrigated.  It was closed in multiple layers with a running 2-0 Vicryl and the skin with a 3-0 subcuticular stitch.  Frozen section still pending at the time of dictation.   PAST MEDICAL HISTORY:  H/O fracture of tibia and fibula (L)    Hyperlipidemia     Hypertension     Obesity

## 2022-12-12 RX ORDER — DIPHENOXYLATE HCL/ATROPINE 2.5-.025MG
2 TABLET ORAL
Qty: 720 | Refills: 0
Start: 2022-12-12

## 2022-12-12 RX ORDER — DIPHENOXYLATE HCL/ATROPINE 2.5-.025MG
2 TABLET ORAL
Qty: 240 | Refills: 3
Start: 2022-12-12 | End: 2023-04-10

## 2023-01-02 NOTE — PATIENT PROFILE ADULT - DOMESTIC TRAVEL HIGH RISK QUESTION
BIB mother from home for c/o fever x 12 days, body aches and painful urination x 1 day. Tested + for flu A on 12/23 but still with fever. Last given motrin 930 am.
No

## 2023-01-03 ENCOUNTER — NON-APPOINTMENT (OUTPATIENT)
Age: 68
End: 2023-01-03

## 2023-01-03 ENCOUNTER — APPOINTMENT (OUTPATIENT)
Dept: NEPHROLOGY | Facility: CLINIC | Age: 68
End: 2023-01-03

## 2023-01-03 DIAGNOSIS — N17.9 ACUTE KIDNEY FAILURE, UNSPECIFIED: ICD-10-CM

## 2023-01-03 DIAGNOSIS — E87.5 HYPERKALEMIA: ICD-10-CM

## 2023-01-03 DIAGNOSIS — E87.1 HYPO-OSMOLALITY AND HYPONATREMIA: ICD-10-CM

## 2023-01-03 NOTE — REVIEW OF SYSTEMS
[Feeling Poorly] : feeling poorly [Feeling Tired] : feeling tired [Vomiting] : vomiting [Difficulty Walking] : difficulty walking [Anxiety] : anxiety [Negative] : Heme/Lymph [FreeTextEntry2] : poor appetite [FreeTextEntry7] : nausea

## 2023-01-03 NOTE — REASON FOR VISIT
[Home] : at home, [unfilled] , at the time of the visit. [Medical Office: ([facility])___] : at the medical office located in  [Family Member] : family member [Verbal consent obtained from patient] : the patient, [unfilled] [Initial Evaluation] : an initial evaluation

## 2023-01-03 NOTE — HISTORY OF PRESENT ILLNESS
[FreeTextEntry1] : Pt. doing follow up visit for JAMISON. \par \par Ms. 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 \par \par Per daughter, pt's appetite has been lately poor. as per her, she had repeat lab tests done last week that showed Scr of 1.0, Na was 138, K was 4.0, CO2 was 23 (labs done on 12/27/22). Denies use of any NSAIds, and take PPIs only as needed. Endorses occasional itching while urinating.

## 2023-01-03 NOTE — CONSULT LETTER
[Dear  ___] : Dear  [unfilled], [Courtesy Letter:] : I had the pleasure of seeing your patient, [unfilled], in my office today. [( Thank you for referring [unfilled] for consultation for _____ )] : Thank you for referring [unfilled] for consultation for [unfilled] [Please see my note below.] : Please see my note below. [Consult Closing:] : Thank you very much for allowing me to participate in the care of this patient.  If you have any questions, please do not hesitate to contact me. [Sincerely,] : Sincerely, [FreeTextEntry3] : Amanda Ram MD

## 2023-01-03 NOTE — ASSESSMENT
[FreeTextEntry1] : JAMISON: Pt. with baseline normal kidney function (Scr was 0.99 on 8/26/22), with JAMISON likely hemodynamically mediated/ prerenal in the setting of high ostomy output, with decreased PO Intake, nausea and vomiting. \par Scr was elevated at 3.7 on labs done on 9/14/22. Recent Scr decreased to 1.0 as per daughter. \par Pt. now with decrease in ostomy output with use of higher dose of loperamide, and also has vomiting less frequently. \par Monitor labs. \par Encourage po Intake. \par \par Hyponatremia: Pt. with chronic hyponatremia in the setting of decreased PO intake and increased ostomy output. Pt. also with ?SIADH in the setting of episodes of severe nausea. \par Recent sodium level WNL (138) as reported per daughter\par Currently taking 2 salt tabs BID. \par Monitor sodium, serum osm, urine osm and uric acid levels. \par \par Hyperkalemia: in the setting of JAMISON. \par Recent potassium level wnl. \par Monitor potassium and Mg levels. \par \par \par Pt. with resolved JAMISON and no electrolyte abnormalities at this time. Advised to continue to have labs done w/ PCP and follow up with nephrology if needed for any electrolyte abnormalities. \par

## 2023-01-10 ENCOUNTER — APPOINTMENT (OUTPATIENT)
Dept: SURGERY | Facility: HOSPITAL | Age: 68
End: 2023-01-10

## 2023-01-10 RX ORDER — DIPHENOXYLATE HYDROCHLORIDE AND ATROPINE SULFATE 2.5; .025 MG/1; MG/1
2.5-0.025 TABLET ORAL
Qty: 240 | Refills: 0 | Status: ACTIVE | COMMUNITY
Start: 2023-01-10 | End: 1900-01-01

## 2023-01-10 RX ORDER — DIPHENOXYLATE HYDROCHLORIDE AND ATROPINE SULFATE 2.5; .025 MG/1; MG/1
2.5-0.025 TABLET ORAL 4 TIMES DAILY
Qty: 240 | Refills: 3 | Status: ACTIVE | COMMUNITY
Start: 2023-01-10 | End: 1900-01-01

## 2023-01-10 RX ORDER — ADHESIVE TAPE 3"X 2.3 YD
4"X4" TAPE, NON-MEDICATED TOPICAL
Qty: 10 | Refills: 0 | Status: ACTIVE | COMMUNITY
Start: 2023-01-10 | End: 1900-01-01

## 2023-01-10 RX ORDER — LOPERAMIDE HYDROCHLORIDE 2 MG/1
2 TABLET ORAL 3 TIMES DAILY
Qty: 90 | Refills: 2 | Status: ACTIVE | COMMUNITY
Start: 2023-01-10 | End: 1900-01-01

## 2023-01-10 RX ORDER — DIPHENOXYLATE HYDROCHLORIDE AND ATROPINE SULFATE 2.5; .025 MG/1; MG/1
2.5-0.025 TABLET ORAL 4 TIMES DAILY
Qty: 240 | Refills: 3 | Status: COMPLETED | COMMUNITY
Start: 2023-01-10

## 2023-01-10 RX ORDER — SODIUM CHLORIDE 0.9 % (FLUSH) 0.9 %
0.9 SYRINGE (ML) INJECTION DAILY
Qty: 3 | Refills: 4 | Status: COMPLETED | COMMUNITY
Start: 2023-01-10

## 2023-01-10 RX ORDER — PROPYLENE GLYCOL/PEG 400 0.3 %-0.4%
DROPS OPHTHALMIC (EYE)
Qty: 5 | Refills: 0 | Status: ACTIVE | COMMUNITY
Start: 2023-01-10 | End: 1900-01-01

## 2023-01-25 RX ORDER — DIPHENOXYLATE HYDROCHLORIDE AND ATROPINE SULFATE 2.5; .025 MG/1; MG/1
2.5-0.025 TABLET ORAL 4 TIMES DAILY
Qty: 240 | Refills: 3 | Status: ACTIVE | COMMUNITY
Start: 2023-01-25 | End: 1900-01-01

## 2023-03-02 NOTE — ED ADULT NURSE NOTE - NS ED NOTE  TALK SOMEONE YN
[address] call [phone]  for a mutual patient on medication to expedite      HYDROCHLOROTHIAZIDE 25 MG Oral Tab 90 tablet 2 3/1/2023    Sig: Stephan Parnell 1 TABLET DAILY         [facility] - Karlene Pollack, [address] [place] [address] [phone], [phone]   [address] [place] [address]   Phone: [phone] Fax: [phone]   Hours: Not open 24 hours No

## 2023-08-23 NOTE — DISCHARGE NOTE PROVIDER - NSDCHHCONTACT_GEN_ALL_CORE_FT
Detail Level: Generalized
Detail Level: Zone
As certified below, I, or a nurse practitioner or physician assistant working with me, had a face-to-face encounter that meets the physician face-to-face encounter requirements.
Detail Level: Detailed

## 2023-11-14 NOTE — ED PROVIDER NOTE - SECONDARY DIAGNOSIS.
Addendum  created 11/14/23 1706 by ARCENIO Morris CRNA    Intraprocedure Meds edited, Orders acknowledged in Narrator JAMISON (acute kidney injury)

## 2023-12-08 NOTE — PROGRESS NOTE ADULT - PROBLEM SELECTOR PLAN 1
Patient is here for follow up ask questions for vaccines, also cologuard order.   Pt with hyponatremia to 116 on admission with osmolality 298. Pt with hypovolemic hyponatremia likely 2/2 decreased PO intake and high output from ostomy.   - Pt s/p 1L normal saline   - Pt corrected to 122 with NaCl, will hold fluids and monitor Na, if continues to uptrend then likely start D5  - goal to not overcorrect more than 6-8mEq/L in 24 hours  - BMP q8h  - Renal consult

## 2024-02-08 NOTE — PROCEDURE NOTE - NSSECUREBY_VASC_A_CORE
Patient attended Phase 2 Cardiac Rehab Exercise Session. Further documentation will be scanned into the medical record upon discharge.    
stabilization device/sterile occulsive dressing

## 2024-03-01 NOTE — PROGRESS NOTE ADULT - PROBLEM/PLAN-5
DISPLAY PLAN FREE TEXT
Performed

## 2024-03-06 NOTE — DIETITIAN INITIAL EVALUATION ADULT - PROBLEM SELECTOR PROBLEM 2
Problem: Respiratory Impairment - Respiratory Therapy 253  Intervention: Inhaled medication delivery  Note: Intervention Status  Done      JAMISON (acute kidney injury)

## 2024-03-26 NOTE — ED PROVIDER NOTE - WR ORDER NAME 1
Addended by: DAMIEN ASCENCIO on: 3/26/2024 02:20 PM     Modules accepted: Orders     Xray Ankle Complete 3 Views, Left

## 2024-09-09 NOTE — ED PROVIDER NOTE - CLINICAL SUMMARY MEDICAL DECISION MAKING FREE TEXT BOX
67F pmh htn, hld, dm, prior cholecystectomy who presents with 1 day abd pain, distention, n/v ddx sbo vs lbo.  will get labs, CT abd/pelv (4) Less than 3 years old

## (undated) DEVICE — SUT SILK 3-0 18" SH (POP-OFF)

## (undated) DEVICE — TAPE SILK 2"

## (undated) DEVICE — POSITIONER STRAP ARMBOARD VELCRO TS-30

## (undated) DEVICE — GLV 6.5 PROTEXIS W HYDROGEL

## (undated) DEVICE — DRAPE TOWEL BLUE 17" X 24"

## (undated) DEVICE — GLV 8 PROTEXIS (WHITE)

## (undated) DEVICE — ELCTR GROUNDING PAD ADULT COVIDIEN

## (undated) DEVICE — SUT MAXON 1 36" GS-24

## (undated) DEVICE — SUT MAXON 0 36" T-12

## (undated) DEVICE — POSITIONER FOAM EGG CRATE ULNAR 2PCS (PINK)

## (undated) DEVICE — PACK MAJOR ABDOMINAL WITH LAP

## (undated) DEVICE — LIGASURE IMPACT

## (undated) DEVICE — SUT SILK 2-0 18" SH (POP-OFF)

## (undated) DEVICE — GLV 7.5 PROTEXIS (WHITE)

## (undated) DEVICE — TUBING INSUFFLATION LAP FILTER 10FT

## (undated) DEVICE — CATH IV SAFE INSYTE 14G X 1.75" (ORANGE)

## (undated) DEVICE — Device

## (undated) DEVICE — ELCTR BOVIE TIP BLADE INSULATED 6.5" EDGE

## (undated) DEVICE — TIP METZENBAUM SCISSOR MONOPOLAR ENDOCUT (ORANGE)

## (undated) DEVICE — PACK GENERAL LAPAROSCOPY

## (undated) DEVICE — TROCAR COVIDIEN VERSAONE FIXATION CANNULA 5MM

## (undated) DEVICE — STAPLER COVIDIEN ENDO GIA SHORT HANDLE

## (undated) DEVICE — FOLEY TRAY 16FR 5CC LF UMETER CLOSED

## (undated) DEVICE — BLADE SURGICAL #15 CARBON

## (undated) DEVICE — SUT PROLENE 2 60" TP-1

## (undated) DEVICE — PROTECTOR HEEL / ELBOW FLUFFY

## (undated) DEVICE — TROCAR COVIDIEN VERSAONE BLADELESS FIXATION 5MM STANDARD

## (undated) DEVICE — DRAPE TOWEL BLUE STICKY

## (undated) DEVICE — DRSG CURITY GAUZE SPONGE 4 X 4" 12-PLY

## (undated) DEVICE — SUT MONOCRYL 4-0 27" PS-2 UNDYED

## (undated) DEVICE — DISSECTOR ENDOSCOPIC KITTNER SINGLE TIP

## (undated) DEVICE — POOLE SUCTION TIP

## (undated) DEVICE — SUT PDS II 1 27" CT

## (undated) DEVICE — DRAPE CAMERA COVER

## (undated) DEVICE — TROCAR COVIDIEN VERSAONE BLADELESS FIXATION 11MM STANDARD

## (undated) DEVICE — WARMING BLANKET FULL ADULT

## (undated) DEVICE — SOL IRR POUR H2O 1500ML

## (undated) DEVICE — SUT VICRYL 3-0 18" SH UNDYED (POP-OFF)

## (undated) DEVICE — CANISTER W/GEL INFOVAC 500ML

## (undated) DEVICE — TUBING HYDRO-SURG PLUS IRRIGATOR W SMOKEVAC & PROBE

## (undated) DEVICE — DRSG TELFA 3 X 8

## (undated) DEVICE — ELCTR BOVIE TIP BLADE INSULATED 2.75" EDGE

## (undated) DEVICE — INSUFFLATION NDL COVIDIEN SURGINEEDLE VERESS 120MM

## (undated) DEVICE — TUBING OLYMPUS INSUFFLATION

## (undated) DEVICE — SOL IRR POUR H2O 500ML

## (undated) DEVICE — SUT VICRYL 0 27" UR-6

## (undated) DEVICE — SOL IRR POUR NS 0.9% 1500ML

## (undated) DEVICE — D HELP - CLEARVIEW CLEARIFY SYSTEM

## (undated) DEVICE — DRAPE FLUID WARMER 44 X 44"

## (undated) DEVICE — STAPLER SKIN VISI-STAT 35 WIDE

## (undated) DEVICE — SUT VICRYL 2-0 27" SH UNDYED

## (undated) DEVICE — STAPLER SKIN MULTI DIRECTION W35

## (undated) DEVICE — CANISTER DISPOSABLE THIN WALL 3000CC

## (undated) DEVICE — SUT VICRYL 0 18" TIES UNDYED

## (undated) DEVICE — DRSG VAC GRANUFOAM LARGE (BLACK)

## (undated) DEVICE — VENODYNE/SCD SLEEVE FOOT

## (undated) DEVICE — VENODYNE/SCD SLEEVE CALF MEDIUM

## (undated) DEVICE — DRSG TEGADERM 2.5X3"

## (undated) DEVICE — DRAPE 3/4 SHEET 52X76"

## (undated) DEVICE — SUT VICRYL 2-0 18" TIES UNDYED

## (undated) DEVICE — BASIN SET SINGLE

## (undated) DEVICE — SOL IRR POUR NS 0.9% 1000ML